# Patient Record
Sex: MALE | Race: BLACK OR AFRICAN AMERICAN | Employment: OTHER | URBAN - METROPOLITAN AREA
[De-identification: names, ages, dates, MRNs, and addresses within clinical notes are randomized per-mention and may not be internally consistent; named-entity substitution may affect disease eponyms.]

---

## 2017-01-02 ENCOUNTER — HOSPITAL ENCOUNTER (OUTPATIENT)
Dept: LAB | Age: 77
Discharge: HOME | End: 2017-01-02
Payer: MEDICARE

## 2017-01-02 DIAGNOSIS — C90.00 MULTIPLE MYELOMA NOT HAVING ACHIEVED REMISSION (HCC): ICD-10-CM

## 2017-01-02 DIAGNOSIS — Z92.3 HX OF RADIATION THERAPY: ICD-10-CM

## 2017-01-02 LAB
ALBUMIN SERPL BCP-MCNC: 3 G/DL (ref 3.2–4.6)
ALBUMIN/GLOB SERPL: 0.3 {RATIO} (ref 1.2–3.5)
ALP SERPL-CCNC: 69 U/L (ref 50–136)
ALT SERPL-CCNC: 30 U/L (ref 12–65)
ANION GAP BLD CALC-SCNC: 5 MMOL/L (ref 7–16)
AST SERPL W P-5'-P-CCNC: 70 U/L (ref 15–37)
BASOPHILS # BLD AUTO: 0 K/UL (ref 0–0.2)
BASOPHILS NFR BLD MANUAL: 1 % (ref 0–2)
BILIRUB SERPL-MCNC: 0.4 MG/DL (ref 0.2–1.1)
BLASTS NFR BLD: 2 %
BUN SERPL-MCNC: 24 MG/DL (ref 8–23)
CALCIUM SERPL-MCNC: 10.4 MG/DL (ref 8.3–10.4)
CHLORIDE SERPL-SCNC: 103 MMOL/L (ref 98–107)
CO2 SERPL-SCNC: 27 MMOL/L (ref 23–32)
CREAT SERPL-MCNC: 1.74 MG/DL (ref 0.8–1.5)
DIFFERENTIAL METHOD BLD: ABNORMAL
ERYTHROCYTE [DISTWIDTH] IN BLOOD BY AUTOMATED COUNT: 17.9 % (ref 11.9–14.6)
GLOBULIN SER CALC-MCNC: 9.4 G/DL (ref 2.3–3.5)
GLUCOSE SERPL-MCNC: 151 MG/DL (ref 65–100)
HCT VFR BLD AUTO: 28.8 % (ref 41.1–50.3)
HGB BLD-MCNC: 9.4 G/DL (ref 13.6–17.2)
LDH SERPL L TO P-CCNC: 366 U/L (ref 110–210)
LYMPHOCYTES # BLD: 1.7 K/UL (ref 0.5–4.6)
LYMPHOCYTES NFR BLD MANUAL: 37 % (ref 16–44)
MCH RBC QN AUTO: 22.5 PG (ref 26.1–32.9)
MCHC RBC AUTO-ENTMCNC: 32.6 G/DL (ref 31.4–35)
MCV RBC AUTO: 69.1 FL (ref 79.6–97.8)
MONOCYTES # BLD: 0.6 K/UL (ref 0.1–1.3)
MONOCYTES NFR BLD MANUAL: 12 % (ref 3–9)
NEUTS BAND NFR BLD MANUAL: 8 % (ref 0–6)
NEUTS SEG # BLD: 2.2 K/UL (ref 1.7–8.2)
NEUTS SEG NFR BLD MANUAL: 40 % (ref 47–75)
NRBC # BLD: 0.87 K/UL (ref 0–0.2)
PLATELET # BLD AUTO: 61 K/UL (ref 150–450)
PLATELET COMMENTS,PCOM: ABNORMAL
POTASSIUM SERPL-SCNC: 3.7 MMOL/L (ref 3.5–5.1)
PROT SERPL-MCNC: 12.4 G/DL (ref 6.3–8.2)
RBC # BLD AUTO: 4.17 M/UL (ref 4.23–5.67)
RBC MORPH BLD: ABNORMAL
SODIUM SERPL-SCNC: 135 MMOL/L (ref 136–145)
WBC # BLD AUTO: 4.6 K/UL (ref 4.3–11.1)
WBC MORPH BLD: ABNORMAL

## 2017-01-02 PROCEDURE — 36415 COLL VENOUS BLD VENIPUNCTURE: CPT | Performed by: INTERNAL MEDICINE

## 2017-01-02 PROCEDURE — 85025 COMPLETE CBC W/AUTO DIFF WBC: CPT | Performed by: INTERNAL MEDICINE

## 2017-01-02 PROCEDURE — 83615 LACTATE (LD) (LDH) ENZYME: CPT | Performed by: INTERNAL MEDICINE

## 2017-01-02 PROCEDURE — 80053 COMPREHEN METABOLIC PANEL: CPT | Performed by: INTERNAL MEDICINE

## 2017-01-03 ENCOUNTER — DOCUMENTATION ONLY (OUTPATIENT)
Dept: REGISTRATION | Age: 77
End: 2017-01-03

## 2017-01-03 NOTE — PROGRESS NOTES
I spoke with Mr. Maryan Fox regarding his insurance coverage. He has Care [employer] Plus and has no deductible and $6700 OOP MAX. Mr. Mayran Fox expressed concerns of how he would be financially               able to afford treatment. Mr. Maryan Fox inquired about Medicaid but I informed him that he was over the income limits for Medicaid. I explained that their is a dayanara available through [employer] for $10,000 that he would qualify for. Mr. Anna Underwood wife also expressed that she is not in good health and fears that she cannot physically help her  and asked about home care. I also encouraged Mr. Maryan Fox to speak  with the [employer] to apply for financial assistance. Next, I spoke with patient regarding potential oral medication authorizations. I told him that if he ever had any problems getting his oral medications filled to give the dedicated [address] [place] Yeimy Ortega financial   coordinator Neri Mitchell a call. Most of the time, it is simply an authorization that needs to be done with the insurance company. Next, I spoke with Mr. Maryan Fox regarding enrolling with ACS and [employer]. I went over some of the services that ACS and ACCA offers and the enrollment process. Lastly, I gave patient a form with various resource organizations that could assist with specific needs (example:  transportation, lodging, preparing meals, home cleaning)              Faxed Patient Referral form to the [facility] at [phone]. Phone [phone]. Form scanned into chart. Faxed Doctors Form to the [employer] at [phone]. Phone [phone]. Form scanned into chart.

## 2017-01-04 ENCOUNTER — HOSPITAL ENCOUNTER (OUTPATIENT)
Dept: INFUSION THERAPY | Age: 77
Discharge: HOME | End: 2017-01-04
Payer: MEDICARE

## 2017-01-04 VITALS
OXYGEN SATURATION: 99 % | SYSTOLIC BLOOD PRESSURE: 141 MMHG | HEART RATE: 78 BPM | TEMPERATURE: 97.7 F | BODY MASS INDEX: 27.89 KG/M2 | DIASTOLIC BLOOD PRESSURE: 76 MMHG | WEIGHT: 217.2 LBS | RESPIRATION RATE: 18 BRPM

## 2017-01-04 DIAGNOSIS — C90.00 MULTIPLE MYELOMA NOT HAVING ACHIEVED REMISSION (HCC): ICD-10-CM

## 2017-01-04 PROCEDURE — 96401 CHEMO ANTI-NEOPL SQ/IM: CPT

## 2017-01-04 PROCEDURE — 74011000250 HC RX REV CODE- 250: Performed by: INTERNAL MEDICINE

## 2017-01-04 PROCEDURE — 74011250637 HC RX REV CODE- 250/637: Performed by: INTERNAL MEDICINE

## 2017-01-04 PROCEDURE — 74011250636 HC RX REV CODE- 250/636

## 2017-01-04 PROCEDURE — 74011250636 HC RX REV CODE- 250/636: Performed by: INTERNAL MEDICINE

## 2017-01-04 RX ORDER — DEXAMETHASONE 4 MG/1
40 TABLET ORAL ONCE
Status: DISPENSED | OUTPATIENT
Start: 2017-01-04 | End: 2017-01-05

## 2017-01-04 RX ORDER — ONDANSETRON 8 MG/1
8 TABLET, ORALLY DISINTEGRATING ORAL ONCE
Status: COMPLETED | OUTPATIENT
Start: 2017-01-04 | End: 2017-01-04

## 2017-01-04 RX ADMIN — SODIUM CHLORIDE 2.95 MG: 9 INJECTION INTRAMUSCULAR; INTRAVENOUS; SUBCUTANEOUS at 16:11

## 2017-01-04 RX ADMIN — ONDANSETRON 8 MG: 8 TABLET, ORALLY DISINTEGRATING ORAL at 15:57

## 2017-01-04 NOTE — PROGRESS NOTES
Arrived ambulatory to [facility] to receive D1, C1 Velcade. Observed for 30 minutes post injection. No rxn noted. Tolerated well. Issues or concerns during appointment:  Patient reported that he forgot to take Decadron prior to arrival.  Shruthi Anne RN notified. Orders given for patient to hold the Decadron today and start tomorrow AM.  Patient verbalized understanding. Also, patient instructed to  prescription for acyclovir at pharmacy to begin taking in the AM.    Aware of next appointment on 1/11 at 2:35 AM.  Discharged ambulatory with cane.

## 2017-01-08 ENCOUNTER — HOSPITAL ENCOUNTER (EMERGENCY)
Age: 77
Discharge: HOME | DRG: 439 | End: 2017-01-08
Attending: EMERGENCY MEDICINE
Payer: MEDICARE

## 2017-01-08 ENCOUNTER — APPOINTMENT (OUTPATIENT)
Dept: CT IMAGING | Age: 77
DRG: 439 | End: 2017-01-08
Attending: EMERGENCY MEDICINE
Payer: MEDICARE

## 2017-01-08 VITALS
SYSTOLIC BLOOD PRESSURE: 156 MMHG | BODY MASS INDEX: 27.85 KG/M2 | RESPIRATION RATE: 18 BRPM | HEART RATE: 107 BPM | OXYGEN SATURATION: 95 % | HEIGHT: 74 IN | DIASTOLIC BLOOD PRESSURE: 76 MMHG | TEMPERATURE: 97.6 F | WEIGHT: 217 LBS

## 2017-01-08 DIAGNOSIS — C90.00 MULTIPLE MYELOMA, REMISSION STATUS UNSPECIFIED (HCC): ICD-10-CM

## 2017-01-08 DIAGNOSIS — R10.31 RIGHT LOWER QUADRANT ABDOMINAL PAIN: Primary | ICD-10-CM

## 2017-01-08 LAB
ALBUMIN SERPL BCP-MCNC: 2.9 G/DL (ref 3.2–4.6)
ALBUMIN/GLOB SERPL: 0.3 {RATIO} (ref 1.2–3.5)
ALP SERPL-CCNC: 70 U/L (ref 50–136)
ALT SERPL-CCNC: 26 U/L (ref 12–65)
ANION GAP BLD CALC-SCNC: 7 MMOL/L (ref 7–16)
AST SERPL W P-5'-P-CCNC: 81 U/L (ref 15–37)
BILIRUB SERPL-MCNC: 0.4 MG/DL (ref 0.2–1.1)
BUN SERPL-MCNC: 32 MG/DL (ref 8–23)
CALCIUM SERPL-MCNC: 9.9 MG/DL (ref 8.3–10.4)
CHLORIDE SERPL-SCNC: 102 MMOL/L (ref 98–107)
CO2 SERPL-SCNC: 26 MMOL/L (ref 21–32)
CREAT SERPL-MCNC: 1.6 MG/DL (ref 0.8–1.5)
DIFFERENTIAL METHOD BLD: ABNORMAL
ERYTHROCYTE [DISTWIDTH] IN BLOOD BY AUTOMATED COUNT: 18 % (ref 11.9–14.6)
GLOBULIN SER CALC-MCNC: 9 G/DL (ref 2.3–3.5)
GLUCOSE SERPL-MCNC: 99 MG/DL (ref 65–100)
HCT VFR BLD AUTO: 30 % (ref 41.1–50.3)
HGB BLD-MCNC: 9.9 G/DL (ref 13.6–17.2)
LYMPHOCYTES # BLD: 3 K/UL (ref 0.5–4.6)
LYMPHOCYTES NFR BLD MANUAL: 46 % (ref 16–44)
MCH RBC QN AUTO: 22.9 PG (ref 26.1–32.9)
MCHC RBC AUTO-ENTMCNC: 33 G/DL (ref 31.4–35)
MCV RBC AUTO: 69.4 FL (ref 79.6–97.8)
METAMYELOCYTES NFR BLD MANUAL: 3 %
MONOCYTES # BLD: 0.8 K/UL (ref 0.1–1.3)
MONOCYTES NFR BLD MANUAL: 13 % (ref 3–9)
MYELOCYTES NFR BLD MANUAL: 2 %
NEUTS SEG # BLD: 2.6 K/UL (ref 1.7–8.2)
NEUTS SEG NFR BLD MANUAL: 34 % (ref 47–75)
PLATELET # BLD AUTO: 46 K/UL (ref 150–450)
PLATELET COMMENTS,PCOM: ABNORMAL
PMV BLD AUTO: ABNORMAL FL (ref 10.8–14.1)
POTASSIUM SERPL-SCNC: 3.7 MMOL/L (ref 3.5–5.1)
PROMYELOCYTES NFR BLD MANUAL: 2 %
PROT SERPL-MCNC: 11.9 G/DL (ref 6.3–8.2)
RBC # BLD AUTO: 4.32 M/UL (ref 4.23–5.67)
RBC MORPH BLD: ABNORMAL
SODIUM SERPL-SCNC: 135 MMOL/L (ref 136–145)
WBC # BLD AUTO: 6.4 K/UL (ref 4.3–11.1)
WBC MORPH BLD: ABNORMAL

## 2017-01-08 RX ORDER — HYDROMORPHONE HYDROCHLORIDE 1 MG/ML
0.5 INJECTION, SOLUTION INTRAMUSCULAR; INTRAVENOUS; SUBCUTANEOUS ONCE
Status: COMPLETED | OUTPATIENT
Start: 2017-01-08 | End: 2017-01-08

## 2017-01-08 RX ORDER — SODIUM CHLORIDE 0.9 % (FLUSH) 0.9 %
10 SYRINGE (ML) INJECTION
Status: COMPLETED | OUTPATIENT
Start: 2017-01-08 | End: 2017-01-08

## 2017-01-08 RX ORDER — HYDROMORPHONE HYDROCHLORIDE 1 MG/ML
0.5 INJECTION, SOLUTION INTRAMUSCULAR; INTRAVENOUS; SUBCUTANEOUS
Status: COMPLETED | OUTPATIENT
Start: 2017-01-08 | End: 2017-01-08

## 2017-01-08 RX ADMIN — SODIUM CHLORIDE 1000 ML: 900 INJECTION, SOLUTION INTRAVENOUS at 16:17

## 2017-01-08 RX ADMIN — HYDROMORPHONE HYDROCHLORIDE 0.5 MG: 1 INJECTION, SOLUTION INTRAMUSCULAR; INTRAVENOUS; SUBCUTANEOUS at 19:27

## 2017-01-08 RX ADMIN — HYDROMORPHONE HYDROCHLORIDE 0.5 MG: 1 INJECTION, SOLUTION INTRAMUSCULAR; INTRAVENOUS; SUBCUTANEOUS at 21:17

## 2017-01-08 RX ADMIN — DIATRIZOATE MEGLUMINE AND DIATRIZOATE SODIUM 30 ML: 600; 100 SOLUTION ORAL; RECTAL at 16:53

## 2017-01-08 RX ADMIN — IOPAMIDOL 100 ML: 755 INJECTION, SOLUTION INTRAVENOUS at 18:43

## 2017-01-08 RX ADMIN — Medication 10 ML: at 18:43

## 2017-01-08 RX ADMIN — SODIUM CHLORIDE 100 ML: 900 INJECTION, SOLUTION INTRAVENOUS at 18:43

## 2017-01-08 NOTE — ED TRIAGE NOTES
Reports generalized abdominal pain radiating into the back. States has multiple myeloma and started treatment with chemo last week.

## 2017-01-08 NOTE — ED PROVIDER NOTES
HPI Comments: Patient comes to our department with abdominal pain that is more in the lower region and more to the right than left. In the last year he has had resection of an area of colon cancer. More recently he has been diagnosed with multiple myeloma and had last treatments on Tuesday followed by oral medication on Saturday. He has had no fever. He denies any abdominal distention. He has had no nausea vomiting. He has had neither constipation nor diarrhea. He has an old surgical scar to the right mid abdomen he is uncertain what surgery was done there but this was also used during his colon resection. Cancer : prostate, colon, multiple myeloma    Oncology called prior to patient's arrival to provide us with additional information    Patient is a 67 y.o. male presenting with abdominal pain. The history is provided by the patient and a friend. Abdominal Pain    This is a recurrent problem. The pain is located in the RLQ. The pain is moderate. Associated symptoms include constipation and back pain. Pertinent negatives include no fever, no diarrhea, no hematochezia, no melena, no nausea, no vomiting, no dysuria, no frequency and no hematuria. Nothing worsens the pain.         Past Medical History:   Diagnosis Date    Arrhythmia      LBBB    Arthritis     BMI 30.0-30.9,adult      BMI 30.5    Cardiomyopathy ([facility] [address]      followed by [facility]    Cholelithiases     Chronic systolic heart failure ([facility] [address] 9/13/2011     [place] Ass[place] class II-III heart failure symptoms    Gout     H/O: pituitary tumor      X2 benign, removed    High cholesterol     History of thyroid cancer     History of vertigo      no recent complications or episodes    Hx of radiation therapy to prostate 2004    Hyperlipidemia 11/18/2015    Hypertension     Hypothyroid      hypo- had portion of thyroid removed due to cancer    ICD (implantable cardiac defibrillator) in place 9/2011     Biotronik BIV/ICD, Gen change 3. 2.16    LBBB (left bundle branch block) 11/18/2015    Malaise and fatigue 11/18/2015    Malignant neoplasm of prostate ([address]     Mass of colon      right colon mass    Sinus node dysfunction (HCC)        Past Surgical History:   Procedure Laterality Date    Hx heent  2008     thyroidetomy partial tumor from pituitary x2    Hx cholecystectomy  2013    Hx gi       benign polyps removed    Hx heart catheterization      Hx pacemaker  2011/2016     biotronik biv-icd    Hx colonoscopy       dx with Right colon mass     Hx thyroidectomy  2008    Hx tumor removal  1990/2010     pituitary tumor removed X2         Family History:   Problem Relation Age of Onset    Heart Disease Sister     Heart Attack Sister     Stroke Mother        Social History     Social History    Marital status:      Spouse name: N/A    Number of children: N/A    Years of education: N/A     Occupational History    Not on file. Social History Main Topics    Smoking status: Never Smoker    Smokeless tobacco: Never Used    Alcohol use Yes      Comment: very rare    Drug use: No    Sexual activity: Yes     Partners: Female     Other Topics Concern    Not on file     Social History Narrative         ALLERGIES: Lisinopril and Pcn [penicillins]    Review of Systems   Constitutional: Negative for fever. Respiratory: Negative. Gastrointestinal: Positive for abdominal pain and constipation. Negative for diarrhea, hematochezia, melena, nausea and vomiting. Genitourinary: Negative for dysuria, flank pain, frequency and hematuria. Musculoskeletal: Positive for back pain. Negative for neck pain. All other systems reviewed and are negative.       Vitals:    01/08/17 1700 01/08/17 1720 01/08/17 1740 01/08/17 1800   BP: 165/72 163/70 149/67 156/70   Pulse: 73 74 73 72   Resp:       Temp:       SpO2: 98% 96% 97% 95%   Weight:       Height:                Physical Exam   Constitutional: He appears well-developed and well-nourished. No distress. Chronically ill but not toxic or septic   HENT:   Head: Atraumatic. Mouth/Throat: Oropharynx is clear and moist.   Eyes: No scleral icterus. Neck: Neck supple. Cardiovascular: Normal rate, regular rhythm and intact distal pulses. Pulmonary/Chest: Effort normal. No respiratory distress. He has no wheezes. Abdominal: Bowel sounds are normal. He exhibits no distension. There is tenderness. There is no rigidity, no rebound, no guarding and no CVA tenderness. Musculoskeletal: He exhibits no tenderness. Neurological: He is alert. Skin: Skin is warm and dry. Psychiatric: His behavior is normal. Thought content normal.   Nursing note and vitals reviewed. MDM  Number of Diagnoses or Management Options  Multiple myeloma, remission status unspecified ([facility] [address]):   Right lower quadrant abdominal pain:   Diagnosis management comments: With baseline significant illnesses will be somewhat more aggressive in evaluation than otherwise no acute surgical changes on exam.  Patient has back pain most likely related to his multiple myeloma. It is worse with certain motion. Not have urinary symptoms. Pain is mainly right mid abdomen with no redness rash or abdominal wall defect associated with this. We'll be doing basic lab work CT scan. In light of patient's ongoing concerns with constipation and gave the option of treating constipation as an outpatient versus in the emergency room trial of a single enema. Patient prefers a trial of an enema.        Amount and/or Complexity of Data Reviewed  Clinical lab tests: reviewed and ordered  Tests in the radiology section of CPT®: reviewed and ordered    Risk of Complications, Morbidity, and/or Mortality  Presenting problems: high  Diagnostic procedures: low  Management options: moderate    Patient Progress  Patient progress: stable    ED Course       Procedures    Recent Results (from the past 12 hour(s))   CBC WITH AUTOMATED DIFF Collection Time: 01/08/17  3:29 PM   Result Value Ref Range    WBC 6.4 4.3 - 11.1 K/uL    RBC 4.32 4.23 - 5.67 M/uL    HGB 9.9 (L) 13.6 - 17.2 g/dL    HCT 30.0 (L) 41.1 - 50.3 %    MCV 69.4 (L) 79.6 - 97.8 FL    MCH 22.9 (L) 26.1 - 32.9 PG    MCHC 33.0 31.4 - 35.0 g/dL    RDW 18.0 (H) 11.9 - 14.6 %    PLATELET 46 (L) 172 - 450 K/uL    MPV CANNOT BE CALCULATED 10.8 - 14.1 FL    NEUTROPHILS 34 (L) 47 - 75 %    LYMPHOCYTES 46 (H) 16 - 44 %    MONOCYTES 13 (H) 3 - 9 %    METAMYELOCYTES 3 %    MYELOCYTES 2 %    PROMYELOCYTES 2 %    ABS. NEUTROPHILS 2.6 1.7 - 8.2 K/UL    ABS. LYMPHOCYTES 3.0 0.5 - 4.6 K/UL    ABS. MONOCYTES 0.8 0.1 - 1.3 K/UL    RBC COMMENTS SLIGHT  ANISOCYTOSIS + POIKILOCYTOSIS        RBC COMMENTS MICROCYTOSIS      RBC COMMENTS TEARDROP CELLS      WBC COMMENTS ATYPICAL LYMPHOCYTES PRESENT      PLATELET COMMENTS DECREASED      DF AUTOMATED     METABOLIC PANEL, COMPREHENSIVE    Collection Time: 01/08/17  3:29 PM   Result Value Ref Range    Sodium 135 (L) 136 - 145 mmol/L    Potassium 3.7 3.5 - 5.1 mmol/L    Chloride 102 98 - 107 mmol/L    CO2 26 21 - 32 mmol/L    Anion gap 7 7 - 16 mmol/L    Glucose 99 65 - 100 mg/dL    BUN 32 (H) 8 - 23 MG/DL    Creatinine 1.60 (H) 0.8 - 1.5 MG/DL    GFR est AA 54 (L) >60 ml/min/1.73m2    GFR est non-AA 45 (L) >60 ml/min/1.73m2    Calcium 9.9 8.3 - 10.4 MG/DL    Bilirubin, total 0.4 0.2 - 1.1 MG/DL    ALT 26 12 - 65 U/L    AST 81 (H) 15 - 37 U/L    Alk. phosphatase 70 50 - 136 U/L    Protein, total 11.9 (H) 6.3 - 8.2 g/dL    Albumin 2.9 (L) 3.2 - 4.6 g/dL    Globulin 9.0 (H) 2.3 - 3.5 g/dL    A-G Ratio 0.3 (L) 1.2 - 3.5          CT of the Abdomen and Pelvis with contrast     CLINICAL INDICATION: Acute severe right abdominal pain with history of colon  cancer, prostate cancer, multiple myeloma     COMPARISON: 11/29/2016, 9/23/2016     TECHNIQUE: Automated exposure Control was used.  Multiple axial images were  obtained through the abdomen and pelvis after intravenous injection of 125cc of  isovue 370. Oral contrast given for evaluation of GI tract. Coronal reformatted  images obtained for further evaluation of organs.     FINDINGS: The partially seen lung bases demonstrate mild bilateral lower lobe  peribronchial thickening. The heart is enlarged. There is partial visualization  of pacemaker/defibrillator hardware.     Abdomen: There are no suspicious lesions in the liver or spleen. The adrenal  glands and pancreas appear unremarkable. Cholecystectomy clips are again noted. Probable bilateral renal cysts are similar to prior, but mostly too small to  characterize. There is normal enhancement of the kidneys, no hydronephrosis.      No lymphadenopathy, free air, focal inflammatory changes or fluid collections in  the abdomen. Aorta normal caliber.     There is no evidence of bowel obstruction. Again demonstrated is a right lower  quadrant abdominal wall hernia containing several loops of bowel without  definite inflammation or dilatation.     Pelvis: No acute inflammatory changes or fluid collections in the pelvis. No  lymphadenopathy. Urinary bladder is moderately fluid distended.     Bones demonstrate no acute fracture. Diffuse osseous lesions are again seen  compatible with metastases and/or myeloma. The T10 vertebral lesion measures up  to 2.5 cm (axial image 5, previously 2.3 cm at this level). The others overall  appear slightly increased in prominence and number.        IMPRESSION  IMPRESSION:   1. Diffuse osseous lesions slightly increased since prior, compatible with  myeloma or metastases. 2. Right abdominal wall hernia again noted. No bowel obstruction.   3. Mild bibasilar lung infiltrates.

## 2017-01-09 NOTE — ED NOTES
Pt given enema per order. Able to hold contents for 10  Minutes and is currently sitting on the commode.

## 2017-01-09 NOTE — ED NOTES
Report from Ravi Pham. Pt resting on stretcher flat per pt's request. Just returned from CT and states he is having pain. Will notify MD for treatment of pain.

## 2017-01-09 NOTE — DISCHARGE INSTRUCTIONS

## 2017-01-11 ENCOUNTER — APPOINTMENT (OUTPATIENT)
Dept: ULTRASOUND IMAGING | Age: 77
DRG: 439 | End: 2017-01-11
Attending: NURSE PRACTITIONER
Payer: MEDICARE

## 2017-01-11 ENCOUNTER — HOSPITAL ENCOUNTER (INPATIENT)
Age: 77
LOS: 6 days | Discharge: NURSING, REHAB OR LONG-TERM CARE | DRG: 439 | End: 2017-01-17
Attending: INTERNAL MEDICINE | Admitting: INTERNAL MEDICINE
Payer: MEDICARE

## 2017-01-11 ENCOUNTER — APPOINTMENT (OUTPATIENT)
Dept: GENERAL RADIOLOGY | Age: 77
DRG: 439 | End: 2017-01-11
Attending: NURSE PRACTITIONER
Payer: MEDICARE

## 2017-01-11 ENCOUNTER — HOSPITAL ENCOUNTER (OUTPATIENT)
Dept: INFUSION THERAPY | Age: 77
End: 2017-01-11
Payer: MEDICARE

## 2017-01-11 PROBLEM — N17.9 ACUTE KIDNEY INJURY (HCC): Status: ACTIVE | Noted: 2017-01-11

## 2017-01-11 PROBLEM — R52 INTRACTABLE PAIN: Status: ACTIVE | Noted: 2017-01-11

## 2017-01-11 PROBLEM — D61.818 PANCYTOPENIA (HCC): Status: ACTIVE | Noted: 2017-01-11

## 2017-01-11 PROBLEM — K59.00 CONSTIPATION: Status: ACTIVE | Noted: 2017-01-11

## 2017-01-11 LAB
ALBUMIN SERPL BCP-MCNC: 2.4 G/DL (ref 3.2–4.6)
ALBUMIN/GLOB SERPL: 0.3 {RATIO} (ref 1.2–3.5)
ALP SERPL-CCNC: 95 U/L (ref 50–136)
ALT SERPL-CCNC: 75 U/L (ref 12–65)
AMORPH CRY URNS QL MICRO: ABNORMAL
AMYLASE SERPL-CCNC: 174 U/L (ref 25–115)
ANION GAP BLD CALC-SCNC: 14 MMOL/L (ref 7–16)
APPEARANCE UR: ABNORMAL
AST SERPL W P-5'-P-CCNC: 148 U/L (ref 15–37)
BACTERIA URNS QL MICRO: 0 /HPF
BILIRUB SERPL-MCNC: 0.6 MG/DL (ref 0.2–1.1)
BILIRUB UR QL: NEGATIVE
BLASTS NFR BLD: 4 %
BUN SERPL-MCNC: 71 MG/DL (ref 8–23)
CALCIUM SERPL-MCNC: 9.1 MG/DL (ref 8.3–10.4)
CHLORIDE SERPL-SCNC: 102 MMOL/L (ref 98–107)
CK MB CFR SERPL CALC: 0.3 %
CK MB SERPL-MCNC: 1.8 NG/ML (ref 0.5–3.6)
CK SERPL-CCNC: 706 U/L (ref 21–215)
CO2 SERPL-SCNC: 21 MMOL/L (ref 21–32)
COLOR UR: YELLOW
CREAT SERPL-MCNC: 4.23 MG/DL (ref 0.8–1.5)
CREAT UR-MCNC: 90.4 MG/DL
DIFFERENTIAL METHOD BLD: ABNORMAL
EOSINOPHIL # BLD: 0 K/UL (ref 0–0.8)
EOSINOPHIL NFR BLD MANUAL: 1 % (ref 1–8)
EPI CELLS #/AREA URNS HPF: ABNORMAL /HPF
ERYTHROCYTE [DISTWIDTH] IN BLOOD BY AUTOMATED COUNT: 18.2 % (ref 11.9–14.6)
GLOBULIN SER CALC-MCNC: 7.1 G/DL (ref 2.3–3.5)
GLUCOSE SERPL-MCNC: 95 MG/DL (ref 65–100)
GLUCOSE UR STRIP.AUTO-MCNC: NEGATIVE MG/DL
HCT VFR BLD AUTO: 22 % (ref 41.1–50.3)
HGB BLD-MCNC: 7.4 G/DL (ref 13.6–17.2)
HGB UR QL STRIP: ABNORMAL
KETONES UR QL STRIP.AUTO: NEGATIVE MG/DL
LEUKOCYTE ESTERASE UR QL STRIP.AUTO: NEGATIVE
LIPASE SERPL-CCNC: 384 U/L (ref 73–393)
LYMPHOCYTES # BLD: 2.1 K/UL (ref 0.5–4.6)
LYMPHOCYTES NFR BLD MANUAL: 63 % (ref 16–44)
MAGNESIUM SERPL-MCNC: 2.3 MG/DL (ref 1.8–2.4)
MCH RBC QN AUTO: 22.8 PG (ref 26.1–32.9)
MCHC RBC AUTO-ENTMCNC: 33.6 G/DL (ref 31.4–35)
MCV RBC AUTO: 67.7 FL (ref 79.6–97.8)
METAMYELOCYTES NFR BLD MANUAL: 2 %
MONOCYTES # BLD: 0 K/UL (ref 0.1–1.3)
MONOCYTES NFR BLD MANUAL: 1 % (ref 3–9)
MYELOCYTES NFR BLD MANUAL: 3 %
MYOGLOBIN SERPL-MCNC: 1406 NG/ML (ref 16–96)
NEUTS BAND NFR BLD MANUAL: 4 % (ref 0–10)
NEUTS SEG # BLD: 1.1 K/UL (ref 1.7–8.2)
NEUTS SEG NFR BLD MANUAL: 21 % (ref 47–75)
NITRITE UR QL STRIP.AUTO: NEGATIVE
NRBC BLD-RTO: 3 PER 100 WBC
OTHER OBSERVATIONS,UCOM: ABNORMAL
PH UR STRIP: 5 [PH] (ref 5–9)
PLATELET # BLD AUTO: 31 K/UL (ref 150–450)
PLATELET COMMENTS,PCOM: ABNORMAL
PMV BLD AUTO: ABNORMAL FL (ref 10.8–14.1)
POTASSIUM SERPL-SCNC: 3.8 MMOL/L (ref 3.5–5.1)
PROMYELOCYTES NFR BLD MANUAL: 1 %
PROT SERPL-MCNC: 9.5 G/DL (ref 6.3–8.2)
PROT UR STRIP-MCNC: ABNORMAL MG/DL
RBC # BLD AUTO: 3.25 M/UL (ref 4.23–5.67)
RBC #/AREA URNS HPF: ABNORMAL /HPF
RBC MORPH BLD: ABNORMAL
SODIUM SERPL-SCNC: 137 MMOL/L (ref 136–145)
SODIUM UR-SCNC: 50 MMOL/L
SP GR UR REFRACTOMETRY: 1.01 (ref 1–1.02)
UROBILINOGEN UR QL STRIP.AUTO: 1 EU/DL (ref 0.2–1)
WBC # BLD AUTO: 3.4 K/UL (ref 4.3–11.1)
WBC URNS QL MICRO: ABNORMAL /HPF

## 2017-01-11 PROCEDURE — 80053 COMPREHEN METABOLIC PANEL: CPT | Performed by: NURSE PRACTITIONER

## 2017-01-11 PROCEDURE — 74020 XR ABD (AP AND ERECT OR DECUB): CPT

## 2017-01-11 PROCEDURE — 86644 CMV ANTIBODY: CPT | Performed by: NURSE PRACTITIONER

## 2017-01-11 PROCEDURE — 85025 COMPLETE CBC W/AUTO DIFF WBC: CPT | Performed by: NURSE PRACTITIONER

## 2017-01-11 PROCEDURE — 81001 URINALYSIS AUTO W/SCOPE: CPT | Performed by: NURSE PRACTITIONER

## 2017-01-11 PROCEDURE — 74011250637 HC RX REV CODE- 250/637: Performed by: NURSE PRACTITIONER

## 2017-01-11 PROCEDURE — 86923 COMPATIBILITY TEST ELECTRIC: CPT | Performed by: NURSE PRACTITIONER

## 2017-01-11 PROCEDURE — 65270000029 HC RM PRIVATE

## 2017-01-11 PROCEDURE — 86900 BLOOD TYPING SEROLOGIC ABO: CPT | Performed by: NURSE PRACTITIONER

## 2017-01-11 PROCEDURE — 76770 US EXAM ABDO BACK WALL COMP: CPT

## 2017-01-11 PROCEDURE — 82570 ASSAY OF URINE CREATININE: CPT | Performed by: NURSE PRACTITIONER

## 2017-01-11 PROCEDURE — 77030013131 HC IV BLD ST ICUM -A

## 2017-01-11 PROCEDURE — 83690 ASSAY OF LIPASE: CPT | Performed by: NURSE PRACTITIONER

## 2017-01-11 PROCEDURE — 83735 ASSAY OF MAGNESIUM: CPT | Performed by: NURSE PRACTITIONER

## 2017-01-11 PROCEDURE — 87086 URINE CULTURE/COLONY COUNT: CPT | Performed by: NURSE PRACTITIONER

## 2017-01-11 PROCEDURE — 82550 ASSAY OF CK (CPK): CPT | Performed by: NURSE PRACTITIONER

## 2017-01-11 PROCEDURE — 84300 ASSAY OF URINE SODIUM: CPT | Performed by: NURSE PRACTITIONER

## 2017-01-11 PROCEDURE — 74011250636 HC RX REV CODE- 250/636: Performed by: NURSE PRACTITIONER

## 2017-01-11 PROCEDURE — 36415 COLL VENOUS BLD VENIPUNCTURE: CPT | Performed by: NURSE PRACTITIONER

## 2017-01-11 PROCEDURE — 83874 ASSAY OF MYOGLOBIN: CPT | Performed by: NURSE PRACTITIONER

## 2017-01-11 PROCEDURE — 82150 ASSAY OF AMYLASE: CPT | Performed by: NURSE PRACTITIONER

## 2017-01-11 RX ORDER — ACETAMINOPHEN 325 MG/1
650 TABLET ORAL
Status: DISCONTINUED | OUTPATIENT
Start: 2017-01-11 | End: 2017-01-17 | Stop reason: HOSPADM

## 2017-01-11 RX ORDER — MELATONIN
2000 DAILY
Status: DISCONTINUED | OUTPATIENT
Start: 2017-01-12 | End: 2017-01-17 | Stop reason: HOSPADM

## 2017-01-11 RX ORDER — CARVEDILOL 25 MG/1
25 TABLET ORAL 2 TIMES DAILY WITH MEALS
Status: DISCONTINUED | OUTPATIENT
Start: 2017-01-12 | End: 2017-01-11

## 2017-01-11 RX ORDER — SODIUM CHLORIDE 9 MG/ML
125 INJECTION, SOLUTION INTRAVENOUS CONTINUOUS
Status: DISCONTINUED | OUTPATIENT
Start: 2017-01-11 | End: 2017-01-17 | Stop reason: HOSPADM

## 2017-01-11 RX ORDER — DEXAMETHASONE 4 MG/1
40 TABLET ORAL
Status: DISCONTINUED | OUTPATIENT
Start: 2017-01-12 | End: 2017-01-11

## 2017-01-11 RX ORDER — CHLORHEXIDINE GLUCONATE 1.2 MG/ML
15 RINSE ORAL 4 TIMES DAILY
Status: DISCONTINUED | OUTPATIENT
Start: 2017-01-11 | End: 2017-01-17 | Stop reason: HOSPADM

## 2017-01-11 RX ORDER — LANOLIN ALCOHOL/MO/W.PET/CERES
1000 CREAM (GRAM) TOPICAL DAILY
Status: DISCONTINUED | OUTPATIENT
Start: 2017-01-12 | End: 2017-01-17 | Stop reason: HOSPADM

## 2017-01-11 RX ORDER — ROSUVASTATIN CALCIUM 20 MG/1
10 TABLET, COATED ORAL
Status: DISCONTINUED | OUTPATIENT
Start: 2017-01-11 | End: 2017-01-17 | Stop reason: HOSPADM

## 2017-01-11 RX ORDER — ENOXAPARIN SODIUM 100 MG/ML
40 INJECTION SUBCUTANEOUS EVERY 24 HOURS
Status: DISCONTINUED | OUTPATIENT
Start: 2017-01-11 | End: 2017-01-11

## 2017-01-11 RX ORDER — POLYETHYLENE GLYCOL 3350 17 G/17G
17 POWDER, FOR SOLUTION ORAL DAILY
Status: DISCONTINUED | OUTPATIENT
Start: 2017-01-11 | End: 2017-01-17 | Stop reason: HOSPADM

## 2017-01-11 RX ORDER — LUBIPROSTONE 8 UG/1
8 CAPSULE, GELATIN COATED ORAL DAILY
Status: DISCONTINUED | OUTPATIENT
Start: 2017-01-12 | End: 2017-01-17 | Stop reason: HOSPADM

## 2017-01-11 RX ORDER — ASPIRIN 81 MG/1
81 TABLET ORAL
Status: DISCONTINUED | OUTPATIENT
Start: 2017-01-12 | End: 2017-01-11

## 2017-01-11 RX ORDER — VALSARTAN 320 MG/1
320 TABLET ORAL
Status: DISCONTINUED | OUTPATIENT
Start: 2017-01-11 | End: 2017-01-17 | Stop reason: HOSPADM

## 2017-01-11 RX ORDER — CARVEDILOL 25 MG/1
25 TABLET ORAL 2 TIMES DAILY WITH MEALS
Status: DISCONTINUED | OUTPATIENT
Start: 2017-01-11 | End: 2017-01-17 | Stop reason: HOSPADM

## 2017-01-11 RX ORDER — AMLODIPINE BESYLATE 5 MG/1
5 TABLET ORAL DAILY
Status: DISCONTINUED | OUTPATIENT
Start: 2017-01-12 | End: 2017-01-17 | Stop reason: HOSPADM

## 2017-01-11 RX ORDER — ACYCLOVIR 800 MG/1
400 TABLET ORAL
Status: DISPENSED | OUTPATIENT
Start: 2017-01-11 | End: 2017-01-15

## 2017-01-11 RX ORDER — LEVOTHYROXINE SODIUM 125 UG/1
125 TABLET ORAL
Status: DISCONTINUED | OUTPATIENT
Start: 2017-01-12 | End: 2017-01-17 | Stop reason: HOSPADM

## 2017-01-11 RX ORDER — SODIUM CHLORIDE 9 MG/ML
250 INJECTION, SOLUTION INTRAVENOUS AS NEEDED
Status: DISCONTINUED | OUTPATIENT
Start: 2017-01-11 | End: 2017-01-17 | Stop reason: HOSPADM

## 2017-01-11 RX ORDER — PANTOPRAZOLE SODIUM 40 MG/1
40 TABLET, DELAYED RELEASE ORAL
Status: DISCONTINUED | OUTPATIENT
Start: 2017-01-11 | End: 2017-01-17 | Stop reason: HOSPADM

## 2017-01-11 RX ORDER — DIPHENHYDRAMINE HCL 25 MG
25 CAPSULE ORAL
Status: DISCONTINUED | OUTPATIENT
Start: 2017-01-11 | End: 2017-01-17 | Stop reason: HOSPADM

## 2017-01-11 RX ORDER — ALLOPURINOL 100 MG/1
300 TABLET ORAL
Status: DISCONTINUED | OUTPATIENT
Start: 2017-01-11 | End: 2017-01-11

## 2017-01-11 RX ORDER — FUROSEMIDE 20 MG/1
20 TABLET ORAL
Status: DISCONTINUED | OUTPATIENT
Start: 2017-01-11 | End: 2017-01-11

## 2017-01-11 RX ORDER — OXYCODONE AND ACETAMINOPHEN 5; 325 MG/1; MG/1
1-2 TABLET ORAL
Status: DISCONTINUED | OUTPATIENT
Start: 2017-01-11 | End: 2017-01-17 | Stop reason: HOSPADM

## 2017-01-11 RX ORDER — OXYCODONE HCL 10 MG/1
10 TABLET, FILM COATED, EXTENDED RELEASE ORAL EVERY 12 HOURS
Status: DISCONTINUED | OUTPATIENT
Start: 2017-01-11 | End: 2017-01-17 | Stop reason: HOSPADM

## 2017-01-11 RX ADMIN — ACYCLOVIR 400 MG: 800 TABLET ORAL at 13:31

## 2017-01-11 RX ADMIN — ROSUVASTATIN CALCIUM 10 MG: 20 TABLET, FILM COATED ORAL at 22:56

## 2017-01-11 RX ADMIN — SODIUM CHLORIDE 100 ML/HR: 900 INJECTION, SOLUTION INTRAVENOUS at 22:55

## 2017-01-11 RX ADMIN — LACTULOSE 20 G: 10 SOLUTION ORAL at 18:49

## 2017-01-11 RX ADMIN — OXYCODONE HYDROCHLORIDE 10 MG: 10 TABLET, FILM COATED, EXTENDED RELEASE ORAL at 20:44

## 2017-01-11 RX ADMIN — ACYCLOVIR 400 MG: 800 TABLET ORAL at 22:55

## 2017-01-11 RX ADMIN — CARVEDILOL 25 MG: 25 TABLET, FILM COATED ORAL at 18:50

## 2017-01-11 RX ADMIN — CHLORHEXIDINE GLUCONATE 15 ML: 1.2 RINSE ORAL at 22:55

## 2017-01-11 RX ADMIN — SODIUM CHLORIDE 100 ML/HR: 900 INJECTION, SOLUTION INTRAVENOUS at 11:26

## 2017-01-11 RX ADMIN — ACYCLOVIR 400 MG: 800 TABLET ORAL at 18:49

## 2017-01-11 RX ADMIN — DIPHENHYDRAMINE HYDROCHLORIDE 25 MG: 25 CAPSULE ORAL at 22:56

## 2017-01-11 RX ADMIN — ACETAMINOPHEN 650 MG: 325 TABLET, FILM COATED ORAL at 22:56

## 2017-01-11 RX ADMIN — FUROSEMIDE 20 MG: 20 TABLET ORAL at 13:31

## 2017-01-11 RX ADMIN — VALSARTAN 320 MG: 320 TABLET, FILM COATED ORAL at 22:56

## 2017-01-11 RX ADMIN — PANTOPRAZOLE SODIUM 40 MG: 40 TABLET, DELAYED RELEASE ORAL at 13:30

## 2017-01-11 RX ADMIN — POLYETHYLENE GLYCOL 3350 17 G: 17 POWDER, FOR SOLUTION ORAL at 13:30

## 2017-01-11 NOTE — H&P
Inpatient Hematology / Oncology History and Physical    Reason for Asmission:  Multiple Myeloma/Weakness  Intractable pain    History of Present Illness:  Mr. Regina Summers is a 67 y.o. male admitted on 1/11/2017 with a primary diagnosis of intractable pain and dehydration. He is a known patient of Dr Yuliana Shi for resected colon cancer in Feburary 2016 and a new (12/16) diagnosis of multiple myeloma. He has been experiencing both back and abdominal pain along with general malaise that has markedly increased in the last 24 hours. His wife called his nurse navigator this morning and states he is much weaker and unable to hold his own weight. His wife reports that he fell around 3am today and she and a neighbor were unable to get him up therefore 911 was called for assistance. He was recently seen in the ED over the weekend for RLQ pain and constipation. He was given an enema is minimal relief while in the ED. He states today waking with dried blood to his lips and mouth along with noticing blood in his underwear from his penis. Review of Systems:  Constitutional + fatigue. Denies fever, chills, weight loss, appetite changes, night sweats. HEENT Denies trauma, blurry vision, hearing loss, ear pain, nosebleeds, sore throat, neck pain and ear discharge. Skin Denies lesions or rashes. Lungs Denies dyspnea, cough, sputum production or hemoptysis. Cardiovascular Denies chest pain, palpitations, or lower extremity edema. Gastrointestinal + abdominal pain and constipation. Denies nausea, vomiting, changes in bowel habits, bloody or black stools.  +blood from penis. Denies dysuria, frequency or hesitancy of urination. Neuro Denies headaches, visual changes or ataxia. Denies dizziness, tingling, tremors, sensory change, speech change, focal weakness or headaches. Hematology + bleeding from lip-unsure if from trauma. Denies easy bruising, gingival bleeding or epistaxis.    Endo Denies heat/cold intolerance, denies diabetes or thyroid abnormalities. MSK + back pain and recent falls. Denies arthralgias or myalgias. Psychiatric/Behavioral Denies depression and substance abuse. The patient is not nervous/anxious.          Allergies   Allergen Reactions    Lisinopril Cough    Pcn [Penicillins] Swelling     Past Medical History   Diagnosis Date    Arrhythmia      LBBB    Arthritis     BMI 30.0-30.9,adult      BMI 30.5    Cardiomyopathy ([facility] [address]      followed by [address] [facility]    Cholelithiases     Chronic systolic heart failure ([facility] [address] 9/13/2011     [place] Ass[place] class II-III heart failure symptoms    Gout     H/O: pituitary tumor      X2 benign, removed    High cholesterol     History of thyroid cancer     History of vertigo      no recent complications or episodes    Hx of radiation therapy to prostate 2004    Hyperlipidemia 11/18/2015    Hypertension     Hypothyroid      hypo- had portion of thyroid removed due to cancer    ICD (implantable cardiac defibrillator) in place 9/2011     B[employer]ronik BIV/ICD, Gen change 3.2.16    LBBB (left bundle branch block) 11/18/2015    Malaise and fatigue 11/18/2015    Malignant neoplasm of prostate ([facility] [address]     Mass of colon      right colon mass    Sinus node dysfunction (HCC)      Past Surgical History   Procedure Laterality Date    Hx heent  2008     thyroidetomy partial tumor from pituitary x2    Hx cholecystectomy  2013    Hx gi       benign polyps removed    Hx heart catheterization      Hx pacemaker  2011/2016     biotronik biv-icd    Hx colonoscopy       dx with Right colon mass     Hx thyroidectomy  2008    Hx tumor removal  1990/2010     pituitary tumor removed X2     Family History   Problem Relation Age of Onset    Heart Disease Sister     Heart Attack Sister     Stroke Mother      Social History     Social History    Marital status:      Spouse name: N/A    Number of children: N/A    Years of education: N/A Occupational History    Not on file. Social History Main Topics    Smoking status: Never Smoker    Smokeless tobacco: Never Used    Alcohol use Yes      Comment: very rare    Drug use: No    Sexual activity: Yes     Partners: Female     Other Topics Concern    Not on file     Social History Narrative     Current Facility-Administered Medications   Medication Dose Route Frequency Provider Last Rate Last Dose    acyclovir (ZOVIRAX) tablet 400 mg  400 mg Oral Q4HWA Consuelo Mao NP   400 mg at 01/11/17 1331    allopurinol (ZYLOPRIM) tablet 300 mg  300 mg Oral QHS Consuelo Mao NP       [facility] ON 1/12/2017] amLODIPine (NORVASC) tablet 5 mg  5 mg Oral DAILY Consuelo Mao NP        [START ON 1/12/2017] aspirin delayed-release tablet 81 mg  81 mg Oral 7am Consuelo Mao NP        [START ON 1/12/2017] carvedilol (COREG) tablet 25 mg  25 mg Oral BID WITH MEALS Consuelo Mao NP        [START ON 1/12/2017] . PHARMACY TO SUBSTITUTE PER PROTOCOL    DAILY Consuelo Mao NP        [START ON 1/12/2017] cyanocobalamin tablet 1,000 mcg  1,000 mcg Oral DAILY Consuelo Mao NP        [START ON 1/12/2017] dexamethasone (DECADRON) tablet 40 mg  40 mg Oral Q7D Consuelo Mao NP        furosemide (LASIX) tablet 20 mg  20 mg Oral PCL Consuelo Mao NP   20 mg at 01/11/17 1331    valsartan (DIOVAN) tablet 320 mg  320 mg Oral QHS Consuelo Mao NP        [START ON 1/12/2017] levothyroxine (SYNTHROID) tablet 125 mcg  125 mcg Oral ACB Consuelo Mao NP        . PHARMACY TO SUBSTITUTE PER PROTOCOL    Per Protocol Consuelo Mao NP        oxyCODONE-acetaminophen (PERCOCET) 5-325 mg per tablet 1-2 Tab  1-2 Tab Oral Q4H PRN Consuelo Mao NP        pantoprazole (PROTONIX) tablet 40 mg  40 mg Oral ACB Consuelo Mao NP   40 mg at 01/11/17 1330    polyethylene glycol (MIRALAX) packet 17 g  17 g Oral DAILY Consuelo Mao NP   17 g at 01/11/17 1330    rosuvastatin (CRESTOR) tablet 10 mg 10 mg Oral QHS Edda , NP        enoxaparin (LOVENOX) injection 40 mg  40 mg SubCUTAneous Q24H Edda , NP        0.9% sodium chloride infusion  100 mL/hr IntraVENous CONTINUOUS Edda [place],  mL/hr at 17 1126 100 mL/hr at 17 1126       OBJECTIVE:  Patient Vitals for the past 8 hrs:   BP Temp Pulse Resp SpO2   17 1324 117/61 97.2 °F (36.2 °C) 72 20 93 %   17 1109 - - - - 97 %   17 1105 125/62 100 °F (37.8 °C) 74 20 93 %     Temp (24hrs), Av.6 °F (37 °C), Min:97.2 °F (36.2 °C), Max:100 °F (37.8 °C)     0701 -  1900  In: 240 [P.O.:240]  Out: -     Physical Exam:  Constitutional: Well developed, well nourished male in no acute distress, sitting comfortably in the hospital bed. HEENT: Dried blood to lips and front teeth. Normocephalic and atraumatic. Oropharynx is clear, mucous membranes are moist.  Pupils are equal and round. Extraocular muscles are intact. Sclerae anicteric. Neck supple without JVD. No thyromegaly present. Skin Warm and dry. No bruising and no rash noted. No erythema. No pallor. Respiratory Lungs are clear to auscultation bilaterally without wheezes, rales or rhonchi, normal air exchange without accessory muscle use. CVS Normal rate, regular rhythm and normal S1 and S2. No murmurs, gallops, or rubs. Abdomen + tenderness to lower abdomen. Soft, nondistended, normoactive bowel sounds. No palpable mass. No hepatosplenomegaly. Neuro Grossly nonfocal with no obvious sensory or motor deficits. MSK Generalized weakness. Normal range of motion in general.  No edema and no tenderness. Psych Appropriate mood and affect.         Labs:    Recent Results (from the past 24 hour(s))   METABOLIC PANEL, COMPREHENSIVE    Collection Time: 17 11:50 AM   Result Value Ref Range    Sodium 137 136 - 145 mmol/L    Potassium 3.8 3.5 - 5.1 mmol/L    Chloride 102 98 - 107 mmol/L    CO2 21 21 - 32 mmol/L    Anion gap 14 7 - 16 mmol/L    Glucose 95 65 - 100 mg/dL    BUN 71 (H) 8 - 23 MG/DL    Creatinine 4.23 (H) 0.8 - 1.5 MG/DL    GFR est AA 18 (L) >60 ml/min/1.73m2    GFR est non-AA 15 (L) >60 ml/min/1.73m2    Calcium 9.1 8.3 - 10.4 MG/DL    Bilirubin, total 0.6 0.2 - 1.1 MG/DL    ALT 75 (H) 12 - 65 U/L     (H) 15 - 37 U/L    Alk. phosphatase 95 50 - 136 U/L    Protein, total 9.5 (H) 6.3 - 8.2 g/dL    Albumin 2.4 (L) 3.2 - 4.6 g/dL    Globulin 7.1 (H) 2.3 - 3.5 g/dL    A-G Ratio 0.3 (L) 1.2 - 3.5     MAGNESIUM    Collection Time: 01/11/17 11:50 AM   Result Value Ref Range    Magnesium 2.3 1.8 - 2.4 mg/dL   CBC WITH AUTOMATED DIFF    Collection Time: 01/11/17 11:50 AM   Result Value Ref Range    WBC 3.4 (L) 4.3 - 11.1 K/uL    RBC 3.25 (L) 4.23 - 5.67 M/uL    HGB 7.4 (L) 13.6 - 17.2 g/dL    HCT 22.0 (L) 41.1 - 50.3 %    MCV 67.7 (L) 79.6 - 97.8 FL    MCH 22.8 (L) 26.1 - 32.9 PG    MCHC 33.6 31.4 - 35.0 g/dL    RDW 18.2 (H) 11.9 - 14.6 %    PLATELET 31 (L) 774 - 450 K/uL    MPV CANNOT BE CALCULATED 10.8 - 14.1 FL    DF PENDING        Imaging:  [unfilled]    ASSESSMENT:  Problem List  Date Reviewed: 1/2/2017          Codes Class Noted    Intractable pain ICD-10-CM: R52  ICD-9-CM: 780.96  1/11/2017        Multiple myeloma (HCC) ICD-10-CM: C90.00  ICD-9-CM: 203.00  1/2/2017        Postural imbalance ICD-10-CM: R29.3  ICD-9-CM: 729.90  12/14/2016        Polyneuropathy ICD-10-CM: G62.9  ICD-9-CM: 356.9  12/14/2016        Fall ICD-10-CM: R39. Jossie Lay  ICD-9-CM: E888.9  12/14/2016        Encounter for medication management ICD-10-CM: Z79.899  ICD-9-CM: V58.69  12/14/2016        Urinary retention ICD-10-CM: R33.9  ICD-9-CM: 788.20  6/6/2016        Colonic mass ICD-10-CM: K63.9  ICD-9-CM: 569.89  3/23/2016        Hyperlipidemia ICD-10-CM: E78.5  ICD-9-CM: 272.4  11/18/2015        Vertigo ICD-10-CM: Z59  ICD-9-CM: 780.4  11/18/2015        Malaise and fatigue ICD-10-CM: R53.81, R53.83  ICD-9-CM: 780.79  11/18/2015        Cardiomyopathy ([facility] [address] ICD-10-CM: I42.9  ICD-9-CM: 425.4  11/18/2015        LBBB (left bundle branch block) ICD-10-CM: I44.7  ICD-9-CM: 426.3  11/18/2015        Arthritis ICD-10-CM: M19.90  ICD-9-CM: 716.90  Unknown        Arrhythmia ICD-10-CM: I49.9  ICD-9-CM: 427.9  Unknown    Overview Signed 6/18/2013  2:07 PM by Leonides Cantor MD     LBBB             Cholelithiases ICD-10-CM: R29.58  ICD-9-CM: 574.20  Unknown        Hx of radiation therapy to prostate ICD-10-CM: Z92.3  ICD-9-CM: V15.3  Unknown        Chronic systolic heart failure ([facility] [address] ICD-10-CM: I50.22  ICD-9-CM: 428.22  9/13/2011        Automatic implantable cardioverter-defibrillator in situ ICD-10-CM: Z95.810  ICD-9-CM: V45.02  9/1/2011                PLAN:  -Multiple Myeloma  Currently holding Revlimid, Dexamethasone and Velcade    -Abdominal/Back Pain/Constipation  Order KUB, lactulose, Oxycontin 10mg BID, Percocet 5 1-2 tabs q4h prn    -MARY  Gentle hydration due to EF of 30-35%, renal US, check FeNa, consider nephrology consult tomorrow if no improvement, hold lasix     -Pancytopenia  1 unit RBCs, monitor platelet and WBCs. Hold anticoagulation per platelets <25M. -Mucositis  Good oral hygiene, Peridex mouthwash QID    -Supportive Care  Follow Steven Davis NP   [facility]  [id number] [place] [address]  Office : [phone]  Fax : [phone]   Patient seen, examed and discussed with NP, agree with above history/assessment/plan. 67 y. o.male h/o colon cancer had new dx of MM and started RVD with Dr. Dee Edward, adimitted for abd pain, constipation, dehydration, weakness and unsteady, CT is reviewed personally and appeared stool impaction, s/p enema, give lactulose, check amylase/lipase for pancreatitis, check CPK/myoglobulin increased c/w myopathy, appeared very dry , dehydration and MARY and need IVF, hold chemo. Georgina Herrera M.D.   [address] [address]  Office : [phone]  Fax : [phone]

## 2017-01-11 NOTE — PROGRESS NOTES
Head to  Toe skin assessment completed with this nurse and  Griselda Wood RN charge nurse reveal no skin breaks. Dry skin noted old blood from oral mucosa.

## 2017-01-11 NOTE — PROGRESS NOTES
TRANSFER - IN REPORT:    Verbal report received from Chart review  on Daneil Nim  being received from home- direct admission  To hospital  for routine progression of care      Report consisted of patients Situation, Background, Assessment and   Recommendations(SBAR). Information from the following report(s) SBAR and Kardex was reviewed with the receiving nurse. Opportunity for questions and clarification was provided. Assessment completed upon patients arrival to unit and care assumed.

## 2017-01-12 ENCOUNTER — APPOINTMENT (OUTPATIENT)
Dept: GENERAL RADIOLOGY | Age: 77
DRG: 439 | End: 2017-01-12
Attending: NURSE PRACTITIONER
Payer: MEDICARE

## 2017-01-12 LAB
ALBUMIN SERPL BCP-MCNC: 2.2 G/DL (ref 3.2–4.6)
ALBUMIN/GLOB SERPL: 0.3 {RATIO} (ref 1.2–3.5)
ALP SERPL-CCNC: 85 U/L (ref 50–136)
ALT SERPL-CCNC: 74 U/L (ref 12–65)
ANION GAP BLD CALC-SCNC: 12 MMOL/L (ref 7–16)
AST SERPL W P-5'-P-CCNC: 169 U/L (ref 15–37)
BILIRUB SERPL-MCNC: 0.4 MG/DL (ref 0.2–1.1)
BLASTS NFR BLD: 1 %
BUN SERPL-MCNC: 72 MG/DL (ref 8–23)
CALCIUM SERPL-MCNC: 9.2 MG/DL (ref 8.3–10.4)
CHLORIDE SERPL-SCNC: 107 MMOL/L (ref 98–107)
CO2 SERPL-SCNC: 22 MMOL/L (ref 21–32)
CREAT SERPL-MCNC: 3.91 MG/DL (ref 0.8–1.5)
DIFFERENTIAL METHOD BLD: ABNORMAL
EOSINOPHIL # BLD: 0.1 K/UL (ref 0–0.8)
EOSINOPHIL NFR BLD MANUAL: 3 % (ref 1–8)
ERYTHROCYTE [DISTWIDTH] IN BLOOD BY AUTOMATED COUNT: 18.1 % (ref 11.9–14.6)
GLOBULIN SER CALC-MCNC: 6.6 G/DL (ref 2.3–3.5)
GLUCOSE SERPL-MCNC: 87 MG/DL (ref 65–100)
HCT VFR BLD AUTO: 21.2 % (ref 41.1–50.3)
HGB BLD-MCNC: 7.2 G/DL (ref 13.6–17.2)
LYMPHOCYTES # BLD: 1.6 K/UL (ref 0.5–4.6)
LYMPHOCYTES NFR BLD MANUAL: 62 % (ref 16–44)
MAGNESIUM SERPL-MCNC: 2.2 MG/DL (ref 1.8–2.4)
MCH RBC QN AUTO: 23.1 PG (ref 26.1–32.9)
MCHC RBC AUTO-ENTMCNC: 34 G/DL (ref 31.4–35)
MCV RBC AUTO: 67.9 FL (ref 79.6–97.8)
METAMYELOCYTES NFR BLD MANUAL: 2 %
MYELOCYTES NFR BLD MANUAL: 2 %
NEUTS BAND NFR BLD MANUAL: 6 % (ref 0–10)
NEUTS SEG # BLD: 0.9 K/UL (ref 1.7–8.2)
NEUTS SEG NFR BLD MANUAL: 24 % (ref 47–75)
NRBC BLD-RTO: 7 PER 100 WBC
PLATELET # BLD AUTO: 31 K/UL (ref 150–450)
PLATELET COMMENTS,PCOM: ABNORMAL
PMV BLD AUTO: ABNORMAL FL (ref 10.8–14.1)
POTASSIUM SERPL-SCNC: 4 MMOL/L (ref 3.5–5.1)
PROT SERPL-MCNC: 8.8 G/DL (ref 6.3–8.2)
RBC # BLD AUTO: 3.12 M/UL (ref 4.23–5.67)
RBC MORPH BLD: ABNORMAL
SODIUM SERPL-SCNC: 141 MMOL/L (ref 136–145)
WBC # BLD AUTO: 2.5 K/UL (ref 4.3–11.1)

## 2017-01-12 PROCEDURE — 83735 ASSAY OF MAGNESIUM: CPT | Performed by: NURSE PRACTITIONER

## 2017-01-12 PROCEDURE — 74011250636 HC RX REV CODE- 250/636: Performed by: NURSE PRACTITIONER

## 2017-01-12 PROCEDURE — 97166 OT EVAL MOD COMPLEX 45 MIN: CPT

## 2017-01-12 PROCEDURE — 36430 TRANSFUSION BLD/BLD COMPNT: CPT

## 2017-01-12 PROCEDURE — 80053 COMPREHEN METABOLIC PANEL: CPT | Performed by: NURSE PRACTITIONER

## 2017-01-12 PROCEDURE — 71020 XR CHEST PA LAT: CPT

## 2017-01-12 PROCEDURE — 74011250637 HC RX REV CODE- 250/637: Performed by: NURSE PRACTITIONER

## 2017-01-12 PROCEDURE — 86644 CMV ANTIBODY: CPT | Performed by: NURSE PRACTITIONER

## 2017-01-12 PROCEDURE — 65270000029 HC RM PRIVATE

## 2017-01-12 PROCEDURE — P9040 RBC LEUKOREDUCED IRRADIATED: HCPCS | Performed by: NURSE PRACTITIONER

## 2017-01-12 PROCEDURE — 97161 PT EVAL LOW COMPLEX 20 MIN: CPT

## 2017-01-12 PROCEDURE — 36415 COLL VENOUS BLD VENIPUNCTURE: CPT | Performed by: NURSE PRACTITIONER

## 2017-01-12 PROCEDURE — 87040 BLOOD CULTURE FOR BACTERIA: CPT | Performed by: NURSE PRACTITIONER

## 2017-01-12 PROCEDURE — 30233N1 TRANSFUSION OF NONAUTOLOGOUS RED BLOOD CELLS INTO PERIPHERAL VEIN, PERCUTANEOUS APPROACH: ICD-10-PCS | Performed by: INTERNAL MEDICINE

## 2017-01-12 PROCEDURE — 85025 COMPLETE CBC W/AUTO DIFF WBC: CPT | Performed by: NURSE PRACTITIONER

## 2017-01-12 PROCEDURE — 77010033678 HC OXYGEN DAILY

## 2017-01-12 RX ORDER — SODIUM CHLORIDE 9 MG/ML
250 INJECTION, SOLUTION INTRAVENOUS AS NEEDED
Status: DISCONTINUED | OUTPATIENT
Start: 2017-01-12 | End: 2017-01-17 | Stop reason: HOSPADM

## 2017-01-12 RX ORDER — DOCUSATE SODIUM 100 MG/1
100 CAPSULE, LIQUID FILLED ORAL 2 TIMES DAILY
Status: DISCONTINUED | OUTPATIENT
Start: 2017-01-12 | End: 2017-01-17 | Stop reason: HOSPADM

## 2017-01-12 RX ADMIN — SODIUM CHLORIDE 100 ML/HR: 900 INJECTION, SOLUTION INTRAVENOUS at 16:32

## 2017-01-12 RX ADMIN — LEVOTHYROXINE SODIUM 125 MCG: 125 TABLET ORAL at 08:29

## 2017-01-12 RX ADMIN — OXYCODONE HYDROCHLORIDE AND ACETAMINOPHEN 1 TABLET: 5; 325 TABLET ORAL at 00:59

## 2017-01-12 RX ADMIN — ACYCLOVIR 400 MG: 800 TABLET ORAL at 17:34

## 2017-01-12 RX ADMIN — DOCUSATE SODIUM 100 MG: 100 CAPSULE ORAL at 17:33

## 2017-01-12 RX ADMIN — CARVEDILOL 25 MG: 25 TABLET, FILM COATED ORAL at 08:30

## 2017-01-12 RX ADMIN — CHLORHEXIDINE GLUCONATE 15 ML: 1.2 RINSE ORAL at 08:39

## 2017-01-12 RX ADMIN — ACYCLOVIR 400 MG: 800 TABLET ORAL at 05:39

## 2017-01-12 RX ADMIN — CYANOCOBALAMIN TAB 1000 MCG 1000 MCG: 1000 TAB at 08:37

## 2017-01-12 RX ADMIN — ACYCLOVIR 400 MG: 800 TABLET ORAL at 09:45

## 2017-01-12 RX ADMIN — ACYCLOVIR 400 MG: 800 TABLET ORAL at 22:05

## 2017-01-12 RX ADMIN — OXYCODONE HYDROCHLORIDE 10 MG: 10 TABLET, FILM COATED, EXTENDED RELEASE ORAL at 08:37

## 2017-01-12 RX ADMIN — VALSARTAN 320 MG: 320 TABLET, FILM COATED ORAL at 22:05

## 2017-01-12 RX ADMIN — OXYCODONE HYDROCHLORIDE 10 MG: 10 TABLET, FILM COATED, EXTENDED RELEASE ORAL at 22:05

## 2017-01-12 RX ADMIN — DOCUSATE SODIUM 100 MG: 100 CAPSULE ORAL at 08:38

## 2017-01-12 RX ADMIN — AMLODIPINE BESYLATE 5 MG: 5 TABLET ORAL at 08:39

## 2017-01-12 RX ADMIN — ACYCLOVIR 400 MG: 800 TABLET ORAL at 13:57

## 2017-01-12 RX ADMIN — CHLORHEXIDINE GLUCONATE 15 ML: 1.2 RINSE ORAL at 23:34

## 2017-01-12 RX ADMIN — ROSUVASTATIN CALCIUM 10 MG: 20 TABLET, FILM COATED ORAL at 22:05

## 2017-01-12 RX ADMIN — CHLORHEXIDINE GLUCONATE 15 ML: 1.2 RINSE ORAL at 18:37

## 2017-01-12 RX ADMIN — PANTOPRAZOLE SODIUM 40 MG: 40 TABLET, DELAYED RELEASE ORAL at 08:29

## 2017-01-12 RX ADMIN — CHLORHEXIDINE GLUCONATE 15 ML: 1.2 RINSE ORAL at 14:05

## 2017-01-12 RX ADMIN — CARVEDILOL 25 MG: 25 TABLET, FILM COATED ORAL at 17:34

## 2017-01-12 RX ADMIN — VITAMIN D, TAB 1000IU (100/BT) 2000 UNITS: 25 TAB at 08:37

## 2017-01-12 NOTE — PROGRESS NOTES
Problem: Self Care Deficits Care Plan (Adult)  Goal: *Acute Goals and Plan of Care (Insert Text)  1. Patient will perform grooming and upper body dressing with supervision within 7 days with equipment as needed. 2. Patient will perform bathing with minimal assistance within 7 days with equipment as needed. 3. Patient will perform lower body dressing and toileting with moderate assistance within 7 days with equipment as needed. 4. Patient will perform toilet transfer with supervision within 7 days with equipment as needed. Pt will participate in B UE therapeutic exercises for 8 minutes with 3 rest breaks within 7 days. OCCUPATIONAL THERAPY: INITIAL ASSESSMENT, AM 1/12/2017  INPATIENT: Hospital Day: 2  Payor: CARE IMPROVEMENT PLUS / [place] / Product Type: [employer] Medicare /      NAME/AGE/GENDER: Jessica Adan is a 67 y.o. male  PRIMARY DIAGNOSIS: Multiple Myeloma/Weakness  Intractable pain Intractable pain Intractable pain        ICD-10: Treatment Diagnosis: Generalized Muscle Weakness (M62.81)  History of falling (Z91.81)  Precautions/Allergies:   Fall,  Lisinopril and Pcn [penicillins]    ASSESSMENT:      Mr. Tim Paredes presents supine in bed, alert. Patient lives at home with his wife; patient stated that he was able to perform most ADLs prior to admit. Patient sat on edge of bed with moderate assistance x 2. Patient's blood pressure: supine- 109/55, sitting 104/62, standing 110/44. Patient stood with moderate assistance using RW and transferred to [facility] with min/mod assistance. Patient was total assistance for toileting. Patient transferred to chair with moderate assistance. Patient functioning below baseline. Patient to benefit from Occupational Therapy to maximize ADL performance. Recommend Post Acute Rehab services. Cont OT per plan. This section established at most recent assessment   PROBLEM LIST (Impairments causing functional limitations):  1.  Decreased Strength  2. Decreased ADL/Functional Activities  3. Decreased Transfer Abilities  4. Decreased Ambulation Ability/Technique  5. Decreased Balance  6. Decreased Activity Tolerance  7. Decreased Work Simplification/Energy Conservation Techniques  8. Increased Fatigue  9. Decreased Flexibility/Joint Mobility  10. Decreased [place] with Home Exercise Program    INTERVENTIONS PLANNED: (Benefits and precautions of occupational therapy have been discussed with the patient.)  1. Activities of daily living training  2. Adaptive equipment training  3. Balance training  4. Clothing management  5. Cognitive training  6. Donning&doffing training  7. Group therapy  8. Hygiene training  9. Neuromuscular re-eduation  10. Re-evaluation  11. Sensory reintergration training  12. Theraputic activity  13. Theraputic exercise      TREATMENT PLAN: Frequency/Duration: Follow patient 3 x's /weel to address above goals. Rehabilitation Potential For Stated Goals: GOOD      RECOMMENDED REHABILITATION/EQUIPMENT: (at time of discharge pending progress): Continue Skilled Therapy. OCCUPATIONAL PROFILE AND HISTORY:   History of Present Injury/Illness (Reason for Referral):  Mr. Erika Lopez is a 67 y.o. male admitted on 1/11/2017 with a primary diagnosis of intractable pain and dehydration. He is a known patient of Dr Donna Washington for resected colon cancer in Feburary 2016 and a new (12/16) diagnosis of multiple myeloma. He has been experiencing both back and abdominal pain along with general malaise that has markedly increased in the last 24 hours. His wife called his nurse navigator this morning and states he is much weaker and unable to hold his own weight. His wife reports that he fell around 3am today and she and a neighbor were unable to get him up therefore 911 was called for assistance. He was recently seen in the ED over the weekend for RLQ pain and constipation. He was given an enema is minimal relief while in the ED.  He states today waking with dried blood to his lips and mouth along with noticing blood in his underwear from his penis. Past Medical History/Comorbidities:   Mr. Dianne Hirsch  has a past medical history of Arrhythmia; Arthritis; BMI 30.0-30.9,adult; Cardiomyopathy ([facility] [address] Cholelithiases; Chronic systolic heart failure ([facility] [address] (9/13/2011); Gout; H/O: pituitary tumor; High cholesterol; History of thyroid cancer; History of vertigo; Hx of radiation therapy to prostate (2004); Hyperlipidemia (11/18/2015); Hypertension; Hypothyroid; ICD (implantable cardiac defibrillator) in place (9/2011); LBBB (left bundle branch block) (11/18/2015); Malaise and fatigue (11/18/2015); Malignant neoplasm of prostate ([facility] [address] Mass of colon; and Sinus node dysfunction ([facility] [address] He also has no past medical history of Adverse effect of anesthesia; Difficult intubation; Malignant hyperthermia due to anesthesia; Nausea & vomiting; Pseudocholinesterase deficiency; or Unspecified adverse effect of anesthesia. Mr. Dianne Hirsch  has a past surgical history that includes heent (2008); cholecystectomy (2013); gi; heart catheterization; pacemaker (2011/2016); colonoscopy; thyroidectomy (2008); and tumor removal (1990/2010).   Social History/Living Environment:   Living Alone: No  Support Systems: Spouse/Significant Other/Partner  Patient Expects to be Discharged to[de-identified] Unknown  Tub or Shower Type: Shower  Prior Level of Function/Work/Activity:  Patient able to perform basic ADLs per patient report      Number of Personal Factors/Comorbidities that affect the Plan of Care  · Multiple Myeloma,   · Arthritis  · Malignant neoplasm of prostate  · Mass of colon  · Vertigo History  · Malaise & Fatigue  : Expanded review of therapy/medical records (1-2):  MODERATE COMPLEXITY   ASSESSMENT OF OCCUPATIONAL PERFORMANCE[de-identified]   Activities of Daily Living:           Basic ADLs (From Assessment) Complex ADLs (From Assessment)   Basic ADL  Feeding: Setup  Oral Facial Hygiene/Grooming: Minimum assistance  Bathing: Moderate assistance  Upper Body Dressing: Contact guard assistance  Lower Body Dressing: Total assistance  Toileting: Total assistance Instrumental ADL  Meal Preparation: Total assistance  Homemaking: Total assistance  Medication Management: Moderate assistance   Grooming/Bathing/Dressing Activities of Daily Living     Cognitive Retraining  Safety/Judgement: Awareness of environment; Fall prevention                       Bed/Mat Mobility  Supine to Sit: Moderate assistance;Assist x2  Sit to Stand: Minimum assistance;Assist x2; Additional time  Bed to Chair: Moderate assistance  Scooting: Contact guard assistance          Most Recent Physical Functioning:   Gross Assessment:                  Posture:  Posture (WDL): Exceptions to WDL  Posture Assessment: Forward head, Rounded shoulders  Balance:  Sitting: Impaired  Sitting - Static: Fair (occasional)  Sitting - Dynamic: Fair (occasional)  Standing: Impaired  Standing - Static: Fair  Standing - Dynamic : Fair Bed Mobility:  Supine to Sit: Moderate assistance;Assist x2  Scooting: Contact guard assistance  Wheelchair Mobility:     Transfers:  Sit to Stand: Minimum assistance;Assist x2; Additional time  Stand to Sit: Maximum assistance  Bed to Chair: Moderate assistance                    Patient Vitals for the past 6 hrs:       BP BP Patient Position O2 Flow Rate (L/min) Pulse   01/12/17 1155 117/56 At rest;Supine; Head of bed elevated (Comment degrees) 3 l/min 78        Mental Status  Neurologic State: Alert  Orientation Level: Oriented X4  Cognition: Follows commands  Perseveration: No perseveration noted  Safety/Judgement: Awareness of environment, Fall prevention                               Physical Skills Involved:  1. Balance  2. Mobility  3. Strength  4. Endurance Cognitive Skills Affected (resulting in the inability to perform in a timely and safe manner):  1. Problem Solving  2. Mental Sequencing  3.  Remembering Psychosocial Skills Affected:  1. Habits  2. Routines and Behaviors   Number of elements that affect the Plan of Care: 3-5:  MODERATE COMPLEXITY   CLINICAL DECISION MAKIN[address] [place] [address] AM-PAC 6 Clicks   Basic Mobility Inpatient Short Form  How much help from another person does the patient currently need. .. Total A Lot A Little None   1. Putting on and taking off regular lower body clothing? [X] 1   [ ] 2   [ ] 3   [ ] 4   2. Bathing (including washing, rinsing, drying)? [ ] 1   [X] 2   [ ] 3   [ ] 4   3. Toileting, which includes using toilet, bedpan or urinal?   [X] 1   [ ] 2   [ ] 3   [ ] 4   4. Putting on and taking off regular upper body clothing?   [ ] 1   [ ] 2   [X] 3   [ ] 4   5. Taking care of personal grooming such as brushing teeth? [ ] 1   [ ] 2   [X] 3   [ ] 4   6. Eating meals? [ ] 1   [ ] 2   [X] 3   [ ] 4   © , Trustees of [address] [place] [address], under license to [employer]. All rights reserved    Score:  Initial: CL Most Recent: X (Date: -- )     Interpretation of Tool:  Represents activities that are increasingly more difficult (i.e. Bed mobility, Transfers, Gait). Score 24 23 22-20 19-15 14-10 9-7 6       Modifier CH CI CJ CK CL CM CN         · Self Care:               - CURRENT STATUS:    CL - 60%-79% impaired, limited or restricted               - GOAL STATUS:           CL - 60%-79% impaired, limited or restricted               - D/C STATUS:                       ---------------To be determined---------------  Payor: CARE IMPROVEMENT PLUS [place] / Product Type: Managed Care Medicare /       Medical Necessity:     · Patient demonstrates good rehab potential due to higher previous functional level. Reason for Services/Other Comments:  · Patient continues to require skilled intervention due to patient's inability to take care of self.    Use of outcome tool(s) and clinical judgement create a POC that gives a: MODERATE COMPLEXITY TREATMENT:   (In addition to Assessment/Re-Assessment sessions the following treatments were rendered)   Pre-treatment Symptoms/Complaints:    Pain: Initial:   Pain Intensity 1: 4  Pain Location 1: Abdomen  Pain Orientation 1: Lower  Pain Intervention(s) 1: Ambulation/Increased Activity  Post Session:  3      Assessment/Reassessment only, no treatment provided today     Treatment/Session Assessment:    · Response to Treatment:  Patient tolerated tx well. · Interdisciplinary Collaboration:  · Physical Therapist, Registered Nurse and   · After treatment position/precautions:  · Up in chair, Bed/Chair-wheels locked, Bed in low position, Call light within reach and RN notified  · Compliance with Program/Exercises: compliant all of the time. · Recommendations/Intent for next treatment session: \"Next visit will focus on advancements to more challenging activities and reduction in assistance provided\".   Total Treatment Duration:  OT Patient Time In/Time Out  Time In: 5072  Time Out: 42 [facility],

## 2017-01-12 NOTE — PROGRESS NOTES
END OF SHIFT NOTE:    Intake/Output      Voiding:  has urinal in place   Catheter: no  Drain:  None             Stool:  None since admission . Emesis:  None           VITAL SIGNS  Patient Vitals for the past 12 hrs:   Temp Pulse Resp BP SpO2   01/11/17 1840 97.9 °F (36.6 °C) 73 18 118/54 98 %   01/11/17 1512 - - - - 93 %   01/11/17 1507 99.8 °F (37.7 °C) 76 20 100/46 91 %   01/11/17 1324 97.2 °F (36.2 °C) 72 20 117/61 93 %   01/11/17 1109 - - - - 97 %   01/11/17 1105 100 °F (37.8 °C) 74 20 125/62 93 %       Pain Assessment       Ambulating  Yes from bed to chair     Additional Information:  Patient can't tolerate head of bed  25- 35   Degree-  Complain of lower back pain. Sign place on outside of door per wife's request to screen visitors for cold- flu symptoms- no admission. Shift report given to oncoming nurse Rizwan Flynn RN at the bedside.     Leta Simmons RN

## 2017-01-12 NOTE — PROGRESS NOTES
Inpatient Hematology / Oncology Progress Note      Admission Date: 2017 10:59 AM  Reason for Admission/Hospital Course: Multiple Myeloma/Weakness  Intractable pain      24 Hour Events:  Pain improved  Abdominal pain continues  Appetite fair  Mucositis improved      ROS:  Constitutional: Positive for low grade fever, -chills, +weakness, +malaise, +fatigue. CV: Negative for chest pain, palpitations, edema. Respiratory: Negative for dyspnea, cough, wheezing. GI: Negative for nausea, +abdominal pain, -diarrhea. 10 point review of systems is otherwise negative with the exception of the elements mentioned above in the HPI. Allergies   Allergen Reactions    Lisinopril Cough    Pcn [Penicillins] Swelling       OBJECTIVE:  Patient Vitals for the past 8 hrs:   BP Temp Pulse Resp SpO2   17 1155 117/56 99.3 °F (37.4 °C) 78 20 -   17 0820 111/52 98.3 °F (36.8 °C) 74 18 96 %   17 0619 102/50 97.9 °F (36.6 °C) 97 18 98 %   17 0530 101/48 98.1 °F (36.7 °C) 75 18 96 %     Temp (24hrs), Av.7 °F (37.1 °C), Min:97.2 °F (36.2 °C), Max:100.6 °F (38.1 °C)     0701 -  1900  In: 240 [P.O.:240]  Out: 350 [Urine:350]    Physical Exam:  Constitutional: Well developed, well nourished male in no acute distress, lying comfortably in the hospital bed. HEENT: Normocephalic and atraumatic. Oropharynx is clear, mucous membranes are moist. Extraocular muscles are intact. Sclerae anicteric. Lymph node   Deferred   Skin Warm and dry. No bruising and no rash noted. No erythema. No pallor. Respiratory Lungs are clear to auscultation bilaterally without wheezes, rales or rhonchi, normal air exchange without accessory muscle use. CVS Normal rate, regular rhythm and normal S1 and S2. No murmurs, gallops, or rubs. Abdomen Soft, nontender and nondistended, normoactive bowel sounds. No palpable mass. No hepatosplenomegaly.    Neuro Grossly nonfocal with no obvious sensory or motor deficits. MSK Normal range of motion in general.  No edema and no tenderness. Psych Appropriate mood and affect. Labs:    Recent Labs      01/12/17   0040  01/11/17   1150   WBC  2.5*  3.4*   RBC  3.12*  3.25*   HGB  7.2*  7.4*   HCT  21.2*  22.0*   MCV  67.9*  67.7*   MCH  23.1*  22.8*   MCHC  34.0  33.6   RDW  18.1*  18.2*   PLT  31*  31*   GRANS  24*  21*   LYMPH  62*  63*   MONOS   --   1*   EOS  3  1   DF  MANUAL  AUTOMATED   ANEU  0.9*  1.1*   ABL  1.6  2.1   ABM   --   0.0*   REA  0.1  0.0      Recent Labs      01/12/17   0040  01/11/17   1150   NA  141  137   K  4.0  3.8   CL  107  102   CO2  22  21   AGAP  12  14   GLU  87  95   BUN  72*  71*   CREA  3.91*  4.23*   GFRAA  19*  18*   GFRNA  16*  15*   CA  9.2  9.1   SGOT  169*  148*   AP  85  95   TP  8.8*  9.5*   ALB  2.2*  2.4*   GLOB  6.6*  7.1*   AGRAT  0.3*  0.3*   MG  2.2  2.3         Imaging:      ASSESSMENT:    Problem List  Date Reviewed: 1/2/2017          Codes Class Noted    * (Principal)Intractable pain ICD-10-CM: R52  ICD-9-CM: 780.96  1/11/2017        Acute kidney injury [facility]) ICD-10-CM: N17.9  ICD-9-CM: 584.9  1/11/2017        Pancytopenia ([facility] [address] ICD-10-CM: L52.764  ICD-9-CM: 284.19  1/11/2017        Constipation ICD-10-CM: K59.00  ICD-9-CM: 564.00  1/11/2017        Multiple myeloma (HCC) ICD-10-CM: C90.00  ICD-9-CM: 203.00  1/2/2017        Postural imbalance ICD-10-CM: R29.3  ICD-9-CM: 729.90  12/14/2016        Polyneuropathy ICD-10-CM: G62.9  ICD-9-CM: 356.9  12/14/2016        Fall ICD-10-CM: J06. Natalie Moreno  ICD-9-CM: E888.9  12/14/2016        Encounter for medication management ICD-10-CM: Z79.899  ICD-9-CM: V58.69  12/14/2016        Urinary retention ICD-10-CM: R33.9  ICD-9-CM: 788.20  6/6/2016        Colonic mass ICD-10-CM: K63.9  ICD-9-CM: 569.89  3/23/2016        Hyperlipidemia ICD-10-CM: E78.5  ICD-9-CM: 272.4  11/18/2015        Vertigo ICD-10-CM: R42  ICD-9-CM: 780.4  11/18/2015        Malaise and fatigue ICD-10-CM: R53.81, R53.83  ICD-9-CM: 780.79  11/18/2015        Cardiomyopathy ([facility] [address] ICD-10-CM: I42.9  ICD-9-CM: 425.4  11/18/2015        LBBB (left bundle branch block) ICD-10-CM: I44.7  ICD-9-CM: 426.3  11/18/2015        Arthritis ICD-10-CM: M19.90  ICD-9-CM: 716.90  Unknown        Arrhythmia ICD-10-CM: I49.9  ICD-9-CM: 427.9  Unknown    Overview Signed 6/18/2013  2:07 PM by Jose Tena MD     LBBB             Cholelithiases ICD-10-CM: N80.98  ICD-9-CM: 574.20  Unknown        Hx of radiation therapy to prostate ICD-10-CM: Z92.3  ICD-9-CM: V15.3  Unknown        Chronic systolic heart failure ([facility] [address] ICD-10-CM: I50.22  ICD-9-CM: 428.22  9/13/2011        Automatic implantable cardioverter-defibrillator in situ ICD-10-CM: Z95.810  ICD-9-CM: V45.02  9/1/2011                PLAN:    -Multiple Myeloma  Currently holding Revlimid, Dexamethasone and Velcade     -Abdominal/Back Pain/Constipation  Lactulose, Oxycontin 10mg BID, Percocet 5 1-2 tabs q4h prn  01-12 pain improved, continue lactulose     -MARY  Gentle hydration due to EF of 30-35%, renal US, check FeNa, consider nephrology consult tomorrow if no improvement, hold lasix   01-12 creatinine improving    -Rhabdomyolysis  01-12 Continue with gently hydration       -Pancytopenia  1 unit RBCs, monitor platelet and WBCs. Hold anticoagulation per platelets <34L.  77-93 stable, continue to monitor     -Mucositis  Good oral hygiene, Peridex mouthwash QID  01-12 much improved     -Supportive Care  Follow Steven Gaviria NP   [facility]  [id number] [place] [address]  Office : [phone]  Fax : [phone]   Patient seen, examed and discussed with NP, agree with above history/assessment/plan. 67 y. o.male h/o colon cancer had new dx of MM and started RVD with Dr. Dennys Rand, adimitted for abd pain, constipation, dehydration, weakness and unsteady, CT is reviewed personally and appeared stool impaction, s/p enema, give lactulose, check amylase/lipase for pancreatitis, check CPK/myoglobulin increased c/w myopathy, appeared very dry , dehydration and MARY and need IVF, hold chemo. Responded to above supportive rx, continue gentle hydration given the CHF.       Georgina Herrera M.D.   [address]  Office : [phone]  Fax : [phone]

## 2017-01-12 NOTE — PROGRESS NOTES
Care Management Interventions  PCP Verified by CM: Yes  Palliative Care Consult (Criteria: CHF and RRAT>21): Yes  Mode of Transport at Discharge: Other (see comment)  Transition of Care Consult (CM Consult): Other  MyChart Signup: No  Discharge Durable Medical Equipment: No  Health Maintenance Reviewed: Yes  Physical Therapy Consult: Yes  Occupational Therapy Consult: Yes  Speech Therapy Consult: No  Current Support Network: Lives with Spouse, Own Home  Confirm Follow Up Transport: Family  Plan discussed with Pt/Family/Caregiver: Yes  Freedom of Choice Offered: Yes  Discharge Location  Discharge Placement: Skilled nursing facility     Patient needs a PPD asap.

## 2017-01-12 NOTE — PROGRESS NOTES
Problem: Mobility Impaired (Adult and Pediatric)  Goal: *Acute Goals and Plan of Care (Insert Text)  STG:  (1.)Mr. Srinivasan Cesar will move from supine to sit and sit to supine , scoot up and down and roll side to side with MINIMAL ASSIST within 3 day(s). (2.)Mr. Srinivasan Cesar will transfer from bed to chair and chair to bed with CONTACT GUARD ASSIST using the least restrictive device within 3 day(s). (3.)Mr. Srinivasan Cesar will ambulate with CONTACT GUARD ASSIST for 75+ feet with the least restrictive device within 3 day(s). LTG:  (1.)Mr. Srinivasan Cesar will move from supine to sit and sit to supine , scoot up and down and roll side to side in bed with SUPERVISION within 7 day(s). (2.)Mr. Srinivasan Cesar will transfer from bed to chair and chair to bed with STAND BY ASSIST using the least restrictive device within 7 day(s). (3.)Mr. Srinivasan Cesar will ambulate with STAND BY ASSIST for 150+ feet with the least restrictive device within 7 day(s). ________________________________________________________________________________________________       PHYSICAL THERAPY: INITIAL ASSESSMENT, AM 1/12/2017  INPATIENT: Hospital Day: 2  Payor: CARE IMPROVEMENT PLUS [place] / Product Type: [employer] Medicare /      NAME/AGE/GENDER: Madison Garcia is a 67 y.o. male  PRIMARY DIAGNOSIS: Multiple Myeloma/Weakness  Intractable pain Intractable pain Intractable pain        ICD-10: Treatment Diagnosis: Generalized Muscle Weakness (M62.81)  Difficulty in walking, Not elsewhere classified (R26.2)  Precautions/Allergies:   Lisinopril and Pcn [penicillins]       ASSESSMENT:      Mr. Srinivasan Cesar presents supine in bed upon arrival and agreeable to evaluation. Pt sat to EOB with mod assist x 2 then asked to use BSC. BP in supine was 106/58 then 110/44 in standing. No symptoms of dizziness reported. Transferred to [facility] with min assist x 2. Required total assist for steven-care and brief change. Transferred to chair with min assist x 2 and RW.   Left with CNA for bathing. Pt is currently functioning below his baseline and would benefit from Acute PT services during stay. This section established at most recent assessment   PROBLEM LIST (Impairments causing functional limitations):  1. Decreased Strength  2. Decreased ADL/Functional Activities  3. Decreased Transfer Abilities  4. Decreased Ambulation Ability/Technique  5. Decreased Balance  6. Decreased Activity Tolerance    INTERVENTIONS PLANNED: (Benefits and precautions of physical therapy have been discussed with the patient.)  1. Balance Exercise  2. Bed Mobility  3. Family Education  4. Gait Training  5. Therapeutic Activites  6. Therapeutic Exercise/Strengthening  7. Transfer Training      TREATMENT PLAN: Frequency/Duration: 3 times a week for duration of hospital stay  Rehabilitation Potential For Stated Goals: GOOD      RECOMMENDED REHABILITATION/EQUIPMENT: (at time of discharge pending progress): Continue Skilled Therapy. HISTORY:   History of Present Injury/Illness (Reason for Referral):  Mr. Daniel Alcaraz is a 67 y.o. male admitted on 1/11/2017 with a primary diagnosis of intractable pain and dehydration. He is a known patient of Dr Alas Friday for resected colon cancer in Feburary 2016 and a new (12/16) diagnosis of multiple myeloma. He has been experiencing both back and abdominal pain along with general malaise that has markedly increased in the last 24 hours. His wife called his nurse navigator this morning and states he is much weaker and unable to hold his own weight. His wife reports that he fell around 3am today and she and a neighbor were unable to get him up therefore 911 was called for assistance. He was recently seen in the ED over the weekend for RLQ pain and constipation. He was given an enema is minimal relief while in the ED.  He states today waking with dried blood to his lips and mouth along with noticing blood in his underwear from his penis  Past Medical History/Comorbidities: Mr. Erika Lopez  has a past medical history of Arrhythmia; Arthritis; BMI 30.0-30.9,adult; Cardiomyopathy ([facility] [address] Cholelithiases; Chronic systolic heart failure ([facility] [address] (9/13/2011); Gout; H/O: pituitary tumor; High cholesterol; History of thyroid cancer; History of vertigo; Hx of radiation therapy to prostate (2004); Hyperlipidemia (11/18/2015); Hypertension; Hypothyroid; ICD (implantable cardiac defibrillator) in place (9/2011); LBBB (left bundle branch block) (11/18/2015); Malaise and fatigue (11/18/2015); Malignant neoplasm of prostate ([facility] [address] Mass of colon; and Sinus node dysfunction ([facility] He also has no past medical history of Adverse effect of anesthesia; Difficult intubation; Malignant hyperthermia due to anesthesia; Nausea & vomiting; Pseudocholinesterase deficiency; or Unspecified adverse effect of anesthesia. Mr. Erika Lopez  has a past surgical history that includes heent (2008); cholecystectomy (2013); gi; heart catheterization; pacemaker (2011/2016); colonoscopy; thyroidectomy (2008); and tumor removal (1990/2010). Social History/Living Environment:   Tub or Shower Type: Shower  Prior Level of Function/Work/Activity:  Pt reports modified independence with all ADLs and ambulates with cane or RW on occasion.        Current Medications:           Current Facility-Administered Medications:     docusate sodium (COLACE) capsule 100 mg, 100 mg, Oral, BID, Halley Croft, NP, 100 mg at 01/12/17 0838    0.9% sodium chloride infusion 250 mL, 250 mL, IntraVENous, PRN, Halley Croft, NP    acyclovir (ZOVIRAX) tablet 400 mg, 400 mg, Oral, Q4HWA, Don Latus, NP, 400 mg at 01/12/17 0945    amLODIPine (NORVASC) tablet 5 mg, 5 mg, Oral, DAILY, Don Latus, NP, 5 mg at 01/12/17 1609    cholecalciferol (VITAMIN D3) tablet 2,000 Units, 2,000 Units, Oral, DAILY, Don Latus, NP, 2,000 Units at 01/12/17 9938    cyanocobalamin tablet 1,000 mcg, 1,000 mcg, Oral, DAILY, Abbe Hayden NP, 1,000 mcg at 01/12/17 4128   valsartan (DIOVAN) tablet 320 mg, 320 mg, Oral, QHS, Prakash Davis, NP, 320 mg at 01/11/17 2256    levothyroxine (SYNTHROID) tablet 125 mcg, 125 mcg, Oral, ACB, Prakash Davis, NP, 125 mcg at 01/12/17 5972    lubiPROStone (AMITIZA) capsule 8 mcg, 8 mcg, Oral, DAILY, Prakash Davis, NP, 8 mcg at 01/12/17 1155    oxyCODONE-acetaminophen (PERCOCET) 5-325 mg per tablet 1-2 Tab, 1-2 Tab, Oral, Q4H PRN, Prakash Davis, NP, 1 Tab at 01/12/17 0059    pantoprazole (PROTONIX) tablet 40 mg, 40 mg, Oral, ACB, Prakash Davis, NP, 40 mg at 01/12/17 0829    polyethylene glycol (MIRALAX) packet 17 g, 17 g, Oral, DAILY, Prakash Davis, NP, 17 g at 01/11/17 1330    rosuvastatin (CRESTOR) tablet 10 mg, 10 mg, Oral, QHS, Prakash Davis, NP, 10 mg at 01/11/17 2256    0.9% sodium chloride infusion, 100 mL/hr, IntraVENous, CONTINUOUS, Prakash Davis NP, Last Rate: 100 mL/hr at 01/11/17 2255, 100 mL/hr at 01/11/17 2255    lactulose (CHRONULAC) solution 20 g, 20 g, Oral, TID, Olivia Amezcua NP, Stopped at 01/11/17 2200    carvedilol (COREG) tablet 25 mg, 25 mg, Oral, BID WITH MEALS, Olivia Seven, NP, 25 mg at 01/12/17 0830    chlorhexidine (PERIDEX) 0.12 % mouthwash 15 mL, 15 mL, Oral, QID, Olivia Seven, NP, 15 mL at 01/12/17 0839    0.9% sodium chloride infusion 250 mL, 250 mL, IntraVENous, PRN, Olivia Seven, NP    acetaminophen (TYLENOL) tablet 650 mg, 650 mg, Oral, Q6H PRN, Olivia Seven, NP, 650 mg at 01/11/17 2256    diphenhydrAMINE (BENADRYL) capsule 25 mg, 25 mg, Oral, Q6H PRN, Olivia Amezcua, NP, 25 mg at 01/11/17 2256    oxyCODONE ER (OxyCONTIN) tablet 10 mg, 10 mg, Oral, Q12H, Olivia Amezcua, NP, 10 mg at 01/12/17 0837    promethazine (PHENERGAN) with saline injection 25 mg, 25 mg, IntraVENous, Q6H PRN, Olivia Amezcua NP   Date Last Reviewed:     Number of Personal Factors/Comorbidities that affect the Plan of Care: 1-2: MODERATE COMPLEXITY   EXAMINATION:   Most Recent Physical Functioning:   Gross Assessment:  AROM: Generally decreased, functional  Strength: Generally decreased, functional  Coordination: Generally decreased, functional               Posture:  Posture (WDL): Exceptions to WDL  Posture Assessment: Forward head, Rounded shoulders  Balance:  Sitting: Impaired  Sitting - Static: Fair (occasional)  Sitting - Dynamic: Fair (occasional)  Standing: Impaired  Standing - Static: Fair  Standing - Dynamic : Fair Bed Mobility:  Supine to Sit: Moderate assistance;Assist x2  Scooting: Contact guard assistance  Wheelchair Mobility:     Transfers:  Sit to Stand: Minimum assistance;Assist x2; Additional time  Stand to Sit: Maximum assistance  Bed to Chair: Minimum assistance;Assist x2  Gait:     Base of Support: Widened;Center of gravity altered  Speed/Fatoumata: Slow  Step Length: Right shortened;Left shortened  Gait Abnormalities: Decreased step clearance  Distance (ft): 5 Feet (ft) (total)  Assistive Device: Walker, rolling  Ambulation - Level of Assistance: Minimal assistance;Assist x2  Interventions: Verbal cues       Body Structures Involved:  1. Muscles Body Functions Affected:  1. Neuromusculoskeletal Activities and Participation Affected:  1. Mobility   Number of elements that affect the Plan of Care: 3: MODERATE COMPLEXITY   CLINICAL PRESENTATION:   Presentation: Stable and uncomplicated: LOW COMPLEXITY   CLINICAL DECISION MAKIN \A Chronology of [place] [facility]\"" [address] AM-PAC 6 Clicks   Basic Mobility Inpatient Short Form  How much difficulty does the patient currently have. .. Unable A Lot A Little None   1. Turning over in bed (including adjusting bedclothes, sheets and blankets)? [ ] 1   [ ] 2   [X] 3   [ ] 4   2. Sitting down on and standing up from a chair with arms ( e.g., wheelchair, bedside commode, etc.)   [ ] 1   [X] 2   [ ] 3   [ ] 4   3. Moving from lying on back to sitting on the side of the bed?    [ ] 1   [X] 2   [ ] 3   [ ] 4   How much help from another person does the patient currently need. .. Total A Lot A Little None   4. Moving to and from a bed to a chair (including a wheelchair)? [ ] 1   [ ] 2   [X] 3   [ ] 4   5. Need to walk in hospital room? [ ] 1   [ ] 2   [X] 3   [ ] 4   6. Climbing 3-5 steps with a railing? [ ] 1   [X] 2   [ ] 3   [ ] 4   © 2007, Trustees of [address] [place] [address] [address] [address], under license to [email or web address]. All rights reserved    Score:  Initial: 15 Most Recent: X (Date: -- )     Interpretation of Tool:  Represents activities that are increasingly more difficult (i.e. Bed mobility, Transfers, Gait). Score 24 23 22-20 19-15 14-10 9-7 6       Modifier CH CI CJ CK CL CM CN         · Mobility - Walking and Moving Around:               - CURRENT STATUS:    CK - 40%-59% impaired, limited or restricted               - GOAL STATUS:           CK - 40%-59% impaired, limited or restricted               - D/C STATUS:                       ---------------To be determined---------------  Payor: CARE IMPROVEMENT PLUS [place] / Product Type: Managed Care Medicare /       Medical Necessity:     · Patient demonstrates good rehab potential due to higher previous functional level. Reason for Services/Other Comments:  · Patient continues to require skilled intervention due to decreased functional status compared to baseline.    Use of outcome tool(s) and clinical judgement create a POC that gives a: Clear prediction of patient's progress: LOW COMPLEXITY                 TREATMENT:   (In addition to Assessment/Re-Assessment sessions the following treatments were rendered)   Pre-treatment Symptoms/Complaints:    Pain: Initial:   Pain Intensity 1: 0 0 Post Session:  0      Assessment/Reassessment only, no treatment provided today     Treatment/Session Assessment:    · Response to Treatment:  good  · Interdisciplinary Collaboration:  · Physical Therapist, Occupational Therapist and Registered Nurse  · After treatment position/precautions:  · Up in chair, Bed/Chair-wheels locked, Call light within reach and CNA at bedside  · Compliance with Program/Exercises: Will assess as treatment progresses. · Recommendations/Intent for next treatment session: \"Next visit will focus on advancements to more challenging activities and reduction in assistance provided\".   Total Treatment Duration:  PT Patient Time In/Time Out  Time In: 1015  Time Out: [address] , PT

## 2017-01-13 LAB
ABO + RH BLD: NORMAL
ALBUMIN SERPL BCP-MCNC: 1.9 G/DL (ref 3.2–4.6)
ALBUMIN/GLOB SERPL: 0.3 {RATIO} (ref 1.2–3.5)
ALP SERPL-CCNC: 72 U/L (ref 50–136)
ALT SERPL-CCNC: 74 U/L (ref 12–65)
AMYLASE SERPL-CCNC: 172 U/L (ref 25–115)
ANION GAP BLD CALC-SCNC: 11 MMOL/L (ref 7–16)
AST SERPL W P-5'-P-CCNC: 245 U/L (ref 15–37)
BILIRUB SERPL-MCNC: 0.4 MG/DL (ref 0.2–1.1)
BLASTS NFR BLD: 2 %
BLD PROD TYP BPU: NORMAL
BLD PROD TYP BPU: NORMAL
BLOOD GROUP ANTIBODIES SERPL: NORMAL
BPU ID: NORMAL
BPU ID: NORMAL
BUN SERPL-MCNC: 66 MG/DL (ref 8–23)
CALCIUM SERPL-MCNC: 8.2 MG/DL (ref 8.3–10.4)
CHLORIDE SERPL-SCNC: 108 MMOL/L (ref 98–107)
CK SERPL-CCNC: 977 U/L (ref 21–215)
CO2 SERPL-SCNC: 21 MMOL/L (ref 21–32)
CREAT SERPL-MCNC: 3.61 MG/DL (ref 0.8–1.5)
CROSSMATCH RESULT,%XM: NORMAL
CROSSMATCH RESULT,%XM: NORMAL
DIFFERENTIAL METHOD BLD: ABNORMAL
ERYTHROCYTE [DISTWIDTH] IN BLOOD BY AUTOMATED COUNT: 20.8 % (ref 11.9–14.6)
GLOBULIN SER CALC-MCNC: 5.8 G/DL (ref 2.3–3.5)
GLUCOSE SERPL-MCNC: 88 MG/DL (ref 65–100)
HCT VFR BLD AUTO: 25.1 % (ref 41.1–50.3)
HGB BLD-MCNC: 8.5 G/DL (ref 13.6–17.2)
LIPASE SERPL-CCNC: 896 U/L (ref 73–393)
LYMPHOCYTES # BLD: 0.5 K/UL (ref 0.5–4.6)
LYMPHOCYTES NFR BLD MANUAL: 28 % (ref 16–44)
MAGNESIUM SERPL-MCNC: 2.1 MG/DL (ref 1.8–2.4)
MCH RBC QN AUTO: 23.8 PG (ref 26.1–32.9)
MCHC RBC AUTO-ENTMCNC: 33.9 G/DL (ref 31.4–35)
MCV RBC AUTO: 70.3 FL (ref 79.6–97.8)
METAMYELOCYTES NFR BLD MANUAL: 14 %
MONOCYTES # BLD: 0.1 K/UL (ref 0.1–1.3)
MONOCYTES NFR BLD MANUAL: 6 % (ref 3–9)
MYELOCYTES NFR BLD MANUAL: 12 %
MYOGLOBIN SERPL-MCNC: 1086 NG/ML (ref 16–96)
NEUTS BAND NFR BLD MANUAL: 16 % (ref 0–10)
NEUTS SEG # BLD: 1.1 K/UL (ref 1.7–8.2)
NEUTS SEG NFR BLD MANUAL: 22 % (ref 47–75)
NRBC BLD-RTO: 2 PER 100 WBC
PLATELET # BLD AUTO: 28 K/UL (ref 150–450)
PLATELET COMMENTS,PCOM: ABNORMAL
PMV BLD AUTO: ABNORMAL FL (ref 10.8–14.1)
POTASSIUM SERPL-SCNC: 4.1 MMOL/L (ref 3.5–5.1)
PROT SERPL-MCNC: 7.7 G/DL (ref 6.3–8.2)
RBC # BLD AUTO: 3.57 M/UL (ref 4.23–5.67)
RBC MORPH BLD: ABNORMAL
SODIUM SERPL-SCNC: 140 MMOL/L (ref 136–145)
SPECIMEN EXP DATE BLD: NORMAL
STATUS OF UNIT,%ST: NORMAL
STATUS OF UNIT,%ST: NORMAL
TOTAL CELLS COUNTED SPEC: 50
UNIT DIVISION, %UDIV: 0
UNIT DIVISION, %UDIV: 0
WBC # BLD AUTO: 1.7 K/UL (ref 4.3–11.1)
WBC MORPH BLD: ABNORMAL

## 2017-01-13 PROCEDURE — 82150 ASSAY OF AMYLASE: CPT | Performed by: NURSE PRACTITIONER

## 2017-01-13 PROCEDURE — 74011250636 HC RX REV CODE- 250/636: Performed by: NURSE PRACTITIONER

## 2017-01-13 PROCEDURE — 82550 ASSAY OF CK (CPK): CPT | Performed by: NURSE PRACTITIONER

## 2017-01-13 PROCEDURE — 85025 COMPLETE CBC W/AUTO DIFF WBC: CPT | Performed by: NURSE PRACTITIONER

## 2017-01-13 PROCEDURE — 36415 COLL VENOUS BLD VENIPUNCTURE: CPT | Performed by: NURSE PRACTITIONER

## 2017-01-13 PROCEDURE — 65270000029 HC RM PRIVATE

## 2017-01-13 PROCEDURE — 74011000302 HC RX REV CODE- 302: Performed by: INTERNAL MEDICINE

## 2017-01-13 PROCEDURE — 74011250637 HC RX REV CODE- 250/637: Performed by: NURSE PRACTITIONER

## 2017-01-13 PROCEDURE — 86580 TB INTRADERMAL TEST: CPT | Performed by: INTERNAL MEDICINE

## 2017-01-13 PROCEDURE — 83690 ASSAY OF LIPASE: CPT | Performed by: NURSE PRACTITIONER

## 2017-01-13 PROCEDURE — 83735 ASSAY OF MAGNESIUM: CPT | Performed by: NURSE PRACTITIONER

## 2017-01-13 PROCEDURE — 80053 COMPREHEN METABOLIC PANEL: CPT | Performed by: NURSE PRACTITIONER

## 2017-01-13 PROCEDURE — 83874 ASSAY OF MYOGLOBIN: CPT | Performed by: NURSE PRACTITIONER

## 2017-01-13 RX ADMIN — CHLORHEXIDINE GLUCONATE 15 ML: 1.2 RINSE ORAL at 22:00

## 2017-01-13 RX ADMIN — ACYCLOVIR 400 MG: 800 TABLET ORAL at 05:35

## 2017-01-13 RX ADMIN — AMLODIPINE BESYLATE 5 MG: 5 TABLET ORAL at 09:14

## 2017-01-13 RX ADMIN — ACYCLOVIR 400 MG: 800 TABLET ORAL at 09:14

## 2017-01-13 RX ADMIN — CHLORHEXIDINE GLUCONATE 15 ML: 1.2 RINSE ORAL at 17:25

## 2017-01-13 RX ADMIN — SODIUM CHLORIDE 100 ML/HR: 900 INJECTION, SOLUTION INTRAVENOUS at 03:18

## 2017-01-13 RX ADMIN — SODIUM CHLORIDE 100 ML/HR: 900 INJECTION, SOLUTION INTRAVENOUS at 13:23

## 2017-01-13 RX ADMIN — ACYCLOVIR 400 MG: 800 TABLET ORAL at 21:28

## 2017-01-13 RX ADMIN — CHLORHEXIDINE GLUCONATE 15 ML: 1.2 RINSE ORAL at 13:23

## 2017-01-13 RX ADMIN — PANTOPRAZOLE SODIUM 40 MG: 40 TABLET, DELAYED RELEASE ORAL at 09:13

## 2017-01-13 RX ADMIN — OXYCODONE HYDROCHLORIDE AND ACETAMINOPHEN 2 TABLET: 5; 325 TABLET ORAL at 20:11

## 2017-01-13 RX ADMIN — CYANOCOBALAMIN TAB 1000 MCG 1000 MCG: 1000 TAB at 09:14

## 2017-01-13 RX ADMIN — ACYCLOVIR 400 MG: 800 TABLET ORAL at 13:24

## 2017-01-13 RX ADMIN — DOCUSATE SODIUM 100 MG: 100 CAPSULE ORAL at 17:25

## 2017-01-13 RX ADMIN — DOCUSATE SODIUM 100 MG: 100 CAPSULE ORAL at 09:14

## 2017-01-13 RX ADMIN — VITAMIN D, TAB 1000IU (100/BT) 2000 UNITS: 25 TAB at 09:13

## 2017-01-13 RX ADMIN — OXYCODONE HYDROCHLORIDE 10 MG: 10 TABLET, FILM COATED, EXTENDED RELEASE ORAL at 21:27

## 2017-01-13 RX ADMIN — ACYCLOVIR 400 MG: 800 TABLET ORAL at 17:25

## 2017-01-13 RX ADMIN — LEVOTHYROXINE SODIUM 125 MCG: 125 TABLET ORAL at 09:13

## 2017-01-13 RX ADMIN — POLYETHYLENE GLYCOL 3350 17 G: 17 POWDER, FOR SOLUTION ORAL at 09:14

## 2017-01-13 RX ADMIN — ROSUVASTATIN CALCIUM 10 MG: 20 TABLET, FILM COATED ORAL at 21:27

## 2017-01-13 RX ADMIN — CARVEDILOL 25 MG: 25 TABLET, FILM COATED ORAL at 17:25

## 2017-01-13 RX ADMIN — CARVEDILOL 25 MG: 25 TABLET, FILM COATED ORAL at 09:13

## 2017-01-13 RX ADMIN — TUBERCULIN PURIFIED PROTEIN DERIVATIVE 5 UNITS: 5 INJECTION, SOLUTION INTRADERMAL at 13:23

## 2017-01-13 RX ADMIN — OXYCODONE HYDROCHLORIDE 10 MG: 10 TABLET, FILM COATED, EXTENDED RELEASE ORAL at 09:14

## 2017-01-13 RX ADMIN — VALSARTAN 320 MG: 320 TABLET, FILM COATED ORAL at 21:27

## 2017-01-13 RX ADMIN — CHLORHEXIDINE GLUCONATE 15 ML: 1.2 RINSE ORAL at 09:13

## 2017-01-13 NOTE — PROGRESS NOTES
END OF SHIFT NOTE:    Intake/Output  01/12 1901 - 01/13 0700  In: 9820 [I.V.:1347]  Out: 250 [Urine:250]   Voiding: YES  Catheter: NO  Drain:              Stool:  2 occurrences. Stool Assessment  Stool Color: Trudi Toro (01/13/17 0210)  Stool Appearance: Other (comment) (smear) (01/13/17 0210)  Stool Amount: Smear (01/13/17 0210)  Stool Source/Status: Rectum (01/13/17 0210)    Emesis:  0 occurrences. VITAL SIGNS  Patient Vitals for the past 12 hrs:   Temp Pulse Resp BP SpO2   01/13/17 0428 98.5 °F (36.9 °C) 74 16 91/41 96 %   01/13/17 0021 98.1 °F (36.7 °C) 91 18 102/51 98 %   01/12/17 2028 98.3 °F (36.8 °C) 76 20 115/55 96 %       Pain Assessment  Pain 1  Pain Scale 1: Numeric (0 - 10) (01/12/17 1945)  Pain Intensity 1: 0 (01/12/17 1945)  Patient Stated Pain Goal: 0 (01/12/17 1603)  Pain Reassessment 1: Yes (01/12/17 1603)  Pain Onset 1: pta (01/12/17 1300)  Pain Location 1: Abdomen (01/12/17 1300)  Pain Orientation 1: Lower (01/12/17 1300)  Pain Description 1: Aching (01/12/17 1300)  Pain Intervention(s) 1: Ambulation/Increased Activity (01/12/17 1300)    Ambulating  NO    Additional Information:   Shift report will be given to oncoming nurse at the bedside.     Desmond Traore RN

## 2017-01-13 NOTE — PROGRESS NOTES
Inpatient Hematology / Oncology Progress Note      Admission Date: 2017 10:59 AM  Reason for Admission/Hospital Course: Multiple Myeloma/Weakness  Intractable pain      24 Hour Events:  Abdominal pain continues  Appetite good  Mucositis improved  Working with PT      ROS:  Constitutional: Positive for low grade fever, -chills, +weakness, +malaise, +fatigue. CV: Negative for chest pain, palpitations, edema. Respiratory: Negative for dyspnea, cough, wheezing. GI: Negative for nausea, +abdominal pain, -diarrhea. 10 point review of systems is otherwise negative with the exception of the elements mentioned above in the HPI. Allergies   Allergen Reactions    Lisinopril Cough    Pcn [Penicillins] Swelling       OBJECTIVE:  Patient Vitals for the past 8 hrs:   BP Temp Pulse Resp SpO2   17 0743 92/44 98.2 °F (36.8 °C) 73 18 93 %     Temp (24hrs), Av.3 °F (36.8 °C), Min:98.1 °F (36.7 °C), Max:98.5 °F (36.9 °C)     0701 -  1900  In: 360 [P.O.:360]  Out: 200 [Urine:200]    Physical Exam:  Constitutional: Well developed, well nourished male in no acute distress, lying comfortably in the hospital bed. HEENT: Normocephalic and atraumatic. Oropharynx is clear, mucous membranes are moist. Extraocular muscles are intact. Sclerae anicteric. Lymph node   Deferred   Skin Warm and dry. No bruising and no rash noted. No erythema. No pallor. Respiratory Lungs are clear to auscultation bilaterally without wheezes, rales or rhonchi, normal air exchange without accessory muscle use. CVS Normal rate, regular rhythm and normal S1 and S2. No murmurs, gallops, or rubs. Abdomen Soft, nontender and nondistended, normoactive bowel sounds. No palpable mass. No hepatosplenomegaly. Neuro Grossly nonfocal with no obvious sensory or motor deficits. MSK Normal range of motion in general.  No edema and no tenderness. Psych Appropriate mood and affect.         Labs:      Recent Labs 01/13/17 0425 01/12/17   0040  01/11/17   1150   WBC  1.7*  2.5*  3.4*   RBC  3.57*  3.12*  3.25*   HGB  8.5*  7.2*  7.4*   HCT  25.1*  21.2*  22.0*   MCV  70.3*  67.9*  67.7*   MCH  23.8*  23.1*  22.8*   MCHC  33.9  34.0  33.6   RDW  20.8*  18.1*  18.2*   PLT  28*  31*  31*   GRANS  22*  24*  21*   LYMPH  28  62*  63*   MONOS  6   --   1*   EOS   --   3  1   DF  MANUAL  MANUAL  AUTOMATED   ANEU  1.1*  0.9*  1.1*   ABL  0.5  1.6  2.1   ABM  0.1   --   0.0*   REA   --   0.1  0.0        Recent Labs      01/13/17 0425 01/12/17   0040  01/11/17   1150   NA  140  141  137   K  4.1  4.0  3.8   CL  108*  107  102   CO2  21  22  21   AGAP  11  12  14   GLU  88  87  95   BUN  66*  72*  71*   CREA  3.61*  3.91*  4.23*   GFRAA  21*  19*  18*   GFRNA  18*  16*  15*   CA  8.2*  9.2  9.1   SGOT  245*  169*  148*   AP  72  85  95   TP  7.7  8.8*  9.5*   ALB  1.9*  2.2*  2.4*   GLOB  5.8*  6.6*  7.1*   AGRAT  0.3*  0.3*  0.3*   MG  2.1  2.2  2.3         Imaging:      ASSESSMENT:    Problem List  Date Reviewed: 1/2/2017          Codes Class Noted    * (Principal)Intractable pain ICD-10-CM: R52  ICD-9-CM: 780.96  1/11/2017        Acute kidney injury [facility]) ICD-10-CM: N17.9  ICD-9-CM: 584.9  1/11/2017        Pancytopenia ([facility] [address] ICD-10-CM: W19.852  ICD-9-CM: 284.19  1/11/2017        Constipation ICD-10-CM: K59.00  ICD-9-CM: 564.00  1/11/2017        Multiple myeloma (HCC) ICD-10-CM: C90.00  ICD-9-CM: 203.00  1/2/2017        Postural imbalance ICD-10-CM: R29.3  ICD-9-CM: 729.90  12/14/2016        Polyneuropathy ICD-10-CM: G62.9  ICD-9-CM: 356.9  12/14/2016        Fall ICD-10-CM: I92. Alejandra Leticia  ICD-9-CM: E888.9  12/14/2016        Encounter for medication management ICD-10-CM: Z79.899  ICD-9-CM: V58.69  12/14/2016        Urinary retention ICD-10-CM: R33.9  ICD-9-CM: 788.20  6/6/2016        Colonic mass ICD-10-CM: K63.9  ICD-9-CM: 569.89  3/23/2016        Hyperlipidemia ICD-10-CM: E78.5  ICD-9-CM: 272.4  11/18/2015        Vertigo ICD-10-CM: R42  ICD-9-CM: 780.4  11/18/2015        Malaise and fatigue ICD-10-CM: R53.81, R53.83  ICD-9-CM: 780.79  11/18/2015        Cardiomyopathy ([facility] ICD-10-CM: I42.9  ICD-9-CM: 425.4  11/18/2015        LBBB (left bundle branch block) ICD-10-CM: I44.7  ICD-9-CM: 426.3  11/18/2015        Arthritis ICD-10-CM: M19.90  ICD-9-CM: 716.90  Unknown        Arrhythmia ICD-10-CM: I49.9  ICD-9-CM: 427.9  Unknown    Overview Signed 6/18/2013  2:07 PM by Evelina Gonzalez MD     LBBB             Cholelithiases ICD-10-CM: X79.24  ICD-9-CM: 574.20  Unknown        Hx of radiation therapy to prostate ICD-10-CM: Z92.3  ICD-9-CM: V15.3  Unknown        Chronic systolic heart failure ([facility] ICD-10-CM: I50.22  ICD-9-CM: 428.22  9/13/2011        Automatic implantable cardioverter-defibrillator in situ ICD-10-CM: Z95.810  ICD-9-CM: V45.02  9/1/2011                PLAN:    -Multiple Myeloma  Currently holding Revlimid, Dexamethasone and Velcade     -Abdominal/Back Pain/Constipation  Lactulose, Oxycontin 10mg BID, Percocet 5 1-2 tabs q4h prn  01-12 pain improved, continue lactulose  01-13 patient was unaware that he could get percocet as needed     -MARY  Gentle hydration due to EF of 30-35%, renal US, check FeNa, consider nephrology consult tomorrow if no improvement, hold lasix   01-12 creatinine improving    -Rhabdomyolysis  01-12 Continue with gently hydration  01-13 Continue to monitor-, Myoglobin 1086-Increase IV fluids to 125/hr       -Pancytopenia  1 unit RBCs, monitor platelet and WBCs. Hold anticoagulation per platelets <17U.  54-84 stable, continue to monitor     -Mucositis  Good oral hygiene, Peridex mouthwash QID  01-13 much improved     -Supportive Care  Follow Steven So NP   [facility]  [address] [address]  Office : [phone]  Fax : [phone]      Patient seen, examed and discussed with NP, agree with above history/assessment/plan. 67 y. o.male h/o colon cancer had new dx of MM and started RVD with Dr. Linda Sapp, adimitted for abd pain, constipation, dehydration, weakness and unsteady, CT is reviewed personally and appeared stool impaction, s/p enema, give lactulose, check amylase/lipase for pancreatitis, check CPK/myoglobulin increased c/w myopathy, appeared very dry , dehydration and MARY and need IVF, hold chemo. Responded to above supportive rx, He has low EF but never CHF exacerbation, increase IVF to 125ml/hr and monitor closely, PT.  Vania Ndiaye M.D.   [address]  Office : [phone]  Fax : [phone]

## 2017-01-13 NOTE — PROGRESS NOTES
END OF SHIFT NOTE:    Intake/Output      Voiding: yes   Catheter:  No  Drain:  None             Stool:  One stool this am    Stool Assessment  Stool Color: Shilo Perez (01/11/17 2107)  Stool Appearance: Formed; Soft (01/11/17 2107)  Stool Amount: Large (01/11/17 2107)  Stool Source/Status: Rectum (01/11/17 2107)    Emesis:  None           VITAL SIGNS  Patient Vitals for the past 12 hrs:   Temp Pulse Resp BP SpO2   01/12/17 1635 98.2 °F (36.8 °C) 76 18 112/47 97 %   01/12/17 1155 99.3 °F (37.4 °C) 78 20 117/56 -   01/12/17 0820 98.3 °F (36.8 °C) 74 18 111/52 96 %       Pain Assessment  Pain 1  Pain Scale 1: Visual (01/12/17 1603)  Pain Intensity 1: 0 (01/12/17 1603)  Patient Stated Pain Goal: 0 (01/12/17 1603)  Pain Reassessment 1: Yes (01/12/17 1603)  Pain Onset 1: pta (01/12/17 1300)  Pain Location 1: Abdomen (01/12/17 1300)  Pain Orientation 1: Lower (01/12/17 1300)  Pain Description 1: Aching (01/12/17 1300)  Pain Intervention(s) 1: Ambulation/Increased Activity (01/12/17 1300)    Ambulating  Short distance from bed to chair     Additional Information:  Low grade temperature 99.3 replied \" I don't know why I'm sleeping  so much. \"  No complain  of break-trough pain or nausea. One event  of right arm muscle twitching this am- short time event. Patient had a large stool this am; use  bedside commode. Overall appetite 20-30 %    Shift report given to oncoming nurse  Umer Bowman RN at the bedside.     Esteban Ibarra RN

## 2017-01-13 NOTE — PROGRESS NOTES
Attempted a conversation with patient's spouse ref SNF rehab and a possible opportunity for patient to rehab in [facility] at the [facility]. She does not want to discuss. She defers to a son in [place] that will be visiting tomorrow. SW will request that the weekend person meet with him if possible. Spouse does agree that patient needs rehab. Patient has had some confusion and he defers to the MD to decide if he needs rehab (?).

## 2017-01-14 LAB
ALBUMIN SERPL BCP-MCNC: 2 G/DL (ref 3.2–4.6)
ALBUMIN/GLOB SERPL: 0.4 {RATIO} (ref 1.2–3.5)
ALP SERPL-CCNC: 75 U/L (ref 50–136)
ALT SERPL-CCNC: 80 U/L (ref 12–65)
AMYLASE SERPL-CCNC: 161 U/L (ref 25–115)
ANION GAP BLD CALC-SCNC: 11 MMOL/L (ref 7–16)
AST SERPL W P-5'-P-CCNC: 245 U/L (ref 15–37)
BACTERIA SPEC CULT: NORMAL
BILIRUB SERPL-MCNC: 0.3 MG/DL (ref 0.2–1.1)
BUN SERPL-MCNC: 56 MG/DL (ref 8–23)
CALCIUM SERPL-MCNC: 8 MG/DL (ref 8.3–10.4)
CHLORIDE SERPL-SCNC: 112 MMOL/L (ref 98–107)
CK SERPL-CCNC: 1522 U/L (ref 21–215)
CO2 SERPL-SCNC: 20 MMOL/L (ref 21–32)
CREAT SERPL-MCNC: 2.82 MG/DL (ref 0.8–1.5)
DIFFERENTIAL METHOD BLD: ABNORMAL
EOSINOPHIL # BLD: 0 K/UL (ref 0–0.8)
EOSINOPHIL NFR BLD MANUAL: 1 % (ref 1–8)
ERYTHROCYTE [DISTWIDTH] IN BLOOD BY AUTOMATED COUNT: 21.4 % (ref 11.9–14.6)
GLOBULIN SER CALC-MCNC: 5.7 G/DL (ref 2.3–3.5)
GLUCOSE SERPL-MCNC: 107 MG/DL (ref 65–100)
HCT VFR BLD AUTO: 26.5 % (ref 41.1–50.3)
HGB BLD-MCNC: 8.9 G/DL (ref 13.6–17.2)
LIPASE SERPL-CCNC: 1025 U/L (ref 73–393)
LYMPHOCYTES # BLD: 0.5 K/UL (ref 0.5–4.6)
LYMPHOCYTES NFR BLD MANUAL: 25 % (ref 16–44)
MAGNESIUM SERPL-MCNC: 1.8 MG/DL (ref 1.8–2.4)
MCH RBC QN AUTO: 23.8 PG (ref 26.1–32.9)
MCHC RBC AUTO-ENTMCNC: 33.6 G/DL (ref 31.4–35)
MCV RBC AUTO: 70.9 FL (ref 79.6–97.8)
METAMYELOCYTES NFR BLD MANUAL: 7 %
MONOCYTES # BLD: 0.2 K/UL (ref 0.1–1.3)
MONOCYTES NFR BLD MANUAL: 11 % (ref 3–9)
MYELOCYTES NFR BLD MANUAL: 2 %
MYOGLOBIN SERPL-MCNC: 522 NG/ML (ref 16–96)
NEUTS BAND NFR BLD MANUAL: 8 % (ref 0–10)
NEUTS SEG # BLD: 1.3 K/UL (ref 1.7–8.2)
NEUTS SEG NFR BLD MANUAL: 45 % (ref 47–75)
PLATELET # BLD AUTO: 34 K/UL (ref 150–450)
PLATELET COMMENTS,PCOM: ABNORMAL
PMV BLD AUTO: ABNORMAL FL (ref 10.8–14.1)
POTASSIUM SERPL-SCNC: 4.3 MMOL/L (ref 3.5–5.1)
PROMYELOCYTES NFR BLD MANUAL: 1 %
PROT SERPL-MCNC: 7.7 G/DL (ref 6.3–8.2)
RBC # BLD AUTO: 3.74 M/UL (ref 4.23–5.67)
RBC MORPH BLD: ABNORMAL
SERVICE CMNT-IMP: NORMAL
SODIUM SERPL-SCNC: 143 MMOL/L (ref 136–145)
WBC # BLD AUTO: 2 K/UL (ref 4.3–11.1)

## 2017-01-14 PROCEDURE — 74011250637 HC RX REV CODE- 250/637: Performed by: NURSE PRACTITIONER

## 2017-01-14 PROCEDURE — 83874 ASSAY OF MYOGLOBIN: CPT | Performed by: NURSE PRACTITIONER

## 2017-01-14 PROCEDURE — 82150 ASSAY OF AMYLASE: CPT | Performed by: NURSE PRACTITIONER

## 2017-01-14 PROCEDURE — 77010033678 HC OXYGEN DAILY

## 2017-01-14 PROCEDURE — 82550 ASSAY OF CK (CPK): CPT | Performed by: NURSE PRACTITIONER

## 2017-01-14 PROCEDURE — 74011250636 HC RX REV CODE- 250/636: Performed by: NURSE PRACTITIONER

## 2017-01-14 PROCEDURE — 83735 ASSAY OF MAGNESIUM: CPT | Performed by: NURSE PRACTITIONER

## 2017-01-14 PROCEDURE — 83690 ASSAY OF LIPASE: CPT | Performed by: NURSE PRACTITIONER

## 2017-01-14 PROCEDURE — 85025 COMPLETE CBC W/AUTO DIFF WBC: CPT | Performed by: NURSE PRACTITIONER

## 2017-01-14 PROCEDURE — 36415 COLL VENOUS BLD VENIPUNCTURE: CPT | Performed by: NURSE PRACTITIONER

## 2017-01-14 PROCEDURE — 94760 N-INVAS EAR/PLS OXIMETRY 1: CPT

## 2017-01-14 PROCEDURE — 65270000029 HC RM PRIVATE

## 2017-01-14 PROCEDURE — 80053 COMPREHEN METABOLIC PANEL: CPT | Performed by: NURSE PRACTITIONER

## 2017-01-14 RX ADMIN — CHLORHEXIDINE GLUCONATE 15 ML: 1.2 RINSE ORAL at 16:59

## 2017-01-14 RX ADMIN — SODIUM CHLORIDE 125 ML/HR: 900 INJECTION, SOLUTION INTRAVENOUS at 01:27

## 2017-01-14 RX ADMIN — ACYCLOVIR 400 MG: 800 TABLET ORAL at 10:21

## 2017-01-14 RX ADMIN — Medication 5 ML: at 16:57

## 2017-01-14 RX ADMIN — DOCUSATE SODIUM 100 MG: 100 CAPSULE ORAL at 17:00

## 2017-01-14 RX ADMIN — LEVOTHYROXINE SODIUM 125 MCG: 125 TABLET ORAL at 10:21

## 2017-01-14 RX ADMIN — CARVEDILOL 25 MG: 25 TABLET, FILM COATED ORAL at 10:20

## 2017-01-14 RX ADMIN — ACYCLOVIR 400 MG: 800 TABLET ORAL at 03:48

## 2017-01-14 RX ADMIN — OXYCODONE HYDROCHLORIDE 10 MG: 10 TABLET, FILM COATED, EXTENDED RELEASE ORAL at 22:56

## 2017-01-14 RX ADMIN — ROSUVASTATIN CALCIUM 10 MG: 20 TABLET, FILM COATED ORAL at 22:56

## 2017-01-14 RX ADMIN — VALSARTAN 320 MG: 320 TABLET, FILM COATED ORAL at 22:56

## 2017-01-14 RX ADMIN — PANTOPRAZOLE SODIUM 40 MG: 40 TABLET, DELAYED RELEASE ORAL at 10:22

## 2017-01-14 RX ADMIN — AMLODIPINE BESYLATE 5 MG: 5 TABLET ORAL at 10:20

## 2017-01-14 RX ADMIN — DOCUSATE SODIUM 100 MG: 100 CAPSULE ORAL at 10:20

## 2017-01-14 RX ADMIN — ACYCLOVIR 400 MG: 800 TABLET ORAL at 22:55

## 2017-01-14 RX ADMIN — ACYCLOVIR 400 MG: 800 TABLET ORAL at 16:58

## 2017-01-14 RX ADMIN — Medication 5 ML: at 22:57

## 2017-01-14 RX ADMIN — SODIUM CHLORIDE 125 ML/HR: 900 INJECTION, SOLUTION INTRAVENOUS at 03:47

## 2017-01-14 RX ADMIN — SODIUM CHLORIDE 125 ML/HR: 900 INJECTION, SOLUTION INTRAVENOUS at 10:26

## 2017-01-14 RX ADMIN — CARVEDILOL 25 MG: 25 TABLET, FILM COATED ORAL at 16:59

## 2017-01-14 RX ADMIN — CHLORHEXIDINE GLUCONATE 15 ML: 1.2 RINSE ORAL at 10:27

## 2017-01-14 RX ADMIN — OXYCODONE HYDROCHLORIDE 10 MG: 10 TABLET, FILM COATED, EXTENDED RELEASE ORAL at 10:22

## 2017-01-14 RX ADMIN — VITAMIN D, TAB 1000IU (100/BT) 2000 UNITS: 25 TAB at 10:21

## 2017-01-14 NOTE — PROGRESS NOTES
Inpatient Hematology / Oncology Progress Note      Admission Date: 2017 10:59 AM  Reason for Admission/Hospital Course: Multiple Myeloma/Weakness  Intractable pain      24 Hour Events:  Abdominal pain improving  Appetite good  Mucositis improved  Working with PT      ROS:  Constitutional: Positive for low grade fever, -chills, +weakness, +malaise, +fatigue. CV: Negative for chest pain, palpitations, edema. Respiratory: Negative for dyspnea, cough, wheezing. GI: Negative for nausea, +abdominal pain, -diarrhea. 10 point review of systems is otherwise negative with the exception of the elements mentioned above in the HPI. Allergies   Allergen Reactions    Lisinopril Cough    Pcn [Penicillins] Swelling       OBJECTIVE:  Patient Vitals for the past 8 hrs:   BP Temp Pulse Resp SpO2   17 0748 93/59 97.3 °F (36.3 °C) 72 18 96 %   17 0346 118/66 97.8 °F (36.6 °C) 79 18 96 %     Temp (24hrs), Av.8 °F (36.6 °C), Min:97.3 °F (36.3 °C), Max:98.3 °F (36.8 °C)     0701 -  1900  In: -   Out: 300 [Urine:300]    Physical Exam:  Constitutional: Well developed, well nourished male in no acute distress, lying comfortably in the hospital bed. HEENT: Normocephalic and atraumatic. Oropharynx is clear, mucous membranes are moist. Extraocular muscles are intact. Sclerae anicteric. Lymph node   Deferred   Skin Warm and dry. No bruising and no rash noted. No erythema. No pallor. Respiratory Lungs are clear to auscultation bilaterally without wheezes, rales or rhonchi, normal air exchange without accessory muscle use. CVS Normal rate, regular rhythm and normal S1 and S2. No murmurs, gallops, or rubs. Abdomen Soft, nontender and nondistended, normoactive bowel sounds. No palpable mass. No hepatosplenomegaly. Neuro Grossly nonfocal with no obvious sensory or motor deficits. MSK Normal range of motion in general.  No edema and no tenderness.    Psych Appropriate mood and affect. Labs:      Recent Labs      01/14/17   0435  01/13/17   0425  01/12/17   0040  01/11/17   1150   WBC  2.0*  1.7*  2.5*  3.4*   RBC  3.74*  3.57*  3.12*  3.25*   HGB  8.9*  8.5*  7.2*  7.4*   HCT  26.5*  25.1*  21.2*  22.0*   MCV  70.9*  70.3*  67.9*  67.7*   MCH  23.8*  23.8*  23.1*  22.8*   MCHC  33.6  33.9  34.0  33.6   RDW  21.4*  20.8*  18.1*  18.2*   PLT  34*  28*  31*  31*   GRANS  45*  22*  24*  21*   LYMPH  25  28  62*  63*   MONOS  11*  6   --   1*   EOS  1   --   3  1   DF  AUTOMATED  MANUAL  MANUAL  AUTOMATED   ANEU  1.3*  1.1*  0.9*  1.1*   ABL  0.5  0.5  1.6  2.1   ABM  0.2  0.1   --   0.0*   REA  0.0   --   0.1  0.0        Recent Labs      01/14/17   0435  01/13/17   0425  01/12/17   0040   NA  143  140  141   K  4.3  4.1  4.0   CL  112*  108*  107   CO2  20*  21  22   AGAP  11  11  12   GLU  107*  88  87   BUN  56*  66*  72*   CREA  2.82*  3.61*  3.91*   GFRAA  28*  21*  19*   GFRNA  23*  18*  16*   CA  8.0*  8.2*  9.2   SGOT  245*  245*  169*   AP  75  72  85   TP  7.7  7.7  8.8*   ALB  2.0*  1.9*  2.2*   GLOB  5.7*  5.8*  6.6*   AGRAT  0.4*  0.3*  0.3*   MG  1.8  2.1  2.2         Imaging:      ASSESSMENT:    Problem List  Date Reviewed: 1/2/2017          Codes Class Noted    * (Principal)Intractable pain ICD-10-CM: R52  ICD-9-CM: 780.96  1/11/2017        Acute kidney injury [facility]) ICD-10-CM: N17.9  ICD-9-CM: 584.9  1/11/2017        Pancytopenia ([address] ICD-10-CM: L70.681  ICD-9-CM: 284.19  1/11/2017        Constipation ICD-10-CM: K59.00  ICD-9-CM: 564.00  1/11/2017        Multiple myeloma (HCC) ICD-10-CM: C90.00  ICD-9-CM: 203.00  1/2/2017        Postural imbalance ICD-10-CM: R29.3  ICD-9-CM: 729.90  12/14/2016        Polyneuropathy ICD-10-CM: G62.9  ICD-9-CM: 356.9  12/14/2016        Fall ICD-10-CM: T90. Balaji Grief  ICD-9-CM: E888.9  12/14/2016        Encounter for medication management ICD-10-CM: Z79.899  ICD-9-CM: V58.69  12/14/2016        Urinary retention ICD-10-CM: R33.9  ICD-9-CM: 788.20 6/6/2016        Colonic mass ICD-10-CM: K63.9  ICD-9-CM: 569.89  3/23/2016        Hyperlipidemia ICD-10-CM: E78.5  ICD-9-CM: 272.4  11/18/2015        Vertigo ICD-10-CM: D41  ICD-9-CM: 780.4  11/18/2015        Malaise and fatigue ICD-10-CM: R53.81, R53.83  ICD-9-CM: 780.79  11/18/2015        Cardiomyopathy ([facility] [address] ICD-10-CM: I42.9  ICD-9-CM: 425.4  11/18/2015        LBBB (left bundle branch block) ICD-10-CM: I44.7  ICD-9-CM: 426.3  11/18/2015        Arthritis ICD-10-CM: M19.90  ICD-9-CM: 716.90  Unknown        Arrhythmia ICD-10-CM: I49.9  ICD-9-CM: 427.9  Unknown    Overview Signed 6/18/2013  2:07 PM by Sarah Altamirano MD     LBBB             Cholelithiases ICD-10-CM: J17.06  ICD-9-CM: 574.20  Unknown        Hx of radiation therapy to prostate ICD-10-CM: Z92.3  ICD-9-CM: V15.3  Unknown        Chronic systolic heart failure ([facility] [address] ICD-10-CM: I50.22  ICD-9-CM: 428.22  9/13/2011        Automatic implantable cardioverter-defibrillator in situ ICD-10-CM: Z95.810  ICD-9-CM: V45.02  9/1/2011                PLAN:    -Multiple Myeloma  Currently holding Revlimid, Dexamethasone and Velcade     -Abdominal/Back Pain/Constipation  Lactulose, Oxycontin 10mg BID, Percocet 5 1-2 tabs q4h prn  01-12 pain improved, continue lactulose  01-13 patient was unaware that he could get percocet as needed  01-14 patient states pain controlled     -MARY  Gentle hydration due to EF of 30-35%, renal US, check FeNa, consider nephrology consult tomorrow if no improvement, hold lasix   01-12 creatinine improving    -Rhabdomyolysis  01-12 Continue with gently hydration  01-13 Continue to monitor-, Myoglobin 1086-Increase IV fluids to 125/hr  01-14 CK 1522, Myoglobin 522, Amylase 161, Lipase 1025       -Pancytopenia  1 unit RBCs, monitor platelet and WBCs.  Hold anticoagulation per platelets <70D.  94-69 stable, continue to monitor     -Mucositis  Good oral hygiene, Peridex mouthwash QID  01-13 much improved     -Supportive Care  Follow Steven Natty Bryan NP   [facility]  [address] [address]  Office : [phone]  Fax : [phone]    Patient seen, examed and discussed with NP, agree with above history/assessment/plan. 67 y. o.male h/o colon cancer had new dx of MM and started RVD with Dr. Mary Anne Roberts, adimitted for abd pain, constipation, dehydration, weakness and unsteady, CT is reviewed personally and appeared stool impaction, s/p enema, give lactulose, check amylase/lipase for pancreatitis, check CPK/myoglobulin increased c/w myopathy, appeared very dry , dehydration and MARY and need IVF, hold chemo. Responded to above supportive rx, He has low EF but never CHF exacerbation, increase IVF to 125ml/hr and monitor closely, PT, felling stronger, myoglobin trending down, but lipase tending up although eating fine, change to low fat diet for caution.  Jair Grajeda M.D.   [address]  Office : [phone]  Fax : [phone]

## 2017-01-14 NOTE — PROGRESS NOTES
END OF SHIFT NOTE:    Intake/Output  01/13 1901 - 01/14 0700  In: 400 [P.O.:400]  Out: -    Voiding: YES  Catheter: NO  Drain:      Stool:  3 occurrences. Stool Assessment  Stool Color: Dwight Crockerman (01/13/17 0210)  Stool Appearance: Other (comment) (smear) (01/13/17 0210)  Stool Amount: Smear (01/13/17 0210)  Stool Source/Status: Rectum (01/13/17 0210)    Emesis:  0 occurrences. VITAL SIGNS  Patient Vitals for the past 12 hrs:   Temp Pulse Resp BP SpO2   01/13/17 1620 97.9 °F (36.6 °C) 72 18 93/47 95 %       Pain Assessment  Pain 1  Pain Scale 1: Numeric (0 - 10) (01/13/17 1458)  Pain Intensity 1: 0 (01/13/17 1458)  Patient Stated Pain Goal: 0 (01/13/17 1458)  Pain Reassessment 1: Yes (01/12/17 1603)  Pain Onset 1: pta (01/12/17 1300)  Pain Location 1: Abdomen (01/12/17 1300)  Pain Orientation 1: Lower (01/12/17 1300)  Pain Description 1: Aching (01/12/17 1300)  Pain Intervention(s) 1: Ambulation/Increased Activity (01/12/17 1300)    Ambulating  NO    Additional Information:   Patient intermittently confused throughout the shift. Incontinent of bowel and bladder, states this is new since his admission. Pain is associated with movement, but resolves while he is lying still. He did not request any PRN pain medication today. Shift report given to oncoming nurse at the bedside.     Philly Hurst RN

## 2017-01-15 LAB
ALBUMIN SERPL BCP-MCNC: 2 G/DL (ref 3.2–4.6)
ALBUMIN/GLOB SERPL: 0.4 {RATIO} (ref 1.2–3.5)
ALP SERPL-CCNC: 78 U/L (ref 50–136)
ALT SERPL-CCNC: 84 U/L (ref 12–65)
AMYLASE SERPL-CCNC: 142 U/L (ref 25–115)
ANION GAP BLD CALC-SCNC: 8 MMOL/L (ref 7–16)
AST SERPL W P-5'-P-CCNC: 213 U/L (ref 15–37)
BILIRUB SERPL-MCNC: 0.2 MG/DL (ref 0.2–1.1)
BUN SERPL-MCNC: 41 MG/DL (ref 8–23)
CALCIUM SERPL-MCNC: 8.2 MG/DL (ref 8.3–10.4)
CHLORIDE SERPL-SCNC: 115 MMOL/L (ref 98–107)
CK SERPL-CCNC: 1040 U/L (ref 21–215)
CO2 SERPL-SCNC: 24 MMOL/L (ref 21–32)
CREAT SERPL-MCNC: 2.07 MG/DL (ref 0.8–1.5)
DIFFERENTIAL METHOD BLD: ABNORMAL
ERYTHROCYTE [DISTWIDTH] IN BLOOD BY AUTOMATED COUNT: 22 % (ref 11.9–14.6)
GLOBULIN SER CALC-MCNC: 5.1 G/DL (ref 2.3–3.5)
GLUCOSE SERPL-MCNC: 88 MG/DL (ref 65–100)
HCT VFR BLD AUTO: 27.8 % (ref 41.1–50.3)
HGB BLD-MCNC: 9.2 G/DL (ref 13.6–17.2)
LIPASE SERPL-CCNC: 859 U/L (ref 73–393)
LYMPHOCYTES # BLD: 0.8 K/UL (ref 0.5–4.6)
LYMPHOCYTES NFR BLD MANUAL: 34 % (ref 16–44)
MAGNESIUM SERPL-MCNC: 1.7 MG/DL (ref 1.8–2.4)
MCH RBC QN AUTO: 23.9 PG (ref 26.1–32.9)
MCHC RBC AUTO-ENTMCNC: 33.1 G/DL (ref 31.4–35)
MCV RBC AUTO: 72.2 FL (ref 79.6–97.8)
METAMYELOCYTES NFR BLD MANUAL: 7 %
MM INDURATION POC: NORMAL MM (ref 0–5)
MONOCYTES # BLD: 0.3 K/UL (ref 0.1–1.3)
MONOCYTES NFR BLD MANUAL: 12 % (ref 3–9)
MYELOCYTES NFR BLD MANUAL: 3 %
MYOGLOBIN SERPL-MCNC: 253 NG/ML (ref 16–96)
NEUTS BAND NFR BLD MANUAL: 3 % (ref 0–10)
NEUTS SEG # BLD: 1.2 K/UL (ref 1.7–8.2)
NEUTS SEG NFR BLD MANUAL: 41 % (ref 47–75)
PLATELET # BLD AUTO: 46 K/UL (ref 150–450)
PLATELET COMMENTS,PCOM: ABNORMAL
PMV BLD AUTO: ABNORMAL FL (ref 10.8–14.1)
POTASSIUM SERPL-SCNC: 4.5 MMOL/L (ref 3.5–5.1)
PPD POC: NORMAL NEGATIVE
PROT SERPL-MCNC: 7.1 G/DL (ref 6.3–8.2)
RBC # BLD AUTO: 3.85 M/UL (ref 4.23–5.67)
RBC MORPH BLD: ABNORMAL
RBC MORPH BLD: ABNORMAL
SODIUM SERPL-SCNC: 147 MMOL/L (ref 136–145)
WBC # BLD AUTO: 2.3 K/UL (ref 4.3–11.1)

## 2017-01-15 PROCEDURE — 36415 COLL VENOUS BLD VENIPUNCTURE: CPT | Performed by: NURSE PRACTITIONER

## 2017-01-15 PROCEDURE — 74011250636 HC RX REV CODE- 250/636: Performed by: NURSE PRACTITIONER

## 2017-01-15 PROCEDURE — 92610 EVALUATE SWALLOWING FUNCTION: CPT

## 2017-01-15 PROCEDURE — 83735 ASSAY OF MAGNESIUM: CPT | Performed by: NURSE PRACTITIONER

## 2017-01-15 PROCEDURE — 82150 ASSAY OF AMYLASE: CPT | Performed by: NURSE PRACTITIONER

## 2017-01-15 PROCEDURE — 74011250637 HC RX REV CODE- 250/637: Performed by: NURSE PRACTITIONER

## 2017-01-15 PROCEDURE — 83874 ASSAY OF MYOGLOBIN: CPT | Performed by: NURSE PRACTITIONER

## 2017-01-15 PROCEDURE — 80053 COMPREHEN METABOLIC PANEL: CPT | Performed by: NURSE PRACTITIONER

## 2017-01-15 PROCEDURE — 83690 ASSAY OF LIPASE: CPT | Performed by: NURSE PRACTITIONER

## 2017-01-15 PROCEDURE — 82550 ASSAY OF CK (CPK): CPT | Performed by: NURSE PRACTITIONER

## 2017-01-15 PROCEDURE — 65270000029 HC RM PRIVATE

## 2017-01-15 PROCEDURE — 85025 COMPLETE CBC W/AUTO DIFF WBC: CPT | Performed by: NURSE PRACTITIONER

## 2017-01-15 RX ADMIN — Medication 5 ML: at 09:54

## 2017-01-15 RX ADMIN — CHLORHEXIDINE GLUCONATE 15 ML: 1.2 RINSE ORAL at 18:12

## 2017-01-15 RX ADMIN — Medication 5 ML: at 20:29

## 2017-01-15 RX ADMIN — OXYCODONE HYDROCHLORIDE AND ACETAMINOPHEN 1 TABLET: 5; 325 TABLET ORAL at 20:31

## 2017-01-15 RX ADMIN — CHLORHEXIDINE GLUCONATE 15 ML: 1.2 RINSE ORAL at 13:20

## 2017-01-15 RX ADMIN — Medication 5 ML: at 05:11

## 2017-01-15 RX ADMIN — AMLODIPINE BESYLATE 5 MG: 5 TABLET ORAL at 09:52

## 2017-01-15 RX ADMIN — CHLORHEXIDINE GLUCONATE 15 ML: 1.2 RINSE ORAL at 09:00

## 2017-01-15 RX ADMIN — LEVOTHYROXINE SODIUM 125 MCG: 125 TABLET ORAL at 09:52

## 2017-01-15 RX ADMIN — VALSARTAN 320 MG: 320 TABLET, FILM COATED ORAL at 20:29

## 2017-01-15 RX ADMIN — OXYCODONE HYDROCHLORIDE 10 MG: 10 TABLET, FILM COATED, EXTENDED RELEASE ORAL at 20:31

## 2017-01-15 RX ADMIN — VITAMIN D, TAB 1000IU (100/BT) 2000 UNITS: 25 TAB at 09:51

## 2017-01-15 RX ADMIN — Medication 5 ML: at 17:02

## 2017-01-15 RX ADMIN — PANTOPRAZOLE SODIUM 40 MG: 40 TABLET, DELAYED RELEASE ORAL at 09:52

## 2017-01-15 RX ADMIN — CARVEDILOL 25 MG: 25 TABLET, FILM COATED ORAL at 17:02

## 2017-01-15 RX ADMIN — ACYCLOVIR 400 MG: 800 TABLET ORAL at 09:52

## 2017-01-15 RX ADMIN — Medication 5 ML: at 13:28

## 2017-01-15 RX ADMIN — OXYCODONE HYDROCHLORIDE AND ACETAMINOPHEN 1 TABLET: 5; 325 TABLET ORAL at 05:11

## 2017-01-15 RX ADMIN — SODIUM CHLORIDE 125 ML/HR: 900 INJECTION, SOLUTION INTRAVENOUS at 20:32

## 2017-01-15 RX ADMIN — DOCUSATE SODIUM 100 MG: 100 CAPSULE ORAL at 17:02

## 2017-01-15 RX ADMIN — ROSUVASTATIN CALCIUM 10 MG: 20 TABLET, FILM COATED ORAL at 20:30

## 2017-01-15 RX ADMIN — DOCUSATE SODIUM 100 MG: 100 CAPSULE ORAL at 09:52

## 2017-01-15 RX ADMIN — CYANOCOBALAMIN TAB 1000 MCG 1000 MCG: 1000 TAB at 09:52

## 2017-01-15 RX ADMIN — CARVEDILOL 25 MG: 25 TABLET, FILM COATED ORAL at 09:52

## 2017-01-15 RX ADMIN — SODIUM CHLORIDE 125 ML/HR: 900 INJECTION, SOLUTION INTRAVENOUS at 04:11

## 2017-01-15 RX ADMIN — OXYCODONE HYDROCHLORIDE 10 MG: 10 TABLET, FILM COATED, EXTENDED RELEASE ORAL at 09:52

## 2017-01-15 NOTE — PROGRESS NOTES
Speech Pathology Note:    Speech Therapy available for bedside swallow evaluation, however patient on BSC. Will re-attempt as schedule permits. Thank you for this consult,  Roger Lee MS, CCC-SLP

## 2017-01-15 NOTE — PROGRESS NOTES
Inpatient Hematology / Oncology Progress Note      Admission Date: 2017 10:59 AM  Reason for Admission/Hospital Course: Multiple Myeloma/Weakness; Intractable pain      24 Hour Events:  Abdominal pain improving  Appetite good  Mucositis improved  Working with PT      ROS:  Constitutional: Positive for low grade fever, -chills, +weakness, +malaise, +fatigue. CV: Negative for chest pain, palpitations, edema. Respiratory: Negative for dyspnea, cough, wheezing. GI: Negative for nausea, +abdominal pain, -diarrhea. 10 point review of systems is otherwise negative with the exception of the elements mentioned above in the HPI. Allergies   Allergen Reactions    Lisinopril Cough    Pcn [Penicillins] Swelling       OBJECTIVE:  Patient Vitals for the past 8 hrs:   BP Temp Pulse Resp SpO2   01/15/17 0716 122/67 97.4 °F (36.3 °C) 73 18 97 %   01/15/17 0421 129/71 97.4 °F (36.3 °C) 80 18 94 %     Temp (24hrs), Av.5 °F (36.4 °C), Min:97.4 °F (36.3 °C), Max:97.7 °F (36.5 °C)    01/15 0701 - 01/15 1900  In: -   Out: 900 [Urine:900]    Physical Exam:  Constitutional: Well developed, well nourished male in no acute distress, lying comfortably in the hospital bed. HEENT: Normocephalic and atraumatic. Oropharynx is clear, mucous membranes are moist. Extraocular muscles are intact. Sclerae anicteric. Lymph node   Deferred   Skin Warm and dry. No bruising and no rash noted. No erythema. No pallor. Respiratory Lungs are clear to auscultation bilaterally without wheezes, rales or rhonchi, normal air exchange without accessory muscle use. CVS Normal rate, regular rhythm and normal S1 and S2. No murmurs, gallops, or rubs. Abdomen Soft, nontender and nondistended, normoactive bowel sounds. No palpable mass. No hepatosplenomegaly. Neuro Grossly nonfocal with no obvious sensory or motor deficits. MSK Normal range of motion in general.  No edema and no tenderness.    Psych Appropriate mood and affect. Labs:      Recent Labs      01/15/17   0500  01/14/17   0435  01/13/17   0425   WBC  2.3*  2.0*  1.7*   RBC  3.85*  3.74*  3.57*   HGB  9.2*  8.9*  8.5*   HCT  27.8*  26.5*  25.1*   MCV  72.2*  70.9*  70.3*   MCH  23.9*  23.8*  23.8*   MCHC  33.1  33.6  33.9   RDW  22.0*  21.4*  20.8*   PLT  46*  34*  28*   GRANS  41*  45*  22*   LYMPH  34  25  28   MONOS  12*  11*  6   EOS   --   1   --    DF  AUTOMATED  AUTOMATED  MANUAL   ANEU  1.2*  1.3*  1.1*   ABL  0.8  0.5  0.5   ABM  0.3  0.2  0.1   REA   --   0.0   --         Recent Labs      01/15/17   0500  01/14/17 0435  01/13/17   0425   NA  147*  143  140   K  4.5  4.3  4.1   CL  115*  112*  108*   CO2  24  20*  21   AGAP  8  11  11   GLU  88  107*  88   BUN  41*  56*  66*   CREA  2.07*  2.82*  3.61*   GFRAA  40*  28*  21*   GFRNA  33*  23*  18*   CA  8.2*  8.0*  8.2*   SGOT  213*  245*  245*   AP  78  75  72   TP  7.1  7.7  7.7   ALB  2.0*  2.0*  1.9*   GLOB  5.1*  5.7*  5.8*   AGRAT  0.4*  0.4*  0.3*   MG  1.7*  1.8  2.1         Imaging:      ASSESSMENT:    Problem List  Date Reviewed: 1/2/2017          Codes Class Noted    * (Principal)Intractable pain ICD-10-CM: R52  ICD-9-CM: 780.96  1/11/2017        Acute kidney injury [facility]) ICD-10-CM: N17.9  ICD-9-CM: 584.9  1/11/2017        Pancytopenia ([facility] [address] [address] ICD-10-CM: E45.981  ICD-9-CM: 284.19  1/11/2017        Constipation ICD-10-CM: K59.00  ICD-9-CM: 564.00  1/11/2017        Multiple myeloma (HCC) ICD-10-CM: C90.00  ICD-9-CM: 203.00  1/2/2017        Postural imbalance ICD-10-CM: R29.3  ICD-9-CM: 729.90  12/14/2016        Polyneuropathy ICD-10-CM: G62.9  ICD-9-CM: 356.9  12/14/2016        Fall ICD-10-CM: D22. Kenard Plan  ICD-9-CM: E888.9  12/14/2016        Encounter for medication management ICD-10-CM: Z79.899  ICD-9-CM: V58.69  12/14/2016        Urinary retention ICD-10-CM: R33.9  ICD-9-CM: 788.20  6/6/2016        Colonic mass ICD-10-CM: K63.9  ICD-9-CM: 569.89  3/23/2016        Hyperlipidemia ICD-10-CM: E78.5  ICD-9-CM: 272.4  11/18/2015        Vertigo ICD-10-CM: R42  ICD-9-CM: 780.4  11/18/2015        Malaise and fatigue ICD-10-CM: R53.81, R53.83  ICD-9-CM: 780.79  11/18/2015        Cardiomyopathy ([facility] ICD-10-CM: I42.9  ICD-9-CM: 425.4  11/18/2015        LBBB (left bundle branch block) ICD-10-CM: I44.7  ICD-9-CM: 426.3  11/18/2015        Arthritis ICD-10-CM: M19.90  ICD-9-CM: 716.90  Unknown        Arrhythmia ICD-10-CM: I49.9  ICD-9-CM: 427.9  Unknown    Overview Signed 6/18/2013  2:07 PM by Ariella Jones MD     LBBB             Cholelithiases ICD-10-CM: B89.07  ICD-9-CM: 574.20  Unknown        Hx of radiation therapy to prostate ICD-10-CM: Z92.3  ICD-9-CM: V15.3  Unknown        Chronic systolic heart failure ([facility] ICD-10-CM: I50.22  ICD-9-CM: 428.22  9/13/2011        Automatic implantable cardioverter-defibrillator in situ ICD-10-CM: Z95.810  ICD-9-CM: V45.02  9/1/2011                PLAN:    -Multiple Myeloma  Currently holding Revlimid, Dexamethasone and Velcade     -Abdominal/Back Pain/Constipation  Lactulose, Oxycontin 10mg BID, Percocet 5 1-2 tabs q4h prn  01-12 pain improved, continue lactulose  01-13 patient was unaware that he could get percocet as needed  01-14 patient states pain controlled     -MARY  Gentle hydration due to EF of 30-35%, renal US, check FeNa, consider nephrology consult tomorrow if no improvement, hold lasix   01-12 creatinine improving    -Rhabdomyolysis  01-12 Continue with gently hydration  01-13 Continue to monitor-, Myoglobin 1086-Increase IV fluids to 125/hr  01-14 CK 1522, Myoglobin 522, Amylase 161, Lipase 1025       -Pancytopenia  1 unit RBCs, monitor platelet and WBCs. Hold anticoagulation per platelets <32X.  96-58 stable, continue to monitor     -Mucositis  Good oral hygiene, Peridex mouthwash QID  01-13 much improved     -Supportive Care  Follow Steven SOPs       Patient seen, examed and discussed with NP, agree with above history/assessment/plan. 67 y. o.male h/o colon cancer had new dx of MM and started RVD with Dr. Linda Sapp, adimitted for abd pain, constipation, dehydration, weakness and unsteady, CT is reviewed personally and appeared stool impaction, s/p enema, give lactulose, check amylase/lipase for pancreatitis, check CPK/myoglobulin increased c/w myopathy, appeared very dry , dehydration and MARY and need IVF, hold chemo. Responded to above supportive rx, He has low EF but never CHF exacerbation, increase IVF to 125ml/hr and monitor closely, PT, felling stronger, myoglobin trending down, but lipase tending up although eating fine, changed to low fat diet and lipase trend down, Cr trend down with IVF, CBC stable, wife complained his swallow and swallow eval was fine and discussed need to eat well, add remeron, PT eval for 9th floor.  Vania Ndiaye M.D.   [address], [address] [place] [address]  Office : [phone]  Fax : [phone]

## 2017-01-15 NOTE — PROGRESS NOTES
Problem: Dysphagia (Adult)  Goal: *Acute Goals and Plan of Care (Insert Text)  ST. Pt. Will tolerate mechanical soft/thin liquids with no overt s/sx of aspiration/penetration. LTG: Pt. Will tolerate least restrictive diet without respiratory decline. SPEECH LANGUAGE PATHOLOGY: BEDSIDE SWALLOW NOTE: INITIAL ASSESSMENT     NAME/AGE/GENDER: Matt Long is a 67 y.o. male  DATE: 1/15/2017  PRIMARY DIAGNOSIS: Multiple Myeloma/Weakness  Intractable pain       ICD-10: Treatment Diagnosis: R13.12 Dysphagia, Oral Phase     INTERDISCIPLINARY COLLABORATION: Registered Nurse  PRECAUTIONS/ALLERGIES: Lisinopril and Pcn [penicillins]   ASSESSMENT:   Based on the objective data described below, Mr. Jaskaran Vivar presents with oral dysphagia/decreased bolus formulation secondary to dry mouth. Patient reports he avoids much of the food he is served secondary to inability to adequately form bolus for swallow. Demonstrates adequate mastication quality and able to tolerate applesauce and soft fruit with adequate bolus formation. Did not attempt cracker as patient states he cannot swallow it given its dry quality. Patient tolerated thin liquids via cup sip and straw sip without difficulty. Recommend mechanical soft textures/thin liquids with additional sauces/syrup to allow for adequate moisture for bolus formation. Follow up for diet tolerance. Patient will benefit from skilled intervention to address the below impairments. ?????? ? ? This section established at most recent assessment??????????  PROBLEM LIST (Impairments causing functional limitations):  1. Mild oral dysphagia  REHABILITATION POTENTIAL FOR STATED GOALS: GOOD      PLAN OF CARE:   Patient will benefit from skilled intervention to address the following impairments.   RECOMMENDATIONS AND PLANNED INTERVENTIONS (Benefits and precautions of therapy have been discussed with the patient.):  · PO:  Mechanical soft  · Liquids:  regular thin  · extra sauces, gravies, syrup  MEDICATIONS:  · With liquid  COMPENSATORY STRATEGIES/MODIFICATIONS INCLUDING:  · Alternate liquids/solids  · Small sips and bites  OTHER RECOMMENDATIONS (including follow up treatment recommendations):   · Patient education  RECOMMENDED DIET MODIFICATIONS DISCUSSED WITH:  · Patient  FREQUENCY/DURATION: Continue to follow patient 3 times a week as able or until goals met. RECOMMENDED REHABILITATION/EQUIPMENT: (at time of discharge pending progress):   Continue Skilled Therapy. SUBJECTIVE:   \"I just can't catch a break\"  History of Present Injury/Illness: Mr. Prasanth Leung  has a past medical history of Arrhythmia; Arthritis; BMI 30.0-30.9,adult; Cardiomyopathy ([facility] [address] Cholelithiases; Chronic systolic heart failure ([facility] [address] (9/13/2011); Gout; H/O: pituitary tumor; High cholesterol; History of thyroid cancer; History of vertigo; Hx of radiation therapy to prostate (2004); Hyperlipidemia (11/18/2015); Hypertension; Hypothyroid; ICD (implantable cardiac defibrillator) in place (9/2011); LBBB (left bundle branch block) (11/18/2015); Malaise and fatigue (11/18/2015); Malignant neoplasm of prostate ([facility] [address] Mass of colon; and Sinus node dysfunction ([facility] [address] He also has no past medical history of Adverse effect of anesthesia; Difficult intubation; Malignant hyperthermia due to anesthesia; Nausea & vomiting; Pseudocholinesterase deficiency; or Unspecified adverse effect of anesthesia. Ethelene Gauss He also  has a past surgical history that includes heent (2008); cholecystectomy (2013); gi; heart catheterization; pacemaker (2011/2016); colonoscopy; thyroidectomy (2008); and tumor removal (1990/2010). Present Symptoms:    Pain Intensity 1: 0  Pain Location 1: Abdomen  Pain Orientation 1: Anterior, Mid  Pain Intervention(s) 1: Medication (see MAR)  Current Medications:   No current facility-administered medications on file prior to encounter.         Current Outpatient Prescriptions on File Prior to Encounter   Medication Sig Dispense Refill  [] acyclovir (ZOVIRAX) 400 mg tablet Take 1 Tab by mouth every four (4) hours (while awake) for 10 days. 50 Tab 0    pantoprazole (PROTONIX) 20 mg tablet Take 1 Tab by mouth daily. 30 Tab 2    polyethylene glycol (MIRALAX) 17 gram packet Take 17 g by mouth daily.  lenalidomide (REVLIMID) 10 mg cap Take 10 mg by mouth daily. For 3 weeks and then off for 1 week. 21 Cap 5    dexamethasone (DECADRON) 4 mg tablet Take 10 Tabs by mouth every seven (7) days. On the day of Velcade 40 Tab 5    cyanocobalamin (VITAMIN B-12) 1,000 mcg sublingual tablet Take 1,000 mcg by mouth daily.  cholecalciferol, vitamin D3, (VITAMIN D3) 2,000 unit tab Take  by mouth.  oxyCODONE-acetaminophen (PERCOCET) 5-325 mg per tablet Take 1-2 Tabs by mouth every four (4) hours as needed for Pain. Max Daily Amount: 12 Tabs. 90 Tab 0    lubiPROStone (AMITIZA) 8 mcg capsule Take  by mouth.  amLODIPine (NORVASC) 10 mg tablet 5 mg.  furosemide (LASIX) 20 mg tablet Take 20 mg by mouth daily (after lunch).  irbesartan (AVAPRO) 300 mg tablet Take 300 mg by mouth nightly.  rosuvastatin (CRESTOR) 10 mg tablet Take 10 mg by mouth nightly.  allopurinol (ZYLOPRIM) 300 mg tablet Take 300 mg by mouth nightly.  carvedilol (COREG) 25 mg tablet Take 25 mg by mouth two (2) times daily (with meals).  levothyroxine (SYNTHROID) 125 mcg tablet Take 125 mcg by mouth Daily (before breakfast).  aspirin 81 mg tablet Take 81 mg by mouth every morning.  May hold for 2 days prior to surg per Cardiology  Indications: MYOCARDIAL INFARCTION         Current Dietary Status:  Regular/thin           History of reflux:  YES   · Reflux medication:protonix  Social History/Home Situation:    Home Environment: Private residence  # Steps to Enter: 0  One/Two Story Residence: One story  Living Alone: No  Support Systems: Spouse/Significant Other/Partner  Patient Expects to be Discharged to[employer] residence  Current DME Used/Available at Home: [employer] Mends, straight, Walker, rollator  Tub or Shower Type: Shower      OBJECTIVE:   Respiratory Status:  Nasal cannula  2 l/min  CXR Results:IMPRESSION:     Mild cardiomegaly without evidence of vascular congestion. Mild left lung base atelectasis/infiltrate. MRI/CT Results:none this admission  Oral Motor Structure/Speech:  Oral-Motor Structure/Motor Speech  Labial: No impairment  Dentition: Intact  Oral Hygiene: dry mouth  Lingual: Left deviation     Cognitive and Communication Status:  Neurologic State: Alert  Orientation Level: Oriented to person;Oriented to place  Cognition: Memory loss; Follows commands  Perception: Appears intact  Perseveration: No perseveration noted  Safety/Judgement: Fall prevention; Awareness of environment     BEDSIDE SWALLOW EVALUATION  Oral Assessment:  Oral Assessment  Labial: No impairment  Dentition: Intact  Oral Hygiene: dry mouth  Lingual: Left deviation  P.O. Trials:  Patient Position: upright in chair     The patient was given bite/sip amounts of the following:   Consistency Presented: Thin liquid;Puree;Mixed consistency; Ice chips  How Presented: Self-fed/presented;Cup/sip;Spoon;Straw;Successive swallows     ORAL PHASE:  Bolus Acceptance: No impairment  Bolus Formation/Control: Impaired  Propulsion: Delayed (# of seconds)  Type of Impairment: Mastication;Piecemeal  Oral Residue: Less than 10% of bolus     PHARYNGEAL PHASE:  Initiation of Swallow: No impairment  Laryngeal Elevation: Functional  Aspiration Signs/Symptoms: None  Vocal Quality: No impairment     Effective Modifications: Alternate liquids/solids;Small sips and bites     Pharyngeal Phase Characteristics: No impairment, issues, or problems      OTHER OBSERVATIONS:  Rate/bite size: WNL         Endurance:   WNL               Tool Used: Dysphagia Outcome and Severity Scale (WESTLEY)     Score Comments   Normal Diet  [ ] 7 With no strategies or extra time needed   Functional Swallow [ ] 6 May have mild oral or pharyngeal delay         Mild Dysphagia     [X] 5 Which may require one diet consistency restricted (those who demonstrate penetration which is entirely cleared on MBS would be included)   Mild-Moderate Dysphagia  [ ] 4 With 1-2 diet consistencies restricted         Moderate Dysphagia  [ ] 3 With 2 or more diet consistencies restricted         Moderately Severe Dysphagia  [ ] 2 With partial PO strategies (trials with ST only)         Severe Dysphagia  [ ] 1 With inability to tolerate any PO safely            Score:  Initial: 5 Most Recent: X (Date: -- )   Interpretation of Tool: The Dysphagia Outcome and Severity Scale (WESTLEY) is a simple, easy-to-use, 7-point scale developed to systematically rate the functional severity of dysphagia based on objective assessment and make recommendations for diet level, independence level, and type of nutrition.        Score 7 6 5 4 3 2 1   Modifier CH CI CJ CK CL CM CN   · Swallowing:               - CURRENT STATUS:           CJ - 20%-39% impaired, limited or restricted               - GOAL STATUS:                   CI - 1%-19% impaired, limited or restricted               - D/C STATUS:                       ---------------To be determined---------------  Payor: CARE IMPROVEMENT PLUS / [place]ARE [place] / Product Type: Managed Care Medicare /       TREATMENT:         (In addition to Assessment/Re-Assessment sessions the following treatments were rendered)  Assessment/Reassessment only, no treatment provided today  MODALITIES:                                                                     ORAL MOTOR  EXERCISES:                                                                                                                                                                       LARYNGEAL / PHARYNGEAL EXERCISES: __________________________________________________________________________________________________  Safety:   After treatment position/precautions:  · Up in chair, Call light within reach and RN notified  Treatment Assessment:  Patient actively participated in evaluation. Progression/Medical Necessity:   · Skilled intervention continues to be required due to patient still consuming a modified diet. Compliance with Program/Exercises: Will assess as treatment progresses. Reason for Continuation of Services/Other Comments:  · Patient continues to require skilled intervention due to dysphagia. Recommendations/Intent for next treatment session: \"Treatment next visit will focus on diet tolerance\". Total Treatment Duration:  Time In: 4650  Time Out: [facility].  MS Jesus, CCC-SLP

## 2017-01-15 NOTE — PROGRESS NOTES
Spiritual care visit:  Reviewed notes prior to visit  Patient appears calm  Provided supportive presence   Assured patient of our continued support and prayers  Thankful for our care  Signed by Sabine Mason, staff , [address] [place] [address] Road

## 2017-01-16 LAB
ALBUMIN SERPL BCP-MCNC: 1.9 G/DL (ref 3.2–4.6)
ALBUMIN/GLOB SERPL: 0.4 {RATIO} (ref 1.2–3.5)
ALP SERPL-CCNC: 81 U/L (ref 50–136)
ALT SERPL-CCNC: 99 U/L (ref 12–65)
AMYLASE SERPL-CCNC: 147 U/L (ref 25–115)
ANION GAP BLD CALC-SCNC: 7 MMOL/L (ref 7–16)
AST SERPL W P-5'-P-CCNC: 197 U/L (ref 15–37)
BILIRUB SERPL-MCNC: 0.3 MG/DL (ref 0.2–1.1)
BUN SERPL-MCNC: 30 MG/DL (ref 8–23)
CALCIUM SERPL-MCNC: 8.4 MG/DL (ref 8.3–10.4)
CHLORIDE SERPL-SCNC: 114 MMOL/L (ref 98–107)
CK SERPL-CCNC: 652 U/L (ref 21–215)
CO2 SERPL-SCNC: 24 MMOL/L (ref 21–32)
CREAT SERPL-MCNC: 1.62 MG/DL (ref 0.8–1.5)
DIFFERENTIAL METHOD BLD: ABNORMAL
ERYTHROCYTE [DISTWIDTH] IN BLOOD BY AUTOMATED COUNT: 21.9 % (ref 11.9–14.6)
GLOBULIN SER CALC-MCNC: 5.3 G/DL (ref 2.3–3.5)
GLUCOSE SERPL-MCNC: 95 MG/DL (ref 65–100)
HCT VFR BLD AUTO: 27.3 % (ref 41.1–50.3)
HGB BLD-MCNC: 8.9 G/DL (ref 13.6–17.2)
LIPASE SERPL-CCNC: 959 U/L (ref 73–393)
LYMPHOCYTES # BLD: 0.8 K/UL (ref 0.5–4.6)
LYMPHOCYTES NFR BLD MANUAL: 30 % (ref 16–44)
MAGNESIUM SERPL-MCNC: 1.5 MG/DL (ref 1.8–2.4)
MCH RBC QN AUTO: 23.5 PG (ref 26.1–32.9)
MCHC RBC AUTO-ENTMCNC: 32.6 G/DL (ref 31.4–35)
MCV RBC AUTO: 72 FL (ref 79.6–97.8)
METAMYELOCYTES NFR BLD MANUAL: 2 %
MM INDURATION POC: NORMAL MM (ref 0–5)
MONOCYTES # BLD: 0.2 K/UL (ref 0.1–1.3)
MONOCYTES NFR BLD MANUAL: 8 % (ref 3–9)
MYOGLOBIN SERPL-MCNC: 148 NG/ML (ref 16–96)
NEUTS SEG # BLD: 1.5 K/UL (ref 1.7–8.2)
NEUTS SEG NFR BLD MANUAL: 60 % (ref 47–75)
NRBC BLD-RTO: 1 PER 100 WBC
PLATELET # BLD AUTO: 50 K/UL (ref 150–450)
PLATELET COMMENTS,PCOM: ABNORMAL
PMV BLD AUTO: ABNORMAL FL (ref 10.8–14.1)
POTASSIUM SERPL-SCNC: 4.4 MMOL/L (ref 3.5–5.1)
PPD POC: NEGATIVE NEGATIVE
PROT SERPL-MCNC: 7.2 G/DL (ref 6.3–8.2)
RBC # BLD AUTO: 3.79 M/UL (ref 4.23–5.67)
RBC MORPH BLD: ABNORMAL
SODIUM SERPL-SCNC: 145 MMOL/L (ref 136–145)
WBC # BLD AUTO: 2.5 K/UL (ref 4.3–11.1)

## 2017-01-16 PROCEDURE — 74011250636 HC RX REV CODE- 250/636: Performed by: NURSE PRACTITIONER

## 2017-01-16 PROCEDURE — 74011250637 HC RX REV CODE- 250/637: Performed by: NURSE PRACTITIONER

## 2017-01-16 PROCEDURE — 92526 ORAL FUNCTION THERAPY: CPT

## 2017-01-16 PROCEDURE — 83690 ASSAY OF LIPASE: CPT | Performed by: NURSE PRACTITIONER

## 2017-01-16 PROCEDURE — 82550 ASSAY OF CK (CPK): CPT | Performed by: NURSE PRACTITIONER

## 2017-01-16 PROCEDURE — 83735 ASSAY OF MAGNESIUM: CPT | Performed by: NURSE PRACTITIONER

## 2017-01-16 PROCEDURE — 65270000029 HC RM PRIVATE

## 2017-01-16 PROCEDURE — 74011250636 HC RX REV CODE- 250/636: Performed by: INTERNAL MEDICINE

## 2017-01-16 PROCEDURE — 36415 COLL VENOUS BLD VENIPUNCTURE: CPT | Performed by: NURSE PRACTITIONER

## 2017-01-16 PROCEDURE — 85025 COMPLETE CBC W/AUTO DIFF WBC: CPT | Performed by: NURSE PRACTITIONER

## 2017-01-16 PROCEDURE — 83874 ASSAY OF MYOGLOBIN: CPT | Performed by: NURSE PRACTITIONER

## 2017-01-16 PROCEDURE — 74011250637 HC RX REV CODE- 250/637: Performed by: INTERNAL MEDICINE

## 2017-01-16 PROCEDURE — 82150 ASSAY OF AMYLASE: CPT | Performed by: NURSE PRACTITIONER

## 2017-01-16 PROCEDURE — 97150 GROUP THERAPEUTIC PROCEDURES: CPT

## 2017-01-16 PROCEDURE — 80053 COMPREHEN METABOLIC PANEL: CPT | Performed by: NURSE PRACTITIONER

## 2017-01-16 PROCEDURE — 97535 SELF CARE MNGMENT TRAINING: CPT

## 2017-01-16 RX ORDER — HYDROMORPHONE HYDROCHLORIDE 1 MG/ML
1 INJECTION, SOLUTION INTRAMUSCULAR; INTRAVENOUS; SUBCUTANEOUS
Status: COMPLETED | OUTPATIENT
Start: 2017-01-16 | End: 2017-01-16

## 2017-01-16 RX ORDER — MAGNESIUM SULFATE HEPTAHYDRATE 40 MG/ML
2 INJECTION, SOLUTION INTRAVENOUS ONCE
Status: COMPLETED | OUTPATIENT
Start: 2017-01-16 | End: 2017-01-16

## 2017-01-16 RX ORDER — FENTANYL 50 UG/1
1 PATCH TRANSDERMAL
Status: DISCONTINUED | OUTPATIENT
Start: 2017-01-16 | End: 2017-01-17 | Stop reason: HOSPADM

## 2017-01-16 RX ADMIN — PANTOPRAZOLE SODIUM 40 MG: 40 TABLET, DELAYED RELEASE ORAL at 08:52

## 2017-01-16 RX ADMIN — VITAMIN D, TAB 1000IU (100/BT) 2000 UNITS: 25 TAB at 08:52

## 2017-01-16 RX ADMIN — CHLORHEXIDINE GLUCONATE 15 ML: 1.2 RINSE ORAL at 09:06

## 2017-01-16 RX ADMIN — DOCUSATE SODIUM 100 MG: 100 CAPSULE ORAL at 08:52

## 2017-01-16 RX ADMIN — VALSARTAN 320 MG: 320 TABLET, FILM COATED ORAL at 21:59

## 2017-01-16 RX ADMIN — OXYCODONE HYDROCHLORIDE 10 MG: 10 TABLET, FILM COATED, EXTENDED RELEASE ORAL at 21:59

## 2017-01-16 RX ADMIN — SODIUM CHLORIDE 125 ML/HR: 900 INJECTION, SOLUTION INTRAVENOUS at 18:31

## 2017-01-16 RX ADMIN — OXYCODONE HYDROCHLORIDE AND ACETAMINOPHEN 1 TABLET: 5; 325 TABLET ORAL at 22:01

## 2017-01-16 RX ADMIN — SODIUM CHLORIDE 125 ML/HR: 900 INJECTION, SOLUTION INTRAVENOUS at 03:59

## 2017-01-16 RX ADMIN — OXYCODONE HYDROCHLORIDE AND ACETAMINOPHEN 2 TABLET: 5; 325 TABLET ORAL at 17:44

## 2017-01-16 RX ADMIN — HYDROMORPHONE HYDROCHLORIDE 1 MG: 1 INJECTION, SOLUTION INTRAMUSCULAR; INTRAVENOUS; SUBCUTANEOUS at 18:34

## 2017-01-16 RX ADMIN — DOCUSATE SODIUM 100 MG: 100 CAPSULE ORAL at 17:42

## 2017-01-16 RX ADMIN — CHLORHEXIDINE GLUCONATE 15 ML: 1.2 RINSE ORAL at 22:02

## 2017-01-16 RX ADMIN — Medication 5 ML: at 04:00

## 2017-01-16 RX ADMIN — OXYCODONE HYDROCHLORIDE 10 MG: 10 TABLET, FILM COATED, EXTENDED RELEASE ORAL at 08:52

## 2017-01-16 RX ADMIN — CARVEDILOL 25 MG: 25 TABLET, FILM COATED ORAL at 17:42

## 2017-01-16 RX ADMIN — MAGNESIUM SULFATE HEPTAHYDRATE 2 G: 40 INJECTION, SOLUTION INTRAVENOUS at 08:55

## 2017-01-16 RX ADMIN — LEVOTHYROXINE SODIUM 125 MCG: 125 TABLET ORAL at 08:52

## 2017-01-16 RX ADMIN — AMLODIPINE BESYLATE 5 MG: 5 TABLET ORAL at 08:52

## 2017-01-16 RX ADMIN — ROSUVASTATIN CALCIUM 10 MG: 20 TABLET, FILM COATED ORAL at 21:59

## 2017-01-16 RX ADMIN — CARVEDILOL 25 MG: 25 TABLET, FILM COATED ORAL at 08:52

## 2017-01-16 RX ADMIN — CYANOCOBALAMIN TAB 1000 MCG 1000 MCG: 1000 TAB at 14:05

## 2017-01-16 RX ADMIN — Medication: at 18:30

## 2017-01-16 RX ADMIN — Medication 5 ML: at 22:00

## 2017-01-16 NOTE — PROGRESS NOTES
Met with pts spouse Janet Reece and her brother in [facility] about bed offer from [facility], explained that we have not heard from [facility], wife described a conversation with a doctor about possible being transferred to 9th floor but no referral notes from Dr. Siddharth Jolly or William Fields in chart. Messages x2 left for Sandra to return call from [facility]. Just received call back from Sandra with [facility] states she has sent pts information over to the billing office and waiting to hear back from them about bed. States will call back when she knows something.

## 2017-01-16 NOTE — PROGRESS NOTES
0700-Bedside report received from Rebekah Mcrae RN. Resting in bed. No needs voiced. No s/s of acute distress. 0708-Able to turn self in bed to the right so that this RN could assess posterior lung sounds. 1747-Reports abdominal pain 9/10 and wants the doctor called. MD paged. 1757-Spoke with Rita Epps NP. Orders received.

## 2017-01-16 NOTE — PROGRESS NOTES
Problem: Self Care Deficits Care Plan (Adult)  Goal: *Acute Goals and Plan of Care (Insert Text)  1. Patient will perform grooming and upper body dressing with supervision within 7 days with equipment as needed. 2. Patient will perform bathing with minimal assistance within 7 days with equipment as needed. 3. Patient will perform lower body dressing and toileting with moderate assistance within 7 days with equipment as needed. 4. Patient will perform toilet transfer with supervision within 7 days with equipment as needed. Pt will participate in B UE therapeutic exercises for 8 minutes with 3 rest breaks within 7 days. OCCUPATIONAL THERAPY: Daily Note, Treatment Day: 1st and AM 1/16/2017  INPATIENT: Hospital Day: 6  Payor: CARE IMPROVEMENT PLUS / [place] CARE [place] / Product Type: Managed Care Medicare /      NAME/AGE/GENDER: Luz Marina Hardy is a 67 y.o. male  PRIMARY DIAGNOSIS: Multiple Myeloma/Weakness  Intractable pain Intractable pain Intractable pain        ICD-10: Treatment Diagnosis: Generalized Muscle Weakness (M62.81)  History of falling (Z91.81)  Precautions/Allergies:   Fall,  Lisinopril and Pcn [penicillins]    ASSESSMENT:      Mr. Tio Molina was admitted for the above diagnosis. Pt presents sitting up in chair upon arrival. Required min assist for sit to stand. Pt able to enter bathroom and complete toileting (standing) with CGA. Pt stood at sink and washed his hands with CGA as well. Pt was SOB a little coming out of bathroom. Pt was brought to therapy gym to participate in group exercises (in grid below) to increase UE strength and activity tolerance to perform mobility and ADLs. Pt required visual, verbal, and tactile cueing to complete exercises. Pt tolerated session well. Returned to room by recliner. Pt left sitting up in chair with wife present in room. Pt will continue to benefit from skilled OT during stay.     This section established at most recent assessment   PROBLEM LIST (Impairments causing functional limitations):  1. Decreased Strength  2. Decreased ADL/Functional Activities  3. Decreased Transfer Abilities  4. Decreased Ambulation Ability/Technique  5. Decreased Balance  6. Decreased Activity Tolerance  7. Decreased Work Simplification/Energy Conservation Techniques  8. Increased Fatigue  9. Decreased Flexibility/Joint Mobility  10. Decreased [place] with Home Exercise Program    INTERVENTIONS PLANNED: (Benefits and precautions of occupational therapy have been discussed with the patient.)  1. Activities of daily living training  2. Adaptive equipment training  3. Balance training  4. Clothing management  5. Cognitive training  6. Donning&doffing training  7. Group therapy  8. Hygiene training  9. Neuromuscular re-eduation  10. Re-evaluation  11. Sensory reintergration training  12. Theraputic activity  13. Theraputic exercise      TREATMENT PLAN: Frequency/Duration: Follow patient 3 x's /sol to address above goals. Rehabilitation Potential For Stated Goals: GOOD      RECOMMENDED REHABILITATION/EQUIPMENT: (at time of discharge pending progress): Continue Skilled Therapy. OCCUPATIONAL PROFILE AND HISTORY:   History of Present Injury/Illness (Reason for Referral):  Mr. Maryan Fox is a 67 y.o. male admitted on 1/11/2017 with a primary diagnosis of intractable pain and dehydration. He is a known patient of Dr Roshni Nguyen for resected colon cancer in Feburary 2016 and a new (12/16) diagnosis of multiple myeloma. He has been experiencing both back and abdominal pain along with general malaise that has markedly increased in the last 24 hours. His wife called his nurse navigator this morning and states he is much weaker and unable to hold his own weight. His wife reports that he fell around 3am today and she and a neighbor were unable to get him up therefore 911 was called for assistance. He was recently seen in the ED over the weekend for RLQ pain and constipation.  He was given an enema is minimal relief while in the ED. He states today waking with dried blood to his lips and mouth along with noticing blood in his underwear from his penis. Past Medical History/Comorbidities:   Mr. Jody Del Toro  has a past medical history of Arrhythmia; Arthritis; BMI 30.0-30.9,adult; Cardiomyopathy ([address] Cholelithiases; Chronic systolic heart failure ([address] (9/13/2011); Gout; H/O: pituitary tumor; High cholesterol; History of thyroid cancer; History of vertigo; Hx of radiation therapy to prostate (2004); Hyperlipidemia (11/18/2015); Hypertension; Hypothyroid; ICD (implantable cardiac defibrillator) in place (9/2011); LBBB (left bundle branch block) (11/18/2015); Malaise and fatigue (11/18/2015); Malignant neoplasm of prostate ([facility] [address] Mass of colon; and Sinus node dysfunction ([facility] [address] He also has no past medical history of Adverse effect of anesthesia; Difficult intubation; Malignant hyperthermia due to anesthesia; Nausea & vomiting; Pseudocholinesterase deficiency; or Unspecified adverse effect of anesthesia. Mr. Jody Del Toro  has a past surgical history that includes heent (2008); cholecystectomy (2013); gi; heart catheterization; pacemaker (2011/2016); colonoscopy; thyroidectomy (2008); and tumor removal (1990/2010).   Social History/Living Environment:   Home Environment: Private residence  # Steps to Enter: 0  One/Two Story Residence: One story  Living Alone: No  Support Systems: Spouse/Significant Other/Partner  Patient Expects to be Discharged to[de-identified] Private residence  Current DME Used/Available at Home: Maria Speller, straight, Braden Canavan, rollator  Tub or Shower Type: Shower  Prior Level of Function/Work/Activity:  Patient able to perform basic ADLs per patient report      Number of Personal Factors/Comorbidities that affect the Plan of Care  · Multiple Myeloma,   · Arthritis  · Malignant neoplasm of prostate  · Mass of colon  · Vertigo History  · Malaise & Fatigue  : Expanded review of therapy/medical records (1-2): MODERATE COMPLEXITY   ASSESSMENT OF OCCUPATIONAL PERFORMANCE[de-identified]   Activities of Daily Living:           Basic ADLs (From Assessment) Complex ADLs (From Assessment)   Basic ADL  Feeding: Setup  Oral Facial Hygiene/Grooming: Minimum assistance  Bathing: Moderate assistance  Upper Body Dressing: Contact guard assistance  Lower Body Dressing: Total assistance  Toileting: Total assistance Instrumental ADL  Meal Preparation: Total assistance  Homemaking: Total assistance  Medication Management: Moderate assistance   Grooming/Bathing/Dressing Activities of Daily Living     Cognitive Retraining  Safety/Judgement: Awareness of environment           Toileting  Toileting Assistance: Contact guard assistance     Functional Transfers  Bathroom Mobility: Contact guard assistance     Bed/Mat Mobility  Sit to Stand: Minimum assistance          Most Recent Physical Functioning:   Gross Assessment:                  Posture:  Posture (WDL): Exceptions to WDL  Posture Assessment: Forward head, Rounded shoulders  Balance:  Sitting: Impaired  Sitting - Static: Good (unsupported)  Sitting - Dynamic: Fair (occasional)  Standing: Impaired  Standing - Static: Fair  Standing - Dynamic : Fair Bed Mobility:     Wheelchair Mobility:     Transfers:  Sit to Stand: Minimum assistance                    Patient Vitals for the past 6 hrs:   BP BP Patient Position SpO2 Pulse   01/16/17 0808 152/82 At rest 98 % 71   01/16/17 1200 130/66 At rest 98 % 73        Mental Status  Neurologic State: Alert, Appropriate for age  Orientation Level: Oriented X4  Cognition: Appropriate decision making, Follows commands  Perception: Appears intact  Perseveration: No perseveration noted  Safety/Judgement: Awareness of environment                               Physical Skills Involved:  1. Balance  2. Mobility  3. Strength  4. Endurance Cognitive Skills Affected (resulting in the inability to perform in a timely and safe manner):  1. Problem Solving  2.  Mental Sequencing  3. Remembering Psychosocial Skills Affected:  1. Habits  2. Routines and Behaviors   Number of elements that affect the Plan of Care: 3-5:  MODERATE COMPLEXITY   CLINICAL DECISION MAKIN[address] [place] [address] AM-PAC 6 Clicks   Basic Mobility Inpatient Short Form  How much help from another person does the patient currently need. .. Total A Lot A Little None   1. Putting on and taking off regular lower body clothing? [X] 1   [ ] 2   [ ] 3   [ ] 4   2. Bathing (including washing, rinsing, drying)? [ ] 1   [X] 2   [ ] 3   [ ] 4   3. Toileting, which includes using toilet, bedpan or urinal?   [X] 1   [ ] 2   [ ] 3   [ ] 4   4. Putting on and taking off regular upper body clothing?   [ ] 1   [ ] 2   [X] 3   [ ] 4   5. Taking care of personal grooming such as brushing teeth? [ ] 1   [ ] 2   [X] 3   [ ] 4   6. Eating meals? [ ] 1   [ ] 2   [X] 3   [ ] 4   © , Trustees of [address] [place] [address], under license to [employer]. All rights reserved    Score:  Initial: CL Most Recent: X (Date: -- )     Interpretation of Tool:  Represents activities that are increasingly more difficult (i.e. Bed mobility, Transfers, Gait). Score 24 23 22-20 19-15 14-10 9-7 6       Modifier CH CI CJ CK CL CM CN         · Self Care:               - CURRENT STATUS:    CL - 60%-79% impaired, limited or restricted               - GOAL STATUS:           CL - 60%-79% impaired, limited or restricted               - D/C STATUS:                       ---------------To be determined---------------  Payor: CARE IMPROVEMENT PLUS [place]ARE [place]PR[place] / Product Type: Managed Care Medicare /       Medical Necessity:     · Patient demonstrates good rehab potential due to higher previous functional level. Reason for Services/Other Comments:  · Patient continues to require skilled intervention due to patient's inability to take care of self.    Use of outcome tool(s) and clinical judgement create a POC that gives a: MODERATE COMPLEXITY                 TREATMENT:   (In addition to Assessment/Re-Assessment sessions the following treatments were rendered)   Pre-treatment Symptoms/Complaints:    Pain: Initial:   Pain Intensity 1: 0  Post Session:  3      Self Care: (10 minutes): Procedure(s) (per grid) utilized to improve and/or restore self-care/home management as related to toileting. Required minimal tactile cueing to facilitate activities of daily living skills. Group Therapeutic Exercise: (10 minutes):  Exercises per grid below to improve mobility, strength and activity tolerance. Required minimal visual, verbal and tactile cues to promote proper body alignment and promote proper body mechanics. Progressed resistance and repetitions as indicated. UE Exercises (with theraband) Date:  1/16/2017 Date:   Date:     Activity/Exercise Parameters Parameters Parameters   Shoulder Abd/Adduction 20 reps     Shoulder Flexion 15 reps     Elbow Flexion 20 reps     Punches 20 reps     Ball Toss 5 reps                        Treatment/Session Assessment:    · Response to Treatment:  Patient tolerated tx well. · Interdisciplinary Collaboration:  · Certified Occupational Therapy Assistant and Registered Nurse  · After treatment position/precautions:  · Up in chair, Bed/Chair-wheels locked, Bed in low position, Call light within reach and Family at bedside  · Compliance with Program/Exercises: compliant all of the time. · Recommendations/Intent for next treatment session: \"Next visit will focus on advancements to more challenging activities and reduction in assistance provided\".   Total Treatment Duration:  OT Patient Time In/Time Out  Time In: 1100 (1115)  Time Out: 1110 [phone])     Brandi Miranda

## 2017-01-16 NOTE — PROGRESS NOTES
Received call back from [employer] with Beverley Chairez states has just received word that pts insurance has approved him to come to their facility. Will be going to room # 302, report number [phone], asked by Sandra to please fax DC summary, RX, PPD, orders to be faxed prior to transfer fax# [phone]. I have called spouse Apple Jeffries and made her aware of acceptance to [facility], also made Harpreet Borges NP with Oncology aware of bed ready at [facility].

## 2017-01-16 NOTE — PROGRESS NOTES
Inpatient Hematology / Oncology Progress Note      Admission Date: 2017 10:59 AM  Reason for Admission/Hospital Course: Multiple Myeloma/Weakness  Intractable pain      24 Hour Events:  Abdominal pain continue  Orders for fentanyl patch  Mucositis improved  Working with PT  Waiting on rehab placement      ROS:  Constitutional: Positive for low grade fever, -chills, +weakness, +malaise, +fatigue. CV: Negative for chest pain, palpitations, edema. Respiratory: Negative for dyspnea, cough, wheezing. GI: Negative for nausea, +abdominal pain, -diarrhea. 10 point review of systems is otherwise negative with the exception of the elements mentioned above in the HPI. Allergies   Allergen Reactions    Lisinopril Cough    Pcn [Penicillins] Swelling       OBJECTIVE:  Patient Vitals for the past 8 hrs:   BP Temp Pulse Resp SpO2   17 0808 152/82 97.6 °F (36.4 °C) 71 18 98 %   17 0345 142/73 97.5 °F (36.4 °C) 75 18 98 %     Temp (24hrs), Av.6 °F (36.4 °C), Min:97.5 °F (36.4 °C), Max:98 °F (36.7 °C)         Physical Exam:  Constitutional: Well developed, well nourished male in no acute distress, lying comfortably in the hospital bed. HEENT: Normocephalic and atraumatic. Oropharynx is clear, mucous membranes are moist. Extraocular muscles are intact. Sclerae anicteric. Lymph node   Deferred   Skin Warm and dry. No bruising and no rash noted. No erythema. No pallor. Respiratory Lungs are clear to auscultation bilaterally without wheezes, rales or rhonchi, normal air exchange without accessory muscle use. CVS Normal rate, regular rhythm and normal S1 and S2. No murmurs, gallops, or rubs. Abdomen Soft, mildly tender and nondistended, normoactive bowel sounds. No palpable mass. No hepatosplenomegaly. Neuro Grossly nonfocal with no obvious sensory or motor deficits. MSK Normal range of motion in general.  No edema and no tenderness. Psych Appropriate mood and affect. Labs:      Recent Labs      01/16/17   0640  01/15/17   0500  01/14/17   0435   WBC  2.5*  2.3*  2.0*   RBC  3.79*  3.85*  3.74*   HGB  8.9*  9.2*  8.9*   HCT  27.3*  27.8*  26.5*   MCV  72.0*  72.2*  70.9*   MCH  23.5*  23.9*  23.8*   MCHC  32.6  33.1  33.6   RDW  21.9*  22.0*  21.4*   PLT  50*  46*  34*   GRANS  60  41*  45*   LYMPH  30  34  25   MONOS  8  12*  11*   EOS   --    --   1   DF  MANUAL  AUTOMATED  AUTOMATED   ANEU  1.5*  1.2*  1.3*   ABL  0.8  0.8  0.5   ABM  0.2  0.3  0.2   REA   --    --   0.0        Recent Labs      01/16/17   0640  01/15/17   0500  01/14/17   0435   NA  145  147*  143   K  4.4  4.5  4.3   CL  114*  115*  112*   CO2  24  24  20*   AGAP  7  8  11   GLU  95  88  107*   BUN  30*  41*  56*   CREA  1.62*  2.07*  2.82*   GFRAA  53*  40*  28*   GFRNA  44*  33*  23*   CA  8.4  8.2*  8.0*   SGOT  197*  213*  245*   AP  81  78  75   TP  7.2  7.1  7.7   ALB  1.9*  2.0*  2.0*   GLOB  5.3*  5.1*  5.7*   AGRAT  0.4*  0.4*  0.4*   MG  1.5*  1.7*  1.8         Imaging:      ASSESSMENT:    Problem List  Date Reviewed: 1/2/2017          Codes Class Noted    * (Principal)Intractable pain ICD-10-CM: R52  ICD-9-CM: 780.96  1/11/2017        Acute kidney injury [facility]) ICD-10-CM: N17.9  ICD-9-CM: 584.9  1/11/2017        Pancytopenia ([facility] [address] ICD-10-CM: V85.258  ICD-9-CM: 284.19  1/11/2017        Constipation ICD-10-CM: K59.00  ICD-9-CM: 564.00  1/11/2017        Multiple myeloma (H[facility]) ICD-10-CM: C90.00  ICD-9-CM: 203.00  1/2/2017        Postural imbalance ICD-10-CM: R29.3  ICD-9-CM: 729.90  12/14/2016        Polyneuropathy ICD-10-CM: G62.9  ICD-9-CM: 356.9  12/14/2016        Fall ICD-10-CM: N64. Milind Zelayas  ICD-9-CM: E888.9  12/14/2016        Encounter for medication management ICD-10-CM: Z79.899  ICD-9-CM: V58.69  12/14/2016        Urinary retention ICD-10-CM: R33.9  ICD-9-CM: 788.20  6/6/2016        Colonic mass ICD-10-CM: K63.9  ICD-9-CM: 569.89  3/23/2016        Hyperlipidemia ICD-10-CM: E78.5  ICD-9-CM: 272.4  11/18/2015        Vertigo ICD-10-CM: R42  ICD-9-CM: 780.4  11/18/2015        Malaise and fatigue ICD-10-CM: R53.81, R53.83  ICD-9-CM: 780.79  11/18/2015        Cardiomyopathy ([facility] ICD-10-CM: I42.9  ICD-9-CM: 425.4  11/18/2015        LBBB (left bundle branch block) ICD-10-CM: I44.7  ICD-9-CM: 426.3  11/18/2015        Arthritis ICD-10-CM: M19.90  ICD-9-CM: 716.90  Unknown        Arrhythmia ICD-10-CM: I49.9  ICD-9-CM: 427.9  Unknown    Overview Signed 6/18/2013  2:07 PM by So Fernandez MD     LBBB             Cholelithiases ICD-10-CM: L24.28  ICD-9-CM: 574.20  Unknown        Hx of radiation therapy to prostate ICD-10-CM: Z92.3  ICD-9-CM: V15.3  Unknown        Chronic systolic heart failure ([facility] ICD-10-CM: I50.22  ICD-9-CM: 428.22  9/13/2011        Automatic implantable cardioverter-defibrillator in situ ICD-10-CM: Z95.810  ICD-9-CM: V45.02  9/1/2011                PLAN:    -Multiple Myeloma  Currently holding Revlimid, Dexamethasone and Velcade  01-16 Need to re-evaluate treatment plan once discharged as symptoms may be SE from Velcade     -Abdominal/Back Pain/Constipation  Lactulose, Oxycontin 10mg BID, Percocet 5 1-2 tabs q4h prn  01-12 pain improved, continue lactulose  01-13 patient was unaware that he could get percocet as needed  01-14 patient states pain controlled  01-16 Constipation resolved. Fentanyl patch ordered for continued abdominal pain     -MARY  Gentle hydration due to EF of 30-35%, renal US, check FeNa, consider nephrology consult tomorrow if no improvement, hold lasix   01-16 creatinine improving    -Rhabdomyolysis/Pancreatitis  01-12 Continue with gently hydration  01-13 Continue to monitor-, Myoglobin 1086-Increase IV fluids to 125/hr  01-14 CK 1522, Myoglobin 522, Amylase 161, Lipase 1025  01-16 Counts improving, on low fat diet       -Pancytopenia  1 unit RBCs, monitor platelet and WBCs.  Hold anticoagulation per platelets <65N.  90-50 stable, continue to monitor     -Mucositis  Good oral hygiene, Peridex mouthwash QID  01-13 much improved     -Supportive Care  Follow [facility] [address], [address] [place] [address]  Office : [phone]  Fax : [phone]        Attending Addendum:  I personally evaluated the patient with Yobany Lee, N.P.,  and agree with the assessment, findings and plan as documented. His pancreatitis is due to Velcade, he said he felt a little better.               Griselda Goldstein MD  [address] [place] [address]  Office : [phone]  Fax : [phone]

## 2017-01-16 NOTE — PROGRESS NOTES
Problem: Dysphagia (Adult)  Goal: *Acute Goals and Plan of Care (Insert Text)  ST. Pt. Will tolerate mechanical soft/thin liquids with no overt s/sx of aspiration/penetration. LTG: Pt. Will tolerate least restrictive diet without respiratory decline. SPEECH LANGUAGE PATHOLOGY: BEDSIDE SWALLOW NOTE: Daily Note and Discharge     NAME/AGE/GENDER: Lilliam Gregg is a 67 y.o. male  DATE: 2017  PRIMARY DIAGNOSIS: Multiple Myeloma/Weakness  Intractable pain       ICD-10: Treatment Diagnosis: R13.12 Dysphagia, Oral Phase     INTERDISCIPLINARY COLLABORATION: Registered Nurse  PRECAUTIONS/ALLERGIES: Lisinopril and Pcn [penicillins]   ASSESSMENT:   Patient seen for diet tolerance. Pt reported he didn't eat his lunch which was apparent as his tray was at bedside. He reported he didn't have any problems swallowing prior to admission. Pt reported pain in abdomen when elevated. He agreed to be elevated to 60 degree angle but no higher. Pt given trials thin liquids and taryn slaw. Pt with increased mastication with slaw. Min residue along front of upper teeth as he was masticating but he spontaneously used a lingual sweep to clear which was effective. Also recommended for him to alternate liquids/solids when his mouth is dry. He reported he hasn't tried that yet. Also reminded him it was suggested by SLP yesterday to receive extra gravy/sauces for his food. He reported he would try more foods if that was the case. Recommend continuing with [facility] soft diet. No further ST indicated. ?????? ? ? This section established at most recent assessment??????????  PROBLEM LIST (Impairments causing functional limitations):  1. Mild oral dysphagia  REHABILITATION POTENTIAL FOR STATED GOALS: GOOD      PLAN OF CARE:   Patient will benefit from skilled intervention to address the following impairments.   RECOMMENDATIONS AND PLANNED INTERVENTIONS (Benefits and precautions of therapy have been discussed with the patient.):  · PO:  Mechanical soft  · Liquids:  regular thin  · extra sauces, gravies, syrup  MEDICATIONS:  · With liquid  COMPENSATORY STRATEGIES/MODIFICATIONS INCLUDING:  · Alternate liquids/solids  · Small sips and bites  RECOMMENDED DIET MODIFICATIONS DISCUSSED WITH:  · Patient  · RN     RECOMMENDED REHABILITATION/EQUIPMENT: (at time of discharge pending progress):   None. SUBJECTIVE:   Pt cooperative. History of Present Injury/Illness: Mr. Rosalind Rodriguez  has a past medical history of Arrhythmia; Arthritis; BMI 30.0-30.9,adult; Cardiomyopathy ([address] Cholelithiases; Chronic systolic heart failure ([address] (9/13/2011); Gout; H/O: pituitary tumor; High cholesterol; History of thyroid cancer; History of vertigo; Hx of radiation therapy to prostate (2004); Hyperlipidemia (11/18/2015); Hypertension; Hypothyroid; ICD (implantable cardiac defibrillator) in place (9/2011); LBBB (left bundle branch block) (11/18/2015); Malaise and fatigue (11/18/2015); Malignant neoplasm of prostate ([address] Mass of colon; and Sinus node dysfunction ([address] He also has no past medical history of Adverse effect of anesthesia; Difficult intubation; Malignant hyperthermia due to anesthesia; Nausea & vomiting; Pseudocholinesterase deficiency; or Unspecified adverse effect of anesthesia. Renata Lopez He also  has a past surgical history that includes heent (2008); cholecystectomy (2013); gi; heart catheterization; pacemaker (2011/2016); colonoscopy; thyroidectomy (2008); and tumor removal (1990/2010). Present Symptoms:    Pain Intensity 1: 3  Pain Location 1: Abdomen  Pain Orientation 1: Anterior, Mid  Pain Intervention(s) 1: Repositioned  Current Medications:   No current facility-administered medications on file prior to encounter. Current Outpatient Prescriptions on File Prior to Encounter   Medication Sig Dispense Refill    pantoprazole (PROTONIX) 20 mg tablet Take 1 Tab by mouth daily.  30 Tab 2    polyethylene glycol (MIRALAX) 17 gram packet Take 17 g by mouth daily.  lenalidomide (REVLIMID) 10 mg cap Take 10 mg by mouth daily. For 3 weeks and then off for 1 week. 21 Cap 5    dexamethasone (DECADRON) 4 mg tablet Take 10 Tabs by mouth every seven (7) days. On the day of Velcade 40 Tab 5    cyanocobalamin (VITAMIN B-12) 1,000 mcg sublingual tablet Take 1,000 mcg by mouth daily.  cholecalciferol, vitamin D3, (VITAMIN D3) 2,000 unit tab Take  by mouth.  oxyCODONE-acetaminophen (PERCOCET) 5-325 mg per tablet Take 1-2 Tabs by mouth every four (4) hours as needed for Pain. Max Daily Amount: 12 Tabs. 90 Tab 0    lubiPROStone (AMITIZA) 8 mcg capsule Take  by mouth.  amLODIPine (NORVASC) 10 mg tablet 5 mg.  furosemide (LASIX) 20 mg tablet Take 20 mg by mouth daily (after lunch).  irbesartan (AVAPRO) 300 mg tablet Take 300 mg by mouth nightly.  rosuvastatin (CRESTOR) 10 mg tablet Take 10 mg by mouth nightly.  allopurinol (ZYLOPRIM) 300 mg tablet Take 300 mg by mouth nightly.  carvedilol (COREG) 25 mg tablet Take 25 mg by mouth two (2) times daily (with meals).  levothyroxine (SYNTHROID) 125 mcg tablet Take 125 mcg by mouth Daily (before breakfast).  aspirin 81 mg tablet Take 81 mg by mouth every morning. May hold for 2 days prior to surg per Cardiology  Indications: MYOCARDIAL INFARCTION       Current Dietary Status:  [facility] soft/thin           History of reflux:  YES   · Reflux medication:protonix  Social History/Home Situation:    Home Environment: Private residence  # Steps to Enter: 0  One/Two Story Residence: One story  Living Alone: No  Support Systems: Spouse/Significant Other/Partner  Patient Expects to be Discharged to[de-identified] Private residence  Current DME Used/Available at Home: corinne Keenan Johanne Hylan, rollator  Tub or Shower Type: Shower      OBJECTIVE:   Respiratory Status:        CXR Results:IMPRESSION:     Mild cardiomegaly without evidence of vascular congestion.      Mild left lung base atelectasis/infiltrate. MRI/CT Results:none this admission     Cognitive and Communication Status:  Neurologic State: Alert         BEDSIDE SWALLOW EVALUATION  Oral Assessment:     P.O. Trials:  Patient Position: upright in bed     The patient was given bite/sip amounts of the following:   Consistency Presented: Mixed consistency; Thin liquid  How Presented: Self-fed/presented;Spoon;Straw;Successive swallows     ORAL PHASE:  Bolus Acceptance: No impairment  Bolus Formation/Control: Impaired  Propulsion: No impairment  Type of Impairment: Mastication  Oral Residue: None     PHARYNGEAL PHASE:  Initiation of Swallow: No impairment     Aspiration Signs/Symptoms: None  Vocal Quality: No impairment                 OTHER OBSERVATIONS:  Rate/bite size: WNL         Endurance: WNL               Tool Used: Dysphagia Outcome and Severity Scale (WESTLEY)     Score Comments   Normal Diet  [ ] 7 With no strategies or extra time needed   Functional Swallow  [ ] 6 May have mild oral or pharyngeal delay         Mild Dysphagia     [X] 5 Which may require one diet consistency restricted (those who demonstrate penetration which is entirely cleared on MBS would be included)   Mild-Moderate Dysphagia  [ ] 4 With 1-2 diet consistencies restricted         Moderate Dysphagia  [ ] 3 With 2 or more diet consistencies restricted         Moderately Severe Dysphagia  [ ] 2 With partial PO strategies (trials with ST only)         Severe Dysphagia  [ ] 1 With inability to tolerate any PO safely            Score:  Initial: 5 Most Recent: 6 (Date: 1/16/17 )   Interpretation of Tool: The Dysphagia Outcome and Severity Scale (WESTLEY) is a simple, easy-to-use, 7-point scale developed to systematically rate the functional severity of dysphagia based on objective assessment and make recommendations for diet level, independence level, and type of nutrition.        Score 7 6 5 4 3 2 1   Modifier CH CI CJ CK CL CM CN   · Swallowing:               - CURRENT STATUS:           CJ - 20%-39% impaired, limited or restricted               - GOAL STATUS:                   CI - 1%-19% impaired, limited or restricted               - D/C STATUS:                       CI - 1%-19% impaired, limited or restricted  Payor: Kath Clemente / Plan: [employer] / Product Type: Managed Care Medicare /       TREATMENT:         (In addition to Assessment/Re-Assessment sessions the following treatments were rendered)  Dysphagia Activities: Activities/Procedures listed utilized to improve progress in diet tolerance. Required no cueing to improve swallow safety.   MODALITIES:   :           __________________________________________________________________________________________________  Safety:   After treatment position/precautions:  · Call light within reach, RN notified and upright in bed    Total Treatment Duration:  Time In: 3387  Time Out: [address] [facility] [facility]

## 2017-01-17 VITALS
RESPIRATION RATE: 18 BRPM | HEART RATE: 71 BPM | OXYGEN SATURATION: 96 % | DIASTOLIC BLOOD PRESSURE: 72 MMHG | SYSTOLIC BLOOD PRESSURE: 114 MMHG | TEMPERATURE: 97.9 F

## 2017-01-17 LAB
ALBUMIN SERPL BCP-MCNC: 2.1 G/DL (ref 3.2–4.6)
ALBUMIN/GLOB SERPL: 0.4 {RATIO} (ref 1.2–3.5)
ALP SERPL-CCNC: 88 U/L (ref 50–136)
ALT SERPL-CCNC: 136 U/L (ref 12–65)
AMYLASE SERPL-CCNC: 148 U/L (ref 25–115)
ANION GAP BLD CALC-SCNC: 10 MMOL/L (ref 7–16)
AST SERPL W P-5'-P-CCNC: 224 U/L (ref 15–37)
BACTERIA SPEC CULT: NORMAL
BACTERIA SPEC CULT: NORMAL
BASOPHILS # BLD AUTO: 0 K/UL (ref 0–0.2)
BASOPHILS # BLD: 1 % (ref 0–2)
BILIRUB SERPL-MCNC: 0.3 MG/DL (ref 0.2–1.1)
BUN SERPL-MCNC: 26 MG/DL (ref 8–23)
CALCIUM SERPL-MCNC: 8.5 MG/DL (ref 8.3–10.4)
CHLORIDE SERPL-SCNC: 113 MMOL/L (ref 98–107)
CK SERPL-CCNC: 448 U/L (ref 21–215)
CO2 SERPL-SCNC: 22 MMOL/L (ref 21–32)
CREAT SERPL-MCNC: 1.34 MG/DL (ref 0.8–1.5)
DIFFERENTIAL METHOD BLD: ABNORMAL
EOSINOPHIL # BLD: 0 K/UL (ref 0–0.8)
EOSINOPHIL NFR BLD: 0 % (ref 0.5–7.8)
ERYTHROCYTE [DISTWIDTH] IN BLOOD BY AUTOMATED COUNT: 22.1 % (ref 11.9–14.6)
GLOBULIN SER CALC-MCNC: 5.3 G/DL (ref 2.3–3.5)
GLUCOSE SERPL-MCNC: 98 MG/DL (ref 65–100)
HCT VFR BLD AUTO: 27.7 % (ref 41.1–50.3)
HGB BLD-MCNC: 9.2 G/DL (ref 13.6–17.2)
IMM GRANULOCYTES # BLD: 0.2 K/UL (ref 0–0.5)
LIPASE SERPL-CCNC: 832 U/L (ref 73–393)
LYMPHOCYTES # BLD AUTO: 37 % (ref 13–44)
LYMPHOCYTES # BLD: 1.1 K/UL (ref 0.5–4.6)
MAGNESIUM SERPL-MCNC: 1.9 MG/DL (ref 1.8–2.4)
MCH RBC QN AUTO: 24 PG (ref 26.1–32.9)
MCHC RBC AUTO-ENTMCNC: 33.2 G/DL (ref 31.4–35)
MCV RBC AUTO: 72.1 FL (ref 79.6–97.8)
MONOCYTES # BLD: 0.4 K/UL (ref 0.1–1.3)
MONOCYTES NFR BLD AUTO: 12 % (ref 4–12)
MYOGLOBIN SERPL-MCNC: 147 NG/ML (ref 16–96)
NEUTS SEG # BLD: 1.5 K/UL (ref 1.7–8.2)
NEUTS SEG NFR BLD AUTO: 50 % (ref 43–78)
PLATELET # BLD AUTO: 71 K/UL (ref 150–450)
PLATELET COMMENTS,PCOM: ABNORMAL
PMV BLD AUTO: ABNORMAL FL (ref 10.8–14.1)
POTASSIUM SERPL-SCNC: 4 MMOL/L (ref 3.5–5.1)
PROT SERPL-MCNC: 7.4 G/DL (ref 6.3–8.2)
RBC # BLD AUTO: 3.84 M/UL (ref 4.23–5.67)
RBC MORPH BLD: ABNORMAL
SERVICE CMNT-IMP: NORMAL
SERVICE CMNT-IMP: NORMAL
SODIUM SERPL-SCNC: 145 MMOL/L (ref 136–145)
WBC # BLD AUTO: 3 K/UL (ref 4.3–11.1)

## 2017-01-17 PROCEDURE — 85025 COMPLETE CBC W/AUTO DIFF WBC: CPT | Performed by: NURSE PRACTITIONER

## 2017-01-17 PROCEDURE — 74011250637 HC RX REV CODE- 250/637: Performed by: NURSE PRACTITIONER

## 2017-01-17 PROCEDURE — 83735 ASSAY OF MAGNESIUM: CPT | Performed by: NURSE PRACTITIONER

## 2017-01-17 PROCEDURE — 74011250636 HC RX REV CODE- 250/636: Performed by: NURSE PRACTITIONER

## 2017-01-17 PROCEDURE — 82150 ASSAY OF AMYLASE: CPT | Performed by: NURSE PRACTITIONER

## 2017-01-17 PROCEDURE — 82550 ASSAY OF CK (CPK): CPT | Performed by: NURSE PRACTITIONER

## 2017-01-17 PROCEDURE — 83874 ASSAY OF MYOGLOBIN: CPT | Performed by: NURSE PRACTITIONER

## 2017-01-17 PROCEDURE — 36415 COLL VENOUS BLD VENIPUNCTURE: CPT | Performed by: NURSE PRACTITIONER

## 2017-01-17 PROCEDURE — 80053 COMPREHEN METABOLIC PANEL: CPT | Performed by: NURSE PRACTITIONER

## 2017-01-17 PROCEDURE — 83690 ASSAY OF LIPASE: CPT | Performed by: NURSE PRACTITIONER

## 2017-01-17 RX ORDER — CHLORHEXIDINE GLUCONATE 1.2 MG/ML
15 RINSE ORAL 4 TIMES DAILY
Qty: 840 ML | Refills: 0 | Status: SHIPPED | OUTPATIENT
Start: 2017-01-17 | End: 2017-01-31

## 2017-01-17 RX ORDER — FENTANYL 50 UG/1
1 PATCH TRANSDERMAL
Qty: 5 PATCH | Refills: 0 | Status: SHIPPED | OUTPATIENT
Start: 2017-01-17 | End: 2017-02-10 | Stop reason: SDUPTHER

## 2017-01-17 RX ORDER — OXYCODONE AND ACETAMINOPHEN 5; 325 MG/1; MG/1
1-2 TABLET ORAL
Qty: 60 TAB | Refills: 0 | Status: SHIPPED | OUTPATIENT
Start: 2017-01-17 | End: 2017-03-16

## 2017-01-17 RX ORDER — OXYCODONE HCL 10 MG/1
10 TABLET, FILM COATED, EXTENDED RELEASE ORAL EVERY 12 HOURS
Qty: 60 TAB | Refills: 0 | Status: SHIPPED | OUTPATIENT
Start: 2017-01-17 | End: 2017-03-16

## 2017-01-17 RX ADMIN — Medication 5 ML: at 01:00

## 2017-01-17 RX ADMIN — LEVOTHYROXINE SODIUM 125 MCG: 125 TABLET ORAL at 08:53

## 2017-01-17 RX ADMIN — CYANOCOBALAMIN TAB 1000 MCG 1000 MCG: 1000 TAB at 08:53

## 2017-01-17 RX ADMIN — AMLODIPINE BESYLATE 5 MG: 5 TABLET ORAL at 08:53

## 2017-01-17 RX ADMIN — CHLORHEXIDINE GLUCONATE 15 ML: 1.2 RINSE ORAL at 12:10

## 2017-01-17 RX ADMIN — VITAMIN D, TAB 1000IU (100/BT) 2000 UNITS: 25 TAB at 08:53

## 2017-01-17 RX ADMIN — PANTOPRAZOLE SODIUM 40 MG: 40 TABLET, DELAYED RELEASE ORAL at 08:53

## 2017-01-17 RX ADMIN — SODIUM CHLORIDE 125 ML/HR: 900 INJECTION, SOLUTION INTRAVENOUS at 02:50

## 2017-01-17 RX ADMIN — Medication 5 ML: at 12:10

## 2017-01-17 RX ADMIN — POLYETHYLENE GLYCOL 3350 17 G: 17 POWDER, FOR SOLUTION ORAL at 08:53

## 2017-01-17 RX ADMIN — OXYCODONE HYDROCHLORIDE 10 MG: 10 TABLET, FILM COATED, EXTENDED RELEASE ORAL at 08:53

## 2017-01-17 RX ADMIN — Medication 5 ML: at 08:53

## 2017-01-17 RX ADMIN — CHLORHEXIDINE GLUCONATE 15 ML: 1.2 RINSE ORAL at 08:53

## 2017-01-17 RX ADMIN — Medication 5 ML: at 04:00

## 2017-01-17 RX ADMIN — DOCUSATE SODIUM 100 MG: 100 CAPSULE ORAL at 08:53

## 2017-01-17 RX ADMIN — OXYCODONE HYDROCHLORIDE AND ACETAMINOPHEN 1 TABLET: 5; 325 TABLET ORAL at 05:05

## 2017-01-17 RX ADMIN — CARVEDILOL 25 MG: 25 TABLET, FILM COATED ORAL at 08:53

## 2017-01-17 NOTE — DISCHARGE SUMMARY
[facility]: In Patient Hematology / Oncology Discharge Summary Note    Patient ID:  Boyd Doss  [id number]  67 y.o.  1940    Admit Date: 1/11/2017    Discharge Date: 1/17/2017    Admission Diagnoses: Multiple Myeloma/Weakness  Intractable pain    Discharge Diagnoses:  Principal Diagnosis: Intractable pain  Principal Problem:    Intractable pain (1/11/2017)    Active Problems:    Acute kidney injury ([facility] [address] (1/11/2017)      Pancytopenia ([facility] [address] (1/11/2017)      Constipation (1/11/2017)        Hospital Course:  Mr. Sanjana Jackson is a 67 y.o. male admitted on 1/11/2017 with a primary diagnosis of intractable pain and dehydration. He is a known patient of Dr Vera Hodge for resected colon cancer in Feburary 2016 and a new (12/16) diagnosis of multiple myeloma. He had been experiencing both back and abdominal pain along with general malaise that has markedly increased. He was admitted to hospital for intractable abdominal pain. He was gently hydrated as his EF is 30-35% and medications for his pain. He was found to have elevated CK, Myoglobin, Amylase, and Lipase which have gradually improved as well as his pain. It appears that Mr. Cony Simon admission was perhaps due to a SE of Velcade therefore, it was removed from his treatment plan. He is going to short term rehab at [facility]. He will followup with Dr. Vera Hodge in clinic early next week. Consults:  None    Significant Diagnostic Studies:   Final result (Exam End: 1/11/2017  4:54 PM)         Study Result      History: Abdominal pain and constipation     EXAM: Flat and upright views of the abdomen     FINDINGS: Clips are present from prior cholecystectomy. The lung bases are  clear. The bowel gas pattern is nonspecific without evidence of free air or  obstruction.     IMPRESSION  IMPRESSION: No findings to suggest free air or obstruction.      Final result (Exam End: 1/11/2017  6:15 PM)         Study Result      Bilateral renal ultrasound     History: acute renal failure, 67 years Male     Comparison: None available     Findings: No evidence of hydronephrosis, nephrolithiasis, or renal mass. The  right kidney measures 11.5 CM in length. There is a simple appearing right  renal parapelvic cyst measuring 2.7 x 1.7 x 2.1 cm, without flow. Left kidney  measures 12.5 CM. Small simple appearing left renal upper pole medial cortical  cyst measuring 0.9 x 1.0 x 1.0 cm, without flow. Partially full urinary bladder  appears unremarkable.      IMPRESSION  Impression: No acute hydronephrosis. Simple appearing bilateral renal cysts. Final result (Exam End: 2017  1:26 AM) Open        Study Result         Exam: 2 views of the chest     Indication: Fever and cough, evaluate for infection      Comparison: Chest radiograph from March 15, 2016     Findings: The AP image is likely inverted on the vertical axis and mislabeled. The cardiac  silhouette is mildly enlarged. Left cardiac pacer/ICD is in unchanged position. Patchy left lung base atelectasis/infiltrate. . No pleural effusion or evidence  of pneumothorax. No acute osseous abnormality. IMPRESSION:     Mild cardiomegaly without evidence of vascular congestion.     Mild left lung base atelectasis/infiltrate. Allergies   Allergen Reactions    Lisinopril Cough    Pcn [Penicillins] Swelling       OBJECTIVE:  Patient Vitals for the past 8 hrs:   BP Temp Pulse Resp SpO2   17 0350 142/85 97.8 °F (36.6 °C) 70 18 97 %     Temp (24hrs), Av.9 °F (36.6 °C), Min:97.6 °F (36.4 °C), Max:98.2 °F (36.8 °C)         Physical Exam:  Constitutional: Oriented to person, place, and time. Well-developed and well-nourished. HEENT: Normocephalic and atraumatic. Oropharynx is clear and moist.   Conjunctivae and EOM are normal.. No scleral icterus. Neck supple. Skin Warm and dry. No bruising and no rash noted. No erythema. No pallor. Respiratory Effort normal and breath sounds normal.  No respiratory distress.   No wheezes. No rales. No tenderness. CVS Normal rate, regular rhythm and normal heart sounds. Exam reveals no gallop, no friction and no rub. No murmur heard. Abdomen Soft. Bowel sounds are normal. Exhibits no distension. There is no tenderness. There is no rebound and no guarding. Neuro Alert and oriented   MSK Normal range of motion. No edema and no tenderness. Psych Normal mood, affect, behavior, judgment and thought content      Labs:  Recent Labs      01/17/17 0449 01/16/17   0640  01/15/17   0500   WBC  3.0*  2.5*  2.3*   RBC  3.84*  3.79*  3.85*   HGB  9.2*  8.9*  9.2*   HCT  27.7*  27.3*  27.8*   MCV  72.1*  72.0*  72.2*   MCH  24.0*  23.5*  23.9*   MCHC  33.2  32.6  33.1   RDW  22.1*  21.9*  22.0*   PLT  71*  50*  46*   GRANS  50  60  41*   LYMPH  37  30  34   MONOS  12  8  12*   EOS  0*   --    --    BASOS  1   --    --    DF  MANUAL  MANUAL  AUTOMATED   ANEU  1.5*  1.5*  1.2*   ABL  1.1  0.8  0.8   ABM  0.4  0.2  0.3   REA  0.0   --    --    ABB  0.0   --    --    AIG  0.2   --    --     Recent Labs      01/17/17 0449 01/16/17   0640  01/15/17   0500   NA  145  145  147*   K  4.0  4.4  4.5   CL  113*  114*  115*   CO2  22  24  24   AGAP  10  7  8   GLU  98  95  88   BUN  26*  30*  41*   CREA  1.34  1.62*  2.07*   GFRAA  >60  53*  40*   GFRNA  55*  44*  33*   CA  8.5  8.4  8.2*   SGOT  224*  197*  213*   AP  88  81  78   TP  7.4  7.2  7.1   ALB  2.1*  1.9*  2.0*   GLOB  5.3*  5.3*  5.1*   AGRAT  0.4*  0.4*  0.4*   MG  1.9  1.5*  1.7*       ASSESSMENT:    Principal Problem:    Intractable pain (1/11/2017)    Active Problems:    Acute kidney injury ([facility] [address] (1/11/2017)      Pancytopenia ([facility] [address] (1/11/2017)      Constipation (1/11/2017)      Current Discharge Medication List      START taking these medications    Details   chlorhexidine (PERIDEX) 0.12 % solution Take 15 mL by mouth four (4) times daily for 14 days.   Qty: 840 mL, Refills: 0      fentaNYL (DURAGESIC) 50 mcg/hr PATCH 1 Patch by TransDERmal route every seventy-two (72) hours. Max Daily Amount: 1 Patch. Qty: 5 Patch, Refills: 0      oxyCODONE ER (OXYCONTIN) 10 mg ER tablet Take 1 Tab by mouth every twelve (12) hours. Max Daily Amount: 20 mg.  Qty: 60 Tab, Refills: 0         CONTINUE these medications which have CHANGED    Details   !! oxyCODONE-acetaminophen (PERCOCET) 5-325 mg per tablet Take 1-2 Tabs by mouth every four (4) hours as needed. Max Daily Amount: 12 Tabs. Qty: 60 Tab, Refills: 0       !! - Potential duplicate medications found. Please discuss with provider. CONTINUE these medications which have NOT CHANGED    Details   pantoprazole (PROTONIX) 20 mg tablet Take 1 Tab by mouth daily. Qty: 30 Tab, Refills: 2      polyethylene glycol (MIRALAX) 17 gram packet Take 17 g by mouth daily. Associated Diagnoses: Multiple myeloma, remission status unspecified (HCC)      cyanocobalamin (VITAMIN B-12) 1,000 mcg sublingual tablet Take 1,000 mcg by mouth daily. cholecalciferol, vitamin D3, (VITAMIN D3) 2,000 unit tab Take  by mouth. !! oxyCODONE-acetaminophen (PERCOCET) 5-325 mg per tablet Take 1-2 Tabs by mouth every four (4) hours as needed for Pain. Max Daily Amount: 12 Tabs. Qty: 90 Tab, Refills: 0      lubiPROStone (AMITIZA) 8 mcg capsule Take  by mouth. amLODIPine (NORVASC) 10 mg tablet 5 mg.      furosemide (LASIX) 20 mg tablet Take 20 mg by mouth daily (after lunch). irbesartan (AVAPRO) 300 mg tablet Take 300 mg by mouth nightly. rosuvastatin (CRESTOR) 10 mg tablet Take 10 mg by mouth nightly. carvedilol (COREG) 25 mg tablet Take 25 mg by mouth two (2) times daily (with meals). levothyroxine (SYNTHROID) 125 mcg tablet Take 125 mcg by mouth Daily (before breakfast). aspirin 81 mg tablet Take 81 mg by mouth every morning. May hold for 2 days prior to surg per Cardiology  Indications: MYOCARDIAL INFARCTION       ! ! - Potential duplicate medications found. Please discuss with provider. STOP taking these medications       acyclovir (ZOVIRAX) 400 mg tablet Comments:   Reason for Stopping:         lenalidomide (REVLIMID) 10 mg cap Comments:   Reason for Stopping:         dexamethasone (DECADRON) 4 mg tablet Comments:   Reason for Stopping:         allopurinol (ZYLOPRIM) 300 mg tablet Comments:   Reason for Stopping:               PLAN:    Follow-up Appointments   Procedures    FOLLOW UP VISIT Appointment in: 3 - 5 Days Please make followup appointment with Dr. Veronica Avila early next week. Please make followup appointment with Dr. Veronica Avila early next week. Standing Status:   Standing     Number of Occurrences:   1     Order Specific Question:   Appointment in     Answer:   3 - 5 Days       Time spent discharging the patient was 32 minutes. Saravanan Moreno NP  [address], [address]  Office : [phone]  Fax : [phone]        Attending Addendum:  I personally evaluated the patient with Saravanan Moreno, N.P.,  and agree with the assessment, findings and plan as documented. He still complained of abdominal discomfort, he is scheduled to return to our clinic next week, no more Velcade.               Edith Rodriguez MD  [facility]  [address] [address]  Office : [phone]  Fax : [phone]

## 2017-01-17 NOTE — PROGRESS NOTES
Care Management Interventions  PCP Verified by CM: Yes  Palliative Care Consult (Criteria: CHF and RRAT>21): Yes  Mode of Transport at Discharge: Other (see comment)  Transition of Care Consult (CM Consult):  Other  MyChart Signup: No  Discharge Durable Medical Equipment: No  Health Maintenance Reviewed: Yes  Physical Therapy Consult: Yes  Occupational Therapy Consult: Yes  Speech Therapy Consult: No  Current Support Network: Lives with Spouse, Own Home  Confirm Follow Up Transport: Family  Plan discussed with Pt/Family/Caregiver: Yes  Freedom of Choice Offered: Yes  Discharge Location  Discharge Placement: Skilled nursing facility     Patient to transfer to [facility] via [facility] today

## 2017-01-17 NOTE — PROGRESS NOTES
Report given to nurse at [facility] [place], operated by [facility]. Prescriptions were faxed over and will be in packet for ambulance to take. All questions answered.

## 2017-01-17 NOTE — PROGRESS NOTES
END OF SHIFT NOTE:    Intake/Output  01/16 1901 - 01/17 0700  In: 5448 [I.V.:1573]  Out: 210 [Urine:210]   Voiding: YES  Catheter: NO  Drain:              Stool:  0 occurrences. Stool Assessment  Stool Color: Santos Gey (01/15/17 1445)  Stool Appearance: Formed (01/15/17 1445)  Stool Amount: Small (01/15/17 1445)  Stool Source/Status: Rectum (01/15/17 1445)    Emesis:  0 occurrences. VITAL SIGNS  Patient Vitals for the past 12 hrs:   Temp Pulse Resp BP SpO2   01/17/17 0350 97.8 °F (36.6 °C) 70 18 142/85 97 %   01/16/17 2353 97.6 °F (36.4 °C) 70 20 136/76 98 %   01/16/17 2002 97.9 °F (36.6 °C) 74 20 120/61 96 %       Pain Assessment  Pain 1  Pain Scale 1: Numeric (0 - 10) (01/17/17 0505)  Pain Intensity 1: 8 (01/17/17 0505)  Patient Stated Pain Goal: 0 (01/16/17 2201)  Pain Reassessment 1: Patient sleeping (01/16/17 1844)  Pain Onset 1: \"now\" (01/17/17 0505)  Pain Location 1: Abdomen (01/17/17 0505)  Pain Orientation 1: Anterior (01/17/17 0505)  Pain Description 1: Aching (01/17/17 0505)  Pain Intervention(s) 1: Medication (see MAR) (01/17/17 0505)    Ambulating  YES    Additional Information:     Shift report given to oncoming nurse at the bedside.     Judd Carreon RN

## 2017-01-17 NOTE — PROGRESS NOTES
TRANSFER - OUT REPORT:    Verbal report given to nurse on Madeline Brown  being transferred to [facility]  for routine progression of care       Report consisted of patients Situation, Background, Assessment and   Recommendations(SBAR). Information from the following report(s) SBAR and Recent Results was reviewed with the receiving nurse. Lines:   Peripheral IV 11/29/16 Right Antecubital (Active)       Peripheral IV 01/13/17 Right; Inner Forearm (Active)   Site Assessment Clean, dry, & intact 1/16/2017  8:00 PM   Phlebitis Assessment 0 1/16/2017  8:00 PM   Infiltration Assessment 0 1/16/2017  8:00 PM   Dressing Status Clean, dry, & intact 1/16/2017  8:00 PM   Dressing Type Disk with Chlorhexadine Gluconate (CHG); Transparent 1/16/2017  2:05 PM   Hub Color/Line Status Blue; Infusing 1/16/2017  2:05 PM        Opportunity for questions and clarification was provided.       Patient transported with:   O2 @ 3 liters

## 2017-01-18 ENCOUNTER — HOSPITAL ENCOUNTER (OUTPATIENT)
Dept: INFUSION THERAPY | Age: 77
End: 2017-01-18
Payer: MEDICARE

## 2017-01-19 ENCOUNTER — PATIENT OUTREACH (OUTPATIENT)
Dept: CASE MANAGEMENT | Age: 77
End: 2017-01-19

## 2017-01-19 NOTE — ACP (ADVANCE CARE PLANNING)
1/19/17:  Patient's wife called this navigator questioning follow-up plan for the patient in regards to revlimid schedule and Dr. Marcello Pierce appointments. Patient not currently taking revlimid or dexamethasone at [address] [place] [address] per RN in charge of his care. Patient to follow-up with Dr. Marcello Pierce on 1/27 @ 12 labs and 1230 ov. Dr. Marcello Pierce made aware that patient not on Rev/Dex at this time. Wife notified of appt times as well.

## 2017-01-25 ENCOUNTER — HOSPITAL ENCOUNTER (OUTPATIENT)
Dept: INFUSION THERAPY | Age: 77
End: 2017-01-25
Payer: MEDICARE

## 2017-01-27 ENCOUNTER — HOSPITAL ENCOUNTER (OUTPATIENT)
Dept: LAB | Age: 77
Discharge: HOME | End: 2017-01-27

## 2017-01-27 DIAGNOSIS — C90.00 MULTIPLE MYELOMA NOT HAVING ACHIEVED REMISSION (HCC): ICD-10-CM

## 2017-02-01 ENCOUNTER — HOSPITAL ENCOUNTER (OUTPATIENT)
Dept: INFUSION THERAPY | Age: 77
End: 2017-02-01

## 2017-02-13 ENCOUNTER — HOSPITAL ENCOUNTER (OUTPATIENT)
Dept: LAB | Age: 77
Discharge: HOME | End: 2017-02-13
Payer: MEDICARE

## 2017-02-13 ENCOUNTER — PATIENT OUTREACH (OUTPATIENT)
Dept: CASE MANAGEMENT | Age: 77
End: 2017-02-13

## 2017-02-13 DIAGNOSIS — D61.818 PANCYTOPENIA (HCC): ICD-10-CM

## 2017-02-13 LAB
ALBUMIN SERPL BCP-MCNC: 3.5 G/DL (ref 3.2–4.6)
ALBUMIN/GLOB SERPL: 1 {RATIO} (ref 1.2–3.5)
ALP SERPL-CCNC: 274 U/L (ref 50–136)
ALT SERPL-CCNC: 52 U/L (ref 12–65)
ANION GAP BLD CALC-SCNC: 9 MMOL/L (ref 7–16)
AST SERPL W P-5'-P-CCNC: 37 U/L (ref 15–37)
BASOPHILS # BLD AUTO: 0 K/UL (ref 0–0.2)
BASOPHILS # BLD: 0 % (ref 0–2)
BILIRUB SERPL-MCNC: 1 MG/DL (ref 0.2–1.1)
BUN SERPL-MCNC: 21 MG/DL (ref 8–23)
CALCIUM SERPL-MCNC: 9 MG/DL (ref 8.3–10.4)
CHLORIDE SERPL-SCNC: 101 MMOL/L (ref 98–107)
CO2 SERPL-SCNC: 25 MMOL/L (ref 23–32)
CREAT SERPL-MCNC: 1.13 MG/DL (ref 0.8–1.5)
DIFFERENTIAL METHOD BLD: ABNORMAL
EOSINOPHIL # BLD: 0.2 K/UL (ref 0–0.8)
EOSINOPHIL NFR BLD: 4 % (ref 0.5–7.8)
ERYTHROCYTE [DISTWIDTH] IN BLOOD BY AUTOMATED COUNT: 20.9 % (ref 11.9–14.6)
GLOBULIN SER CALC-MCNC: 3.5 G/DL (ref 2.3–3.5)
GLUCOSE SERPL-MCNC: 139 MG/DL (ref 65–100)
HCT VFR BLD AUTO: 27.1 % (ref 41.1–50.3)
HGB BLD-MCNC: 9 G/DL (ref 13.6–17.2)
LYMPHOCYTES # BLD AUTO: 22 % (ref 13–44)
LYMPHOCYTES # BLD: 1.1 K/UL (ref 0.5–4.6)
MAGNESIUM SERPL-MCNC: 2.1 MG/DL (ref 1.8–2.4)
MCH RBC QN AUTO: 24.1 PG (ref 26.1–32.9)
MCHC RBC AUTO-ENTMCNC: 33.2 G/DL (ref 31.4–35)
MCV RBC AUTO: 72.5 FL (ref 79.6–97.8)
MONOCYTES # BLD: 0.5 K/UL (ref 0.1–1.3)
MONOCYTES NFR BLD AUTO: 11 % (ref 4–12)
NEUTS SEG # BLD: 2.9 K/UL (ref 1.7–8.2)
NEUTS SEG NFR BLD AUTO: 62 % (ref 43–78)
NRBC # BLD: 0 K/UL (ref 0–0.2)
PLATELET # BLD AUTO: 221 K/UL (ref 150–450)
PMV BLD AUTO: 9.9 FL (ref 10.8–14.1)
POTASSIUM SERPL-SCNC: 4.2 MMOL/L (ref 3.5–5.1)
PROT SERPL-MCNC: 7 G/DL (ref 6.3–8.2)
RBC # BLD AUTO: 3.74 M/UL (ref 4.23–5.67)
SODIUM SERPL-SCNC: 135 MMOL/L (ref 136–145)
WBC # BLD AUTO: 4.7 K/UL (ref 4.3–11.1)

## 2017-02-13 PROCEDURE — 83735 ASSAY OF MAGNESIUM: CPT | Performed by: INTERNAL MEDICINE

## 2017-02-13 PROCEDURE — 85025 COMPLETE CBC W/AUTO DIFF WBC: CPT | Performed by: INTERNAL MEDICINE

## 2017-02-13 PROCEDURE — 80053 COMPREHEN METABOLIC PANEL: CPT | Performed by: INTERNAL MEDICINE

## 2017-02-13 PROCEDURE — 36415 COLL VENOUS BLD VENIPUNCTURE: CPT | Performed by: INTERNAL MEDICINE

## 2017-02-28 ENCOUNTER — HOSPITAL ENCOUNTER (OUTPATIENT)
Dept: LAB | Age: 77
Discharge: HOME | End: 2017-02-28
Payer: MEDICARE

## 2017-02-28 ENCOUNTER — PATIENT OUTREACH (OUTPATIENT)
Dept: CASE MANAGEMENT | Age: 77
End: 2017-02-28

## 2017-02-28 DIAGNOSIS — C90.00 MULTIPLE MYELOMA, REMISSION STATUS UNSPECIFIED (HCC): ICD-10-CM

## 2017-02-28 LAB
ALBUMIN SERPL BCP-MCNC: 3.6 G/DL (ref 3.2–4.6)
ALBUMIN/GLOB SERPL: 1.2 {RATIO} (ref 1.2–3.5)
ALP SERPL-CCNC: 225 U/L (ref 50–136)
ALT SERPL-CCNC: 34 U/L (ref 12–65)
ANION GAP BLD CALC-SCNC: 8 MMOL/L (ref 7–16)
AST SERPL W P-5'-P-CCNC: 25 U/L (ref 15–37)
BASOPHILS # BLD AUTO: 0 K/UL (ref 0–0.2)
BASOPHILS # BLD: 1 % (ref 0–2)
BILIRUB SERPL-MCNC: 0.6 MG/DL (ref 0.2–1.1)
BUN SERPL-MCNC: 13 MG/DL (ref 8–23)
CALCIUM SERPL-MCNC: 9.1 MG/DL (ref 8.3–10.4)
CHLORIDE SERPL-SCNC: 101 MMOL/L (ref 98–107)
CO2 SERPL-SCNC: 27 MMOL/L (ref 23–32)
CREAT SERPL-MCNC: 0.83 MG/DL (ref 0.8–1.5)
DIFFERENTIAL METHOD BLD: ABNORMAL
EOSINOPHIL # BLD: 0 K/UL (ref 0–0.8)
EOSINOPHIL NFR BLD: 1 % (ref 0.5–7.8)
ERYTHROCYTE [DISTWIDTH] IN BLOOD BY AUTOMATED COUNT: 19.2 % (ref 11.9–14.6)
GLOBULIN SER CALC-MCNC: 3.1 G/DL (ref 2.3–3.5)
GLUCOSE SERPL-MCNC: 81 MG/DL (ref 65–100)
HCT VFR BLD AUTO: 29.4 % (ref 41.1–50.3)
HGB BLD-MCNC: 9.5 G/DL (ref 13.6–17.2)
LYMPHOCYTES # BLD AUTO: 34 % (ref 13–44)
LYMPHOCYTES # BLD: 1.6 K/UL (ref 0.5–4.6)
MAGNESIUM SERPL-MCNC: 2.2 MG/DL (ref 1.8–2.4)
MCH RBC QN AUTO: 23.6 PG (ref 26.1–32.9)
MCHC RBC AUTO-ENTMCNC: 32.3 G/DL (ref 31.4–35)
MCV RBC AUTO: 73 FL (ref 79.6–97.8)
MONOCYTES # BLD: 0.9 K/UL (ref 0.1–1.3)
MONOCYTES NFR BLD AUTO: 18 % (ref 4–12)
NEUTS SEG # BLD: 2.2 K/UL (ref 1.7–8.2)
NEUTS SEG NFR BLD AUTO: 47 % (ref 43–78)
NRBC # BLD: 0 K/UL (ref 0–0.2)
PHOSPHATE SERPL-MCNC: 2.4 MG/DL (ref 2.3–3.7)
PLATELET # BLD AUTO: 257 K/UL (ref 150–450)
PMV BLD AUTO: 9.6 FL (ref 10.8–14.1)
POTASSIUM SERPL-SCNC: 4.1 MMOL/L (ref 3.5–5.1)
PROT SERPL-MCNC: 6.7 G/DL (ref 6.3–8.2)
RBC # BLD AUTO: 4.03 M/UL (ref 4.23–5.67)
SODIUM SERPL-SCNC: 136 MMOL/L (ref 136–145)
WBC # BLD AUTO: 4.7 K/UL (ref 4.3–11.1)

## 2017-02-28 PROCEDURE — 84100 ASSAY OF PHOSPHORUS: CPT | Performed by: INTERNAL MEDICINE

## 2017-02-28 PROCEDURE — 83883 ASSAY NEPHELOMETRY NOT SPEC: CPT | Performed by: INTERNAL MEDICINE

## 2017-02-28 PROCEDURE — 80053 COMPREHEN METABOLIC PANEL: CPT | Performed by: INTERNAL MEDICINE

## 2017-02-28 PROCEDURE — 36415 COLL VENOUS BLD VENIPUNCTURE: CPT | Performed by: INTERNAL MEDICINE

## 2017-02-28 PROCEDURE — 85025 COMPLETE CBC W/AUTO DIFF WBC: CPT | Performed by: INTERNAL MEDICINE

## 2017-02-28 PROCEDURE — 83735 ASSAY OF MAGNESIUM: CPT | Performed by: INTERNAL MEDICINE

## 2017-02-28 PROCEDURE — 84165 PROTEIN E-PHORESIS SERUM: CPT | Performed by: INTERNAL MEDICINE

## 2017-02-28 PROCEDURE — 86334 IMMUNOFIX E-PHORESIS SERUM: CPT | Performed by: INTERNAL MEDICINE

## 2017-02-28 NOTE — ACP (ADVANCE CARE PLANNING)
2/28/17 Dr Jeanine Cadet reviewed labs and orders for myeloma labs today. Patient to continue Rev/Dex and return in 2 weeks for labs and 4 weeks for labs and OV. Patient aware of appts.  Decreased Duragesic by 50 %

## 2017-03-01 LAB
ALBUMIN SERPL ELPH-MCNC: 2.81 G/DL (ref 3.2–5.6)
ALBUMIN/GLOB SERPL: 0.8 {RATIO}
ALPHA1 GLOB SERPL ELPH-MCNC: 0.19 G/DL (ref 0.1–0.4)
ALPHA2 GLOB SERPL ELPH-MCNC: 0.83 G/DL (ref 0.4–1.2)
B-GLOBULIN SERPL QL ELPH: 1.07 G/DL (ref 0.6–1.3)
GAMMA GLOB MFR SERPL ELPH: 1.4 G/DL (ref 0.5–1.6)
IGA SERPL-MCNC: 56 MG/DL (ref 85–499)
IGG SERPL-MCNC: 858 MG/DL (ref 610–1616)
IGM SERPL-MCNC: 125 MG/DL (ref 35–242)
KAPPA LC FREE SER-MCNC: 13.19 MG/L (ref 3.3–19.4)
KAPPA LC FREE/LAMBDA FREE SER: 0.47 {RATIO} (ref 0.26–1.65)
LAMBDA LC FREE SERPL-MCNC: 28.19 MG/L (ref 5.71–26.3)
M PROTEIN SERPL ELPH-MCNC: 0.53 G/DL
PROT PATTERN SERPL ELPH-IMP: ABNORMAL
PROT PATTERN SPEC IFE-IMP: ABNORMAL
PROT SERPL-MCNC: 6.3 G/DL (ref 6.3–8.2)

## 2017-03-14 ENCOUNTER — HOSPITAL ENCOUNTER (OUTPATIENT)
Dept: LAB | Age: 77
Discharge: HOME | End: 2017-03-14
Payer: MEDICARE

## 2017-03-14 DIAGNOSIS — C90.00 MULTIPLE MYELOMA, REMISSION STATUS UNSPECIFIED (HCC): ICD-10-CM

## 2017-03-14 LAB
ALBUMIN SERPL BCP-MCNC: 3.6 G/DL (ref 3.2–4.6)
ALBUMIN/GLOB SERPL: 1.1 {RATIO} (ref 1.2–3.5)
ALP SERPL-CCNC: 167 U/L (ref 50–136)
ALT SERPL-CCNC: 32 U/L (ref 12–65)
ANION GAP BLD CALC-SCNC: 7 MMOL/L (ref 7–16)
AST SERPL W P-5'-P-CCNC: 20 U/L (ref 15–37)
BASOPHILS # BLD AUTO: 0 K/UL (ref 0–0.2)
BASOPHILS # BLD: 1 % (ref 0–2)
BILIRUB SERPL-MCNC: 0.5 MG/DL (ref 0.2–1.1)
BUN SERPL-MCNC: 14 MG/DL (ref 8–23)
CALCIUM SERPL-MCNC: 9.3 MG/DL (ref 8.3–10.4)
CHLORIDE SERPL-SCNC: 103 MMOL/L (ref 98–107)
CO2 SERPL-SCNC: 29 MMOL/L (ref 23–32)
CREAT SERPL-MCNC: 0.97 MG/DL (ref 0.8–1.5)
DIFFERENTIAL METHOD BLD: ABNORMAL
EOSINOPHIL # BLD: 0.3 K/UL (ref 0–0.8)
EOSINOPHIL NFR BLD: 5 % (ref 0.5–7.8)
ERYTHROCYTE [DISTWIDTH] IN BLOOD BY AUTOMATED COUNT: 17.8 % (ref 11.9–14.6)
GLOBULIN SER CALC-MCNC: 3.4 G/DL (ref 2.3–3.5)
GLUCOSE SERPL-MCNC: 99 MG/DL (ref 65–100)
HCT VFR BLD AUTO: 32.3 % (ref 41.1–50.3)
HGB BLD-MCNC: 10.5 G/DL (ref 13.6–17.2)
LYMPHOCYTES # BLD AUTO: 28 % (ref 13–44)
LYMPHOCYTES # BLD: 1.5 K/UL (ref 0.5–4.6)
MCH RBC QN AUTO: 23.6 PG (ref 26.1–32.9)
MCHC RBC AUTO-ENTMCNC: 32.5 G/DL (ref 31.4–35)
MCV RBC AUTO: 72.7 FL (ref 79.6–97.8)
MONOCYTES # BLD: 0.9 K/UL (ref 0.1–1.3)
MONOCYTES NFR BLD AUTO: 18 % (ref 4–12)
NEUTS SEG # BLD: 2.6 K/UL (ref 1.7–8.2)
NEUTS SEG NFR BLD AUTO: 49 % (ref 43–78)
NRBC # BLD: 0 K/UL (ref 0–0.2)
PLATELET # BLD AUTO: 288 K/UL (ref 150–450)
PMV BLD AUTO: 9.3 FL (ref 10.8–14.1)
POTASSIUM SERPL-SCNC: 4.1 MMOL/L (ref 3.5–5.1)
PROT SERPL-MCNC: 7 G/DL (ref 6.3–8.2)
RBC # BLD AUTO: 4.44 M/UL (ref 4.23–5.67)
SODIUM SERPL-SCNC: 139 MMOL/L (ref 136–145)
WBC # BLD AUTO: 5.3 K/UL (ref 4.3–11.1)

## 2017-03-14 PROCEDURE — 86334 IMMUNOFIX E-PHORESIS SERUM: CPT | Performed by: INTERNAL MEDICINE

## 2017-03-14 PROCEDURE — 36415 COLL VENOUS BLD VENIPUNCTURE: CPT | Performed by: INTERNAL MEDICINE

## 2017-03-14 PROCEDURE — 80053 COMPREHEN METABOLIC PANEL: CPT | Performed by: INTERNAL MEDICINE

## 2017-03-14 PROCEDURE — 83883 ASSAY NEPHELOMETRY NOT SPEC: CPT | Performed by: INTERNAL MEDICINE

## 2017-03-14 PROCEDURE — 84165 PROTEIN E-PHORESIS SERUM: CPT | Performed by: INTERNAL MEDICINE

## 2017-03-14 PROCEDURE — 85025 COMPLETE CBC W/AUTO DIFF WBC: CPT | Performed by: INTERNAL MEDICINE

## 2017-03-15 LAB
ALBUMIN SERPL ELPH-MCNC: 3.65 G/DL (ref 3.2–5.6)
ALBUMIN/GLOB SERPL: 1.3 {RATIO}
ALPHA1 GLOB SERPL ELPH-MCNC: 0.22 G/DL (ref 0.1–0.4)
ALPHA2 GLOB SERPL ELPH-MCNC: 0.73 G/DL (ref 0.4–1.2)
B-GLOBULIN SERPL QL ELPH: 0.93 G/DL (ref 0.6–1.3)
GAMMA GLOB MFR SERPL ELPH: 0.87 G/DL (ref 0.5–1.6)
IGA SERPL-MCNC: 54 MG/DL (ref 85–499)
IGG SERPL-MCNC: 806 MG/DL (ref 610–1616)
IGM SERPL-MCNC: 110 MG/DL (ref 35–242)
KAPPA LC FREE SER-MCNC: 13.26 MG/L (ref 3.3–19.4)
KAPPA LC FREE/LAMBDA FREE SER: 0.4 {RATIO} (ref 0.26–1.65)
LAMBDA LC FREE SERPL-MCNC: 33.39 MG/L (ref 5.71–26.3)
M PROTEIN SERPL ELPH-MCNC: 0.31 G/DL
PROT PATTERN SERPL ELPH-IMP: ABNORMAL
PROT PATTERN SPEC IFE-IMP: ABNORMAL
PROT SERPL-MCNC: 6.4 G/DL (ref 6.3–8.2)

## 2017-03-28 ENCOUNTER — HOSPITAL ENCOUNTER (OUTPATIENT)
Dept: LAB | Age: 77
Discharge: HOME | End: 2017-03-28
Payer: MEDICARE

## 2017-03-28 DIAGNOSIS — C90.00 MULTIPLE MYELOMA, REMISSION STATUS UNSPECIFIED (HCC): ICD-10-CM

## 2017-03-28 LAB
ALBUMIN SERPL BCP-MCNC: 3.7 G/DL (ref 3.2–4.6)
ALBUMIN/GLOB SERPL: 1.2 {RATIO} (ref 1.2–3.5)
ALP SERPL-CCNC: 145 U/L (ref 50–136)
ALT SERPL-CCNC: 32 U/L (ref 12–65)
ANION GAP BLD CALC-SCNC: 4 MMOL/L (ref 7–16)
AST SERPL W P-5'-P-CCNC: 20 U/L (ref 15–37)
BASOPHILS # BLD AUTO: 0 K/UL (ref 0–0.2)
BASOPHILS # BLD: 1 % (ref 0–2)
BILIRUB SERPL-MCNC: 0.5 MG/DL (ref 0.2–1.1)
BUN SERPL-MCNC: 17 MG/DL (ref 8–23)
CALCIUM SERPL-MCNC: 9.2 MG/DL (ref 8.3–10.4)
CHLORIDE SERPL-SCNC: 107 MMOL/L (ref 98–107)
CO2 SERPL-SCNC: 29 MMOL/L (ref 23–32)
CREAT SERPL-MCNC: 0.92 MG/DL (ref 0.8–1.5)
DIFFERENTIAL METHOD BLD: ABNORMAL
EOSINOPHIL # BLD: 0.1 K/UL (ref 0–0.8)
EOSINOPHIL NFR BLD: 1 % (ref 0.5–7.8)
ERYTHROCYTE [DISTWIDTH] IN BLOOD BY AUTOMATED COUNT: 17 % (ref 11.9–14.6)
GLOBULIN SER CALC-MCNC: 3.2 G/DL (ref 2.3–3.5)
GLUCOSE SERPL-MCNC: 106 MG/DL (ref 65–100)
HCT VFR BLD AUTO: 33.4 % (ref 41.1–50.3)
HGB BLD-MCNC: 10.9 G/DL (ref 13.6–17.2)
LYMPHOCYTES # BLD AUTO: 34 % (ref 13–44)
LYMPHOCYTES # BLD: 1.5 K/UL (ref 0.5–4.6)
MCH RBC QN AUTO: 23.6 PG (ref 26.1–32.9)
MCHC RBC AUTO-ENTMCNC: 32.6 G/DL (ref 31.4–35)
MCV RBC AUTO: 72.5 FL (ref 79.6–97.8)
MONOCYTES # BLD: 0.8 K/UL (ref 0.1–1.3)
MONOCYTES NFR BLD AUTO: 17 % (ref 4–12)
NEUTS SEG # BLD: 2.1 K/UL (ref 1.7–8.2)
NEUTS SEG NFR BLD AUTO: 47 % (ref 43–78)
NRBC # BLD: 0 K/UL (ref 0–0.2)
PLATELET # BLD AUTO: 246 K/UL (ref 150–450)
PMV BLD AUTO: 9.5 FL (ref 10.8–14.1)
POTASSIUM SERPL-SCNC: 4.1 MMOL/L (ref 3.5–5.1)
PROT SERPL-MCNC: 6.9 G/DL (ref 6.3–8.2)
RBC # BLD AUTO: 4.61 M/UL (ref 4.23–5.67)
SODIUM SERPL-SCNC: 140 MMOL/L (ref 136–145)
WBC # BLD AUTO: 4.5 K/UL (ref 4.3–11.1)

## 2017-03-28 PROCEDURE — 85025 COMPLETE CBC W/AUTO DIFF WBC: CPT | Performed by: INTERNAL MEDICINE

## 2017-03-28 PROCEDURE — 36415 COLL VENOUS BLD VENIPUNCTURE: CPT | Performed by: INTERNAL MEDICINE

## 2017-03-28 PROCEDURE — 83883 ASSAY NEPHELOMETRY NOT SPEC: CPT | Performed by: INTERNAL MEDICINE

## 2017-03-28 PROCEDURE — 86334 IMMUNOFIX E-PHORESIS SERUM: CPT | Performed by: INTERNAL MEDICINE

## 2017-03-28 PROCEDURE — 84165 PROTEIN E-PHORESIS SERUM: CPT | Performed by: INTERNAL MEDICINE

## 2017-03-28 PROCEDURE — 80053 COMPREHEN METABOLIC PANEL: CPT | Performed by: INTERNAL MEDICINE

## 2017-03-29 LAB
ALBUMIN SERPL ELPH-MCNC: 3.95 G/DL (ref 3.2–5.6)
ALBUMIN/GLOB SERPL: 1.6 {RATIO}
ALPHA1 GLOB SERPL ELPH-MCNC: 0.17 G/DL (ref 0.1–0.4)
ALPHA2 GLOB SERPL ELPH-MCNC: 0.7 G/DL (ref 0.4–1.2)
B-GLOBULIN SERPL QL ELPH: 0.81 G/DL (ref 0.6–1.3)
GAMMA GLOB MFR SERPL ELPH: 0.87 G/DL (ref 0.5–1.6)
IGA SERPL-MCNC: 55 MG/DL (ref 85–499)
IGG SERPL-MCNC: 934 MG/DL (ref 610–1616)
IGM SERPL-MCNC: 114 MG/DL (ref 35–242)
KAPPA LC FREE SER-MCNC: 14.7 MG/L (ref 3.3–19.4)
KAPPA LC FREE/LAMBDA FREE SER: 0.53 {RATIO} (ref 0.26–1.65)
LAMBDA LC FREE SERPL-MCNC: 27.56 MG/L (ref 5.71–26.3)
M PROTEIN SERPL ELPH-MCNC: 0.42 G/DL
PROT PATTERN SERPL ELPH-IMP: ABNORMAL
PROT PATTERN SPEC IFE-IMP: ABNORMAL
PROT SERPL-MCNC: 6.5 G/DL (ref 6.3–8.2)

## 2017-04-26 ENCOUNTER — PATIENT OUTREACH (OUTPATIENT)
Dept: CASE MANAGEMENT | Age: 77
End: 2017-04-26

## 2017-04-26 ENCOUNTER — HOSPITAL ENCOUNTER (OUTPATIENT)
Dept: LAB | Age: 77
Discharge: HOME | End: 2017-04-26
Payer: MEDICARE

## 2017-04-26 DIAGNOSIS — C90.00 MULTIPLE MYELOMA, REMISSION STATUS UNSPECIFIED (HCC): ICD-10-CM

## 2017-04-26 LAB
ALBUMIN SERPL BCP-MCNC: 3.6 G/DL (ref 3.2–4.6)
ALBUMIN/GLOB SERPL: 1.2 {RATIO} (ref 1.2–3.5)
ALP SERPL-CCNC: 107 U/L (ref 50–136)
ALT SERPL-CCNC: 33 U/L (ref 12–65)
ANION GAP BLD CALC-SCNC: 9 MMOL/L (ref 7–16)
AST SERPL W P-5'-P-CCNC: 24 U/L (ref 15–37)
BASOPHILS # BLD AUTO: 0.1 K/UL (ref 0–0.2)
BASOPHILS # BLD: 1 % (ref 0–2)
BILIRUB SERPL-MCNC: 0.3 MG/DL (ref 0.2–1.1)
BUN SERPL-MCNC: 20 MG/DL (ref 8–23)
CALCIUM SERPL-MCNC: 9.2 MG/DL (ref 8.3–10.4)
CHLORIDE SERPL-SCNC: 105 MMOL/L (ref 98–107)
CO2 SERPL-SCNC: 26 MMOL/L (ref 21–32)
CREAT SERPL-MCNC: 1.04 MG/DL (ref 0.8–1.5)
DIFFERENTIAL METHOD BLD: ABNORMAL
EOSINOPHIL # BLD: 0.1 K/UL (ref 0–0.8)
EOSINOPHIL NFR BLD: 2 % (ref 0.5–7.8)
ERYTHROCYTE [DISTWIDTH] IN BLOOD BY AUTOMATED COUNT: 16 % (ref 11.9–14.6)
GLOBULIN SER CALC-MCNC: 2.9 G/DL (ref 2.3–3.5)
GLUCOSE SERPL-MCNC: 152 MG/DL (ref 65–100)
HCT VFR BLD AUTO: 36.1 % (ref 41.1–50.3)
HGB BLD-MCNC: 12 G/DL (ref 13.6–17.2)
LYMPHOCYTES # BLD AUTO: 35 % (ref 13–44)
LYMPHOCYTES # BLD: 1.3 K/UL (ref 0.5–4.6)
MAGNESIUM SERPL-MCNC: 2 MG/DL (ref 1.8–2.4)
MCH RBC QN AUTO: 23.5 PG (ref 26.1–32.9)
MCHC RBC AUTO-ENTMCNC: 33.2 G/DL (ref 31.4–35)
MCV RBC AUTO: 70.8 FL (ref 79.6–97.8)
MONOCYTES # BLD: 0.7 K/UL (ref 0.1–1.3)
MONOCYTES NFR BLD AUTO: 17 % (ref 4–12)
NEUTS SEG # BLD: 1.7 K/UL (ref 1.7–8.2)
NEUTS SEG NFR BLD AUTO: 44 % (ref 43–78)
NRBC # BLD: 0 K/UL (ref 0–0.2)
PLATELET # BLD AUTO: 209 K/UL (ref 150–450)
PMV BLD AUTO: 9.5 FL (ref 10.8–14.1)
POTASSIUM SERPL-SCNC: 4.3 MMOL/L (ref 3.5–5.1)
PROT SERPL-MCNC: 6.5 G/DL (ref 6.3–8.2)
RBC # BLD AUTO: 5.1 M/UL (ref 4.23–5.67)
SODIUM SERPL-SCNC: 140 MMOL/L (ref 136–145)
WBC # BLD AUTO: 3.8 K/UL (ref 4.3–11.1)

## 2017-04-26 PROCEDURE — 80053 COMPREHEN METABOLIC PANEL: CPT | Performed by: INTERNAL MEDICINE

## 2017-04-26 PROCEDURE — 83735 ASSAY OF MAGNESIUM: CPT | Performed by: INTERNAL MEDICINE

## 2017-04-26 PROCEDURE — 36415 COLL VENOUS BLD VENIPUNCTURE: CPT | Performed by: INTERNAL MEDICINE

## 2017-04-26 PROCEDURE — 86334 IMMUNOFIX E-PHORESIS SERUM: CPT | Performed by: INTERNAL MEDICINE

## 2017-04-26 PROCEDURE — 84165 PROTEIN E-PHORESIS SERUM: CPT | Performed by: INTERNAL MEDICINE

## 2017-04-26 PROCEDURE — 85025 COMPLETE CBC W/AUTO DIFF WBC: CPT | Performed by: INTERNAL MEDICINE

## 2017-04-26 PROCEDURE — 83883 ASSAY NEPHELOMETRY NOT SPEC: CPT | Performed by: INTERNAL MEDICINE

## 2017-04-26 NOTE — ACP (ADVANCE CARE PLANNING)
4/26/17:  Patient in for monthly follow-up and labs with Dr. Scotty Mayo. Patient reports he has pain occasionally (3x/week)throughout the week in which he manages with his pain medication. Patient made Dr. Scotty Mayo aware of incisional hernia in abdomen. Patient states surgical repair on hold while he is taking revlimid. Patient states hernia doesn't bother him and he has a binder to help as well. Dr. Scotty Mayo pleased with progress at this point and mentioned possibility of increasing revlimid dose in the future if necessary. Patient to return in one month with labs. Revlimid and decadron calendar provided to the patient. Dr. Scotty Mayo would like repeat myeloma labs and cea with next visit. Imaging every 6 months for colon cancer monitoring purposes.

## 2017-04-28 LAB
ALBUMIN SERPL ELPH-MCNC: 3.67 G/DL (ref 3.2–5.6)
ALBUMIN/GLOB SERPL: 1.3 {RATIO}
ALPHA1 GLOB SERPL ELPH-MCNC: 0.22 G/DL (ref 0.1–0.4)
ALPHA2 GLOB SERPL ELPH-MCNC: 0.81 G/DL (ref 0.4–1.2)
B-GLOBULIN SERPL QL ELPH: 0.9 G/DL (ref 0.6–1.3)
GAMMA GLOB MFR SERPL ELPH: 0.81 G/DL (ref 0.5–1.6)
IGA SERPL-MCNC: 46 MG/DL (ref 85–499)
IGG SERPL-MCNC: 782 MG/DL (ref 610–1616)
IGM SERPL-MCNC: 77 MG/DL (ref 35–242)
KAPPA LC FREE SER-MCNC: 11.49 MG/L (ref 3.3–19.4)
KAPPA LC FREE/LAMBDA FREE SER: 0.57 {RATIO} (ref 0.26–1.65)
LAMBDA LC FREE SERPL-MCNC: 20.33 MG/L (ref 5.71–26.3)
M PROTEIN SERPL ELPH-MCNC: 0.38 G/DL
PROT PATTERN SERPL ELPH-IMP: ABNORMAL
PROT PATTERN SPEC IFE-IMP: ABNORMAL
PROT SERPL-MCNC: 6.4 G/DL (ref 6.3–8.2)

## 2017-05-24 ENCOUNTER — HOSPITAL ENCOUNTER (OUTPATIENT)
Dept: LAB | Age: 77
Discharge: HOME | End: 2017-05-24
Payer: MEDICARE

## 2017-05-24 ENCOUNTER — HOSPITAL ENCOUNTER (OUTPATIENT)
Dept: GENERAL RADIOLOGY | Age: 77
Discharge: HOME | End: 2017-05-24
Payer: MEDICARE

## 2017-05-24 ENCOUNTER — PATIENT OUTREACH (OUTPATIENT)
Dept: CASE MANAGEMENT | Age: 77
End: 2017-05-24

## 2017-05-24 DIAGNOSIS — C90.00 MULTIPLE MYELOMA, REMISSION STATUS UNSPECIFIED (HCC): ICD-10-CM

## 2017-05-24 DIAGNOSIS — K63.89 COLONIC MASS: ICD-10-CM

## 2017-05-24 LAB
ALBUMIN SERPL BCP-MCNC: 3.4 G/DL (ref 3.2–4.6)
ALBUMIN/GLOB SERPL: 1 {RATIO} (ref 1.2–3.5)
ALP SERPL-CCNC: 99 U/L (ref 50–136)
ALT SERPL-CCNC: 38 U/L (ref 12–65)
ANION GAP BLD CALC-SCNC: 8 MMOL/L (ref 7–16)
AST SERPL W P-5'-P-CCNC: 29 U/L (ref 15–37)
BASOPHILS # BLD AUTO: 0 K/UL (ref 0–0.2)
BASOPHILS # BLD: 1 % (ref 0–2)
BILIRUB SERPL-MCNC: 0.5 MG/DL (ref 0.2–1.1)
BUN SERPL-MCNC: 18 MG/DL (ref 8–23)
CALCIUM SERPL-MCNC: 9.1 MG/DL (ref 8.3–10.4)
CEA SERPL-MCNC: 2.8 NG/ML (ref 0–3)
CHLORIDE SERPL-SCNC: 101 MMOL/L (ref 98–107)
CO2 SERPL-SCNC: 27 MMOL/L (ref 21–32)
CREAT SERPL-MCNC: 1.03 MG/DL (ref 0.8–1.5)
DIFFERENTIAL METHOD BLD: ABNORMAL
EOSINOPHIL # BLD: 0.1 K/UL (ref 0–0.8)
EOSINOPHIL NFR BLD: 1 % (ref 0.5–7.8)
ERYTHROCYTE [DISTWIDTH] IN BLOOD BY AUTOMATED COUNT: 16.3 % (ref 11.9–14.6)
GLOBULIN SER CALC-MCNC: 3.5 G/DL (ref 2.3–3.5)
GLUCOSE SERPL-MCNC: 99 MG/DL (ref 65–100)
HCT VFR BLD AUTO: 38 % (ref 41.1–50.3)
HGB BLD-MCNC: 12.7 G/DL (ref 13.6–17.2)
KAPPA LC FREE SER-MCNC: 12.69 MG/L (ref 3.3–19.4)
KAPPA LC FREE/LAMBDA FREE SER: 0.44 {RATIO} (ref 0.26–1.65)
LAMBDA LC FREE SERPL-MCNC: 28.96 MG/L (ref 5.71–26.3)
LYMPHOCYTES # BLD AUTO: 26 % (ref 13–44)
LYMPHOCYTES # BLD: 1.6 K/UL (ref 0.5–4.6)
MAGNESIUM SERPL-MCNC: 2.1 MG/DL (ref 1.8–2.4)
MCH RBC QN AUTO: 23.1 PG (ref 26.1–32.9)
MCHC RBC AUTO-ENTMCNC: 33.4 G/DL (ref 31.4–35)
MCV RBC AUTO: 69.1 FL (ref 79.6–97.8)
MONOCYTES # BLD: 1.2 K/UL (ref 0.1–1.3)
MONOCYTES NFR BLD AUTO: 20 % (ref 4–12)
NEUTS SEG # BLD: 3.2 K/UL (ref 1.7–8.2)
NEUTS SEG NFR BLD AUTO: 52 % (ref 43–78)
NRBC # BLD: 0 K/UL (ref 0–0.2)
PLATELET # BLD AUTO: 178 K/UL (ref 150–450)
PMV BLD AUTO: 8.8 FL (ref 10.8–14.1)
POTASSIUM SERPL-SCNC: 4 MMOL/L (ref 3.5–5.1)
PROT SERPL-MCNC: 6.9 G/DL (ref 6.3–8.2)
RBC # BLD AUTO: 5.5 M/UL (ref 4.23–5.67)
SODIUM SERPL-SCNC: 136 MMOL/L (ref 136–145)
WBC # BLD AUTO: 6.2 K/UL (ref 4.3–11.1)

## 2017-05-24 PROCEDURE — 36415 COLL VENOUS BLD VENIPUNCTURE: CPT | Performed by: INTERNAL MEDICINE

## 2017-05-24 PROCEDURE — 80053 COMPREHEN METABOLIC PANEL: CPT | Performed by: INTERNAL MEDICINE

## 2017-05-24 PROCEDURE — 71100 X-RAY EXAM RIBS UNI 2 VIEWS: CPT

## 2017-05-24 PROCEDURE — 84165 PROTEIN E-PHORESIS SERUM: CPT | Performed by: INTERNAL MEDICINE

## 2017-05-24 PROCEDURE — 83735 ASSAY OF MAGNESIUM: CPT | Performed by: INTERNAL MEDICINE

## 2017-05-24 PROCEDURE — 82378 CARCINOEMBRYONIC ANTIGEN: CPT | Performed by: INTERNAL MEDICINE

## 2017-05-24 PROCEDURE — 85025 COMPLETE CBC W/AUTO DIFF WBC: CPT | Performed by: INTERNAL MEDICINE

## 2017-05-24 PROCEDURE — 86334 IMMUNOFIX E-PHORESIS SERUM: CPT | Performed by: INTERNAL MEDICINE

## 2017-05-24 PROCEDURE — 83883 ASSAY NEPHELOMETRY NOT SPEC: CPT | Performed by: INTERNAL MEDICINE

## 2017-05-26 LAB
ALBUMIN SERPL ELPH-MCNC: 3.57 G/DL (ref 3.2–5.6)
ALBUMIN/GLOB SERPL: 1.2 {RATIO}
ALPHA1 GLOB SERPL ELPH-MCNC: 0.28 G/DL (ref 0.1–0.4)
ALPHA2 GLOB SERPL ELPH-MCNC: 0.92 G/DL (ref 0.4–1.2)
B-GLOBULIN SERPL QL ELPH: 1 G/DL (ref 0.6–1.3)
GAMMA GLOB MFR SERPL ELPH: 0.83 G/DL (ref 0.5–1.6)
IGA SERPL-MCNC: 40 MG/DL (ref 85–499)
IGG SERPL-MCNC: 787 MG/DL (ref 610–1616)
IGM SERPL-MCNC: 69 MG/DL (ref 35–242)
M PROTEIN SERPL ELPH-MCNC: 0.29 G/DL
PROT PATTERN SERPL ELPH-IMP: ABNORMAL
PROT PATTERN SPEC IFE-IMP: ABNORMAL
PROT SERPL-MCNC: 6.6 G/DL (ref 6.3–8.2)

## 2017-06-05 ENCOUNTER — PATIENT OUTREACH (OUTPATIENT)
Dept: CASE MANAGEMENT | Age: 77
End: 2017-06-05

## 2017-06-26 ENCOUNTER — PATIENT OUTREACH (OUTPATIENT)
Dept: CASE MANAGEMENT | Age: 77
End: 2017-06-26

## 2017-06-26 ENCOUNTER — HOSPITAL ENCOUNTER (OUTPATIENT)
Dept: LAB | Age: 77
Discharge: HOME | End: 2017-06-26
Payer: MEDICARE

## 2017-06-26 DIAGNOSIS — C90.00 MULTIPLE MYELOMA, REMISSION STATUS UNSPECIFIED (HCC): ICD-10-CM

## 2017-06-26 DIAGNOSIS — K63.89 COLONIC MASS: ICD-10-CM

## 2017-06-26 LAB
ALBUMIN SERPL BCP-MCNC: 3.6 G/DL (ref 3.2–4.6)
ALBUMIN/GLOB SERPL: 1.2 {RATIO} (ref 1.2–3.5)
ALP SERPL-CCNC: 89 U/L (ref 50–136)
ALT SERPL-CCNC: 39 U/L (ref 12–65)
ANION GAP BLD CALC-SCNC: 7 MMOL/L (ref 7–16)
AST SERPL W P-5'-P-CCNC: 28 U/L (ref 15–37)
BASOPHILS # BLD AUTO: 0 K/UL (ref 0–0.2)
BASOPHILS NFR BLD MANUAL: 1 % (ref 0–2)
BILIRUB SERPL-MCNC: 0.4 MG/DL (ref 0.2–1.1)
BUN SERPL-MCNC: 19 MG/DL (ref 8–23)
CALCIUM SERPL-MCNC: 9.5 MG/DL (ref 8.3–10.4)
CHLORIDE SERPL-SCNC: 103 MMOL/L (ref 98–107)
CO2 SERPL-SCNC: 29 MMOL/L (ref 21–32)
CREAT SERPL-MCNC: 1.1 MG/DL (ref 0.8–1.5)
DIFFERENTIAL METHOD BLD: ABNORMAL
EOSINOPHIL # BLD: 0.2 K/UL (ref 0–0.8)
EOSINOPHIL NFR BLD MANUAL: 4 % (ref 1–8)
ERYTHROCYTE [DISTWIDTH] IN BLOOD BY AUTOMATED COUNT: 17.1 % (ref 11.9–14.6)
GLOBULIN SER CALC-MCNC: 3 G/DL (ref 2.3–3.5)
GLUCOSE SERPL-MCNC: 87 MG/DL (ref 65–100)
HCT VFR BLD AUTO: 37.4 % (ref 41.1–50.3)
HGB BLD-MCNC: 12.7 G/DL (ref 13.6–17.2)
LYMPHOCYTES # BLD: 1.4 K/UL (ref 0.5–4.6)
LYMPHOCYTES NFR BLD MANUAL: 31 % (ref 16–44)
MAGNESIUM SERPL-MCNC: 2 MG/DL (ref 1.8–2.4)
MCH RBC QN AUTO: 23.1 PG (ref 26.1–32.9)
MCHC RBC AUTO-ENTMCNC: 34 G/DL (ref 31.4–35)
MCV RBC AUTO: 68.1 FL (ref 79.6–97.8)
METAMYELOCYTES NFR BLD MANUAL: 1 %
MONOCYTES # BLD: 0.5 K/UL (ref 0.1–1.3)
MONOCYTES NFR BLD MANUAL: 12 % (ref 3–9)
NEUTS BAND NFR BLD MANUAL: 3 % (ref 0–6)
NEUTS SEG # BLD: 2.3 K/UL (ref 1.7–8.2)
NEUTS SEG NFR BLD MANUAL: 46 % (ref 47–75)
NRBC # BLD: 0 K/UL (ref 0–0.2)
PLATELET # BLD AUTO: 158 K/UL (ref 150–450)
PLATELET COMMENTS,PCOM: SLIGHT
PMV BLD AUTO: 9.2 FL (ref 10.8–14.1)
POTASSIUM SERPL-SCNC: 4.1 MMOL/L (ref 3.5–5.1)
PROMYELOCYTES NFR BLD MANUAL: 2 %
PROT SERPL-MCNC: 6.6 G/DL (ref 6.3–8.2)
RBC # BLD AUTO: 5.49 M/UL (ref 4.23–5.67)
RBC MORPH BLD: ABNORMAL
SODIUM SERPL-SCNC: 139 MMOL/L (ref 136–145)
WBC # BLD AUTO: 4.4 K/UL (ref 4.3–11.1)
WBC MORPH BLD: ABNORMAL

## 2017-06-26 PROCEDURE — 83883 ASSAY NEPHELOMETRY NOT SPEC: CPT | Performed by: INTERNAL MEDICINE

## 2017-06-26 PROCEDURE — 85025 COMPLETE CBC W/AUTO DIFF WBC: CPT | Performed by: INTERNAL MEDICINE

## 2017-06-26 PROCEDURE — 86334 IMMUNOFIX E-PHORESIS SERUM: CPT | Performed by: INTERNAL MEDICINE

## 2017-06-26 PROCEDURE — 36415 COLL VENOUS BLD VENIPUNCTURE: CPT | Performed by: INTERNAL MEDICINE

## 2017-06-26 PROCEDURE — 84165 PROTEIN E-PHORESIS SERUM: CPT | Performed by: INTERNAL MEDICINE

## 2017-06-26 PROCEDURE — 83735 ASSAY OF MAGNESIUM: CPT | Performed by: INTERNAL MEDICINE

## 2017-06-26 PROCEDURE — 80053 COMPREHEN METABOLIC PANEL: CPT | Performed by: INTERNAL MEDICINE

## 2017-06-26 NOTE — PROGRESS NOTES
6/26/17 Dr Kaitlin Roper reviewed labs. Patient to continue Revlimid. Navigation will sign off at this time since Revlimid therapy only.  Please feel free to contact us should further additional treatment is needed

## 2017-06-28 LAB
ALBUMIN SERPL ELPH-MCNC: 3.7 G/DL (ref 3.2–5.6)
ALBUMIN/GLOB SERPL: 1.4 {RATIO}
ALPHA1 GLOB SERPL ELPH-MCNC: 0.2 G/DL (ref 0.1–0.4)
ALPHA2 GLOB SERPL ELPH-MCNC: 0.75 G/DL (ref 0.4–1.2)
B-GLOBULIN SERPL QL ELPH: 0.92 G/DL (ref 0.6–1.3)
GAMMA GLOB MFR SERPL ELPH: 0.72 G/DL (ref 0.5–1.6)
IGA SERPL-MCNC: 45 MG/DL (ref 85–499)
IGG SERPL-MCNC: 692 MG/DL (ref 610–1616)
IGM SERPL-MCNC: 97 MG/DL (ref 35–242)
KAPPA LC FREE SER-MCNC: 8.42 MG/L (ref 3.3–19.4)
KAPPA LC FREE/LAMBDA FREE SER: 0.52 {RATIO} (ref 0.26–1.65)
LAMBDA LC FREE SERPL-MCNC: 16.22 MG/L (ref 5.71–26.3)
M PROTEIN SERPL ELPH-MCNC: 0.36 G/DL
PROT PATTERN SERPL ELPH-IMP: ABNORMAL
PROT PATTERN SPEC IFE-IMP: ABNORMAL
PROT SERPL-MCNC: 6.3 G/DL (ref 6.3–8.2)

## 2017-07-19 ENCOUNTER — HOSPITAL ENCOUNTER (OUTPATIENT)
Dept: LAB | Age: 77
Discharge: HOME | End: 2017-07-19
Payer: MEDICARE

## 2017-07-19 DIAGNOSIS — C90.00 MULTIPLE MYELOMA, REMISSION STATUS UNSPECIFIED (HCC): ICD-10-CM

## 2017-07-19 LAB
ALBUMIN SERPL BCP-MCNC: 3.2 G/DL (ref 3.2–4.6)
ALBUMIN/GLOB SERPL: 1 {RATIO} (ref 1.2–3.5)
ALP SERPL-CCNC: 88 U/L (ref 50–136)
ALT SERPL-CCNC: 35 U/L (ref 12–65)
ANION GAP BLD CALC-SCNC: 9 MMOL/L (ref 7–16)
AST SERPL W P-5'-P-CCNC: 24 U/L (ref 15–37)
BASOPHILS # BLD AUTO: 0.1 K/UL (ref 0–0.2)
BASOPHILS # BLD: 1 % (ref 0–2)
BILIRUB SERPL-MCNC: 0.6 MG/DL (ref 0.2–1.1)
BUN SERPL-MCNC: 15 MG/DL (ref 8–23)
CALCIUM SERPL-MCNC: 8.7 MG/DL (ref 8.3–10.4)
CHLORIDE SERPL-SCNC: 107 MMOL/L (ref 98–107)
CO2 SERPL-SCNC: 26 MMOL/L (ref 21–32)
CREAT SERPL-MCNC: 0.97 MG/DL (ref 0.8–1.5)
DIFFERENTIAL METHOD BLD: ABNORMAL
EOSINOPHIL # BLD: 0.1 K/UL (ref 0–0.8)
EOSINOPHIL NFR BLD: 2 % (ref 0.5–7.8)
ERYTHROCYTE [DISTWIDTH] IN BLOOD BY AUTOMATED COUNT: 16.6 % (ref 11.9–14.6)
GLOBULIN SER CALC-MCNC: 3.3 G/DL (ref 2.3–3.5)
GLUCOSE SERPL-MCNC: 131 MG/DL (ref 65–100)
HCT VFR BLD AUTO: 36 % (ref 41.1–50.3)
HGB BLD-MCNC: 12.3 G/DL (ref 13.6–17.2)
KAPPA LC FREE SER-MCNC: 11.81 MG/L (ref 3.3–19.4)
KAPPA LC FREE/LAMBDA FREE SER: 0.43 {RATIO} (ref 0.26–1.65)
LAMBDA LC FREE SERPL-MCNC: 27.6 MG/L (ref 5.71–26.3)
LYMPHOCYTES # BLD AUTO: 34 % (ref 13–44)
LYMPHOCYTES # BLD: 1.8 K/UL (ref 0.5–4.6)
MCH RBC QN AUTO: 23.6 PG (ref 26.1–32.9)
MCHC RBC AUTO-ENTMCNC: 34.2 G/DL (ref 31.4–35)
MCV RBC AUTO: 69 FL (ref 79.6–97.8)
MONOCYTES # BLD: 0.8 K/UL (ref 0.1–1.3)
MONOCYTES NFR BLD AUTO: 15 % (ref 4–12)
NEUTS SEG # BLD: 2.5 K/UL (ref 1.7–8.2)
NEUTS SEG NFR BLD AUTO: 49 % (ref 43–78)
NRBC # BLD: 0 K/UL (ref 0–0.2)
PLATELET # BLD AUTO: 189 K/UL (ref 150–450)
PMV BLD AUTO: 9.5 FL (ref 10.8–14.1)
POTASSIUM SERPL-SCNC: 3.9 MMOL/L (ref 3.5–5.1)
PROT SERPL-MCNC: 6.5 G/DL (ref 6.3–8.2)
RBC # BLD AUTO: 5.22 M/UL (ref 4.23–5.67)
SODIUM SERPL-SCNC: 142 MMOL/L (ref 136–145)
WBC # BLD AUTO: 5.2 K/UL (ref 4.3–11.1)

## 2017-07-19 PROCEDURE — 80053 COMPREHEN METABOLIC PANEL: CPT | Performed by: INTERNAL MEDICINE

## 2017-07-19 PROCEDURE — 85025 COMPLETE CBC W/AUTO DIFF WBC: CPT | Performed by: INTERNAL MEDICINE

## 2017-07-19 PROCEDURE — 36415 COLL VENOUS BLD VENIPUNCTURE: CPT | Performed by: INTERNAL MEDICINE

## 2017-07-19 PROCEDURE — 83883 ASSAY NEPHELOMETRY NOT SPEC: CPT | Performed by: INTERNAL MEDICINE

## 2017-07-19 PROCEDURE — 84165 PROTEIN E-PHORESIS SERUM: CPT | Performed by: INTERNAL MEDICINE

## 2017-07-19 PROCEDURE — 86334 IMMUNOFIX E-PHORESIS SERUM: CPT | Performed by: INTERNAL MEDICINE

## 2017-07-21 LAB
ALBUMIN SERPL ELPH-MCNC: 3.53 G/DL (ref 3.2–5.6)
ALBUMIN/GLOB SERPL: 1.3 {RATIO}
ALPHA1 GLOB SERPL ELPH-MCNC: 0.25 G/DL (ref 0.1–0.4)
ALPHA2 GLOB SERPL ELPH-MCNC: 0.78 G/DL (ref 0.4–1.2)
B-GLOBULIN SERPL QL ELPH: 0.92 G/DL (ref 0.6–1.3)
GAMMA GLOB MFR SERPL ELPH: 0.72 G/DL (ref 0.5–1.6)
IGA SERPL-MCNC: 56 MG/DL (ref 85–499)
IGG SERPL-MCNC: 717 MG/DL (ref 610–1616)
IGM SERPL-MCNC: 101 MG/DL (ref 35–242)
M PROTEIN SERPL ELPH-MCNC: 0.4 G/DL
PROT PATTERN SERPL ELPH-IMP: ABNORMAL
PROT PATTERN SPEC IFE-IMP: ABNORMAL
PROT SERPL-MCNC: 6.2 G/DL (ref 6.3–8.2)

## 2017-08-16 ENCOUNTER — HOSPITAL ENCOUNTER (OUTPATIENT)
Dept: LAB | Age: 77
Discharge: HOME | End: 2017-08-16
Payer: MEDICARE

## 2017-08-16 ENCOUNTER — PATIENT OUTREACH (OUTPATIENT)
Dept: CASE MANAGEMENT | Age: 77
End: 2017-08-16

## 2017-08-16 DIAGNOSIS — C90.00 MULTIPLE MYELOMA, REMISSION STATUS UNSPECIFIED (HCC): ICD-10-CM

## 2017-08-16 LAB
ALBUMIN SERPL-MCNC: 3.4 G/DL (ref 3.2–4.6)
ALBUMIN/GLOB SERPL: 1.2 {RATIO} (ref 1.2–3.5)
ALP SERPL-CCNC: 78 U/L (ref 50–136)
ALT SERPL-CCNC: 35 U/L (ref 12–65)
ANION GAP SERPL CALC-SCNC: 4 MMOL/L (ref 7–16)
AST SERPL-CCNC: 23 U/L (ref 15–37)
BASOPHILS # BLD: 0.1 K/UL (ref 0–0.2)
BASOPHILS NFR BLD: 1 % (ref 0–2)
BILIRUB SERPL-MCNC: 0.5 MG/DL (ref 0.2–1.1)
BUN SERPL-MCNC: 18 MG/DL (ref 8–23)
CALCIUM SERPL-MCNC: 8.8 MG/DL (ref 8.3–10.4)
CHLORIDE SERPL-SCNC: 104 MMOL/L (ref 98–107)
CO2 SERPL-SCNC: 30 MMOL/L (ref 21–32)
CREAT SERPL-MCNC: 1.19 MG/DL (ref 0.8–1.5)
DIFFERENTIAL METHOD BLD: ABNORMAL
EOSINOPHIL # BLD: 0.1 K/UL (ref 0–0.8)
EOSINOPHIL NFR BLD: 2 % (ref 0.5–7.8)
ERYTHROCYTE [DISTWIDTH] IN BLOOD BY AUTOMATED COUNT: 17 % (ref 11.9–14.6)
GLOBULIN SER CALC-MCNC: 2.9 G/DL (ref 2.3–3.5)
GLUCOSE SERPL-MCNC: 107 MG/DL (ref 65–100)
HCT VFR BLD AUTO: 36.8 % (ref 41.1–50.3)
HGB BLD-MCNC: 12.5 G/DL (ref 13.6–17.2)
KAPPA LC FREE SER-MCNC: 10.73 MG/L (ref 3.3–19.4)
KAPPA LC FREE/LAMBDA FREE SER: 0.47 {RATIO} (ref 0.26–1.65)
LAMBDA LC FREE SERPL-MCNC: 22.77 MG/L (ref 5.71–26.3)
LYMPHOCYTES # BLD: 1.7 K/UL (ref 0.5–4.6)
LYMPHOCYTES NFR BLD: 38 % (ref 13–44)
MAGNESIUM SERPL-MCNC: 2 MG/DL (ref 1.8–2.4)
MCH RBC QN AUTO: 23.9 PG (ref 26.1–32.9)
MCHC RBC AUTO-ENTMCNC: 34 G/DL (ref 31.4–35)
MCV RBC AUTO: 70.2 FL (ref 79.6–97.8)
MONOCYTES # BLD: 0.8 K/UL (ref 0.1–1.3)
MONOCYTES NFR BLD: 18 % (ref 4–12)
NEUTS SEG # BLD: 1.8 K/UL (ref 1.7–8.2)
NEUTS SEG NFR BLD: 41 % (ref 43–78)
NRBC # BLD: 0.01 K/UL (ref 0–0.2)
PHOSPHATE SERPL-MCNC: 2.5 MG/DL (ref 2.3–3.7)
PLATELET # BLD AUTO: 152 K/UL (ref 150–450)
PMV BLD AUTO: 8.7 FL (ref 10.8–14.1)
POTASSIUM SERPL-SCNC: 4.2 MMOL/L (ref 3.5–5.1)
PROT SERPL-MCNC: 6.3 G/DL (ref 6.3–8.2)
RBC # BLD AUTO: 5.24 M/UL (ref 4.23–5.67)
SODIUM SERPL-SCNC: 138 MMOL/L (ref 136–145)
WBC # BLD AUTO: 4.4 K/UL (ref 4.3–11.1)

## 2017-08-16 PROCEDURE — 84100 ASSAY OF PHOSPHORUS: CPT | Performed by: INTERNAL MEDICINE

## 2017-08-16 PROCEDURE — 36415 COLL VENOUS BLD VENIPUNCTURE: CPT | Performed by: INTERNAL MEDICINE

## 2017-08-16 PROCEDURE — 85025 COMPLETE CBC W/AUTO DIFF WBC: CPT | Performed by: INTERNAL MEDICINE

## 2017-08-16 PROCEDURE — 80053 COMPREHEN METABOLIC PANEL: CPT | Performed by: INTERNAL MEDICINE

## 2017-08-16 PROCEDURE — 83883 ASSAY NEPHELOMETRY NOT SPEC: CPT | Performed by: INTERNAL MEDICINE

## 2017-08-16 PROCEDURE — 83735 ASSAY OF MAGNESIUM: CPT | Performed by: INTERNAL MEDICINE

## 2017-08-16 PROCEDURE — 86334 IMMUNOFIX E-PHORESIS SERUM: CPT | Performed by: INTERNAL MEDICINE

## 2017-08-16 PROCEDURE — 84165 PROTEIN E-PHORESIS SERUM: CPT | Performed by: INTERNAL MEDICINE

## 2017-08-16 NOTE — PROGRESS NOTES
8/16/17:  Patient in for myeloma follow-up. Patient reports he is doing ok and has good and bad days energy torres. Dr. Myesha Chavarria reviewed labs and assessed the patient. Plan is to continue with revlimid 10mg 21 out 28 days. Patient given medication calendar to help with Revlimid and Dex 40mg weekly schedule. Patient to return in one months with labs.

## 2017-08-18 LAB
ALBUMIN SERPL ELPH-MCNC: 3.47 G/DL (ref 3.2–5.6)
ALBUMIN/GLOB SERPL: 1.3 {RATIO}
ALPHA1 GLOB SERPL ELPH-MCNC: 0.18 G/DL (ref 0.1–0.4)
ALPHA2 GLOB SERPL ELPH-MCNC: 0.7 G/DL (ref 0.4–1.2)
B-GLOBULIN SERPL QL ELPH: 0.92 G/DL (ref 0.6–1.3)
GAMMA GLOB MFR SERPL ELPH: 0.83 G/DL (ref 0.5–1.6)
IGA SERPL-MCNC: 49 MG/DL (ref 85–499)
IGG SERPL-MCNC: 672 MG/DL (ref 610–1616)
IGM SERPL-MCNC: 77 MG/DL (ref 35–242)
M PROTEIN SERPL ELPH-MCNC: 0.45 G/DL
PROT PATTERN SERPL ELPH-IMP: ABNORMAL
PROT PATTERN SPEC IFE-IMP: ABNORMAL
PROT SERPL-MCNC: 6.1 G/DL (ref 6.3–8.2)

## 2017-08-25 ENCOUNTER — HOSPITAL ENCOUNTER (OUTPATIENT)
Dept: CT IMAGING | Age: 77
Discharge: HOME | End: 2017-08-25
Attending: NEUROLOGICAL SURGERY
Payer: MEDICARE

## 2017-08-25 ENCOUNTER — HOSPITAL ENCOUNTER (OUTPATIENT)
Dept: LAB | Age: 77
Discharge: HOME | End: 2017-08-25
Attending: NEUROLOGICAL SURGERY
Payer: MEDICARE

## 2017-08-25 VITALS — BODY MASS INDEX: 27.46 KG/M2 | HEIGHT: 74 IN | WEIGHT: 214 LBS

## 2017-08-25 DIAGNOSIS — D35.2 PITUITARY ADENOMA (HCC): ICD-10-CM

## 2017-08-25 LAB
CORTIS AM PEAK SERPL-MCNC: 7.1 UG/DL (ref 7–25)
PROLACTIN SERPL-MCNC: 12.9 NG/ML
TSH SERPL DL<=0.005 MIU/L-ACNC: 0.28 UIU/ML (ref 0.36–3.74)

## 2017-08-25 PROCEDURE — 84443 ASSAY THYROID STIM HORMONE: CPT | Performed by: NEUROLOGICAL SURGERY

## 2017-08-25 PROCEDURE — 36415 COLL VENOUS BLD VENIPUNCTURE: CPT | Performed by: NEUROLOGICAL SURGERY

## 2017-08-25 PROCEDURE — 74011636320 HC RX REV CODE- 636/320: Performed by: NEUROLOGICAL SURGERY

## 2017-08-25 PROCEDURE — 82533 TOTAL CORTISOL: CPT | Performed by: NEUROLOGICAL SURGERY

## 2017-08-25 PROCEDURE — 84146 ASSAY OF PROLACTIN: CPT | Performed by: NEUROLOGICAL SURGERY

## 2017-08-25 PROCEDURE — 74011000258 HC RX REV CODE- 258: Performed by: NEUROLOGICAL SURGERY

## 2017-08-25 PROCEDURE — 70470 CT HEAD/BRAIN W/O & W/DYE: CPT

## 2017-08-25 PROCEDURE — 84305 ASSAY OF SOMATOMEDIN: CPT | Performed by: NEUROLOGICAL SURGERY

## 2017-08-25 RX ORDER — SODIUM CHLORIDE 0.9 % (FLUSH) 0.9 %
10 SYRINGE (ML) INJECTION
Status: COMPLETED | OUTPATIENT
Start: 2017-08-25 | End: 2017-08-25

## 2017-08-25 RX ADMIN — Medication 10 ML: at 15:17

## 2017-08-25 RX ADMIN — IOPAMIDOL 100 ML: 755 INJECTION, SOLUTION INTRAVENOUS at 15:17

## 2017-08-25 RX ADMIN — SODIUM CHLORIDE 100 ML: 900 INJECTION, SOLUTION INTRAVENOUS at 15:17

## 2017-08-26 LAB — IGF-I SERPL-MCNC: 92 NG/ML (ref 37–172)

## 2017-09-13 ENCOUNTER — HOSPITAL ENCOUNTER (OUTPATIENT)
Dept: LAB | Age: 77
Discharge: HOME | End: 2017-09-13
Payer: MEDICARE

## 2017-09-13 ENCOUNTER — PATIENT OUTREACH (OUTPATIENT)
Dept: CASE MANAGEMENT | Age: 77
End: 2017-09-13

## 2017-09-13 DIAGNOSIS — C90.00 MULTIPLE MYELOMA, REMISSION STATUS UNSPECIFIED (HCC): ICD-10-CM

## 2017-09-13 LAB
ALBUMIN SERPL-MCNC: 3.8 G/DL (ref 3.2–4.6)
ALBUMIN/GLOB SERPL: 1.3 {RATIO} (ref 1.2–3.5)
ALP SERPL-CCNC: 78 U/L (ref 50–136)
ALT SERPL-CCNC: 38 U/L (ref 12–65)
ANION GAP SERPL CALC-SCNC: 4 MMOL/L (ref 7–16)
AST SERPL-CCNC: 30 U/L (ref 15–37)
BASOPHILS # BLD: 0.1 K/UL (ref 0–0.2)
BASOPHILS NFR BLD: 1 % (ref 0–2)
BILIRUB SERPL-MCNC: 0.5 MG/DL (ref 0.2–1.1)
BUN SERPL-MCNC: 17 MG/DL (ref 8–23)
CALCIUM SERPL-MCNC: 9.5 MG/DL (ref 8.3–10.4)
CHLORIDE SERPL-SCNC: 105 MMOL/L (ref 98–107)
CO2 SERPL-SCNC: 29 MMOL/L (ref 21–32)
CREAT SERPL-MCNC: 1.03 MG/DL (ref 0.8–1.5)
DIFFERENTIAL METHOD BLD: ABNORMAL
EOSINOPHIL # BLD: 0.1 K/UL (ref 0–0.8)
EOSINOPHIL NFR BLD: 1 % (ref 0.5–7.8)
ERYTHROCYTE [DISTWIDTH] IN BLOOD BY AUTOMATED COUNT: 16.5 % (ref 11.9–14.6)
GLOBULIN SER CALC-MCNC: 3 G/DL (ref 2.3–3.5)
GLUCOSE SERPL-MCNC: 92 MG/DL (ref 65–100)
HCT VFR BLD AUTO: 38.9 % (ref 41.1–50.3)
HGB BLD-MCNC: 13.3 G/DL (ref 13.6–17.2)
LYMPHOCYTES # BLD: 2.1 K/UL (ref 0.5–4.6)
LYMPHOCYTES NFR BLD: 37 % (ref 13–44)
MAGNESIUM SERPL-MCNC: 2 MG/DL (ref 1.8–2.4)
MCH RBC QN AUTO: 24.2 PG (ref 26.1–32.9)
MCHC RBC AUTO-ENTMCNC: 34.2 G/DL (ref 31.4–35)
MCV RBC AUTO: 70.7 FL (ref 79.6–97.8)
MONOCYTES # BLD: 0.9 K/UL (ref 0.1–1.3)
MONOCYTES NFR BLD: 17 % (ref 4–12)
NEUTS SEG # BLD: 2.5 K/UL (ref 1.7–8.2)
NEUTS SEG NFR BLD: 44 % (ref 43–78)
NRBC # BLD: 0 K/UL (ref 0–0.2)
PLATELET # BLD AUTO: 172 K/UL (ref 150–450)
PMV BLD AUTO: 8.9 FL (ref 10.8–14.1)
POTASSIUM SERPL-SCNC: 4.2 MMOL/L (ref 3.5–5.1)
PROT SERPL-MCNC: 6.8 G/DL (ref 6.3–8.2)
RBC # BLD AUTO: 5.5 M/UL (ref 4.23–5.67)
SODIUM SERPL-SCNC: 138 MMOL/L (ref 136–145)
WBC # BLD AUTO: 5.7 K/UL (ref 4.3–11.1)

## 2017-09-13 PROCEDURE — 83735 ASSAY OF MAGNESIUM: CPT | Performed by: INTERNAL MEDICINE

## 2017-09-13 PROCEDURE — 85025 COMPLETE CBC W/AUTO DIFF WBC: CPT | Performed by: INTERNAL MEDICINE

## 2017-09-13 PROCEDURE — 86334 IMMUNOFIX E-PHORESIS SERUM: CPT | Performed by: INTERNAL MEDICINE

## 2017-09-13 PROCEDURE — 84165 PROTEIN E-PHORESIS SERUM: CPT | Performed by: INTERNAL MEDICINE

## 2017-09-13 PROCEDURE — 36415 COLL VENOUS BLD VENIPUNCTURE: CPT | Performed by: INTERNAL MEDICINE

## 2017-09-13 PROCEDURE — 80053 COMPREHEN METABOLIC PANEL: CPT | Performed by: INTERNAL MEDICINE

## 2017-09-13 PROCEDURE — 83883 ASSAY NEPHELOMETRY NOT SPEC: CPT | Performed by: INTERNAL MEDICINE

## 2017-09-13 NOTE — PROGRESS NOTES
9/13/17 Dr Laith Jules reviewed labs and patient to proceed with Revlimid and Decadron tomorrow as planned. Orders for CT scan of chest/abd/pelvis with contrast prior to return in 4 weeks (due to hx of colon cancer). Patient aware of appointments and calendar given to patient.

## 2017-09-14 LAB
ALBUMIN SERPL ELPH-MCNC: 3.77 G/DL (ref 3.2–5.6)
ALBUMIN/GLOB SERPL: 1.4 {RATIO}
ALPHA1 GLOB SERPL ELPH-MCNC: 0.23 G/DL (ref 0.1–0.4)
ALPHA2 GLOB SERPL ELPH-MCNC: 0.64 G/DL (ref 0.4–1.2)
B-GLOBULIN SERPL QL ELPH: 0.97 G/DL (ref 0.6–1.3)
GAMMA GLOB MFR SERPL ELPH: 0.89 G/DL (ref 0.5–1.6)
IGA SERPL-MCNC: 32 MG/DL (ref 85–499)
IGG SERPL-MCNC: 747 MG/DL (ref 610–1616)
IGM SERPL-MCNC: 55 MG/DL (ref 35–242)
KAPPA LC FREE SER-MCNC: 10.2 MG/L (ref 3.3–19.4)
KAPPA LC FREE/LAMBDA FREE SER: 0.43 {RATIO} (ref 0.26–1.65)
LAMBDA LC FREE SERPL-MCNC: 23.99 MG/L (ref 5.71–26.3)
M PROTEIN SERPL ELPH-MCNC: 0.35 G/DL
PROT PATTERN SERPL ELPH-IMP: ABNORMAL
PROT PATTERN SPEC IFE-IMP: ABNORMAL
PROT SERPL-MCNC: 6.5 G/DL (ref 6.3–8.2)

## 2017-10-09 ENCOUNTER — HOSPITAL ENCOUNTER (OUTPATIENT)
Dept: CT IMAGING | Age: 77
Discharge: HOME | End: 2017-10-09
Attending: INTERNAL MEDICINE
Payer: MEDICARE

## 2017-10-09 VITALS — HEIGHT: 74 IN | WEIGHT: 212 LBS | BODY MASS INDEX: 27.21 KG/M2

## 2017-10-09 DIAGNOSIS — K63.89 COLONIC MASS: ICD-10-CM

## 2017-10-09 DIAGNOSIS — C90.00 MULTIPLE MYELOMA, REMISSION STATUS UNSPECIFIED (HCC): ICD-10-CM

## 2017-10-09 PROCEDURE — 74011000258 HC RX REV CODE- 258: Performed by: INTERNAL MEDICINE

## 2017-10-09 PROCEDURE — 74011636320 HC RX REV CODE- 636/320: Performed by: INTERNAL MEDICINE

## 2017-10-09 PROCEDURE — 74177 CT ABD & PELVIS W/CONTRAST: CPT

## 2017-10-09 RX ORDER — SODIUM CHLORIDE 0.9 % (FLUSH) 0.9 %
10 SYRINGE (ML) INJECTION
Status: COMPLETED | OUTPATIENT
Start: 2017-10-09 | End: 2017-10-09

## 2017-10-09 RX ADMIN — SODIUM CHLORIDE 100 ML: 900 INJECTION, SOLUTION INTRAVENOUS at 15:42

## 2017-10-09 RX ADMIN — DIATRIZOATE MEGLUMINE AND DIATRIZOATE SODIUM 15 ML: 660; 100 LIQUID ORAL; RECTAL at 14:15

## 2017-10-09 RX ADMIN — Medication 10 ML: at 15:42

## 2017-10-09 RX ADMIN — IOPAMIDOL 100 ML: 755 INJECTION, SOLUTION INTRAVENOUS at 15:41

## 2017-10-11 ENCOUNTER — HOSPITAL ENCOUNTER (OUTPATIENT)
Dept: LAB | Age: 77
Discharge: HOME | End: 2017-10-11
Payer: MEDICARE

## 2017-10-11 ENCOUNTER — PATIENT OUTREACH (OUTPATIENT)
Dept: CASE MANAGEMENT | Age: 77
End: 2017-10-11

## 2017-10-11 DIAGNOSIS — C90.00 MULTIPLE MYELOMA, REMISSION STATUS UNSPECIFIED (HCC): ICD-10-CM

## 2017-10-11 DIAGNOSIS — K63.89 COLONIC MASS: ICD-10-CM

## 2017-10-11 LAB
ALBUMIN SERPL-MCNC: 3.2 G/DL (ref 3.2–4.6)
ALBUMIN/GLOB SERPL: 1 {RATIO} (ref 1.2–3.5)
ALP SERPL-CCNC: 78 U/L (ref 50–136)
ALT SERPL-CCNC: 37 U/L (ref 12–65)
ANION GAP SERPL CALC-SCNC: 7 MMOL/L (ref 7–16)
AST SERPL-CCNC: 27 U/L (ref 15–37)
BASOPHILS # BLD: 0.1 K/UL (ref 0–0.2)
BASOPHILS NFR BLD: 2 % (ref 0–2)
BILIRUB SERPL-MCNC: 0.3 MG/DL (ref 0.2–1.1)
BUN SERPL-MCNC: 16 MG/DL (ref 8–23)
CALCIUM SERPL-MCNC: 8.8 MG/DL (ref 8.3–10.4)
CEA SERPL-MCNC: 3.6 NG/ML (ref 0–3)
CHLORIDE SERPL-SCNC: 107 MMOL/L (ref 98–107)
CO2 SERPL-SCNC: 26 MMOL/L (ref 21–32)
CREAT SERPL-MCNC: 1.09 MG/DL (ref 0.8–1.5)
DIFFERENTIAL METHOD BLD: ABNORMAL
EOSINOPHIL # BLD: 0.1 K/UL (ref 0–0.8)
EOSINOPHIL NFR BLD: 2 % (ref 0.5–7.8)
ERYTHROCYTE [DISTWIDTH] IN BLOOD BY AUTOMATED COUNT: 15.9 % (ref 11.9–14.6)
GLOBULIN SER CALC-MCNC: 3.2 G/DL (ref 2.3–3.5)
GLUCOSE SERPL-MCNC: 169 MG/DL (ref 65–100)
HCT VFR BLD AUTO: 37.2 % (ref 41.1–50.3)
HGB BLD-MCNC: 12.6 G/DL (ref 13.6–17.2)
KAPPA LC FREE SER-MCNC: 11.11 MG/L (ref 3.3–19.4)
KAPPA LC FREE/LAMBDA FREE SER: 0.27 {RATIO} (ref 0.26–1.65)
LAMBDA LC FREE SERPL-MCNC: 41.51 MG/L (ref 5.71–26.3)
LYMPHOCYTES # BLD: 1.5 K/UL (ref 0.5–4.6)
LYMPHOCYTES NFR BLD: 38 % (ref 13–44)
MCH RBC QN AUTO: 24 PG (ref 26.1–32.9)
MCHC RBC AUTO-ENTMCNC: 33.9 G/DL (ref 31.4–35)
MCV RBC AUTO: 70.9 FL (ref 79.6–97.8)
MONOCYTES # BLD: 0.7 K/UL (ref 0.1–1.3)
MONOCYTES NFR BLD: 18 % (ref 4–12)
NEUTS SEG # BLD: 1.6 K/UL (ref 1.7–8.2)
NEUTS SEG NFR BLD: 40 % (ref 43–78)
NRBC # BLD: 0 K/UL (ref 0–0.2)
PLATELET # BLD AUTO: 160 K/UL (ref 150–450)
PMV BLD AUTO: 9.3 FL (ref 10.8–14.1)
POTASSIUM SERPL-SCNC: 4 MMOL/L (ref 3.5–5.1)
PROT SERPL-MCNC: 6.4 G/DL (ref 6.3–8.2)
RBC # BLD AUTO: 5.25 M/UL (ref 4.23–5.67)
SODIUM SERPL-SCNC: 140 MMOL/L (ref 136–145)
WBC # BLD AUTO: 4 K/UL (ref 4.3–11.1)

## 2017-10-11 PROCEDURE — 80053 COMPREHEN METABOLIC PANEL: CPT | Performed by: INTERNAL MEDICINE

## 2017-10-11 PROCEDURE — 83883 ASSAY NEPHELOMETRY NOT SPEC: CPT | Performed by: INTERNAL MEDICINE

## 2017-10-11 PROCEDURE — 85025 COMPLETE CBC W/AUTO DIFF WBC: CPT | Performed by: INTERNAL MEDICINE

## 2017-10-11 PROCEDURE — 36415 COLL VENOUS BLD VENIPUNCTURE: CPT | Performed by: INTERNAL MEDICINE

## 2017-10-11 PROCEDURE — 86334 IMMUNOFIX E-PHORESIS SERUM: CPT | Performed by: INTERNAL MEDICINE

## 2017-10-11 PROCEDURE — 84165 PROTEIN E-PHORESIS SERUM: CPT | Performed by: INTERNAL MEDICINE

## 2017-10-11 PROCEDURE — 82378 CARCINOEMBRYONIC ANTIGEN: CPT | Performed by: INTERNAL MEDICINE

## 2017-10-13 LAB
ALBUMIN SERPL ELPH-MCNC: 3.32 G/DL (ref 3.2–5.6)
ALBUMIN/GLOB SERPL: 1.2 {RATIO}
ALPHA1 GLOB SERPL ELPH-MCNC: 0.22 G/DL (ref 0.1–0.4)
ALPHA2 GLOB SERPL ELPH-MCNC: 0.75 G/DL (ref 0.4–1.2)
B-GLOBULIN SERPL QL ELPH: 0.9 G/DL (ref 0.6–1.3)
GAMMA GLOB MFR SERPL ELPH: 0.81 G/DL (ref 0.5–1.6)
IGA SERPL-MCNC: 27 MG/DL (ref 85–499)
IGG SERPL-MCNC: 770 MG/DL (ref 610–1616)
IGM SERPL-MCNC: 70 MG/DL (ref 35–242)
M PROTEIN SERPL ELPH-MCNC: 0.31 G/DL
PROT PATTERN SERPL ELPH-IMP: ABNORMAL
PROT PATTERN SPEC IFE-IMP: ABNORMAL
PROT SERPL-MCNC: 6 G/DL (ref 6.3–8.2)

## 2017-10-16 ENCOUNTER — HOSPITAL ENCOUNTER (OUTPATIENT)
Dept: LAB | Age: 77
Discharge: HOME | End: 2017-10-16
Payer: MEDICARE

## 2017-10-16 DIAGNOSIS — N17.9 ACUTE KIDNEY INJURY (HCC): ICD-10-CM

## 2017-10-16 DIAGNOSIS — R53.81 MALAISE AND FATIGUE: ICD-10-CM

## 2017-10-16 DIAGNOSIS — I42.9 CARDIOMYOPATHY, UNSPECIFIED TYPE (HCC): ICD-10-CM

## 2017-10-16 DIAGNOSIS — R53.83 MALAISE AND FATIGUE: ICD-10-CM

## 2017-10-16 DIAGNOSIS — I50.22 CHRONIC SYSTOLIC HEART FAILURE (HCC): ICD-10-CM

## 2017-10-16 LAB
ANION GAP SERPL CALC-SCNC: 4 MMOL/L (ref 7–16)
BNP SERPL-MCNC: 55 PG/ML
BUN SERPL-MCNC: 20 MG/DL (ref 8–23)
CALCIUM SERPL-MCNC: 8.8 MG/DL (ref 8.3–10.4)
CHLORIDE SERPL-SCNC: 107 MMOL/L (ref 98–107)
CO2 SERPL-SCNC: 32 MMOL/L (ref 21–32)
CREAT SERPL-MCNC: 1.28 MG/DL (ref 0.8–1.5)
GLUCOSE SERPL-MCNC: 92 MG/DL (ref 65–100)
POTASSIUM SERPL-SCNC: 3.8 MMOL/L (ref 3.5–5.1)
SODIUM SERPL-SCNC: 143 MMOL/L (ref 136–145)

## 2017-10-16 PROCEDURE — 83880 ASSAY OF NATRIURETIC PEPTIDE: CPT | Performed by: INTERNAL MEDICINE

## 2017-10-16 PROCEDURE — 80048 BASIC METABOLIC PNL TOTAL CA: CPT | Performed by: INTERNAL MEDICINE

## 2017-10-16 PROCEDURE — 36415 COLL VENOUS BLD VENIPUNCTURE: CPT | Performed by: INTERNAL MEDICINE

## 2017-10-20 ENCOUNTER — HOSPITAL ENCOUNTER (OUTPATIENT)
Dept: LAB | Age: 77
Discharge: HOME | End: 2017-10-20
Payer: MEDICARE

## 2017-10-20 DIAGNOSIS — I49.9 CARDIAC ARRHYTHMIA, UNSPECIFIED CARDIAC ARRHYTHMIA TYPE: ICD-10-CM

## 2017-10-20 DIAGNOSIS — I42.9 CARDIOMYOPATHY, UNSPECIFIED TYPE (HCC): ICD-10-CM

## 2017-10-20 DIAGNOSIS — I50.22 CHRONIC SYSTOLIC HEART FAILURE (HCC): ICD-10-CM

## 2017-10-20 DIAGNOSIS — N17.9 ACUTE KIDNEY INJURY (HCC): ICD-10-CM

## 2017-10-20 LAB
ANION GAP SERPL CALC-SCNC: 10 MMOL/L (ref 7–16)
BUN SERPL-MCNC: 13 MG/DL (ref 8–23)
CALCIUM SERPL-MCNC: 9 MG/DL (ref 8.3–10.4)
CHLORIDE SERPL-SCNC: 106 MMOL/L (ref 98–107)
CO2 SERPL-SCNC: 23 MMOL/L (ref 21–32)
CREAT SERPL-MCNC: 1.1 MG/DL (ref 0.8–1.5)
GLUCOSE SERPL-MCNC: 130 MG/DL (ref 65–100)
MAGNESIUM SERPL-MCNC: 1.8 MG/DL (ref 1.8–2.4)
POTASSIUM SERPL-SCNC: 3.7 MMOL/L (ref 3.5–5.1)
SODIUM SERPL-SCNC: 139 MMOL/L (ref 136–145)

## 2017-10-20 PROCEDURE — 36415 COLL VENOUS BLD VENIPUNCTURE: CPT | Performed by: INTERNAL MEDICINE

## 2017-10-20 PROCEDURE — 83735 ASSAY OF MAGNESIUM: CPT | Performed by: INTERNAL MEDICINE

## 2017-10-20 PROCEDURE — 80048 BASIC METABOLIC PNL TOTAL CA: CPT | Performed by: INTERNAL MEDICINE

## 2017-11-02 ENCOUNTER — HOSPITAL ENCOUNTER (OUTPATIENT)
Dept: LAB | Age: 77
Discharge: HOME | End: 2017-11-02
Payer: MEDICARE

## 2017-11-02 DIAGNOSIS — I50.22 CHRONIC SYSTOLIC HEART FAILURE (HCC): ICD-10-CM

## 2017-11-02 DIAGNOSIS — R60.0 LOCALIZED EDEMA: ICD-10-CM

## 2017-11-02 LAB
ALBUMIN SERPL-MCNC: 3.5 G/DL (ref 3.2–4.6)
ALBUMIN/GLOB SERPL: 1.1 {RATIO} (ref 1.2–3.5)
ALP SERPL-CCNC: 74 U/L (ref 50–136)
ALT SERPL-CCNC: 44 U/L (ref 12–65)
ANION GAP SERPL CALC-SCNC: 8 MMOL/L (ref 7–16)
AST SERPL-CCNC: 34 U/L (ref 15–37)
BILIRUB SERPL-MCNC: 0.9 MG/DL (ref 0.2–1.1)
BUN SERPL-MCNC: 19 MG/DL (ref 8–23)
CALCIUM SERPL-MCNC: 8.8 MG/DL (ref 8.3–10.4)
CHLORIDE SERPL-SCNC: 107 MMOL/L (ref 98–107)
CO2 SERPL-SCNC: 25 MMOL/L (ref 21–32)
CREAT SERPL-MCNC: 1.18 MG/DL (ref 0.8–1.5)
GLOBULIN SER CALC-MCNC: 3.2 G/DL (ref 2.3–3.5)
GLUCOSE SERPL-MCNC: 112 MG/DL (ref 65–100)
POTASSIUM SERPL-SCNC: 4.5 MMOL/L (ref 3.5–5.1)
PROT SERPL-MCNC: 6.7 G/DL (ref 6.3–8.2)
SODIUM SERPL-SCNC: 140 MMOL/L (ref 136–145)

## 2017-11-02 PROCEDURE — 36415 COLL VENOUS BLD VENIPUNCTURE: CPT | Performed by: INTERNAL MEDICINE

## 2017-11-02 PROCEDURE — 80053 COMPREHEN METABOLIC PANEL: CPT | Performed by: INTERNAL MEDICINE

## 2017-11-13 ENCOUNTER — HOSPITAL ENCOUNTER (OUTPATIENT)
Dept: LAB | Age: 77
Discharge: HOME | End: 2017-11-13
Payer: MEDICARE

## 2017-11-13 ENCOUNTER — HOSPITAL ENCOUNTER (OUTPATIENT)
Dept: INFUSION THERAPY | Age: 77
Discharge: HOME | End: 2017-11-13

## 2017-11-13 ENCOUNTER — PATIENT OUTREACH (OUTPATIENT)
Dept: CASE MANAGEMENT | Age: 77
End: 2017-11-13

## 2017-11-13 DIAGNOSIS — C90.00 MULTIPLE MYELOMA, REMISSION STATUS UNSPECIFIED (HCC): ICD-10-CM

## 2017-11-13 LAB
ALBUMIN SERPL-MCNC: 4 G/DL (ref 3.2–4.6)
ALBUMIN/GLOB SERPL: 1.1 {RATIO} (ref 1.2–3.5)
ALP SERPL-CCNC: 87 U/L (ref 50–136)
ALT SERPL-CCNC: 47 U/L (ref 12–65)
ANION GAP SERPL CALC-SCNC: 7 MMOL/L (ref 7–16)
AST SERPL-CCNC: 149 U/L (ref 15–37)
BASOPHILS # BLD: 0 K/UL (ref 0–0.2)
BASOPHILS NFR BLD: 0 % (ref 0–2)
BILIRUB SERPL-MCNC: 1.4 MG/DL (ref 0.2–1.1)
BUN SERPL-MCNC: 26 MG/DL (ref 8–23)
CALCIUM SERPL-MCNC: 9.7 MG/DL (ref 8.3–10.4)
CHLORIDE SERPL-SCNC: 97 MMOL/L (ref 98–107)
CO2 SERPL-SCNC: 33 MMOL/L (ref 21–32)
CREAT SERPL-MCNC: 1.52 MG/DL (ref 0.8–1.5)
DIFFERENTIAL METHOD BLD: ABNORMAL
EOSINOPHIL # BLD: 0.1 K/UL (ref 0–0.8)
EOSINOPHIL NFR BLD: 1 % (ref 0.5–7.8)
ERYTHROCYTE [DISTWIDTH] IN BLOOD BY AUTOMATED COUNT: 15.1 % (ref 11.9–14.6)
GLOBULIN SER CALC-MCNC: 3.5 G/DL (ref 2.3–3.5)
GLUCOSE SERPL-MCNC: 130 MG/DL (ref 65–100)
HCT VFR BLD AUTO: 42.4 % (ref 41.1–50.3)
HGB BLD-MCNC: 14.5 G/DL (ref 13.6–17.2)
LDH SERPL L TO P-CCNC: 639 U/L (ref 110–210)
LYMPHOCYTES # BLD: 2.1 K/UL (ref 0.5–4.6)
LYMPHOCYTES NFR BLD: 19 % (ref 13–44)
MAGNESIUM SERPL-MCNC: 1.6 MG/DL (ref 1.8–2.4)
MCH RBC QN AUTO: 24.1 PG (ref 26.1–32.9)
MCHC RBC AUTO-ENTMCNC: 34.2 G/DL (ref 31.4–35)
MCV RBC AUTO: 70.5 FL (ref 79.6–97.8)
MONOCYTES # BLD: 2.6 K/UL (ref 0.1–1.3)
MONOCYTES NFR BLD: 23 % (ref 4–12)
NEUTS SEG # BLD: 6.3 K/UL (ref 1.7–8.2)
NEUTS SEG NFR BLD: 57 % (ref 43–78)
NRBC # BLD: 0.01 K/UL (ref 0–0.2)
PLATELET # BLD AUTO: 150 K/UL (ref 150–450)
PMV BLD AUTO: 10.3 FL (ref 10.8–14.1)
POTASSIUM SERPL-SCNC: 3.9 MMOL/L (ref 3.5–5.1)
PROT SERPL-MCNC: 7.5 G/DL (ref 6.3–8.2)
RBC # BLD AUTO: 6.01 M/UL (ref 4.23–5.67)
SODIUM SERPL-SCNC: 137 MMOL/L (ref 136–145)
WBC # BLD AUTO: 11.1 K/UL (ref 4.3–11.1)

## 2017-11-13 PROCEDURE — 84165 PROTEIN E-PHORESIS SERUM: CPT | Performed by: INTERNAL MEDICINE

## 2017-11-13 PROCEDURE — 36415 COLL VENOUS BLD VENIPUNCTURE: CPT | Performed by: INTERNAL MEDICINE

## 2017-11-13 PROCEDURE — 80053 COMPREHEN METABOLIC PANEL: CPT | Performed by: INTERNAL MEDICINE

## 2017-11-13 PROCEDURE — 83615 LACTATE (LD) (LDH) ENZYME: CPT | Performed by: INTERNAL MEDICINE

## 2017-11-13 PROCEDURE — 85025 COMPLETE CBC W/AUTO DIFF WBC: CPT | Performed by: INTERNAL MEDICINE

## 2017-11-13 PROCEDURE — 86334 IMMUNOFIX E-PHORESIS SERUM: CPT | Performed by: INTERNAL MEDICINE

## 2017-11-13 PROCEDURE — 83883 ASSAY NEPHELOMETRY NOT SPEC: CPT | Performed by: INTERNAL MEDICINE

## 2017-11-13 PROCEDURE — 83735 ASSAY OF MAGNESIUM: CPT | Performed by: INTERNAL MEDICINE

## 2017-11-13 NOTE — PROGRESS NOTES
11/13/17 Dr Jeanine Cadet reviewed labs and CT scan. Orders to hold Zometa for now, Dr Army Morrow consult for excisional biopsy of node seen on scan. Order for Omnicef 300 mg BID for 10 days patient instructed not to start until after appointment with Dr Thelma Shukla tomorrow and to start only if cultures there are negative. Patient stated understanding. Surgical navigator, Charly Silverio contacted about referral. Patient to return in 4 weeks with labs.

## 2017-11-14 LAB
ALBUMIN SERPL ELPH-MCNC: 3.88 G/DL (ref 3.2–5.6)
ALBUMIN/GLOB SERPL: 1.2 {RATIO}
ALPHA1 GLOB SERPL ELPH-MCNC: 0.31 G/DL (ref 0.1–0.4)
ALPHA2 GLOB SERPL ELPH-MCNC: 0.98 G/DL (ref 0.4–1.2)
B-GLOBULIN SERPL QL ELPH: 1.1 G/DL (ref 0.6–1.3)
GAMMA GLOB MFR SERPL ELPH: 0.84 G/DL (ref 0.5–1.6)
IGA SERPL-MCNC: 35 MG/DL (ref 85–499)
IGG SERPL-MCNC: 882 MG/DL (ref 610–1616)
IGM SERPL-MCNC: 75 MG/DL (ref 35–242)
KAPPA LC FREE SER-MCNC: 10.84 MG/L (ref 3.3–19.4)
KAPPA LC FREE/LAMBDA FREE SER: 0.19 {RATIO} (ref 0.26–1.65)
LAMBDA LC FREE SERPL-MCNC: 55.79 MG/L (ref 5.71–26.3)
M PROTEIN SERPL ELPH-MCNC: 0.31 G/DL
PROT PATTERN SERPL ELPH-IMP: ABNORMAL
PROT PATTERN SPEC IFE-IMP: ABNORMAL
PROT SERPL-MCNC: 7.1 G/DL (ref 6.3–8.2)

## 2017-11-15 ENCOUNTER — HOSPITAL ENCOUNTER (OUTPATIENT)
Dept: LAB | Age: 77
Discharge: HOME | End: 2017-11-15
Payer: MEDICARE

## 2017-11-15 ENCOUNTER — HOSPITAL ENCOUNTER (OUTPATIENT)
Dept: GENERAL RADIOLOGY | Age: 77
Discharge: HOME | End: 2017-11-15
Payer: MEDICARE

## 2017-11-15 DIAGNOSIS — R05.9 COUGH: ICD-10-CM

## 2017-11-15 DIAGNOSIS — R60.0 LOCALIZED EDEMA: ICD-10-CM

## 2017-11-15 LAB
ANION GAP SERPL CALC-SCNC: 13 MMOL/L (ref 7–16)
BUN SERPL-MCNC: 57 MG/DL (ref 8–23)
CALCIUM SERPL-MCNC: 9.3 MG/DL (ref 8.3–10.4)
CHLORIDE SERPL-SCNC: 95 MMOL/L (ref 98–107)
CO2 SERPL-SCNC: 25 MMOL/L (ref 21–32)
CREAT SERPL-MCNC: 3.32 MG/DL (ref 0.8–1.5)
GLUCOSE SERPL-MCNC: 104 MG/DL (ref 65–100)
POTASSIUM SERPL-SCNC: 3.9 MMOL/L (ref 3.5–5.1)
SODIUM SERPL-SCNC: 133 MMOL/L (ref 136–145)

## 2017-11-15 PROCEDURE — 71020 XR CHEST PA LAT: CPT

## 2017-11-17 ENCOUNTER — HOSPITAL ENCOUNTER (OUTPATIENT)
Dept: LAB | Age: 77
Discharge: HOME | DRG: 580 | End: 2017-11-17
Payer: MEDICARE

## 2017-11-17 DIAGNOSIS — C90.00 MULTIPLE MYELOMA, REMISSION STATUS UNSPECIFIED (HCC): ICD-10-CM

## 2017-11-17 LAB
ALBUMIN SERPL-MCNC: 2.8 G/DL (ref 3.2–4.6)
ALBUMIN/GLOB SERPL: 0.7 {RATIO} (ref 1.2–3.5)
ALP SERPL-CCNC: 74 U/L (ref 50–136)
ALT SERPL-CCNC: 39 U/L (ref 12–65)
ANION GAP SERPL CALC-SCNC: 15 MMOL/L (ref 7–16)
AST SERPL-CCNC: 46 U/L (ref 15–37)
BASOPHILS # BLD: 0 K/UL (ref 0–0.2)
BASOPHILS NFR BLD: 0 % (ref 0–2)
BILIRUB SERPL-MCNC: 2.2 MG/DL (ref 0.2–1.1)
BUN SERPL-MCNC: 67 MG/DL (ref 8–23)
CALCIUM SERPL-MCNC: 9.3 MG/DL (ref 8.3–10.4)
CHLORIDE SERPL-SCNC: 95 MMOL/L (ref 98–107)
CO2 SERPL-SCNC: 21 MMOL/L (ref 21–32)
CREAT SERPL-MCNC: 3.68 MG/DL (ref 0.8–1.5)
DIFFERENTIAL METHOD BLD: ABNORMAL
EOSINOPHIL # BLD: 0.1 K/UL (ref 0–0.8)
EOSINOPHIL NFR BLD: 1 % (ref 0.5–7.8)
ERYTHROCYTE [DISTWIDTH] IN BLOOD BY AUTOMATED COUNT: 14.8 % (ref 11.9–14.6)
GLOBULIN SER CALC-MCNC: 4.1 G/DL (ref 2.3–3.5)
GLUCOSE SERPL-MCNC: 133 MG/DL (ref 65–100)
HCT VFR BLD AUTO: 33.1 % (ref 41.1–50.3)
HGB BLD-MCNC: 11.7 G/DL (ref 13.6–17.2)
IMM GRANULOCYTES # BLD: 0.3 K/UL (ref 0–0.5)
IMM GRANULOCYTES NFR BLD AUTO: 4 % (ref 0–5)
KAPPA LC FREE SER-MCNC: 23.21 MG/L (ref 3.3–19.4)
KAPPA LC FREE/LAMBDA FREE SER: 0.15 {RATIO} (ref 0.26–1.65)
LAMBDA LC FREE SERPL-MCNC: 153.9 MG/L (ref 5.71–26.3)
LYMPHOCYTES # BLD: 2.1 K/UL (ref 0.5–4.6)
LYMPHOCYTES NFR BLD: 27 % (ref 13–44)
MAGNESIUM SERPL-MCNC: 1.7 MG/DL (ref 1.8–2.4)
MCH RBC QN AUTO: 24 PG (ref 26.1–32.9)
MCHC RBC AUTO-ENTMCNC: 35.3 G/DL (ref 31.4–35)
MCV RBC AUTO: 67.8 FL (ref 79.6–97.8)
MONOCYTES # BLD: 1.1 K/UL (ref 0.1–1.3)
MONOCYTES NFR BLD: 15 % (ref 4–12)
NEUTS SEG # BLD: 4.2 K/UL (ref 1.7–8.2)
NEUTS SEG NFR BLD: 53 % (ref 43–78)
PLATELET # BLD AUTO: 178 K/UL (ref 150–450)
PMV BLD AUTO: ABNORMAL FL (ref 10.8–14.1)
POTASSIUM SERPL-SCNC: 3.5 MMOL/L (ref 3.5–5.1)
PROT SERPL-MCNC: 6.9 G/DL (ref 6.3–8.2)
RBC # BLD AUTO: 4.88 M/UL (ref 4.23–5.67)
SODIUM SERPL-SCNC: 131 MMOL/L (ref 136–145)
WBC # BLD AUTO: 7.8 K/UL (ref 4.3–11.1)

## 2017-11-18 PROBLEM — N17.9 ACUTE RENAL FAILURE (ARF) (HCC): Status: ACTIVE | Noted: 2017-11-18

## 2017-11-20 ENCOUNTER — HOSPITAL ENCOUNTER (OUTPATIENT)
Dept: LAB | Age: 77
Discharge: HOME | End: 2017-11-20
Payer: MEDICARE

## 2017-11-20 ENCOUNTER — HOSPITAL ENCOUNTER (INPATIENT)
Age: 77
LOS: 6 days | Discharge: HOME HEALTH | DRG: 580 | End: 2017-11-26
Attending: INTERNAL MEDICINE | Admitting: INTERNAL MEDICINE
Payer: MEDICARE

## 2017-11-20 ENCOUNTER — ANESTHESIA EVENT (OUTPATIENT)
Dept: SURGERY | Age: 77
DRG: 580 | End: 2017-11-20
Payer: MEDICARE

## 2017-11-20 ENCOUNTER — PATIENT OUTREACH (OUTPATIENT)
Dept: CASE MANAGEMENT | Age: 77
End: 2017-11-20

## 2017-11-20 DIAGNOSIS — N17.9 ACUTE KIDNEY INJURY (HCC): ICD-10-CM

## 2017-11-20 DIAGNOSIS — R22.1 NECK MASS: ICD-10-CM

## 2017-11-20 DIAGNOSIS — R52 PAIN: ICD-10-CM

## 2017-11-20 DIAGNOSIS — I48.0 PAROXYSMAL ATRIAL FIBRILLATION (HCC): ICD-10-CM

## 2017-11-20 DIAGNOSIS — M54.2 NECK PAIN: ICD-10-CM

## 2017-11-20 DIAGNOSIS — C90.00 MULTIPLE MYELOMA, REMISSION STATUS UNSPECIFIED (HCC): ICD-10-CM

## 2017-11-20 DIAGNOSIS — R53.83 MALAISE AND FATIGUE: ICD-10-CM

## 2017-11-20 DIAGNOSIS — N17.9 ACUTE RENAL FAILURE, UNSPECIFIED ACUTE RENAL FAILURE TYPE (HCC): ICD-10-CM

## 2017-11-20 DIAGNOSIS — R53.81 MALAISE AND FATIGUE: ICD-10-CM

## 2017-11-20 DIAGNOSIS — N17.9 AKI (ACUTE KIDNEY INJURY) (HCC): ICD-10-CM

## 2017-11-20 LAB
ABO + RH BLD: NORMAL
ALBUMIN SERPL ELPH-MCNC: 2.23 G/DL (ref 3.2–5.6)
ALBUMIN SERPL-MCNC: 3 G/DL (ref 3.2–4.6)
ALBUMIN/GLOB SERPL: 0.5 {RATIO}
ALBUMIN/GLOB SERPL: 0.7 {RATIO} (ref 1.2–3.5)
ALP SERPL-CCNC: 73 U/L (ref 50–136)
ALPHA1 GLOB SERPL ELPH-MCNC: 0.59 G/DL (ref 0.1–0.4)
ALPHA2 GLOB SERPL ELPH-MCNC: 1.14 G/DL (ref 0.4–1.2)
ALT SERPL-CCNC: 44 U/L (ref 12–65)
ANION GAP SERPL CALC-SCNC: 12 MMOL/L (ref 7–16)
AST SERPL-CCNC: 35 U/L (ref 15–37)
B-GLOBULIN SERPL QL ELPH: 1.16 G/DL (ref 0.6–1.3)
BILIRUB SERPL-MCNC: 0.7 MG/DL (ref 0.2–1.1)
BLOOD GROUP ANTIBODIES SERPL: NORMAL
BNP SERPL-MCNC: 64 PG/ML
BUN SERPL-MCNC: 58 MG/DL (ref 8–23)
CALCIUM SERPL-MCNC: 9.8 MG/DL (ref 8.3–10.4)
CHLORIDE SERPL-SCNC: 104 MMOL/L (ref 98–107)
CO2 SERPL-SCNC: 23 MMOL/L (ref 21–32)
CREAT SERPL-MCNC: 2.16 MG/DL (ref 0.8–1.5)
DIFFERENTIAL METHOD BLD: ABNORMAL
ERYTHROCYTE [DISTWIDTH] IN BLOOD BY AUTOMATED COUNT: 14.5 % (ref 11.9–14.6)
GAMMA GLOB MFR SERPL ELPH: 1.28 G/DL (ref 0.5–1.6)
GLOBULIN SER CALC-MCNC: 4.3 G/DL (ref 2.3–3.5)
GLUCOSE SERPL-MCNC: 190 MG/DL (ref 65–100)
HCT VFR BLD AUTO: 35.7 % (ref 41.1–50.3)
HGB BLD-MCNC: 12.3 G/DL (ref 13.6–17.2)
IGA SERPL-MCNC: 32 MG/DL (ref 85–499)
IGG SERPL-MCNC: 745 MG/DL (ref 610–1616)
IGM SERPL-MCNC: 67 MG/DL (ref 35–242)
LYMPHOCYTES # BLD: 1.4 K/UL (ref 0.5–4.6)
LYMPHOCYTES NFR BLD MANUAL: 20 % (ref 16–44)
M PROTEIN SERPL ELPH-MCNC: 0.51 G/DL
MAGNESIUM SERPL-MCNC: 1.9 MG/DL (ref 1.8–2.4)
MCH RBC QN AUTO: 24.3 PG (ref 26.1–32.9)
MCHC RBC AUTO-ENTMCNC: 34.5 G/DL (ref 31.4–35)
MCV RBC AUTO: 70.6 FL (ref 79.6–97.8)
METAMYELOCYTES NFR BLD MANUAL: 1 %
MONOCYTES # BLD: 1 K/UL (ref 0.1–1.3)
MONOCYTES NFR BLD MANUAL: 14 % (ref 3–9)
MYELOCYTES NFR BLD MANUAL: 1 %
NEUTS BAND NFR BLD MANUAL: 5 % (ref 0–6)
NEUTS SEG # BLD: 4.7 K/UL (ref 1.7–8.2)
NEUTS SEG NFR BLD MANUAL: 59 % (ref 47–75)
NRBC # BLD: 0.01 K/UL (ref 0–0.2)
PLATELET # BLD AUTO: 183 K/UL (ref 150–450)
PLATELET COMMENTS,PCOM: ADEQUATE
PMV BLD AUTO: 11.3 FL (ref 10.8–14.1)
POTASSIUM SERPL-SCNC: 4.4 MMOL/L (ref 3.5–5.1)
PROT PATTERN SERPL ELPH-IMP: ABNORMAL
PROT PATTERN SPEC IFE-IMP: ABNORMAL
PROT SERPL-MCNC: 6.4 G/DL (ref 6.3–8.2)
PROT SERPL-MCNC: 7.3 G/DL (ref 6.3–8.2)
RBC # BLD AUTO: 5.06 M/UL (ref 4.23–5.67)
RBC MORPH BLD: ABNORMAL
RBC MORPH BLD: ABNORMAL
SODIUM SERPL-SCNC: 139 MMOL/L (ref 136–145)
SPECIMEN EXP DATE BLD: NORMAL
WBC # BLD AUTO: 7.1 K/UL (ref 4.3–11.1)
WBC MORPH BLD: ABNORMAL

## 2017-11-20 PROCEDURE — 99222 1ST HOSP IP/OBS MODERATE 55: CPT | Performed by: INTERNAL MEDICINE

## 2017-11-20 PROCEDURE — 74011250636 HC RX REV CODE- 250/636: Performed by: NURSE PRACTITIONER

## 2017-11-20 PROCEDURE — 80053 COMPREHEN METABOLIC PANEL: CPT | Performed by: NURSE PRACTITIONER

## 2017-11-20 PROCEDURE — 85025 COMPLETE CBC W/AUTO DIFF WBC: CPT | Performed by: NURSE PRACTITIONER

## 2017-11-20 PROCEDURE — 83735 ASSAY OF MAGNESIUM: CPT | Performed by: NURSE PRACTITIONER

## 2017-11-20 PROCEDURE — 36415 COLL VENOUS BLD VENIPUNCTURE: CPT | Performed by: NURSE PRACTITIONER

## 2017-11-20 PROCEDURE — 74011250637 HC RX REV CODE- 250/637: Performed by: NURSE PRACTITIONER

## 2017-11-20 PROCEDURE — 65270000029 HC RM PRIVATE

## 2017-11-20 PROCEDURE — 86900 BLOOD TYPING SEROLOGIC ABO: CPT | Performed by: NURSE PRACTITIONER

## 2017-11-20 PROCEDURE — 83880 ASSAY OF NATRIURETIC PEPTIDE: CPT | Performed by: INTERNAL MEDICINE

## 2017-11-20 RX ORDER — SODIUM CHLORIDE 0.9 % (FLUSH) 0.9 %
5-10 SYRINGE (ML) INJECTION EVERY 8 HOURS
Status: DISCONTINUED | OUTPATIENT
Start: 2017-11-20 | End: 2017-11-21 | Stop reason: HOSPADM

## 2017-11-20 RX ORDER — LEVOTHYROXINE SODIUM 100 UG/1
100 TABLET ORAL
Status: DISCONTINUED | OUTPATIENT
Start: 2017-11-21 | End: 2017-11-26 | Stop reason: HOSPADM

## 2017-11-20 RX ORDER — PANTOPRAZOLE SODIUM 40 MG/1
40 TABLET, DELAYED RELEASE ORAL
Status: DISCONTINUED | OUTPATIENT
Start: 2017-11-21 | End: 2017-11-26 | Stop reason: HOSPADM

## 2017-11-20 RX ORDER — MORPHINE SULFATE 10 MG/ML
2 INJECTION, SOLUTION INTRAMUSCULAR; INTRAVENOUS
Status: DISCONTINUED | OUTPATIENT
Start: 2017-11-20 | End: 2017-11-26 | Stop reason: HOSPADM

## 2017-11-20 RX ORDER — SODIUM CHLORIDE 9 MG/ML
75 INJECTION, SOLUTION INTRAVENOUS CONTINUOUS
Status: DISCONTINUED | OUTPATIENT
Start: 2017-11-20 | End: 2017-11-24

## 2017-11-20 RX ORDER — CARVEDILOL 25 MG/1
25 TABLET ORAL 2 TIMES DAILY WITH MEALS
Status: DISCONTINUED | OUTPATIENT
Start: 2017-11-20 | End: 2017-11-21

## 2017-11-20 RX ORDER — LENALIDOMIDE 10 MG/1
10 CAPSULE ORAL DAILY
Status: COMPLETED | OUTPATIENT
Start: 2017-11-21 | End: 2017-11-22

## 2017-11-20 RX ORDER — AMOXICILLIN 250 MG
1 CAPSULE ORAL DAILY
Status: DISCONTINUED | OUTPATIENT
Start: 2017-11-21 | End: 2017-11-26 | Stop reason: HOSPADM

## 2017-11-20 RX ORDER — OXYCODONE HYDROCHLORIDE 5 MG/1
10 TABLET ORAL
Status: DISCONTINUED | OUTPATIENT
Start: 2017-11-20 | End: 2017-11-26 | Stop reason: HOSPADM

## 2017-11-20 RX ORDER — SODIUM CHLORIDE 0.9 % (FLUSH) 0.9 %
5-10 SYRINGE (ML) INJECTION AS NEEDED
Status: DISCONTINUED | OUTPATIENT
Start: 2017-11-20 | End: 2017-11-21 | Stop reason: HOSPADM

## 2017-11-20 RX ADMIN — OXYCODONE HYDROCHLORIDE 10 MG: 5 TABLET ORAL at 19:39

## 2017-11-20 RX ADMIN — SODIUM CHLORIDE 75 ML/HR: 900 INJECTION, SOLUTION INTRAVENOUS at 15:17

## 2017-11-20 RX ADMIN — MORPHINE SULFATE 2 MG: 10 INJECTION, SOLUTION INTRAMUSCULAR; INTRAVENOUS at 15:15

## 2017-11-20 RX ADMIN — CARVEDILOL 25 MG: 25 TABLET, FILM COATED ORAL at 17:12

## 2017-11-20 NOTE — PROGRESS NOTES
END OF SHIFT NOTE:    Intake/Output      Voiding: Yes  Catheter: No  Drain:  No            Stool:  0         Emesis:  0       VITAL SIGNS  Patient Vitals for the past 12 hrs:   Temp Pulse Resp BP SpO2   11/20/17 1516 98.2 °F (36.8 °C) 86 20 119/69 95 %       Pain Assessment  Pain 1  Pain Scale 1: Numeric (0 - 10) (11/20/17 1550)  Pain Intensity 1: 4 (11/20/17 1550)  Patient Stated Pain Goal: 0 (11/20/17 1820)  Pain Reassessment 1: Yes (11/20/17 1550)  Pain Location 1: Neck (11/20/17 1518)  Pain Description 1: Constant; Sharp (11/20/17 1518)  Pain Intervention(s) 1: Medication (see MAR) (11/20/17 1518)    Ambulating  Yes    Additional Information:     Shift report will be  given to oncoming nurse at the bedside.     Serina Abbott RN

## 2017-11-20 NOTE — LETTER
11/30/2017 9:48 AM 
 
[facility]. Decision to operate Left neck exploration lymph node biopsy and abscess drainage. Drain placement. Thank you

## 2017-11-20 NOTE — CONSULTS
Consult Note:   Arina Mcmahan  MRN: [id number]  :1940  Age:77 y.o.    HPI: Arina Mcmahan is a 65 y.o. male who has a PMHx of heart failure, multiple myeloma and colon CA. Mr Erika Lopez was admitted to [facility] on 17 for MARY and left neck pain. He reports nothing makes the pain better but palpation of the neck increases pain. He also reports a hx of thyroid cancer with partial thyroidectomy around [id number]. Over the last month, pt reports worsening fatigue, weight loss of about 6 lbs, and progressive swelling in the left neck. He had a CT scan of chest, abdomen and pelvis at [facility] approx 2 weeks ago. Results are highlighted below. He had a previously scheduled appointment with Dr. Mahesh Mcqueen for lymph node biopsy for early December but because of increasing pain and enlarging node was admitted to hospital and general surgery is consulted for node biopsy.         Past Medical History:   Diagnosis Date    Arrhythmia     LBBB    Arthritis     BMI 30.0-30.9,adult     BMI 30.5    Cardiomyopathy ([address]     followed by [facility]    Cholelithiases     Chronic systolic heart failure ([address] 2011    [place] [employer] class II-III heart failure symptoms    Gout     H/O: pituitary tumor     X2 benign, removed    High cholesterol     History of thyroid cancer     History of vertigo     no recent complications or episodes    Hx of radiation therapy to prostate     Hyperlipidemia 2015    Hypertension     Hypothyroid     hypo- had portion of thyroid removed due to cancer    ICD (implantable cardiac defibrillator) in place 2011    [employer] BIV/ICD, Gen change 3.2.16    LBBB (left bundle branch block) 2015    Malaise and fatigue 2015    Malignant neoplasm of prostate ([address]     Mass of colon     right colon mass    Sinus node dysfunction (HCC)      Past Surgical History:   Procedure Laterality Date    HX CHOLECYSTECTOMY      HX COLONOSCOPY      dx with Right colon mass  HX GI      benign polyps removed    HX HEART CATHETERIZATION      HX HEENT  2008    thyroidetomy partial tumor from pituitary x2    HX PACEMAKER  2011/2016    biotronik biv-icd    HX THYROIDECTOMY  2008    HX TUMOR REMOVAL  1990/2010    pituitary tumor removed X2     Current Facility-Administered Medications   Medication Dose Route Frequency    apixaban (ELIQUIS) tablet 5 mg  5 mg Oral BID    carvedilol (COREG) tablet 25 mg  25 mg Oral BID WITH MEALS    [START ON 11/21/2017] levothyroxine (SYNTHROID) tablet 100 mcg  100 mcg Oral ACB    [START ON 11/21/2017] pantoprazole (PROTONIX) tablet 40 mg  40 mg Oral ACB    [START ON 11/21/2017] lenalidomide cap 10 mg (patient supplied)  10 mg Oral DAILY    oxyCODONE IR (ROXICODONE) tablet 10 mg  10 mg Oral Q4H PRN    morphine injection 2 mg  2 mg IntraVENous Q3H PRN    0.9% sodium chloride infusion  75 mL/hr IntraVENous CONTINUOUS    [START ON 11/21/2017] senna-docusate (Velez Resides) 8.6-50 mg per tablet 1 Tab  1 Tab Oral DAILY     Lisinopril and Pcn [penicillins]  Social History     Social History    Marital status:      Spouse name: N/A    Number of children: N/A    Years of education: N/A     Social History Main Topics    Smoking status: Never Smoker    Smokeless tobacco: Never Used    Alcohol use Yes      Comment: very rare    Drug use: No    Sexual activity: Yes     Partners: Female     Other Topics Concern    Not on file     Social History Narrative     History   Smoking Status    Never Smoker   Smokeless Tobacco    Never Used     Family History   Problem Relation Age of Onset    Heart Disease Sister     Heart Attack Sister     Stroke Mother      ROS: The patient has no difficulty with chest pain. Reports shortness of breath and feeling fatigued. No fever or chills. Denies n/v/d or dysphagia. Comprehensive review of systems was otherwise unremarkable except as noted above.     Physical Exam:   Visit Vitals    /69 (BP 1 Location: Right arm, BP Patient Position: Sitting)    Pulse 86    Temp 98.2 °F (36.8 °C)    Resp 20    Wt 92.4 kg (203 lb 11.3 oz)  Comment: 203 lbs 6 oz    SpO2 95%    BMI 26.15 kg/m2     Constitutional: Alert, oriented, cooperative patient in no acute distress; appears stated age    Eyes:Sclera are clear. EOMs intact  ENMT: no external lesions gross hearing normal; approx 6 cm left neck mass (lymph node) that is tender to touch, no ear or lip lesions, nares normal  CV: RRR. Normal perfusion  Resp: No JVD. Breathing is  non-labored; no audible wheezing. GI: soft and non-distended     Musculoskeletal: unremarkable with normal function. No embolic signs or cyanosis. Neuro:  Oriented; moves all 4; no focal deficits  Psychiatric: normal affect and mood, no memory impairment    Recent vitals (if inpt):  Patient Vitals for the past 24 hrs:   BP Temp Pulse Resp SpO2 Weight   11/20/17 1518 - - - - - 92.4 kg (203 lb 11.3 oz)   11/20/17 1516 119/69 98.2 °F (36.8 °C) 86 20 95 % -       Labs:  Recent Labs      11/20/17   1154   WBC  7.1   HGB  12.3*   PLT  183   NA  139   K  4.4   CL  104   CO2  23   BUN  58*   CREA  2.16*   GLU  190*   TBILI  0.7   SGOT  35   ALT  44   AP  73       Lab Results   Component Value Date/Time    WBC 7.1 11/20/2017 11:54 AM    HGB 12.3 11/20/2017 11:54 AM    PLATELET 887 98/17/2867 11:54 AM    Sodium 139 11/20/2017 11:54 AM    Potassium 4.4 11/20/2017 11:54 AM    Chloride 104 11/20/2017 11:54 AM    CO2 23 11/20/2017 11:54 AM    BUN 58 11/20/2017 11:54 AM    Creatinine 2.16 11/20/2017 11:54 AM    Glucose 190 11/20/2017 11:54 AM    INR 1.0 05/06/2016 09:07 AM    aPTT 24.2 05/06/2016 09:07 AM    Bilirubin, total 0.7 11/20/2017 11:54 AM    Bilirubin, direct 0.2 07/22/2014 11:28 AM    AST (SGOT) 35 11/20/2017 11:54 AM    ALT (SGPT) 44 11/20/2017 11:54 AM    Alk.  phosphatase 73 11/20/2017 11:54 AM    Amylase 148 01/17/2017 04:49 AM    Lipase 832 01/17/2017 04:49 AM    Troponin-I, Qt. <0.02 08/17/2014 08:15 PM     CT Scan 10/9/17:   CT CHEST, ABDOMEN AND PELVIS WITH INTRAVENOUS CONTRAST DATED 10/9/2017.      History: Multiple myeloma and colon cancer.       Comparison: CT chest 11/29/2016, and CT abdomen and pelvis 1/8/2017, and  9/23/2016       Technique:   Multiple contiguous helical CT images reconstructed at 5 mm were  obtained from the base of the neck to the ischial tuberosities following oral  and 100 cc Isovue-370 without acute complication.  All CT scans performed at  this facility use one or all of the following: Automated exposure control,  adjustment of the mA and/or kVp according to patient's size, iterative  reconstruction.      Findings:  CT Chest:   Enlarging solid appearing masses are seen in the left neck. The larger lesion in  the mid left neck is only partially visualized on image 1 measuring 2.3 cm and  does appear increased in size to this lesion which was partially visualized on  the September 2016 study at which time this measured 1.9 cm. An additional left  neck mass which may be related to the left thyroid lobe seen on image 11 also  appears increased in size now measuring 3 cm x 2.9 cm and previously measuring  1.9 cm x 1.7 cm. No evolving axillary, mediastinal, or hilar lymphadenopathy is  seen.  The thoracic aorta is normal in caliber.       Evaluation with lung windows demonstrates no new worrisome pulmonary nodules or  masses.  No pleural effusion is seen.  Lungs are expanded without evidence for  pneumothorax.  The prior numerous lytic appearing lesions are not felt to be  significantly changed with the largest lesion once again seen in T10. This  lesion does demonstrate minimal peripheral sclerosis which can suggest early  healing.  Some new vertebral body height loss is seen at T10 and T12 which is new  from the prior CT scan of the chest dated 11/29/2016 although no definite acute  features are seen and therefore these likely represent interval although  subacute compression fractures.      CT ABDOMEN:    The Liver is homogeneous in attenuation.  The spleen is homogeneous in  attenuation.  No contour deforming or enhancing mass lesions are seen of the  pancreas or adrenal glands.  The gallbladder has been removed.  The kidneys  enhance symmetrically and no evidence of hydronephrosis is seen.        The visualized loops of small bowel and colon are normal in caliber. Stable post  surgical changes are seen in the right colon with apparent resection of the  cecum. The ileocolonic anastomosis is seen on image 92 without evolving  worrisome adverse features. A stable large hernia is seen in the right anterior  abdominal wall containing multiple small bowel loops in the hepatic flexure of  the colon. No free fluid and no free air is seen in the abdomen.  No adenopathy  is seen of the abdomen.  The abdominal aorta is unremarkable in appearance. Innumerable lytic lesions are seen of the lumbar spine which are not felt to be  significantly changed.      CT PELVIS:  No abnormal pelvic fluid collections are present.  No pelvic adenopathy is seen.   The urinary bladder is unremarkable.  Innumerable lytic lesions are seen in the  bony pelvis which are not felt to be significantly changed.      IMPRESSION  IMPRESSION:    1.  Innumerable lytic osseous lesions which are not felt to be significant  changed. The prior largest lesion in T10 demonstrates minimal peripheral  sclerosis which could indicate early healing. New vertebral body height  abnormalities are seen at T10 and T12 indicating likely pathologic compression  fractures. These are new since November 2016 although no definite acute features  are seen and therefore these are favored to be more subacute in nature.      2.  Stable post surgical changes suggesting a prior resection of the cecum. No  abnormal soft tissue is seen adjacent to the suture line to suggest locally  recurrent tumor.      3.  Enlarging enhancing masses in the left neck which may be related to the left  thyroid lobe although this is difficult to say with certainty. This is felt to  unlikely represent metastatic disease from colon cancer although could represent  soft tissue manifestation of multiple myeloma (i.e. plasmacytoma). A dedicated  contrasted CT scan of the neck would be recommended for further assessment. I reviewed recent labs and recent radiologic studies. I independently reviewed radiology images for studies I described above or studies I have ordered. Admission date (for inpatients): 11/20/2017   * No surgery found *  * No surgery found *    ASSESSMENT/PLAN:  Problem List  Date Reviewed: 11/13/2017          Codes Class Noted    Pain ICD-10-CM: R52  ICD-9-CM: 780.96  11/20/2017        MARY (acute kidney injury) ([facility] ICD-10-CM: N17.9  ICD-9-CM: 584.9  11/20/2017        Acute renal failure (ARF) ([facility] ICD-10-CM: N17.9  ICD-9-CM: 584.9  11/18/2017        Intractable pain ICD-10-CM: R52  ICD-9-CM: 780.96  1/11/2017        Acute kidney injury ([facility] ICD-10-CM: N17.9  ICD-9-CM: 584.9  1/11/2017        Pancytopenia ([facility] ICD-10-CM: X17.189  ICD-9-CM: 284.19  1/11/2017        Constipation ICD-10-CM: K59.00  ICD-9-CM: 564.00  1/11/2017        Multiple myeloma ([facility] ICD-10-CM: C90.00  ICD-9-CM: 203.00  1/2/2017        Postural imbalance ICD-10-CM: R29.3  ICD-9-CM: 729.90  12/14/2016        Polyneuropathy ICD-10-CM: G62.9  ICD-9-CM: 356.9  12/14/2016        Fall ICD-10-CM: F09. Jse Penning  ICD-9-CM: E888.9  12/14/2016        Encounter for medication management ICD-10-CM: Z79.899  ICD-9-CM: V58.69  12/14/2016        Urinary retention ICD-10-CM: R33.9  ICD-9-CM: 788.20  6/6/2016        Colonic mass ICD-10-CM: K63.9  ICD-9-CM: 569.89  3/23/2016        Hyperlipidemia ICD-10-CM: E78.5  ICD-9-CM: 272.4  11/18/2015        Vertigo ICD-10-CM: W05  ICD-9-CM: 780.4  11/18/2015        Malaise and fatigue ICD-10-CM: R53.81, R53.83  ICD-9-CM: 780.79  11/18/2015        Cardiomyopathy ([facility] ICD-10-CM: I42.9  ICD-9-CM: 425.4  11/18/2015        LBBB (left bundle branch block) ICD-10-CM: I44.7  ICD-9-CM: 426.3  11/18/2015        Arthritis ICD-10-CM: M19.90  ICD-9-CM: 716.90  Unknown        Arrhythmia ICD-10-CM: I49.9  ICD-9-CM: 427.9  Unknown    Overview Signed 6/18/2013  2:07 PM by Pranay Flores MD     LBBB             Cholelithiases ICD-10-CM: F21.88  ICD-9-CM: 574.20  Unknown        Hx of radiation therapy to prostate ICD-10-CM: Z92.3  ICD-9-CM: V15.3  Unknown        Chronic systolic heart failure ([facility] ICD-10-CM: I50.22  ICD-9-CM: 428.22  9/13/2011        Automatic implantable cardioverter-defibrillator in situ ICD-10-CM: Z95.810  ICD-9-CM: V45.02  9/1/2011            Active Problems:    Pain (11/20/2017)      MARY (acute kidney injury) ([facility] (11/20/2017)       Plan:  NPO after midnight  Hold Eliquis  Left cervical lymph node biopsy 11/21/17    Further orders per Dr. Zane Reyna    Signed:  Kiko Bañuelos NP   I have seen and independently examined this patient in addition to the Nurse Practitioner and I formulated the assessment and plan and communicated the plan to her for the completion of the above note. The plan will include tissue diagnosis with lymph node excision or partial excision depending on location. Given his blood thinner I would be reluctant to remove the whole node if adherent. I have discussed this with him and he expresses understanding and wishes to proceed to surgery tomorrow. I have discussed the plan with the patient and they are in agreement. If they have any additional questions in the interim I have asked them to notify the nurse to call me. Bal Varner MD, FACS  General and Bariatric Surgery  [facility].

## 2017-11-20 NOTE — H&P
Inpatient Hematology / Oncology History and Physical    Reason for Asmission:  myeloma  renal failure    History of Present Illness:  Mr. Kj Lopez is a 65 y.o. male admitted on 11/20/2017 with a primary diagnosis of MARY and Left neck pain. Mr. Kj Lopez, a patient of Dr Gallito Mott, has a history of resected colon cancer in March 2016. In December of 2016 he was diagnosed with Multiple Myeloma, bone marrow biopsy confirmed extensive involvement with clonal plasma cells with complex cytogenetics by FISH. He was offered RVD off protocol as this appears to be an aggressive plasma cell dyscrasia. He was admitted 1/11/17 for worsening pain, dehydration. He was found to have evidence of pancreatitis with elevation of amylase and lipase, improved with fluids. Velcade was thought to be the cause therefore a change to Rev/Dex was made. He also had a history of thyroid cancer, unsure of type and how long ago. He was seen in follow up with Dr Gallito Mott last week and states worsening fatigue and progressive swelling in the left neck for the past few weeks. He had a CT at [facility] two weeks prior. His labs on 11/13 showed a creatinine of 1.52, it continued to rise to 3.68 on 11/17 and today is 2.16. He states he has been drinking a lot of water, but has not been eating well and has lost 6 pounds in the past week. Of note, on 11/17 his Kappa chain was 23.21 and Lambda 153, up from 4 days prior with Kappa 10.84 and Lambda of 55.79. M-spike of 0.51, up from 0.31 on 11/13 as well. He was brought in today for recheck of labs (cardiology held his diuretics and Entresto for the 3 days). Today he states his neck swelling and pain has increased, he is unable to alleviate his pain with Ibuprofen or different positions. Last week he was referred back to Dr Familia Heredia for a lymph node biopsy but earliest appointment is in early December.   Because of his progressive pain, concern for the progressively enlarged node interfering with his airway and the need for the biopsy, he has agreed to be admitted to accomplish these goals. Review of Systems:  Constitutional +fatigue, weight loss Denies fever, chills, night sweats. HEENT +left neck pain. Denies trauma, blurry vision, hearing loss, ear pain, nosebleeds, sore throat and ear discharge. Skin Denies lesions or rashes. Lungs +dyspnea Denies cough, sputum production or hemoptysis. Cardiovascular Denies chest pain, palpitations, or lower extremity edema. Gastrointestinal Denies nausea, vomiting, changes in bowel habits, bloody or black stools, abdominal pain.  Denies dysuria, frequency or hesitancy of urination. Neuro Denies headaches, visual changes or ataxia. Denies dizziness, tingling, tremors, sensory change, speech change, focal weakness or headaches. Hematology Denies easy bruising or bleeding, denies gingival bleeding or epistaxis. Endo Denies heat/cold intolerance, denies diabetes or thyroid abnormalities. MSK Denies back pain, arthralgias, myalgias or frequent falls. Psychiatric/Behavioral The patient is not nervous/anxious.          Allergies   Allergen Reactions    Lisinopril Cough    Pcn [Penicillins] Swelling     Past Medical History:   Diagnosis Date    Arrhythmia     LBBB    Arthritis     BMI 30.0-30.9,adult     BMI 30.5    Cardiomyopathy ([facility] [address]     followed by [facility]    Cholelithiases     Chronic systolic heart failure ([facility] [address] 9/13/2011    [place] Ass[place] class II-III heart failure symptoms    Gout     H/O: pituitary tumor     X2 benign, removed    High cholesterol     History of thyroid cancer     History of vertigo     no recent complications or episodes    Hx of radiation therapy to prostate 2004    Hyperlipidemia 11/18/2015    Hypertension     Hypothyroid     hypo- had portion of thyroid removed due to cancer    ICD (implantable cardiac defibrillator) in place 9/2011    Biotronik BIV/ICD, Gen change 3.2.16    LBBB (left bundle branch block) 11/18/2015    Malaise and fatigue 11/18/2015    Malignant neoplasm of prostate ([facility] [address]     Mass of colon     right colon mass    Sinus node dysfunction (HCC)      Past Surgical History:   Procedure Laterality Date    HX CHOLECYSTECTOMY  2013    HX COLONOSCOPY      dx with Right colon mass     HX GI      benign polyps removed    HX HEART CATHETERIZATION      HX HEENT  2008    thyroidetomy partial tumor from pituitary x2    HX PACEMAKER  2011/2016    biotronik biv-icd    HX THYROIDECTOMY  2008    HX TUMOR REMOVAL  1990/2010    pituitary tumor removed X2     Family History   Problem Relation Age of Onset    Heart Disease Sister     Heart Attack Sister     Stroke Mother      Social History     Social History    Marital status:      Spouse name: N/A    Number of children: N/A    Years of education: N/A     Occupational History    Not on file. Social History Main Topics    Smoking status: Never Smoker    Smokeless tobacco: Never Used    Alcohol use Yes      Comment: very rare    Drug use: No    Sexual activity: Yes     Partners: Female     Other Topics Concern    Not on file     Social History Narrative     Current Outpatient Prescriptions   Medication Sig Dispense Refill    sacubitril-valsartan (ENTRESTO) 49 mg/51 mg tablet Take 1 Tab by mouth two (2) times a day. 180 Tab 3    apixaban (ELIQUIS) 5 mg tablet 1 BID  Indications: PREVENT THROMBOEMBOLISM IN CHRONIC ATRIAL FIBRILLATION 180 Tab 3    spironolactone (ALDACTONE) 25 mg tablet Take 1 Tab by mouth two (2) times a day. 180 Tab 3    torsemide (DEMADEX) 20 mg tablet Take 1 Tab by mouth daily. Indications: Edema, Pulmonary Edema due to Chronic Heart Failure 30 Tab 11    lenalidomide (REVLIMID) 10 mg cap Take 1 Cap by mouth daily. For 3 weeks and then off for 1 week. 21 Cap 0    ibuprofen (ADVIL) 200 mg tablet Take  by mouth.       dexamethasone (DECADRON) 4 mg tablet Take 10 tabs by mouth every 7 days when taking velcade 40 Tab 4    whey 9 gram pack Take  by mouth.  chlorhexidine (PERIDEX) 0.12 % solution 15 mL by Swish and Spit route two (2) times a day.  omeprazole (PRILOSEC) 20 mg capsule Take 20 mg by mouth daily.  cyanocobalamin (VITAMIN B-12) 1,000 mcg sublingual tablet Take 1,000 mcg by mouth daily.  cholecalciferol, vitamin D3, (VITAMIN D3) 2,000 unit tab Take  by mouth.  rosuvastatin (CRESTOR) 10 mg tablet Take 10 mg by mouth nightly.  carvedilol (COREG) 25 mg tablet Take 25 mg by mouth two (2) times daily (with meals).  levothyroxine (SYNTHROID) 125 mcg tablet Take 100 mcg by mouth Daily (before breakfast). OBJECTIVE:  No data found. Temp (24hrs), Av.2 °F (36.8 °C), Min:98.2 °F (36.8 °C), Max:98.2 °F (36.8 °C)         Physical Exam:  Constitutional: Well developed, well nourished male in no acute distress, sitting comfortably in the exam chair. HEENT: Normocephalic and atraumatic. Oropharynx is clear, mucous membranes are moist.   Sclerae anicteric. Neck supple without JVD. No thyromegaly present. Trachea midline. Lymph node   Large left (golf ball size) cervical node-tender to touch. Skin Warm and dry. No bruising and no rash noted. No erythema. No pallor. Respiratory Lungs are clear to auscultation bilaterally without wheezes, rales or rhonchi, normal air exchange without accessory muscle use. CVS Normal rate, regular rhythm and normal S1 and S2. No murmurs, gallops, or rubs. Abdomen Soft, nontender and nondistended, normoactive bowel sounds. No palpable mass. No hepatosplenomegaly. Neuro Grossly nonfocal with no obvious sensory or motor deficits. MSK Normal range of motion in general.  No edema and no tenderness. Psych Appropriate mood and affect.         Labs:    Recent Results (from the past 24 hour(s))   CBC WITH AUTOMATED DIFF    Collection Time: 17 11:54 AM   Result Value Ref Range    WBC 7.1 4.3 - 11.1 K/uL RBC 5.06 4.23 - 5.67 M/uL    HGB 12.3 (L) 13.6 - 17.2 g/dL    HCT 35.7 (L) 41.1 - 50.3 %    MCV 70.6 (L) 79.6 - 97.8 FL    MCH 24.3 (L) 26.1 - 32.9 PG    MCHC 34.5 31.4 - 35.0 g/dL    RDW 14.5 11.9 - 14.6 %    PLATELET 856 623 - 255 K/uL    MPV 11.3 10.8 - 14.1 FL    ABSOLUTE NRBC 0.01 0.0 - 0.2 K/uL    NEUTROPHILS 59 47 - 75 %    BAND NEUTROPHILS 5 0 - 6 %    LYMPHOCYTES 20 16 - 44 %    MONOCYTES 14 (H) 3 - 9 %    METAMYELOCYTES 1 %    MYELOCYTES 1 %    ABS. NEUTROPHILS 4.7 1.7 - 8.2 K/UL    ABS. LYMPHOCYTES 1.4 0.5 - 4.6 K/UL    ABS. MONOCYTES 1.0 0.1 - 1.3 K/UL    RBC COMMENTS NRBC'S PRESENT  OCCASIONAL  OVALOCYTES       RBC COMMENTS RARE  ACANTHOCYTES  OCCASIONAL  TARGET CELLS        WBC COMMENTS Result Confirmed By Smear      PLATELET COMMENTS ADEQUATE      DF MANUAL     METABOLIC PANEL, COMPREHENSIVE    Collection Time: 11/20/17 11:54 AM   Result Value Ref Range    Sodium 139 136 - 145 mmol/L    Potassium 4.4 3.5 - 5.1 mmol/L    Chloride 104 98 - 107 mmol/L    CO2 23 21 - 32 mmol/L    Anion gap 12 7 - 16 mmol/L    Glucose 190 (H) 65 - 100 mg/dL    BUN 58 (H) 8 - 23 MG/DL    Creatinine 2.16 (H) 0.8 - 1.5 MG/DL    GFR est AA 38 (L) >60 ml/min/1.73m2    GFR est non-AA 32 (L) >60 ml/min/1.73m2    Calcium 9.8 8.3 - 10.4 MG/DL    Bilirubin, total 0.7 0.2 - 1.1 MG/DL    ALT (SGPT) 44 12 - 65 U/L    AST (SGOT) 35 15 - 37 U/L    Alk.  phosphatase 73 50 - 136 U/L    Protein, total 7.3 6.3 - 8.2 g/dL    Albumin 3.0 (L) 3.2 - 4.6 g/dL    Globulin 4.3 (H) 2.3 - 3.5 g/dL    A-G Ratio 0.7 (L) 1.2 - 3.5     MAGNESIUM    Collection Time: 11/20/17 11:54 AM   Result Value Ref Range    Magnesium 1.9 1.8 - 2.4 mg/dL       Imaging:  CT CHEST, ABDOMEN AND PELVIS WITH INTRAVENOUS CONTRAST DATED 10/9/2017.     History: Multiple myeloma and colon cancer.      Comparison: CT chest 11/29/2016, and CT abdomen and pelvis 1/8/2017, and  9/23/2016      Technique:   Multiple contiguous helical CT images reconstructed at 5 mm were  obtained from the base of the neck to the ischial tuberosities following oral  and 100 cc Isovue-370 without acute complication. All CT scans performed at  this facility use one or all of the following: Automated exposure control,  adjustment of the mA and/or kVp according to patient's size, iterative  reconstruction.     Findings:  CT Chest:   Enlarging solid appearing masses are seen in the left neck. The larger lesion in  the mid left neck is only partially visualized on image 1 measuring 2.3 cm and  does appear increased in size to this lesion which was partially visualized on  the September 2016 study at which time this measured 1.9 cm. An additional left  neck mass which may be related to the left thyroid lobe seen on image 11 also  appears increased in size now measuring 3 cm x 2.9 cm and previously measuring  1.9 cm x 1.7 cm. No evolving axillary, mediastinal, or hilar lymphadenopathy is  seen. The thoracic aorta is normal in caliber.      Evaluation with lung windows demonstrates no new worrisome pulmonary nodules or  masses. No pleural effusion is seen. Lungs are expanded without evidence for  pneumothorax. The prior numerous lytic appearing lesions are not felt to be  significantly changed with the largest lesion once again seen in T10. This  lesion does demonstrate minimal peripheral sclerosis which can suggest early  healing. Some new vertebral body height loss is seen at T10 and T12 which is new  from the prior CT scan of the chest dated 11/29/2016 although no definite acute  features are seen and therefore these likely represent interval although  subacute compression fractures.     CT ABDOMEN:    The Liver is homogeneous in attenuation. The spleen is homogeneous in  attenuation. No contour deforming or enhancing mass lesions are seen of the  pancreas or adrenal glands. The gallbladder has been removed.   The kidneys  enhance symmetrically and no evidence of hydronephrosis is seen.       The visualized loops of small bowel and colon are normal in caliber. Stable post  surgical changes are seen in the right colon with apparent resection of the  cecum. The ileocolonic anastomosis is seen on image 92 without evolving  worrisome adverse features. A stable large hernia is seen in the right anterior  abdominal wall containing multiple small bowel loops in the hepatic flexure of  the colon. No free fluid and no free air is seen in the abdomen. No adenopathy  is seen of the abdomen. The abdominal aorta is unremarkable in appearance. Innumerable lytic lesions are seen of the lumbar spine which are not felt to be  significantly changed.     CT PELVIS:  No abnormal pelvic fluid collections are present. No pelvic adenopathy is seen. The urinary bladder is unremarkable. Innumerable lytic lesions are seen in the  bony pelvis which are not felt to be significantly changed.     IMPRESSION  IMPRESSION:    1. Innumerable lytic osseous lesions which are not felt to be significant  changed. The prior largest lesion in T10 demonstrates minimal peripheral  sclerosis which could indicate early healing. New vertebral body height  abnormalities are seen at T10 and T12 indicating likely pathologic compression  fractures. These are new since November 2016 although no definite acute features  are seen and therefore these are favored to be more subacute in nature.     2.  Stable post surgical changes suggesting a prior resection of the cecum. No  abnormal soft tissue is seen adjacent to the suture line to suggest locally  recurrent tumor.     3. Enlarging enhancing masses in the left neck which may be related to the left  thyroid lobe although this is difficult to say with certainty. This is felt to  unlikely represent metastatic disease from colon cancer although could represent  soft tissue manifestation of multiple myeloma (i.e. plasmacytoma).  A dedicated  contrasted CT scan of the neck would be recommended for further assessment. ASSESSMENT:  Problem List  Date Reviewed: 11/13/2017          Codes Class Noted    Acute renal failure (ARF) ([facility] ICD-10-CM: N17.9  ICD-9-CM: 584.9  11/18/2017        Intractable pain ICD-10-CM: R52  ICD-9-CM: 780.96  1/11/2017        Acute kidney injury [facility]) ICD-10-CM: N17.9  ICD-9-CM: 584.9  1/11/2017        Pancytopenia ([facility] ICD-10-CM: M13.113  ICD-9-CM: 284.19  1/11/2017        Constipation ICD-10-CM: K59.00  ICD-9-CM: 564.00  1/11/2017        Multiple myeloma ([facility] ICD-10-CM: C90.00  ICD-9-CM: 203.00  1/2/2017        Postural imbalance ICD-10-CM: R29.3  ICD-9-CM: 729.90  12/14/2016        Polyneuropathy ICD-10-CM: G62.9  ICD-9-CM: 356.9  12/14/2016        Fall ICD-10-CM: Q15. [address] Vasquez  ICD-9-CM: E888.9  12/14/2016        Encounter for medication management ICD-10-CM: Z79.899  ICD-9-CM: V58.69  12/14/2016        Urinary retention ICD-10-CM: R33.9  ICD-9-CM: 788.20  6/6/2016        Colonic mass ICD-10-CM: K63.9  ICD-9-CM: 569.89  3/23/2016        Hyperlipidemia ICD-10-CM: E78.5  ICD-9-CM: 272.4  11/18/2015        Vertigo ICD-10-CM: C05  ICD-9-CM: 780.4  11/18/2015        Malaise and fatigue ICD-10-CM: R53.81, R53.83  ICD-9-CM: 780.79  11/18/2015        Cardiomyopathy ([facility] ICD-10-CM: I42.9  ICD-9-CM: 425.4  11/18/2015        LBBB (left bundle branch block) ICD-10-CM: I44.7  ICD-9-CM: 426.3  11/18/2015        Arthritis ICD-10-CM: M19.90  ICD-9-CM: 716.90  Unknown        Arrhythmia ICD-10-CM: I49.9  ICD-9-CM: 427.9  Unknown    Overview Signed 6/18/2013  2:07 PM by Danita Espinoza MD     LBBB             Cholelithiases ICD-10-CM: C40.49  ICD-9-CM: 574.20  Unknown        Hx of radiation therapy to prostate ICD-10-CM: Z92.3  ICD-9-CM: V15.3  Unknown        Chronic systolic heart failure ([facility] [address] ICD-10-CM: I50.22  ICD-9-CM: 428.22  9/13/2011        Automatic implantable cardioverter-defibrillator in situ ICD-10-CM: Z95.810  ICD-9-CM: V45.02  9/1/2011                PLAN:  Multiple Myeloma  -currently on Revlimid/Dex    Left Neck Pain-related to Enlarged Lymph Node  -?  Plasmacytoma, consult general surgery for biopsy, will start pain meds (OxyIR 10mg q4h, Morphine IV 2mg q3h prn)    MARY  -closely monitor creatinine, gentle hydration with [email or web address]    Supportive Care  Continue Home Meds    DVT prophylaxis-currently on Eliquis 5 mg BID for A Fib, will need to be held 24 hours for biopsy            Luis Castañeda, [facility]  [address] [place] [address]  Office : [phone]  Fax : [phone]

## 2017-11-20 NOTE — PROGRESS NOTES
11/20/17:  Received call from Dr. Marcello Pierce to have patient seen by NP for further evaluation based on labs from Friday. [id number], NP saw patient and evaluated labs. Patient's main problem is pain on left side of neck around site of enlarged lymph node. Patient to be admitted for pain control and lymph node biopsy. Dr. Kianna Gotti to evaluate patient at cancer center and then to be admitted to #[phone]. Report called to Juan Jose Navarro RN on 5th floor.

## 2017-11-20 NOTE — IP AVS SNAPSHOT
Alenatinygabbi Keller 
 
 
 [address] [facility] 
[phone] Patient: Howie Nuno MRN: [id number] :1940 About your hospitalization You were admitted on:  2017 You last received care in the:  [address] You were discharged on:  2017 Why you were hospitalized Your primary diagnosis was:  Pain Your diagnoses also included:  Arnel (Acute Kidney Injury) (Hcc), Paf (Paroxysmal Atrial Fibrillation) (Hcc), Chronic Systolic Chf (Congestive Heart Failure) (Hcc), Multiple Myeloma (Hcc) Things You Need To Do (next 8 weeks) Follow up with Axel Carias MD  
  
Phone:  [phone] Where:  [address] [facility] [address] Friday Dec 01, 2017 Follow Up with Janice Tautm MD at  9:30 AM  
Where:  [place] [facility] ([facility]) Monday Dec 11, 2017 LAB with Roman Solis at  3:15 PM  
Where:  Roman Solis ([address]) Follow Up with Aron Ratliff MD at  3:45 PM  
Where: [facility]  LAB with Roman Solis at 10:30 AM  
Where:  Roman Solis ([address]) Follow Up with Aron Ratliff MD at 11:00 AM  
Where: [facility])  OFFICE DEVICE CHECKS with MARISELA/ADRIAN DEVICE 55 at 10:40 AM  
This upcoming device check will take place IN OUR OFFICE. Please arrive 15 minutes early to review any necessary paperwork requirements. If you have any further questions or need to change this appointment, we are happy to help and can be reached at [phone]. Where:  [facility] CARDIOLOGY MARISELA OFFICE ([address] [place] [address]) Discharge Orders None A check sandip indicates which time of day the medication should be taken. My Medications STOP taking these medications   
 apixaban 5 mg tablet Commonly known as:  ELIQUIS  
   
  
  
TAKE these medications as instructed Instructions Each Dose to Equal  
 Morning Noon Evening Bedtime ADVIL 200 mg tablet Generic drug:  ibuprofen Your last dose was: Your next dose is: Take  by mouth. * carvedilol 25 mg tablet Commonly known as:  Jax Pintos Your last dose was: Your next dose is: Take 25 mg by mouth two (2) times daily (with meals). 25 mg  
    
   
   
   
  
 * carvedilol 6.25 mg tablet Commonly known as:  Jax Pintos Your last dose was: Your next dose is: Take 1 Tab by mouth two (2) times daily (with meals). 6.25 mg  
    
   
   
   
  
 chlorhexidine 0.12 % solution Commonly known as:  PERIDEX Your last dose was: Your next dose is:    
   
   
 15 mL by Swish and Spit route two (2) times a day. 15 mL CRESTOR 10 mg tablet Generic drug:  rosuvastatin Your last dose was: Your next dose is: Take 10 mg by mouth nightly. 10 mg  
    
   
   
   
  
 dexamethasone 4 mg tablet Commonly known as:  DECADRON Your last dose was: Your next dose is: Take 10 tabs by mouth every 7 days when taking velcade  
     
   
   
   
  
 lenalidomide 10 mg Cap Commonly known as:  REVLIMID Your last dose was: Your next dose is: Take 1 Cap by mouth daily. For 3 weeks and then off for 1 week. 10 mg  
    
   
   
   
  
 levothyroxine 125 mcg tablet Commonly known as:  SYNTHROID Your last dose was: Your next dose is: Take 100 mcg by mouth Daily (before breakfast). 100 mcg  
    
   
   
   
  
 omeprazole 20 mg capsule Commonly known as:  PRILOSEC Your last dose was: Your next dose is: Take 20 mg by mouth daily. 20 mg  
    
   
   
   
  
 oxyCODONE IR 10 mg Tab immediate release tablet Commonly known as:  Mabeline Cristian Your last dose was: Your next dose is: Take 1 Tab by mouth every four (4) hours as needed. Max Daily Amount: 60 mg.  
 10 mg  
    
   
   
   
  
 sacubitril-valsartan 49-51 mg Tab tablet Commonly known as:  ENTRESTO Your last dose was: Your next dose is: Take 1 Tab by mouth two (2) times a day. 1 Tab * spironolactone 25 mg tablet Commonly known as:  ALDACTONE Your last dose was: Your next dose is: Take 1 Tab by mouth two (2) times a day. 25 mg  
    
   
   
   
  
 * spironolactone 25 mg tablet Commonly known as:  ALDACTONE Your last dose was: Your next dose is: Take 1 Tab by mouth two (2) times a day. 25 mg  
    
   
   
   
  
 torsemide 20 mg tablet Commonly known as:  DEMADEX Your last dose was: Your next dose is: Take 1 Tab by mouth daily. Indications: Edema, Pulmonary Edema due to Chronic Heart Failure 20 mg  
    
   
   
   
  
 VITAMIN B-12 1,000 mcg sublingual tablet Generic drug:  cyanocobalamin Your last dose was: Your next dose is: Take 1,000 mcg by mouth daily. 1000 mcg VITAMIN D3 2,000 unit Tab Generic drug:  cholecalciferol (vitamin D3) Your last dose was: Your next dose is: Take  by mouth.  
     
   
   
   
  
 whey 9 gram Pack Your last dose was: Your next dose is: Take  by mouth. * Notice: This list has 4 medication(s) that are the same as other medications prescribed for you. Read the directions carefully, and ask your doctor or other care provider to review them with you. Where to Get Your Medications These medications were sent to [facility], [address]  [address], [address] [address] Phone:  [phone]  
  carvedilol 6.25 mg tablet  
 spironolactone 25 mg tablet Information on where to get these meds will be given to you by the nurse or doctor. ! Ask your nurse or doctor about these medications  
  oxyCODONE IR 10 mg Tab immediate release tablet Discharge Instructions DISCHARGE SUMMARY from Nurse PATIENT INSTRUCTIONS: 
 
After general anesthesia or intravenous sedation, for 24 hours or while taking prescription Narcotics: · Limit your activities · Do not drive and operate hazardous machinery · Do not make important personal or business decisions · Do  not drink alcoholic beverages · If you have not urinated within 8 hours after discharge, please contact your surgeon on call. Report the following to your surgeon: 
· Excessive pain, swelling, redness or odor of or around the surgical area · Temperature over 100.5 · Nausea and vomiting lasting longer than 4 hours or if unable to take medications · Any signs of decreased circulation or nerve impairment to extremity: change in color, persistent  numbness, tingling, coldness or increase pain · Any questions What to do at Home: 
Recommended activity: Activity as tolerated. If you experience any of the following symptoms 1. Temperature greater than 99.5 (check temp every day) 2. Heart rate greater than 90 beats per minute 3. Increased respirations 4. Chills 5. Cough with any sputum (color: yellow, white, green, or bloody?) 6. Shortness of breath or change in breathing pattern 7. Bleeding:  Any prolonged bleeding presented from skin tears, cuts, bleeding from brushing teeth, nose bleed, bleeding noted in stool 8. Tenderness, swelling, or drainage from IV site or central line catheter Diet: 
 
Neutropenic Precautions Thrombocytopenic Precautions Follow-Up visits: 
An Dias MD office #:[phone] 
 
 
 
 
 
 
, please follow up with your doctor. *  Please give a list of your current medications to your Primary Care Provider. *  Please update this list whenever your medications are discontinued, doses are 
    changed, or new medications (including over-the-counter products) are added. *  Please carry medication information at all times in case of emergency situations. These are general instructions for a healthy lifestyle: No smoking/ No tobacco products/ Avoid exposure to second hand smoke Surgeon General's Warning:  Quitting smoking now greatly reduces serious risk to your health. Obesity, smoking, and sedentary lifestyle greatly increases your risk for illness A healthy diet, regular physical exercise & weight monitoring are important for maintaining a healthy lifestyle You may be retaining fluid if you have a history of heart failure or if you experience any of the following symptoms:  Weight gain of 3 pounds or more overnight or 5 pounds in a week, increased swelling in our hands or feet or shortness of breath while lying flat in bed. Please call your doctor as soon as you notice any of these symptoms; do not wait until your next office visit. Recognize signs and symptoms of STROKE: 
 
F-face looks uneven A-arms unable to move or move unevenly S-speech slurred or non-existent T-time-call 911 as soon as signs and symptoms begin-DO NOT go Back to bed or wait to see if you get better-TIME IS BRAIN. Warning Signs of HEART ATTACK Call 911 if you have these symptoms: 
? Chest discomfort. Most heart attacks involve discomfort in the center of the chest that lasts more than a few minutes, or that goes away and comes back. It can feel like uncomfortable pressure, squeezing, fullness, or pain. ? Discomfort in other areas of the upper body.  Symptoms can include pain or discomfort in one or both arms, the back, neck, jaw, or stomach. ? Shortness of breath with or without chest discomfort. ? Other signs may include breaking out in a cold sweat, nausea, or lightheadedness. Don't wait more than five minutes to call [address]! Fast action can save your life. Calling 911 is almost always the fastest way to get lifesaving treatment. Emergency Medical Services staff can begin treatment when they arrive  up to an hour sooner than if someone gets to the hospital by car. The discharge information has been reviewed with the patient. The patient verbalized understanding. Discharge medications reviewed with the patient and appropriate educational materials and side effects teaching were provided. ___________________________________________________________________________________________________________________________________ Neck Dissection: What to Expect at Home Your Recovery A neck dissection is surgery to remove all or some of the lymph nodes and surrounding tissue from the neck. Lymph nodes are small, round or bean-shaped glands that act like filters and remove germs from your body, help fight infection, and trap cancer cells. This surgery is most often done to treat cancer of the head and neck. You may leave the hospital with stitches in your cut (incision). Your doctor will tell you if you need to return to have these removed. You may still have a tube called a drain in your neck. Your doctor will probably take this out a few days after your surgery. The area may also be swollen, and you may have a stiff neck. For most people, the swelling starts to go away 4 to 5 days after surgery. You may have numbness in your neck and ear. Your lower lip or shoulder may feel weak. For most people, these problems go away in 6 to 12 months. But sometimes these problems can be permanent. You may always feel a little numb, stiff, or weak in some areas. If a neck muscle was removed, your neck may look flatter or thinner. If you have cancer, you may still need other treatment after surgery, such as radiation or chemotherapy. Having cancer is scary. You may feel many emotions and may need some help coping. Seek out family, friends, and counselors for support. You also can do things at home to make yourself feel better while you go through treatment. Call the [employer] Debbie Ipsumanjel (6-445.916.4840) or visit its website at [address] for more information. This care sheet gives you a general idea about how long it will take for you to recover. But each person recovers at a different pace. Follow the steps below to get better as quickly as possible. How can you care for yourself at home? ? ? · Try to walk each day. Start by walking a little more than you did the day before. Bit by bit, increase the amount you walk. Walking boosts blood flow and helps prevent pneumonia and constipation. ? · Avoid strenuous physical activity, lifting heavy objects, and airplane travel for 3 weeks after surgery or until your doctor says it is okay. ? · Ask your doctor when you can drive again. ? · You may be able to take showers (unless you have a drain near your incision). If you do have a drain near your incision, follow your doctor's instructions to empty and care for it. Diet ? · If it is painful to swallow, start out with cold drinks, Popsicles, and ice cream. Next, try soft foods like pudding, yogurt, canned or cooked fruit, scrambled eggs, and mashed potatoes. Avoid eating hard or scratchy foods like chips or raw vegetables. Avoid orange or tomato juice and other acidic foods that can sting the throat. ? · If you cough right after drinking, try drinking thicker liquids, such as \"smoothies. \"  
? · You may notice that your bowel movements are not regular right after your surgery. This is common.  Try to avoid constipation and straining with bowel movements. You may want to take a fiber supplement every day. If you have not had a bowel movement after a couple of days, ask your doctor about taking a mild laxative. Medicines ? · Your doctor will tell you if and when you can restart your medicines. He or she will also give you instructions about taking any new medicines. ? · If you take blood thinners, such as warfarin (Coumadin), clopidogrel (Plavix), or aspirin, be sure to talk to your doctor. He or she will tell you if and when to start taking those medicines again. Make sure that you understand exactly what your doctor wants you to do. ? · Take pain medicines exactly as directed. ¨ If the doctor gave you a prescription medicine for pain, take it as prescribed. ¨ If you are not taking a prescription pain medicine, ask your doctor if you can take an over-the-counter medicine. ? · If you think your pain medicine is making you sick to your stomach: 
¨ Take your medicine after meals (unless your doctor has told you not to). ¨ Ask your doctor for a different pain medicine. ? · Your doctor may have prescribed antibiotics. Take them as directed. Do not stop taking them just because you feel better. You need to take the full course of antibiotics. Incision care ? · If your doctor told you how to care for your incision, follow your doctor's instructions. If you did not get instructions, follow this general advice: ¨ After the first 24 to 48 hours, wash around the incision with clean water 2 times a day. Don't use hydrogen peroxide or alcohol, which can slow healing. ? · You may have a drain near your incision. Your doctor will tell you how to take care of it. Exercise ? · If you have trouble with shoulder and arm strength and movement, you may need physical therapy. Your doctor or physical therapist will help you with this. Follow-up care is a key part of your treatment and safety.  Be sure to make and go to all appointments, and call your doctor if you are having problems. It's also a good idea to know your test results and keep a list of the medicines you take. When should you call for help? Call 911 anytime you think you may need emergency care. For example, call if: 
? · You passed out (lost consciousness). ? · You have sudden chest pain and shortness of breath, or you cough up blood. ? · You have trouble breathing. ?Call your doctor now or seek immediate medical care if: 
? · You have loose stitches, or your incision comes open. ? · You have signs of infection, such as: 
¨ Increased pain, swelling, warmth, or redness. ¨ Red streaks leading from the incision. ¨ Pus draining from the incision. ¨ A fever. ? · Bleeding from your incision soaks through your bandages. ? Watch closely for changes in your health, and be sure to contact your doctor if: 
? · You do not get better as expected. Where can you learn more? Go to http://nakul-sharon.info/. Enter L729 in the search box to learn more about \"Neck Dissection: What to Expect at Home. \" Current as of: May 12, 2017 Content Version: 11.4 © 4507-9561 Healthwise, Incorporated. Care instructions adapted under license by [employer] (which disclaims liability or warranty for this information). If you have questions about a medical condition or this instruction, always ask your healthcare professional. [place] [address] any warranty or liability for your use of this information. Introducing [facility]! Dear Gino Hylton: Thank you for requesting a [employer] account. Our records indicate that you already have an active [employer] account. You can access your account anytime at [email or web address] Did you know that you can access your hospital and ER discharge instructions at any time in [employer]?   You can also review all of your test results from your hospital stay or ER visit. Additional Information If you have questions, please visit the Frequently Asked Questions section of the [employer] website at [email or web address] Remember, MyChart is NOT to be used for urgent needs. For medical emergencies, dial 911. Now available from your iPhone and Android! Unresulted Labs-Please follow up with your PCP about these lab tests Order Current Status FUNGUS CULTURE AND SMEAR In process QUANTIFERON TB GOLD In process CULTURE, ANAEROBIC Preliminary result EKG, 12 LEAD, INITIAL Preliminary result Providers Seen During Your Hospitalization Provider Specialty Primary office phone Emily Mejia MD Hematology and Oncology [phone] Your Primary Care Physician (PCP) Primary Care Physician Office Phone Office Fax [address] [phone] You are allergic to the following Allergen Reactions Lisinopril Cough Pcn (Penicillins) Swelling Recent Documentation Weight BMI Smoking Status 97.4 kg 27.58 kg/m2 Never Smoker Emergency Contacts Name Discharge Info Relation Home Work Mobile [address] [place] [address] CAREGIVER [3] Spouse [3] [phone] Patient Belongings The following personal items are in your possession at time of discharge: 
  Dental Appliances: None  Visual Aid: None      Home Medications: None   Jewelry: None  Clothing: None    Other Valuables: None Please provide this summary of care documentation to your next provider. Signatures-by signing, you are acknowledging that this After Visit Summary has been reviewed with you and you have received a copy. Patient Signature:  ____________________________________________________________  Date:  ____________________________________________________________  
  
Gino Newell    
    
 Provider Signature:  ____________________________________________________________ Date:  ____________________________________________________________

## 2017-11-20 NOTE — IP AVS SNAPSHOT
[address] [facility] 
 
 
 [address] [place] [address] 
[phone] Patient: Andrews Thomason MRN: [id number] :1940 My Medications STOP taking these medications   
 apixaban 5 mg tablet Commonly known as:  ELIQUIS  
   
  
  
TAKE these medications as instructed Instructions Each Dose to Equal  
 Morning Noon Evening Bedtime ADVIL 200 mg tablet Generic drug:  ibuprofen Your last dose was: Your next dose is: Take  by mouth. * carvedilol 25 mg tablet Commonly known as:  Jenna Olguin Your last dose was: Your next dose is: Take 25 mg by mouth two (2) times daily (with meals). 25 mg  
    
   
   
   
  
 * carvedilol 6.25 mg tablet Commonly known as:  Jenna Olguin Your last dose was: Your next dose is: Take 1 Tab by mouth two (2) times daily (with meals). 6.25 mg  
    
   
   
   
  
 chlorhexidine 0.12 % solution Commonly known as:  PERIDEX Your last dose was: Your next dose is:    
   
   
 15 mL by Swish and Spit route two (2) times a day. 15 mL CRESTOR 10 mg tablet Generic drug:  rosuvastatin Your last dose was: Your next dose is: Take 10 mg by mouth nightly. 10 mg  
    
   
   
   
  
 dexamethasone 4 mg tablet Commonly known as:  DECADRON Your last dose was: Your next dose is: Take 10 tabs by mouth every 7 days when taking velcade  
     
   
   
   
  
 lenalidomide 10 mg Cap Commonly known as:  REVLIMID Your last dose was: Your next dose is: Take 1 Cap by mouth daily. For 3 weeks and then off for 1 week. 10 mg  
    
   
   
   
  
 levothyroxine 125 mcg tablet Commonly known as:  SYNTHROID Your last dose was: Your next dose is: Take 100 mcg by mouth Daily (before breakfast). 100 mcg  
    
   
   
   
  
 omeprazole 20 mg capsule Commonly known as:  PRILOSEC Your last dose was: Your next dose is: Take 20 mg by mouth daily. 20 mg  
    
   
   
   
  
 oxyCODONE IR 10 mg Tab immediate release tablet Commonly known as:  Huong Kellogg Your last dose was: Your next dose is: Take 1 Tab by mouth every four (4) hours as needed. Max Daily Amount: 60 mg.  
 10 mg  
    
   
   
   
  
 sacubitril-valsartan 49-51 mg Tab tablet Commonly known as:  ENTRESTO Your last dose was: Your next dose is: Take 1 Tab by mouth two (2) times a day. 1 Tab * spironolactone 25 mg tablet Commonly known as:  ALDACTONE Your last dose was: Your next dose is: Take 1 Tab by mouth two (2) times a day. 25 mg  
    
   
   
   
  
 * spironolactone 25 mg tablet Commonly known as:  ALDACTONE Your last dose was: Your next dose is: Take 1 Tab by mouth two (2) times a day. 25 mg  
    
   
   
   
  
 torsemide 20 mg tablet Commonly known as:  DEMADEX Your last dose was: Your next dose is: Take 1 Tab by mouth daily. Indications: Edema, Pulmonary Edema due to Chronic Heart Failure 20 mg  
    
   
   
   
  
 VITAMIN B-12 1,000 mcg sublingual tablet Generic drug:  cyanocobalamin Your last dose was: Your next dose is: Take 1,000 mcg by mouth daily. 1000 mcg VITAMIN D3 2,000 unit Tab Generic drug:  cholecalciferol (vitamin D3) Your last dose was: Your next dose is: Take  by mouth.  
     
   
   
   
  
 whey 9 gram Pack Your last dose was: Your next dose is: Take  by mouth. * Notice:   This list has 4 medication(s) that are the same as other medications prescribed for you. Read the directions carefully, and ask your doctor or other care provider to review them with you. Where to Get Your Medications These medications were sent to [facility] [address]  [address], [address] [address] Phone:  [phone]  
  carvedilol 6.25 mg tablet  
 spironolactone 25 mg tablet Information on where to get these meds will be given to you by the nurse or doctor. ! Ask your nurse or doctor about these medications  
  oxyCODONE IR 10 mg Tab immediate release tablet

## 2017-11-21 ENCOUNTER — ANESTHESIA (OUTPATIENT)
Dept: SURGERY | Age: 77
DRG: 580 | End: 2017-11-21
Payer: MEDICARE

## 2017-11-21 LAB
ALBUMIN SERPL-MCNC: 2.6 G/DL (ref 3.2–4.6)
ALBUMIN/GLOB SERPL: 0.7 {RATIO} (ref 1.2–3.5)
ALP SERPL-CCNC: 57 U/L (ref 50–136)
ALT SERPL-CCNC: 34 U/L (ref 12–65)
ANION GAP SERPL CALC-SCNC: 10 MMOL/L (ref 7–16)
AST SERPL-CCNC: 26 U/L (ref 15–37)
BILIRUB SERPL-MCNC: 0.7 MG/DL (ref 0.2–1.1)
BUN SERPL-MCNC: 47 MG/DL (ref 8–23)
CALCIUM SERPL-MCNC: 8.9 MG/DL (ref 8.3–10.4)
CHLORIDE SERPL-SCNC: 108 MMOL/L (ref 98–107)
CO2 SERPL-SCNC: 23 MMOL/L (ref 21–32)
CREAT SERPL-MCNC: 1.51 MG/DL (ref 0.8–1.5)
DIFFERENTIAL METHOD BLD: ABNORMAL
EOSINOPHIL # BLD: 0.2 K/UL (ref 0–0.8)
EOSINOPHIL NFR BLD MANUAL: 4 % (ref 1–8)
ERYTHROCYTE [DISTWIDTH] IN BLOOD BY AUTOMATED COUNT: 14.8 % (ref 11.9–14.6)
GLOBULIN SER CALC-MCNC: 3.5 G/DL (ref 2.3–3.5)
GLUCOSE SERPL-MCNC: 117 MG/DL (ref 65–100)
HCT VFR BLD AUTO: 31.4 % (ref 41.1–50.3)
HGB BLD-MCNC: 11 G/DL (ref 13.6–17.2)
LYMPHOCYTES # BLD: 1.3 K/UL (ref 0.5–4.6)
LYMPHOCYTES NFR BLD MANUAL: 23 % (ref 16–44)
MAGNESIUM SERPL-MCNC: 1.6 MG/DL (ref 1.8–2.4)
MCH RBC QN AUTO: 23.9 PG (ref 26.1–32.9)
MCHC RBC AUTO-ENTMCNC: 35 G/DL (ref 31.4–35)
MCV RBC AUTO: 68.3 FL (ref 79.6–97.8)
MONOCYTES # BLD: 0.9 K/UL (ref 0.1–1.3)
MONOCYTES NFR BLD MANUAL: 15 % (ref 3–9)
MYELOCYTES NFR BLD MANUAL: 2 %
NEUTS SEG # BLD: 3.3 K/UL (ref 1.7–8.2)
NEUTS SEG NFR BLD MANUAL: 56 % (ref 47–75)
PLATELET # BLD AUTO: 150 K/UL (ref 150–450)
PLATELET COMMENTS,PCOM: ADEQUATE
PMV BLD AUTO: 10.1 FL (ref 10.8–14.1)
POTASSIUM SERPL-SCNC: 4.1 MMOL/L (ref 3.5–5.1)
PROT SERPL-MCNC: 6.1 G/DL (ref 6.3–8.2)
RBC # BLD AUTO: 4.6 M/UL (ref 4.23–5.67)
RBC MORPH BLD: ABNORMAL
SODIUM SERPL-SCNC: 141 MMOL/L (ref 136–145)
WBC # BLD AUTO: 5.7 K/UL (ref 4.3–11.1)
WBC MORPH BLD: ABNORMAL

## 2017-11-21 PROCEDURE — 83735 ASSAY OF MAGNESIUM: CPT | Performed by: NURSE PRACTITIONER

## 2017-11-21 PROCEDURE — 0J9500Z DRAINAGE OF LEFT NECK SUBCUTANEOUS TISSUE AND FASCIA WITH DRAINAGE DEVICE, OPEN APPROACH: ICD-10-PCS | Performed by: SURGERY

## 2017-11-21 PROCEDURE — 76010000138 HC OR TIME 0.5 TO 1 HR: Performed by: SURGERY

## 2017-11-21 PROCEDURE — 88305 TISSUE EXAM BY PATHOLOGIST: CPT | Performed by: SURGERY

## 2017-11-21 PROCEDURE — 74011000250 HC RX REV CODE- 250: Performed by: SURGERY

## 2017-11-21 PROCEDURE — 87205 SMEAR GRAM STAIN: CPT | Performed by: SURGERY

## 2017-11-21 PROCEDURE — 76210000006 HC OR PH I REC 0.5 TO 1 HR: Performed by: SURGERY

## 2017-11-21 PROCEDURE — 87102 FUNGUS ISOLATION CULTURE: CPT | Performed by: INTERNAL MEDICINE

## 2017-11-21 PROCEDURE — 77030011640 HC PAD GRND REM COVD -A: Performed by: SURGERY

## 2017-11-21 PROCEDURE — 99233 SBSQ HOSP IP/OBS HIGH 50: CPT | Performed by: INTERNAL MEDICINE

## 2017-11-21 PROCEDURE — 74011250636 HC RX REV CODE- 250/636

## 2017-11-21 PROCEDURE — 77030012411 HC DRN WND CARD -A: Performed by: SURGERY

## 2017-11-21 PROCEDURE — 74011250636 HC RX REV CODE- 250/636: Performed by: NURSE PRACTITIONER

## 2017-11-21 PROCEDURE — 74011000250 HC RX REV CODE- 250: Performed by: NURSE PRACTITIONER

## 2017-11-21 PROCEDURE — 77030020782 HC GWN BAIR PAWS FLX 3M -B: Performed by: ANESTHESIOLOGY

## 2017-11-21 PROCEDURE — 77030027138 HC INCENT SPIROMETER -A

## 2017-11-21 PROCEDURE — 80053 COMPREHEN METABOLIC PANEL: CPT | Performed by: NURSE PRACTITIONER

## 2017-11-21 PROCEDURE — 74011250637 HC RX REV CODE- 250/637: Performed by: NURSE PRACTITIONER

## 2017-11-21 PROCEDURE — 87075 CULTR BACTERIA EXCEPT BLOOD: CPT | Performed by: SURGERY

## 2017-11-21 PROCEDURE — 77030002966 HC SUT PDS J&J -A: Performed by: SURGERY

## 2017-11-21 PROCEDURE — 87076 CULTURE ANAEROBE IDENT EACH: CPT | Performed by: INTERNAL MEDICINE

## 2017-11-21 PROCEDURE — 36415 COLL VENOUS BLD VENIPUNCTURE: CPT | Performed by: NURSE PRACTITIONER

## 2017-11-21 PROCEDURE — 07B20ZX EXCISION OF LEFT NECK LYMPHATIC, OPEN APPROACH, DIAGNOSTIC: ICD-10-PCS | Performed by: SURGERY

## 2017-11-21 PROCEDURE — 65270000029 HC RM PRIVATE

## 2017-11-21 PROCEDURE — 74011000258 HC RX REV CODE- 258: Performed by: NURSE PRACTITIONER

## 2017-11-21 PROCEDURE — 76060000033 HC ANESTHESIA 1 TO 1.5 HR: Performed by: SURGERY

## 2017-11-21 PROCEDURE — 85025 COMPLETE CBC W/AUTO DIFF WBC: CPT | Performed by: NURSE PRACTITIONER

## 2017-11-21 RX ORDER — LANOLIN ALCOHOL/MO/W.PET/CERES
400 CREAM (GRAM) TOPICAL 3 TIMES DAILY
Status: DISCONTINUED | OUTPATIENT
Start: 2017-11-21 | End: 2017-11-23

## 2017-11-21 RX ORDER — LENALIDOMIDE 10 MG/1
10 CAPSULE ORAL DAILY
Status: DISCONTINUED | OUTPATIENT
Start: 2017-11-30 | End: 2017-11-26 | Stop reason: HOSPADM

## 2017-11-21 RX ORDER — HYDROMORPHONE HYDROCHLORIDE 1 MG/ML
1 INJECTION, SOLUTION INTRAMUSCULAR; INTRAVENOUS; SUBCUTANEOUS
Status: DISCONTINUED | OUTPATIENT
Start: 2017-11-21 | End: 2017-11-26 | Stop reason: HOSPADM

## 2017-11-21 RX ORDER — HYDROCODONE BITARTRATE AND ACETAMINOPHEN 7.5; 325 MG/15ML; MG/15ML
7.5 SOLUTION ORAL
Status: DISCONTINUED | OUTPATIENT
Start: 2017-11-21 | End: 2017-11-26 | Stop reason: HOSPADM

## 2017-11-21 RX ORDER — BUPIVACAINE HYDROCHLORIDE 2.5 MG/ML
INJECTION, SOLUTION EPIDURAL; INFILTRATION; INTRACAUDAL AS NEEDED
Status: DISCONTINUED | OUTPATIENT
Start: 2017-11-21 | End: 2017-11-21 | Stop reason: HOSPADM

## 2017-11-21 RX ORDER — PROPOFOL 10 MG/ML
INJECTION, EMULSION INTRAVENOUS AS NEEDED
Status: DISCONTINUED | OUTPATIENT
Start: 2017-11-21 | End: 2017-11-21 | Stop reason: HOSPADM

## 2017-11-21 RX ORDER — FENTANYL CITRATE 50 UG/ML
INJECTION, SOLUTION INTRAMUSCULAR; INTRAVENOUS AS NEEDED
Status: DISCONTINUED | OUTPATIENT
Start: 2017-11-21 | End: 2017-11-21 | Stop reason: HOSPADM

## 2017-11-21 RX ORDER — SODIUM CHLORIDE, SODIUM LACTATE, POTASSIUM CHLORIDE, CALCIUM CHLORIDE 600; 310; 30; 20 MG/100ML; MG/100ML; MG/100ML; MG/100ML
INJECTION, SOLUTION INTRAVENOUS
Status: DISCONTINUED | OUTPATIENT
Start: 2017-11-21 | End: 2017-11-21 | Stop reason: HOSPADM

## 2017-11-21 RX ORDER — PROPOFOL 10 MG/ML
INJECTION, EMULSION INTRAVENOUS
Status: DISCONTINUED | OUTPATIENT
Start: 2017-11-21 | End: 2017-11-21 | Stop reason: HOSPADM

## 2017-11-21 RX ADMIN — CLINDAMYCIN PHOSPHATE 900 MG: 150 INJECTION, SOLUTION, CONCENTRATE INTRAVENOUS at 13:52

## 2017-11-21 RX ADMIN — Medication 400 MG: at 22:08

## 2017-11-21 RX ADMIN — OXYCODONE HYDROCHLORIDE 10 MG: 5 TABLET ORAL at 06:37

## 2017-11-21 RX ADMIN — LEVOTHYROXINE SODIUM 100 MCG: 100 TABLET ORAL at 06:37

## 2017-11-21 RX ADMIN — FENTANYL CITRATE 25 MCG: 50 INJECTION, SOLUTION INTRAMUSCULAR; INTRAVENOUS at 14:02

## 2017-11-21 RX ADMIN — CARVEDILOL 25 MG: 25 TABLET, FILM COATED ORAL at 08:56

## 2017-11-21 RX ADMIN — Medication 400 MG: at 16:42

## 2017-11-21 RX ADMIN — SODIUM CHLORIDE 75 ML/HR: 900 INJECTION, SOLUTION INTRAVENOUS at 06:41

## 2017-11-21 RX ADMIN — OXYCODONE HYDROCHLORIDE 10 MG: 5 TABLET ORAL at 22:08

## 2017-11-21 RX ADMIN — PANTOPRAZOLE SODIUM 40 MG: 40 TABLET, DELAYED RELEASE ORAL at 06:37

## 2017-11-21 RX ADMIN — LENALIDOMIDE 10 MG: 10 CAPSULE ORAL at 10:36

## 2017-11-21 RX ADMIN — Medication 10 ML: at 01:39

## 2017-11-21 RX ADMIN — PROPOFOL 75 MCG/KG/MIN: 10 INJECTION, EMULSION INTRAVENOUS at 14:02

## 2017-11-21 RX ADMIN — PROPOFOL 30 MG: 10 INJECTION, EMULSION INTRAVENOUS at 14:02

## 2017-11-21 RX ADMIN — Medication 400 MG: at 08:56

## 2017-11-21 RX ADMIN — STANDARDIZED SENNA CONCENTRATE AND DOCUSATE SODIUM 1 TABLET: 8.6; 5 TABLET, FILM COATED ORAL at 09:01

## 2017-11-21 RX ADMIN — SODIUM CHLORIDE, SODIUM LACTATE, POTASSIUM CHLORIDE, CALCIUM CHLORIDE: 600; 310; 30; 20 INJECTION, SOLUTION INTRAVENOUS at 13:52

## 2017-11-21 NOTE — PROGRESS NOTES
END OF SHIFT NOTE:    Intake/Output  11/20 1901 - 11/21 0700  In: 1127 [P.O.:857; I.V.:270]  Out: 500 [Urine:500]   Voiding: YES  Catheter: NO  Drain:              Stool:  0 occurrences. Stool Assessment  Stool Color: Renetta Forth (per pt.) (11/21/17 0335)  Stool Appearance: Formed (per pt.) (11/21/17 0335)  Stool Amount: Small (per pt.) (11/21/17 0335)  Stool Source/Status: Rectum (11/21/17 0335)    Emesis:  0 occurrences. VITAL SIGNS  Patient Vitals for the past 12 hrs:   Temp Pulse Resp BP SpO2   11/21/17 0335 97.8 °F (36.6 °C) 69 18 137/75 98 %   11/20/17 2310 97.5 °F (36.4 °C) 83 20 95/62 96 %   11/20/17 2025 97.2 °F (36.2 °C) 86 20 96/61 96 %       Pain Assessment  Pain 1  Pain Scale 1: Visual (11/20/17 2025)  Pain Intensity 1: 0 (11/20/17 2025)  Patient Stated Pain Goal: 0 (11/1940)  Pain Reassessment 1: Patient sleeping (11/20/17 2025)  Pain Onset 1: pta (11/1940)  Pain Location 1: Neck (11/1940)  Pain Orientation 1:  (all the way around) (11/1940)  Pain Description 1: Constant; Sharp (11/1940)  Pain Intervention(s) 1: Medication (see MAR) (11/1940)    Ambulating  No    Additional Information:     Shift report given to oncoming nurse at the bedside.     Tiera Bustos RN

## 2017-11-21 NOTE — PROGRESS NOTES
[facility] Hematology & Oncology        Inpatient Hematology / Oncology Progress Note      Admission Date: 2017  2:39 PM  Reason for Admission/Hospital Course: Lymphadenopathy [R59.1]      24 Hour Events:  Feeling better - pain improved with oxycodone  Mildly hypotensive this AM  No dyspnea     ROS:  Constitutional: Negative for fever, chills, weakness, malaise, fatigue. CV: Negative for chest pain, palpitations, edema. Respiratory: Negative for dyspnea, cough, wheezing. GI: Negative for nausea, abdominal pain, diarrhea. 10 point review of systems is otherwise negative with the exception of the elements mentioned above in the HPI. Allergies   Allergen Reactions    Lisinopril Cough    Pcn [Penicillins] Swelling       OBJECTIVE:  Patient Vitals for the past 8 hrs:   BP Temp Pulse Resp SpO2   17 1100 99/58 97.3 °F (36.3 °C) 77 18 92 %   17 0648 105/72 97.3 °F (36.3 °C) 73 18 96 %     Temp (24hrs), Av.6 °F (36.4 °C), Min:97.2 °F (36.2 °C), Max:98.2 °F (36.8 °C)         Physical Exam:  Constitutional: Well developed, well nourished male in no acute distress, sitting comfortably in the hospital bed. HEENT: Normocephalic and atraumatic. Oropharynx is clear, mucous membranes are moist. Area of swelling to left side of neck - firm to palpation   Lymph node   Deferred   Skin Warm and dry. No bruising and no rash noted. No erythema. No pallor. Respiratory Lungs few bibasilar crackles, normal air exchange without accessory muscle use. CVS Normal rate, regular rhythm and normal S1 and S2. No murmurs, gallops, or rubs. Abdomen Soft, nontender and nondistended, normoactive bowel sounds. No palpable mass. No hepatosplenomegaly. Neuro Grossly nonfocal with no obvious sensory or motor deficits. MSK Normal range of motion in general.  No edema and no tenderness. Psych Appropriate mood and affect.         Labs:    Recent Labs      17   0356  17   1154   WBC  5.7 7. 1   RBC  4.60  5.06   HGB  11.0*  12.3*   HCT  31.4*  35.7*   MCV  68.3*  70.6*   MCH  23.9*  24.3*   MCHC  35.0  34.5   RDW  14.8*  14.5   PLT  150  183   GRANS  56  59   LYMPH  23  20   MONOS  15*  14*   EOS  4   --    DF  MANUAL  MANUAL   ANEU  3.3  4.7   ABL  1.3  1.4   ABM  0.9  1.0   REA  0.2   --       Recent Labs      11/21/17   0356  11/20/17   1154   NA  141  139   K  4.1  4.4   CL  108*  104   CO2  23  23   AGAP  10  12   GLU  117*  190*   BUN  47*  58*   CREA  1.51*  2.16*   GFRAA  58*  38*   GFRNA  48*  32*   CA  8.9  9.8   SGOT  26  35   AP  57  73   TP  6.1*  7.3   ALB  2.6*  3.0*   GLOB  3.5  4.3*   AGRAT  0.7*  0.7*   MG  1.6*  1.9     Imaging:  NA    ASSESSMENT:    Problem List  Date Reviewed: 11/13/2017          Codes Class Noted    Pain ICD-10-CM: R52  ICD-9-CM: 780.96  11/20/2017        MARY (acute kidney injury) ([facility] ICD-10-CM: N17.9  ICD-9-CM: 584.9  11/20/2017        Acute renal failure (ARF) ([facility] ICD-10-CM: N17.9  ICD-9-CM: 584.9  11/18/2017        Intractable pain ICD-10-CM: R52  ICD-9-CM: 780.96  1/11/2017        Acute kidney injury ([facility] ICD-10-CM: N17.9  ICD-9-CM: 584.9  1/11/2017        Pancytopenia ([facility] ICD-10-CM: M38.410  ICD-9-CM: 284.19  1/11/2017        Constipation ICD-10-CM: K59.00  ICD-9-CM: 564.00  1/11/2017        Multiple myeloma (HCC) ICD-10-CM: C90.00  ICD-9-CM: 203.00  1/2/2017        Postural imbalance ICD-10-CM: R29.3  ICD-9-CM: 729.90  12/14/2016        Polyneuropathy ICD-10-CM: G62.9  ICD-9-CM: 356.9  12/14/2016        Fall ICD-10-CM: Z24. Enrique Luke  ICD-9-CM: E888.9  12/14/2016        Encounter for medication management ICD-10-CM: Z79.899  ICD-9-CM: V58.69  12/14/2016        Urinary retention ICD-10-CM: R33.9  ICD-9-CM: 788.20  6/6/2016        Colonic mass ICD-10-CM: K63.9  ICD-9-CM: 569.89  3/23/2016        Hyperlipidemia ICD-10-CM: E78.5  ICD-9-CM: 272.4  11/18/2015        Vertigo ICD-10-CM: R42  ICD-9-CM: 780.4  11/18/2015        Malaise and fatigue ICD-10-CM: R53.81, R53.83  ICD-9-CM: 780.79  11/18/2015        Cardiomyopathy ([facility] ICD-10-CM: I42.9  ICD-9-CM: 425.4  11/18/2015        LBBB (left bundle branch block) ICD-10-CM: I44.7  ICD-9-CM: 426.3  11/18/2015        Arthritis ICD-10-CM: M19.90  ICD-9-CM: 716.90  Unknown        Arrhythmia ICD-10-CM: I49.9  ICD-9-CM: 427.9  Unknown    Overview Signed 6/18/2013  2:07 PM by Rina Winn MD     LBBB             Cholelithiases ICD-10-CM: Q72.68  ICD-9-CM: 574.20  Unknown        Hx of radiation therapy to prostate ICD-10-CM: Z92.3  ICD-9-CM: V15.3  Unknown        Chronic systolic heart failure ([facility] ICD-10-CM: I50.22  ICD-9-CM: 428.22  9/13/2011        Automatic implantable cardioverter-defibrillator in situ ICD-10-CM: Z95.810  ICD-9-CM: V45.02  9/1/2011                PLAN:  Multiple Myeloma  -currently on Revlimid/Dex     Left Neck Pain-related to Enlarged Lymph Node  -? Plasmacytoma, consult general surgery for biopsy, will start pain meds (OxyIR 10mg q4h, Morphine IV 2mg q3h prn)  11/21 Gen surgery planning biopsy later today. Appreciate the help! Continue pain meds PRN.    MARY  -closely monitor creatinine, gentle hydration with [email or web address] on Request  11/21 Improved back to baseline at 1.51     Supportive Care  Continue Home Meds  Encourage incentive spirometer     DVT prophylaxis-currently on Eliquis 5 mg BID for A Fib, resume when [id number] with surgery post biopsy              [facility]  [address] [address]  Office : [phone]  Fax : [phone]     Attending Addendum:  I personally evaluated the patient with Sofia Meade NP , and agree with the assessment, findings and plan as documented. Appears well, heart regular without murmur, lungs clear, abdomen benign. Decreased BP this am, will hold Coreg in pm.  Pain much better controlled. Going for neck mass biopsy today. Cr back to baseline.           MD [facility]  [address], [address] [facility]  Office : [phone]  Fax : [phone]

## 2017-11-21 NOTE — H&P
Admit Date: 2017    POD Day of Surgery    Procedure:  Procedure(s):  LEFT CERVICAL/NECK  NODE BIOPSY   REQUEST TO FOLLOW  ROOM 516    Subjective:     Patient has no new complaints. His neck soreness remains    Objective:     Blood pressure (P) 117/56, pulse (P) 73, temperature (P) 97.5 °F (36.4 °C), resp. rate (P) 18, weight 201 lb 14.4 oz (91.6 kg), SpO2 (P) 96 %. Temp (24hrs), Av.5 °F (36.4 °C), Min:97.2 °F (36.2 °C), Max:98.2 °F (36.8 °C)      Physical Exam:  LUNG: clear , HEART: regular rate and rhythm, ABDOMEN: soft, non-tender. Bowel sounds normal. No masses,  no organomegaly, EXTREMITIES:  extremities normal, atraumatic, no cyanosis or edema  HEENT:  Palpable left anterior lymph node/mass. Labs:   Recent Results (from the past 24 hour(s))   TYPE & SCREEN    Collection Time: 17  7:04 PM   Result Value Ref Range    Crossmatch Expiration 2017     ABO/Rh(D) B POSITIVE     Antibody screen NEG    METABOLIC PANEL, COMPREHENSIVE    Collection Time: 17  3:56 AM   Result Value Ref Range    Sodium 141 136 - 145 mmol/L    Potassium 4.1 3.5 - 5.1 mmol/L    Chloride 108 (H) 98 - 107 mmol/L    CO2 23 21 - 32 mmol/L    Anion gap 10 7 - 16 mmol/L    Glucose 117 (H) 65 - 100 mg/dL    BUN 47 (H) 8 - 23 MG/DL    Creatinine 1.51 (H) 0.8 - 1.5 MG/DL    GFR est AA 58 (L) >60 ml/min/1.73m2    GFR est non-AA 48 (L) >60 ml/min/1.73m2    Calcium 8.9 8.3 - 10.4 MG/DL    Bilirubin, total 0.7 0.2 - 1.1 MG/DL    ALT (SGPT) 34 12 - 65 U/L    AST (SGOT) 26 15 - 37 U/L    Alk.  phosphatase 57 50 - 136 U/L    Protein, total 6.1 (L) 6.3 - 8.2 g/dL    Albumin 2.6 (L) 3.2 - 4.6 g/dL    Globulin 3.5 2.3 - 3.5 g/dL    A-G Ratio 0.7 (L) 1.2 - 3.5     MAGNESIUM    Collection Time: 17  3:56 AM   Result Value Ref Range    Magnesium 1.6 (L) 1.8 - 2.4 mg/dL   CBC WITH AUTOMATED DIFF    Collection Time: 17  3:56 AM   Result Value Ref Range    WBC 5.7 4.3 - 11.1 K/uL    RBC 4.60 4.23 - 5.67 M/uL    HGB 11.0 (L) 13.6 - 17.2 g/dL    HCT 31.4 (L) 41.1 - 50.3 %    MCV 68.3 (L) 79.6 - 97.8 FL    MCH 23.9 (L) 26.1 - 32.9 PG    MCHC 35.0 31.4 - 35.0 g/dL    RDW 14.8 (H) 11.9 - 14.6 %    PLATELET 539 282 - 447 K/uL    MPV 10.1 (L) 10.8 - 14.1 FL    NEUTROPHILS 56 47 - 75 %    LYMPHOCYTES 23 16 - 44 %    MONOCYTES 15 (H) 3 - 9 %    EOSINOPHILS 4 1 - 8 %    MYELOCYTES 2 %    ABS. NEUTROPHILS 3.3 1.7 - 8.2 K/UL    ABS. LYMPHOCYTES 1.3 0.5 - 4.6 K/UL    ABS. MONOCYTES 0.9 0.1 - 1.3 K/UL    ABS. EOSINOPHILS 0.2 0.0 - 0.8 K/UL    RBC COMMENTS SLIGHT  MICROCYTOSIS        WBC COMMENTS Result Confirmed By Smear      PLATELET COMMENTS ADEQUATE      DF MANUAL         Data Review images and reports reviewed    Assessment:     Active Problems:    Pain (11/20/2017)      MARY (acute kidney injury) ([facility] (11/20/2017)        Plan/Recommendations/Medical Decision Making:     Continue present treatment   He agrees to proceed with surgery to day to get a tissue diagnosis. I have explained the risks and benefits. Left anterior neck mass biopsy.     Dennis Lanza MD

## 2017-11-21 NOTE — PROGRESS NOTES
Problem: Nutrition Deficit  Goal: *Optimize nutritional status  Nutrition  Reason for assessment: Referral received from nursing admission Malnutrition Screening Tool for recently lost 14-23# without trying and eating poorly due to decreased appetite. Assessment:   Diet order(s): NPO 11/20, CLQ 11/21  Food/Nutrition Patient History:  I spoke with the patient's wife while the patient was off the floor for procedure. Per patient's wife, he has not had an appetite for the past couple of months. She states that this is d/t a recent illness (reported PNA), and a new medication that has inhibited his appetite and meal intake (?). Per patient's wife, she states that he has complained of some discomfort with swallowing but he has continued to eat regular foods. She is unsure what a UBW is and states that I would have to ask the patient. Per wife, he does use a protein powder + milk at home. Patient has a h/o colon cancer (resection in 3/2016) and thyroid cancer. He is newly diagnosed with MM and findings of a L neck lesion and is s/p excisional anterior chain lymph node biopsy and drainage of deep space abscess with drain placement today (biopsy originally scheduled for December). He also presents with MARY. Labs appear to have improved from yesterday (BUN 47, Cr 1.51, K WDL of 4.1). Edema: 2+ to BLEs  Anthropometrics:    Vitals 11/20/2017   Height 6' 2\"   ,  Weight: 91.6 kg (201 lb 14.4 oz), Weight Source: Standing scale (comment), Body mass index is 25.92 kg/(m^2). BMI class of normal weight for age. WT / BMI 11/21/2017 11/20/2017 11/13/2017   WEIGHT 201 lb 14.4 oz 204 lb 2 oz 210 lb     WT / BMI 11/8/2017 10/30/2017 10/16/2017   WEIGHT 215 lb 219 lb 219 lb     WT / BMI 11/29/2016   WEIGHT 232 lb   Per weights recorded in EMR, patient has had a potential 31 pound, 13.3% clinically insignificant weight loss over ~1 year.   He has had a more recent 18 pound, 8.2% clinically significant weight loss over ~1 month.  Macronutrient needs:  EER:  3596-1074 kcal /day (20-25 kcal/kg listed BW)  EPR:   grams protein/day (1-1.3 grams/kg IBW)-as renal function allows;(GFR 58)-MARY  Intake/Comparative Standards: NPO/CLQ diet is inadequate in kcal/protein. Nutrition Diagnosis: Unintended weight loss r/t decreased appetite as evidenced by patient with significant recent weight loss noted above and insignificant weight loss over the past one year and wife report of patient with no appetite. Intervention:  Meals and snacks: CLQ  Nutrition Supplement Therapy: ensure clear TID with CLQ diet; CIB + milk and ensure enlive TID with diet advancement to full liquids or solids. (as diet and/or renal function allows). Discharge nutrition plan: Too soon to determine.     Rose Howell [address], [address] [place] [address], [phone]

## 2017-11-21 NOTE — PROGRESS NOTES
visited x2. Pt had been taken to surgery earlier so  met with his wife. Wife asked if  would return following pt's operation. Wife and her friend were leaving.  visited again and pt was sitting in his chair alert and trying to work his phone. Pt stated he was doing well and he appeared to be doing well.  provided spiritual care through presence and prayer.

## 2017-11-21 NOTE — ANESTHESIA POSTPROCEDURE EVALUATION
Post-Anesthesia Evaluation and Assessment    Patient: Jessica Adan MRN: [id number]  SSN: [id number]    YOB: 1940  Age: 65 y.o. Sex: male       Cardiovascular Function/Vital Signs  Visit Vitals    /55    Pulse 71    Temp 36.8 °C (98.2 °F)    Resp 18    Wt 91.6 kg (201 lb 14.4 oz)    SpO2 98%    BMI 25.92 kg/m2       Patient is status post total IV anesthesia, general - backup anesthesia for Procedure(s):  LEFT CERVICAL/NECK  NODE BIOPSY  . Nausea/Vomiting: None    Postoperative hydration reviewed and adequate. Pain:  Pain Scale 1: Numeric (0 - 10) (11/21/17 1533)  Pain Intensity 1: 0 (11/21/17 1533)   Managed    Neurological Status:   Neuro (WDL): Within Defined Limits (11/21/17 1533)   At baseline    Mental Status and Level of Consciousness: Arousable    Pulmonary Status:   O2 Device: Nasal cannula (11/21/17 1533)   Adequate oxygenation and airway patent    Complications related to anesthesia: None    Post-anesthesia assessment completed.  No concerns    Signed By: Beto Garrett MD     November 21, 2017

## 2017-11-21 NOTE — OP NOTES
Calvin A. Norberto Cheadle., MD  [facility]ariat[facility]  [facility], [facility] [address], [place]   [phone]      Operative Report    Patient: Yohan Hodge MRN: [id number]      YOB: 1940  Age: 65 y.o. Sex: male       Date of Surgery: 11/21/2017     Preoperative Diagnosis: Lymphadenopathy [R59.1] Torticollis     Postoperative Diagnosis: Lymphadenopathy [R59.1] Torticollis. Deep Anterior Neck Abscess, Between the pretracheal fascia and the carotid sheath. Necrotic Lymph node excisional biopsy. Drainage of deep space abscess with drain placement. Cultures taken for aerobic, anaerobic and fungus. Procedure:  Excisional anterior chain lymph node biopsy. Drainage of deep space abscess with drain placement. Anesthesia: General   Complications: none  Estimated Blood Loss: per anesthesia  Drain: 1/4 inch penrose left in the deep space and sutured to the skin. Indications:  As outlined in the History and Physical.      INDICATIONS: The patient who was referred to me for mostly left neck pain. He expressed symptoms of pain all the way around his neck and essentially was hold his neck in one position due to pain with movement. It has been present for over a few weeks. The patient desires remove and the risks and benefits of the procedure were described in the preoperative area in detail. They are evaluated in the Preoperative holding area and opportunity for questions was given and answers given to their satisfaction. The wish to proceed with surgery today. There was evaluated and findings are consistent with an enlarged lymph node and pain. Due to his extensive history of cancers metastatic disease was discussed and tissue for diagnosis is requested. I discussed the reasoning behind the surgery and that I would likely only take part of the lymph node due to its proximity to the major vessels and nerves if the mass was adherent to any of them.   If it came out easily I will try to remove it for symptoms relief. He understood and agreed to the risks and the plan. PROCEDURE IN DETAIL: The patient was evaluated in the preoperative holding area. We discussed the planned intervention. I discussed the risks and benefits of the procedure, including bleeding, significant scarring, and disease recurrence. The patient was brought to the operating room and underwent MAC anesthesia. He was then placed in the supine position. All pressure points were padded. His lesion was well exposed and he was secured to the table. I then planned my excision by making a linear incision for a length of 6 cm in a transverse fashion over the mass which was palpable and marked prior to surgery. After pacheco the tissue mass was evaluated for removal by blunt dissection. I used cautery and the 15 blade scalpel to dissect through the skin and split the platysma with electrocautery and used a self retaining retractor to hold the tissues back. I identified the SCM I dissected over the top of the mass and I dissected posterior to it with care to preserve the nerves and vessels. I encountered the carotid shealth and stayed anterior to it. I was able to bluntly dissect around mass which was a lymph node as suspected measuring greater than 3 cm. As I was dissecting around the node I noted small amount of purulent material.  I took a larger blunt hemostat and teased the pretracheal and carotid sheath space open and a large amount of purulent material flowed out. I did cultures for aerobic, anaerobic and fungus. I then copiously irrigated the space and placed a 1/4 inch penrose into it and sutured it to the skin with 3-0 nylon in two areas. I then wedged out the anterior surface of the lymph node and there was central necrosis and more purulence was identified. I irrigated that out as well and sent a 1 cm by 1 cm piece of the lymph node to pathology.    I examined the wound and bleeding was controlled with electrocautery. After removal of this tissue, all that was left was the remainder of the necrotic lymph node, healthy fat, skin, and subcutaneous tissues. The wound was irrigated with sterile saline once again. Hemostasis was assured. This was  complex wound and was closed with multiple layers. The deep tissues were closed with 3-0 PDS and subdermally with 3-0 PDS and 3-0 nylon in the skin in a simple interrupted fashion. It was covered with sterile guaze and fixed with tape. 0.25% Marcaine was instilled into the skin and subcutaneous tissues. After all sutures were placed, the wound was again evaluated. The patient was placed back supine on the stretcher, extubated, and returned to recovery in stable condition. Sponge, instrument, and needle counts were correct.      Arnoldo Klein MD

## 2017-11-21 NOTE — PERIOP NOTES
TRANSFER - IN REPORT:    Verbal report received from Darrell Delatorre RN(name) on [employer]  being received from 5th floor(unit) for ordered procedure      Report consisted of patients Situation, Background, Assessment and   Recommendations(SBAR). Information from the following report(s) SBAR was reviewed with the receiving nurse. Opportunity for questions and clarification was provided. Assessment completed upon patients arrival to unit and care assumed.

## 2017-11-21 NOTE — PERIOP NOTES
TRANSFER - OUT REPORT:    Verbal report given to Laura OLSON on [employer]  being transferred to [address] [place] [address] for routine post - op       Report consisted of patients Situation, Background, Assessment and   Recommendations(SBAR). Information from the following report(s) SBAR, OR Summary, Procedure Summary, Intake/Output and MAR was reviewed with the receiving nurse. Lines:   Peripheral IV 11/20/17 Right Forearm (Active)   Site Assessment Clean, dry, & intact 11/21/2017  3:33 PM   Phlebitis Assessment 0 11/21/2017  3:33 PM   Infiltration Assessment 0 11/21/2017  3:33 PM   Dressing Status Clean, dry, & intact; Occlusive 11/21/2017  3:33 PM   Dressing Type Transparent;Tape 11/21/2017  3:33 PM   Hub Color/Line Status Blue; Infusing 11/21/2017  3:33 PM   Alcohol Cap Used No 11/21/2017  3:33 PM        Opportunity for questions and clarification was provided. Patient transported with:   O2 @ 2 liters    VTE prophylaxis orders have not been written for [employer]. Patient and family given floor number and nurses name. Family updated re: pt status after security code verified.

## 2017-11-21 NOTE — PROGRESS NOTES
END OF SHIFT NOTE:    Intake/Output  11/21 0701 - 11/21 1900  In: 65 [P.O.:120; I.V.:500]  Out: 205 [Urine:200]   Voiding: YES  Catheter: NO  Drain:   Penrose Drain 11/21/17 Left;Mid Neck (Active)   Site Assessment Clean, dry, & intact 11/21/2017  3:33 PM   Dressing Status Clean, dry, & intact 11/21/2017  3:33 PM               Stool:  0 occurrences. Stool Assessment  Stool Color: Yulissa Link (per pt.) (11/21/17 0335)  Stool Appearance: Formed (per pt.) (11/21/17 0335)  Stool Amount: Small (per pt.) (11/21/17 0335)  Stool Source/Status: Rectum (11/21/17 0335)    Emesis:  0 occurrences. VITAL SIGNS  Patient Vitals for the past 12 hrs:   Temp Pulse Resp BP SpO2   11/21/17 1645 97.3 °F (36.3 °C) 73 18 136/71 97 %   11/21/17 1548 - 71 18 122/55 98 %   11/21/17 1543 - 69 18 115/57 96 %   11/21/17 1538 - 70 18 119/64 98 %   11/21/17 1533 98.2 °F (36.8 °C) 70 16 116/67 96 %   11/21/17 1528 - 70 18 118/66 97 %   11/21/17 1523 - 69 16 119/60 98 %   11/21/17 1518 - 69 18 114/58 97 %   11/21/17 1513 - 73 16 105/56 98 %   11/21/17 1508 - 71 16 114/58 99 %   11/21/17 1503 - 72 16 113/57 100 %   11/21/17 1458 - 70 14 112/55 99 %   11/21/17 1455 98.3 °F (36.8 °C) 70 10 111/55 98 %   11/21/17 1306 97.5 °F (36.4 °C) 73 18 117/56 96 %   11/21/17 1100 97.3 °F (36.3 °C) 77 18 99/58 92 %   11/21/17 0648 97.3 °F (36.3 °C) 73 18 105/72 96 %       Pain Assessment  Pain 1  Pain Scale 1: Numeric (0 - 10) (11/21/17 1659)  Pain Intensity 1: 0 (11/21/17 1659)  Patient Stated Pain Goal: 0 (11/21/17 2346)  Pain Reassessment 1: Yes (11/21/17 1659)  Pain Onset 1: pta (11/21/17 1089)  Pain Location 1: Neck (11/21/17 1230)  Pain Orientation 1:  (all the way around) (11/21/17 5934)  Pain Description 1: Constant; Sharp (11/21/17 2847)  Pain Intervention(s) 1: Emotional support (11/21/17 5792)    Ambulating  Yes    Additional Information: drsg to left neck from bx today    Shift report will be given to oncoming nurse at the bedside.     Shanice Hernandez Katherleen Meigs

## 2017-11-21 NOTE — ANESTHESIA PREPROCEDURE EVALUATION
Anesthetic History   No history of anesthetic complications            Review of Systems / Medical History  Patient summary reviewed, nursing notes reviewed and pertinent labs reviewed    Pulmonary          Shortness of breath         Neuro/Psych             Comments: Pituitary tumor resected x 2 Cardiovascular    Hypertension: well controlled      CHF: NYHA Classification III  Dysrhythmias (LBBB)   Pacemaker (Since 2011. Fully paced. Has never discharged.) and hyperlipidemia    Exercise tolerance[de-identified] Could achieve >4 Mets until about past 2 weeks where he has been getting progressively more fatigued. Comments: H/o ischemic cardiomyopathy. Echo 2016 showed EF 30-35% with RVSP of 43mmHg. GI/Hepatic/Renal         Renal disease: ARF      Comments: Colon mass Endo/Other      Hypothyroidism: well controlled  Arthritis     Other Findings   Comments: Pt has had h/o thyroid cancer, colon cancer, prostate cancer and now multiple myeloma. Physical Exam    Airway  Mallampati: I  TM Distance: 4 - 6 cm  Neck ROM: decreased range of motion   Mouth opening: Normal     Cardiovascular    Rhythm: regular  Rate: normal         Dental    Dentition: Implants and Poor dentition     Pulmonary  Breath sounds clear to auscultation               Abdominal  GI exam deferred       Other Findings            Anesthetic Plan    ASA: 3  Anesthesia type: total IV anesthesia and general - backup          Induction: Intravenous  Anesthetic plan and risks discussed with: Patient      Pt with some hoarseness. ?recurrent larygeal nerve involvement in cervical neck mass. Denies dysphagia. Limited extension due to neck pain and mass.

## 2017-11-22 LAB
ALBUMIN SERPL-MCNC: 2.3 G/DL (ref 3.2–4.6)
ALBUMIN/GLOB SERPL: 0.7 {RATIO} (ref 1.2–3.5)
ALP SERPL-CCNC: 53 U/L (ref 50–136)
ALT SERPL-CCNC: 29 U/L (ref 12–65)
ANION GAP SERPL CALC-SCNC: 8 MMOL/L (ref 7–16)
AST SERPL-CCNC: 24 U/L (ref 15–37)
BASOPHILS # BLD: 0 K/UL (ref 0–0.2)
BASOPHILS NFR BLD: 0 % (ref 0–2)
BILIRUB SERPL-MCNC: 0.8 MG/DL (ref 0.2–1.1)
BUN SERPL-MCNC: 26 MG/DL (ref 8–23)
CALCIUM SERPL-MCNC: 8.5 MG/DL (ref 8.3–10.4)
CHLORIDE SERPL-SCNC: 109 MMOL/L (ref 98–107)
CO2 SERPL-SCNC: 24 MMOL/L (ref 21–32)
CREAT SERPL-MCNC: 1.26 MG/DL (ref 0.8–1.5)
DIFFERENTIAL METHOD BLD: ABNORMAL
EOSINOPHIL # BLD: 0.2 K/UL (ref 0–0.8)
EOSINOPHIL NFR BLD: 3 % (ref 0.5–7.8)
ERYTHROCYTE [DISTWIDTH] IN BLOOD BY AUTOMATED COUNT: 14.8 % (ref 11.9–14.6)
GLOBULIN SER CALC-MCNC: 3.3 G/DL (ref 2.3–3.5)
GLUCOSE SERPL-MCNC: 96 MG/DL (ref 65–100)
HCT VFR BLD AUTO: 29.2 % (ref 41.1–50.3)
HGB BLD-MCNC: 10.1 G/DL (ref 13.6–17.2)
IMM GRANULOCYTES # BLD: 0.2 K/UL (ref 0–0.5)
IMM GRANULOCYTES NFR BLD AUTO: 5 % (ref 0–5)
LYMPHOCYTES # BLD: 1.1 K/UL (ref 0.5–4.6)
LYMPHOCYTES NFR BLD: 21 % (ref 13–44)
MAGNESIUM SERPL-MCNC: 1.6 MG/DL (ref 1.8–2.4)
MCH RBC QN AUTO: 23.9 PG (ref 26.1–32.9)
MCHC RBC AUTO-ENTMCNC: 34.6 G/DL (ref 31.4–35)
MCV RBC AUTO: 69.2 FL (ref 79.6–97.8)
MONOCYTES # BLD: 0.7 K/UL (ref 0.1–1.3)
MONOCYTES NFR BLD: 13 % (ref 4–12)
NEUTS SEG # BLD: 2.9 K/UL (ref 1.7–8.2)
NEUTS SEG NFR BLD: 58 % (ref 43–78)
PLATELET # BLD AUTO: 146 K/UL (ref 150–450)
PMV BLD AUTO: 10 FL (ref 10.8–14.1)
POTASSIUM SERPL-SCNC: 4 MMOL/L (ref 3.5–5.1)
PROT SERPL-MCNC: 5.6 G/DL (ref 6.3–8.2)
RBC # BLD AUTO: 4.22 M/UL (ref 4.23–5.67)
SODIUM SERPL-SCNC: 141 MMOL/L (ref 136–145)
WBC # BLD AUTO: 5.1 K/UL (ref 4.3–11.1)

## 2017-11-22 PROCEDURE — 74011250637 HC RX REV CODE- 250/637: Performed by: NURSE PRACTITIONER

## 2017-11-22 PROCEDURE — 74011000258 HC RX REV CODE- 258: Performed by: NURSE PRACTITIONER

## 2017-11-22 PROCEDURE — 74011000250 HC RX REV CODE- 250: Performed by: NURSE PRACTITIONER

## 2017-11-22 PROCEDURE — 65270000029 HC RM PRIVATE

## 2017-11-22 PROCEDURE — 86777 TOXOPLASMA ANTIBODY: CPT | Performed by: NURSE PRACTITIONER

## 2017-11-22 PROCEDURE — 36415 COLL VENOUS BLD VENIPUNCTURE: CPT | Performed by: NURSE PRACTITIONER

## 2017-11-22 PROCEDURE — 87389 HIV-1 AG W/HIV-1&-2 AB AG IA: CPT | Performed by: NURSE PRACTITIONER

## 2017-11-22 PROCEDURE — 85025 COMPLETE CBC W/AUTO DIFF WBC: CPT | Performed by: NURSE PRACTITIONER

## 2017-11-22 PROCEDURE — 83735 ASSAY OF MAGNESIUM: CPT | Performed by: NURSE PRACTITIONER

## 2017-11-22 PROCEDURE — 86480 TB TEST CELL IMMUN MEASURE: CPT | Performed by: NURSE PRACTITIONER

## 2017-11-22 PROCEDURE — 80053 COMPREHEN METABOLIC PANEL: CPT | Performed by: NURSE PRACTITIONER

## 2017-11-22 PROCEDURE — 94760 N-INVAS EAR/PLS OXIMETRY 1: CPT

## 2017-11-22 PROCEDURE — 99233 SBSQ HOSP IP/OBS HIGH 50: CPT | Performed by: INTERNAL MEDICINE

## 2017-11-22 PROCEDURE — 74011250636 HC RX REV CODE- 250/636: Performed by: NURSE PRACTITIONER

## 2017-11-22 RX ADMIN — SODIUM CHLORIDE 900 MG: 900 INJECTION, SOLUTION INTRAVENOUS at 10:43

## 2017-11-22 RX ADMIN — Medication 400 MG: at 23:23

## 2017-11-22 RX ADMIN — SODIUM CHLORIDE 900 MG: 900 INJECTION, SOLUTION INTRAVENOUS at 17:46

## 2017-11-22 RX ADMIN — OXYCODONE HYDROCHLORIDE 10 MG: 5 TABLET ORAL at 23:20

## 2017-11-22 RX ADMIN — PANTOPRAZOLE SODIUM 40 MG: 40 TABLET, DELAYED RELEASE ORAL at 07:02

## 2017-11-22 RX ADMIN — STANDARDIZED SENNA CONCENTRATE AND DOCUSATE SODIUM 1 TABLET: 8.6; 5 TABLET, FILM COATED ORAL at 07:02

## 2017-11-22 RX ADMIN — MORPHINE SULFATE 2 MG: 10 INJECTION, SOLUTION INTRAMUSCULAR; INTRAVENOUS at 01:05

## 2017-11-22 RX ADMIN — Medication 400 MG: at 07:02

## 2017-11-22 RX ADMIN — LENALIDOMIDE 10 MG: 10 CAPSULE ORAL at 07:05

## 2017-11-22 RX ADMIN — APIXABAN 5 MG: 5 TABLET, FILM COATED ORAL at 17:45

## 2017-11-22 RX ADMIN — LEVOTHYROXINE SODIUM 100 MCG: 100 TABLET ORAL at 07:02

## 2017-11-22 RX ADMIN — SODIUM CHLORIDE 75 ML/HR: 900 INJECTION, SOLUTION INTRAVENOUS at 01:05

## 2017-11-22 RX ADMIN — Medication 400 MG: at 15:52

## 2017-11-22 NOTE — PROGRESS NOTES
[facility] Hematology & Oncology        Inpatient Hematology / Oncology Progress Note      Admission Date: 2017  2:39 PM  Reason for Admission/Hospital Course: Lymphadenopathy [R59.1]      24 Hour Events:  S/p excisional lymph node biopsy and drainage of abscess to left side of neck  BP improved off coreg  Pain generally controlled with PO oxycodone     ROS:  Constitutional: Negative for fever, chills, weakness, malaise, fatigue. CV: Negative for chest pain, palpitations, edema. Respiratory: Negative for dyspnea, cough, wheezing. GI: Negative for nausea, abdominal pain, diarrhea. 10 point review of systems is otherwise negative with the exception of the elements mentioned above in the HPI. Allergies   Allergen Reactions    Lisinopril Cough    Pcn [Penicillins] Swelling       OBJECTIVE:  Patient Vitals for the past 8 hrs:   BP Temp Pulse Resp SpO2   17 0730 137/74 97.8 °F (36.6 °C) 65 18 96 %   17 0729 - - - - 96 %   17 0300 112/50 98.1 °F (36.7 °C) 71 18 96 %     Temp (24hrs), Av.8 °F (36.6 °C), Min:97.3 °F (36.3 °C), Max:98.3 °F (36.8 °C)     0701 -  1900  In: 267 [P.O.:240; I.V.:407]  Out: 225 [Urine:225]    Physical Exam:  Constitutional: Well developed, well nourished male in no acute distress, sitting comfortably in the hospital bed. HEENT: Normocephalic and atraumatic. Oropharynx is clear, mucous membranes are moist. Area of swelling to left side of neck - firm to palpation   Lymph node   Deferred   Skin Warm and dry. No bruising and no rash noted. No erythema. No pallor. Respiratory Lungs clear, normal air exchange without accessory muscle use. CVS Normal rate, regular rhythm and normal S1 and S2. No murmurs, gallops, or rubs. Abdomen Soft, nontender and nondistended, normoactive bowel sounds. No palpable mass. No hepatosplenomegaly. Neuro Grossly nonfocal with no obvious sensory or motor deficits.    MSK Normal range of motion in general.  No edema and no tenderness. Psych Appropriate mood and affect.         Labs:      Recent Labs      11/22/17   0633  11/21/17   0356  11/20/17   1154   WBC  5.1  5.7  7.1   RBC  4.22*  4.60  5.06   HGB  10.1*  11.0*  12.3*   HCT  29.2*  31.4*  35.7*   MCV  69.2*  68.3*  70.6*   MCH  23.9*  23.9*  24.3*   MCHC  34.6  35.0  34.5   RDW  14.8*  14.8*  14.5   PLT  146*  150  183   GRANS  58  56  59   LYMPH  21  23  20   MONOS  13*  15*  14*   EOS  3  4   --    BASOS  0   --    --    IG  5   --    --    DF  AUTOMATED  MANUAL  MANUAL   ANEU  2.9  3.3  4.7   ABL  1.1  1.3  1.4   ABM  0.7  0.9  1.0   REA  0.2  0.2   --    ABB  0.0   --    --    AIG  0.2   --    --         Recent Labs      11/22/17   0633  11/21/17   0356  11/20/17   1154   NA  141  141  139   K  4.0  4.1  4.4   CL  109*  108*  104   CO2  24  23  23   AGAP  8  10  12   GLU  96  117*  190*   BUN  26*  47*  58*   CREA  1.26  1.51*  2.16*   GFRAA  >60  58*  38*   GFRNA  59*  48*  32*   CA  8.5  8.9  9.8   SGOT  24  26  35   AP  53  57  73   TP  5.6*  6.1*  7.3   ALB  2.3*  2.6*  3.0*   GLOB  3.3  3.5  4.3*   AGRAT  0.7*  0.7*  0.7*   MG  1.6*  1.6*  1.9     Imaging:  NA    ASSESSMENT:    Problem List  Date Reviewed: 11/13/2017          Codes Class Noted    Pain ICD-10-CM: R52  ICD-9-CM: 780.96  11/20/2017        MARY (acute kidney injury) ([facility] [address] [facility] ICD-10-CM: N17.9  ICD-9-CM: 584.9  11/20/2017        Acute renal failure (ARF) (HCC) ICD-10-CM: N17.9  ICD-9-CM: 584.9  11/18/2017        Intractable pain ICD-10-CM: R52  ICD-9-CM: 780.96  1/11/2017        Acute kidney injury ([facility] ICD-10-CM: N17.9  ICD-9-CM: 584.9  1/11/2017        Pancytopenia ([facility] ICD-10-CM: Q73.059  ICD-9-CM: 284.19  1/11/2017        Constipation ICD-10-CM: K59.00  ICD-9-CM: 564.00  1/11/2017        Multiple myeloma (HCC) ICD-10-CM: C90.00  ICD-9-CM: 203.00  1/2/2017        Postural imbalance ICD-10-CM: R29.3  ICD-9-CM: 729.90  12/14/2016        Polyneuropathy ICD-10-CM: G62.9  ICD-9-CM: 356.9  12/14/2016        Fall ICD-10-CM: [address]  ICD-9-CM: E888.9  12/14/2016        Encounter for medication management ICD-10-CM: Z79.899  ICD-9-CM: V58.69  12/14/2016        Urinary retention ICD-10-CM: R33.9  ICD-9-CM: 788.20  6/6/2016        Colonic mass ICD-10-CM: K63.9  ICD-9-CM: 569.89  3/23/2016        Hyperlipidemia ICD-10-CM: E78.5  ICD-9-CM: 272.4  11/18/2015        Vertigo ICD-10-CM: X14  ICD-9-CM: 780.4  11/18/2015        Malaise and fatigue ICD-10-CM: R53.81, R53.83  ICD-9-CM: 780.79  11/18/2015        Cardiomyopathy ([facility] ICD-10-CM: I42.9  ICD-9-CM: 425.4  11/18/2015        LBBB (left bundle branch block) ICD-10-CM: I44.7  ICD-9-CM: 426.3  11/18/2015        Arthritis ICD-10-CM: M19.90  ICD-9-CM: 716.90  Unknown        Arrhythmia ICD-10-CM: I49.9  ICD-9-CM: 427.9  Unknown    Overview Signed 6/18/2013  2:07 PM by Danita Espinoza MD     LBBB             Cholelithiases ICD-10-CM: E20.09  ICD-9-CM: 574.20  Unknown        Hx of radiation therapy to prostate ICD-10-CM: Z92.3  ICD-9-CM: V15.3  Unknown        Chronic systolic heart failure ([facility] ICD-10-CM: I50.22  ICD-9-CM: 428.22  9/13/2011        Automatic implantable cardioverter-defibrillator in situ ICD-10-CM: Z95.810  ICD-9-CM: V45.02  9/1/2011                PLAN:  Multiple Myeloma  - Revlimid/Dex       Left Neck Pain-related to Enlarged Lymph Node  -? Plasmacytoma, consult general surgery for biopsy, will start pain meds (OxyIR 10mg q4h, Morphine IV 2mg q3h prn)  11/21 Gen surgery planning biopsy later today. Appreciate the help! Continue pain meds PRN. 11/22 S/p excisional anterior chain lymph node biopsy and drainage of deep space abscess with drain placement by Dr. Lynda Ojeda 11/21.  ID consult pending for antibiotic recommendations     MARY  -closely monitor creatinine, gentle hydration with [email or web address]  11/21 Improved back to baseline at 1.51     Supportive Care  Continue Home Meds  Encourage incentive spirometer     DVT prophylaxis-currently on Eliquis 5 mg BID for A Fib, resume this PM per surgery              [facility]  [address] [address]  Office : [phone]  Fax : [phone]     Attending Addendum:  I personally evaluated the patient with Kadie Vasquez NP , and agree with the assessment, findings and plan as documented. Appears well, heart regular without murmur, lungs clear, abdomen benign. Looking well after biopsy of neck mass and abscess drainage. Off coreg due to drop in BP yesterday, now improved. He will be undergoing CT neck and ID consult is pending.           MD Mar[facility]  [address] [place] [address]  Office : [phone]  Fax : [phone]

## 2017-11-22 NOTE — PROGRESS NOTES
S/p left cervical neck biopsy and deep space abscess I&D-11/21/17  PLAN:  Pain control  Penrose drain  Abscess fluid culture pending: on Cleocin  ID consult 11/21/17   Restart Eliquis tonight  CT of neck tomorrow, 11/23/17, to r/o residual abscess      ASSESSMENT:  Admit Date: 11/20/2017   1 Day Post-Op  Procedure(s):  LEFT CERVICAL/NECK  NODE BIOPSY      Active Problems:    Pain (11/20/2017)      MARY (acute kidney injury) ([facility] [address] [facility] (11/20/2017)         SUBJECTIVE:Pt alert and sitting on edge of bed. Reports improvement in neck pain. Denies N/V/D. Denies dysphagia or SOB. UOP 550mL last 24h. VSS. AF. H/H 10.1/29.2, WBC 5.1     OBJECTIVE:  Constitutional: Alert oriented cooperative patient in no acute distress; appears stated age   Visit Vitals    /74 (BP 1 Location: Right arm, BP Patient Position: At rest;Sitting)    Pulse 65    Temp 97.8 °F (36.6 °C)    Resp 18    Wt 92.8 kg (204 lb 8 oz)    SpO2 96%    BMI 26.26 kg/m2     Eyes:Sclera are clear. ENMT: no external lesions gross hearing normal; edematous left anterior neck with dressing c/d/i. Small amount of dried bloody drainage noted from penrose drain. CV: RRR. Normal perfusion  Resp: No JVD. Breathing is  non-labored; no audible wheezing. GI: soft and non-distended     Musculoskeletal: Decreased cervical ROM; worse with axial rotation towards the left. No embolic signs or cyanosis.    Neuro:  Oriented; moves all 4; no focal deficits  Psychiatric: normal affect and mood, no memory impairment      Patient Vitals for the past 24 hrs:   BP Temp Pulse Resp SpO2 Weight   11/22/17 0730 137/74 97.8 °F (36.6 °C) 65 18 96 % -   11/22/17 0729 - - - - 96 % -   11/22/17 0300 112/50 98.1 °F (36.7 °C) 71 18 96 % -   11/22/17 0113 - - - - - 92.8 kg (204 lb 8 oz)   11/21/17 2215 118/52 97.9 °F (36.6 °C) 75 18 93 % -   11/21/17 2032 132/69 97.4 °F (36.3 °C) (!) 56 18 92 % -   11/21/17 1645 136/71 97.3 °F (36.3 °C) 73 18 97 % -   11/21/17 1548 122/55 - 71 18 98 % - 11/21/17 1543 115/57 - 69 18 96 % -   11/21/17 1538 119/64 - 70 18 98 % -   11/21/17 1533 116/67 98.2 °F (36.8 °C) 70 16 96 % -   11/21/17 1528 118/66 - 70 18 97 % -   11/21/17 1523 119/60 - 69 16 98 % -   11/21/17 1518 114/58 - 69 18 97 % -   11/21/17 1513 105/56 - 73 16 98 % -   11/21/17 1508 114/58 - 71 16 99 % -   11/21/17 1503 113/57 - 72 16 100 % -   11/21/17 1458 112/55 - 70 14 99 % -   11/21/17 1455 111/55 98.3 °F (36.8 °C) 70 10 98 % -   11/21/17 1306 117/56 97.5 °F (36.4 °C) 73 18 96 % -   11/21/17 1100 99/58 97.3 °F (36.3 °C) 77 18 92 % -     Labs:  Recent Labs      11/22/17   0633   WBC  5.1   HGB  10.1*   PLT  146*   NA  141   K  4.0   CL  109*   CO2  24   BUN  26*   CREA  1.26   GLU  96   TBILI  0.8   SGOT  24   ALT  29   AP  53       KRISTINA Santos  I have seen and independently examined this patient in addition to the Nurse Practitioner and I formulated the assessment and plan and communicated the plan to her for the completion of the above note. The plan will include follow up CT scan for residual abscess. Continue with current management awaiting results. I have discussed the plan with the patient and they are in agreement. If they have any additional questions in the interim I have asked them to notify the nurse to call me. Garry Mansfield MD, FACS  General and Bariatric Surgery  [facility].

## 2017-11-22 NOTE — PHYSICIAN ADVISORY
Letter of Determination: Inpatient Status Appropriate    This patient was originally hospitalized as Inpatient on 11/20/2017 for acute kidney injury. This patient is appropriate for Inpatient Admission in accordance with CMS regulation Section 43 .3. Specifically, patient's stay is expected to be more than Two Midnights and was medically necessary. 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 The patient underwent a lymph node biopsy which exposed a necrotic lymph node with a deep space abscess with placement of a post operative drain. Consistent with CMS guidelines, patient meets for inpatient status. It is our recommendation that this patient's hospitalization status should be INPATIENT status.      The final decision regarding the patient's hospitalization status depends on the attending physician's judgement.     Vanda Ritchie MD, MORA,   Physician [facility].

## 2017-11-22 NOTE — PROGRESS NOTES
END OF SHIFT NOTE:    Intake/Output  11/22 0701 - 11/22 1900  In: 473 [P.O.:240; I.V.:407]  Out: 225 [Urine:225]   Voiding: Yes  Catheter:Yes  Drain:   Penrose Drain 11/21/17 Left;Mid Neck (Active)   Site Assessment Clean, dry, & intact 11/21/2017  3:33 PM   Dressing Status Clean, dry, & intact 11/21/2017  3:33 PM               Stool:  0  Emesis:  0        VITAL SIGNS  Patient Vitals for the past 12 hrs:   Temp Pulse Resp BP SpO2   11/22/17 1515 98.2 °F (36.8 °C) 78 18 125/76 96 %   11/22/17 1115 97.9 °F (36.6 °C) 76 18 123/74 98 %   11/22/17 0730 97.8 °F (36.6 °C) 65 18 137/74 96 %   11/22/17 0729 - - - - 96 %       Pain Assessment  Pain 1  Pain Scale 1: Visual (11/22/17 0151)  Pain Intensity 1: 0 (11/22/17 0151)  Patient Stated Pain Goal: 0 (11/22/17 0109)  Pain Reassessment 1: Patient sleeping (11/22/17 0151)  Pain Onset 1: pta (11/22/17 0109)  Pain Location 1: Neck (11/22/17 0109)  Pain Orientation 1: Posterior (11/22/17 0109)  Pain Description 1: Sharp (11/22/17 0109)  Pain Intervention(s) 1: Medication (see MAR) (11/22/17 0109)    Ambulating  Yes    Additional Information:     Shift report will be  given to oncoming nurse at the bedside.     Gianfranco Herrera RN

## 2017-11-22 NOTE — PROGRESS NOTES
END OF SHIFT NOTE:    Intake/Output  11/21 1901 - 11/22 0700  In: 551 [I.V.:551]  Out: 350 [Urine:350]   Voiding: YES  Catheter: NO  Drain:   Penrose Drain 11/21/17 Left;Mid Neck (Active)   Site Assessment Clean, dry, & intact 11/21/2017  3:33 PM   Dressing Status Clean, dry, & intact 11/21/2017  3:33 PM               Stool:  0 occurrences. Stool Assessment  Stool Color: Haleigh Rawls (per pt.) (11/21/17 0335)  Stool Appearance: Formed (per pt.) (11/21/17 0335)  Stool Amount: Small (per pt.) (11/21/17 0335)  Stool Source/Status: Rectum (11/21/17 0335)    Emesis:  0 occurrences. VITAL SIGNS  Patient Vitals for the past 12 hrs:   Temp Pulse Resp BP SpO2   11/22/17 0300 98.1 °F (36.7 °C) 71 18 112/50 96 %   11/21/17 2215 97.9 °F (36.6 °C) 75 18 118/52 93 %   11/21/17 2032 97.4 °F (36.3 °C) (!) 56 18 132/69 92 %       Pain Assessment  Pain 1  Pain Scale 1: Visual (11/22/17 0151)  Pain Intensity 1: 0 (11/22/17 0151)  Patient Stated Pain Goal: 0 (11/22/17 0109)  Pain Reassessment 1: Patient sleeping (11/22/17 0151)  Pain Onset 1: pta (11/22/17 0109)  Pain Location 1: Neck (11/22/17 0109)  Pain Orientation 1: Posterior (11/22/17 0109)  Pain Description 1: Sharp (11/22/17 0109)  Pain Intervention(s) 1: Medication (see MAR) (11/22/17 0109)    Ambulating  Yes    Additional Information: Pt ambulated to bathroom w/no assistance. Only requested assistance w/getting legs onto bed and adjusting pillows. IV morphine and PO oxy given for posterior neck pain. Pt resting quietly. Shift report given to oncoming nurse at the bedside.     Karli Mcmahon RN

## 2017-11-22 NOTE — CONSULTS
Infectious Disease Consult    Today's Date: 11/22/2017   Admit Date: 11/20/2017    Impression:   · Deep anterior neck abscess s/p drainage (11/21), cx pending  · Lymphadenopathy s/p anterior chain node bx  · MM  · Remote hx of thyroid cancer (2009) with partial thyroidectomy   · Hx of colon cancer s/p resection  · CHF with ICD  · A fib on Eliquis    Plan:   ·  At this time, purulent fluid could represent abscess vs necrotic liquefaction of lymph node. Cultures will provide further insight. · Continue Clindamycin for now. If cultures negative, plan on stopping clindamycin after 72 hours. · Atypical organisms could be present. AFB cultures and node pathology pending. Would not start empiric treatment without definitive evidence. · Check HIV and quantiferon gold. Anti-infectives:   Clindamycin x 1 dose    Subjective:   Date of Consultation:  November 22, 2017  Referring Physician: Josue Monklandon    Patient is a 65 y.o. male admitted on 11/20 for enlarging L neck lesion (concerns for obstruction of airway and plasmacytoma) and worsening renal function. PMH for resected colon cancer in 3/2016, thyroid cancer with partial thyroidectomy (2009), recently diagnosed MM (on rev/dex), A fib on Eliquis, CHF with ICD (generator exchange 2016), and gout. Patient is followed by Dr. Mick Carmichael. General surgery consulted for lymph node biopsy, which was performed yesterday. Per OP note, purulent material encountered behind enlarge node in pretracheal and carotid sheath space. Penrose drain placed. Cx pending.   -He remains afebrile without leukocytosis. MARY greatly improved with IVF. Reported PCN allergy as an adult, IM PCN given in [facility], hours later \"face swelled up so much I had to go to ER\". Does not remember ever taking cephalosporin. Clindamycin given x1 dose pre-op.  Denies nausea, vomiting, diarrhea, fever, chills, or sweats      Patient Active Problem List   Diagnosis Code    Chronic systolic heart failure (HCC) I50.22    Arthritis M19.90    Arrhythmia I49.9    Automatic implantable cardioverter-defibrillator in situ Z95.810    Cholelithiases K80.20    Hx of radiation therapy to prostate Z92.3    Hyperlipidemia E78.5    Vertigo R42    Malaise and fatigue R53.81, R53.83    Cardiomyopathy (HCC) I42.9    LBBB (left bundle branch block) I44.7    Colonic mass K63.9    Urinary retention R33.9    Postural imbalance R29.3    Polyneuropathy G62.9    Fall W19. Rene Zhou Encounter for medication management Z79.899    Multiple myeloma ([address] C90.00    Intractable pain R52    Acute kidney injury ([address] N17.9    Pancytopenia ([address] D61.818    Constipation K59.00    Acute renal failure (ARF) (HCC) N17.9    Pain R52    MARY (acute kidney injury) ([address] N17.9     Past Medical History:   Diagnosis Date    Arrhythmia     LBBB    Arthritis     BMI 30.0-30.9,adult     BMI 30.5    Cardiomyopathy ([address]     follow[facility] by [facility]    Cholelithiases     Chronic systolic heart failure ([address] 9/13/2011    [place] [employer] class II-III heart failure symptoms    Gout     H/O: pituitary tumor     X2 benign, removed    High cholesterol     History of thyroid cancer     History of vertigo     no recent complications or episodes    Hx of radiation therapy to prostate 2004    Hyperlipidemia 11/18/2015    Hypertension     Hypothyroid     hypo- had portion of thyroid removed due to cancer    ICD (implantable cardiac defibrillator) in place 9/2011    [employer] BIV/ICD, Gen change 3.2.16    LBBB (left bundle branch block) 11/18/2015    Malaise and fatigue 11/18/2015    Malignant neoplasm of prostate (HCC)     Mass of colon     right colon mass    Sinus node dysfunction (HCC)       Family History   Problem Relation Age of Onset    Heart Disease Sister     Heart Attack Sister     Stroke Mother       Social History   Substance Use Topics    Smoking status: Never Smoker    Smokeless tobacco: Never Used    Alcohol use Yes Comment: very rare     Past Surgical History:   Procedure Laterality Date    HX CHOLECYSTECTOMY  2013    HX COLONOSCOPY      dx with Right colon mass     HX GI      benign polyps removed    HX HEART CATHETERIZATION      HX HEENT  2008    thyroidetomy partial tumor from pituitary x2    HX PACEMAKER  2011/2016    biotronik biv-icd    HX THYROIDECTOMY  2008    HX TUMOR REMOVAL  1990/2010    pituitary tumor removed X2      Prior to Admission medications    Medication Sig Start Date End Date Taking? Authorizing Provider   sacubitril-valsartan (ENTRESTO) 49 mg/51 mg tablet Take 1 Tab by mouth two (2) times a day. 11/8/17   Sarita Covington MD   apixaban (ELIQUIS) 5 mg tablet 1 BID  Indications: PREVENT THROMBOEMBOLISM IN CHRONIC ATRIAL FIBRILLATION 11/8/17   Sarita Covington MD   spironolactone (ALDACTONE) 25 mg tablet Take 1 Tab by mouth two (2) times a day. 11/8/17   Sarita Covington MD   torsemide (DEMADEX) 20 mg tablet Take 1 Tab by mouth daily. Indications: Edema, Pulmonary Edema due to Chronic Heart Failure 11/8/17   Sarita Covington MD   lenalidomide (REVLIMID) 10 mg cap Take 1 Cap by mouth daily. For 3 weeks and then off for 1 week. 10/26/17   Luann Edwards   ibuprofen (ADVIL) 200 mg tablet Take  by mouth. Historical Provider   dexamethasone (DECADRON) 4 mg tablet Take 10 tabs by mouth every 7 days when taking velcade 10/3/17   Trinity Medina MD   whey 9 gram pack Take  by mouth. Historical Provider   chlorhexidine (PERIDEX) 0.12 % solution 15 mL by Swish and Spit route two (2) times a day. Historical Provider   omeprazole (PRILOSEC) 20 mg capsule Take 20 mg by mouth daily. Historical Provider   cyanocobalamin (VITAMIN B-12) 1,000 mcg sublingual tablet Take 1,000 mcg by mouth daily. Historical Provider   cholecalciferol, vitamin D3, (VITAMIN D3) 2,000 unit tab Take  by mouth. Historical Provider   rosuvastatin (CRESTOR) 10 mg tablet Take 10 mg by mouth nightly.     Historical Provider   carvedilol (COREG) 25 mg tablet Take 25 mg by mouth two (2) times daily (with meals). Historical Provider   levothyroxine (SYNTHROID) 125 mcg tablet Take 100 mcg by mouth Daily (before breakfast). Historical Provider       Allergies   Allergen Reactions    Lisinopril Cough    Pcn [Penicillins] Swelling        Review of Systems:  A comprehensive review of systems was negative except for that written in the History of Present Illness. Objective:     Visit Vitals    /74 (BP 1 Location: Right arm, BP Patient Position: At rest;Sitting)    Pulse 65    Temp 97.8 °F (36.6 °C)    Resp 18    Wt 92.8 kg (204 lb 8 oz)    SpO2 96%    BMI 26.26 kg/m2     Temp (24hrs), Av.8 °F (36.6 °C), Min:97.3 °F (36.3 °C), Max:98.3 °F (36.8 °C)       Lines:  Peripheral IV:            Physical Exam:    General:  Alert, cooperative, well noursished, well developed, appears stated age   Eyes:  Sclera anicteric. Pupils equally round and reactive to light. Mouth/Throat: Mucous membranes normal, oral pharynx clear. L anterior neck enlarged with penrose in place. Incision site closed with sutures. Neck: Supple   Lungs:   Clear to auscultation bilaterally, good effort   CV:  Regular rate and rhythm,no murmur, click, rub or gallop   Abdomen:   Soft, non-tender. bowel sounds normal. non-distended   Extremities: No cyanosis.  Trace edema   Skin: Skin color, texture, turgor normal. no acute rash or lesions   Lymph nodes: Cervical and supraclavicular normal   Musculoskeletal: No swelling or deformity   Lines/Devices:  Intact, no erythema, drainage or tenderness   Psych: Alert and oriented, normal mood affect given the setting         Data Review:     CBC:Recent Labs      17   0633  17   0356  17   1154   WBC  5.1  5.7  7.1   GRANS  58  56  59   MONOS  13*  15*  14*   EOS  3  4   --    ANEU  2.9  3.3  4.7   ABL  1.1  1.3  1.4   HGB  10.1*  11.0*  12.3*   HCT  29.2*  31.4*  35.7*   PLT  146*  150  183       BMP:  Recent Labs      11/22/17   0633  11/21/17   0356  11/20/17   1154   CREA  1.26  1.51*  2.16*   BUN  26*  47*  58*   NA  141  141  139   K  4.0  4.1  4.4   CL  109*  108*  104   CO2  24  23  23   AGAP  8  10  12   GLU  96  117*  190*       LFTS:  Recent Labs      11/22/17   0633  11/21/17   0356  11/20/17   1154   TBILI  0.8  0.7  0.7   ALT  29  34  44   SGOT  24  26  35   AP  53  57  73   TP  5.6*  6.1*  7.3   ALB  2.3*  2.6*  3.0*       Microbiology:     All Micro Results     Procedure Component Value Units Date/Time    CULTURE, Mack Baumgarten STAIN [[id number]] Collected:  11/21/17 1445    Order Status:  Completed Specimen:  Wound from Wound Drainage Updated:  11/22/17 0811     Special Requests: --        NECK  LEFT       GRAM STAIN PENDING     Culture result: NO GROWTH 1 DAY       CULTURE, ANAEROBIC [[id number]] Collected:  11/21/17 1430    Order Status:  Completed Specimen:  Wound Drainage Updated:  11/21/17 1626    FUNGUS CULTURE AND SMEAR [[id number]] Collected:  11/21/17 1425    Order Status:  Completed Updated:  11/21/17 1528    CULTURE, FUNGUS [[id number]]     Order Status:  Sent Specimen:  Wound from Wound Drainage           Imaging:   CT C/A/P (10/9/17)-IMPRESSION:    1.  Innumerable lytic osseous lesions which are not felt to be significant  changed. The prior largest lesion in T10 demonstrates minimal peripheral  sclerosis which could indicate early healing. New vertebral body height  abnormalities are seen at T10 and T12 indicating likely pathologic compression  fractures. These are new since November 2016 although no definite acute features  are seen and therefore these are favored to be more subacute in nature.      2.  Stable post surgical changes suggesting a prior resection of the cecum. No  abnormal soft tissue is seen adjacent to the suture line to suggest locally  recurrent tumor.      3.  Enlarging enhancing masses in the left neck which may be related to the left  thyroid lobe although this is difficult to say with certainty. This is felt to  unlikely represent metastatic disease from colon cancer although could represent  soft tissue manifestation of multiple myeloma (i.e. plasmacytoma). A dedicated  contrasted CT scan of the neck would be recommended for further assessment.     Signed By: Kaylen Cervnates NP     November 22, 2017

## 2017-11-23 ENCOUNTER — APPOINTMENT (OUTPATIENT)
Dept: CT IMAGING | Age: 77
DRG: 580 | End: 2017-11-23
Attending: NURSE PRACTITIONER
Payer: MEDICARE

## 2017-11-23 LAB
ALBUMIN SERPL-MCNC: 2.2 G/DL (ref 3.2–4.6)
ALBUMIN/GLOB SERPL: 0.7 {RATIO} (ref 1.2–3.5)
ALP SERPL-CCNC: 51 U/L (ref 50–136)
ALT SERPL-CCNC: 26 U/L (ref 12–65)
ANION GAP SERPL CALC-SCNC: 11 MMOL/L (ref 7–16)
AST SERPL-CCNC: 24 U/L (ref 15–37)
BACTERIA SPEC CULT: NORMAL
BASOPHILS # BLD: 0 K/UL (ref 0–0.2)
BASOPHILS NFR BLD: 1 % (ref 0–2)
BILIRUB SERPL-MCNC: 0.8 MG/DL (ref 0.2–1.1)
BUN SERPL-MCNC: 15 MG/DL (ref 8–23)
CALCIUM SERPL-MCNC: 8.5 MG/DL (ref 8.3–10.4)
CHLORIDE SERPL-SCNC: 110 MMOL/L (ref 98–107)
CO2 SERPL-SCNC: 21 MMOL/L (ref 21–32)
COMMENT, 006485: NORMAL
CREAT SERPL-MCNC: 1.06 MG/DL (ref 0.8–1.5)
DIFFERENTIAL METHOD BLD: ABNORMAL
EOSINOPHIL # BLD: 0.2 K/UL (ref 0–0.8)
EOSINOPHIL NFR BLD: 4 % (ref 0.5–7.8)
ERYTHROCYTE [DISTWIDTH] IN BLOOD BY AUTOMATED COUNT: 14.9 % (ref 11.9–14.6)
GLOBULIN SER CALC-MCNC: 3 G/DL (ref 2.3–3.5)
GLUCOSE SERPL-MCNC: 78 MG/DL (ref 65–100)
GRAM STN SPEC: NORMAL
GRAM STN SPEC: NORMAL
HCT VFR BLD AUTO: 27.1 % (ref 41.1–50.3)
HGB BLD-MCNC: 9.4 G/DL (ref 13.6–17.2)
HIV 1+2 AB+HIV1 P24 AG SERPL QL IA: NON REACTIVE
IMM GRANULOCYTES # BLD: 0.2 K/UL (ref 0–0.5)
IMM GRANULOCYTES NFR BLD AUTO: 5 % (ref 0–5)
LYMPHOCYTES # BLD: 0.9 K/UL (ref 0.5–4.6)
LYMPHOCYTES NFR BLD: 24 % (ref 13–44)
MAGNESIUM SERPL-MCNC: 1.4 MG/DL (ref 1.8–2.4)
MCH RBC QN AUTO: 24 PG (ref 26.1–32.9)
MCHC RBC AUTO-ENTMCNC: 34.7 G/DL (ref 31.4–35)
MCV RBC AUTO: 69.1 FL (ref 79.6–97.8)
MONOCYTES # BLD: 0.5 K/UL (ref 0.1–1.3)
MONOCYTES NFR BLD: 14 % (ref 4–12)
NEUTS SEG # BLD: 2 K/UL (ref 1.7–8.2)
NEUTS SEG NFR BLD: 52 % (ref 43–78)
PLATELET # BLD AUTO: 133 K/UL (ref 150–450)
PMV BLD AUTO: 9.8 FL (ref 10.8–14.1)
POTASSIUM SERPL-SCNC: 3.8 MMOL/L (ref 3.5–5.1)
PROT SERPL-MCNC: 5.2 G/DL (ref 6.3–8.2)
RBC # BLD AUTO: 3.92 M/UL (ref 4.23–5.67)
SERVICE CMNT-IMP: NORMAL
SODIUM SERPL-SCNC: 142 MMOL/L (ref 136–145)
T GONDII IGG SERPL IA-ACNC: <3 IU/ML (ref 0–7.1)
T GONDII IGM SER IA-ACNC: <3 AU/ML (ref 0–7.9)
WBC # BLD AUTO: 3.8 K/UL (ref 4.3–11.1)

## 2017-11-23 PROCEDURE — 74011250636 HC RX REV CODE- 250/636: Performed by: NURSE PRACTITIONER

## 2017-11-23 PROCEDURE — 70491 CT SOFT TISSUE NECK W/DYE: CPT

## 2017-11-23 PROCEDURE — 74011000250 HC RX REV CODE- 250: Performed by: NURSE PRACTITIONER

## 2017-11-23 PROCEDURE — 83735 ASSAY OF MAGNESIUM: CPT | Performed by: NURSE PRACTITIONER

## 2017-11-23 PROCEDURE — 74011250637 HC RX REV CODE- 250/637: Performed by: INTERNAL MEDICINE

## 2017-11-23 PROCEDURE — 74011250637 HC RX REV CODE- 250/637: Performed by: NURSE PRACTITIONER

## 2017-11-23 PROCEDURE — 74011636320 HC RX REV CODE- 636/320: Performed by: INTERNAL MEDICINE

## 2017-11-23 PROCEDURE — 65270000029 HC RM PRIVATE

## 2017-11-23 PROCEDURE — 99233 SBSQ HOSP IP/OBS HIGH 50: CPT | Performed by: INTERNAL MEDICINE

## 2017-11-23 PROCEDURE — 74011000258 HC RX REV CODE- 258: Performed by: NURSE PRACTITIONER

## 2017-11-23 PROCEDURE — 74011000258 HC RX REV CODE- 258: Performed by: INTERNAL MEDICINE

## 2017-11-23 PROCEDURE — 80053 COMPREHEN METABOLIC PANEL: CPT | Performed by: NURSE PRACTITIONER

## 2017-11-23 PROCEDURE — 85025 COMPLETE CBC W/AUTO DIFF WBC: CPT | Performed by: NURSE PRACTITIONER

## 2017-11-23 PROCEDURE — 36415 COLL VENOUS BLD VENIPUNCTURE: CPT | Performed by: NURSE PRACTITIONER

## 2017-11-23 RX ORDER — SODIUM CHLORIDE 0.9 % (FLUSH) 0.9 %
10 SYRINGE (ML) INJECTION
Status: COMPLETED | OUTPATIENT
Start: 2017-11-23 | End: 2017-11-23

## 2017-11-23 RX ORDER — LANOLIN ALCOHOL/MO/W.PET/CERES
400 CREAM (GRAM) TOPICAL 4 TIMES DAILY
Status: DISCONTINUED | OUTPATIENT
Start: 2017-11-23 | End: 2017-11-24

## 2017-11-23 RX ADMIN — SODIUM CHLORIDE 900 MG: 900 INJECTION, SOLUTION INTRAVENOUS at 10:36

## 2017-11-23 RX ADMIN — SODIUM CHLORIDE 900 MG: 900 INJECTION, SOLUTION INTRAVENOUS at 01:25

## 2017-11-23 RX ADMIN — OXYCODONE HYDROCHLORIDE 10 MG: 5 TABLET ORAL at 10:33

## 2017-11-23 RX ADMIN — PANTOPRAZOLE SODIUM 40 MG: 40 TABLET, DELAYED RELEASE ORAL at 08:45

## 2017-11-23 RX ADMIN — SODIUM CHLORIDE 100 ML: 900 INJECTION, SOLUTION INTRAVENOUS at 09:04

## 2017-11-23 RX ADMIN — IOPAMIDOL 80 ML: 755 INJECTION, SOLUTION INTRAVENOUS at 09:03

## 2017-11-23 RX ADMIN — SODIUM CHLORIDE 900 MG: 900 INJECTION, SOLUTION INTRAVENOUS at 17:55

## 2017-11-23 RX ADMIN — Medication 400 MG: at 12:10

## 2017-11-23 RX ADMIN — Medication 400 MG: at 17:52

## 2017-11-23 RX ADMIN — APIXABAN 5 MG: 5 TABLET, FILM COATED ORAL at 17:52

## 2017-11-23 RX ADMIN — SODIUM CHLORIDE 75 ML/HR: 900 INJECTION, SOLUTION INTRAVENOUS at 21:36

## 2017-11-23 RX ADMIN — OXYCODONE HYDROCHLORIDE 10 MG: 5 TABLET ORAL at 23:12

## 2017-11-23 RX ADMIN — LEVOTHYROXINE SODIUM 100 MCG: 100 TABLET ORAL at 08:45

## 2017-11-23 RX ADMIN — SODIUM CHLORIDE 75 ML/HR: 900 INJECTION, SOLUTION INTRAVENOUS at 06:26

## 2017-11-23 RX ADMIN — Medication 10 ML: at 09:04

## 2017-11-23 RX ADMIN — Medication 400 MG: at 08:45

## 2017-11-23 RX ADMIN — APIXABAN 5 MG: 5 TABLET, FILM COATED ORAL at 08:44

## 2017-11-23 NOTE — PROGRESS NOTES
END OF SHIFT NOTE:    Intake/Output      Voiding: YES  Catheter: NO  Drain:   Penrose Drain 11/21/17 Left;Mid Neck (Active)   Site Assessment Clean, dry, & intact 11/21/2017  3:33 PM   Dressing Status Clean, dry, & intact 11/21/2017  3:33 PM               Stool:  0 occurrences. Stool Assessment  Stool Color: Gabriella Greenwood (per pt.) (11/21/17 0335)  Stool Appearance: Formed (per pt.) (11/21/17 0335)  Stool Amount: Small (per pt.) (11/21/17 0335)  Stool Source/Status: Rectum (11/21/17 0335)    Emesis:  0 occurrences. VITAL SIGNS  Patient Vitals for the past 12 hrs:   Temp Pulse Resp BP SpO2   11/23/17 0413 97.5 °F (36.4 °C) 72 14 120/57 95 %   11/22/17 2353 98.3 °F (36.8 °C) 75 16 109/59 96 %   11/22/17 1956 97.9 °F (36.6 °C) 68 18 112/62 97 %       Pain Assessment  Pain 1  Pain Scale 1: Numeric (0 - 10) (11/23/17 0714)  Pain Intensity 1: 0 (11/23/17 0714)  Patient Stated Pain Goal: 0 (11/22/17 2326)  Pain Reassessment 1: Patient sleeping (11/22/17 2359)  Pain Onset 1: pta (11/22/17 2326)  Pain Location 1: Neck (11/22/17 2326)  Pain Orientation 1: Posterior (11/22/17 2326)  Pain Description 1: Sharp (11/22/17 2326)  Pain Intervention(s) 1: Medication (see MAR) (11/22/17 2326)    Ambulating  Yes    Additional Information:    Shift report given to oncoming nurse at the bedside.     Arturo Pickering RN

## 2017-11-23 NOTE — PROGRESS NOTES
Old left neck dressing loose. Using aseptic technique,  Area cleanse with normal saline new 5.x  9 abd  dressing applied. Sutures and Pen-carolyn drain in place and intact.

## 2017-11-23 NOTE — PROGRESS NOTES
[facility]        Inpatient Hematology / Oncology Progress Note      Admission Date: 2017  2:39 PM  Reason for Admission/Hospital Course: Lymphadenopathy [R59.1]      24 Hour Events:  S/p excisional lymph node biopsy and drainage of abscess to left side of neck  BP improved off coreg  Pain generally controlled with PO oxycodone   CT showed abscess (but in area where gauze is packed)  Mass noted behind this area. LN biopsy showed inflammed connective tissue with areas of extensive necrosis and inflammation - no malignancy  On Clindamycin (D2)    ROS:  Constitutional: Negative for fever, chills, weakness, malaise, fatigue. CV: Negative for chest pain, palpitations, edema. Respiratory: Negative for dyspnea, cough, wheezing. GI: Negative for nausea, abdominal pain, diarrhea. Neuro: +headache    10 point review of systems is otherwise negative with the exception of the elements mentioned above in the HPI. Allergies   Allergen Reactions    Lisinopril Cough    Pcn [Penicillins] Swelling       OBJECTIVE:  Patient Vitals for the past 8 hrs:   BP Temp Pulse Resp SpO2   17 0824 119/68 98.1 °F (36.7 °C) 74 18 96 %   17 0413 120/57 97.5 °F (36.4 °C) 72 14 95 %     Temp (24hrs), Av °F (36.7 °C), Min:97.5 °F (36.4 °C), Max:98.3 °F (36.8 °C)         Physical Exam:  Constitutional: Well developed, well nourished male in no acute distress, sitting comfortably in the hospital bed. HEENT: Normocephalic and atraumatic. Oropharynx is clear, mucous membranes are moist. Area of swelling to left side of neck - firm to palpation +Dressing soak with serosanguinous fluid   Lymph node   Deferred   Skin Warm and dry. No bruising and no rash noted. No erythema. No pallor. Respiratory Lungs clear, normal air exchange without accessory muscle use. CVS Normal rate, regular rhythm and normal S1 and S2. No murmurs, gallops, or rubs.    Abdomen Soft, nontender and nondistended, normoactive bowel sounds. No palpable mass. No hepatosplenomegaly. Neuro Grossly nonfocal with no obvious sensory or motor deficits. MSK Normal range of motion in general.  No edema and no tenderness. Psych Appropriate mood and affect.         Labs:      Recent Labs      11/23/17   0534  11/22/17   0633  11/21/17   0356   WBC  3.8*  5.1  5.7   RBC  3.92*  4.22*  4.60   HGB  9.4*  10.1*  11.0*   HCT  27.1*  29.2*  31.4*   MCV  69.1*  69.2*  68.3*   MCH  24.0*  23.9*  23.9*   MCHC  34.7  34.6  35.0   RDW  14.9*  14.8*  14.8*   PLT  133*  146*  150   GRANS  52  58  56   LYMPH  24  21  23   MONOS  14*  13*  15*   EOS  4  3  4   BASOS  1  0   --    IG  5  5   --    DF  AUTOMATED  AUTOMATED  MANUAL   ANEU  2.0  2.9  3.3   ABL  0.9  1.1  1.3   ABM  0.5  0.7  0.9   REA  0.2  0.2  0.2   ABB  0.0  0.0   --    AIG  0.2  0.2   --         Recent Labs      11/23/17   0534  11/22/17   0633  11/21/17   0356   NA  142  141  141   K  3.8  4.0  4.1   CL  110*  109*  108*   CO2  21  24  23   AGAP  11  8  10   GLU  78  96  117*   BUN  15  26*  47*   CREA  1.06  1.26  1.51*   GFRAA  >60  >60  58*   GFRNA  >60  59*  48*   CA  8.5  8.5  8.9   SGOT  24  24  26   AP  51  53  57   TP  5.2*  5.6*  6.1*   ALB  2.2*  2.3*  2.6*   GLOB  3.0  3.3  3.5   AGRAT  0.7*  0.7*  0.7*   MG  1.4*  1.6*  1.6*     Imaging: n/a      ASSESSMENT:    Problem List  Date Reviewed: 11/13/2017          Codes Class Noted    Pain ICD-10-CM: R52  ICD-9-CM: 780.96  11/20/2017        MARY (acute kidney injury) ([address] ICD-10-CM: N17.9  ICD-9-CM: 584.9  11/20/2017        Acute renal failure (ARF) (HCC) ICD-10-CM: N17.9  ICD-9-CM: 584.9  11/18/2017        Intractable pain ICD-10-CM: R52  ICD-9-CM: 780.96  1/11/2017        Acute kidney injury ([facility] ICD-10-CM: N17.9  ICD-9-CM: 584.9  1/11/2017        Pancytopenia ([facility] ICD-10-CM: N52.265  ICD-9-CM: 284.19  1/11/2017        Constipation ICD-10-CM: K59.00  ICD-9-CM: 564.00  1/11/2017        Multiple myeloma (HCC) ICD-10-CM: C90.00  ICD-9-CM: 203.00  1/2/2017        Postural imbalance ICD-10-CM: R29.3  ICD-9-CM: 729.90  12/14/2016        Polyneuropathy ICD-10-CM: G62.9  ICD-9-CM: 356.9  12/14/2016        Fall ICD-10-CM: C65. Leroy Moody  ICD-9-CM: E888.9  12/14/2016        Encounter for medication management ICD-10-CM: Z79.899  ICD-9-CM: V58.69  12/14/2016        Urinary retention ICD-10-CM: R33.9  ICD-9-CM: 788.20  6/6/2016        Colonic mass ICD-10-CM: K63.9  ICD-9-CM: 569.89  3/23/2016        Hyperlipidemia ICD-10-CM: E78.5  ICD-9-CM: 272.4  11/18/2015        Vertigo ICD-10-CM: O36  ICD-9-CM: 780.4  11/18/2015        Malaise and fatigue ICD-10-CM: R53.81, R53.83  ICD-9-CM: 780.79  11/18/2015        Cardiomyopathy ([facility] ICD-10-CM: I42.9  ICD-9-CM: 425.4  11/18/2015        LBBB (left bundle branch block) ICD-10-CM: I44.7  ICD-9-CM: 426.3  11/18/2015        Arthritis ICD-10-CM: M19.90  ICD-9-CM: 716.90  Unknown        Arrhythmia ICD-10-CM: I49.9  ICD-9-CM: 427.9  Unknown    Overview Signed 6/18/2013  2:07 PM by Jose Tena MD     LBBB             Cholelithiases ICD-10-CM: G51.31  ICD-9-CM: 574.20  Unknown        Hx of radiation therapy to prostate ICD-10-CM: Z92.3  ICD-9-CM: V15.3  Unknown        Chronic systolic heart failure ([facility] ICD-10-CM: I50.22  ICD-9-CM: 428.22  9/13/2011        Automatic implantable cardioverter-defibrillator in situ ICD-10-CM: Z95.810  ICD-9-CM: V45.02  9/1/2011                PLAN:  Multiple Myeloma  - Revlimid/Dex       Left Neck Pain-related to Enlarged Lymph Node  -? Plasmacytoma, consult general surgery for biopsy, will start pain meds (OxyIR 10mg q4h, Morphine IV 2mg q3h prn)  11/21 Gen surgery planning biopsy later today. Appreciate the help! Continue pain meds PRN. 11/22 S/p excisional anterior chain lymph node biopsy and drainage of deep space abscess with drain placement by Dr. Wanda Swift 11/21.  ID consult pending for antibiotic recommendations  11/23 LN bx showed inflammed connective tissue with areas of extensive necrosis and inflammation with no malignancy. Wound cx-NGTD. CT neck completed. MARY  -closely monitor creatinine, gentle hydration with [email or web address]  11/21 Improved back to baseline at 1.51  29 [facility]  Encourage incentive spirometer     DVT prophylaxis-currently on Eliquis 5 mg BID for A Fib              [facility]  [address] [address]  Office : [phone]  Fax : [phone]     Attending Addendum:  I personally evaluated the patient with , and agree with the assessment, findings and plan as documented. VSS/afebrile. Appears well, heart regular without murmur, lungs clear, abdomen benign. I reviewed the CT of neck and will await surgical recommendations. Will likely need additional intervention. Pathology discussed with the pt. ID on board. MARY resolved.            [facility]  [address] [place] [address]  Office : [phone]  Fax : [phone]

## 2017-11-23 NOTE — PROGRESS NOTES
END OF SHIFT NOTE:    Intake/Output  11/23 0701 - 11/23 1900  In: 3306 [P.O.:480; I.V.:801]  Out: 100 [Urine:100]   Voiding: yes   Catheter: no   Drain:   Penrose Drain 11/21/17 Left;Mid Neck (Active)   Site Assessment Clean, dry, & intact 11/21/2017  3:33 PM   Dressing Status Clean, dry, & intact 11/21/2017  3:33 PM               Stool:   Stool this am per patient not seen by nurse occurrences. Stool Assessment  Stool Color: Walker Lacy (per pt.) (11/21/17 0335)  Stool Appearance: Formed (per pt.) (11/21/17 0335)  Stool Amount: Small (per pt.) (11/21/17 0335)  Stool Source/Status: Rectum (11/21/17 0335)    Emesis:  No occurrences. VITAL SIGNS  Patient Vitals for the past 12 hrs:   Temp Pulse Resp BP SpO2   11/23/17 1528 98.1 °F (36.7 °C) 80 16 117/60 99 %   11/23/17 1300 97.8 °F (36.6 °C) 77 18 109/59 96 %   11/23/17 0824 98.1 °F (36.7 °C) 74 18 119/68 96 %       Pain Assessment  Pain 1  Pain Scale 1: Numeric (0 - 10) (11/23/17 1400)  Pain Intensity 1: 0 (11/23/17 1400)  Patient Stated Pain Goal: 0 (11/22/17 2326)  Pain Reassessment 1: Patient sleeping (11/22/17 2359)  Pain Onset 1: pta (11/22/17 2326)  Pain Location 1: Head;Neck (11/23/17 1033)  Pain Orientation 1: Left;Posterior (11/23/17 1033)  Pain Description 1: Cathleen Rhein (11/23/17 1033)  Pain Intervention(s) 1: Medication (see MAR) (11/23/17 1033)    Ambulating  Yes to recliner and bathroom     Additional Information: Regular diet tolerate well. No complain of nausea. Medicated once this shift for pain with Oxycodone. Afebrile. Shift report given to oncoming nurse Mark Ledezma RN  at the bedside.     Uriel Anderson RN

## 2017-11-23 NOTE — PROGRESS NOTES
[facility]  Admission Date: 11/20/2017   POD: 2 Days Post-Op            Daily Progress Note: 11/23/2017  Subjective:   Pt sleeping comfortably,  Denies any shortness of breath, swallowing difficulty, or significant pain in his neck   No stridor or wheezing noted while he was sleeping or when he was awake   Objective:   AVSS  Physical Exam:          GEN: well developed and in no acute distress  HEENT:  PERRL, EOMI, no alar flaring or epistaxis, oral mucosa moist without cyanosis,   NECK: penrose drain in place, no drainage noted on bandage. Incisions clean and intact. Palpable lump with slight tracheal deviation to right noted. LUNGS: clear  HEART:  RRR  ABDOMEN:  soft with no tenderness; positive bowel sounds present  EXTREMITIES:  warm with no cyanosis,  SKIN:  no jaundice or ecchymosis,   NEURO:  alert and oriented, grossly non-focal      LAB  Recent Labs      11/23/17   0534  11/22/17   0633  11/21/17   0356   WBC  3.8*  5.1  5.7   HGB  9.4*  10.1*  11.0*   HCT  27.1*  29.2*  31.4*   PLT  133*  146*  150   NA  142  141  141   K  3.8  4.0  4.1   CL  110*  109*  108*   CO2  21  24  23   BUN  15  26*  47*   CREA  1.06  1.26  1.51*   MG  1.4*  1.6*  1.6*   GLU  78  96  117*   TBILI  0.8  0.8  0.7   SGOT  24  24  26   ALT  26  29  34   AP  51  53  57     CT NECK WITH CONTRAST.      HISTORY:  Status post lymph node biopsy and neck abscess with drain placement.     TECHNIQUE:  3.75 mm axial scans with coronal reconstruction from the obits to  the aortic arch following 80 cc intravenous contrast.  Intravenous contrast was  given to increase the sensitivity to neoplasia.     COMPARISON:  None.     FINDINGS:  There is a peripherally enhancing irregular 3.5 x 3.2 x 4.9 cm  abscess in the left neck. This just lateral to the thyroid cartilage. There is a  drain along the lateral margin of the abscess but not really within it. Trachea  is deviated rightward slightly.  There is a solid appearing mass inferior to the  abscess which measures 3.2 x 3.2 cm. This may represent an abnormal lymph node. This is just medial to the great vessels.     Included intracranial contents unremarkable. Globes and orbits are unremarkable. Included portion of the paranasal sinuses are clear. Mastoid air cells are  clear. Lung apices unremarkable. Superior mediastinum unremarkable.     IMPRESSION  IMPRESSION:  A 3.5 x 3.2 x 4.9 cm abscess in the left neck, just lateral to the  thyroid cartilage. The drain is along the lateral margin of the abscess. Inferior to the abscess is a 3.2 cm mass, neoplasm or abnormal lymph node. The  airways deviated rightward although widely patent. Assessment:     Hospital Problems  Date Reviewed: 11/13/2017          Codes Class Noted POA    Pain ICD-10-CM: R52  ICD-9-CM: 780.96  11/20/2017 Unknown        MARY (acute kidney injury) [facility]) ICD-10-CM: N17.9  ICD-9-CM: 584.9  11/20/2017 Unknown            Plan:     Pt comfortable but with today's CT scan findings I do not think it wise to send him home today. Will discuss with Dr Yassine Rosas - may need repeat washout of neck abscess.      José Miguel Win MD

## 2017-11-24 LAB
ALBUMIN SERPL-MCNC: 2.1 G/DL (ref 3.2–4.6)
ALBUMIN/GLOB SERPL: 0.6 {RATIO} (ref 1.2–3.5)
ALP SERPL-CCNC: 53 U/L (ref 50–136)
ALT SERPL-CCNC: 28 U/L (ref 12–65)
ANION GAP SERPL CALC-SCNC: 9 MMOL/L (ref 7–16)
AST SERPL-CCNC: 29 U/L (ref 15–37)
BASOPHILS # BLD: 0 K/UL (ref 0–0.2)
BASOPHILS NFR BLD: 1 % (ref 0–2)
BILIRUB SERPL-MCNC: 0.4 MG/DL (ref 0.2–1.1)
BUN SERPL-MCNC: 12 MG/DL (ref 8–23)
CALCIUM SERPL-MCNC: 7.8 MG/DL (ref 8.3–10.4)
CHLORIDE SERPL-SCNC: 109 MMOL/L (ref 98–107)
CO2 SERPL-SCNC: 24 MMOL/L (ref 21–32)
CREAT SERPL-MCNC: 1.07 MG/DL (ref 0.8–1.5)
DIFFERENTIAL METHOD BLD: ABNORMAL
EOSINOPHIL # BLD: 0.1 K/UL (ref 0–0.8)
EOSINOPHIL NFR BLD: 3 % (ref 0.5–7.8)
ERYTHROCYTE [DISTWIDTH] IN BLOOD BY AUTOMATED COUNT: 14.8 % (ref 11.9–14.6)
GLOBULIN SER CALC-MCNC: 3.3 G/DL (ref 2.3–3.5)
GLUCOSE SERPL-MCNC: 82 MG/DL (ref 65–100)
HCT VFR BLD AUTO: 28.3 % (ref 41.1–50.3)
HGB BLD-MCNC: 9.8 G/DL (ref 13.6–17.2)
IMM GRANULOCYTES # BLD: 0.1 K/UL (ref 0–0.5)
IMM GRANULOCYTES NFR BLD AUTO: 3 % (ref 0–5)
LYMPHOCYTES # BLD: 0.9 K/UL (ref 0.5–4.6)
LYMPHOCYTES NFR BLD: 23 % (ref 13–44)
MAGNESIUM SERPL-MCNC: 1.6 MG/DL (ref 1.8–2.4)
MCH RBC QN AUTO: 23.9 PG (ref 26.1–32.9)
MCHC RBC AUTO-ENTMCNC: 34.6 G/DL (ref 31.4–35)
MCV RBC AUTO: 69 FL (ref 79.6–97.8)
MONOCYTES # BLD: 0.5 K/UL (ref 0.1–1.3)
MONOCYTES NFR BLD: 12 % (ref 4–12)
NEUTS SEG # BLD: 2.4 K/UL (ref 1.7–8.2)
NEUTS SEG NFR BLD: 58 % (ref 43–78)
PLATELET # BLD AUTO: 156 K/UL (ref 150–450)
PMV BLD AUTO: 9.6 FL (ref 10.8–14.1)
POTASSIUM SERPL-SCNC: 3.6 MMOL/L (ref 3.5–5.1)
PROT SERPL-MCNC: 5.4 G/DL (ref 6.3–8.2)
RBC # BLD AUTO: 4.1 M/UL (ref 4.23–5.67)
SODIUM SERPL-SCNC: 142 MMOL/L (ref 136–145)
WBC # BLD AUTO: 4 K/UL (ref 4.3–11.1)

## 2017-11-24 PROCEDURE — 74011250636 HC RX REV CODE- 250/636: Performed by: NURSE PRACTITIONER

## 2017-11-24 PROCEDURE — 74011250637 HC RX REV CODE- 250/637: Performed by: NURSE PRACTITIONER

## 2017-11-24 PROCEDURE — 74011250636 HC RX REV CODE- 250/636: Performed by: INTERNAL MEDICINE

## 2017-11-24 PROCEDURE — 36415 COLL VENOUS BLD VENIPUNCTURE: CPT | Performed by: NURSE PRACTITIONER

## 2017-11-24 PROCEDURE — 74011000250 HC RX REV CODE- 250: Performed by: NURSE PRACTITIONER

## 2017-11-24 PROCEDURE — 85025 COMPLETE CBC W/AUTO DIFF WBC: CPT | Performed by: NURSE PRACTITIONER

## 2017-11-24 PROCEDURE — 83735 ASSAY OF MAGNESIUM: CPT | Performed by: NURSE PRACTITIONER

## 2017-11-24 PROCEDURE — 80053 COMPREHEN METABOLIC PANEL: CPT | Performed by: NURSE PRACTITIONER

## 2017-11-24 PROCEDURE — 65270000029 HC RM PRIVATE

## 2017-11-24 PROCEDURE — 74011000258 HC RX REV CODE- 258: Performed by: NURSE PRACTITIONER

## 2017-11-24 PROCEDURE — 99233 SBSQ HOSP IP/OBS HIGH 50: CPT | Performed by: INTERNAL MEDICINE

## 2017-11-24 RX ORDER — FLUTICASONE PROPIONATE 50 MCG
2 SPRAY, SUSPENSION (ML) NASAL DAILY
Status: DISCONTINUED | OUTPATIENT
Start: 2017-11-24 | End: 2017-11-26 | Stop reason: HOSPADM

## 2017-11-24 RX ORDER — MAGNESIUM SULFATE HEPTAHYDRATE 40 MG/ML
2 INJECTION, SOLUTION INTRAVENOUS ONCE
Status: COMPLETED | OUTPATIENT
Start: 2017-11-24 | End: 2017-11-24

## 2017-11-24 RX ADMIN — SODIUM CHLORIDE 900 MG: 900 INJECTION, SOLUTION INTRAVENOUS at 10:08

## 2017-11-24 RX ADMIN — LEVOTHYROXINE SODIUM 100 MCG: 100 TABLET ORAL at 08:48

## 2017-11-24 RX ADMIN — APIXABAN 5 MG: 5 TABLET, FILM COATED ORAL at 17:51

## 2017-11-24 RX ADMIN — OXYCODONE HYDROCHLORIDE 10 MG: 5 TABLET ORAL at 15:54

## 2017-11-24 RX ADMIN — OXYCODONE HYDROCHLORIDE 10 MG: 5 TABLET ORAL at 20:18

## 2017-11-24 RX ADMIN — PANTOPRAZOLE SODIUM 40 MG: 40 TABLET, DELAYED RELEASE ORAL at 08:50

## 2017-11-24 RX ADMIN — APIXABAN 5 MG: 5 TABLET, FILM COATED ORAL at 08:48

## 2017-11-24 RX ADMIN — FLUTICASONE PROPIONATE 2 SPRAY: 50 SPRAY, METERED NASAL at 17:51

## 2017-11-24 RX ADMIN — SODIUM CHLORIDE 75 ML/HR: 900 INJECTION, SOLUTION INTRAVENOUS at 10:07

## 2017-11-24 RX ADMIN — MAGNESIUM SULFATE HEPTAHYDRATE 2 G: 40 INJECTION, SOLUTION INTRAVENOUS at 08:50

## 2017-11-24 RX ADMIN — SODIUM CHLORIDE 900 MG: 900 INJECTION, SOLUTION INTRAVENOUS at 17:51

## 2017-11-24 RX ADMIN — SODIUM CHLORIDE 900 MG: 900 INJECTION, SOLUTION INTRAVENOUS at 01:35

## 2017-11-24 NOTE — PROGRESS NOTES
Met with patient at bedside. Patient is alert and oriented x3. Patient lives in own home with wife. Has 4 steps to enter home. Prior to admission patient was independent with adls and continues to drive. Patient has cane at home. Patient did state he was getting his lenalidomide 10mg from the [facility] pharmacy and would continue to need help paying for that medication. Notified LISA Morales at [facility] to follow up with patient for this need. Patient states he has used [facility] in the past and if indicated would like to use them again. Patient states dishcarge plan at this time is home. CM will continue to follow for discharge needs. Care Management Interventions  PCP Verified by CM:  Yes (patient states a couple weeks ago)  Transition of Care Consult (CM Consult): Discharge Planning  Discharge Durable Medical Equipment: No  Current Support Network: Lives with Spouse, Own Home  Confirm Follow Up Transport: Family  Plan discussed with Pt/Family/Caregiver: Yes  Freedom of Choice Offered: Yes  Discharge Location  Discharge Placement: Home with home health

## 2017-11-24 NOTE — PROGRESS NOTES
[facility]        Inpatient Hematology / Oncology Progress Note      Admission Date: 2017  2:39 PM  Reason for Admission/Hospital Course: Lymphadenopathy [R59.1]      24 Hour Events:  S/p excisional lymph node biopsy and drainage of abscess to left side of neck   Pain generally controlled with PO oxycodone    CT showed abscess (but in area where gauze is packed)  Mass noted behind this area - surgery following. LN biopsy showed inflammed connective tissue with areas of extensive necrosis and inflammation - no malignancy  On Clindamycin (D3)    ROS:  Constitutional: Negative for fever, chills, weakness, malaise, fatigue. CV: Negative for chest pain, palpitations, edema. Respiratory: Negative for dyspnea, cough, wheezing. GI: Negative for nausea, abdominal pain, diarrhea. 10 point review of systems is otherwise negative with the exception of the elements mentioned above in the HPI. Allergies   Allergen Reactions    Lisinopril Cough    Pcn [Penicillins] Swelling       OBJECTIVE:  Patient Vitals for the past 8 hrs:   BP Temp Pulse Resp SpO2   17 1050 139/76 97.4 °F (36.3 °C) 70 19 98 %   17 0647 139/57 98.3 °F (36.8 °C) 62 19 96 %     Temp (24hrs), Av.1 °F (36.7 °C), Min:97.4 °F (36.3 °C), Max:98.4 °F (36.9 °C)     0701 -  1900  In: -   Out: 100 [Urine:100]    Physical Exam:  Constitutional: Well developed, well nourished male in no acute distress, sitting comfortably in the hospital bed. HEENT: Normocephalic and atraumatic. Oropharynx is clear, mucous membranes are moist. Area of swelling to left side of neck - firm to palpation +Dressing C/D/I   Lymph node   Deferred   Skin Warm and dry. No bruising and no rash noted. No erythema. No pallor. Respiratory Lungs clear, normal air exchange without accessory muscle use. CVS Normal rate, regular rhythm and normal S1 and S2. No murmurs, gallops, or rubs.    Abdomen Soft, nontender and nondistended, normoactive bowel sounds. No palpable mass. No hepatosplenomegaly. Neuro Grossly nonfocal with no obvious sensory or motor deficits. MSK Normal range of motion in general.  No edema and no tenderness. Psych Appropriate mood and affect.         Labs:      Recent Labs      11/24/17   0600  11/23/17   0534  11/22/17   0633   WBC  4.0*  3.8*  5.1   RBC  4.10*  3.92*  4.22*   HGB  9.8*  9.4*  10.1*   HCT  28.3*  27.1*  29.2*   MCV  69.0*  69.1*  69.2*   MCH  23.9*  24.0*  23.9*   MCHC  34.6  34.7  34.6   RDW  14.8*  14.9*  14.8*   PLT  156  133*  146*   GRANS  58  52  58   LYMPH  23  24  21   MONOS  12  14*  13*   EOS  3  4  3   BASOS  1  1  0   IG  3  5  5   DF  AUTOMATED  AUTOMATED  AUTOMATED   ANEU  2.4  2.0  2.9   ABL  0.9  0.9  1.1   ABM  0.5  0.5  0.7   REA  0.1  0.2  0.2   ABB  0.0  0.0  0.0   AIG  0.1  0.2  0.2        Recent Labs      11/24/17   0600  11/23/17   0534  11/22/17   0633   NA  142  142  141   K  3.6  3.8  4.0   CL  109*  110*  109*   CO2  24  21  24   AGAP  9  11  8   GLU  82  78  96   BUN  12  15  26*   CREA  1.07  1.06  1.26   GFRAA  >60  >60  >60   GFRNA  >60  >60  59*   CA  7.8*  8.5  8.5   SGOT  29  24  24   AP  53  51  53   TP  5.4*  5.2*  5.6*   ALB  2.1*  2.2*  2.3*   GLOB  3.3  3.0  3.3   AGRAT  0.6*  0.7*  0.7*   MG  1.6*  1.4*  1.6*     Imaging: n/a      ASSESSMENT:    Problem List  Date Reviewed: 11/13/2017          Codes Class Noted    Pain ICD-10-CM: R52  ICD-9-CM: 780.96  11/20/2017        MARY (acute kidney injury) ([facility] ICD-10-CM: N17.9  ICD-9-CM: 584.9  11/20/2017        Acute renal failure (ARF) (HCC) ICD-10-CM: N17.9  ICD-9-CM: 584.9  11/18/2017        Intractable pain ICD-10-CM: R52  ICD-9-CM: 780.96  1/11/2017        Acute kidney injury ([facility] ICD-10-CM: N17.9  ICD-9-CM: 584.9  1/11/2017        Pancytopenia ([facility] ICD-10-CM: A06.387  ICD-9-CM: 284.19  1/11/2017        Constipation ICD-10-CM: K59.00  ICD-9-CM: 564.00  1/11/2017        Multiple myeloma (HCC) ICD-10-CM: C90.00  ICD-9-CM: 203.00  1/2/2017        Postural imbalance ICD-10-CM: R29.3  ICD-9-CM: 729.90  12/14/2016        Polyneuropathy ICD-10-CM: G62.9  ICD-9-CM: 356.9  12/14/2016        Fall ICD-10-CM: J30. Cliffan Hamper  ICD-9-CM: E888.9  12/14/2016        Encounter for medication management ICD-10-CM: Z79.899  ICD-9-CM: V58.69  12/14/2016        Urinary retention ICD-10-CM: R33.9  ICD-9-CM: 788.20  6/6/2016        Colonic mass ICD-10-CM: K63.9  ICD-9-CM: 569.89  3/23/2016        Hyperlipidemia ICD-10-CM: E78.5  ICD-9-CM: 272.4  11/18/2015        Vertigo ICD-10-CM: C04  ICD-9-CM: 780.4  11/18/2015        Malaise and fatigue ICD-10-CM: R53.81, R53.83  ICD-9-CM: 780.79  11/18/2015        Cardiomyopathy ([facility] ICD-10-CM: I42.9  ICD-9-CM: 425.4  11/18/2015        LBBB (left bundle branch block) ICD-10-CM: I44.7  ICD-9-CM: 426.3  11/18/2015        Arthritis ICD-10-CM: M19.90  ICD-9-CM: 716.90  Unknown        Arrhythmia ICD-10-CM: I49.9  ICD-9-CM: 427.9  Unknown    Overview Signed 6/18/2013  2:07 PM by Miroslava Pizarro MD     LBBB             Cholelithiases ICD-10-CM: Y50.55  ICD-9-CM: 574.20  Unknown        Hx of radiation therapy to prostate ICD-10-CM: Z92.3  ICD-9-CM: V15.3  Unknown        Chronic systolic heart failure ([facility] ICD-10-CM: I50.22  ICD-9-CM: 428.22  9/13/2011        Automatic implantable cardioverter-defibrillator in situ ICD-10-CM: Z95.810  ICD-9-CM: V45.02  9/1/2011                PLAN:  Multiple Myeloma  - Revlimid/Dex    11/24 Dex due today. Will hold given infection     Left Neck Pain-related to Enlarged Lymph Node  -? Plasmacytoma, consult general surgery for biopsy, will start pain meds (OxyIR 10mg q4h, Morphine IV 2mg q3h prn)  11/21 Gen surgery planning biopsy later today. Appreciate the help! Continue pain meds PRN. 11/22 S/p excisional anterior chain lymph node biopsy and drainage of deep space abscess with drain placement by Dr. Orion Noguera 11/21.  ID consult pending for antibiotic recommendations  11/23 LN bx showed inflammed connective tissue with areas of extensive necrosis and inflammation with no malignancy. Wound cx-NGTD. CT neck completed. 11/24 CT neck with large abscess again noted with a mass inferior to abscess. Await surgery recommendations regarding management. Continues clinda per ID. MARY  -closely monitor creatinine, gentle hydration with [email or web address]  11/21 Improved back to baseline at 1.51  RESOLVED. Stop fluids 11/24     Supportive Care  Continue Home Meds  Encourage incentive spirometer     DVT prophylaxis-currently on Eliquis 5 mg BID for A Fib              [facility]  [address] [address]  Office : [phone]  Fax : [phone]     Attending Addendum:  I personally evaluated the patient with Sudha Pierce NP, and agree with the assessment, findings and plan as documented. VSS, sitting in chair, appears well, heart regular without murmur, no stridor, no wheezing, lungs clear, abdomen benign. No complaints of difficulty breathing. On Rev/Dex - holding dexamethasone today. Appreciate surgical management. On clinda IV per ID.                    [facility]  [address] [facility], [address] [place] [address]  Office : [phone]  Fax : [phone]

## 2017-11-24 NOTE — PROGRESS NOTES
[address]  [address], [address] [facility], [address]      PLAN:Continue to observe until swelling resolved. Await pathology  Continue ABX      ASSESSMENT:  Admit Date: 11/20/2017   3 Days Post-Op  Procedure(s):  LEFT CERVICAL/NECK  NODE BIOPSY      Active Problems:    Pain (11/20/2017)      MARY (acute kidney injury) ([address] (11/20/2017)         SUBJECTIVE:Complains of neck pain although significantly improved since before surgery. No fever. No stridor. CT shows residual abscess and large neoplasm and/or lymph node. Reviewed surgery details. BELEN drain anterior to abscess. I do not feel comfortable sending him home. He looks good today sitting up in chair. Will discuss with Dr. Cathalene Blizzard if swelling becomes surgical over next few days. OBJECTIVE:  Constitutional: Alert oriented cooperative patient in no acute distress; appears stated age   Visit Vitals    /57 (BP 1 Location: Left arm)    Pulse 62    Temp 98.3 °F (36.8 °C)    Resp 19    Wt 214 lb 9.6 oz (97.3 kg)    SpO2 96%    BMI 27.55 kg/m2     Eyes:Sclera are clear. ENMT: no external lesions gross hearing normal; no ear or lip lesions  Neck:Penrose drain in place. Minimal drainage. Mild TTP. No stridor. CV: RRR. Normal perfusion  Resp: No JVD. Breathing is  non-labored; no audible wheezing. GI: soft no TTP    Musculoskeletal: unremarkable with normal function. No embolic signs or cyanosis.    Neuro:  Oriented; moves all 4; no focal deficits  Psychiatric: normal affect and mood, no memory impairment      Patient Vitals for the past 24 hrs:   BP Temp Pulse Resp SpO2 Weight   11/24/17 0647 139/57 98.3 °F (36.8 °C) 62 19 96 % -   11/24/17 0324 123/61 98.4 °F (36.9 °C) 72 14 96 % 214 lb 9.6 oz (97.3 kg)   11/24/17 0040 128/62 98.4 °F (36.9 °C) 70 14 96 % -   11/23/17 2030 114/49 98.2 °F (36.8 °C) 77 17 97 % -   11/23/17 1528 117/60 98.1 °F (36.7 °C) 80 16 99 % -   11/23/17 1300 109/59 97.8 °F (36.6 °C) 77 18 96 % -     Labs:  Recent Labs      11/24/17   0600   WBC  4.0*   HGB  9.8*   PLT  156   NA  142   K  3.6   CL  109*   CO2  24   BUN  12   CREA  1.07   GLU  82   TBILI  0.4   SGOT  29   ALT  28   AP  53         Celestia Hong Cheyanne, DO

## 2017-11-24 NOTE — PROGRESS NOTES
Problem: Falls - Risk of  Goal: *Absence of Falls  Document Bee Fall Risk and appropriate interventions in the flowsheet.    Outcome: Progressing Towards Goal  Fall Risk Interventions:  Mobility Interventions: Communicate number of staff needed for ambulation/transfer, Patient to call before getting OOB         Medication Interventions: Patient to call before getting OOB, Teach patient to arise slowly    Elimination Interventions: Call light in reach, Urinal in reach, Toileting schedule/hourly rounds, Patient to call for help with toileting needs

## 2017-11-24 NOTE — PROGRESS NOTES
END OF SHIFT NOTE:    Intake/Output  11/23 1901 - 11/24 0700  In: 1905 [P.O.:360; I.V.:976]  Out: 250 [Urine:250]   Voiding: YES  Catheter: NO  Drain:   Penrose Drain 11/21/17 Left;Mid Neck (Active)   Site Assessment Clean;Dry 11/24/2017  2:10 AM   Dressing Status Clean, dry, & intact 11/24/2017  2:10 AM   Drainage Description Other (Comment) 11/24/2017  2:10 AM               Stool:  0 occurrences. Stool Assessment  Stool Color: Haleigh Rawls (per pt.) (11/21/17 0335)  Stool Appearance: Other (comment) (per pt was loose this am) (11/23/17 1930)  Stool Amount: Small (per pt.) (11/21/17 0335)  Stool Source/Status: Rectum (11/21/17 0335)    Emesis:  0 occurrences. VITAL SIGNS  Patient Vitals for the past 12 hrs:   Temp Pulse Resp BP SpO2   11/24/17 0324 98.4 °F (36.9 °C) 72 14 123/61 96 %   11/24/17 0040 98.4 °F (36.9 °C) 70 14 128/62 96 %   11/23/17 2030 98.2 °F (36.8 °C) 77 17 114/49 97 %       Pain Assessment  Pain 1  Pain Scale 1: Numeric (0 - 10) (11/24/17 0210)  Pain Intensity 1: 0 (11/24/17 0210)  Patient Stated Pain Goal: 0 (11/24/17 0210)  Pain Reassessment 1: Patient sleeping (11/24/17 0012)  Pain Onset 1: this admit (11/23/17 2312)  Pain Location 1: Neck (11/23/17 2312)  Pain Orientation 1: Left (11/23/17 2312)  Pain Description 1: Aching (11/23/17 2312)  Pain Intervention(s) 1: Medication (see MAR); Environmental changes (11/23/17 2312)    Ambulating  Yes;from bed to bathroom    Additional Information:   Medicated for pain x 1 overnight. Dressing on neck remains dry/intact  Shift report given to oncoming nurse at the bedside.     Mahendra Hawk RN

## 2017-11-25 LAB
ALBUMIN SERPL-MCNC: 2.3 G/DL (ref 3.2–4.6)
ALBUMIN/GLOB SERPL: 0.7 {RATIO} (ref 1.2–3.5)
ALP SERPL-CCNC: 49 U/L (ref 50–136)
ALT SERPL-CCNC: 28 U/L (ref 12–65)
ANION GAP SERPL CALC-SCNC: 8 MMOL/L (ref 7–16)
AST SERPL-CCNC: 29 U/L (ref 15–37)
BASOPHILS # BLD: 0 K/UL (ref 0–0.2)
BASOPHILS NFR BLD: 1 % (ref 0–2)
BILIRUB SERPL-MCNC: 0.4 MG/DL (ref 0.2–1.1)
BUN SERPL-MCNC: 10 MG/DL (ref 8–23)
CALCIUM SERPL-MCNC: 8.2 MG/DL (ref 8.3–10.4)
CHLORIDE SERPL-SCNC: 107 MMOL/L (ref 98–107)
CO2 SERPL-SCNC: 24 MMOL/L (ref 21–32)
CREAT SERPL-MCNC: 1.06 MG/DL (ref 0.8–1.5)
DIFFERENTIAL METHOD BLD: ABNORMAL
EOSINOPHIL # BLD: 0.2 K/UL (ref 0–0.8)
EOSINOPHIL NFR BLD: 3 % (ref 0.5–7.8)
ERYTHROCYTE [DISTWIDTH] IN BLOOD BY AUTOMATED COUNT: 14.9 % (ref 11.9–14.6)
GLOBULIN SER CALC-MCNC: 3.4 G/DL (ref 2.3–3.5)
GLUCOSE SERPL-MCNC: 82 MG/DL (ref 65–100)
HCT VFR BLD AUTO: 27.9 % (ref 41.1–50.3)
HGB BLD-MCNC: 9.7 G/DL (ref 13.6–17.2)
IMM GRANULOCYTES # BLD: 0.1 K/UL (ref 0–0.5)
IMM GRANULOCYTES NFR BLD AUTO: 2 % (ref 0–5)
LYMPHOCYTES # BLD: 1.3 K/UL (ref 0.5–4.6)
LYMPHOCYTES NFR BLD: 25 % (ref 13–44)
MAGNESIUM SERPL-MCNC: 1.6 MG/DL (ref 1.8–2.4)
MCH RBC QN AUTO: 23.9 PG (ref 26.1–32.9)
MCHC RBC AUTO-ENTMCNC: 34.8 G/DL (ref 31.4–35)
MCV RBC AUTO: 68.7 FL (ref 79.6–97.8)
MONOCYTES # BLD: 0.7 K/UL (ref 0.1–1.3)
MONOCYTES NFR BLD: 14 % (ref 4–12)
NEUTS SEG # BLD: 3 K/UL (ref 1.7–8.2)
NEUTS SEG NFR BLD: 55 % (ref 43–78)
PLATELET # BLD AUTO: 162 K/UL (ref 150–450)
PMV BLD AUTO: 9.3 FL (ref 10.8–14.1)
POTASSIUM SERPL-SCNC: 3.6 MMOL/L (ref 3.5–5.1)
PROT SERPL-MCNC: 5.7 G/DL (ref 6.3–8.2)
RBC # BLD AUTO: 4.06 M/UL (ref 4.23–5.67)
SODIUM SERPL-SCNC: 139 MMOL/L (ref 136–145)
WBC # BLD AUTO: 5.3 K/UL (ref 4.3–11.1)

## 2017-11-25 PROCEDURE — 80053 COMPREHEN METABOLIC PANEL: CPT | Performed by: NURSE PRACTITIONER

## 2017-11-25 PROCEDURE — 74011250636 HC RX REV CODE- 250/636: Performed by: NURSE PRACTITIONER

## 2017-11-25 PROCEDURE — 74011250637 HC RX REV CODE- 250/637: Performed by: NURSE PRACTITIONER

## 2017-11-25 PROCEDURE — 83735 ASSAY OF MAGNESIUM: CPT | Performed by: NURSE PRACTITIONER

## 2017-11-25 PROCEDURE — 74011000250 HC RX REV CODE- 250: Performed by: NURSE PRACTITIONER

## 2017-11-25 PROCEDURE — 36415 COLL VENOUS BLD VENIPUNCTURE: CPT | Performed by: NURSE PRACTITIONER

## 2017-11-25 PROCEDURE — 74011250637 HC RX REV CODE- 250/637: Performed by: INTERNAL MEDICINE

## 2017-11-25 PROCEDURE — 74011250636 HC RX REV CODE- 250/636: Performed by: INTERNAL MEDICINE

## 2017-11-25 PROCEDURE — 85025 COMPLETE CBC W/AUTO DIFF WBC: CPT | Performed by: NURSE PRACTITIONER

## 2017-11-25 PROCEDURE — 74011000258 HC RX REV CODE- 258: Performed by: NURSE PRACTITIONER

## 2017-11-25 PROCEDURE — 99233 SBSQ HOSP IP/OBS HIGH 50: CPT | Performed by: INTERNAL MEDICINE

## 2017-11-25 PROCEDURE — 65270000029 HC RM PRIVATE

## 2017-11-25 RX ORDER — MAGNESIUM SULFATE HEPTAHYDRATE 40 MG/ML
2 INJECTION, SOLUTION INTRAVENOUS ONCE
Status: COMPLETED | OUTPATIENT
Start: 2017-11-25 | End: 2017-11-25

## 2017-11-25 RX ORDER — DOCUSATE SODIUM 100 MG/1
100 CAPSULE, LIQUID FILLED ORAL
Status: DISCONTINUED | OUTPATIENT
Start: 2017-11-25 | End: 2017-11-26 | Stop reason: HOSPADM

## 2017-11-25 RX ADMIN — MAGNESIUM SULFATE HEPTAHYDRATE 2 G: 40 INJECTION, SOLUTION INTRAVENOUS at 06:11

## 2017-11-25 RX ADMIN — PANTOPRAZOLE SODIUM 40 MG: 40 TABLET, DELAYED RELEASE ORAL at 08:06

## 2017-11-25 RX ADMIN — SODIUM CHLORIDE 900 MG: 900 INJECTION, SOLUTION INTRAVENOUS at 10:40

## 2017-11-25 RX ADMIN — OXYCODONE HYDROCHLORIDE 10 MG: 5 TABLET ORAL at 06:16

## 2017-11-25 RX ADMIN — MORPHINE SULFATE 2 MG: 10 INJECTION, SOLUTION INTRAMUSCULAR; INTRAVENOUS at 22:41

## 2017-11-25 RX ADMIN — SODIUM CHLORIDE 900 MG: 900 INJECTION, SOLUTION INTRAVENOUS at 01:06

## 2017-11-25 RX ADMIN — FLUTICASONE PROPIONATE 2 SPRAY: 50 SPRAY, METERED NASAL at 10:37

## 2017-11-25 RX ADMIN — OXYCODONE HYDROCHLORIDE 10 MG: 5 TABLET ORAL at 21:19

## 2017-11-25 RX ADMIN — DOCUSATE SODIUM 100 MG: 100 CAPSULE, LIQUID FILLED ORAL at 21:19

## 2017-11-25 RX ADMIN — APIXABAN 5 MG: 5 TABLET, FILM COATED ORAL at 17:25

## 2017-11-25 RX ADMIN — APIXABAN 5 MG: 5 TABLET, FILM COATED ORAL at 08:07

## 2017-11-25 RX ADMIN — LEVOTHYROXINE SODIUM 100 MCG: 100 TABLET ORAL at 08:06

## 2017-11-25 NOTE — PROGRESS NOTES
[facility]        Inpatient Hematology / Oncology Progress Note      Admission Date: 2017  2:39 PM  Reason for Admission/Hospital Course: Lymphadenopathy [R59.1]      24 Hour Events:  S/p excisional lymph node biopsy and drainage of abscess to left side of neck   Pain generally controlled with PO oxycodone    CT showed abscess (but in area where gauze is packed)  Mass noted behind this area - surgery following and consulted ENT. LN biopsy showed inflammed connective tissue with areas of extensive necrosis and inflammation - no malignancy  On Clindamycin (D3)    ROS:  Constitutional: Negative for fever, chills, weakness, malaise, fatigue. CV: Negative for chest pain, palpitations, edema. Respiratory: Negative for dyspnea, cough, wheezing. GI: Negative for nausea, abdominal pain, diarrhea. 10 point review of systems is otherwise negative with the exception of the elements mentioned above in the HPI. Allergies   Allergen Reactions    Lisinopril Cough    Pcn [Penicillins] Swelling       OBJECTIVE:  Patient Vitals for the past 8 hrs:   BP Temp Pulse Resp SpO2 Weight   17 0737 136/74 98.9 °F (37.2 °C) 77 18 95 % -   17 0354 121/70 98.3 °F (36.8 °C) 72 18 94 % -   17 0244 - - - - - 217 lb 8 oz (98.7 kg)     Temp (24hrs), Av.1 °F (36.7 °C), Min:97.4 °F (36.3 °C), Max:98.9 °F (37.2 °C)         Physical Exam:  Constitutional: Well developed, well nourished male in no acute distress, sitting comfortably in bedside chair. HEENT: Normocephalic and atraumatic. Oropharynx is clear, mucous membranes are moist. Area of swelling to left side of neck - firm to palpation +Dressing C/D/I   Lymph node   Deferred   Skin Warm and dry. No bruising and no rash noted. No erythema. No pallor. Respiratory Lungs clear, normal air exchange without accessory muscle use. CVS Normal rate, regular rhythm and normal S1 and S2. No murmurs, gallops, or rubs. Abdomen Soft, nontender and nondistended, normoactive bowel sounds. No palpable mass. No hepatosplenomegaly. Neuro Grossly nonfocal with no obvious sensory or motor deficits. MSK Normal range of motion in general.  No edema and no tenderness. Psych Appropriate mood and affect.         Labs:      Recent Labs      11/25/17 0453 11/24/17   0600  11/23/17   0534   WBC  5.3  4.0*  3.8*   RBC  4.06*  4.10*  3.92*   HGB  9.7*  9.8*  9.4*   HCT  27.9*  28.3*  27.1*   MCV  68.7*  69.0*  69.1*   MCH  23.9*  23.9*  24.0*   MCHC  34.8  34.6  34.7   RDW  14.9*  14.8*  14.9*   PLT  162  156  133*   GRANS  55  58  52   LYMPH  25  23  24   MONOS  14*  12  14*   EOS  3  3  4   BASOS  1  1  1   IG  2  3  5   DF  AUTOMATED  AUTOMATED  AUTOMATED   ANEU  3.0  2.4  2.0   ABL  1.3  0.9  0.9   ABM  0.7  0.5  0.5   REA  0.2  0.1  0.2   ABB  0.0  0.0  0.0   AIG  0.1  0.1  0.2        Recent Labs      11/25/17 0453 11/24/17   0600  11/23/17   0534   NA  139  142  142   K  3.6  3.6  3.8   CL  107  109*  110*   CO2  24  24  21   AGAP  8  9  11   GLU  82  82  78   BUN  10  12  15   CREA  1.06  1.07  1.06   GFRAA  >60  >60  >60   GFRNA  >60  >60  >60   CA  8.2*  7.8*  8.5   SGOT  29  29  24   AP  49*  53  51   TP  5.7*  5.4*  5.2*   ALB  2.3*  2.1*  2.2*   GLOB  3.4  3.3  3.0   AGRAT  0.7*  0.6*  0.7*   MG  1.6*  1.6*  1.4*     Imaging: n/a      ASSESSMENT:    Problem List  Date Reviewed: 11/13/2017          Codes Class Noted    Pain ICD-10-CM: R52  ICD-9-CM: 780.96  11/20/2017        MARY (acute kidney injury) ([facility] ICD-10-CM: N17.9  ICD-9-CM: 584.9  11/20/2017        Acute renal failure (ARF) ([facility] ICD-10-CM: N17.9  ICD-9-CM: 584.9  11/18/2017        Intractable pain ICD-10-CM: R52  ICD-9-CM: 780.96  1/11/2017        Acute kidney injury ([facility] ICD-10-CM: N17.9  ICD-9-CM: 584.9  1/11/2017        Pancytopenia ([facility] ICD-10-CM: Y12.250  ICD-9-CM: 284.19  1/11/2017        Constipation ICD-10-CM: K59.00  ICD-9-CM: 564.00  1/11/2017 Multiple myeloma ([facility] ICD-10-CM: C90.00  ICD-9-CM: 203.00  1/2/2017        Postural imbalance ICD-10-CM: R29.3  ICD-9-CM: 729.90  12/14/2016        Polyneuropathy ICD-10-CM: G62.9  ICD-9-CM: 356.9  12/14/2016        Fall ICD-10-CM: Y89. Leroy Moody  ICD-9-CM: E888.9  12/14/2016        Encounter for medication management ICD-10-CM: Z79.899  ICD-9-CM: V58.69  12/14/2016        Urinary retention ICD-10-CM: R33.9  ICD-9-CM: 788.20  6/6/2016        Colonic mass ICD-10-CM: K63.9  ICD-9-CM: 569.89  3/23/2016        Hyperlipidemia ICD-10-CM: E78.5  ICD-9-CM: 272.4  11/18/2015        Vertigo ICD-10-CM: B23  ICD-9-CM: 780.4  11/18/2015        Malaise and fatigue ICD-10-CM: R53.81, R53.83  ICD-9-CM: 780.79  11/18/2015        Cardiomyopathy ([facility] ICD-10-CM: I42.9  ICD-9-CM: 425.4  11/18/2015        LBBB (left bundle branch block) ICD-10-CM: I44.7  ICD-9-CM: 426.3  11/18/2015        Arthritis ICD-10-CM: M19.90  ICD-9-CM: 716.90  Unknown        Arrhythmia ICD-10-CM: I49.9  ICD-9-CM: 427.9  Unknown    Overview Signed 6/18/2013  2:07 PM by Jose Tena MD     LBBB             Cholelithiases ICD-10-CM: W36.17  ICD-9-CM: 574.20  Unknown        Hx of radiation therapy to prostate ICD-10-CM: Z92.3  ICD-9-CM: V15.3  Unknown        Chronic systolic heart failure ([facility] ICD-10-CM: I50.22  ICD-9-CM: 428.22  9/13/2011        Automatic implantable cardioverter-defibrillator in situ ICD-10-CM: Z95.810  ICD-9-CM: V45.02  9/1/2011                PLAN:  Multiple Myeloma  - Revlimid/Dex    11/24 Dex due today. Will hold given infection     Left Neck Pain-related to Enlarged Lymph Node  -? Plasmacytoma, consult general surgery for biopsy, will start pain meds (OxyIR 10mg q4h, Morphine IV 2mg q3h prn)  11/21 Gen surgery planning biopsy later today. Appreciate the help! Continue pain meds PRN. 11/22 S/p excisional anterior chain lymph node biopsy and drainage of deep space abscess with drain placement by Dr. Wanda Swift 11/21.  ID consult pending for antibiotic recommendations  11/23 LN bx showed inflammed connective tissue with areas of extensive necrosis and inflammation with no malignancy. Wound cx-NGTD. CT neck completed. 11/24 CT neck with large abscess again noted with a mass inferior to abscess. Await surgery recommendations regarding management. Continues clinda per ID.  11/25 surgery following. Consulted ENT to evaluate    MARY  -closely monitor creatinine, gentle hydration with [email or web address]  11/21 Improved back to baseline at 1.51  RESOLVED. Stop fluids 11/24     Supportive Care  Continue Home Meds  Encourage incentive spirometer     DVT prophylaxis-currently on Eliquis 5 mg BID for A Fib              Pollo Al NP   [place] [facility]  [address] [place] [address]  Office : [phone]  Fax : [phone]              Attending Addendum:  I personally evaluated the patient with Pollo Al NP , and agree with the assessment, findings and plan as documented. Appears well, heart regular without murmur, dressing in place over left neck, wound clean, drain in place and no erythema noted, lungs clear, abdomen benign, bilat LE edema unchanged. ENT to see pt for planning of intervention. ID following and recs appreciated.           Chiara Tinoco MD  [place] [facility]  [address] [place] [address]  Office : [phone]  Fax : [phone]

## 2017-11-25 NOTE — PROGRESS NOTES
END OF SHIFT NOTE:    Intake/Output  11/24 1901 - 11/25 0700  In: 236 [P.O.:120; I.V.:116]  Out: -    Voiding: YES  Catheter: NO  Drain:   Penrose Drain 11/21/17 Left;Mid Neck (Active)   Site Assessment Other (Comment) 11/24/2017  7:30 PM   Dressing Status Clean, dry, & intact 11/24/2017  7:30 PM   Drainage Description Other (Comment) 11/24/2017  7:30 PM               Stool:  0 occurrences. Stool Assessment  Stool Color: Ion Antes (per pt.) (11/21/17 0335)  Stool Appearance: Other (comment) (per pt was loose this am) (11/23/17 1930)  Stool Amount: Small (per pt.) (11/21/17 0335)  Stool Source/Status: Rectum (11/21/17 0335)    Emesis:  0 occurrences. VITAL SIGNS  Patient Vitals for the past 12 hrs:   Temp Pulse Resp BP SpO2   11/25/17 0354 98.3 °F (36.8 °C) 72 18 121/70 94 %   11/25/17 0009 98.3 °F (36.8 °C) 67 18 136/64 94 %   [birth year]/1939 97.6 °F (36.4 °C) 100 18 111/52 93 %       Pain Assessment  Pain 1  Pain Scale 1: Numeric (0 - 10) (11/25/17 0210)  Pain Intensity 1: 0 (11/25/17 0210)  Patient Stated Pain Goal: 0 (11/25/17 0210)  Pain Reassessment 1: Yes (11/24/17 2118)  Pain Onset 1: this admit (11/24/17 2018)  Pain Location 1: Arm;Head;Neck (11/24/17 2018)  Pain Orientation 1: Anterior (11/24/17 2018)  Pain Description 1: Oseas Alba (11/24/17 2018)  Pain Intervention(s) 1: Medication (see MAR); Environmental changes; Rest (11/24/17 2018)    Ambulating  Yes;from bed to bathroom    Additional Information:   Medicated x 2 w/ pain med overnight for neck pain. No new complaints. Magnesium replaced per protocol;via IV as pt refused po replacements;causing him diarrhea. Shift report given to oncoming nurse at the bedside.     Erich Jolley RN

## 2017-11-25 NOTE — PROGRESS NOTES
Left neck wound care, using aseptic technique area cleanse with Normal Saline sutures and penrose drain intact no active drainage or odor. Kerlix wrapped arouund  his neck  Loose but secure to prevent tape burns.

## 2017-11-25 NOTE — PROGRESS NOTES
Wound care provide using aseptic technique to left neck. Site cleanse with normal saline sutures  and penrose drain remain  intact no redness . 4 x 4 sterile dressing applied.

## 2017-11-25 NOTE — PROGRESS NOTES
[address]  [address], [address] [facility], [address]      PLAN:Consult ENT  Continue ABX  Patient will need I and D of abscess and possible resection of 3cm LN. ASSESSMENT:  Admit Date: 11/20/2017   4 Days Post-Op  Procedure(s):  LEFT CERVICAL/NECK  NODE BIOPSY      Active Problems:    Pain (11/20/2017)      MARY (acute kidney injury) ([address] (11/20/2017)         801 Count includes the [facility] patient for Dr. Regine Adams. No complaints today. Minimal Penrose drainage. VSS afebrile. No stridor or respiratory distress. Decreased erythema. Mild TTP. OBJECTIVE:  Constitutional: Alert oriented cooperative patient in no acute distress; appears stated age   Visit Vitals    /74 (BP 1 Location: Left arm, BP Patient Position: At rest)    Pulse 77    Temp 98.9 °F (37.2 °C)    Resp 18    Wt 217 lb 8 oz (98.7 kg)    SpO2 95%    BMI 27.93 kg/m2     Eyes:Sclera are clear. ENMT: no external lesions gross hearing normal; penrose drain in place without drainage. Mild TTP and fullness to left neck above drain. CV: RRR. Normal perfusion  Resp: No JVD. Breathing is  non-labored; no audible wheezing. GI: soft No TTP. Musculoskeletal: unremarkable with normal function. No embolic signs or cyanosis.    Neuro:  Oriented; moves all 4; no focal deficits  Psychiatric: normal affect and mood, no memory impairment      Patient Vitals for the past 24 hrs:   BP Temp Pulse Resp SpO2 Weight   11/25/17 0737 136/74 98.9 °F (37.2 °C) 77 18 95 % -   11/25/17 0354 121/70 98.3 °F (36.8 °C) 72 18 94 % -   11/25/17 0244 - - - - - 217 lb 8 oz (98.7 kg)   11/25/17 0009 136/64 98.3 °F (36.8 °C) 67 18 94 % -   11/1940 111/52 97.6 °F (36.4 °C) 100 18 93 % -   11/24/17 1432 124/58 98.3 °F (36.8 °C) 83 19 97 % -   11/24/17 1050 139/76 97.4 °F (36.3 °C) 70 19 98 % -     Labs:  Recent Labs      11/25/17   0453   WBC  5.3   HGB  9.7*   PLT  162   NA  139   K  3.6   CL  107   CO2  24   BUN  10 CREA  1.06   GLU  82   TBILI  0.4   SGOT  29   ALT  28   AP  49*         Felecia Nash Cheyanne, DO

## 2017-11-25 NOTE — PROGRESS NOTES
Problem: Falls - Risk of  Goal: *Absence of Falls  Document Bee Fall Risk and appropriate interventions in the flowsheet.    Outcome: Progressing Towards Goal  Fall Risk Interventions:  Mobility Interventions: Patient to call before getting OOB         Medication Interventions: Teach patient to arise slowly, Patient to call before getting OOB    Elimination Interventions: Call light in reach, Patient to call for help with toileting needs, Toilet paper/wipes in reach, Toileting schedule/hourly rounds, Urinal in reach

## 2017-11-25 NOTE — PROGRESS NOTES
Chart reviewed. Patient not examined. I will discontinue clindamycin per previously outlined plan.     Jerry Pallas, MD

## 2017-11-26 VITALS
HEART RATE: 74 BPM | SYSTOLIC BLOOD PRESSURE: 122 MMHG | OXYGEN SATURATION: 95 % | TEMPERATURE: 98.5 F | DIASTOLIC BLOOD PRESSURE: 64 MMHG | RESPIRATION RATE: 18 BRPM | WEIGHT: 214.8 LBS | BODY MASS INDEX: 27.58 KG/M2

## 2017-11-26 PROBLEM — I50.22 CHRONIC SYSTOLIC CHF (CONGESTIVE HEART FAILURE) (HCC): Status: ACTIVE | Noted: 2017-11-26

## 2017-11-26 PROBLEM — I48.0 PAF (PAROXYSMAL ATRIAL FIBRILLATION) (HCC): Status: ACTIVE | Noted: 2017-11-26

## 2017-11-26 LAB
ALBUMIN SERPL-MCNC: 2.2 G/DL (ref 3.2–4.6)
ALBUMIN/GLOB SERPL: 0.6 {RATIO} (ref 1.2–3.5)
ALP SERPL-CCNC: 49 U/L (ref 50–136)
ALT SERPL-CCNC: 26 U/L (ref 12–65)
ANION GAP SERPL CALC-SCNC: 8 MMOL/L (ref 7–16)
AST SERPL-CCNC: 29 U/L (ref 15–37)
ATRIAL RATE: 78 BPM
BASOPHILS # BLD: 0 K/UL (ref 0–0.2)
BASOPHILS NFR BLD: 1 % (ref 0–2)
BILIRUB SERPL-MCNC: 0.3 MG/DL (ref 0.2–1.1)
BUN SERPL-MCNC: 10 MG/DL (ref 8–23)
CALCIUM SERPL-MCNC: 8.1 MG/DL (ref 8.3–10.4)
CALCULATED P AXIS, ECG09: 37 DEGREES
CALCULATED R AXIS, ECG10: -27 DEGREES
CALCULATED T AXIS, ECG11: 126 DEGREES
CHLORIDE SERPL-SCNC: 107 MMOL/L (ref 98–107)
CO2 SERPL-SCNC: 25 MMOL/L (ref 21–32)
CREAT SERPL-MCNC: 1 MG/DL (ref 0.8–1.5)
DIAGNOSIS, 93000: NORMAL
DIFFERENTIAL METHOD BLD: ABNORMAL
EOSINOPHIL # BLD: 0.2 K/UL (ref 0–0.8)
EOSINOPHIL NFR BLD: 4 % (ref 0.5–7.8)
ERYTHROCYTE [DISTWIDTH] IN BLOOD BY AUTOMATED COUNT: 15 % (ref 11.9–14.6)
GLOBULIN SER CALC-MCNC: 3.5 G/DL (ref 2.3–3.5)
GLUCOSE SERPL-MCNC: 82 MG/DL (ref 65–100)
HCT VFR BLD AUTO: 27.5 % (ref 41.1–50.3)
HGB BLD-MCNC: 9.7 G/DL (ref 13.6–17.2)
IMM GRANULOCYTES # BLD: 0.1 K/UL (ref 0–0.5)
IMM GRANULOCYTES NFR BLD AUTO: 1 % (ref 0–5)
LYMPHOCYTES # BLD: 1.1 K/UL (ref 0.5–4.6)
LYMPHOCYTES NFR BLD: 29 % (ref 13–44)
MAGNESIUM SERPL-MCNC: 1.7 MG/DL (ref 1.8–2.4)
MCH RBC QN AUTO: 24.3 PG (ref 26.1–32.9)
MCHC RBC AUTO-ENTMCNC: 35.3 G/DL (ref 31.4–35)
MCV RBC AUTO: 68.8 FL (ref 79.6–97.8)
MONOCYTES # BLD: 0.4 K/UL (ref 0.1–1.3)
MONOCYTES NFR BLD: 12 % (ref 4–12)
NEUTS SEG # BLD: 1.9 K/UL (ref 1.7–8.2)
NEUTS SEG NFR BLD: 53 % (ref 43–78)
P-R INTERVAL, ECG05: 142 MS
PLATELET # BLD AUTO: 173 K/UL (ref 150–450)
PMV BLD AUTO: 9.7 FL (ref 10.8–14.1)
POTASSIUM SERPL-SCNC: 3.7 MMOL/L (ref 3.5–5.1)
PROT SERPL-MCNC: 5.7 G/DL (ref 6.3–8.2)
Q-T INTERVAL, ECG07: 464 MS
QRS DURATION, ECG06: 182 MS
QTC CALCULATION (BEZET), ECG08: 528 MS
RBC # BLD AUTO: 4 M/UL (ref 4.23–5.67)
SODIUM SERPL-SCNC: 140 MMOL/L (ref 136–145)
VENTRICULAR RATE, ECG03: 78 BPM
WBC # BLD AUTO: 3.7 K/UL (ref 4.3–11.1)

## 2017-11-26 PROCEDURE — 74011250636 HC RX REV CODE- 250/636: Performed by: NURSE PRACTITIONER

## 2017-11-26 PROCEDURE — 80053 COMPREHEN METABOLIC PANEL: CPT | Performed by: NURSE PRACTITIONER

## 2017-11-26 PROCEDURE — 85025 COMPLETE CBC W/AUTO DIFF WBC: CPT | Performed by: NURSE PRACTITIONER

## 2017-11-26 PROCEDURE — 36415 COLL VENOUS BLD VENIPUNCTURE: CPT | Performed by: NURSE PRACTITIONER

## 2017-11-26 PROCEDURE — 74011250637 HC RX REV CODE- 250/637: Performed by: PHYSICIAN ASSISTANT

## 2017-11-26 PROCEDURE — 74011250637 HC RX REV CODE- 250/637: Performed by: NURSE PRACTITIONER

## 2017-11-26 PROCEDURE — 99239 HOSP IP/OBS DSCHRG MGMT >30: CPT | Performed by: INTERNAL MEDICINE

## 2017-11-26 PROCEDURE — 93005 ELECTROCARDIOGRAM TRACING: CPT | Performed by: PHYSICIAN ASSISTANT

## 2017-11-26 PROCEDURE — 83735 ASSAY OF MAGNESIUM: CPT | Performed by: NURSE PRACTITIONER

## 2017-11-26 RX ORDER — CARVEDILOL 6.25 MG/1
6.25 TABLET ORAL 2 TIMES DAILY WITH MEALS
Qty: 60 TAB | Refills: 0 | Status: SHIPPED | OUTPATIENT
Start: 2017-11-26 | End: 2018-08-10 | Stop reason: SDUPTHER

## 2017-11-26 RX ORDER — SPIRONOLACTONE 25 MG/1
25 TABLET ORAL 2 TIMES DAILY
Qty: 30 TAB | Refills: 0 | Status: SHIPPED | OUTPATIENT
Start: 2017-11-26 | End: 2018-01-05

## 2017-11-26 RX ORDER — ENOXAPARIN SODIUM 100 MG/ML
40 INJECTION SUBCUTANEOUS ONCE
Status: COMPLETED | OUTPATIENT
Start: 2017-11-26 | End: 2017-11-26

## 2017-11-26 RX ORDER — OXYCODONE HYDROCHLORIDE 10 MG/1
10 TABLET ORAL
Qty: 45 TAB | Refills: 0 | Status: SHIPPED | OUTPATIENT
Start: 2017-11-26 | End: 2018-01-05

## 2017-11-26 RX ORDER — SPIRONOLACTONE 25 MG/1
25 TABLET ORAL 2 TIMES DAILY
Status: DISCONTINUED | OUTPATIENT
Start: 2017-11-26 | End: 2017-11-26 | Stop reason: HOSPADM

## 2017-11-26 RX ORDER — CARVEDILOL 6.25 MG/1
6.25 TABLET ORAL 2 TIMES DAILY WITH MEALS
Status: DISCONTINUED | OUTPATIENT
Start: 2017-11-26 | End: 2017-11-26 | Stop reason: HOSPADM

## 2017-11-26 RX ADMIN — SPIRONOLACTONE 25 MG: 25 TABLET ORAL at 14:00

## 2017-11-26 RX ADMIN — ENOXAPARIN SODIUM 40 MG: 40 INJECTION SUBCUTANEOUS at 09:49

## 2017-11-26 RX ADMIN — FLUTICASONE PROPIONATE 2 SPRAY: 50 SPRAY, METERED NASAL at 09:52

## 2017-11-26 RX ADMIN — PANTOPRAZOLE SODIUM 40 MG: 40 TABLET, DELAYED RELEASE ORAL at 09:49

## 2017-11-26 RX ADMIN — LEVOTHYROXINE SODIUM 100 MCG: 100 TABLET ORAL at 09:49

## 2017-11-26 RX ADMIN — STANDARDIZED SENNA CONCENTRATE AND DOCUSATE SODIUM 1 TABLET: 8.6; 5 TABLET, FILM COATED ORAL at 09:49

## 2017-11-26 NOTE — PROGRESS NOTES
PLAN:  Dressing change to neck/ penrose drain - clean with NS and cover with Dry gauze daily & prn   ENT surgery next week   Stop Eliquis for upcoming surgery  Lovenox 40mg sq today prior to DC  Cardiology consult for surgery clearance - pt known to Dr. Andrew Myers:  Admit Date: 11/20/2017   5 Days Post-Op  Procedure(s):  LEFT CERVICAL/NECK  NODE BIOPSY      Principal Problem:    Pain (11/20/2017)    Active Problems:    Multiple myeloma ([facility] (1/2/2017)      MARY (acute kidney injury) ([facility] (11/20/2017)      PAF (paroxysmal atrial fibrillation) ([facility] (11/26/2017)      Chronic systolic CHF (congestive heart failure) ([facility] (11/26/2017)       SUBJECTIVE:  Pt sitting up on side of the bed. No new complaints. States is going home later today and will follow up with ENT for surgery next week. AF, VSS. OBJECTIVE:  Constitutional: Alert, cooperative, no acute distress; appears stated age   Visit Vitals    /80 (BP 1 Location: Right arm, BP Patient Position: At rest;Sitting)    Pulse 78    Temp 97.6 °F (36.4 °C)    Resp 18    Wt 214 lb 12.8 oz (97.4 kg)    SpO2 94%    BMI 27.58 kg/m2     Eyes:Sclera are clear. ENMT: no external lesions gross hearing normal; no obvious neck masses, no ear or lip lesions  CV: RRR. Normal perfusion  Resp: No JVD. Breathing is  non-labored; no audible wheezing. GI: soft, non-tender, non-distended     Skin: dressing to neck c/d/i. Penrose drain present  Musculoskeletal: unremarkable with normal function. No embolic signs or cyanosis.    Neuro:  Oriented; moves all 4; no focal deficits  Psychiatric: normal affect and mood, no memory impairment      Patient Vitals for the past 24 hrs:   BP Temp Pulse Resp SpO2 Weight   11/26/17 0752 128/80 97.6 °F (36.4 °C) 78 18 94 % -   11/26/17 0450 137/68 98.2 °F (36.8 °C) 73 15 95 % -   11/26/17 0206 - - - - - 214 lb 12.8 oz (97.4 kg)   11/26/17 0105 116/64 97.9 °F (36.6 °C) 72 16 94 % -   11/25/17 1949 129/67 98.9 °F (37.2 °C) 71 18 97 % -   11/25/17 1620 132/64 98.4 °F (36.9 °C) 70 20 95 % -   11/25/17 1149 151/78 98 °F (36.7 °C) 69 20 95 % -     Labs:  Recent Labs      11/26/17   0445   WBC  3.7*   HGB  9.7*   PLT  173   NA  140   K  3.7   CL  107   CO2  25   BUN  10   CREA  1.00   GLU  82   TBILI  0.3   SGOT  29   ALT  26   AP  49*       Hayley Dumont, FNP-BC    The patient was seen in conjunction with Dr. Mary Rodriguez who independently evaluated the patient, reviewed the chart and agreed with the assessment and plan.

## 2017-11-26 NOTE — PROGRESS NOTES
END OF SHIFT NOTE:    Intake/Output      Voiding: yes   Catheter: no   Drain:   Penrose Drain 11/21/17 Left;Mid Neck (Active)   Site Assessment Other (Comment) 11/24/2017  7:30 PM   Dressing Status Clean, dry, & intact 11/24/2017  7:30 PM   Drainage Description Other (Comment) 11/24/2017  7:30 PM               Stool:  No  occurrences. Stool Assessment  Stool Color: Rosaura Jorge (per pt.) (11/21/17 0335)  Stool Appearance: Other (comment) (per pt was loose this am) (11/23/17 1930)  Stool Amount: Small (per pt.) (11/21/17 0335)  Stool Source/Status: Rectum (11/21/17 0335)    Emesis: no   occurrences. VITAL SIGNS  Patient Vitals for the past 12 hrs:   Temp Pulse Resp BP SpO2   11/25/17 1620 98.4 °F (36.9 °C) 70 20 132/64 95 %   11/25/17 1149 98 °F (36.7 °C) 69 20 151/78 95 %   11/25/17 0737 98.9 °F (37.2 °C) 77 18 136/74 95 %       Pain Assessment  Pain 1  Pain Scale 1: Numeric (0 - 10) (11/25/17 1157)  Pain Intensity 1: 0 (11/25/17 1157)  Patient Stated Pain Goal: 0 (11/25/17 0716)  Pain Reassessment 1: Yes (11/25/17 0716)  Pain Onset 1: this admit (11/25/17 0616)  Pain Location 1: Neck (11/25/17 0616)  Pain Orientation 1: Anterior (11/25/17 6363)  Pain Description 1: [place] (11/25/17 0616)  Pain Intervention(s) 1: Medication (see MAR); Environmental changes (11/25/17 0616)    Ambulating  Yes in hallway tolerate  well. Additional Information:  Possible discharge in am, patient has question on when  Eliquis  need to be held prior to surgery for Monday afternoon. Patient's Cardiologist is Dr. Patience Patricia. No complain of pain or nausea. Appetite fair. Shift report given to oncoming nurse  Arron Koenig RN  at the bedside.     Selvin Daley RN

## 2017-11-26 NOTE — DISCHARGE INSTRUCTIONS
DISCHARGE SUMMARY from Nurse    PATIENT INSTRUCTIONS:    After general anesthesia or intravenous sedation, for 24 hours or while taking prescription Narcotics:  · Limit your activities  · Do not drive and operate hazardous machinery  · Do not make important personal or business decisions  · Do  not drink alcoholic beverages  · If you have not urinated within 8 hours after discharge, please contact your surgeon on call. Report the following to your surgeon:  · Excessive pain, swelling, redness or odor of or around the surgical area  · Temperature over 100.5  · Nausea and vomiting lasting longer than 4 hours or if unable to take medications  · Any signs of decreased circulation or nerve impairment to extremity: change in color, persistent  numbness, tingling, coldness or increase pain  · Any questions    What to do at Home:  Recommended activity: Activity as tolerated. If you experience any of the following symptoms       1. Temperature greater than 99.5 (check temp every day)  2. Heart rate greater than 90 beats per minute  3. Increased respirations   4. Chills  5. Cough with any sputum (color: yellow, white, green, or bloody?)  6. Shortness of breath or change in breathing pattern  7. Bleeding:  Any prolonged bleeding presented from skin tears, cuts, bleeding from brushing teeth, nose bleed, bleeding noted in stool  8. Tenderness, swelling, or drainage from IV site or central line catheter    Diet:    Neutropenic Precautions  Thrombocytopenic Precautions    Follow-Up visits:  Babs Lindsay MD office #[phone]              , please follow up with your doctor. *  Please give a list of your current medications to your Primary Care Provider. *  Please update this list whenever your medications are discontinued, doses are      changed, or new medications (including over-the-counter products) are added.     *  Please carry medication information at all times in case of emergency situations. These are general instructions for a healthy lifestyle:    No smoking/ No tobacco products/ Avoid exposure to second hand smoke  Surgeon General's Warning:  Quitting smoking now greatly reduces serious risk to your health. Obesity, smoking, and sedentary lifestyle greatly increases your risk for illness    A healthy diet, regular physical exercise & weight monitoring are important for maintaining a healthy lifestyle    You may be retaining fluid if you have a history of heart failure or if you experience any of the following symptoms:  Weight gain of 3 pounds or more overnight or 5 pounds in a week, increased swelling in our hands or feet or shortness of breath while lying flat in bed. Please call your doctor as soon as you notice any of these symptoms; do not wait until your next office visit. Recognize signs and symptoms of STROKE:    F-face looks uneven    A-arms unable to move or move unevenly    S-speech slurred or non-existent    T-time-call 911 as soon as signs and symptoms begin-DO NOT go       Back to bed or wait to see if you get better-TIME IS BRAIN. Warning Signs of HEART ATTACK     Call 911 if you have these symptoms:   Chest discomfort. Most heart attacks involve discomfort in the center of the chest that lasts more than a few minutes, or that goes away and comes back. It can feel like uncomfortable pressure, squeezing, fullness, or pain.  Discomfort in other areas of the upper body. Symptoms can include pain or discomfort in one or both arms, the back, neck, jaw, or stomach.  Shortness of breath with or without chest discomfort.  Other signs may include breaking out in a cold sweat, nausea, or lightheadedness. Don't wait more than five minutes to call 911 - MINUTES MATTER! Fast action can save your life. Calling 911 is almost always the fastest way to get lifesaving treatment.  Emergency Medical Services staff can begin treatment when they arrive -- up to an hour sooner than if someone gets to the hospital by car. The discharge information has been reviewed with the patient. The patient verbalized understanding. Discharge medications reviewed with the patient and appropriate educational materials and side effects teaching were provided. ___________________________________________________________________________________________________________________________________     Neck Dissection: What to Expect at Home  Your Recovery  A neck dissection is surgery to remove all or some of the lymph nodes and surrounding tissue from the neck. Lymph nodes are small, round or bean-shaped glands that act like filters and remove germs from your body, help fight infection, and trap cancer cells. This surgery is most often done to treat cancer of the head and neck. You may leave the hospital with stitches in your cut (incision). Your doctor will tell you if you need to return to have these removed. You may still have a tube called a drain in your neck. Your doctor will probably take this out a few days after your surgery. The area may also be swollen, and you may have a stiff neck. For most people, the swelling starts to go away 4 to 5 days after surgery. You may have numbness in your neck and ear. Your lower lip or shoulder may feel weak. For most people, these problems go away in 6 to 12 months. But sometimes these problems can be permanent. You may always feel a little numb, stiff, or weak in some areas. If a neck muscle was removed, your neck may look flatter or thinner. If you have cancer, you may still need other treatment after surgery, such as radiation or chemotherapy. Having cancer is scary. You may feel many emotions and may need some help coping. Seek out family, friends, and counselors for support. You also can do things at home to make yourself feel better while you go through treatment.  Call the [employer] (8-926.971.7589) or visit its website at www.cancer. org for more information. This care sheet gives you a general idea about how long it will take for you to recover. But each person recovers at a different pace. Follow the steps below to get better as quickly as possible. How can you care for yourself at home? ? ? · Try to walk each day. Start by walking a little more than you did the day before. Bit by bit, increase the amount you walk. Walking boosts blood flow and helps prevent pneumonia and constipation. ? · Avoid strenuous physical activity, lifting heavy objects, and airplane travel for 3 weeks after surgery or until your doctor says it is okay. ? · Ask your doctor when you can drive again. ? · You may be able to take showers (unless you have a drain near your incision). If you do have a drain near your incision, follow your doctor's instructions to empty and care for it. Diet  ? · If it is painful to swallow, start out with cold drinks, Popsicles, and ice cream. Next, try soft foods like pudding, yogurt, canned or cooked fruit, scrambled eggs, and mashed potatoes. Avoid eating hard or scratchy foods like chips or raw vegetables. Avoid orange or tomato juice and other acidic foods that can sting the throat. ? · If you cough right after drinking, try drinking thicker liquids, such as \"smoothies. \"   ? · You may notice that your bowel movements are not regular right after your surgery. This is common. Try to avoid constipation and straining with bowel movements. You may want to take a fiber supplement every day. If you have not had a bowel movement after a couple of days, ask your doctor about taking a mild laxative. Medicines  ? · Your doctor will tell you if and when you can restart your medicines. He or she will also give you instructions about taking any new medicines. ? · If you take blood thinners, such as warfarin (Coumadin), clopidogrel (Plavix), or aspirin, be sure to talk to your doctor.  He or she will tell you if and when to start taking those medicines again. Make sure that you understand exactly what your doctor wants you to do. ? · Take pain medicines exactly as directed. ¨ If the doctor gave you a prescription medicine for pain, take it as prescribed. ¨ If you are not taking a prescription pain medicine, ask your doctor if you can take an over-the-counter medicine. ? · If you think your pain medicine is making you sick to your stomach:  ¨ Take your medicine after meals (unless your doctor has told you not to). ¨ Ask your doctor for a different pain medicine. ? · Your doctor may have prescribed antibiotics. Take them as directed. Do not stop taking them just because you feel better. You need to take the full course of antibiotics. Incision care  ? · If your doctor told you how to care for your incision, follow your doctor's instructions. If you did not get instructions, follow this general advice:  ¨ After the first 24 to 48 hours, wash around the incision with clean water 2 times a day. Don't use hydrogen peroxide or alcohol, which can slow healing. ? · You may have a drain near your incision. Your doctor will tell you how to take care of it. Exercise  ? · If you have trouble with shoulder and arm strength and movement, you may need physical therapy. Your doctor or physical therapist will help you with this. Follow-up care is a key part of your treatment and safety. Be sure to make and go to all appointments, and call your doctor if you are having problems. It's also a good idea to know your test results and keep a list of the medicines you take. When should you call for help? Call 911 anytime you think you may need emergency care. For example, call if:  ? · You passed out (lost consciousness). ? · You have sudden chest pain and shortness of breath, or you cough up blood. ? · You have trouble breathing.    ?Call your doctor now or seek immediate medical care if:  ? · You have loose stitches, or your incision comes open. ? · You have signs of infection, such as:  ¨ Increased pain, swelling, warmth, or redness. ¨ Red streaks leading from the incision. ¨ Pus draining from the incision. ¨ A fever. ? · Bleeding from your incision soaks through your bandages. ? Watch closely for changes in your health, and be sure to contact your doctor if:  ? · You do not get better as expected. Where can you learn more? Go to http://nakul-sharon.info/. Enter Q631 in the search box to learn more about \"Neck Dissection: What to Expect at Home. \"  Current as of: May 12, 2017  Content Version: 11.4  © 6115-6449 Healthwise, [employer]. Care instructions adapted under license by [employer] (which disclaims liability or warranty for this information). If you have questions about a medical condition or this instruction, always ask your healthcare professional. [address] any warranty or liability for your use of this information.

## 2017-11-26 NOTE — CONSULTS
[facility] Cardiology Consult                Date of  Admission: 11/20/2017  2:39 PM     Primary Care Physician: Dr Adalid Campbell  Primary Cardiologist: Dr Sharri Vidal  Referring Physician: Dr Karyle Aden  Consulting Physician: Dr Brittany Bae    CC/Reason for consult: CHF      Aurora Mercado is a 65 y.o. male admitted for Lymphadenopathy [R59.1]. He has a h/o colon cancer, tyroid cancer, multiple myeloma, PAF on eliquis and NICM w echo  showing EF 30-35% s/p Biotronink ICD. Remote LHC w normal coronaries. He was admitted 11-20 with neck pain and swelling. Biopsy showed purulent fluid. He was treated with clindamycin. Culture negative and antibiotics held. General surgery and ENT consulted and the patient is pending I&D of L neck abscess and resection of cervical lymph node. He has been off diuretics, BB and ARB but denies CP, SOB, dizziness, palpitations, syncope, LE edema. He had one episode last week where he felt like electricity shot through his whole body but is unsure whether this was his ICD discharging. Last interrogation 9-2017 showed no ventricular arrhythmias. EKG shows v paced at 78. /80, cr 1 (after hydration), CXR 11-15-17 showed faint ZARI opacity. He can walk one flight of stairs without difficulty, hasn't tried two but does not think it would cause him to have CP or SOB. Patient Active Problem List   Diagnosis Code    Chronic systolic heart failure (HCC) I50.22    Arthritis M19.90    Arrhythmia I49.9    Automatic implantable cardioverter-defibrillator in situ Z95.810    Cholelithiases K80.20    Hx of radiation therapy to prostate Z92.3    Hyperlipidemia E78.5    Vertigo R42    Malaise and fatigue R53.81, R53.83    Cardiomyopathy ([facility] [address] I42.9    LBBB (left bundle branch block) I44.7    Colonic mass K63.9    Urinary retention R33.9    Postural imbalance R29.3    Polyneuropathy G62.9    Fall W19. [place] Encounter for medication management Z79.899    Multiple myeloma ([facility] [address] C90.00    Intractable pain R52    Acute kidney injury ([facility] [address] N17.9    Pancytopenia (HCC) D61.818    Constipation K59.00    Acute renal failure (ARF) (HCC) N17.9    Pain R52    MARY (acute kidney injury) (HCC) N17.9    PAF (paroxysmal atrial fibrillation) (HCC) I48.0    Chronic systolic CHF (congestive heart failure) ([facility] I50.22       Past Medical History:   Diagnosis Date    Arrhythmia     LBBB    Arthritis     BMI 30.0-30.9,adult     BMI 30.5    Cardiomyopathy ([facility] [address]     followed by [facility]    Cholelithiases     Chronic systolic heart failure ([facility] [address] 9/13/2011    [place] class II-III heart failure symptoms    Gout     H/O: pituitary tumor     X2 benign, removed    High cholesterol     History of thyroid cancer     History of vertigo     no recent complications or episodes    Hx of radiation therapy to prostate 2004    Hyperlipidemia 11/18/2015    Hypertension     Hypothyroid     hypo- had portion of thyroid removed due to cancer    ICD (implantable cardiac defibrillator) in place 9/2011    Biotronik BIV/ICD, Gen change 3.2.16    LBBB (left bundle branch block) 11/18/2015    Malaise and fatigue 11/18/2015    Malignant neoplasm of prostate ([facility] [address]     Mass of colon     right colon mass    Sinus node dysfunction (HCC)       Past Surgical History:   Procedure Laterality Date    HX CHOLECYSTECTOMY  2013    HX COLONOSCOPY      dx with Right colon mass     HX GI      benign polyps removed    HX HEART CATHETERIZATION      HX HEENT  2008    thyroidetomy partial tumor from pituitary x2    HX PACEMAKER  2011/2016    biotronik biv-icd    HX THYROIDECTOMY  2008    HX TUMOR REMOVAL  1990/2010    pituitary tumor removed X2     Allergies   Allergen Reactions    Lisinopril Cough    Pcn [Penicillins] Swelling      Family History   Problem Relation Age of Onset    Heart Disease Sister     Heart Attack Sister     Stroke Mother       Social History   Substance Use Topics    Smoking status: Never Smoker    Smokeless tobacco: Never Used    Alcohol use Yes      Comment: very rare        Current Facility-Administered Medications   Medication Dose Route Frequency    enoxaparin (LOVENOX) injection 40 mg  40 mg SubCUTAneous ONCE    docusate sodium (COLACE) capsule 100 mg  100 mg Oral BID PRN    fluticasone (FLONASE) 50 mcg/actuation nasal spray 2 Spray  2 Spray Both Nostrils DAILY    [START ON 11/30/2017] lenalidomide cap 10 mg  10 mg Oral DAILY    HYDROmorphone (PF) (DILAUDID) injection 1 mg  1 mg IntraVENous Q4H PRN    HYDROcodone-acetaminophen (HYCET) 0.5-21.7 mg/mL oral solution 7.5 mg  7.5 mg Oral Q4H PRN    levothyroxine (SYNTHROID) tablet 100 mcg  100 mcg Oral ACB    pantoprazole (PROTONIX) tablet 40 mg  40 mg Oral ACB    oxyCODONE IR (ROXICODONE) tablet 10 mg  10 mg Oral Q4H PRN    morphine injection 2 mg  2 mg IntraVENous Q3H PRN    senna-docusate (PERICOLACE) 8.6-50 mg per tablet 1 Tab  1 Tab Oral DAILY       Review of Symptoms:  General: no weight change,  no weakness, fever or chills  Skin: no rashes, lumps, or other skin changes  HEENT: no headache, dizziness, neck swelling and pain   Neck: no swollen glands, goiter, pain or stiffness  Respiratory: no cough, sputum, hemoptysis, no dyspnea, wheezing  Cardiovascular: + as per HPI  Gastrointestinal: + GERD, constipation, diarrhea, liver problems, or h/o GI bleed  Urinary: no frequency, urgency , hematuria, burning/pain with urination, recent flank pain, polyuria, nocturia, or difficulty urinating  Peripheral Vascular: no claudication, leg cramps, prior DVTs, swelling of calves, legs, or feet, color change, or swelling with redness or tenderness  Musculoskeletal: no muscle or joint pain/stiffness, joint swelling, erythema of joints, or back pain  Psychiatric: no depression or excessive stress  Neurological: no sensory or motor loss, seizures, syncope, tremors, numbness, no dementia  Hematologic: no anemia, easy bruising or bleeding  Endocrine: + thyroid problems on synthroid, heat or cold intolerance, excessive sweating, polyuria, polydipsia, no  diabetes. Physical Exam  Vitals:    11/26/17 0105 11/26/17 0206 11/26/17 0450 11/26/17 0752   BP: 116/64  137/68 128/80   Pulse: 72  73 78   Resp: 16  15 18   Temp: 97.9 °F (36.6 °C)  98.2 °F (36.8 °C) 97.6 °F (36.4 °C)   SpO2: 94%  95% 94%   Weight:  97.4 kg (214 lb 12.8 oz)         Physical Exam:  General: Well Developed, Well Nourished, No Acute Distress  HEENT: pupils equal and round, no abnormalities noted  Neck: supple, dressing around neck   Heart: S1S2 irregularly irregular w ICD in chest wall   Lungs: Clear throughout auscultation bilaterally without adventitious sounds  Abd: soft, nontender, nondistended, with good bowel sounds  Ext: warm, 2+ edema only in ankles and feet   Skin: warm and dry  Psychiatric: Normal mood and affect  Neurologic: Alert and oriented X 3          Labs:   Recent Labs      11/26/17   0445  11/25/17   0453   NA  140  139   K  3.7  3.6   MG  1.7*  1.6*   BUN  10  10   CREA  1.00  1.06   GLU  82  82   WBC  3.7*  5.3   HGB  9.7*  9.7*   HCT  27.5*  27.9*   PLT  173  162        Assessment/Plan:     Assessment:   Pain- neck abscess and lymphadenopathy pending I&D per ENT. Pt at mild  increased risk for surgery due to CHF and a fib but no further work up required prior to non elective surgery. Will restart aldactone, BB (at lower dose), hold demadex and entresto and monitor volume status closely. Multiple myeloma - Chemo per onc. MARY (acute kidney injury)- Resolved. PAF (paroxysmal atrial fibrillation) - OK to hold eliquis for surgery, will cont BB for rate control (restart at lower dose as BP borderline). Chronic systolic CHF (congestive heart failure) - EF 30-35% on echo , ? ICD shock last week, euvolemic off diuretics with improved cr after hydration. Restart Aldactone, BB at lower dose, cont to hold demadex and entresto. Thank you very much for this referral. We appreciate the opportunity to participate in this patient's care. We will follow along with above stated plan.     Lacey Wang PA-C  Consulting MD: Tej Bull

## 2017-11-26 NOTE — PROGRESS NOTES
0700-Bedside report received from [facility]. Resting in bed. No needs voiced. No s/s of acute distress. 0815-Discussed planned but not scheduled (per pt) ENT surgery on Monday and need for anticoagulation. Ordered to stop Eliquis and give a one time dose of Lovenox today. 1007-Reports dsg over primrose drain is irriating. Requests for it to be changed. Only note a one time dsg change order for the 22. Teresa Reveles NP with Gen Surg paged to request orders to change dsg.   1022-POLI Sultana paged again. 1025-Orders to cleanse site with NS and cover with dry gauze received. 1105-Dsg to left neck at primrose drain site, cleaned with NS and dry gauze applied. Intact sutures noted. Tolerated well. 1416-Two coregs and aldactos on discharge med rec sheet. Prescription for 6.25 mg noted. Instructed pt to sub 6.25mg for 25 mg and disregard the 25 mg instruction. Instructed to call Dr. Pierre Acharya for follow up tomorrow and Dr. Vince Macias for any surgery questions. Numbers provided. I have reviewed discharge instructions with the patient. The patient verbalized understanding. Awaiting ride. 1450-Discharge to home via wheelchair. Accompanied to lobby by Ivory Keith CNA.

## 2017-11-26 NOTE — PROGRESS NOTES
END OF SHIFT NOTE:    Intake/Output  11/25 1901 - 11/26 0700  In: 240 [P.O.:240]  Out: 25 [Urine:25]   Voiding: YES  Catheter: NO  Drain:   Penrose Drain 11/21/17 Left;Mid Neck (Active)   Site Assessment Other (Comment) 11/24/2017  7:30 PM   Dressing Status Clean, dry, & intact 11/24/2017  7:30 PM   Drainage Description Other (Comment) 11/24/2017  7:30 PM               Stool:  0 occurrences. Stool Assessment  Stool Color: Malissa Civil (per pt.) (11/21/17 0335)  Stool Appearance: Other (comment) (per pt was loose this am) (11/23/17 1930)  Stool Amount: Small (per pt.) (11/21/17 0335)  Stool Source/Status: Rectum (11/21/17 0335)    Emesis:  0 occurrences. VITAL SIGNS  Patient Vitals for the past 12 hrs:   Temp Pulse Resp BP SpO2   11/26/17 0450 98.2 °F (36.8 °C) 73 15 137/68 95 %   11/26/17 0105 97.9 °F (36.6 °C) 72 16 116/64 94 %   11/25/17 1949 98.9 °F (37.2 °C) 71 18 129/67 97 %       Pain Assessment  Pain 1  Pain Scale 1: Visual (11/25/17 2341)  Pain Intensity 1: 0 (11/25/17 2341)  Patient Stated Pain Goal: 0 (11/25/17 0716)  Pain Reassessment 1: Patient sleeping (11/25/17 2341)  Pain Onset 1: now (11/25/17 2242)  Pain Location 1: Head (11/25/17 2242)  Pain Orientation 1: Mid (11/25/17 2242)  Pain Description 1: Dahl Sundeep (11/25/17 2242)  Pain Intervention(s) 1: Medication (see MAR) (11/25/17 2242)    Ambulating  Yes    Additional Information: Pt resting quietly at this time. Pain medication given x2 prn. No other complaints at this time. Shift report given to oncoming nurse at the bedside.     Flako Fagan RN

## 2017-11-26 NOTE — CONSULTS
Ears/Nose/Throat Consult    Subjective:     Date of Consultation:  November 25, 2017    Referring Physician:  Dr. Milli Samuel    History of Present Illness:   Patient is a 65 y.o.  male who is being seen for a left neck mass and abscess. He was admitted to the [facility] for Lymphadenopathy [R59.1]. He was following up with oncology and was found to have swelling of the left neck. He has a history of colon cancer and there was a concern due to the new masses. He states that he first noticed them a few months ago and it started as a small lump which has slowly grown. Upon admission on Monday, Dr. Himanshu Crocker was consulted and he had surgery on Tuesday to drain the left neck abscess. A drain was placed and he has done well after surgery. Pathology was benign. A CT was done after surgery which showed a 5 cm abscess and a 3.2 cm lymph node inferior to the abscess. This is medial to the jugular. There is no hoarseness, no dysphagia, minimal discomfort.       Patient Active Problem List    Diagnosis Date Noted    Pain 11/20/2017    MARY (acute kidney injury) ([address] 11/20/2017    Acute renal failure (ARF) ([address] 11/18/2017    Intractable pain 01/11/2017    Acute kidney injury ([address] 01/11/2017    Pancytopenia ([address] 01/11/2017    Constipation 01/11/2017    Multiple myeloma ([address] 01/02/2017    Postural imbalance 12/14/2016    Polyneuropathy 12/14/2016    Fall 12/14/2016    Encounter for medication management 12/14/2016    Urinary retention 06/06/2016    Colonic mass 03/23/2016    Hyperlipidemia 11/18/2015    Vertigo 11/18/2015    Malaise and fatigue 11/18/2015    Cardiomyopathy ([address] 11/18/2015    LBBB (left bundle branch block) 11/18/2015    Arthritis     Arrhythmia     Cholelithiases     Hx of radiation therapy to prostate     Chronic systolic heart failure ([address] 09/13/2011    Automatic implantable cardioverter-defibrillator in situ 09/01/2011     Past Medical History:   Diagnosis Date  Arrhythmia     LBBB    Arthritis     BMI 30.0-30.9,adult     BMI 30.5    Cardiomyopathy ([facility] [address]     followed by [facility]    Cholelithiases     Chronic systolic heart failure ([facility] [address] 9/13/2011    [place] class II-III heart failure symptoms    Gout     H/O: pituitary tumor     X2 benign, removed    High cholesterol     History of thyroid cancer     History of vertigo     no recent complications or episodes    Hx of radiation therapy to prostate 2004    Hyperlipidemia 11/18/2015    Hypertension     Hypothyroid     hypo- had portion of thyroid removed due to cancer    ICD (implantable cardiac defibrillator) in place 9/2011    Biotronik BIV/ICD, Gen change 3.2.16    LBBB (left bundle branch block) 11/18/2015    Malaise and fatigue 11/18/2015    Malignant neoplasm of prostate ([facility] [address]     Mass of colon     right colon mass    Sinus node dysfunction (HCC)       Family History   Problem Relation Age of Onset    Heart Disease Sister     Heart Attack Sister     Stroke Mother       Social History   Substance Use Topics    Smoking status: Never Smoker    Smokeless tobacco: Never Used    Alcohol use Yes      Comment: very rare     Past Surgical History:   Procedure Laterality Date    HX CHOLECYSTECTOMY  2013    HX COLONOSCOPY      dx with Right colon mass     HX GI      benign polyps removed    HX HEART CATHETERIZATION      HX HEENT  2008    thyroidetomy partial tumor from pituitary x2    HX PACEMAKER  2011/2016    biotronik biv-icd    HX THYROIDECTOMY  2008    HX TUMOR REMOVAL  1990/2010    pituitary tumor removed X2      Current Facility-Administered Medications   Medication Dose Route Frequency    docusate sodium (COLACE) capsule 100 mg  100 mg Oral BID PRN    fluticasone (FLONASE) 50 mcg/actuation nasal spray 2 Spray  2 Spray Both Nostrils DAILY    apixaban (ELIQUIS) tablet 5 mg  5 mg Oral BID    [START ON 11/30/2017] lenalidomide cap 10 mg  10 mg Oral DAILY    HYDROmorphone (PF) (DILAUDID) injection 1 mg  1 mg IntraVENous Q4H PRN    HYDROcodone-acetaminophen (HYCET) 0.5-21.7 mg/mL oral solution 7.5 mg  7.5 mg Oral Q4H PRN    levothyroxine (SYNTHROID) tablet 100 mcg  100 mcg Oral ACB    pantoprazole (PROTONIX) tablet 40 mg  40 mg Oral ACB    oxyCODONE IR (ROXICODONE) tablet 10 mg  10 mg Oral Q4H PRN    morphine injection 2 mg  2 mg IntraVENous Q3H PRN    senna-docusate (PERICOLACE) 8.6-50 mg per tablet 1 Tab  1 Tab Oral DAILY      Allergies   Allergen Reactions    Lisinopril Cough    Pcn [Penicillins] Swelling        Review of Systems:  Pertinent items are noted in the History of Present Illness. Objective:     Patient Vitals for the past 8 hrs:   BP Temp Pulse Resp SpO2   17 1949 129/67 98.9 °F (37.2 °C) 71 18 97 %   17 1620 132/64 98.4 °F (36.9 °C) 70 20 95 %     Temp (24hrs), Av.5 °F (36.9 °C), Min:98 °F (36.7 °C), Max:98.9 °F (37.2 °C)     0701 -  1900  In: 8604 [P.O.:480; I.V.:936]  Out: 700 [Urine:700]    Physical Exam:     Head  Head and Face - The head and face are atraumatic, normocephpalic. The salivary glands are intact and the facial appearance is symmetric. Head shape - No scars, lesions, or masses    Ear  Ear - Tympanic membranes are clear, the external auditory canal is without discharge and the tympanic membranes are mobile. There is no tympanic membrane erythema and no middle ear opacity is visualized. Pinna: bilateral - No hematomas or lacerations    Eye  Eyeball - bilateral - extraocular motions intact, equal in size and movement    Nose and Sinuses  Nose - mucosa is pink and the septum is midline. There are no nasal lesions and there was no turbinate hypertrophy. Mouth and Throat  Lips - upper lip - normal: no dryness, cracking, pallor, cyanosis, or vesicular eruption. Lower lip: normal: no dryness, cracking, pallor, cyanosis, or vesicular eruption.      Teeth and Gums - No bleeding, no inflammation or ulceration. Lips - Pink and symmetrical  Oral Cavity - Oral mucosa pink, soft and hard palates contiguous and tongue moist without ulcers. The mucosa is without ulcerations. No oral cavity masses present. Parotid Gland - Bilateral - Non tender, not swollen. Oropharynx - No discharge or Erythema  Nasopharynx - Non obstructed, mucosa pink and moist.    Hypopharynx - No erythema  Submandiular Gland - Non tender, not swollen. Tonsils - Normal    Neck   Neck - Full range of motion and Supple. Mildly tender on the left to palpation.   + Masses left neck    Trachea - Midline. Thyroid - Gland - Symmetric. Non Tender. Nodules - No nodules. Neurologic - II - XII Grossly intact bilaterally    Cardiac  Inspection - Jugular Vein:  Bilateral - non distended, no prominent pulsations    Chest and Lung  Inspection - Movements:  Chest symmetrical with bilateral expansion, respirations even and non labored          Assessment:     1. Left Neck Abscess  2. Cervical Adenopthy    Plan:     Pt and wife counseled. My recommendation is that he undergo an Incision and drainage of the left neck abscess and excision of a left deep cervical lymph node. The procedure, risks and benefits were discussed with them in the room and they were agreeable to thte surgery. The patient will likely get discharged and will follow up to get this done at [facility] on Monday. All questions answered.         Signed By: Driss Carr DO     November 25, 2017

## 2017-11-26 NOTE — PROGRESS NOTES
Problem: Falls - Risk of  Goal: *Absence of Falls  Outcome: Resolved/Met Date Met: 11/26/17  Fall Risk Interventions:  Mobility Interventions: Assess mobility with egress test         Medication Interventions: Patient to call before getting OOB    Elimination Interventions: Call light in reach

## 2017-11-26 NOTE — DISCHARGE SUMMARY
[facility]: Inpatient Hematology / Oncology Discharge Summary Note    Patient ID:  Sehreen Geller  [id number]  65 y.o.  1940    Admit Date: 11/20/2017    Discharge Date: 11/26/2017    Admission Diagnoses: Lymphadenopathy [R59.1]    Discharge Diagnoses:  Principal Diagnosis: Pain  Principal Problem:    Pain (11/20/2017)    Active Problems:    Multiple myeloma ([facility] (1/2/2017)      MARY (acute kidney injury) ([facility] (11/20/2017)      PAF (paroxysmal atrial fibrillation) ([facility] (11/26/2017)      Chronic systolic CHF (congestive heart failure) ([facility] (11/26/2017)        Hospital Course:  Mr. Prasanth Leung is a 65 y.o. male admitted on 11/20/2017 with a primary diagnosis of MARY and Left neck pain.       Mr. Bashir, a patient of Dr Laura Suárez, has a history of resected colon cancer in March 2016. In December of 2016 he was diagnosed with Multiple Myeloma, bone marrow biopsy confirmed extensive involvement with clonal plasma cells with complex cytogenetics by FISH. He was offered RVD off protocol as this appears to be an aggressive plasma cell dyscrasia.  He was admitted 1/11/17 for worsening pain, dehydration.  He was found to have evidence of pancreatitis with elevation of amylase and lipase, improved with fluids. Velcade was thought to be the cause therefore a change to Rev/Dex was made. He also had a history of thyroid cancer, unsure of type and how long ago. He was seen in follow up with Dr Laura Suárez last week and states worsening fatigue and progressive swelling in the left neck for the past few weeks. He had a CT at [facility] two weeks prior. His labs on 11/13 showed a creatinine of 1.52, it continued to rise to 3.68 on 11/17 and today is 2.16. He states he has been drinking a lot of water, but has not been eating well and has lost 6 pounds in the past week. Of note, on 11/17 his Kappa chain was 23.21 and Lambda 153, up from 4 days prior with Kappa 10.84 and Lambda of 55.79.   M-spike of 0.51, up from 0.31 on 11/13 as well.       He was brought in to clinic 11/20  For recheck of labs (cardiology held his diuretics and Entresto for the 3 days) and he complained of  his neck swelling and pain. Last week he was referred back to Dr Ny Dutta for a lymph node biopsy but earliest appointment is in early December. Because of his progressive pain, concern for the progressively enlarged node interfering with his airway and the need for the biopsy, he has agreed to be admitted to accomplish these goals. Remainder of hospital stay is as follows:       Left Neck Pain-related to Enlarged Lymph Node  -? Plasmacytoma, consult general surgery for biopsy, will start pain meds (OxyIR 10mg q4h, Morphine IV 2mg q3h prn)  11/21 Gen surgery planning biopsy later today. Appreciate the help! Continue pain meds PRN. 11/22 S/p excisional anterior chain lymph node biopsy and drainage of deep space abscess with drain placement by Dr. Carola Rush 11/21. ID consult pending for antibiotic recommendations  11/23 LN bx showed inflammed connective tissue with areas of extensive necrosis and inflammation with no malignancy. Wound cx-NGTD. CT neck completed. 11/24 CT neck with large abscess again noted with a mass inferior to abscess. Await surgery recommendations regarding management. Continues clinda per ID.  11/25 surgery following. Consulted ENT to evaluate  11/26 ENT planning to do an Incision and drainage of the left neck abscess and excision of a left deep cervical lymph node for Monday at [address].      MARY  -closely monitor creatinine, gentle hydration with [email or web address]  11/21 Improved back to baseline at 1.51  RESOLVED. Stop fluids 11/24    Patient is feeling well and is ready for discharge. He will follow up with Dr. Bib Fitch next week. His Eliquis is being held for procedure.    Consults:  IP CONSULT TO GENERAL SURGERY  IP CONSULT TO INFECTIOUS DISEASES  IP CONSULT TO OTOLARYNGOLOGY  IP CONSULT TO CARDIOLOGY    Pertinent Diagnostic Studies:   Labs:    Recent Labs      17   0445  17   0453  17   0600   WBC  3.7*  5.3  4.0*   HGB  9.7*  9.7*  9.8*   PLT  173  162  156   ANEU  1.9  3.0  2.4    Recent Labs      17   0445  17   0453  17   0600   NA  140  139  142   K  3.7  3.6  3.6   CL  107  107  109*   CO2    24  24   GLU  82  82  82   BUN  10  10  12   CREA  1.00  1.06  1.07   CA  8.1*  8.2*  7.8*   SGOT  29  29  29   AP  49*  49*  53   TP  5.7*  5.7*  5.4*   ALB  2.2*  2.3*  2.1*   MG  1.7*  1.6*  1.6*     OBJECTIVE:  Patient Vitals for the past 8 hrs:   BP Temp Pulse Resp SpO2   17 1149 122/64 98.5 °F (36.9 °C) 74 18 95 %   17 0752 128/80 97.6 °F (36.4 °C) 78 18 94 %     Temp (24hrs), Av.3 °F (36.8 °C), Min:97.6 °F (36.4 °C), Max:98.9 °F (37.2 °C)         Physical Exam:  Constitutional: Well developed, well nourished male in no acute distress, sitting comfortably in bed   HEENT: Normocephalic and atraumatic. Oropharynx is clear, mucous membranes are moist. Extraocular muscles are intact. Sclerae anicteric. Skin Warm and dry. No bruising and no rash noted. No erythema. No pallor. Respiratory Lungs are clear to auscultation bilaterally without wheezes, rales or rhonchi, normal air exchange without accessory muscle use. CVS Normal rate, regular rhythm and normal S1 and S2. No murmurs, gallops, or rubs. Abdomen Soft, nontender and nondistended, normoactive bowel sounds. Neuro Grossly nonfocal with no obvious sensory or motor deficits. MSK Normal range of motion in general.  No edema and no tenderness. Psych Appropriate mood and affect.         ASSESSMENT:    Principal Problem:    Pain (2017)    Active Problems:    Multiple myeloma ([facility] (2017)      MARY (acute kidney injury) ([facility] (2017)      PAF (paroxysmal atrial fibrillation) ([facility] (2017)      Chronic systolic CHF (congestive heart failure) ([facility] (2017)      Discharge Medication List as of 11/26/2017  1:56 PM      START taking these medications    Details   oxyCODONE IR (ROXICODONE) 10 mg tab immediate release tablet Take 1 Tab by mouth every four (4) hours as needed. Max Daily Amount: 60 mg., Print, Disp-45 Tab, R-0      !! carvedilol (COREG) 6.25 mg tablet Take 1 Tab by mouth two (2) times daily (with meals). , Normal, Disp-60 Tab, R-0      !! spironolactone (ALDACTONE) 25 mg tablet Take 1 Tab by mouth two (2) times a day., Normal, Disp-30 Tab, R-0       !! - Potential duplicate medications found. Please discuss with provider. CONTINUE these medications which have NOT CHANGED    Details   sacubitril-valsartan (ENTRESTO) 49 mg/51 mg tablet Take 1 Tab by mouth two (2) times a day., Print, Disp-180 Tab, R-3      !! spironolactone (ALDACTONE) 25 mg tablet Take 1 Tab by mouth two (2) times a day., Print, Disp-180 Tab, R-3      torsemide (DEMADEX) 20 mg tablet Take 1 Tab by mouth daily. Indications: Edema, Pulmonary Edema due to Chronic Heart Failure, Normal, Disp-30 Tab, R-11      lenalidomide (REVLIMID) 10 mg cap Take 1 Cap by mouth daily. For 3 weeks and then off for 1 week., Print, Disp-21 Cap, R-0      ibuprofen (ADVIL) 200 mg tablet Take  by mouth., Historical Med      dexamethasone (DECADRON) 4 mg tablet Take 10 tabs by mouth every 7 days when taking velcade, Normal, Disp-40 Tab, R-4      whey 9 gram pack Take  by mouth., Historical Med      chlorhexidine (PERIDEX) 0.12 % solution 15 mL by Swish and Spit route two (2) times a day., Historical Med      omeprazole (PRILOSEC) 20 mg capsule Take 20 mg by mouth daily. , Historical Med      cyanocobalamin (VITAMIN B-12) 1,000 mcg sublingual tablet Take 1,000 mcg by mouth daily. , Historical Med      cholecalciferol, vitamin D3, (VITAMIN D3) 2,000 unit tab Take  by mouth., Historical Med      rosuvastatin (CRESTOR) 10 mg tablet Take 10 mg by mouth nightly., Historical Med      !! carvedilol (COREG) 25 mg tablet Take 25 mg by mouth two (2) times daily (with meals). , Historical Med      levothyroxine (SYNTHROID) 125 mcg tablet Take 100 mcg by mouth Daily (before breakfast). , Historical Med       !! - Potential duplicate medications found. Please discuss with provider. STOP taking these medications       apixaban (ELIQUIS) 5 mg tablet Comments:   Reason for Stopping:             DISPOSITION:  Follow-up Appointments   Procedures    FOLLOW UP VISIT Appointment in: Other (Specify) Patient has follow up appointment with Dr. Zena Phillips 12/1/17     Patient has follow up appointment with Dr. Zena Phillips 12/1/17     Standing Status:   Standing     Number of Occurrences:   1     Order Specific Question:   Appointment in     Answer: Other ([facility])               Arturo Hernandez NP  [facility]  [address] [place] [address]  Office : [phone]  Fax : [phone]     Attending Addendum:  I personally evaluated the patient with Arturo Hernandez NP, and agree with the assessment, findings and plan as documented. Appears well, heart regular, lungs clear, abdomen benign. Seen by cardiology prior to proceeding with ENT intervention tomorrow. He will be called by ENT tomorrow for time of his surgery. Eliquis held. Safe for d/c with follow up with Dr Zena Phillips this week.           Khushi Salazar MD  [facility]  [address]  Office : [phone]  Fax : [phone]

## 2017-11-27 ENCOUNTER — ANESTHESIA EVENT (OUTPATIENT)
Dept: SURGERY | Age: 77
End: 2017-11-27
Payer: MEDICARE

## 2017-11-27 ENCOUNTER — PATIENT OUTREACH (OUTPATIENT)
Dept: CASE MANAGEMENT | Age: 77
End: 2017-11-27

## 2017-11-27 ENCOUNTER — HOSPITAL ENCOUNTER (OUTPATIENT)
Age: 77
Setting detail: OUTPATIENT SURGERY
Discharge: HOME | End: 2017-11-27
Attending: OTOLARYNGOLOGY | Admitting: OTOLARYNGOLOGY
Payer: MEDICARE

## 2017-11-27 ENCOUNTER — ANESTHESIA (OUTPATIENT)
Dept: SURGERY | Age: 77
End: 2017-11-27
Payer: MEDICARE

## 2017-11-27 VITALS
SYSTOLIC BLOOD PRESSURE: 128 MMHG | HEART RATE: 70 BPM | TEMPERATURE: 98 F | BODY MASS INDEX: 27.26 KG/M2 | RESPIRATION RATE: 18 BRPM | OXYGEN SATURATION: 98 % | DIASTOLIC BLOOD PRESSURE: 58 MMHG | WEIGHT: 212.31 LBS

## 2017-11-27 DIAGNOSIS — C90.00 MULTIPLE MYELOMA, REMISSION STATUS UNSPECIFIED (HCC): ICD-10-CM

## 2017-11-27 DIAGNOSIS — N17.9 ACUTE KIDNEY INJURY (HCC): ICD-10-CM

## 2017-11-27 DIAGNOSIS — R22.1 NECK MASS: ICD-10-CM

## 2017-11-27 PROCEDURE — 77030011640 HC PAD GRND REM COVD -A: Performed by: OTOLARYNGOLOGY

## 2017-11-27 PROCEDURE — 76010000153 HC OR TIME 1.5 TO 2 HR: Performed by: OTOLARYNGOLOGY

## 2017-11-27 PROCEDURE — 74011250636 HC RX REV CODE- 250/636: Performed by: ANESTHESIOLOGY

## 2017-11-27 PROCEDURE — 77030008703 HC TU ET UNCUF COVD -A: Performed by: ANESTHESIOLOGY

## 2017-11-27 PROCEDURE — 76210000006 HC OR PH I REC 0.5 TO 1 HR: Performed by: OTOLARYNGOLOGY

## 2017-11-27 PROCEDURE — 77030018836 HC SOL IRR NACL ICUM -A: Performed by: OTOLARYNGOLOGY

## 2017-11-27 PROCEDURE — 74011000250 HC RX REV CODE- 250

## 2017-11-27 PROCEDURE — 76060000034 HC ANESTHESIA 1.5 TO 2 HR: Performed by: OTOLARYNGOLOGY

## 2017-11-27 PROCEDURE — 77030002888 HC SUT CHRMC J&J -A: Performed by: OTOLARYNGOLOGY

## 2017-11-27 PROCEDURE — 77030020782 HC GWN BAIR PAWS FLX 3M -B: Performed by: ANESTHESIOLOGY

## 2017-11-27 PROCEDURE — 74011250637 HC RX REV CODE- 250/637: Performed by: ANESTHESIOLOGY

## 2017-11-27 PROCEDURE — 74011000250 HC RX REV CODE- 250: Performed by: OTOLARYNGOLOGY

## 2017-11-27 PROCEDURE — 77030012411 HC DRN WND CARD -A: Performed by: OTOLARYNGOLOGY

## 2017-11-27 PROCEDURE — 77030014650 HC SEAL MTRX FLOSEL BAXT -C: Performed by: OTOLARYNGOLOGY

## 2017-11-27 PROCEDURE — 77030002916 HC SUT ETHLN J&J -A: Performed by: OTOLARYNGOLOGY

## 2017-11-27 PROCEDURE — 74011250636 HC RX REV CODE- 250/636

## 2017-11-27 PROCEDURE — 88305 TISSUE EXAM BY PATHOLOGIST: CPT | Performed by: OTOLARYNGOLOGY

## 2017-11-27 PROCEDURE — 77030021678 HC GLIDESCP STAT DISP VERT -B: Performed by: ANESTHESIOLOGY

## 2017-11-27 PROCEDURE — 77030011267 HC ELECTRD BLD COVD -A: Performed by: OTOLARYNGOLOGY

## 2017-11-27 RX ORDER — GLYCOPYRROLATE 0.2 MG/ML
INJECTION INTRAMUSCULAR; INTRAVENOUS AS NEEDED
Status: DISCONTINUED | OUTPATIENT
Start: 2017-11-27 | End: 2017-11-27 | Stop reason: HOSPADM

## 2017-11-27 RX ORDER — CARVEDILOL 25 MG/1
25 TABLET ORAL ONCE
Status: DISCONTINUED | OUTPATIENT
Start: 2017-11-27 | End: 2017-11-27

## 2017-11-27 RX ORDER — LIDOCAINE HYDROCHLORIDE AND EPINEPHRINE 10; 10 MG/ML; UG/ML
INJECTION, SOLUTION INFILTRATION; PERINEURAL AS NEEDED
Status: DISCONTINUED | OUTPATIENT
Start: 2017-11-27 | End: 2017-11-27 | Stop reason: HOSPADM

## 2017-11-27 RX ORDER — FENTANYL CITRATE 50 UG/ML
25 INJECTION, SOLUTION INTRAMUSCULAR; INTRAVENOUS ONCE
Status: DISCONTINUED | OUTPATIENT
Start: 2017-11-27 | End: 2017-11-27 | Stop reason: HOSPADM

## 2017-11-27 RX ORDER — CARVEDILOL 6.25 MG/1
12.5 TABLET ORAL
Status: COMPLETED | OUTPATIENT
Start: 2017-11-27 | End: 2017-11-27

## 2017-11-27 RX ORDER — SODIUM CHLORIDE 0.9 % (FLUSH) 0.9 %
5-10 SYRINGE (ML) INJECTION EVERY 8 HOURS
Status: DISCONTINUED | OUTPATIENT
Start: 2017-11-27 | End: 2017-11-27 | Stop reason: HOSPADM

## 2017-11-27 RX ORDER — SODIUM CHLORIDE 9 MG/ML
50 INJECTION, SOLUTION INTRAVENOUS CONTINUOUS
Status: DISCONTINUED | OUTPATIENT
Start: 2017-11-27 | End: 2017-11-27 | Stop reason: HOSPADM

## 2017-11-27 RX ORDER — OXYCODONE HYDROCHLORIDE 5 MG/1
5 TABLET ORAL
Status: DISCONTINUED | OUTPATIENT
Start: 2017-11-27 | End: 2017-11-27 | Stop reason: HOSPADM

## 2017-11-27 RX ORDER — ONDANSETRON 2 MG/ML
INJECTION INTRAMUSCULAR; INTRAVENOUS AS NEEDED
Status: DISCONTINUED | OUTPATIENT
Start: 2017-11-27 | End: 2017-11-27 | Stop reason: HOSPADM

## 2017-11-27 RX ORDER — SODIUM CHLORIDE, SODIUM LACTATE, POTASSIUM CHLORIDE, CALCIUM CHLORIDE 600; 310; 30; 20 MG/100ML; MG/100ML; MG/100ML; MG/100ML
100 INJECTION, SOLUTION INTRAVENOUS CONTINUOUS
Status: DISCONTINUED | OUTPATIENT
Start: 2017-11-27 | End: 2017-11-27 | Stop reason: HOSPADM

## 2017-11-27 RX ORDER — SODIUM CHLORIDE 0.9 % (FLUSH) 0.9 %
5-10 SYRINGE (ML) INJECTION AS NEEDED
Status: DISCONTINUED | OUTPATIENT
Start: 2017-11-27 | End: 2017-11-27 | Stop reason: HOSPADM

## 2017-11-27 RX ORDER — DEXAMETHASONE SODIUM PHOSPHATE 4 MG/ML
INJECTION, SOLUTION INTRA-ARTICULAR; INTRALESIONAL; INTRAMUSCULAR; INTRAVENOUS; SOFT TISSUE AS NEEDED
Status: DISCONTINUED | OUTPATIENT
Start: 2017-11-27 | End: 2017-11-27 | Stop reason: HOSPADM

## 2017-11-27 RX ORDER — LIDOCAINE HYDROCHLORIDE 20 MG/ML
INJECTION, SOLUTION EPIDURAL; INFILTRATION; INTRACAUDAL; PERINEURAL AS NEEDED
Status: DISCONTINUED | OUTPATIENT
Start: 2017-11-27 | End: 2017-11-27 | Stop reason: HOSPADM

## 2017-11-27 RX ORDER — MIDAZOLAM HYDROCHLORIDE 1 MG/ML
2 INJECTION, SOLUTION INTRAMUSCULAR; INTRAVENOUS
Status: DISCONTINUED | OUTPATIENT
Start: 2017-11-27 | End: 2017-11-27 | Stop reason: HOSPADM

## 2017-11-27 RX ORDER — HYDROMORPHONE HYDROCHLORIDE 2 MG/ML
0.5 INJECTION, SOLUTION INTRAMUSCULAR; INTRAVENOUS; SUBCUTANEOUS
Status: DISCONTINUED | OUTPATIENT
Start: 2017-11-27 | End: 2017-11-27 | Stop reason: HOSPADM

## 2017-11-27 RX ORDER — FENTANYL CITRATE 50 UG/ML
INJECTION, SOLUTION INTRAMUSCULAR; INTRAVENOUS AS NEEDED
Status: DISCONTINUED | OUTPATIENT
Start: 2017-11-27 | End: 2017-11-27 | Stop reason: HOSPADM

## 2017-11-27 RX ORDER — FAMOTIDINE 20 MG/1
20 TABLET, FILM COATED ORAL ONCE
Status: COMPLETED | OUTPATIENT
Start: 2017-11-27 | End: 2017-11-27

## 2017-11-27 RX ORDER — NEOSTIGMINE METHYLSULFATE 1 MG/ML
INJECTION INTRAVENOUS AS NEEDED
Status: DISCONTINUED | OUTPATIENT
Start: 2017-11-27 | End: 2017-11-27 | Stop reason: HOSPADM

## 2017-11-27 RX ORDER — LIDOCAINE HYDROCHLORIDE 10 MG/ML
0.1 INJECTION INFILTRATION; PERINEURAL AS NEEDED
Status: DISCONTINUED | OUTPATIENT
Start: 2017-11-27 | End: 2017-11-27 | Stop reason: HOSPADM

## 2017-11-27 RX ORDER — OXYCODONE AND ACETAMINOPHEN 5; 325 MG/1; MG/1
1 TABLET ORAL AS NEEDED
Status: DISCONTINUED | OUTPATIENT
Start: 2017-11-27 | End: 2017-11-27 | Stop reason: HOSPADM

## 2017-11-27 RX ORDER — MIDAZOLAM HYDROCHLORIDE 1 MG/ML
2 INJECTION, SOLUTION INTRAMUSCULAR; INTRAVENOUS ONCE
Status: DISCONTINUED | OUTPATIENT
Start: 2017-11-27 | End: 2017-11-27 | Stop reason: HOSPADM

## 2017-11-27 RX ORDER — ACETAMINOPHEN 10 MG/ML
1000 INJECTION, SOLUTION INTRAVENOUS
Status: COMPLETED | OUTPATIENT
Start: 2017-11-27 | End: 2017-11-27

## 2017-11-27 RX ORDER — PROPOFOL 10 MG/ML
INJECTION, EMULSION INTRAVENOUS AS NEEDED
Status: DISCONTINUED | OUTPATIENT
Start: 2017-11-27 | End: 2017-11-27 | Stop reason: HOSPADM

## 2017-11-27 RX ORDER — ROCURONIUM BROMIDE 10 MG/ML
INJECTION, SOLUTION INTRAVENOUS AS NEEDED
Status: DISCONTINUED | OUTPATIENT
Start: 2017-11-27 | End: 2017-11-27 | Stop reason: HOSPADM

## 2017-11-27 RX ORDER — FLUTICASONE PROPIONATE 50 MCG
2 SPRAY, SUSPENSION (ML) NASAL DAILY
COMMUNITY
End: 2018-02-08

## 2017-11-27 RX ORDER — DIPHENHYDRAMINE HYDROCHLORIDE 50 MG/ML
12.5 INJECTION, SOLUTION INTRAMUSCULAR; INTRAVENOUS ONCE
Status: DISCONTINUED | OUTPATIENT
Start: 2017-11-27 | End: 2017-11-27 | Stop reason: HOSPADM

## 2017-11-27 RX ORDER — CARVEDILOL 6.25 MG/1
6.25 TABLET ORAL ONCE
Status: DISCONTINUED | OUTPATIENT
Start: 2017-11-27 | End: 2017-11-27

## 2017-11-27 RX ADMIN — NEOSTIGMINE METHYLSULFATE 3 MG: 1 INJECTION INTRAVENOUS at 19:04

## 2017-11-27 RX ADMIN — FENTANYL CITRATE 100 MCG: 50 INJECTION, SOLUTION INTRAMUSCULAR; INTRAVENOUS at 17:48

## 2017-11-27 RX ADMIN — ROCURONIUM BROMIDE 35 MG: 10 INJECTION, SOLUTION INTRAVENOUS at 17:48

## 2017-11-27 RX ADMIN — PROPOFOL 200 MG: 10 INJECTION, EMULSION INTRAVENOUS at 17:48

## 2017-11-27 RX ADMIN — SODIUM CHLORIDE, SODIUM LACTATE, POTASSIUM CHLORIDE, AND CALCIUM CHLORIDE 100 ML/HR: 600; 310; 30; 20 INJECTION, SOLUTION INTRAVENOUS at 17:17

## 2017-11-27 RX ADMIN — ONDANSETRON 4 MG: 2 INJECTION INTRAMUSCULAR; INTRAVENOUS at 18:48

## 2017-11-27 RX ADMIN — LIDOCAINE HYDROCHLORIDE 40 MG: 20 INJECTION, SOLUTION EPIDURAL; INFILTRATION; INTRACAUDAL; PERINEURAL at 17:48

## 2017-11-27 RX ADMIN — ACETAMINOPHEN 1000 MG: 10 INJECTION, SOLUTION INTRAVENOUS at 19:29

## 2017-11-27 RX ADMIN — FAMOTIDINE 20 MG: 20 TABLET, FILM COATED ORAL at 17:17

## 2017-11-27 RX ADMIN — ROCURONIUM BROMIDE 10 MG: 10 INJECTION, SOLUTION INTRAVENOUS at 18:17

## 2017-11-27 RX ADMIN — FENTANYL CITRATE 50 MCG: 50 INJECTION, SOLUTION INTRAMUSCULAR; INTRAVENOUS at 18:51

## 2017-11-27 RX ADMIN — GLYCOPYRROLATE 0.4 MG: 0.2 INJECTION INTRAMUSCULAR; INTRAVENOUS at 19:04

## 2017-11-27 RX ADMIN — FENTANYL CITRATE 50 MCG: 50 INJECTION, SOLUTION INTRAMUSCULAR; INTRAVENOUS at 19:10

## 2017-11-27 RX ADMIN — CARVEDILOL 12.5 MG: 6.25 TABLET, FILM COATED ORAL at 17:32

## 2017-11-27 RX ADMIN — SODIUM CHLORIDE, SODIUM LACTATE, POTASSIUM CHLORIDE, AND CALCIUM CHLORIDE: 600; 310; 30; 20 INJECTION, SOLUTION INTRAVENOUS at 17:40

## 2017-11-27 RX ADMIN — DEXAMETHASONE SODIUM PHOSPHATE 4 MG: 4 INJECTION, SOLUTION INTRA-ARTICULAR; INTRALESIONAL; INTRAMUSCULAR; INTRAVENOUS; SOFT TISSUE at 18:48

## 2017-11-27 NOTE — Clinical Note
Please note d/c notes states if indicated patient will be referred to [facility] per his request.  Patient states he would prefer [facility] if indicated, but there are no orders at this time. Patient is also currently admitted for surgery. TY!

## 2017-11-27 NOTE — IP AVS SNAPSHOT
[address] [place] [address] 
[phone] Patient: Olga Hardin MRN: [id number] :1940 About your hospitalization You were admitted on:  2017 You last received care in the:  [facility] PACU You were discharged on:  2017 Why you were hospitalized Your primary diagnosis was:  Not on File Things You Need To Do (next 8 weeks) Schedule an appointment with Nhan Turner,  as soon as possible for a visit today CALL TO SCHEDULE FOLLOW UP APPOINTMENT IF NOT ALREADY SCHEDULED Phone:  [phone] Where:  [facility] [address] Monday Dec 11, 2017 LAB with Roman Solis at  3:15 PM  
Where:  Roman Solis ([address]) Follow Up with Mariza Duncan MD at  3:45 PM  
Where: [facility])  LAB with Roman Solis at 10:30 AM  
Where:  Roman Solis ([address]) Follow Up with Mariza Duncan MD at 11:00 AM  
Where: [facility])  OFFICE DEVICE CHECKS with MARISELA/ADRIAN DEVICE 55 at 10:40 AM  
This upcoming device check will take place IN OUR OFFICE. Please arrive 15 minutes early to review any necessary paperwork requirements. If you have any further questions or need to change this appointment, we are happy to help and can be reached at [phone]. Where:  [facility] CARDIOLOGY MARISELA OFFICE ([address] [place] [address]) Discharge Orders None A check sandip indicates which time of day the medication should be taken. My Medications ASK your physician about these medications Instructions Each Dose to Equal  
 Morning Noon Evening Bedtime ADVIL 200 mg tablet Generic drug:  ibuprofen Your last dose was: Your next dose is: Take  by mouth. * carvedilol 25 mg tablet Commonly known as:  Travis Sandoval Your last dose was: Your next dose is: Take 25 mg by mouth two (2) times daily (with meals). 25 mg  
    
   
   
   
  
 * carvedilol 6.25 mg tablet Commonly known as:  Travis Sandoval Your last dose was: Your next dose is: Take 1 Tab by mouth two (2) times daily (with meals). 6.25 mg  
    
   
   
   
  
 chlorhexidine 0.12 % solution Commonly known as:  PERIDEX Your last dose was: Your next dose is:    
   
   
 15 mL by Swish and Spit route two (2) times a day. 15 mL CRESTOR 10 mg tablet Generic drug:  rosuvastatin Your last dose was: Your next dose is: Take 10 mg by mouth nightly. 10 mg  
    
   
   
   
  
 dexamethasone 4 mg tablet Commonly known as:  DECADRON Your last dose was: Your next dose is: Take 10 tabs by mouth every 7 days when taking velcade FLONASE 50 mcg/actuation nasal spray Generic drug:  fluticasone Your last dose was: Your next dose is: 2 Sprays by Both Nostrils route daily. 2 Spray  
    
   
   
   
  
 lenalidomide 10 mg Cap Commonly known as:  REVLIMID Your last dose was: Your next dose is: Take 1 Cap by mouth daily. For 3 weeks and then off for 1 week. 10 mg  
    
   
   
   
  
 levothyroxine 125 mcg tablet Commonly known as:  SYNTHROID Your last dose was: Your next dose is: Take 100 mcg by mouth Daily (before breakfast). 100 mcg  
    
   
   
   
  
 omeprazole 20 mg capsule Commonly known as:  PRILOSEC Your last dose was: Your next dose is: Take 20 mg by mouth daily.   
 20 mg  
    
   
   
   
  
 oxyCODONE IR 10 mg Tab immediate release tablet Commonly known as:  Danne Copper Your last dose was: Your next dose is: Take 1 Tab by mouth every four (4) hours as needed. Max Daily Amount: 60 mg.  
 10 mg  
    
   
   
   
  
 sacubitril-valsartan 49-51 mg Tab tablet Commonly known as:  ENTRESTO Your last dose was: Your next dose is: Take 1 Tab by mouth two (2) times a day. 1 Tab * spironolactone 25 mg tablet Commonly known as:  ALDACTONE Your last dose was: Your next dose is: Take 1 Tab by mouth two (2) times a day. 25 mg  
    
   
   
   
  
 * spironolactone 25 mg tablet Commonly known as:  ALDACTONE Your last dose was: Your next dose is: Take 1 Tab by mouth two (2) times a day. 25 mg  
    
   
   
   
  
 torsemide 20 mg tablet Commonly known as:  DEMADEX Your last dose was: Your next dose is: Take 1 Tab by mouth daily. Indications: Edema, Pulmonary Edema due to Chronic Heart Failure 20 mg  
    
   
   
   
  
 VITAMIN B-12 1,000 mcg sublingual tablet Generic drug:  cyanocobalamin Your last dose was: Your next dose is: Take 1,000 mcg by mouth daily. 1000 mcg VITAMIN D3 2,000 unit Tab Generic drug:  cholecalciferol (vitamin D3) Your last dose was: Your next dose is: Take  by mouth.  
     
   
   
   
  
 whey 9 gram Pack Your last dose was: Your next dose is: Take  by mouth. * Notice: This list has 4 medication(s) that are the same as other medications prescribed for you. Read the directions carefully, and ask your doctor or other care provider to review them with you. Discharge Instructions Instructions Following Excision of Soft Tissue Mass Activity · As tolerated and as directed by your doctor · Bathe or shower as directed by your doctor Diet · Clear liquids until no nausea or vomiting; then light diet for the first day · Advance to regular diet on second day, unless your doctor orders otherwise · If nausea and vomiting continues, call your doctor Pain · Take pain medication as directed by your doctor ·  Call your doctor if pain is NOT relieved by medication · DO NOT take aspirin or blood thinners until directed by your doctor Follow-Up Phone Calls · Call will be made nursing staff · If you have any problems or concerns, call your doctor as needed Call your doctor if you experience: 
· Excessive bleeding that does not stop after holding mild pressure over the area · Temperature of 101° Fahrenheit or above · Redness, excessive swelling or bruising, and/or green or yellow, smelly discharge from incision After Anesthesia · For the first 24 hours: DO NOT drive, drink alcoholic beverages, or make important decisions · Be aware of dizziness following anesthesia and while taking pain medication Introducing [facility]! Dear Salena Claudio: Thank you for requesting a [employer] account. Our records indicate that you already have an active [employer] account. You can access your account anytime at [email or web address] Did you know that you can access your hospital and ER discharge instructions at any time in [employer]? You can also review all of your test results from your hospital stay or ER visit. Additional Information If you have questions, please visit the Frequently Asked Questions section of the [employer] website at [email or web address] Remember, [employer] is NOT to be used for urgent needs. For medical emergencies, dial 911. Now available from your iPhone and Android! Providers Seen During Your Hospitalization Provider Specialty Primary office phone Nhan Turner,  Otolaryngology [phone] Your Primary Care Physician (PCP) Primary Care Physician Office Phone Office Fax Junior Olivares [phone] You are allergic to the following Allergen Reactions Lisinopril Cough Pcn (Penicillins) Swelling Recent Documentation Weight BMI Smoking Status 96.3 kg 27.26 kg/m2 Never Smoker Emergency Contacts Name Discharge Info Relation Home Work Mobile [address] CAREGIVER [3] Spouse [3] [phone] Patient Belongings The following personal items are in your possession at time of discharge: 
  Dental Appliances: None Please provide this summary of care documentation to your next provider. Signatures-by signing, you are acknowledging that this After Visit Summary has been reviewed with you and you have received a copy. Patient Signature:  ____________________________________________________________ Date:  ____________________________________________________________  
  
Aloma Snellen Provider Signature:  ____________________________________________________________ Date:  ____________________________________________________________

## 2017-11-27 NOTE — PROGRESS NOTES
Transition of Care Discharge Follow-up Questionnaire   Date/Time of Call:   11/27/2017 10:00 AM    What was the patient hospitalized for? Patient was hospitalized for Pain   Does the patient understand his/her diagnosis and/or treatment and what happened during the hospitalization? Spoke to patient who verbalized understanding of treatment and diagnosis. Did the patient receive discharge instructions? Patient states d/c instructions received. Review any discharge instructions (see notes in [facility]). Ask patient if they understand these. Do they have any questions? Patient discussed discharge plan and instruction as Patient understood it to be with Care Coordinator. Which I have documented in the pertinent areas throughout this progress note. Were home services ordered (nursing, PT, OT, ST, etc.)? HH ordered at d/c as states in d/c notes if indicated patient would prefer [facility]. Patient states he actually would prefer [facility] instead. Also please note no order for [facility] order in chart at this time. If so, has the first visit occurred? If not, why? (Assist with coordination of services if necessary.) N/A   Was any DME ordered? No DME ordered at d/c. If so, has it been received? If not, why?  (Assist with coordination of arranging DME orders if necessary.) N/A   Complete a review of all medications (new, continued and discontinued meds per the D/C instructions and medication tab in [address] [place] [address]). All meds reviewed with Care Coordinator and Patient. Aldactone, Oxycodone, Coreg prescribed at d/c. Were all new prescriptions filled? If not, why?  (Assist with obtainment of medications if necessary.) Patient states will p/u today. Does the patient understand the purpose and dosing instructions for all medications?   (If patient has questions, provide explanation and education.) Indicated by Patient to Care Coordinator, the purpose and dosing instructions for all medications were understood. Does the patient have any problems in performing ADLs? (If  patient is unable to perform ADLs  what is the limiting factor(s)? Do they have a support system that can assist? If no support system is present, discuss possible assistance that they may be able to obtain.) Patient states he is independent with all ADLs. Does the patient have all follow-up appointments scheduled? 7 day f/up with PCP?    7-14 day f/up with specialist?    If f/up has not been made  what actions has the care coordinator made to accomplish this? Has transportation been arranged? [facility] Pulmonary follow-up should be within 7 days of discharge; all others should have PCP follow-up within 7 days of discharge; follow-ups with other specialists as appropriate or ordered.) PCP appt. advised and patient declined assistance in scheduling stating he will schedule f/u after todays scheduled surgery. Please see CC. [facility] f/u 12/1, Hem/Onc 12/11. Patient denies need for transportation. Any other questions or concerns expressed by the patient? Gratitude stated and no further questions asked or needs identified. TENNILLE Call Completed By: Quincy Barnes LPN  [facility] [address] Coordinator          This note will not be viewable in [address]

## 2017-11-27 NOTE — ANESTHESIA PREPROCEDURE EVALUATION
Anesthetic History   No history of anesthetic complications            Review of Systems / Medical History  Patient summary reviewed and pertinent labs reviewed    Pulmonary  Within defined limits                 Neuro/Psych   Within defined limits           Cardiovascular    Hypertension: well controlled      CHF  Dysrhythmias   Pacemaker (ICD/pacer) and hyperlipidemia    Exercise tolerance: >4 METS  Comments: Cardiomyopathy  LBBB  EF 30-35% 12/16    ICD never discharged; fully paced   GI/Hepatic/Renal  Within defined limits              Endo/Other      Hypothyroidism  Obesity, arthritis and cancer (prostate, thyroid and colon; MM)     Other Findings   Comments: Vertigo  Gout  Benign pituitary tumor         Physical Exam    Airway  Mallampati: II  TM Distance: 4 - 6 cm  Neck ROM: normal range of motion   Mouth opening: Normal    Comments: Draining left neck wound Cardiovascular    Rhythm: regular  Rate: normal         Dental      Comments: Sore left lower lip   Pulmonary  Breath sounds clear to auscultation               Abdominal  GI exam deferred       Other Findings            Anesthetic Plan    ASA: 3  Anesthesia type: general          Induction: Intravenous  Anesthetic plan and risks discussed with: Patient and Spouse      Magnet over pacer for surgery. Will need stat pads placed for during surgery. Pt given new beta blocker dose (1/2 of original) preop. Was just discharged from hospital yesterday, so this is his first reduced dose.

## 2017-11-28 NOTE — OP NOTES
[facility] 64   OPERATIVE REPORT       Name:  Ramses Chen   MR#:  [id number]   :  1940   Account #:  [de-identified]   Date of Adm:  2017       PREOPERATIVE DIAGNOSES   1. Left neck mass. 2. Left neck abscess. POSTOPERATIVE DIAGNOSES   1. Left neck mass. 2. Left neck abscess. PROCEDURE   1. Incision and drainage of left neck abscess. 2. Incisional biopsy of left neck mass. SURGEON: Edd Mednoza DO    ASSISTANT: None. ANESTHESIA: General.     COMPLICATIONS: None. BLOOD LOSS: Approximately 30 mL. HISTORY: This is a 28-year-old male who I was asked to see on   consult a couple days ago in the hospital. He has a history of   colon cancer, thyroid cancer, and prostate cancer, most recently   being treated for the prostate cancer and he was followed up at   the cancer center. When he did, it was found that he was having   trouble turning his head, almost had a torticollis on the left   side and due to that, was admitted to the hospital. It was felt   that there could have been an abscess so General Surgery was   consulted. An incision and drainage along with a biopsy of the left   neck was done. A drain was placed. A postoperative CT scan was   checked which showed the drain to be in place in the mid portion   of the left neck, but it showed there to be an abscess was still   present superior to that, and a 3 cm neck mass inferior to that   so this was found just prior to discharge and because of the   continued issues and the neck mass, I was consulted for further   care so I did see the patient in the hospital. He had recently   taken Eliquis and had eaten that day. This was the only thing he   was in the hospital for so he was discharged with the plan being   that today I would perform an incision and drainage of the left   neck abscess and excisional biopsy of the left neck mass.  The   procedure, risks, and benefits of all that were discussed with   him and his wife in the hospital room. All questions were   answered and they were agreeable to the surgery. SURGERY ITSELF: The patient was identified in the preoperative   waiting area. He was taken back to the operating room where he   underwent general anesthesia. The gauze was removed from the   left neck and then patient was prepped and draped in a sterile   fashion. Initially, I removed the old stitches and the Penrose   drain that were placed by Dr. Yan Sheets, at the surgery 6 days ago. I then removed his sutures, opening up the surgical incision. There was a defect in the left neck which appeared to be   granulating in well. Palpating more superiorly there was the   mass/abscess that was seen on the CT scan that was above the   drain. Palpating the lower neck externally I was able to locate the   mass that was seen inferiorly on the left side. I did the left   neck mass first out of concern for possible leakage of any   purulent debris from the incision and drainage getting into the   potentially clean surgical incision below, so I did draw an   approximately 2 to 2.5 cm line in a neck crease directly   overlying the left inferior neck mass, less than 1 mL of 1%   lidocaine with epinephrine was injected into that pre-drawn line   and a knife was used to make an incision through the pre-drawn   line and then blunt dissection was used to start dissecting   around the left inferior neck mass. I had retracted the   sternocleidomastoid laterally. I did move the strap muscles out   of the way, was able to locate an irregular feeling/shaped mass   in left inferior neck. This was severely scarred and all the   tissue around it and there appeared to be new blood supply going   to the area so using blunt dissection, I was able to dissect   that around the entire anterior/medial aspect and slightly   towards the lateral aspect.  According to the CT scan, the   jugular vein was attached to the lateral aspect of the carotid   artery and attached to the deep aspect. Given the amount of scar   tissue that was found during just the initial part, I elected   not to dissect out those areas for concern of the vessels. I was   able again, once I had about 50% of the mass dissected out, that   is when an incision was made directly through the mass, excising   the most superficial half. Immediately upon doing so, there was   some mild generalized oozing, but the rest of the debris from   inside this neck mass came out very easily once the capsule   itself was excised so what was left was the deep portion of the   capsule and at that point, both the jugular vein and carotid   artery could be visualized and again, due to scar tissue, I was   not able to safely remove that. There again was continued   generalized oozing, so I did take FloSeal, which was placed into   the cavity. Pressure was held on it. There was good hemostasis so   then 4-0 chromic was used to reapproximate the platysma, 5-0   nylon was used to reapproximate the skin and then the original   surgical incision was what was done next. Again, the sutures   were removed, opening up the incision and palpating the rest of   the neck, the area or Dr. Lexa Currie had operated previously   actually felt like a large necrotic debris. There was still some   of this left behind. There was no sign of any obvious purulence. Going more superior to where the other abscesses were seen,   there appeared to be a very firm tissue and induration.  I did   take a hemostat and I was able to break into the inferior   portion and I got a lot of thin liquid, a small amount of   purulent debris, the largest necrotic tissue which was just   coming out of the incision so through multiple small incisions,   I was able to open up the upper area in at least 5 different   areas and each one contained 1 mL or 2 of purulent debris,   nothing close to what was expected based on the CT scan and   again the finger palpation of the area revealed only hard,   necrotic tissue which was also very friable, so no further   biopsies were done of the upper half. A 1/4-inch Penrose was   then placed through the surgical incision up into the mass   superior to the original incision, 4-0 chromic was used to   reapproximate platysma, 5-0 nylon was used to reapproximate the   skin around the drain and then at that point antibiotic ointment   was placed on the incision and the patient was awakened and   taken to the postop recovery room in stable condition. It should   be noted that compared to what would be expected for an   incisional biopsy and incision and drainage of a neck abscess,   this was much more difficult and more labor intensive than   expected.          Vannessa Gonzalez DO      TSS / TB   D:  11/27/2017   22:06   T:  11/28/2017   14:54   Job #:  [id number]

## 2017-11-28 NOTE — DISCHARGE INSTRUCTIONS
Instructions Following Excision of Soft Tissue Mass    Activity  · As tolerated and as directed by your doctor  · Bathe or shower as directed by your doctor    Diet  · Clear liquids until no nausea or vomiting; then light diet for the first day  · Advance to regular diet on second day, unless your doctor orders otherwise  · If nausea and vomiting continues, call your doctor    Pain  · Take pain medication as directed by your doctor  ·  Call your doctor if pain is NOT relieved by medication  · DO NOT take aspirin or blood thinners until directed by your doctor      Follow-Up Phone Calls  · Call will be made nursing staff  · If you have any problems or concerns, call your doctor as needed    Call your doctor if you experience:  · Excessive bleeding that does not stop after holding mild pressure over the area  · Temperature of 101° Fahrenheit or above  · Redness, excessive swelling or bruising, and/or green or yellow, smelly discharge from incision    After Anesthesia  · For the first 24 hours: DO NOT drive, drink alcoholic beverages, or make important decisions  · Be aware of dizziness following anesthesia and while taking pain medication

## 2017-11-28 NOTE — ANESTHESIA POSTPROCEDURE EVALUATION
Post-Anesthesia Evaluation and Assessment    Patient: Ernie Souza MRN: [id number]  SSN: [id number]    YOB: 1940  Age: 65 y.o. Sex: male       Cardiovascular Function/Vital Signs  Visit Vitals    /58 (BP 1 Location: Right arm, BP Patient Position: At rest)    Pulse 70    Temp 36.7 °C (98 °F)    Resp 18    Wt 96.3 kg (212 lb 5 oz)    SpO2 98%    BMI 27.26 kg/m2       Patient is status post general anesthesia for Procedure(s):  LEFT NECK ABSCESS INCISION AND DRAINAGE/ INCISIONAL BIOPSY OF LEFT NECK MASS. Nausea/Vomiting: None    Postoperative hydration reviewed and adequate. Pain:  Pain Scale 1: Numeric (0 - 10) (11/27/17 2014)  Pain Intensity 1: 0 (11/27/17 2014)   Managed    Neurological Status:   Neuro (WDL): Exceptions to WDL (11/27/17 1959)  Neuro  Neurologic State: Drowsy (11/27/17 1959)  LUE Motor Response: Purposeful (11/27/17 1959)  LLE Motor Response: Purposeful (11/27/17 1959)  RUE Motor Response: Purposeful (11/27/17 1959)  RLE Motor Response: Purposeful (11/27/17 1959)   At baseline    Mental Status and Level of Consciousness: Arousable    Pulmonary Status:   O2 Device: Room air (11/27/17 2014)   Adequate oxygenation and airway patent    Complications related to anesthesia: None    Post-anesthesia assessment completed.  No concerns    Signed By: Jose Cano MD     November 28, 2017

## 2017-11-28 NOTE — ANESTHESIA POSTPROCEDURE EVALUATION
Post-Anesthesia Evaluation and Assessment    Patient: Aurora Mercado MRN: [id number]  SSN: [id number]    YOB: 1940  Age: 65 y.o. Sex: male       Cardiovascular Function/Vital Signs  Visit Vitals    /64 (BP 1 Location: Right arm, BP Patient Position: At rest)    Pulse 69    Temp 36.7 °C (98 °F)    Resp 18    Wt 96.3 kg (212 lb 5 oz)    SpO2 97%    BMI 27.26 kg/m2       Patient is status post general anesthesia for Procedure(s):  LEFT NECK ABSCESS INCISION AND DRAINAGE/ INCISIONAL BIOPSY OF LEFT NECK MASS. Nausea/Vomiting: None    Postoperative hydration reviewed and adequate. Pain:  Pain Scale 1: Numeric (0 - 10) (11/27/17 1959)  Pain Intensity 1: 0 (11/27/17 1959)   Managed    Neurological Status:   Neuro (WDL): Exceptions to WDL (11/27/17 1959)  Neuro  Neurologic State: Drowsy (11/27/17 1959)  LUE Motor Response: Purposeful (11/27/17 1959)  LLE Motor Response: Purposeful (11/27/17 1959)  RUE Motor Response: Purposeful (11/27/17 1959)  RLE Motor Response: Purposeful (11/27/17 1959)   At baseline    Mental Status and Level of Consciousness: Arousable    Pulmonary Status:   O2 Device: Room air (11/27/17 1959)   Adequate oxygenation and airway patent    Complications related to anesthesia: None    Post-anesthesia assessment completed.  No concerns    Signed By: Ilda Ross MD     November 27, 2017

## 2017-12-04 ENCOUNTER — HOSPITAL ENCOUNTER (OUTPATIENT)
Dept: LAB | Age: 77
Discharge: HOME | End: 2017-12-04
Payer: MEDICARE

## 2017-12-04 DIAGNOSIS — N17.9 ACUTE RENAL FAILURE, UNSPECIFIED ACUTE RENAL FAILURE TYPE (HCC): ICD-10-CM

## 2017-12-04 LAB
ANAEROBE ID RESULT 1, RAND1T: NORMAL
ANAEROBE ID W/SUSCEPT., ANIDLT: ABNORMAL
ANION GAP SERPL CALC-SCNC: 10 MMOL/L (ref 7–16)
BUN SERPL-MCNC: 22 MG/DL (ref 8–23)
CALCIUM SERPL-MCNC: 9.7 MG/DL (ref 8.3–10.4)
CHLORIDE SERPL-SCNC: 102 MMOL/L (ref 98–107)
CO2 SERPL-SCNC: 26 MMOL/L (ref 21–32)
CREAT SERPL-MCNC: 1.11 MG/DL (ref 0.8–1.5)
GLUCOSE SERPL-MCNC: 102 MG/DL (ref 65–100)
POTASSIUM SERPL-SCNC: 3.5 MMOL/L (ref 3.5–5.1)
SODIUM SERPL-SCNC: 138 MMOL/L (ref 136–145)
SPECIMEN SOURCE: ABNORMAL

## 2017-12-04 PROCEDURE — 80048 BASIC METABOLIC PNL TOTAL CA: CPT | Performed by: INTERNAL MEDICINE

## 2017-12-04 PROCEDURE — 36415 COLL VENOUS BLD VENIPUNCTURE: CPT | Performed by: INTERNAL MEDICINE

## 2017-12-06 LAB — BACTERIA ISLT ANAEROBE CULT: NORMAL

## 2017-12-11 ENCOUNTER — PATIENT OUTREACH (OUTPATIENT)
Dept: CASE MANAGEMENT | Age: 77
End: 2017-12-11

## 2017-12-11 ENCOUNTER — HOSPITAL ENCOUNTER (OUTPATIENT)
Dept: LAB | Age: 77
Discharge: HOME | End: 2017-12-11
Payer: MEDICARE

## 2017-12-11 ENCOUNTER — HOSPITAL ENCOUNTER (OUTPATIENT)
Dept: GENERAL RADIOLOGY | Age: 77
Discharge: HOME | End: 2017-12-11
Payer: MEDICARE

## 2017-12-11 DIAGNOSIS — J06.9 URI WITH COUGH AND CONGESTION: ICD-10-CM

## 2017-12-11 DIAGNOSIS — C90.00 MULTIPLE MYELOMA NOT HAVING ACHIEVED REMISSION (HCC): ICD-10-CM

## 2017-12-11 LAB
ALBUMIN SERPL-MCNC: 3.4 G/DL (ref 3.2–4.6)
ALBUMIN/GLOB SERPL: 0.8 {RATIO} (ref 1.2–3.5)
ALP SERPL-CCNC: 81 U/L (ref 50–136)
ALT SERPL-CCNC: 51 U/L (ref 12–65)
ANION GAP SERPL CALC-SCNC: 12 MMOL/L (ref 7–16)
AST SERPL-CCNC: 54 U/L (ref 15–37)
BASOPHILS # BLD: 0.1 K/UL (ref 0–0.2)
BASOPHILS NFR BLD: 1 % (ref 0–2)
BILIRUB SERPL-MCNC: 0.7 MG/DL (ref 0.2–1.1)
BUN SERPL-MCNC: 38 MG/DL (ref 8–23)
CALCIUM SERPL-MCNC: 9.5 MG/DL (ref 8.3–10.4)
CHLORIDE SERPL-SCNC: 100 MMOL/L (ref 98–107)
CO2 SERPL-SCNC: 26 MMOL/L (ref 21–32)
CREAT SERPL-MCNC: 1.85 MG/DL (ref 0.8–1.5)
DIFFERENTIAL METHOD BLD: ABNORMAL
EOSINOPHIL # BLD: 0.1 K/UL (ref 0–0.8)
EOSINOPHIL NFR BLD: 1 % (ref 0.5–7.8)
ERYTHROCYTE [DISTWIDTH] IN BLOOD BY AUTOMATED COUNT: 14.8 % (ref 11.9–14.6)
GLOBULIN SER CALC-MCNC: 4.1 G/DL (ref 2.3–3.5)
GLUCOSE SERPL-MCNC: 122 MG/DL (ref 65–100)
HCT VFR BLD AUTO: 34.7 % (ref 41.1–50.3)
HGB BLD-MCNC: 11.4 G/DL (ref 13.6–17.2)
LYMPHOCYTES # BLD: 3.7 K/UL (ref 0.5–4.6)
LYMPHOCYTES NFR BLD: 44 % (ref 13–44)
MAGNESIUM SERPL-MCNC: 1.7 MG/DL (ref 1.8–2.4)
MCH RBC QN AUTO: 23.4 PG (ref 26.1–32.9)
MCHC RBC AUTO-ENTMCNC: 32.9 G/DL (ref 31.4–35)
MCV RBC AUTO: 71.1 FL (ref 79.6–97.8)
MONOCYTES # BLD: 1.3 K/UL (ref 0.1–1.3)
MONOCYTES NFR BLD: 16 % (ref 4–12)
NEUTS SEG # BLD: 3.2 K/UL (ref 1.7–8.2)
NEUTS SEG NFR BLD: 38 % (ref 43–78)
NRBC # BLD: 0 K/UL (ref 0–0.2)
NRBC BLD-RTO: 0 PER 100 WBC (ref 0–2)
PLATELET # BLD AUTO: 193 K/UL (ref 150–450)
PLATELET COMMENTS,PCOM: ADEQUATE
PMV BLD AUTO: 11.4 FL (ref 10.8–14.1)
POTASSIUM SERPL-SCNC: 3.8 MMOL/L (ref 3.5–5.1)
PROT SERPL-MCNC: 7.5 G/DL (ref 6.3–8.2)
RBC # BLD AUTO: 4.88 M/UL (ref 4.23–5.67)
RBC MORPH BLD: ABNORMAL
SODIUM SERPL-SCNC: 138 MMOL/L (ref 136–145)
WBC # BLD AUTO: 8.4 K/UL (ref 4.3–11.1)
WBC MORPH BLD: ABNORMAL

## 2017-12-11 PROCEDURE — 71020 XR CHEST PA LAT: CPT

## 2017-12-11 NOTE — PROGRESS NOTES
12/11/17:  Patient in for follow-up with Dr. Marcello Pierce after recent biopsy. Dr. Marcello Pierce explained need for referral to [address] for additional surgery to right neck. Patient and wife hesitant on additional surgery but willing to go for consultation. Patient with concerns about productive cough and chest congestion. He will go for chest xray this afternoon. Dr. Marcello Pierce to speak with DR. Daniels in regards to blood pressure medications as blood pressure is low today. Patient and wife with multiple questions and concerns. Dr. Marcello Pierce answered questions and offered encouragement. Patient to follow-up on 1/8. [facility] consult in the meantime. This navigator to call patient back with chest xray results.

## 2017-12-13 LAB
ALBUMIN SERPL ELPH-MCNC: 3.59 G/DL (ref 3.2–5.6)
ALBUMIN/GLOB SERPL: 1.1 {RATIO}
ALPHA1 GLOB SERPL ELPH-MCNC: 0.32 G/DL (ref 0.1–0.4)
ALPHA2 GLOB SERPL ELPH-MCNC: 0.96 G/DL (ref 0.4–1.2)
B-GLOBULIN SERPL QL ELPH: 1.02 G/DL (ref 0.6–1.3)
GAMMA GLOB MFR SERPL ELPH: 1.11 G/DL (ref 0.5–1.6)
IGA SERPL-MCNC: 34 MG/DL (ref 85–499)
IGG SERPL-MCNC: 1167 MG/DL (ref 610–1616)
IGM SERPL-MCNC: 75 MG/DL (ref 35–242)
KAPPA LC FREE SER-MCNC: 22.59 MG/L (ref 3.3–19.4)
KAPPA LC FREE/LAMBDA FREE SER: 0.14 {RATIO} (ref 0.26–1.65)
LAMBDA LC FREE SERPL-MCNC: 164.52 MG/L (ref 5.71–26.3)
M PROTEIN SERPL ELPH-MCNC: 0.65 G/DL
PROT PATTERN SERPL ELPH-IMP: ABNORMAL
PROT PATTERN SPEC IFE-IMP: ABNORMAL
PROT SERPL-MCNC: 7 G/DL (ref 6.3–8.2)

## 2017-12-21 LAB
FUNGUS CULTURE, RFCO2T: NORMAL
FUNGUS SMEAR, RFCO1T: NORMAL
FUNGUS SPEC CULT: NORMAL
FUNGUS STAIN, 188244: NORMAL
REFLEX TO ID, RFCO3T: NORMAL
SPECIMEN SOURCE: NORMAL
SPECIMEN SOURCE: NORMAL

## 2017-12-28 ENCOUNTER — HOSPITAL ENCOUNTER (OUTPATIENT)
Dept: LAB | Age: 77
Discharge: HOME | End: 2017-12-28
Payer: MEDICARE

## 2017-12-28 DIAGNOSIS — C90.00 MULTIPLE MYELOMA NOT HAVING ACHIEVED REMISSION (HCC): ICD-10-CM

## 2017-12-28 LAB
ALBUMIN SERPL-MCNC: 3.6 G/DL (ref 3.2–4.6)
ALBUMIN/GLOB SERPL: 1.1 {RATIO} (ref 1.2–3.5)
ALP SERPL-CCNC: 66 U/L (ref 50–136)
ALT SERPL-CCNC: 47 U/L (ref 12–65)
ANION GAP SERPL CALC-SCNC: 12 MMOL/L (ref 7–16)
AST SERPL-CCNC: 48 U/L (ref 15–37)
BASOPHILS # BLD: 0 K/UL (ref 0–0.2)
BASOPHILS NFR BLD: 1 % (ref 0–2)
BILIRUB SERPL-MCNC: 0.5 MG/DL (ref 0.2–1.1)
BUN SERPL-MCNC: 91 MG/DL (ref 8–23)
CALCIUM SERPL-MCNC: 9.2 MG/DL (ref 8.3–10.4)
CHLORIDE SERPL-SCNC: 99 MMOL/L (ref 98–107)
CO2 SERPL-SCNC: 22 MMOL/L (ref 21–32)
CREAT SERPL-MCNC: 2.4 MG/DL (ref 0.8–1.5)
DIFFERENTIAL METHOD BLD: ABNORMAL
EOSINOPHIL # BLD: 0 K/UL (ref 0–0.8)
EOSINOPHIL NFR BLD: 1 % (ref 0.5–7.8)
ERYTHROCYTE [DISTWIDTH] IN BLOOD BY AUTOMATED COUNT: 15 % (ref 11.9–14.6)
GLOBULIN SER CALC-MCNC: 3.4 G/DL (ref 2.3–3.5)
GLUCOSE SERPL-MCNC: 162 MG/DL (ref 65–100)
HCT VFR BLD AUTO: 30.6 % (ref 41.1–50.3)
HGB BLD-MCNC: 10.6 G/DL (ref 13.6–17.2)
IMM GRANULOCYTES # BLD: 0 K/UL (ref 0–0.5)
IMM GRANULOCYTES NFR BLD AUTO: 0 % (ref 0–5)
LYMPHOCYTES # BLD: 1.6 K/UL (ref 0.5–4.6)
LYMPHOCYTES NFR BLD: 37 % (ref 13–44)
MAGNESIUM SERPL-MCNC: 1.7 MG/DL (ref 1.8–2.4)
MCH RBC QN AUTO: 23.9 PG (ref 26.1–32.9)
MCHC RBC AUTO-ENTMCNC: 34.6 G/DL (ref 31.4–35)
MCV RBC AUTO: 68.9 FL (ref 79.6–97.8)
MONOCYTES # BLD: 0.4 K/UL (ref 0.1–1.3)
MONOCYTES NFR BLD: 9 % (ref 4–12)
NEUTS SEG # BLD: 2.3 K/UL (ref 1.7–8.2)
NEUTS SEG NFR BLD: 52 % (ref 43–78)
PLATELET # BLD AUTO: 153 K/UL (ref 150–450)
PMV BLD AUTO: 10.9 FL (ref 10.8–14.1)
POTASSIUM SERPL-SCNC: 4.5 MMOL/L (ref 3.5–5.1)
PROT SERPL-MCNC: 7 G/DL (ref 6.3–8.2)
RBC # BLD AUTO: 4.44 M/UL (ref 4.23–5.67)
SODIUM SERPL-SCNC: 133 MMOL/L (ref 136–145)
WBC # BLD AUTO: 4.4 K/UL (ref 4.3–11.1)

## 2017-12-28 PROCEDURE — 83735 ASSAY OF MAGNESIUM: CPT | Performed by: INTERNAL MEDICINE

## 2017-12-28 PROCEDURE — 86334 IMMUNOFIX E-PHORESIS SERUM: CPT | Performed by: INTERNAL MEDICINE

## 2017-12-28 PROCEDURE — 36415 COLL VENOUS BLD VENIPUNCTURE: CPT | Performed by: INTERNAL MEDICINE

## 2017-12-28 PROCEDURE — 82784 ASSAY IGA/IGD/IGG/IGM EACH: CPT | Performed by: INTERNAL MEDICINE

## 2017-12-28 PROCEDURE — 83883 ASSAY NEPHELOMETRY NOT SPEC: CPT | Performed by: INTERNAL MEDICINE

## 2017-12-28 PROCEDURE — 80053 COMPREHEN METABOLIC PANEL: CPT | Performed by: INTERNAL MEDICINE

## 2017-12-28 PROCEDURE — 85025 COMPLETE CBC W/AUTO DIFF WBC: CPT | Performed by: INTERNAL MEDICINE

## 2017-12-29 LAB
ALBUMIN SERPL ELPH-MCNC: 2.7 G/DL (ref 3.2–5.6)
ALBUMIN/GLOB SERPL: 0.8 {RATIO}
ALPHA1 GLOB SERPL ELPH-MCNC: 0.16 G/DL (ref 0.1–0.4)
ALPHA2 GLOB SERPL ELPH-MCNC: 0.99 G/DL (ref 0.4–1.2)
B-GLOBULIN SERPL QL ELPH: 1.07 G/DL (ref 0.6–1.3)
GAMMA GLOB MFR SERPL ELPH: 1.38 G/DL (ref 0.5–1.6)
IGA SERPL-MCNC: 27 MG/DL (ref 85–499)
IGG SERPL-MCNC: 946 MG/DL (ref 610–1616)
IGM SERPL-MCNC: 49 MG/DL (ref 35–242)
KAPPA LC FREE SER-MCNC: 14.97 MG/L (ref 3.3–19.4)
KAPPA LC FREE/LAMBDA FREE SER: 0.12 {RATIO} (ref 0.26–1.65)
LAMBDA LC FREE SERPL-MCNC: 127.68 MG/L (ref 5.71–26.3)
M PROTEIN SERPL ELPH-MCNC: 0.65 G/DL
PROT PATTERN SERPL ELPH-IMP: ABNORMAL
PROT PATTERN SPEC IFE-IMP: ABNORMAL
PROT SERPL-MCNC: 6.3 G/DL (ref 6.3–8.2)

## 2018-01-03 LAB
BACTERIA SPEC CULT: ABNORMAL
SERVICE CMNT-IMP: ABNORMAL

## 2018-01-05 ENCOUNTER — HOSPITAL ENCOUNTER (OUTPATIENT)
Dept: LAB | Age: 78
Discharge: HOME | End: 2018-01-05
Payer: MEDICARE

## 2018-01-05 DIAGNOSIS — I50.22 CHRONIC SYSTOLIC CHF (CONGESTIVE HEART FAILURE) (HCC): ICD-10-CM

## 2018-01-05 LAB
ANION GAP SERPL CALC-SCNC: 9 MMOL/L
BNP SERPL-MCNC: 33 PG/ML
BUN SERPL-MCNC: 62 MG/DL (ref 8–23)
CALCIUM SERPL-MCNC: 9.5 MG/DL (ref 8.3–10.4)
CHLORIDE SERPL-SCNC: 98 MMOL/L (ref 98–107)
CO2 SERPL-SCNC: 23 MMOL/L (ref 21–32)
CREAT SERPL-MCNC: 2.7 MG/DL (ref 0.8–1.5)
GLUCOSE SERPL-MCNC: 116 MG/DL (ref 65–100)
HCT VFR BLD AUTO: 30.6 % (ref 41.1–50.3)
HGB BLD-MCNC: 10.9 G/DL (ref 13.6–17.2)
POTASSIUM SERPL-SCNC: 4.8 MMOL/L (ref 3.5–5.1)
SODIUM SERPL-SCNC: 130 MMOL/L (ref 136–145)

## 2018-01-05 PROCEDURE — 83880 ASSAY OF NATRIURETIC PEPTIDE: CPT | Performed by: INTERNAL MEDICINE

## 2018-01-05 PROCEDURE — 36415 COLL VENOUS BLD VENIPUNCTURE: CPT | Performed by: INTERNAL MEDICINE

## 2018-01-05 PROCEDURE — 85018 HEMOGLOBIN: CPT | Performed by: INTERNAL MEDICINE

## 2018-01-05 PROCEDURE — 80048 BASIC METABOLIC PNL TOTAL CA: CPT | Performed by: INTERNAL MEDICINE

## 2018-01-11 ENCOUNTER — HOSPITAL ENCOUNTER (OUTPATIENT)
Dept: LAB | Age: 78
Discharge: HOME | End: 2018-01-11
Payer: MEDICARE

## 2018-01-11 DIAGNOSIS — I50.22 SYSTOLIC CHF, CHRONIC (HCC): ICD-10-CM

## 2018-01-11 LAB
ANION GAP SERPL CALC-SCNC: 13 MMOL/L (ref 7–16)
BUN SERPL-MCNC: 48 MG/DL (ref 8–23)
CALCIUM SERPL-MCNC: 9.4 MG/DL (ref 8.3–10.4)
CHLORIDE SERPL-SCNC: 97 MMOL/L (ref 98–107)
CO2 SERPL-SCNC: 23 MMOL/L (ref 21–32)
CREAT SERPL-MCNC: 2 MG/DL (ref 0.8–1.5)
GLUCOSE SERPL-MCNC: 160 MG/DL (ref 65–100)
POTASSIUM SERPL-SCNC: 4.3 MMOL/L (ref 3.5–5.1)
SODIUM SERPL-SCNC: 133 MMOL/L (ref 136–145)

## 2018-01-11 PROCEDURE — 80048 BASIC METABOLIC PNL TOTAL CA: CPT | Performed by: INTERNAL MEDICINE

## 2018-01-11 PROCEDURE — 36415 COLL VENOUS BLD VENIPUNCTURE: CPT | Performed by: INTERNAL MEDICINE

## 2018-01-12 PROBLEM — R00.1 SINUS BRADYCARDIA: Status: ACTIVE | Noted: 2018-01-12

## 2018-01-12 NOTE — PROGRESS NOTES
Labs reviewed and are improved .    he needs to continue on current medical regimen   recheck in 2 weeks  monitor weight and swelling and report 3-5 # weight gain or excessive edema

## 2018-01-15 ENCOUNTER — PATIENT OUTREACH (OUTPATIENT)
Dept: CASE MANAGEMENT | Age: 78
End: 2018-01-15

## 2018-01-15 ENCOUNTER — HOSPITAL ENCOUNTER (OUTPATIENT)
Dept: LAB | Age: 78
Discharge: HOME | End: 2018-01-15
Payer: MEDICARE

## 2018-01-15 DIAGNOSIS — C90.00 MULTIPLE MYELOMA NOT HAVING ACHIEVED REMISSION (HCC): ICD-10-CM

## 2018-01-15 LAB
ALBUMIN SERPL-MCNC: 3.6 G/DL (ref 3.2–4.6)
ALBUMIN/GLOB SERPL: 1 {RATIO} (ref 1.2–3.5)
ALP SERPL-CCNC: 67 U/L (ref 50–136)
ALT SERPL-CCNC: 48 U/L (ref 12–65)
ANION GAP SERPL CALC-SCNC: 10 MMOL/L (ref 7–16)
AST SERPL-CCNC: 39 U/L (ref 15–37)
BASOPHILS # BLD: 0 K/UL (ref 0–0.2)
BASOPHILS NFR BLD: 0 % (ref 0–2)
BILIRUB SERPL-MCNC: 0.7 MG/DL (ref 0.2–1.1)
BUN SERPL-MCNC: 40 MG/DL (ref 8–23)
CALCIUM SERPL-MCNC: 9.4 MG/DL (ref 8.3–10.4)
CHLORIDE SERPL-SCNC: 100 MMOL/L (ref 98–107)
CO2 SERPL-SCNC: 26 MMOL/L (ref 21–32)
CREAT SERPL-MCNC: 2.03 MG/DL (ref 0.8–1.5)
DIFFERENTIAL METHOD BLD: ABNORMAL
EOSINOPHIL # BLD: 0.1 K/UL (ref 0–0.8)
EOSINOPHIL NFR BLD: 1 % (ref 0.5–7.8)
ERYTHROCYTE [DISTWIDTH] IN BLOOD BY AUTOMATED COUNT: 15.5 % (ref 11.9–14.6)
GLOBULIN SER CALC-MCNC: 3.7 G/DL (ref 2.3–3.5)
GLUCOSE SERPL-MCNC: 89 MG/DL (ref 65–100)
HCT VFR BLD AUTO: 29.3 % (ref 41.1–50.3)
HGB BLD-MCNC: 9.9 G/DL (ref 13.6–17.2)
KAPPA LC FREE SER-MCNC: 42.85 MG/L (ref 3.3–19.4)
KAPPA LC FREE/LAMBDA FREE SER: 0.42 {RATIO} (ref 0.26–1.65)
LAMBDA LC FREE SERPL-MCNC: 103.13 MG/L (ref 5.71–26.3)
LYMPHOCYTES # BLD: 2.8 K/UL (ref 0.5–4.6)
LYMPHOCYTES NFR BLD: 53 % (ref 13–44)
MAGNESIUM SERPL-MCNC: 1.6 MG/DL (ref 1.8–2.4)
MCH RBC QN AUTO: 24 PG (ref 26.1–32.9)
MCHC RBC AUTO-ENTMCNC: 33.8 G/DL (ref 31.4–35)
MCV RBC AUTO: 71.1 FL (ref 79.6–97.8)
MONOCYTES # BLD: 0.4 K/UL (ref 0.1–1.3)
MONOCYTES NFR BLD: 8 % (ref 4–12)
NEUTS SEG # BLD: 2.1 K/UL (ref 1.7–8.2)
NEUTS SEG NFR BLD: 38 % (ref 43–78)
NRBC # BLD: 0.01 K/UL (ref 0–0.2)
PLATELET # BLD AUTO: 100 K/UL (ref 150–450)
PLATELET COMMENTS,PCOM: SLIGHT
PMV BLD AUTO: 11.7 FL (ref 10.8–14.1)
POTASSIUM SERPL-SCNC: 3.6 MMOL/L (ref 3.5–5.1)
PROT SERPL-MCNC: 7.3 G/DL (ref 6.3–8.2)
RBC # BLD AUTO: 4.12 M/UL (ref 4.23–5.67)
RBC MORPH BLD: ABNORMAL
SODIUM SERPL-SCNC: 136 MMOL/L (ref 136–145)
WBC # BLD AUTO: 5.4 K/UL (ref 4.3–11.1)
WBC MORPH BLD: ABNORMAL

## 2018-01-15 PROCEDURE — 80053 COMPREHEN METABOLIC PANEL: CPT | Performed by: INTERNAL MEDICINE

## 2018-01-15 PROCEDURE — 83735 ASSAY OF MAGNESIUM: CPT | Performed by: INTERNAL MEDICINE

## 2018-01-15 PROCEDURE — 83883 ASSAY NEPHELOMETRY NOT SPEC: CPT | Performed by: INTERNAL MEDICINE

## 2018-01-15 PROCEDURE — 84165 PROTEIN E-PHORESIS SERUM: CPT | Performed by: INTERNAL MEDICINE

## 2018-01-15 PROCEDURE — 86334 IMMUNOFIX E-PHORESIS SERUM: CPT | Performed by: INTERNAL MEDICINE

## 2018-01-15 PROCEDURE — 36415 COLL VENOUS BLD VENIPUNCTURE: CPT | Performed by: INTERNAL MEDICINE

## 2018-01-15 PROCEDURE — 85025 COMPLETE CBC W/AUTO DIFF WBC: CPT | Performed by: INTERNAL MEDICINE

## 2018-01-15 NOTE — PROGRESS NOTES
1/15/18:  Patient in for follow-up with Dr. Kailee Andrew. He is scheduled for surgery at [address] with Dr. Isidro Oneil on 1/26 for removal of thyroid mass. Dr. Kailee Andrew discussed plan to stop revlimid and dexamethasone after 1/18 dose. Patient to go to [address] on 1/25 and will return for follow-up with Dr. Kailee Andrew around 2/19. Patient to call this navigator if plans change.

## 2018-01-16 LAB
ALBUMIN SERPL ELPH-MCNC: 3.55 G/DL (ref 3.2–5.6)
ALBUMIN/GLOB SERPL: 1.1 {RATIO}
ALPHA1 GLOB SERPL ELPH-MCNC: 0.32 G/DL (ref 0.1–0.4)
ALPHA2 GLOB SERPL ELPH-MCNC: 0.9 G/DL (ref 0.4–1.2)
B-GLOBULIN SERPL QL ELPH: 0.98 G/DL (ref 0.6–1.3)
GAMMA GLOB MFR SERPL ELPH: 0.95 G/DL (ref 0.5–1.6)
IGA SERPL-MCNC: 24 MG/DL (ref 85–499)
IGG SERPL-MCNC: 820 MG/DL (ref 610–1616)
IGM SERPL-MCNC: 39 MG/DL (ref 35–242)
M PROTEIN SERPL ELPH-MCNC: 0.63 G/DL
PROT PATTERN SERPL ELPH-IMP: ABNORMAL
PROT PATTERN SPEC IFE-IMP: ABNORMAL
PROT SERPL-MCNC: 6.7 G/DL (ref 6.3–8.2)

## 2018-01-29 ENCOUNTER — PATIENT OUTREACH (OUTPATIENT)
Dept: CASE MANAGEMENT | Age: 78
End: 2018-01-29

## 2018-01-30 ENCOUNTER — HOSPITAL ENCOUNTER (OUTPATIENT)
Dept: LAB | Age: 78
Discharge: HOME | End: 2018-01-30
Payer: MEDICARE

## 2018-01-30 ENCOUNTER — PATIENT OUTREACH (OUTPATIENT)
Dept: CASE MANAGEMENT | Age: 78
End: 2018-01-30

## 2018-01-30 DIAGNOSIS — C90.00 MULTIPLE MYELOMA NOT HAVING ACHIEVED REMISSION (HCC): ICD-10-CM

## 2018-01-30 LAB
ALBUMIN SERPL-MCNC: 3.1 G/DL (ref 3.2–4.6)
ALBUMIN/GLOB SERPL: 0.8 {RATIO} (ref 1.2–3.5)
ALP SERPL-CCNC: 67 U/L (ref 50–136)
ALT SERPL-CCNC: 36 U/L (ref 12–65)
ANION GAP SERPL CALC-SCNC: 7 MMOL/L (ref 7–16)
AST SERPL-CCNC: 45 U/L (ref 15–37)
BASOPHILS # BLD: 0 K/UL (ref 0–0.2)
BASOPHILS NFR BLD: 1 % (ref 0–2)
BILIRUB SERPL-MCNC: 0.4 MG/DL (ref 0.2–1.1)
BUN SERPL-MCNC: 26 MG/DL (ref 8–23)
CALCIUM SERPL-MCNC: 8.9 MG/DL (ref 8.3–10.4)
CHLORIDE SERPL-SCNC: 106 MMOL/L (ref 98–107)
CO2 SERPL-SCNC: 27 MMOL/L (ref 21–32)
CREAT SERPL-MCNC: 1.55 MG/DL (ref 0.8–1.5)
DIFFERENTIAL METHOD BLD: ABNORMAL
EOSINOPHIL # BLD: 0 K/UL (ref 0–0.8)
EOSINOPHIL NFR BLD: 1 % (ref 0.5–7.8)
ERYTHROCYTE [DISTWIDTH] IN BLOOD BY AUTOMATED COUNT: 17 % (ref 11.9–14.6)
GLOBULIN SER CALC-MCNC: 3.9 G/DL (ref 2.3–3.5)
GLUCOSE SERPL-MCNC: 82 MG/DL (ref 65–100)
HCT VFR BLD AUTO: 25.8 % (ref 41.1–50.3)
HGB BLD-MCNC: 8.7 G/DL (ref 13.6–17.2)
KAPPA LC FREE SER-MCNC: 13.62 MG/L (ref 3.3–19.4)
KAPPA LC FREE/LAMBDA FREE SER: 0.07 {RATIO} (ref 0.26–1.65)
LAMBDA LC FREE SERPL-MCNC: 206.17 MG/L (ref 5.71–26.3)
LYMPHOCYTES # BLD: 1.3 K/UL (ref 0.5–4.6)
LYMPHOCYTES NFR BLD: 47 % (ref 13–44)
MAGNESIUM SERPL-MCNC: 1.7 MG/DL (ref 1.8–2.4)
MCH RBC QN AUTO: 24.4 PG (ref 26.1–32.9)
MCHC RBC AUTO-ENTMCNC: 33.7 G/DL (ref 31.4–35)
MCV RBC AUTO: 72.5 FL (ref 79.6–97.8)
MONOCYTES # BLD: 0.5 K/UL (ref 0.1–1.3)
MONOCYTES NFR BLD: 17 % (ref 4–12)
NEUTS SEG # BLD: 1 K/UL (ref 1.7–8.2)
NEUTS SEG NFR BLD: 35 % (ref 43–78)
NRBC # BLD: 0 K/UL (ref 0–0.2)
PLATELET # BLD AUTO: 208 K/UL (ref 150–450)
PMV BLD AUTO: 9.2 FL (ref 10.8–14.1)
POTASSIUM SERPL-SCNC: 3.4 MMOL/L (ref 3.5–5.1)
PROT SERPL-MCNC: 7 G/DL (ref 6.3–8.2)
RBC # BLD AUTO: 3.56 M/UL (ref 4.23–5.67)
SODIUM SERPL-SCNC: 140 MMOL/L (ref 136–145)
WBC # BLD AUTO: 2.8 K/UL (ref 4.3–11.1)

## 2018-01-30 PROCEDURE — 84165 PROTEIN E-PHORESIS SERUM: CPT | Performed by: INTERNAL MEDICINE

## 2018-01-30 PROCEDURE — 85025 COMPLETE CBC W/AUTO DIFF WBC: CPT | Performed by: INTERNAL MEDICINE

## 2018-01-30 PROCEDURE — 83883 ASSAY NEPHELOMETRY NOT SPEC: CPT | Performed by: INTERNAL MEDICINE

## 2018-01-30 PROCEDURE — 80053 COMPREHEN METABOLIC PANEL: CPT | Performed by: INTERNAL MEDICINE

## 2018-01-30 PROCEDURE — 83735 ASSAY OF MAGNESIUM: CPT | Performed by: INTERNAL MEDICINE

## 2018-01-30 PROCEDURE — 36415 COLL VENOUS BLD VENIPUNCTURE: CPT | Performed by: INTERNAL MEDICINE

## 2018-01-30 PROCEDURE — 86334 IMMUNOFIX E-PHORESIS SERUM: CPT | Performed by: INTERNAL MEDICINE

## 2018-01-30 NOTE — PROGRESS NOTES
1/30/18:  Patient in for follow-up with Dr. Antonia Mckeon post surgery at [address] on 1/26. Patient discharged on 1/28 and doing well. His incision is dry and intact. Patient reports he has instructions for cleaning provided by [address]. He has follow-up arranged with Dr. Arturo De León NP currently but patient trying to get an appt with physician. Dr. Antonia Mckeon would like the patient to hold rev/dex until February 9th. He will re-start at that time and see Dr. Antonia Mckeon one month following. Calendar given to the patient for guidance.

## 2018-02-01 LAB
ALBUMIN SERPL ELPH-MCNC: 3.22 G/DL (ref 3.2–5.6)
ALBUMIN/GLOB SERPL: 1.1 {RATIO}
ALPHA1 GLOB SERPL ELPH-MCNC: 0.34 G/DL (ref 0.1–0.4)
ALPHA2 GLOB SERPL ELPH-MCNC: 0.83 G/DL (ref 0.4–1.2)
B-GLOBULIN SERPL QL ELPH: 0.88 G/DL (ref 0.6–1.3)
GAMMA GLOB MFR SERPL ELPH: 1.02 G/DL (ref 0.5–1.6)
IGA SERPL-MCNC: 18 MG/DL (ref 85–499)
IGG SERPL-MCNC: 1104 MG/DL (ref 610–1616)
IGM SERPL-MCNC: 22 MG/DL (ref 35–242)
M PROTEIN SERPL ELPH-MCNC: 0.44 G/DL
PROT PATTERN SERPL ELPH-IMP: ABNORMAL
PROT PATTERN SPEC IFE-IMP: ABNORMAL
PROT SERPL-MCNC: 6.3 G/DL (ref 6.3–8.2)

## 2018-02-08 PROBLEM — E07.9 THYROID MASS: Status: ACTIVE | Noted: 2018-02-08

## 2018-03-05 ENCOUNTER — HOSPITAL ENCOUNTER (OUTPATIENT)
Dept: LAB | Age: 78
Discharge: HOME | End: 2018-03-05
Payer: MEDICARE

## 2018-03-05 DIAGNOSIS — C90.00 MULTIPLE MYELOMA NOT HAVING ACHIEVED REMISSION (HCC): ICD-10-CM

## 2018-03-05 LAB
ALBUMIN SERPL-MCNC: 3.7 G/DL (ref 3.2–4.6)
ALBUMIN/GLOB SERPL: 1.1 {RATIO} (ref 1.2–3.5)
ALP SERPL-CCNC: 79 U/L (ref 50–136)
ALT SERPL-CCNC: 27 U/L (ref 12–65)
ANION GAP SERPL CALC-SCNC: 11 MMOL/L (ref 7–16)
AST SERPL-CCNC: 26 U/L (ref 15–37)
BASOPHILS # BLD: 0 K/UL (ref 0–0.2)
BASOPHILS NFR BLD: 1 % (ref 0–2)
BILIRUB SERPL-MCNC: 0.5 MG/DL (ref 0.2–1.1)
BUN SERPL-MCNC: 20 MG/DL (ref 8–23)
CALCIUM SERPL-MCNC: 8.9 MG/DL (ref 8.3–10.4)
CHLORIDE SERPL-SCNC: 106 MMOL/L (ref 98–107)
CO2 SERPL-SCNC: 26 MMOL/L (ref 21–32)
CREAT SERPL-MCNC: 1.18 MG/DL (ref 0.8–1.5)
DIFFERENTIAL METHOD BLD: ABNORMAL
EOSINOPHIL # BLD: 0 K/UL (ref 0–0.8)
EOSINOPHIL NFR BLD: 1 % (ref 0.5–7.8)
ERYTHROCYTE [DISTWIDTH] IN BLOOD BY AUTOMATED COUNT: 16.4 % (ref 11.9–14.6)
GLOBULIN SER CALC-MCNC: 3.3 G/DL (ref 2.3–3.5)
GLUCOSE SERPL-MCNC: 110 MG/DL (ref 65–100)
HCT VFR BLD AUTO: 33.7 % (ref 41.1–50.3)
HGB BLD-MCNC: 11.4 G/DL (ref 13.6–17.2)
KAPPA LC FREE SER-MCNC: 10.21 MG/L (ref 3.3–19.4)
KAPPA LC FREE/LAMBDA FREE SER: 0.12 {RATIO} (ref 0.26–1.65)
LAMBDA LC FREE SERPL-MCNC: 82.59 MG/L (ref 5.71–26.3)
LYMPHOCYTES # BLD: 1.3 K/UL (ref 0.5–4.6)
LYMPHOCYTES NFR BLD: 32 % (ref 13–44)
MAGNESIUM SERPL-MCNC: 1.9 MG/DL (ref 1.8–2.4)
MCH RBC QN AUTO: 24.8 PG (ref 26.1–32.9)
MCHC RBC AUTO-ENTMCNC: 33.8 G/DL (ref 31.4–35)
MCV RBC AUTO: 73.3 FL (ref 79.6–97.8)
MONOCYTES # BLD: 0.9 K/UL (ref 0.1–1.3)
MONOCYTES NFR BLD: 21 % (ref 4–12)
NEUTS SEG # BLD: 1.9 K/UL (ref 1.7–8.2)
NEUTS SEG NFR BLD: 46 % (ref 43–78)
NRBC # BLD: 0 K/UL (ref 0–0.2)
PLATELET # BLD AUTO: 173 K/UL (ref 150–450)
PMV BLD AUTO: 10.4 FL (ref 10.8–14.1)
POTASSIUM SERPL-SCNC: 3.3 MMOL/L (ref 3.5–5.1)
PROT SERPL-MCNC: 7 G/DL (ref 6.3–8.2)
RBC # BLD AUTO: 4.6 M/UL (ref 4.23–5.67)
SODIUM SERPL-SCNC: 143 MMOL/L (ref 136–145)
WBC # BLD AUTO: 4.1 K/UL (ref 4.3–11.1)

## 2018-03-05 PROCEDURE — 83883 ASSAY NEPHELOMETRY NOT SPEC: CPT | Performed by: INTERNAL MEDICINE

## 2018-03-05 PROCEDURE — 85025 COMPLETE CBC W/AUTO DIFF WBC: CPT | Performed by: INTERNAL MEDICINE

## 2018-03-05 PROCEDURE — 80053 COMPREHEN METABOLIC PANEL: CPT | Performed by: INTERNAL MEDICINE

## 2018-03-05 PROCEDURE — 36415 COLL VENOUS BLD VENIPUNCTURE: CPT | Performed by: INTERNAL MEDICINE

## 2018-03-05 PROCEDURE — 86334 IMMUNOFIX E-PHORESIS SERUM: CPT | Performed by: INTERNAL MEDICINE

## 2018-03-05 PROCEDURE — 83735 ASSAY OF MAGNESIUM: CPT | Performed by: INTERNAL MEDICINE

## 2018-03-05 PROCEDURE — 84165 PROTEIN E-PHORESIS SERUM: CPT | Performed by: INTERNAL MEDICINE

## 2018-03-06 LAB
ALBUMIN SERPL ELPH-MCNC: 3.89 G/DL (ref 3.2–5.6)
ALBUMIN/GLOB SERPL: 1.4 {RATIO}
ALPHA1 GLOB SERPL ELPH-MCNC: 0.21 G/DL (ref 0.1–0.4)
ALPHA2 GLOB SERPL ELPH-MCNC: 0.72 G/DL (ref 0.4–1.2)
B-GLOBULIN SERPL QL ELPH: 0.85 G/DL (ref 0.6–1.3)
GAMMA GLOB MFR SERPL ELPH: 0.93 G/DL (ref 0.5–1.6)
IGA SERPL-MCNC: 21 MG/DL (ref 85–499)
IGG SERPL-MCNC: 1058 MG/DL (ref 610–1616)
IGM SERPL-MCNC: 27 MG/DL (ref 35–242)
M PROTEIN SERPL ELPH-MCNC: 0.46 G/DL
PROT PATTERN SERPL ELPH-IMP: ABNORMAL
PROT PATTERN SPEC IFE-IMP: ABNORMAL
PROT SERPL-MCNC: 6.6 G/DL (ref 6.3–8.2)

## 2018-03-15 PROBLEM — E89.0 POSTSURGICAL HYPOTHYROIDISM: Status: ACTIVE | Noted: 2018-03-15

## 2018-03-15 PROBLEM — C73 HURTHLE CELL CARCINOMA (HCC): Status: ACTIVE | Noted: 2018-03-15

## 2018-03-15 PROBLEM — D35.2 PITUITARY MACROADENOMA (HCC): Status: ACTIVE | Noted: 2018-03-15

## 2018-03-22 ENCOUNTER — HOSPITAL ENCOUNTER (OUTPATIENT)
Dept: LAB | Age: 78
Discharge: HOME | End: 2018-03-22
Payer: MEDICARE

## 2018-03-22 DIAGNOSIS — I48.0 PAF (PAROXYSMAL ATRIAL FIBRILLATION) (HCC): ICD-10-CM

## 2018-03-22 DIAGNOSIS — N17.9 ACUTE RENAL FAILURE, UNSPECIFIED ACUTE RENAL FAILURE TYPE (HCC): ICD-10-CM

## 2018-03-22 DIAGNOSIS — I50.22 CHRONIC SYSTOLIC HEART FAILURE (HCC): ICD-10-CM

## 2018-03-22 DIAGNOSIS — I49.9 CARDIAC ARRHYTHMIA, UNSPECIFIED CARDIAC ARRHYTHMIA TYPE: ICD-10-CM

## 2018-03-22 DIAGNOSIS — N17.9 ACUTE KIDNEY INJURY (HCC): ICD-10-CM

## 2018-03-22 DIAGNOSIS — I50.22 CHRONIC SYSTOLIC CHF (CONGESTIVE HEART FAILURE) (HCC): ICD-10-CM

## 2018-03-22 DIAGNOSIS — N17.9 AKI (ACUTE KIDNEY INJURY) (HCC): ICD-10-CM

## 2018-03-22 DIAGNOSIS — I42.9 CARDIOMYOPATHY, UNSPECIFIED TYPE (HCC): ICD-10-CM

## 2018-03-22 LAB
ANION GAP SERPL CALC-SCNC: 8 MMOL/L (ref 7–16)
BNP SERPL-MCNC: 24 PG/ML
BUN SERPL-MCNC: 16 MG/DL (ref 8–23)
CALCIUM SERPL-MCNC: 8.9 MG/DL (ref 8.3–10.4)
CHLORIDE SERPL-SCNC: 104 MMOL/L (ref 98–107)
CO2 SERPL-SCNC: 28 MMOL/L (ref 21–32)
CREAT SERPL-MCNC: 1.2 MG/DL (ref 0.8–1.5)
GLUCOSE SERPL-MCNC: 95 MG/DL (ref 65–100)
POTASSIUM SERPL-SCNC: 3.6 MMOL/L (ref 3.5–5.1)
SODIUM SERPL-SCNC: 140 MMOL/L (ref 136–145)

## 2018-03-22 PROCEDURE — 36415 COLL VENOUS BLD VENIPUNCTURE: CPT | Performed by: INTERNAL MEDICINE

## 2018-03-22 PROCEDURE — 83880 ASSAY OF NATRIURETIC PEPTIDE: CPT | Performed by: INTERNAL MEDICINE

## 2018-03-22 PROCEDURE — 80048 BASIC METABOLIC PNL TOTAL CA: CPT | Performed by: INTERNAL MEDICINE

## 2018-03-27 ENCOUNTER — HOSPITAL ENCOUNTER (OUTPATIENT)
Dept: ULTRASOUND IMAGING | Age: 78
Discharge: HOME | End: 2018-03-27
Attending: INTERNAL MEDICINE
Payer: MEDICARE

## 2018-03-27 ENCOUNTER — HOSPITAL ENCOUNTER (OUTPATIENT)
Dept: CT IMAGING | Age: 78
Discharge: HOME | End: 2018-03-27
Attending: INTERNAL MEDICINE
Payer: MEDICARE

## 2018-03-27 DIAGNOSIS — C73 HURTHLE CELL CARCINOMA (HCC): ICD-10-CM

## 2018-03-27 PROCEDURE — 74011636320 HC RX REV CODE- 636/320: Performed by: INTERNAL MEDICINE

## 2018-03-27 PROCEDURE — 70491 CT SOFT TISSUE NECK W/DYE: CPT

## 2018-03-27 PROCEDURE — 74011000258 HC RX REV CODE- 258: Performed by: INTERNAL MEDICINE

## 2018-03-27 PROCEDURE — 76536 US EXAM OF HEAD AND NECK: CPT

## 2018-03-27 RX ORDER — SODIUM CHLORIDE 0.9 % (FLUSH) 0.9 %
10 SYRINGE (ML) INJECTION
Status: COMPLETED | OUTPATIENT
Start: 2018-03-27 | End: 2018-03-27

## 2018-03-27 RX ADMIN — SODIUM CHLORIDE 100 ML: 900 INJECTION, SOLUTION INTRAVENOUS at 11:33

## 2018-03-27 RX ADMIN — Medication 10 ML: at 11:33

## 2018-03-27 RX ADMIN — IOPAMIDOL 100 ML: 755 INJECTION, SOLUTION INTRAVENOUS at 11:33

## 2018-03-28 ENCOUNTER — PATIENT OUTREACH (OUTPATIENT)
Dept: CASE MANAGEMENT | Age: 78
End: 2018-03-28

## 2018-03-28 ENCOUNTER — HOSPITAL ENCOUNTER (OUTPATIENT)
Dept: LAB | Age: 78
Discharge: HOME | End: 2018-03-28
Payer: MEDICARE

## 2018-03-28 DIAGNOSIS — C90.00 MULTIPLE MYELOMA NOT HAVING ACHIEVED REMISSION (HCC): ICD-10-CM

## 2018-03-28 LAB
ALBUMIN SERPL-MCNC: 3.5 G/DL (ref 3.2–4.6)
ALBUMIN/GLOB SERPL: 1 {RATIO} (ref 1.2–3.5)
ALP SERPL-CCNC: 81 U/L (ref 50–136)
ALT SERPL-CCNC: 35 U/L (ref 12–65)
ANION GAP SERPL CALC-SCNC: 4 MMOL/L (ref 7–16)
AST SERPL-CCNC: 27 U/L (ref 15–37)
BASOPHILS # BLD: 0.1 K/UL (ref 0–0.2)
BASOPHILS NFR BLD: 1 % (ref 0–2)
BILIRUB SERPL-MCNC: 0.6 MG/DL (ref 0.2–1.1)
BUN SERPL-MCNC: 22 MG/DL (ref 8–23)
CALCIUM SERPL-MCNC: 9.1 MG/DL (ref 8.3–10.4)
CHLORIDE SERPL-SCNC: 102 MMOL/L (ref 98–107)
CO2 SERPL-SCNC: 33 MMOL/L (ref 21–32)
CREAT SERPL-MCNC: 1.34 MG/DL (ref 0.8–1.5)
DIFFERENTIAL METHOD BLD: ABNORMAL
EOSINOPHIL # BLD: 0.2 K/UL (ref 0–0.8)
EOSINOPHIL NFR BLD: 4 % (ref 0.5–7.8)
ERYTHROCYTE [DISTWIDTH] IN BLOOD BY AUTOMATED COUNT: 16 % (ref 11.9–14.6)
GLOBULIN SER CALC-MCNC: 3.4 G/DL (ref 2.3–3.5)
GLUCOSE SERPL-MCNC: 131 MG/DL (ref 65–100)
HCT VFR BLD AUTO: 36.6 % (ref 41.1–50.3)
HGB BLD-MCNC: 12.3 G/DL (ref 13.6–17.2)
LYMPHOCYTES # BLD: 1.9 K/UL (ref 0.5–4.6)
LYMPHOCYTES NFR BLD: 38 % (ref 13–44)
MAGNESIUM SERPL-MCNC: 2.1 MG/DL (ref 1.8–2.4)
MCH RBC QN AUTO: 24.6 PG (ref 26.1–32.9)
MCHC RBC AUTO-ENTMCNC: 33.6 G/DL (ref 31.4–35)
MCV RBC AUTO: 73.1 FL (ref 79.6–97.8)
MONOCYTES # BLD: 0.7 K/UL (ref 0.1–1.3)
MONOCYTES NFR BLD: 14 % (ref 4–12)
NEUTS SEG # BLD: 2.2 K/UL (ref 1.7–8.2)
NEUTS SEG NFR BLD: 43 % (ref 43–78)
NRBC # BLD: 0 K/UL (ref 0–0.2)
PLATELET # BLD AUTO: 141 K/UL (ref 150–450)
PLATELET COMMENTS,PCOM: ADEQUATE
PMV BLD AUTO: 10.2 FL (ref 10.8–14.1)
POTASSIUM SERPL-SCNC: 3.8 MMOL/L (ref 3.5–5.1)
PROT SERPL-MCNC: 6.9 G/DL (ref 6.3–8.2)
RBC # BLD AUTO: 5.01 M/UL (ref 4.23–5.67)
RBC MORPH BLD: ABNORMAL
SODIUM SERPL-SCNC: 139 MMOL/L (ref 136–145)
WBC # BLD AUTO: 5.1 K/UL (ref 4.3–11.1)
WBC MORPH BLD: ABNORMAL

## 2018-03-28 PROCEDURE — 80053 COMPREHEN METABOLIC PANEL: CPT | Performed by: INTERNAL MEDICINE

## 2018-03-28 PROCEDURE — 83735 ASSAY OF MAGNESIUM: CPT | Performed by: INTERNAL MEDICINE

## 2018-03-28 PROCEDURE — 85025 COMPLETE CBC W/AUTO DIFF WBC: CPT | Performed by: INTERNAL MEDICINE

## 2018-03-28 PROCEDURE — 36415 COLL VENOUS BLD VENIPUNCTURE: CPT | Performed by: INTERNAL MEDICINE

## 2018-03-28 NOTE — PROGRESS NOTES
3/28/18:  Patient in for follow-up with Dr. Zhang Moulton. Patient reports he is scheduled to meet with Dr. Nena Ruelas at [address] [place] [address]. Patient voiced concerns about hoping to receive radiation at [facility] if possible. Patient to discuss options with Dr. Moira Barron tomorrow. Dr. Zhang Moulton would like patient to continue revlimid and dex as before and return in one month. He will have repeat CT scan prior to follow-up on history of colon cancer. Revlimid calendar provided for patient's use.

## 2018-04-12 ENCOUNTER — PATIENT OUTREACH (OUTPATIENT)
Dept: CASE MANAGEMENT | Age: 78
End: 2018-04-12

## 2018-04-12 NOTE — PROGRESS NOTES
4/12/18:  Patient called reporting he has experienced sharp lower back pain for the last week. He was evaluated by his PCP. Per patient, PCP told him to follow-up with Dr. Cecelia Choi. Dr. Cecelia Choi made aware and would like to keep current CT scheduled and follow-up as planned.

## 2018-04-20 ENCOUNTER — HOSPITAL ENCOUNTER (OUTPATIENT)
Dept: CT IMAGING | Age: 78
Discharge: HOME | End: 2018-04-20
Attending: INTERNAL MEDICINE
Payer: MEDICARE

## 2018-04-20 DIAGNOSIS — K63.89 COLONIC MASS: ICD-10-CM

## 2018-04-20 PROCEDURE — 74177 CT ABD & PELVIS W/CONTRAST: CPT

## 2018-04-20 PROCEDURE — 74011000258 HC RX REV CODE- 258: Performed by: INTERNAL MEDICINE

## 2018-04-20 PROCEDURE — 74011636320 HC RX REV CODE- 636/320: Performed by: INTERNAL MEDICINE

## 2018-04-20 RX ORDER — SODIUM CHLORIDE 0.9 % (FLUSH) 0.9 %
10 SYRINGE (ML) INJECTION
Status: COMPLETED | OUTPATIENT
Start: 2018-04-20 | End: 2018-04-20

## 2018-04-20 RX ADMIN — IOPAMIDOL 100 ML: 755 INJECTION, SOLUTION INTRAVENOUS at 14:38

## 2018-04-20 RX ADMIN — Medication 10 ML: at 14:38

## 2018-04-20 RX ADMIN — DIATRIZOATE MEGLUMINE AND DIATRIZOATE SODIUM 15 ML: 660; 100 LIQUID ORAL; RECTAL at 14:38

## 2018-04-20 RX ADMIN — SODIUM CHLORIDE 100 ML: 900 INJECTION, SOLUTION INTRAVENOUS at 14:38

## 2018-04-24 ENCOUNTER — HOSPITAL ENCOUNTER (OUTPATIENT)
Dept: LAB | Age: 78
Discharge: HOME | End: 2018-04-24
Payer: MEDICARE

## 2018-04-24 ENCOUNTER — PATIENT OUTREACH (OUTPATIENT)
Dept: CASE MANAGEMENT | Age: 78
End: 2018-04-24

## 2018-04-24 DIAGNOSIS — C90.00 MULTIPLE MYELOMA NOT HAVING ACHIEVED REMISSION (HCC): ICD-10-CM

## 2018-04-24 DIAGNOSIS — K63.89 COLONIC MASS: ICD-10-CM

## 2018-04-24 LAB
ALBUMIN SERPL-MCNC: 3.7 G/DL (ref 3.2–4.6)
ALBUMIN/GLOB SERPL: 1 {RATIO} (ref 1.2–3.5)
ALP SERPL-CCNC: 92 U/L (ref 50–136)
ALT SERPL-CCNC: 56 U/L (ref 12–65)
ANION GAP SERPL CALC-SCNC: 7 MMOL/L (ref 7–16)
AST SERPL-CCNC: 41 U/L (ref 15–37)
BASOPHILS # BLD: 0.1 K/UL (ref 0–0.2)
BASOPHILS NFR BLD: 1 % (ref 0–2)
BILIRUB SERPL-MCNC: 0.5 MG/DL (ref 0.2–1.1)
BUN SERPL-MCNC: 31 MG/DL (ref 8–23)
CALCIUM SERPL-MCNC: 9.3 MG/DL (ref 8.3–10.4)
CEA SERPL-MCNC: 4.5 NG/ML (ref 0–3)
CHLORIDE SERPL-SCNC: 100 MMOL/L (ref 98–107)
CO2 SERPL-SCNC: 31 MMOL/L (ref 21–32)
CREAT SERPL-MCNC: 1.51 MG/DL (ref 0.8–1.5)
DIFFERENTIAL METHOD BLD: ABNORMAL
EOSINOPHIL # BLD: 0.2 K/UL (ref 0–0.8)
EOSINOPHIL NFR BLD: 4 % (ref 0.5–7.8)
ERYTHROCYTE [DISTWIDTH] IN BLOOD BY AUTOMATED COUNT: 16 % (ref 11.9–14.6)
GLOBULIN SER CALC-MCNC: 3.8 G/DL (ref 2.3–3.5)
GLUCOSE SERPL-MCNC: 161 MG/DL (ref 65–100)
HCT VFR BLD AUTO: 39.3 % (ref 41.1–50.3)
HGB BLD-MCNC: 13.1 G/DL (ref 13.6–17.2)
KAPPA LC FREE SER-MCNC: 9.43 MG/L (ref 3.3–19.4)
KAPPA LC FREE/LAMBDA FREE SER: 0.05 {RATIO} (ref 0.26–1.65)
LAMBDA LC FREE SERPL-MCNC: 186.52 MG/L (ref 5.71–26.3)
LYMPHOCYTES # BLD: 1.5 K/UL (ref 0.5–4.6)
LYMPHOCYTES NFR BLD: 27 % (ref 13–44)
MAGNESIUM SERPL-MCNC: 1.8 MG/DL (ref 1.8–2.4)
MCH RBC QN AUTO: 24.1 PG (ref 26.1–32.9)
MCHC RBC AUTO-ENTMCNC: 33.3 G/DL (ref 31.4–35)
MCV RBC AUTO: 72.4 FL (ref 79.6–97.8)
MONOCYTES # BLD: 0.7 K/UL (ref 0.1–1.3)
MONOCYTES NFR BLD: 13 % (ref 4–12)
NEUTS SEG # BLD: 2.9 K/UL (ref 1.7–8.2)
NEUTS SEG NFR BLD: 55 % (ref 43–78)
NRBC # BLD: 0 K/UL (ref 0–0.2)
PLATELET # BLD AUTO: 164 K/UL (ref 150–450)
PLATELET COMMENTS,PCOM: ADEQUATE
PMV BLD AUTO: 10.1 FL (ref 10.8–14.1)
POTASSIUM SERPL-SCNC: 3.6 MMOL/L (ref 3.5–5.1)
PROT SERPL-MCNC: 7.5 G/DL (ref 6.3–8.2)
RBC # BLD AUTO: 5.43 M/UL (ref 4.23–5.67)
RBC MORPH BLD: ABNORMAL
SODIUM SERPL-SCNC: 138 MMOL/L (ref 136–145)
WBC # BLD AUTO: 5.4 K/UL (ref 4.3–11.1)
WBC MORPH BLD: ABNORMAL

## 2018-04-24 PROCEDURE — 85025 COMPLETE CBC W/AUTO DIFF WBC: CPT | Performed by: INTERNAL MEDICINE

## 2018-04-24 PROCEDURE — 84165 PROTEIN E-PHORESIS SERUM: CPT | Performed by: INTERNAL MEDICINE

## 2018-04-24 PROCEDURE — 83883 ASSAY NEPHELOMETRY NOT SPEC: CPT | Performed by: INTERNAL MEDICINE

## 2018-04-24 PROCEDURE — 80053 COMPREHEN METABOLIC PANEL: CPT | Performed by: INTERNAL MEDICINE

## 2018-04-24 PROCEDURE — 82378 CARCINOEMBRYONIC ANTIGEN: CPT | Performed by: INTERNAL MEDICINE

## 2018-04-24 PROCEDURE — 36415 COLL VENOUS BLD VENIPUNCTURE: CPT | Performed by: INTERNAL MEDICINE

## 2018-04-24 PROCEDURE — 86334 IMMUNOFIX E-PHORESIS SERUM: CPT | Performed by: INTERNAL MEDICINE

## 2018-04-24 PROCEDURE — 83735 ASSAY OF MAGNESIUM: CPT | Performed by: INTERNAL MEDICINE

## 2018-04-24 NOTE — PROGRESS NOTES
Pt was seen and labs reviewed by Dr. Laith Jules. Pt is scheduled for thyroid surgery soon, so he ws instructed to stop Revlimid now. His last dose of this cycle would have been this coming Friday, but pt to hold until he would have resumed next cycle. He was also instructed to hold Dex on Friday as well and resume with start of next cycle. Pt encouraged to take/alternate  Oxycodone and Ibuprofen for back pain. Pt encouraged to call us if pack pain worsens. Will look into starting Zometa once he has recovered from surgery. Pt verbalized understanding of all instructions. He will return to clinic in 4 weeks.

## 2018-04-25 LAB
ALBUMIN SERPL ELPH-MCNC: 3.89 G/DL (ref 3.2–5.6)
ALBUMIN/GLOB SERPL: 1.2 {RATIO}
ALPHA1 GLOB SERPL ELPH-MCNC: 0.27 G/DL (ref 0.1–0.4)
ALPHA2 GLOB SERPL ELPH-MCNC: 0.9 G/DL (ref 0.4–1.2)
B-GLOBULIN SERPL QL ELPH: 1.03 G/DL (ref 0.6–1.3)
GAMMA GLOB MFR SERPL ELPH: 1.1 G/DL (ref 0.5–1.6)
IGA SERPL-MCNC: 19 MG/DL (ref 85–499)
IGG SERPL-MCNC: 1189 MG/DL (ref 610–1616)
IGM SERPL-MCNC: 29 MG/DL (ref 35–242)
M PROTEIN SERPL ELPH-MCNC: 0.87 G/DL
PROT PATTERN SERPL ELPH-IMP: ABNORMAL
PROT PATTERN SPEC IFE-IMP: ABNORMAL
PROT SERPL-MCNC: 7.2 G/DL (ref 6.3–8.2)

## 2018-05-07 ENCOUNTER — PATIENT OUTREACH (OUTPATIENT)
Dept: CASE MANAGEMENT | Age: 78
End: 2018-05-07

## 2018-05-07 ENCOUNTER — HOSPITAL ENCOUNTER (OUTPATIENT)
Dept: LAB | Age: 78
Discharge: HOME | End: 2018-05-07
Payer: MEDICARE

## 2018-05-07 DIAGNOSIS — C90.00 MULTIPLE MYELOMA NOT HAVING ACHIEVED REMISSION (HCC): ICD-10-CM

## 2018-05-07 DIAGNOSIS — M54.50 ACUTE RIGHT-SIDED LOW BACK PAIN WITHOUT SCIATICA: ICD-10-CM

## 2018-05-07 LAB
APPEARANCE UR: CLEAR
BILIRUB UR QL: NEGATIVE
COLOR UR: YELLOW
GLUCOSE UR STRIP.AUTO-MCNC: NEGATIVE MG/DL
HGB UR QL STRIP: NEGATIVE
KETONES UR QL STRIP.AUTO: NEGATIVE MG/DL
LEUKOCYTE ESTERASE UR QL STRIP.AUTO: NEGATIVE
NITRITE UR QL STRIP.AUTO: NEGATIVE
PH UR STRIP: 5 [PH] (ref 5–9)
PROT UR STRIP-MCNC: NEGATIVE MG/DL
SP GR UR REFRACTOMETRY: 1.01 (ref 1–1.02)
UROBILINOGEN UR QL STRIP.AUTO: 0.2 EU/DL (ref 0.2–1)

## 2018-05-07 PROCEDURE — 81003 URINALYSIS AUTO W/O SCOPE: CPT | Performed by: NURSE PRACTITIONER

## 2018-05-07 NOTE — PROGRESS NOTES
5/7/18:  Patient called this morning to report extreme pain (noted 9/10) on right lower back. He states his normal abdominal pain and lower back pain is bad as well. He notes pain is some better when lying down at night but intensifies with position changes. He has tried oxycodone 10 with no relief and norco with no relief. Patient worked in to see the NP. CT scan from 4/20 reviewed by NP and Dr. Barrington Pinto again. Urinalysis to be collected today. Patient to try an increase in oxycodone to 15mg and he will have script for flexeril to have on hand. Patient to return on 5/22 with Dr. Barrington Pinto but will call if medication changes are not effective.

## 2018-05-14 ENCOUNTER — APPOINTMENT (OUTPATIENT)
Dept: CT IMAGING | Age: 78
DRG: 478 | End: 2018-05-14
Attending: NURSE PRACTITIONER
Payer: MEDICARE

## 2018-05-14 ENCOUNTER — HOSPITAL ENCOUNTER (INPATIENT)
Age: 78
LOS: 8 days | Discharge: NURSING, REHAB OR LONG-TERM CARE | DRG: 478 | End: 2018-05-23
Attending: EMERGENCY MEDICINE | Admitting: INTERNAL MEDICINE
Payer: MEDICARE

## 2018-05-14 ENCOUNTER — APPOINTMENT (OUTPATIENT)
Dept: GENERAL RADIOLOGY | Age: 78
DRG: 478 | End: 2018-05-14
Attending: EMERGENCY MEDICINE
Payer: MEDICARE

## 2018-05-14 DIAGNOSIS — N17.9 AKI (ACUTE KIDNEY INJURY) (HCC): ICD-10-CM

## 2018-05-14 DIAGNOSIS — C73 HURTHLE CELL CARCINOMA OF THYROID (HCC): ICD-10-CM

## 2018-05-14 DIAGNOSIS — Z92.3 HX OF RADIATION THERAPY: ICD-10-CM

## 2018-05-14 DIAGNOSIS — I50.22 CHRONIC SYSTOLIC CHF (CONGESTIVE HEART FAILURE) (HCC): ICD-10-CM

## 2018-05-14 DIAGNOSIS — D35.2 PITUITARY MACROADENOMA (HCC): ICD-10-CM

## 2018-05-14 DIAGNOSIS — R53.83 MALAISE AND FATIGUE: ICD-10-CM

## 2018-05-14 DIAGNOSIS — R52 PAIN: ICD-10-CM

## 2018-05-14 DIAGNOSIS — C90.00 MULTIPLE MYELOMA, REMISSION STATUS UNSPECIFIED (HCC): ICD-10-CM

## 2018-05-14 DIAGNOSIS — I44.7 LBBB (LEFT BUNDLE BRANCH BLOCK): ICD-10-CM

## 2018-05-14 DIAGNOSIS — N17.9 ACUTE RENAL FAILURE, UNSPECIFIED ACUTE RENAL FAILURE TYPE (HCC): ICD-10-CM

## 2018-05-14 DIAGNOSIS — S32.020D CLOSED COMPRESSION FRACTURE OF L2 LUMBAR VERTEBRA WITH ROUTINE HEALING, SUBSEQUENT ENCOUNTER: ICD-10-CM

## 2018-05-14 DIAGNOSIS — I42.9 CARDIOMYOPATHY, UNSPECIFIED TYPE (HCC): ICD-10-CM

## 2018-05-14 DIAGNOSIS — R52 INTRACTABLE PAIN: ICD-10-CM

## 2018-05-14 DIAGNOSIS — I48.0 PAF (PAROXYSMAL ATRIAL FIBRILLATION) (HCC): ICD-10-CM

## 2018-05-14 DIAGNOSIS — E89.0 POSTSURGICAL HYPOTHYROIDISM: ICD-10-CM

## 2018-05-14 DIAGNOSIS — Z95.810 AUTOMATIC IMPLANTABLE CARDIOVERTER-DEFIBRILLATOR IN SITU: ICD-10-CM

## 2018-05-14 DIAGNOSIS — G62.9 POLYNEUROPATHY: ICD-10-CM

## 2018-05-14 DIAGNOSIS — R53.81 MALAISE AND FATIGUE: ICD-10-CM

## 2018-05-14 DIAGNOSIS — M54.50 ACUTE MIDLINE LOW BACK PAIN WITHOUT SCIATICA: Primary | ICD-10-CM

## 2018-05-14 DIAGNOSIS — C61 PROSTATE CANCER (HCC): ICD-10-CM

## 2018-05-14 LAB
ALBUMIN SERPL-MCNC: 3.6 G/DL (ref 3.2–4.6)
ALBUMIN/GLOB SERPL: 0.9 {RATIO} (ref 1.2–3.5)
ALP SERPL-CCNC: 110 U/L (ref 50–136)
ALT SERPL-CCNC: 38 U/L (ref 12–65)
ANION GAP SERPL CALC-SCNC: 10 MMOL/L (ref 7–16)
AST SERPL-CCNC: 44 U/L (ref 15–37)
BASOPHILS # BLD: 0 K/UL (ref 0–0.2)
BASOPHILS NFR BLD: 0 % (ref 0–2)
BILIRUB SERPL-MCNC: 0.9 MG/DL (ref 0.2–1.1)
BUN SERPL-MCNC: 34 MG/DL (ref 8–23)
CALCIUM SERPL-MCNC: 9.8 MG/DL (ref 8.3–10.4)
CHLORIDE SERPL-SCNC: 98 MMOL/L (ref 98–107)
CO2 SERPL-SCNC: 31 MMOL/L (ref 21–32)
CREAT SERPL-MCNC: 1.53 MG/DL (ref 0.8–1.5)
DIFFERENTIAL METHOD BLD: ABNORMAL
EOSINOPHIL # BLD: 0.1 K/UL (ref 0–0.8)
EOSINOPHIL NFR BLD: 1 % (ref 0.5–7.8)
ERYTHROCYTE [DISTWIDTH] IN BLOOD BY AUTOMATED COUNT: 15.3 % (ref 11.9–14.6)
GLOBULIN SER CALC-MCNC: 3.8 G/DL (ref 2.3–3.5)
GLUCOSE SERPL-MCNC: 110 MG/DL (ref 65–100)
HCT VFR BLD AUTO: 36.2 % (ref 41.1–50.3)
HGB BLD-MCNC: 12.2 G/DL (ref 13.6–17.2)
IMM GRANULOCYTES # BLD: 0.1 K/UL (ref 0–0.5)
IMM GRANULOCYTES NFR BLD AUTO: 1 % (ref 0–5)
LYMPHOCYTES # BLD: 1.7 K/UL (ref 0.5–4.6)
LYMPHOCYTES NFR BLD: 29 % (ref 13–44)
MCH RBC QN AUTO: 23.7 PG (ref 26.1–32.9)
MCHC RBC AUTO-ENTMCNC: 33.7 G/DL (ref 31.4–35)
MCV RBC AUTO: 70.3 FL (ref 79.6–97.8)
MONOCYTES # BLD: 0.5 K/UL (ref 0.1–1.3)
MONOCYTES NFR BLD: 9 % (ref 4–12)
NEUTS SEG # BLD: 3.5 K/UL (ref 1.7–8.2)
NEUTS SEG NFR BLD: 60 % (ref 43–78)
PLATELET # BLD AUTO: 147 K/UL (ref 150–450)
PMV BLD AUTO: ABNORMAL FL (ref 10.8–14.1)
POTASSIUM SERPL-SCNC: 3.4 MMOL/L (ref 3.5–5.1)
PROT SERPL-MCNC: 7.4 G/DL (ref 6.3–8.2)
RBC # BLD AUTO: 5.15 M/UL (ref 4.23–5.67)
SODIUM SERPL-SCNC: 139 MMOL/L (ref 136–145)
TSH SERPL DL<=0.005 MIU/L-ACNC: 0.93 UIU/ML (ref 0.36–3.74)
WBC # BLD AUTO: 5.9 K/UL (ref 4.3–11.1)

## 2018-05-14 PROCEDURE — 85025 COMPLETE CBC W/AUTO DIFF WBC: CPT | Performed by: EMERGENCY MEDICINE

## 2018-05-14 PROCEDURE — 99223 1ST HOSP IP/OBS HIGH 75: CPT | Performed by: INTERNAL MEDICINE

## 2018-05-14 PROCEDURE — 99285 EMERGENCY DEPT VISIT HI MDM: CPT | Performed by: EMERGENCY MEDICINE

## 2018-05-14 PROCEDURE — 81003 URINALYSIS AUTO W/O SCOPE: CPT | Performed by: EMERGENCY MEDICINE

## 2018-05-14 PROCEDURE — 74011250636 HC RX REV CODE- 250/636: Performed by: EMERGENCY MEDICINE

## 2018-05-14 PROCEDURE — 99218 HC RM OBSERVATION: CPT

## 2018-05-14 PROCEDURE — 74011636637 HC RX REV CODE- 636/637: Performed by: NURSE PRACTITIONER

## 2018-05-14 PROCEDURE — 72132 CT LUMBAR SPINE W/DYE: CPT

## 2018-05-14 PROCEDURE — 84443 ASSAY THYROID STIM HORMONE: CPT | Performed by: EMERGENCY MEDICINE

## 2018-05-14 PROCEDURE — 74011250637 HC RX REV CODE- 250/637: Performed by: NURSE PRACTITIONER

## 2018-05-14 PROCEDURE — 96376 TX/PRO/DX INJ SAME DRUG ADON: CPT | Performed by: EMERGENCY MEDICINE

## 2018-05-14 PROCEDURE — 72170 X-RAY EXAM OF PELVIS: CPT

## 2018-05-14 PROCEDURE — 72100 X-RAY EXAM L-S SPINE 2/3 VWS: CPT

## 2018-05-14 PROCEDURE — 74011250636 HC RX REV CODE- 250/636: Performed by: NURSE PRACTITIONER

## 2018-05-14 PROCEDURE — 96374 THER/PROPH/DIAG INJ IV PUSH: CPT | Performed by: EMERGENCY MEDICINE

## 2018-05-14 PROCEDURE — 80053 COMPREHEN METABOLIC PANEL: CPT | Performed by: EMERGENCY MEDICINE

## 2018-05-14 PROCEDURE — 74011636320 HC RX REV CODE- 636/320: Performed by: INTERNAL MEDICINE

## 2018-05-14 PROCEDURE — 74011000258 HC RX REV CODE- 258: Performed by: INTERNAL MEDICINE

## 2018-05-14 RX ORDER — POTASSIUM CHLORIDE 20 MEQ/1
20 TABLET, EXTENDED RELEASE ORAL 2 TIMES DAILY
Status: DISCONTINUED | OUTPATIENT
Start: 2018-05-14 | End: 2018-05-23 | Stop reason: HOSPADM

## 2018-05-14 RX ORDER — MORPHINE SULFATE 10 MG/ML
4 INJECTION, SOLUTION INTRAMUSCULAR; INTRAVENOUS
Status: COMPLETED | OUTPATIENT
Start: 2018-05-14 | End: 2018-05-14

## 2018-05-14 RX ORDER — HYDROMORPHONE HYDROCHLORIDE 1 MG/ML
1 INJECTION, SOLUTION INTRAMUSCULAR; INTRAVENOUS; SUBCUTANEOUS
Status: DISCONTINUED | OUTPATIENT
Start: 2018-05-14 | End: 2018-05-23 | Stop reason: HOSPADM

## 2018-05-14 RX ORDER — CARVEDILOL 6.25 MG/1
6.25 TABLET ORAL 2 TIMES DAILY WITH MEALS
Status: DISCONTINUED | OUTPATIENT
Start: 2018-05-14 | End: 2018-05-23 | Stop reason: HOSPADM

## 2018-05-14 RX ORDER — LANOLIN ALCOHOL/MO/W.PET/CERES
1000 CREAM (GRAM) TOPICAL DAILY
Status: DISCONTINUED | OUTPATIENT
Start: 2018-05-15 | End: 2018-05-23 | Stop reason: HOSPADM

## 2018-05-14 RX ORDER — PANTOPRAZOLE SODIUM 40 MG/1
40 TABLET, DELAYED RELEASE ORAL
Status: DISCONTINUED | OUTPATIENT
Start: 2018-05-15 | End: 2018-05-23 | Stop reason: HOSPADM

## 2018-05-14 RX ORDER — OXYCODONE HYDROCHLORIDE 15 MG/1
15 TABLET ORAL
Status: DISCONTINUED | OUTPATIENT
Start: 2018-05-14 | End: 2018-05-17

## 2018-05-14 RX ORDER — CYCLOBENZAPRINE HCL 10 MG
10 TABLET ORAL
Status: DISCONTINUED | OUTPATIENT
Start: 2018-05-14 | End: 2018-05-23 | Stop reason: HOSPADM

## 2018-05-14 RX ORDER — SODIUM CHLORIDE 0.9 % (FLUSH) 0.9 %
10 SYRINGE (ML) INJECTION
Status: COMPLETED | OUTPATIENT
Start: 2018-05-14 | End: 2018-05-14

## 2018-05-14 RX ORDER — CHLORHEXIDINE GLUCONATE 1.2 MG/ML
15 RINSE ORAL 2 TIMES DAILY
Status: DISCONTINUED | OUTPATIENT
Start: 2018-05-14 | End: 2018-05-23 | Stop reason: HOSPADM

## 2018-05-14 RX ORDER — POLYETHYLENE GLYCOL 3350 17 G/17G
17 POWDER, FOR SOLUTION ORAL DAILY
Status: DISCONTINUED | OUTPATIENT
Start: 2018-05-15 | End: 2018-05-15

## 2018-05-14 RX ORDER — ROSUVASTATIN CALCIUM 5 MG/1
10 TABLET, COATED ORAL
Status: DISCONTINUED | OUTPATIENT
Start: 2018-05-14 | End: 2018-05-23 | Stop reason: HOSPADM

## 2018-05-14 RX ORDER — TORSEMIDE 20 MG/1
20 TABLET ORAL DAILY
Status: DISCONTINUED | OUTPATIENT
Start: 2018-05-15 | End: 2018-05-23 | Stop reason: HOSPADM

## 2018-05-14 RX ADMIN — MORPHINE SULFATE 4 MG: 10 INJECTION INTRAMUSCULAR; INTRAVENOUS; SUBCUTANEOUS at 09:39

## 2018-05-14 RX ADMIN — APIXABAN 5 MG: 5 TABLET, FILM COATED ORAL at 16:42

## 2018-05-14 RX ADMIN — OXYCODONE HYDROCHLORIDE 15 MG: 15 TABLET ORAL at 14:27

## 2018-05-14 RX ADMIN — CYCLOBENZAPRINE HYDROCHLORIDE 10 MG: 10 TABLET, FILM COATED ORAL at 21:24

## 2018-05-14 RX ADMIN — SODIUM CHLORIDE 100 ML: 900 INJECTION, SOLUTION INTRAVENOUS at 16:04

## 2018-05-14 RX ADMIN — HYDROMORPHONE HYDROCHLORIDE 1 MG: 1 INJECTION, SOLUTION INTRAMUSCULAR; INTRAVENOUS; SUBCUTANEOUS at 21:24

## 2018-05-14 RX ADMIN — HYDROMORPHONE HYDROCHLORIDE 1 MG: 1 INJECTION, SOLUTION INTRAMUSCULAR; INTRAVENOUS; SUBCUTANEOUS at 13:35

## 2018-05-14 RX ADMIN — MORPHINE SULFATE 4 MG: 10 INJECTION INTRAMUSCULAR; INTRAVENOUS; SUBCUTANEOUS at 08:07

## 2018-05-14 RX ADMIN — POTASSIUM CHLORIDE 20 MEQ: 1500 TABLET, EXTENDED RELEASE ORAL at 16:43

## 2018-05-14 RX ADMIN — Medication 10 ML: at 16:04

## 2018-05-14 RX ADMIN — HYDROMORPHONE HYDROCHLORIDE 1 MG: 1 INJECTION, SOLUTION INTRAMUSCULAR; INTRAVENOUS; SUBCUTANEOUS at 16:38

## 2018-05-14 RX ADMIN — PREDNISONE 60 MG: 50 TABLET ORAL at 14:27

## 2018-05-14 RX ADMIN — CARVEDILOL 6.25 MG: 6.25 TABLET, FILM COATED ORAL at 16:43

## 2018-05-14 RX ADMIN — ROSUVASTATIN CALCIUM 10 MG: 5 TABLET, FILM COATED ORAL at 21:24

## 2018-05-14 RX ADMIN — IOPAMIDOL 100 ML: 755 INJECTION, SOLUTION INTRAVENOUS at 16:04

## 2018-05-14 NOTE — ED NOTES
Ramond Pallas MD requested ambulation of pt. Pt states he \"hurts to bad to get up and there is no way he can walk\". This nurse attempted to assist pt with standing and pt refused.

## 2018-05-14 NOTE — ED TRIAGE NOTES
Pt sent by oncologist Juliane Lubin. Pt has hx of multiple myloma. Pt c\o all over body pain, predominately back pain.  States  He has had this pain \"for a while\" C\o worse upon waking this AM.

## 2018-05-14 NOTE — PROGRESS NOTES
05/14/18 1336   Dual Skin Pressure Injury Assessment   Dual Skin Pressure Injury Assessment WDL   Second Care Provider (Based on [address] [place] [address]) Sukhjinder Benedict RN

## 2018-05-14 NOTE — ED PROVIDER NOTES
HPI Comments: 78-year-old gentleman with complex past history including congestive heart failure, atrial fibrillation, defibrillator implant and also a history of prostate, colon, thyroid cancer with multiple myeloma. Patient has had some mild fairly long-term back pain attributed to his myeloma. It seemed to worsen last night. When he awoke today he had too much pain to get out of bed and his low back. No change in bowel or bladder. No fever or dysuria. No incontinence. No radiation of the pain down his legs or any numbness. Mild suprapubic pain. No nausea vomiting fever. No chest pain. Patient is a 65 y.o. male presenting with general illness and back pain. The history is provided by the patient. Generalized Body Aches   Associated symptoms include abdominal pain. Pertinent negatives include no chest pain, no headaches and no shortness of breath. Back Pain    This is a new problem. The current episode started more than 1 week ago. The problem has been rapidly worsening. The problem occurs constantly. The pain is associated with no known injury. The pain is present in the lower back. The quality of the pain is described as aching and dull. The pain radiates to the left groin. The pain is moderate. Associated symptoms include abdominal pain. Pertinent negatives include no chest pain, no fever, no numbness, no headaches, no abdominal swelling, no bowel incontinence, no perianal numbness, no bladder incontinence, no dysuria, no pelvic pain, no paresthesias and no weakness. He has tried nothing for the symptoms.         Past Medical History:   Diagnosis Date    Arrhythmia     LBBB    Arthritis     BMI 30.0-30.9,adult     BMI 30.5    Cardiomyopathy ([facility] [address]     followed by [facility]    Cholelithiases     Chronic systolic heart failure ([facility] [address] 9/13/2011    [place] [employer] class II-III heart failure symptoms    Gout     H/O: pituitary tumor     X2 benign, removed    High cholesterol     History of thyroid cancer     History of vertigo     no recent complications or episodes    Hurthle cell carcinoma of thyroid ([facility] [address]     Hx of radiation therapy to prostate 2004    Hyperlipidemia 11/18/2015    Hypertension     Hypothyroid     hypo- had portion of thyroid removed due to cancer    ICD (implantable cardiac defibrillator) in place 9/2011    Biotron[employer] BIV/ICD, Gen change 3.2.16    LBBB (left bundle branch block) 11/18/2015    Malaise and fatigue 11/18/2015    Malignant neoplasm of prostate ([facility] [address]     Mass of colon     right colon mass    Multiple myeloma ([facility] [address]     Sinus node dysfunction (HCC)        Past Surgical History:   Procedure Laterality Date    HX CHOLECYSTECTOMY  2013    HX COLONOSCOPY      dx with Right colon mass     HX GI      benign polyps removed    HX HEART CATHETERIZATION      HX HEENT  2008    thyroidetomy partial tumor from pituitary x2    HX PACEMAKER  2011/2016    biotronik biv-icd    HX THYROIDECTOMY  2008    HX THYROIDECTOMY  01/26/2018    completion thyroidectomy    HX TUMOR REMOVAL  1990/2010    pituitary tumor removed X2         Family History:   Problem Relation Age of Onset    Heart Disease Sister     Heart Attack Sister     Stroke Mother     Thyroid Disease Neg Hx     Diabetes Neg Hx        Social History     Social History    Marital status:      Spouse name: N/A    Number of children: N/A    Years of education: N/A     Occupational History    Not on file. Social History Main Topics    Smoking status: Never Smoker    Smokeless tobacco: Never Used    Alcohol use Yes      Comment: very rare    Drug use: No    Sexual activity: Yes     Partners: Female     Other Topics Concern    Not on file     Social History Narrative         ALLERGIES: Lisinopril and Pcn [penicillins]    Review of Systems   Constitutional: Negative for chills and fever. Respiratory: Negative for cough and shortness of breath.     Cardiovascular: Negative for chest pain and palpitations. Gastrointestinal: Positive for abdominal pain. Negative for bowel incontinence, diarrhea, nausea and vomiting. Genitourinary: Negative for bladder incontinence, dysuria, flank pain and pelvic pain. Musculoskeletal: Positive for back pain. Negative for neck pain. Skin: Negative for color change and rash. Neurological: Negative for syncope, weakness, numbness, headaches and paresthesias. All other systems reviewed and are negative. Vitals:    05/14/18 0745 05/14/18 0750   BP: (!) 158/92    Pulse: 84    Resp: 18    Temp: 97.9 °F (36.6 °C)    SpO2: 96% 96%   Weight: 86.2 kg (190 lb)    Height: 6' 2\" (1.88 m)             Physical Exam   Constitutional: He is oriented to person, place, and time. He appears well-developed and well-nourished. No distress. HENT:   Head: Normocephalic and atraumatic. Mouth/Throat: Oropharynx is clear and moist. No oropharyngeal exudate. Eyes: Conjunctivae and EOM are normal. Pupils are equal, round, and reactive to light. Neck: Normal range of motion. Neck supple. Cardiovascular: Normal rate, regular rhythm and intact distal pulses. No murmur heard. Pulmonary/Chest: Breath sounds normal. No respiratory distress. Abdominal: Soft. Bowel sounds are normal. He exhibits no mass. There is no tenderness. There is no rebound and no guarding. No hernia. Musculoskeletal:        Lumbar back: He exhibits decreased range of motion, tenderness and bony tenderness. Neurological: He is alert and oriented to person, place, and time. Gait normal.   Nl speech   Skin: Skin is warm and dry. Psychiatric: He has a normal mood and affect. His speech is normal.   Nursing note and vitals reviewed. MDM  Number of Diagnoses or Management Options  Diagnosis management comments: No signs of spinal cord compression. Plain films. Urinalysis screening blood work.   Pain control       Amount and/or Complexity of Data Reviewed  Clinical lab tests: ordered and reviewed  Tests in the radiology section of CPT®: ordered and reviewed  Independent visualization of images, tracings, or specimens: yes    Risk of Complications, Morbidity, and/or Mortality  Presenting problems: moderate  Diagnostic procedures: low  Management options: moderate    Patient Progress  Patient progress: stable        ED Course       Procedures      Results Include:    Recent Results (from the past 24 hour(s))   CBC WITH AUTOMATED DIFF    Collection Time: 05/14/18  8:01 AM   Result Value Ref Range    WBC 5.9 4.3 - 11.1 K/uL    RBC 5.15 4.23 - 5.67 M/uL    HGB 12.2 (L) 13.6 - 17.2 g/dL    HCT 36.2 (L) 41.1 - 50.3 %    MCV 70.3 (L) 79.6 - 97.8 FL    MCH 23.7 (L) 26.1 - 32.9 PG    MCHC 33.7 31.4 - 35.0 g/dL    RDW 15.3 (H) 11.9 - 14.6 %    PLATELET 573 (L) 713 - 450 K/uL    MPV Cannot be calculated 10.8 - 14.1 FL    DF AUTOMATED      NEUTROPHILS 60 43 - 78 %    LYMPHOCYTES 29 13 - 44 %    MONOCYTES 9 4.0 - 12.0 %    EOSINOPHILS 1 0.5 - 7.8 %    BASOPHILS 0 0.0 - 2.0 %    IMMATURE GRANULOCYTES 1 0.0 - 5.0 %    ABS. NEUTROPHILS 3.5 1.7 - 8.2 K/UL    ABS. LYMPHOCYTES 1.7 0.5 - 4.6 K/UL    ABS. MONOCYTES 0.5 0.1 - 1.3 K/UL    ABS. EOSINOPHILS 0.1 0.0 - 0.8 K/UL    ABS. BASOPHILS 0.0 0.0 - 0.2 K/UL    ABS. IMM. GRANS. 0.1 0.0 - 0.5 K/UL   METABOLIC PANEL, COMPREHENSIVE    Collection Time: 05/14/18  8:01 AM   Result Value Ref Range    Sodium 139 136 - 145 mmol/L    Potassium 3.4 (L) 3.5 - 5.1 mmol/L    Chloride 98 98 - 107 mmol/L    CO2 31 21 - 32 mmol/L    Anion gap 10 7 - 16 mmol/L    Glucose 110 (H) 65 - 100 mg/dL    BUN 34 (H) 8 - 23 MG/DL    Creatinine 1.53 (H) 0.8 - 1.5 MG/DL    GFR est AA 57 (L) >60 ml/min/1.73m2    GFR est non-AA 47 (L) >60 ml/min/1.73m2    Calcium 9.8 8.3 - 10.4 MG/DL    Bilirubin, total 0.9 0.2 - 1.1 MG/DL    ALT (SGPT) 38 12 - 65 U/L    AST (SGOT) 44 (H) 15 - 37 U/L    Alk.  phosphatase 110 50 - 136 U/L    Protein, total 7.4 6.3 - 8.2 g/dL    Albumin 3.6 3.2 - 4.6 g/dL    Globulin 3.8 (H) 2.3 - 3.5 g/dL    A-G Ratio 0.9 (L) 1.2 - 3.5     TSH 3RD GENERATION    Collection Time: 05/14/18  8:01 AM   Result Value Ref Range    TSH 0.931 0.358 - 3.740 uIU/mL     Xr Spine Lumb 2 Or 3 V    Result Date: 5/14/2018  LUMBAR SPINE RADIOGRAPHS, 5/14/2018 CLINICAL HISTORY:  Chronic low back pain which is severe for the past 2 days. TECHNIQUE: AP and lateral views of the lumbar spine with coned down view of the lumbosacral junction. Comparison: CT abdomen 4/20/2018 FINDINGS: Five lumbar type vertebrae are visualized. Alignment is maintained at all levels. Vertebral body height is maintained at all levels in the lumbar spine. A moderate anterior compression deformity is seen at T12. However, this does not demonstrate clear acute features in a similar compression deformity was seen on the prior CT scan and therefore this is not felt to be acute. Fusion is once again seen between the L4 and L5 vertebral bodies. The posterior elements, transverse processes, and sacroiliac joints are intact. Mild to moderate discogenic degenerative changes are seen throughout the lumbar spine. Moderate facet hypertrophic changes are seen in the lower lumbar spine. There is direct apposition of spinous processes with mild intervening sclerotic changes in the mid and lower lumbar spine suggesting a Baastrup's deformity which can be a source of chronic pain. Irregular lucency is seen likely representing the patient's lytic bony disease which was better assessed by CT and described as stable on the recent CT scan. The patient is status post cholecystectomy. IMPRESSION: 1. No acute changes evident by plain film imaging. 2.  Irregular lucency likely representing the patient's known osseous lytic metastatic disease. 3.  Chronic degenerative changes as described above. 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 These can be sources of chronic pain.      Xr Pelv Ap Only    Result Date: 5/14/2018  Pelvis single view 5/14/2018 CLINICAL HISTORY: Acute on chronic low back pain and bilateral hip pain. Unable to ambulate. More severe for 2 days. History of multiple myeloma. COMPARISON: CT the pelvis 4/20/2018. FINDINGS: 2 frontal views of the pelvis are submitted for evaluation. The lumbar spine is partially included in the field of imaging and will be described in a separate report of lumbar spine x-rays obtained. No abnormal widening is seen of the sacroiliac joints or pubic symphysis. No acute fracture line is seen of the pelvic ring. No definite acute fracture is seen of the proximal hips although dedicated imaging should be performed if hip fracture is strongly suspected. Multiple lucent lesions are seen. These are most clearly demonstrated in the left parasymphyseal pubic bone and right inferior pubic ramus. These likely represent the lytic metastatic disease described on the prior CT scan of the pelvis which was described as not significantly changed. Moderate degenerative changes are seen of the left hip mainly consisting of joint space loss with some superior acetabular hypertrophy. IMPRESSION: 1. Lytic osseous metastatic disease without acute osseous changes appreciated. 2. Moderate chronic degenerative changes of the left hip. Patient not able to ambulate despite 2 doses of morphine. Discussed with oncology and they will see.

## 2018-05-14 NOTE — PROGRESS NOTES
Problem: Falls - Risk of  Goal: *Absence of Falls  Document Bee Fall Risk and appropriate interventions in the flowsheet.    Outcome: Progressing Towards Goal  Fall Risk Interventions:

## 2018-05-14 NOTE — H&P
[facility]        Inpatient Hematology / Oncology History and Physical    Reason for Asmission:  Intractable pain    History of Present Illness:  Mr. Ben Soriano is a 65 y.o. male admitted on 5/14/2018 with a primary diagnosis of The primary encounter diagnosis was Acute midline low back pain without sciatica. Diagnoses of Multiple myeloma, remission status unspecified ([address] and Prostate cancer ([facility] were also pertinent to this visit. Jess Mildred His PMH includes CHF, Afib, hx of defibrillator implant, HTN and hx of prostate, colon, and thyroid cancer with multiple myeloma. He is a patient of Dr. Katina Koroma, currently on treatment with Revlimid/Dex which was just started back on 5/4/18 after his thyroidectomy. Pt states he has not started radioactive iodine because it is currently on backorder. He has had some mild fairly long-term back pain attributed to his myeloma. He presented to ED with c/o worsening lower back pain, worse when he woke up this AM.  Denies any changes in bowel or bladder habits. No tingling or numbness in extremities. Xray of lumbar spine with no acute changes - showed known osseous lytic lesions and chronic degenerative changes. 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 He will be admitted for further evaluation and management of his intractable back pain. Review of Systems:  Constitutional +fatigue. Denies fever, chills, weight loss, appetite changes, night sweats. HEENT Denies trauma, blurry vision, hearing loss, ear pain, nosebleeds, sore throat, neck pain and ear discharge. Skin Denies lesions or rashes. Lungs Denies dyspnea, cough, sputum production or hemoptysis. Cardiovascular Denies chest pain, palpitations, or lower extremity edema. Gastrointestinal Denies nausea, vomiting, changes in bowel habits, bloody or black stools, abdominal pain.     Denies dysuria, frequency or hesitancy of urination. Neuro Denies headaches, visual changes or ataxia. Denies dizziness, tingling, tremors, sensory change, speech change, focal weakness or headaches. Hematology Denies easy bruising or bleeding, denies gingival bleeding or epistaxis. Endo +hx of thyroidectomy. Denies heat/cold intolerance, denies diabetes. MSK +lower back pain. Denies arthralgias, myalgias or frequent falls. Psychiatric/Behavioral Denies depression and substance abuse. The patient is not nervous/anxious.          Allergies   Allergen Reactions    Lisinopril Cough    Pcn [Penicillins] Swelling     Past Medical History:   Diagnosis Date    Arrhythmia     LBBB    Arthritis     BMI 30.0-30.9,adult     BMI 30.5    Cardiomyopathy ([address]     followed by [facility]    Cholelithiases     Chronic systolic heart failure ([address] 9/13/2011    [place] [employer] class II-III heart failure symptoms    Gout     H/O: pituitary tumor     X2 benign, removed    High cholesterol     History of thyroid cancer     History of vertigo     no recent complications or episodes    Hurthle cell carcinoma of thyroid ([address]     Hx of radiation therapy to prostate 2004    Hyperlipidemia 11/18/2015    Hypertension     Hypothyroid     hypo- had portion of thyroid removed due to cancer    ICD (implantable cardiac defibrillator) in place 9/2011    [employer] BIV/ICD, Gen change 3.2.16    LBBB (left bundle branch block) 11/18/2015    Malaise and fatigue 11/18/2015    Malignant neoplasm of prostate ([address]     Mass of colon     right colon mass    Multiple myeloma ([address]     Sinus node dysfunction ([address]      Past Surgical History:   Procedure Laterality Date    HX CHOLECYSTECTOMY  2013    HX COLONOSCOPY      dx with Right colon mass     HX GI      benign polyps removed    HX HEART CATHETERIZATION      HX HEENT  2008    thyroidetomy partial tumor from pituitary x2    HX PACEMAKER  2011/2016    biotronik biv-icd    HX THYROIDECTOMY  2008  HX THYROIDECTOMY  01/26/2018    completion thyroidectomy    HX TUMOR REMOVAL  1990/2010    pituitary tumor removed X2     Family History   Problem Relation Age of Onset    Heart Disease Sister     Heart Attack Sister     Stroke Mother     Thyroid Disease Neg Hx     Diabetes Neg Hx      Social History     Social History    Marital status:      Spouse name: N/A    Number of children: N/A    Years of education: N/A     Occupational History    Not on file.      Social History Main Topics    Smoking status: Never Smoker    Smokeless tobacco: Never Used    Alcohol use Yes      Comment: very rare    Drug use: No    Sexual activity: Yes     Partners: Female     Other Topics Concern    Not on file     Social History Narrative     Current Facility-Administered Medications   Medication Dose Route Frequency Provider Last Rate Last Dose    apixaban (ELIQUIS) tablet 5 mg  5 mg Oral BID Joslyn Perkins NP        carvedilol (COREG) tablet 6.25 mg  6.25 mg Oral BID WITH MEALS Joslyn Perkins NP        chlorhexidine (PERIDEX) 0.12 % mouthwash 15 mL  15 mL Swish and Spit BID Joslyn Perkins NP       Dean Prior Jamia Reddish ON 5/15/2018] cyanocobalamin tablet 1,000 mcg  1,000 mcg Oral DAILY Joslyn Perkins NP        cyclobenzaprine (FLEXERIL) tablet 10 mg  10 mg Oral TID PRN Joslyn Perkins NP        [START ON 5/15/2018] levothyroxine (SYNTHROID) tablet 137 mcg  137 mcg Oral ACB Joslyn Perkins NP       Dean Prior Jamia Reddish ON 5/15/2018] pantoprazole (PROTONIX) tablet 40 mg  40 mg Oral ACB Joslyn Perkins NP        oxyCODONE IR (OXY-IR) immediate release tablet 15 mg  15 mg Oral Q4H PRN Joslyn Perkins NP        [START ON 5/15/2018] polyethylene glycol (MIRALAX) packet 17 g  17 g Oral DAILY Joslyn Perkins NP        rosuvastatin (CRESTOR) tablet 10 mg  10 mg Oral QHS Joslyn Perkins NP        Jamia Reddish ON 5/15/2018] torsemide (DEMADEX) tablet 20 mg  20 mg Oral DAILY Joslyn Perkins NP       Dean Prior HYDROmorphone (PF) (DILAUDID) injection 1 mg  1 mg IntraVENous Q3H PRN Kate Fletcher NP   1 mg at 18 1335    predniSONE (DELTASONE) tablet 60 mg  60 mg Oral DAILY WITH BREAKFAST Candi Jiang NP           OBJECTIVE:  Patient Vitals for the past 8 hrs:   BP Temp Pulse Resp SpO2 Height Weight   18 1338 136/72 98.4 °F (36.9 °C) 77 18 94 % - -   18 1301 150/83 - 83 - 95 % - -   18 1245 - - 81 - 97 % - -   18 1031 116/64 - 71 - 96 % - -   18 0902 128/60 - 73 - 90 % - -   18 0750 - - - - 96 % - -   18 0745 (!) 158/92 97.9 °F (36.6 °C) 84 18 96 % 6' 2\" (1.88 m) 190 lb (86.2 kg)     Temp (24hrs), Av.2 °F (36.8 °C), Min:97.9 °F (36.6 °C), Max:98.4 °F (36.9 °C)         Physical Exam:  Constitutional: Well developed, well nourished male in no acute distress, lying in the hospital bed. Wife at bedside. HEENT: Normocephalic and atraumatic. Oropharynx is clear, mucous membranes are moist.  Extraocular muscles are intact. Sclerae anicteric. Neck supple without JVD. No thyromegaly present. Skin Warm and dry. No bruising and no rash noted. No erythema. No pallor. Respiratory Lungs are clear to auscultation bilaterally without wheezes, rales or rhonchi, normal air exchange without accessory muscle use. CVS Normal rate, regular rhythm and normal S1 and S2. No murmurs, gallops, or rubs. Abdomen Soft, nontender and nondistended, normoactive bowel sounds. +Hernia. No hepatosplenomegaly. Neuro Grossly nonfocal with no obvious sensory or motor deficits. MSK Normal range of motion in general.  No edema and no tenderness. Psych Appropriate mood and affect.         Labs:    Recent Results (from the past 24 hour(s))   CBC WITH AUTOMATED DIFF    Collection Time: 18  8:01 AM   Result Value Ref Range    WBC 5.9 4.3 - 11.1 K/uL    RBC 5.15 4.23 - 5.67 M/uL    HGB 12.2 (L) 13.6 - 17.2 g/dL    HCT 36.2 (L) 41.1 - 50.3 %    MCV 70.3 (L) 79.6 - 97.8 FL    MCH 23.7 (L) 26.1 - 32.9 PG    MCHC 33.7 31.4 - 35.0 g/dL    RDW 15.3 (H) 11.9 - 14.6 %    PLATELET 173 (L) 985 - 450 K/uL    MPV Cannot be calculated 10.8 - 14.1 FL    DF AUTOMATED      NEUTROPHILS 60 43 - 78 %    LYMPHOCYTES 29 13 - 44 %    MONOCYTES 9 4.0 - 12.0 %    EOSINOPHILS 1 0.5 - 7.8 %    BASOPHILS 0 0.0 - 2.0 %    IMMATURE GRANULOCYTES 1 0.0 - 5.0 %    ABS. NEUTROPHILS 3.5 1.7 - 8.2 K/UL    ABS. LYMPHOCYTES 1.7 0.5 - 4.6 K/UL    ABS. MONOCYTES 0.5 0.1 - 1.3 K/UL    ABS. EOSINOPHILS 0.1 0.0 - 0.8 K/UL    ABS. BASOPHILS 0.0 0.0 - 0.2 K/UL    ABS. IMM. GRANS. 0.1 0.0 - 0.5 K/UL   METABOLIC PANEL, COMPREHENSIVE    Collection Time: 05/14/18  8:01 AM   Result Value Ref Range    Sodium 139 136 - 145 mmol/L    Potassium 3.4 (L) 3.5 - 5.1 mmol/L    Chloride 98 98 - 107 mmol/L    CO2 31 21 - 32 mmol/L    Anion gap 10 7 - 16 mmol/L    Glucose 110 (H) 65 - 100 mg/dL    BUN 34 (H) 8 - 23 MG/DL    Creatinine 1.53 (H) 0.8 - 1.5 MG/DL    GFR est AA 57 (L) >60 ml/min/1.73m2    GFR est non-AA 47 (L) >60 ml/min/1.73m2    Calcium 9.8 8.3 - 10.4 MG/DL    Bilirubin, total 0.9 0.2 - 1.1 MG/DL    ALT (SGPT) 38 12 - 65 U/L    AST (SGOT) 44 (H) 15 - 37 U/L    Alk. phosphatase 110 50 - 136 U/L    Protein, total 7.4 6.3 - 8.2 g/dL    Albumin 3.6 3.2 - 4.6 g/dL    Globulin 3.8 (H) 2.3 - 3.5 g/dL    A-G Ratio 0.9 (L) 1.2 - 3.5     TSH 3RD GENERATION    Collection Time: 05/14/18  8:01 AM   Result Value Ref Range    TSH 0.931 0.358 - 3.740 uIU/mL       Imaging:  XR SPINE LUMB 2 OR 3 V [[id number]] Collected: 05/14/18 0847      Order Status: Completed Updated: 05/14/18 0852     Narrative:       LUMBAR SPINE RADIOGRAPHS, 5/14/2018    CLINICAL HISTORY:  Chronic low back pain which is severe for the past 2 days. TECHNIQUE: AP and lateral views of the lumbar spine with coned down view of the  lumbosacral junction.     Comparison: CT abdomen 4/20/2018    FINDINGS:  Five lumbar type vertebrae are visualized.  Alignment is maintained at all  levels.  Vertebral body height is maintained at all levels in the lumbar spine. A moderate anterior compression deformity is seen at T12. However, this does not  demonstrate clear acute features in a similar compression deformity was seen on  the prior CT scan and therefore this is not felt to be acute. Fusion is once  again seen between the L4 and L5 vertebral bodies. The posterior elements,  transverse processes, and sacroiliac joints are intact. Mild to moderate  discogenic degenerative changes are seen throughout the lumbar spine. Moderate  facet hypertrophic changes are seen in the lower lumbar spine. There is direct  apposition of spinous processes with mild intervening sclerotic changes in the  mid and lower lumbar spine suggesting a Baastrup's deformity which can be a  source of chronic pain. Irregular lucency is seen likely representing the  patient's lytic bony disease which was better assessed by CT and described as  stable on the recent CT scan. The patient is status post cholecystectomy.       Impression:       IMPRESSION:  1.   No acute changes evident by plain film imaging. 2.  Irregular lucency likely representing the patient's known osseous lytic  metastatic disease. 3.  Chronic degenerative changes as described above. 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 These can be  sources of chronic pain.         XR PELV AP ONLY [[id number]] Collected: 05/14/18 0843     Order Status: Completed Updated: 05/14/18 0849     Narrative:       Pelvis single view 5/14/2018    CLINICAL HISTORY: Acute on chronic low back pain and bilateral hip pain. Unable  to ambulate. More severe for 2 days. History of multiple myeloma. COMPARISON: CT the pelvis 4/20/2018. FINDINGS:  2 frontal views of the pelvis are submitted for evaluation.  The lumbar spine is  partially included in the field of imaging and will be described in a separate  report of lumbar spine x-rays obtained. No abnormal widening is seen of the  sacroiliac joints or pubic symphysis. No acute fracture line is seen of the  pelvic ring. No definite acute fracture is seen of the proximal hips although  dedicated imaging should be performed if hip fracture is strongly suspected. Multiple lucent lesions are seen. These are most clearly demonstrated in the  left parasymphyseal pubic bone and right inferior pubic ramus. These likely  represent the lytic metastatic disease described on the prior CT scan of the  pelvis which was described as not significantly changed. Moderate degenerative  changes are seen of the left hip mainly consisting of joint space loss with some  superior acetabular hypertrophy.       Impression:       IMPRESSION:  1. Lytic osseous metastatic disease without acute osseous changes appreciated. 2. Moderate chronic degenerative changes of the left hip.          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  ICD-9-CM: 227.3  3/15/2018        Thyroid mass ICD-10-CM: E07.9  ICD-9-CM: 246.9  2/8/2018        Sinus bradycardia ICD-10-CM: R00.1  ICD-9-CM: 427.89  1/12/2018        PAF (paroxysmal atrial fibrillation) ([facility] ICD-10-CM: I48.0  ICD-9-CM: 427.31  11/26/2017        Chronic systolic CHF (congestive heart failure) ([facility] ICD-10-CM: I50.22  ICD-9-CM: 428.22, 428.0  11/26/2017        Pain ICD-10-CM: R52  ICD-9-CM: 780.96  11/20/2017        MARY (acute kidney injury) ([facility] ICD-10-CM: N17.9  ICD-9-CM: 584.9  11/20/2017        Acute renal failure (ARF) ([facility] ICD-10-CM: N17.9  ICD-9-CM: 584.9  11/18/2017        Intractable pain ICD-10-CM: R52  ICD-9-CM: 780.96  1/11/2017 Acute kidney injury [facility]) ICD-10-CM: N17.9  ICD-9-CM: 584.9  1/11/2017        Pancytopenia ([facility] ICD-10-CM: U06.069  ICD-9-CM: 284.19  1/11/2017        Constipation ICD-10-CM: K59.00  ICD-9-CM: 564.00  1/11/2017        Multiple myeloma ([facility] ICD-10-CM: C90.00  ICD-9-CM: 203.00  1/2/2017        Postural imbalance ICD-10-CM: R29.3  ICD-9-CM: 729.90  12/14/2016        Polyneuropathy ICD-10-CM: G62.9  ICD-9-CM: 356.9  12/14/2016        Fall ICD-10-CM: Z67. Reece An  ICD-9-CM: E888.9  12/14/2016        Encounter for medication management ICD-10-CM: Z79.899  ICD-9-CM: V58.69  12/14/2016        Urinary retention ICD-10-CM: R33.9  ICD-9-CM: 788.20  6/6/2016        Colonic mass ICD-10-CM: K63.9  ICD-9-CM: 569.89  3/23/2016        Hyperlipidemia ICD-10-CM: E78.5  ICD-9-CM: 272.4  11/18/2015        Vertigo ICD-10-CM: L03  ICD-9-CM: 780.4  11/18/2015        Malaise and fatigue ICD-10-CM: R53.81, R53.83  ICD-9-CM: 780.79  11/18/2015        Cardiomyopathy ([facility] ICD-10-CM: I42.9  ICD-9-CM: 425.4  11/18/2015        LBBB (left bundle branch block) ICD-10-CM: I44.7  ICD-9-CM: 426.3  11/18/2015        Arthritis ICD-10-CM: M19.90  ICD-9-CM: 716.90  Unknown        Arrhythmia ICD-10-CM: I49.9  ICD-9-CM: 427.9  Unknown    Overview Signed 6/18/2013  2:07 PM by Tyrel Stanley MD     LBBB             Cholelithiases ICD-10-CM: M19.92  ICD-9-CM: 574.20  Unknown        Hx of radiation therapy to prostate ICD-10-CM: Z92.3  ICD-9-CM: V15.3  Unknown        Chronic systolic heart failure ([facility] ICD-10-CM: I50.22  ICD-9-CM: 428.22  9/13/2011        Automatic implantable cardioverter-defibrillator in situ ICD-10-CM: Z95.810  ICD-9-CM: V45.02  9/1/2011                PLAN:  Hx of prostate, colon, and thyroid cancers with multiple myeloma  - currently on treatment with Revlimid/Dex  - s/p thyroidectomy, waiting to start on radioactive iodine (currently on backorder per pt)    Intractable lower back pain  - Xray of lumbar spine with no acute changes - showed known osseous lytic lesions and chronic degenerative changes. 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 - Pt unable to have MRI d/t implanted defibrillator. CT L-spine ordered.   - Dilaudid ordered PRN  - Pulse steroid dosing    Continue home meds  Steven SOPs  [facility]  [address] [address]  Office : [phone]  Fax : [phone]

## 2018-05-14 NOTE — PROGRESS NOTES
[address] via stretcher to [address]  Returned from [address]

## 2018-05-14 NOTE — PROGRESS NOTES
Initial visit to assess pt's spiritual needs. Ministry of presence & prayer to demonstrate caring & concern, convey emotional & spiritual support.     Chaplain Vandana Xiao MDiv,ThM,PhD

## 2018-05-14 NOTE — PROGRESS NOTES
Pt received to room 502 via stretcher from ED. Spouse at bedside. Pt c/o 9/10 back and abd pain. 1mg IV dilaudid given. Dual skin assessment completed with Dorothye Curling; no issues identified. Pt instructed to use call bell for any needs.

## 2018-05-14 NOTE — PROGRESS NOTES
Bedside shift change report given to  (oncoming nurse) by Eben Thomas RN (offgoing nurse). Report included the following information SBAR, Kardex and MAR. Dilaudid and oxy IR given for pain control. CT L spine completed. Resting quietly; no needs at present.

## 2018-05-14 NOTE — ED NOTES
TRANSFER - OUT REPORT:    Verbal report given to Melodie(name) on Bevelyn Check  being transferred to 502(unit) for routine progression of care       Report consisted of patients Situation, Background, Assessment and   Recommendations(SBAR). Information from the following report(s) ED Summary was reviewed with the receiving nurse. Lines:   Peripheral IV 05/14/18 Right Antecubital (Active)   Site Assessment Clean, dry, & intact 5/14/2018  8:02 AM   Phlebitis Assessment 0 5/14/2018  8:02 AM   Infiltration Assessment 0 5/14/2018  8:02 AM   Dressing Status Clean, dry, & intact 5/14/2018  8:02 AM        Opportunity for questions and clarification was provided.       Patient transported with:  transport

## 2018-05-14 NOTE — PROGRESS NOTES
CM met with pt at bedside. Pt's wife, Kristi Pink [phone], also present. Pt currently lives with spouse. 4 stairs at entry. DME in the home includes a walker and a cane. No other DME requested. No oxygen in the home. Pt reports being independent with ADLs and still drives. No issues obtaining medication via insurance provider. Pt plans to DC home but is agreeable to [facility] if indicated. CM will continue to follow for any needs,     Care Management Interventions  PCP Verified by CM:  Yes  Last Visit to PCP: 05/10/18  Transition of Care Consult (CM Consult): Discharge Planning  Current Support Network: Lives with Spouse  Confirm Follow Up Transport: Family  Plan discussed with Pt/Family/Caregiver: Yes  Freedom of Choice Offered: Yes  Discharge Location  Discharge Placement: Unable to determine at this time

## 2018-05-15 PROBLEM — S32.000A COMPRESSION FRACTURE OF LUMBAR VERTEBRA (HCC): Status: ACTIVE | Noted: 2018-05-15

## 2018-05-15 LAB
ALBUMIN SERPL-MCNC: 3 G/DL (ref 3.2–4.6)
ALBUMIN/GLOB SERPL: 0.9 {RATIO} (ref 1.2–3.5)
ALP SERPL-CCNC: 96 U/L (ref 50–136)
ALT SERPL-CCNC: 32 U/L (ref 12–65)
ANION GAP SERPL CALC-SCNC: 9 MMOL/L (ref 7–16)
AST SERPL-CCNC: 34 U/L (ref 15–37)
BASOPHILS # BLD: 0 K/UL (ref 0–0.2)
BASOPHILS NFR BLD: 0 % (ref 0–2)
BILIRUB SERPL-MCNC: 0.5 MG/DL (ref 0.2–1.1)
BUN SERPL-MCNC: 21 MG/DL (ref 8–23)
CALCIUM SERPL-MCNC: 9.4 MG/DL (ref 8.3–10.4)
CHLORIDE SERPL-SCNC: 103 MMOL/L (ref 98–107)
CO2 SERPL-SCNC: 28 MMOL/L (ref 21–32)
CREAT SERPL-MCNC: 1.05 MG/DL (ref 0.8–1.5)
DIFFERENTIAL METHOD BLD: ABNORMAL
EOSINOPHIL # BLD: 0 K/UL (ref 0–0.8)
EOSINOPHIL NFR BLD: 0 % (ref 0.5–7.8)
ERYTHROCYTE [DISTWIDTH] IN BLOOD BY AUTOMATED COUNT: 15.1 % (ref 11.9–14.6)
GLOBULIN SER CALC-MCNC: 3.5 G/DL (ref 2.3–3.5)
GLUCOSE SERPL-MCNC: 148 MG/DL (ref 65–100)
HCT VFR BLD AUTO: 33.6 % (ref 41.1–50.3)
HGB BLD-MCNC: 11.3 G/DL (ref 13.6–17.2)
IMM GRANULOCYTES # BLD: 0 K/UL (ref 0–0.5)
IMM GRANULOCYTES NFR BLD AUTO: 0 % (ref 0–5)
LYMPHOCYTES # BLD: 1.3 K/UL (ref 0.5–4.6)
LYMPHOCYTES NFR BLD: 22 % (ref 13–44)
MAGNESIUM SERPL-MCNC: 1.9 MG/DL (ref 1.8–2.4)
MCH RBC QN AUTO: 23.5 PG (ref 26.1–32.9)
MCHC RBC AUTO-ENTMCNC: 33.6 G/DL (ref 31.4–35)
MCV RBC AUTO: 70 FL (ref 79.6–97.8)
MONOCYTES # BLD: 0.2 K/UL (ref 0.1–1.3)
MONOCYTES NFR BLD: 4 % (ref 4–12)
NEUTS SEG # BLD: 4.4 K/UL (ref 1.7–8.2)
NEUTS SEG NFR BLD: 74 % (ref 43–78)
PLATELET # BLD AUTO: 126 K/UL (ref 150–450)
PMV BLD AUTO: ABNORMAL FL (ref 10.8–14.1)
POTASSIUM SERPL-SCNC: 3.6 MMOL/L (ref 3.5–5.1)
PROT SERPL-MCNC: 6.5 G/DL (ref 6.3–8.2)
RBC # BLD AUTO: 4.8 M/UL (ref 4.23–5.67)
SODIUM SERPL-SCNC: 140 MMOL/L (ref 136–145)
WBC # BLD AUTO: 6 K/UL (ref 4.3–11.1)

## 2018-05-15 PROCEDURE — 80053 COMPREHEN METABOLIC PANEL: CPT | Performed by: NURSE PRACTITIONER

## 2018-05-15 PROCEDURE — 77030032490 HC SLV COMPR SCD KNE COVD -B

## 2018-05-15 PROCEDURE — 85025 COMPLETE CBC W/AUTO DIFF WBC: CPT | Performed by: NURSE PRACTITIONER

## 2018-05-15 PROCEDURE — 74011250636 HC RX REV CODE- 250/636: Performed by: NURSE PRACTITIONER

## 2018-05-15 PROCEDURE — 36415 COLL VENOUS BLD VENIPUNCTURE: CPT | Performed by: NURSE PRACTITIONER

## 2018-05-15 PROCEDURE — 83883 ASSAY NEPHELOMETRY NOT SPEC: CPT | Performed by: INTERNAL MEDICINE

## 2018-05-15 PROCEDURE — 74011636637 HC RX REV CODE- 636/637: Performed by: NURSE PRACTITIONER

## 2018-05-15 PROCEDURE — 65270000029 HC RM PRIVATE

## 2018-05-15 PROCEDURE — 99218 HC RM OBSERVATION: CPT

## 2018-05-15 PROCEDURE — 83735 ASSAY OF MAGNESIUM: CPT | Performed by: NURSE PRACTITIONER

## 2018-05-15 PROCEDURE — 74011250637 HC RX REV CODE- 250/637: Performed by: NURSE PRACTITIONER

## 2018-05-15 PROCEDURE — 99233 SBSQ HOSP IP/OBS HIGH 50: CPT | Performed by: INTERNAL MEDICINE

## 2018-05-15 PROCEDURE — 86334 IMMUNOFIX E-PHORESIS SERUM: CPT | Performed by: INTERNAL MEDICINE

## 2018-05-15 PROCEDURE — 84165 PROTEIN E-PHORESIS SERUM: CPT | Performed by: INTERNAL MEDICINE

## 2018-05-15 RX ORDER — POLYETHYLENE GLYCOL 3350 17 G/17G
17 POWDER, FOR SOLUTION ORAL
Status: DISCONTINUED | OUTPATIENT
Start: 2018-05-15 | End: 2018-05-23 | Stop reason: HOSPADM

## 2018-05-15 RX ADMIN — APIXABAN 5 MG: 5 TABLET, FILM COATED ORAL at 07:50

## 2018-05-15 RX ADMIN — ROSUVASTATIN CALCIUM 10 MG: 5 TABLET, FILM COATED ORAL at 22:31

## 2018-05-15 RX ADMIN — PREDNISONE 60 MG: 50 TABLET ORAL at 07:51

## 2018-05-15 RX ADMIN — CARVEDILOL 6.25 MG: 6.25 TABLET, FILM COATED ORAL at 18:02

## 2018-05-15 RX ADMIN — OXYCODONE HYDROCHLORIDE 15 MG: 15 TABLET ORAL at 22:31

## 2018-05-15 RX ADMIN — POTASSIUM CHLORIDE 20 MEQ: 1500 TABLET, EXTENDED RELEASE ORAL at 18:02

## 2018-05-15 RX ADMIN — POTASSIUM CHLORIDE 20 MEQ: 1500 TABLET, EXTENDED RELEASE ORAL at 07:50

## 2018-05-15 RX ADMIN — LEVOTHYROXINE SODIUM 137 MCG: 50 TABLET ORAL at 09:32

## 2018-05-15 RX ADMIN — CYCLOBENZAPRINE HYDROCHLORIDE 10 MG: 10 TABLET, FILM COATED ORAL at 07:51

## 2018-05-15 RX ADMIN — CYANOCOBALAMIN TAB 1000 MCG 1000 MCG: 1000 TAB at 07:50

## 2018-05-15 RX ADMIN — CARVEDILOL 6.25 MG: 6.25 TABLET, FILM COATED ORAL at 07:50

## 2018-05-15 RX ADMIN — CHLORHEXIDINE GLUCONATE 15 ML: 1.2 RINSE ORAL at 18:05

## 2018-05-15 RX ADMIN — OXYCODONE HYDROCHLORIDE 15 MG: 15 TABLET ORAL at 07:51

## 2018-05-15 RX ADMIN — HYDROMORPHONE HYDROCHLORIDE 1 MG: 1 INJECTION, SOLUTION INTRAMUSCULAR; INTRAVENOUS; SUBCUTANEOUS at 07:51

## 2018-05-15 RX ADMIN — PANTOPRAZOLE SODIUM 40 MG: 40 TABLET, DELAYED RELEASE ORAL at 07:50

## 2018-05-15 RX ADMIN — TORSEMIDE 20 MG: 20 TABLET ORAL at 07:50

## 2018-05-15 NOTE — PROGRESS NOTES
END OF SHIFT NOTE:    Intake/Output  05/14 1901 - 05/15 0700  In: -   Out: 550 [Urine:550]   Voiding: YES  Catheter: NO  Drain:              Stool:  1 occurrences. Stool Assessment  Stool Color: Brown (05/14/18 2210)  Stool Appearance: Soft (05/14/18 2210)  Stool Amount: Small (05/14/18 2210)  Stool Source/Status: Rectum (05/14/18 2210)    Emesis:  0 occurrences. VITAL SIGNS  Patient Vitals for the past 12 hrs:   Temp Pulse Resp BP SpO2   05/15/18 0418 97.4 °F (36.3 °C) 72 16 133/76 97 %   05/15/18 0009 97.4 °F (36.3 °C) 67 16 118/57 98 %   05/14/18 1917 97.4 °F (36.3 °C) 70 16 134/75 95 %       Pain Assessment  Pain 1  Pain Scale 1: Visual (05/14/18 2218)  Pain Intensity 1: 0 (05/14/18 2218)  Patient Stated Pain Goal: 0 (05/14/18 2218)  Pain Reassessment 1: Patient sleeping (05/14/18 2218)  Pain Location 1: Back (05/14/18 2125)  Pain Orientation 1: Lower (05/14/18 2125)  Pain Intervention(s) 1: Medication (see MAR) (05/14/18 2125)    Ambulating  No    Additional Information: Pt is alert and oriented. Dialudid and flexeril given once during the night. Pt had a calm quiet night. No other needs voiced. Shift report given to oncoming nurse at the bedside.     Garrick Santana RN

## 2018-05-15 NOTE — CONSULTS
Department of Interventional Radiology  [phone]        Consult Note     Patient: Hetal Najera MRN: [id number]  SSN: [id number]    YOB: 1940  Age: 65 y.o. Sex: male      Referring Physician: Oncology    Consult Date: 5/15/2018     Subjective:     Chief Complaint: back pain    History of Present Illness: Hetal Najera is a 65 y.o. male who is seen in consultation for possible kyphoplasty. Pt awoke early yesterday with the abrupt onset of mid low back pain. EMS transported him to the ED and he was admitted for intractable back pain. Hx significant for multiple myeloma diagnosed 2016, chronic but very tolerable back pain, prostate, colon and most recently, thyroid cancer. The pain is severe and limits most all movement. Pain also at rest.  No radiculopathy. CT scan reviewed and compared to prior imaging. Acute/subacute L1/L2 VCF, old T12 fracture. No MRI-has PPM/ICD.     Past Medical History:   Diagnosis Date    Arrhythmia     LBBB    Arthritis     BMI 30.0-30.9,adult     BMI 30.5    Cardiomyopathy ([address]     followed by [address] Card[facility]    Cholelithiases     Chronic systolic heart failure ([address] 9/13/2011    [place] [employer] class II-III heart failure symptoms    Gout     H/O: pituitary tumor     X2 benign, removed    High cholesterol     History of thyroid cancer     History of vertigo     no recent complications or episodes    Hurthle cell carcinoma of thyroid ([address]     Hx of radiation therapy to prostate 2004    Hyperlipidemia 11/18/2015    Hypertension     Hypothyroid     hypo- had portion of thyroid removed due to cancer    ICD (implantable cardiac defibrillator) in place 9/2011    B[employer]ron[employer] BIV/ICD, Gen change 3.2.16    LBBB (left bundle branch block) 11/18/2015    Malaise and fatigue 11/18/2015    Malignant neoplasm of prostate ([address]     Mass of colon     right colon mass    Multiple myeloma (HCC)     Sinus node dysfunction (HCC)      Past Surgical History:   Procedure Laterality Date    HX CHOLECYSTECTOMY  2013    HX COLONOSCOPY      dx with Right colon mass     HX GI      benign polyps removed    HX HEART CATHETERIZATION      HX HEENT  2008    thyroidetomy partial tumor from pituitary x2    HX PACEMAKER  2011/2016    biotronik biv-icd    HX THYROIDECTOMY  2008    HX THYROIDECTOMY  01/26/2018    completion thyroidectomy    HX TUMOR REMOVAL  1990/2010    pituitary tumor removed X2      Family History   Problem Relation Age of Onset    Heart Disease Sister     Heart Attack Sister     Stroke Mother     Thyroid Disease Neg Hx     Diabetes Neg Hx      Social History   Substance Use Topics    Smoking status: Never Smoker    Smokeless tobacco: Never Used    Alcohol use Yes      Comment: very rare      Allergies   Allergen Reactions    Lisinopril Cough    Pcn [Penicillins] Swelling     Current Facility-Administered Medications   Medication Dose Route Frequency    carvedilol (COREG) tablet 6.25 mg  6.25 mg Oral BID WITH MEALS    chlorhexidine (PERIDEX) 0.12 % mouthwash 15 mL  15 mL Swish and Spit BID    cyanocobalamin tablet 1,000 mcg  1,000 mcg Oral DAILY    cyclobenzaprine (FLEXERIL) tablet 10 mg  10 mg Oral TID PRN    levothyroxine (SYNTHROID) tablet 137 mcg  137 mcg Oral ACB    pantoprazole (PROTONIX) tablet 40 mg  40 mg Oral ACB    oxyCODONE IR (OXY-IR) immediate release tablet 15 mg  15 mg Oral Q4H PRN    polyethylene glycol (MIRALAX) packet 17 g  17 g Oral DAILY    rosuvastatin (CRESTOR) tablet 10 mg  10 mg Oral QHS    torsemide (DEMADEX) tablet 20 mg  20 mg Oral DAILY    HYDROmorphone (PF) (DILAUDID) injection 1 mg  1 mg IntraVENous Q3H PRN    predniSONE (DELTASONE) tablet 60 mg  60 mg Oral DAILY WITH BREAKFAST    potassium chloride (K-DUR, KLOR-CON) SR tablet 20 mEq  20 mEq Oral BID        Review of Systems:  A detailed 10 organ review of systems is obtained with pertinent positives as listed in the History of Present Illness and Past Medical History. All others are negative. Objective:     Physical Exam:  Visit Vitals    /77 (BP 1 Location: Left arm, BP Patient Position: At rest)    Pulse 75    Temp 98 °F (36.7 °C)    Resp 16    Ht 6' 2\" (1.88 m)    Wt 85.5 kg (188 lb 8 oz)    SpO2 95%    BMI 24.2 kg/m2      HEART: regular rate and rhythm  LUNG: clear to auscultation bilaterally  ABDOMEN: soft, nontender  EXTREMITIES: normal strength, tone, and muscle mass, no deformities, no erythema, induration, or nodules   MS:  Tender mid upper lumbar spine. Lab/Data Review:  BMP:   Lab Results   Component Value Date/Time     05/15/2018 06:28 AM    K 3.6 05/15/2018 06:28 AM     05/15/2018 06:28 AM    CO2 28 05/15/2018 06:28 AM    AGAP 9 05/15/2018 06:28 AM     (H) 05/15/2018 06:28 AM    BUN 21 05/15/2018 06:28 AM    CREA 1.05 05/15/2018 06:28 AM    GFRAA >60 05/15/2018 06:28 AM    GFRNA >60 05/15/2018 06:28 AM     CBC:   Lab Results   Component Value Date/Time    WBC 6.0 05/15/2018 06:28 AM    HGB 11.3 (L) 05/15/2018 06:28 AM    HCT 33.6 (L) 05/15/2018 06:28 AM     (L) 05/15/2018 06:28 AM     COAGS: No results found for: APTT, PTP, INR      Assessment/Plan:   Intractable back pain, Multiple myeloma, Acute/subacute L1, L2 vertebral compression fracture. Discussed kyphoplasty with  Mr. Ben Soriano and he would like to proceed. Will check with anesthesia and schedule ASAP.      Art Chiu PA-C

## 2018-05-15 NOTE — PROGRESS NOTES
END OF SHIFT NOTE:    Intake/Output  05/15 0701 - 05/15 1900  In: 960 [P.O.:960]  Out: 1300 [Urine:1300]   Voiding: YES  Catheter: NO  Drain:              Stool:  2 occurrences. Stool Assessment  Stool Color: Jm Cinnamon (05/15/18 1237)  Stool Appearance: Soft (05/15/18 1237)  Stool Amount: Medium (05/15/18 1237)  Stool Source/Status: Rectum (05/15/18 1237)    Emesis:  0 occurrences. VITAL SIGNS  Patient Vitals for the past 12 hrs:   Temp Pulse Resp BP SpO2   05/15/18 1435 97.6 °F (36.4 °C) 78 16 131/72 95 %   05/15/18 1100 98 °F (36.7 °C) 75 16 136/77 95 %   05/15/18 0740 98.3 °F (36.8 °C) 69 16 133/72 97 %       Pain Assessment  Pain 1  Pain Scale 1: Numeric (0 - 10) (05/15/18 0853)  Pain Intensity 1: 6 (05/15/18 0853)  Patient Stated Pain Goal: 0 (05/15/18 0803)  Pain Reassessment 1: Yes (05/15/18 0853)  Pain Location 1: Back (05/15/18 0803)  Pain Orientation 1: Lower (05/14/18 2125)  Pain Description 1: Constant (10/10 with movement) (05/15/18 0803)  Pain Intervention(s) 1: Medication (see MAR) (05/15/18 0803)    Ambulating  No    Additional Information: kyphoplasty scheduled for Friday    Shift report will be given to oncoming nurse at the bedside.     Cliffton Primrose, RN

## 2018-05-15 NOTE — PROGRESS NOTES
[facility]        Inpatient Hematology / Oncology Progress Note      Admission Date: 2018  7:42 AM  Reason for Admission/Hospital Course: Intractable pain      24 Hour Events:  Afebrile, VSS  IR consulted for possible kyphoplasty  Pain better with dilaudid    ROS:  Constitutional: +weakness; negative for fever, chills. CV: Negative for chest pain, palpitations, edema. Respiratory: Negative for dyspnea, cough, wheezing. GI: Negative for nausea, abdominal pain, diarrhea. MSK: +lower back pain    10 point review of systems is otherwise negative with the exception of the elements mentioned above in the HPI. Allergies   Allergen Reactions    Lisinopril Cough    Pcn [Penicillins] Swelling       OBJECTIVE:  Patient Vitals for the past 8 hrs:   BP Temp Pulse Resp SpO2   05/15/18 0740 133/72 98.3 °F (36.8 °C) 69 16 97 %   05/15/18 0418 133/76 97.4 °F (36.3 °C) 72 16 97 %     Temp (24hrs), Av.8 °F (36.6 °C), Min:97.4 °F (36.3 °C), Max:98.4 °F (36.9 °C)    05/15 0701 - 05/15 1900  In: -   Out: 400 [Urine:400]    Physical Exam:  Constitutional: Well developed, well nourished male in no acute distress, lying in the hospital bed. Wife at bedside. HEENT: Normocephalic and atraumatic. Oropharynx is clear, mucous membranes are moist.  Extraocular muscles are intact. Sclerae anicteric. Neck supple without JVD. No thyromegaly present.          Skin Warm and dry. No bruising and no rash noted. No erythema. No pallor. Respiratory Lungs are clear to auscultation bilaterally without wheezes, rales or rhonchi, normal air exchange without accessory muscle use. CVS Normal rate, regular rhythm and normal S1 and S2. No murmurs, gallops, or rubs. Abdomen Soft, nontender and nondistended, normoactive bowel sounds. +Hernia. No hepatosplenomegaly. Neuro Grossly nonfocal with no obvious sensory or motor deficits. MSK Normal range of motion in general.  No edema and no tenderness. Psych Appropriate mood and affect.           Labs:    Recent Labs      05/15/18   0628  05/14/18   0801   WBC  6.0  5.9   RBC  4.80  5.15   HGB  11.3*  12.2*   HCT  33.6*  36.2*   MCV  70.0*  70.3*   MCH  23.5*  23.7*   MCHC  33.6  33.7   RDW  15.1*  15.3*   PLT  126*  147*   GRANS  74  60   LYMPH  22  29   MONOS  4  9   EOS  0*  1   BASOS  0  0   IG  0  1   DF  AUTOMATED  AUTOMATED   ANEU  4.4  3.5   ABL  1.3  1.7   ABM  0.2  0.5   REA  0.0  0.1   ABB  0.0  0.0   AIG  0.0  0.1      Recent Labs      05/15/18   0628  05/14/18   0801   NA  140  139   K  3.6  3.4*   CL  103  98   CO2  28  31   AGAP  9  10   GLU  148*  110*   BUN  21  34*   CREA  1.05  1.53*   GFRAA  >60  57*   GFRNA  >60  47*   CA  9.4  9.8   SGOT  34  44*   AP  96  110   TP  6.5  7.4   ALB  3.0*  3.6   GLOB  3.5  3.8*   AGRAT  0.9*  0.9*   MG  1.9   --          Imaging:  CT SPINE Jaiden Celis CONT [[id number]] Collected: 05/14/18 1632      Order Status: Completed Updated: 05/14/18 1657     Addenda:         Addendum:   DC4 The significant findings in this report have been referred to the Imaging Navigator in order to communicate to the referring provider or his/her   designee as outlined in Section II.C.2.a.ii-iii of the ACR Practice Guideline for Communication of Diagnostic Imaging Findings.       Signed: 05/14/18 1655 by Aleyda Sanford MD     Narrative:       Exam:  CT lumbar spine with contrast    History: worsening back pain; hx of multiple myeloma, 77 years Male severe back  pain    Technique: Standard departmental protocol CT of the lumbar spine was performed  after uneventful intravenous administration of 100 cc of nonionic IV contrast.   Coronal and sagittal reformats were obtained.  Radiation dose reduction  techniques were used for this study:  Our CT scanners use one or all of the  following: Automated exposure control, adjustment of the mA and/or kVp according  to patient's size, iterative reconstruction.     Comparison: CT torso April 20, 2018    Findings: Lear Cotton is diffuse narrowing the spinal canal, consistent with  congenital spinal canal stenosis.  Normal alignment.  There appears to be  partial fusion of the L4 and L5 vertebral bodies.  There are anterior bridging  osteophytes at L5-S1.  Mild central loss of vertebral body height is seen with  superior endplate sclerosis of the L2 and L3 vertebral bodies, these could  represent mild acute to subacute compression fractures, and appear possibly new  since the prior CT torso.  Moderate anterior compression deformity of the T12  vertebral body with anterior bridging osteophytes appears unchanged.  A large  posterior disc osteophyte complex is seen at L4-L5 causing mild spinal canal  narrowing measuring 12 mm in AP dimension.  A large posterior disc osteophyte  complex is seen at L5-S1 without significant spinal canal stenosis.  However  there appears to be severe bilateral neuroforaminal stenosis at this level.  No  evidence of significant spinal canal stenosis.  Normal mineralization. Visualized prevertebral and paravertebral soft tissues unremarkable.       Impression:       Impression:  1.  Suspect acute to subacute mild central compression fractures of the L2 and  L3 vertebral bodies. 2.  Severe bilateral neuroforaminal stenosis at L5-S1.     CT SPINE Orysia Crick WO CONT [[id number]]      Order Status: Canceled      XR SPINE LUMB 2 OR 3 V [[id number]] Collected: 05/14/18 0847     Order Status: Completed Updated: 05/14/18 0852     Narrative:       LUMBAR SPINE RADIOGRAPHS, 5/14/2018    CLINICAL HISTORY:  Chronic low back pain which is severe for the past 2 days. TECHNIQUE: AP and lateral views of the lumbar spine with coned down view of the  lumbosacral junction. Comparison: CT abdomen 4/20/2018    FINDINGS:  Five lumbar type vertebrae are visualized.  Alignment is maintained at all  levels.  Vertebral body height is maintained at all levels in the lumbar spine.   A moderate anterior compression deformity is seen at T12. However, this does not  demonstrate clear acute features in a similar compression deformity was seen on  the prior CT scan and therefore this is not felt to be acute. Fusion is once  again seen between the L4 and L5 vertebral bodies. The posterior elements,  transverse processes, and sacroiliac joints are intact. Mild to moderate  discogenic degenerative changes are seen throughout the lumbar spine. Moderate  facet hypertrophic changes are seen in the lower lumbar spine. There is direct  apposition of spinous processes with mild intervening sclerotic changes in the  mid and lower lumbar spine suggesting a Baastrup's deformity which can be a  source of chronic pain. Irregular lucency is seen likely representing the  patient's lytic bony disease which was better assessed by CT and described as  stable on the recent CT scan. The patient is status post cholecystectomy.       Impression:       IMPRESSION:  1.   No acute changes evident by plain film imaging. 2.  Irregular lucency likely representing the patient's known osseous lytic  metastatic disease. 3.  Chronic degenerative changes as described above. 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 These can be  sources of chronic pain.         XR PELV AP ONLY [[id number]] Collected: 05/14/18 0843     Order Status: Completed Updated: 05/14/18 0849     Narrative:       Pelvis single view 5/14/2018    CLINICAL HISTORY: Acute on chronic low back pain and bilateral hip pain. Unable  to ambulate. More severe for 2 days. History of multiple myeloma. COMPARISON: CT the pelvis 4/20/2018. FINDINGS:  2 frontal views of the pelvis are submitted for evaluation. The lumbar spine is  partially included in the field of imaging and will be described in a separate  report of lumbar spine x-rays obtained. No abnormal widening is seen of the  sacroiliac joints or pubic symphysis.  No acute fracture line is seen of the  pelvic ring. No definite acute fracture is seen of the proximal hips although  dedicated imaging should be performed if hip fracture is strongly suspected. Multiple lucent lesions are seen. These are most clearly demonstrated in the  left parasymphyseal pubic bone and right inferior pubic ramus. These likely  represent the lytic metastatic disease described on the prior CT scan of the  pelvis which was described as not significantly changed. Moderate degenerative  changes are seen of the left hip mainly consisting of joint space loss with some  superior acetabular hypertrophy.       Impression:       IMPRESSION:  1. Lytic osseous metastatic disease without acute osseous changes appreciated. 2. Moderate chronic degenerative changes of the left hip.          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  ICD-9-CM: 227.3  3/15/2018        Thyroid mass ICD-10-CM: E07.9  ICD-9-CM: 246.9  2/8/2018        Sinus bradycardia ICD-10-CM: R00.1  ICD-9-CM: 427.89  1/12/2018        PAF (paroxysmal atrial fibrillation) (HCC) ICD-10-CM: I48.0  ICD-9-CM: 427.31  11/26/2017        Chronic systolic CHF (congestive heart failure) ([facility] ICD-10-CM: I50.22  ICD-9-CM: 428.22, 428.0  11/26/2017        Pain ICD-10-CM: R52  ICD-9-CM: 780.96  11/20/2017        MARY (acute kidney injury) ([facility] ICD-10-CM: N17.9  ICD-9-CM: 584.9  11/20/2017        Acute renal failure (ARF) ([facility] ICD-10-CM: N17.9  ICD-9-CM: 584.9  11/18/2017        Intractable pain ICD-10-CM: R52  ICD-9-CM: 780.96  1/11/2017        Acute kidney injury ([facility] ICD-10-CM: N17.9  ICD-9-CM: 584.9  1/11/2017        Pancytopenia ([facility] ICD-10-CM: Z17.181  ICD-9-CM: 284.19  1/11/2017        Constipation ICD-10-CM: K59.00  ICD-9-CM: 564.00  1/11/2017        Multiple myeloma ([facility] ICD-10-CM: C90.00  ICD-9-CM: 203.00  1/2/2017        Postural imbalance ICD-10-CM: R29.3  ICD-9-CM: 729.90  12/14/2016        Polyneuropathy ICD-10-CM: G62.9  ICD-9-CM: 356.9  12/14/2016        Fall ICD-10-CM: L71. Montine Living  ICD-9-CM: E888.9  12/14/2016        Encounter for medication management ICD-10-CM: Z79.899  ICD-9-CM: V58.69  12/14/2016        Urinary retention ICD-10-CM: R33.9  ICD-9-CM: 788.20  6/6/2016        Colonic mass ICD-10-CM: K63.9  ICD-9-CM: 569.89  3/23/2016        Hyperlipidemia ICD-10-CM: E78.5  ICD-9-CM: 272.4  11/18/2015        Vertigo ICD-10-CM: Z48  ICD-9-CM: 780.4  11/18/2015        Malaise and fatigue ICD-10-CM: R53.81, R53.83  ICD-9-CM: 780.79  11/18/2015        Cardiomyopathy ([facility] ICD-10-CM: I42.9  ICD-9-CM: 425.4  11/18/2015        LBBB (left bundle branch block) ICD-10-CM: I44.7  ICD-9-CM: 426.3  11/18/2015        Arthritis ICD-10-CM: M19.90  ICD-9-CM: 716.90  Unknown        Arrhythmia ICD-10-CM: I49.9  ICD-9-CM: 427.9  Unknown    Overview Signed 6/18/2013  2:07 PM by Elbert Balbuena MD     LBBB             Cholelithiases ICD-10-CM: Y73.38  ICD-9-CM: 574.20  Unknown        Hx of radiation therapy to prostate ICD-10-CM: Z92.3  ICD-9-CM: V15.3  Unknown        Chronic systolic heart failure ([facility] ICD-10-CM: I50.22  ICD-9-CM: 428.22  9/13/2011        Automatic implantable cardioverter-defibrillator in situ ICD-10-CM: Z95.810  ICD-9-CM: V45.02  9/1/2011            Mr. Jordi Hogue is a 65 y.o. male admitted on 5/14/2018 with a primary diagnosis of intractable back pain. His PMH includes CHF, Afib, hx of defibrillator implant, HTN and hx of prostate, colon, and thyroid cancer with multiple myeloma. He is a patient of Dr. Florecita Bob, currently on treatment with Revlimid/Dex which was just started back on 5/4/18 after his thyroidectomy.   Pt states he has not started radioactive iodine because it is currently on backorder. He has had some mild fairly long-term back pain attributed to his myeloma. He presented to ED with c/o worsening lower back pain, worse when he woke up this AM.  Denies any changes in bowel or bladder habits. No tingling or numbness in extremities. Xray of lumbar spine with no acute changes - showed known osseous lytic lesions and chronic degenerative changes. 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 PLAN:  Hx of prostate, colon, and thyroid cancers with multiple myeloma  - currently on treatment with Revlimid/Dex  - s/p thyroidectomy, waiting to start on radioactive iodine (currently on backorder per pt)     Intractable lower back pain  - Xray of lumbar spine with no acute changes - showed known osseous lytic lesions and chronic degenerative changes. 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 - Pt unable to have MRI d/t implanted defibrillator. CT L-spine ordered. - Dilaudid ordered PRN  - Pulse steroid dosing  5/15 CT revealed acute to subacute mild central compression fractures of L2 and L3 vertebral bodies and severe b/l neuroforaminal stenosis at L5-S1. IR consulted for kyphoplasty.   Currently on prednisone 60mg.     Continue home meds  Steven SOPs  On Eliquis (holding Eliquis in case pt can have kyphoplasty - SCDs in meantime)              Lissett Siegel NP   [facility]  [address] [address]  Office : [phone]  Fax : [phone]

## 2018-05-16 LAB
ALBUMIN SERPL ELPH-MCNC: 3.46 G/DL (ref 3.2–5.6)
ALBUMIN/GLOB SERPL: 1.2 {RATIO}
ALPHA1 GLOB SERPL ELPH-MCNC: 0.26 G/DL (ref 0.1–0.4)
ALPHA2 GLOB SERPL ELPH-MCNC: 0.77 G/DL (ref 0.4–1.2)
B-GLOBULIN SERPL QL ELPH: 0.86 G/DL (ref 0.6–1.3)
GAMMA GLOB MFR SERPL ELPH: 1.06 G/DL (ref 0.5–1.6)
IGA SERPL-MCNC: 15 MG/DL (ref 85–499)
IGG SERPL-MCNC: 1153 MG/DL (ref 610–1616)
IGM SERPL-MCNC: 16 MG/DL (ref 35–242)
KAPPA LC FREE SER-MCNC: 8.7 MG/L (ref 3.3–19.4)
KAPPA LC FREE/LAMBDA FREE SER: 0.04 {RATIO} (ref 0.26–1.65)
LAMBDA LC FREE SERPL-MCNC: 203.15 MG/L (ref 5.71–26.3)
M PROTEIN SERPL ELPH-MCNC: 0.8 G/DL
PROT PATTERN SERPL ELPH-IMP: ABNORMAL
PROT PATTERN SPEC IFE-IMP: ABNORMAL
PROT SERPL-MCNC: 6.4 G/DL (ref 6.3–8.2)

## 2018-05-16 PROCEDURE — 97162 PT EVAL MOD COMPLEX 30 MIN: CPT

## 2018-05-16 PROCEDURE — 99232 SBSQ HOSP IP/OBS MODERATE 35: CPT | Performed by: INTERNAL MEDICINE

## 2018-05-16 PROCEDURE — 65270000029 HC RM PRIVATE

## 2018-05-16 PROCEDURE — 86580 TB INTRADERMAL TEST: CPT | Performed by: NURSE PRACTITIONER

## 2018-05-16 PROCEDURE — 74011250637 HC RX REV CODE- 250/637: Performed by: NURSE PRACTITIONER

## 2018-05-16 PROCEDURE — 74011250636 HC RX REV CODE- 250/636: Performed by: NURSE PRACTITIONER

## 2018-05-16 PROCEDURE — 74011636637 HC RX REV CODE- 636/637: Performed by: NURSE PRACTITIONER

## 2018-05-16 PROCEDURE — 97116 GAIT TRAINING THERAPY: CPT

## 2018-05-16 PROCEDURE — 74011000302 HC RX REV CODE- 302: Performed by: NURSE PRACTITIONER

## 2018-05-16 RX ADMIN — POTASSIUM CHLORIDE 20 MEQ: 1500 TABLET, EXTENDED RELEASE ORAL at 08:16

## 2018-05-16 RX ADMIN — TUBERCULIN PURIFIED PROTEIN DERIVATIVE 5 UNITS: 5 INJECTION, SOLUTION INTRADERMAL at 15:38

## 2018-05-16 RX ADMIN — CHLORHEXIDINE GLUCONATE 15 ML: 1.2 RINSE ORAL at 16:40

## 2018-05-16 RX ADMIN — OXYCODONE HYDROCHLORIDE 15 MG: 15 TABLET ORAL at 15:38

## 2018-05-16 RX ADMIN — CARVEDILOL 6.25 MG: 6.25 TABLET, FILM COATED ORAL at 16:40

## 2018-05-16 RX ADMIN — PREDNISONE 60 MG: 50 TABLET ORAL at 08:16

## 2018-05-16 RX ADMIN — POTASSIUM CHLORIDE 20 MEQ: 1500 TABLET, EXTENDED RELEASE ORAL at 16:40

## 2018-05-16 RX ADMIN — ROSUVASTATIN CALCIUM 10 MG: 5 TABLET, FILM COATED ORAL at 22:19

## 2018-05-16 RX ADMIN — PANTOPRAZOLE SODIUM 40 MG: 40 TABLET, DELAYED RELEASE ORAL at 08:16

## 2018-05-16 RX ADMIN — CHLORHEXIDINE GLUCONATE 15 ML: 1.2 RINSE ORAL at 08:16

## 2018-05-16 RX ADMIN — OXYCODONE HYDROCHLORIDE 15 MG: 15 TABLET ORAL at 08:15

## 2018-05-16 RX ADMIN — TORSEMIDE 20 MG: 20 TABLET ORAL at 08:16

## 2018-05-16 RX ADMIN — LEVOTHYROXINE SODIUM 137 MCG: 50 TABLET ORAL at 08:15

## 2018-05-16 RX ADMIN — CARVEDILOL 6.25 MG: 6.25 TABLET, FILM COATED ORAL at 08:16

## 2018-05-16 RX ADMIN — CYANOCOBALAMIN TAB 1000 MCG 1000 MCG: 1000 TAB at 08:16

## 2018-05-16 RX ADMIN — HYDROMORPHONE HYDROCHLORIDE 1 MG: 1 INJECTION, SOLUTION INTRAMUSCULAR; INTRAVENOUS; SUBCUTANEOUS at 16:40

## 2018-05-16 RX ADMIN — CYCLOBENZAPRINE HYDROCHLORIDE 10 MG: 10 TABLET, FILM COATED ORAL at 19:44

## 2018-05-16 NOTE — PROGRESS NOTES
Problem: Falls - Risk of  Goal: *Absence of Falls  Document Bee Fall Risk and appropriate interventions in the flowsheet. Outcome: Progressing Towards Goal  Fall Risk Interventions:            Medication Interventions: Patient to call before getting OOB, Teach patient to arise slowly    Elimination Interventions: Call light in reach, Patient to call for help with toileting needs, Urinal in reach             Problem: Pressure Injury - Risk of  Goal: *Prevention of pressure injury  Document Mervin Scale and appropriate interventions in the flowsheet. Outcome: Progressing Towards Goal  Pressure Injury Interventions:             Activity Interventions: Pressure redistribution bed/mattress(bed type)    Mobility Interventions: Pressure redistribution bed/mattress (bed type)    Nutrition Interventions: Document food/fluid/supplement intake, Offer support with meals,snacks and hydration

## 2018-05-16 NOTE — PROGRESS NOTES
Problem: Mobility Impaired (Adult and Pediatric)  Goal: *Acute Goals and Plan of Care (Insert Text)  1. Mr. Leo Caruso will perform supine to sit and sit to supine independently in 7 days. 2.  Mr. Leo Caruso will perform sit to stand and bed to chair independently in 7 days. 3.  Mr. Leo Caruso will perform gait 250 ft with rolling walker or least restrictive device independently in 7 days. 4.  Mr. Leo Caruso will perform up and down 4 steps with rail independently in 7 days. PHYSICAL THERAPY: Initial Assessment 5/16/2018  INPATIENT: Hospital Day: 3  Payor: CARE IMPROVEMENT PLUS / [place] / Product Type: [employer] Medicare /      NAME/AGE/GENDER: Lo Lundy is a 65 y.o. male   PRIMARY DIAGNOSIS: Low back pain, unspecified back pain laterality, unspecified chronicity, with sciatica presence unspecified [M54.5]  Low back pain, unspecified back pain laterality, unspecified chronicity, with sciatica presence unspecified [M54.5] <principal problem not specified> <principal problem not specified>  Procedure(s) (LRB):  IR ANESTHESIA/KYPHOPLASTY L1, L2  ROOM 502 (N/A)     ICD-10: Treatment Diagnosis:    · Generalized Muscle Weakness (M62.81)  · Difficulty in walking, Not elsewhere classified (R26.2)  · Low Back Pain (M54.5)   Precaution/Allergies:  Lisinopril and Pcn [penicillins]      ASSESSMENT:     Mr. Leo Caruso presents a very kind gentleman who somehow is able to smile through his pain. He is clearly in pain but he wants so desperately to move that he was all for PT coming to work with him. He is scheduled for a kyphoplasty on Friday. He has dealt with back pain for years but it just recently got a lot worse. Today he performed bed mobility with contact guard to min assist.  Transfers needed bed elevated and contact guard. Gait it was obvious how hard he was pushing down on the walker for support but he went about 50 ft. Very slowly. Not at a functional pace.   His wife is present and has her own back issues so he must be independent in order to return home. She can not help him. Discussed the potential for a post acute rehab stay and the patients wife was very appreciative of even having the discussion because she told me how worried she was about being able to care for him. At this point we all decided to wait and see what happens after the kyphoplasty and go from there. CM has been notified of plan. If Mr. Onel Jamisno does go home he will need a rolling walker. This section established at most recent assessment   PROBLEM LIST (Impairments causing functional limitations):  1. Decreased Strength  2. Decreased Transfer Abilities  3. Decreased Ambulation Ability/Technique  4. Increased Pain  5. Decreased Activity Tolerance  6. Decreased Pacing Skills   INTERVENTIONS PLANNED: (Benefits and precautions of physical therapy have been discussed with the patient.)  1. Bed Mobility  2. Gait Training  3. Therapeutic Activites  4. Therapeutic Exercise/Strengthening  5. Transfer Training     TREATMENT PLAN: Frequency/Duration: 3 times a week for duration of hospital stay  Rehabilitation Potential For Stated Goals: Good     RECOMMENDED REHABILITATION/EQUIPMENT: (at time of discharge pending progress): Due to the probability of continued deficits (see above) this patient will likely need continued skilled physical therapy after discharge. Equipment:    to be determined. HISTORY:   History of Present Injury/Illness (Reason for Referral):  Mr. Onel Jamison is a 65 y.o. male admitted on 5/14/2018 with a primary diagnosis of The primary encounter diagnosis was Acute midline low back pain without sciatica. Diagnoses of Multiple myeloma, remission status unspecified ([address] and Prostate cancer ([address] were also pertinent to this visit. .       His PMH includes CHF, Afib, hx of defibrillator implant, HTN and hx of prostate, colon, and thyroid cancer with multiple myeloma.   He is a patient of Dr. Marlin Hamilton, currently on treatment with Revlimid/Dex which was just started back on 5/4/18 after his thyroidectomy. Pt states he has not started radioactive iodine because it is currently on backorder. He has had some mild fairly long-term back pain attributed to his myeloma. He presented to ED with c/o worsening lower back pain, worse when he woke up this AM.  Denies any changes in bowel or bladder habits. No tingling or numbness in extremities. Xray of lumbar spine with no acute changes - showed known osseous lytic lesions and chronic degenerative changes. 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 He will be admitted for further evaluation and management of his intractable back pain.     Past Medical History/Comorbidities:   Mr. Santa Patel  has a past medical history of Arrhythmia; Arthritis; BMI 30.0-30.9,adult; Cardiomyopathy ([address] Cholelithiases; Chronic systolic heart failure ([address] (9/13/2011); Gout; H/O: pituitary tumor; High cholesterol; History of thyroid cancer; History of vertigo; Hurthle cell carcinoma of thyroid ([address] Hx of radiation therapy to prostate (2004); Hyperlipidemia (11/18/2015); Hypertension; Hypothyroid; ICD (implantable cardiac defibrillator) in place (9/2011); LBBB (left bundle branch block) (11/18/2015); Malaise and fatigue (11/18/2015); Malignant neoplasm of prostate ([address] Mass of colon; Multiple myeloma ([address] and Sinus node dysfunction ([address] Mr. Santa Patel  has a past surgical history that includes hx heent (2008); hx cholecystectomy (2013); hx gi; hx heart catheterization; hx pacemaker (2011/2016); hx colonoscopy; hx thyroidectomy (2008); hx tumor removal (1990/2010); and hx thyroidectomy (01/26/2018).   Social History/Living Environment:   Home Environment: Private residence  # Steps to Enter: 4  One/Two Story Residence: One story  Living Alone: No  Support Systems: Spouse/Significant Other/Partner (however she has back issues herself and can not physically assist him.)  Patient Expects to be Discharged to[de-identified] Unknown  Current DME Used/Available at Home: [employer], straight (he was a walker but it is his mother in laws who is 5 2 and he is over 6 ft. )  Prior Level of Function/Work/Activity:  Has dealt with back pain for many years but never used an assistive device. age, multiple myeloma   Number of Personal Factors/Comorbidities that affect the Plan of Care: 1-2: MODERATE COMPLEXITY   EXAMINATION:   Most Recent Physical Functioning:   Gross Assessment:  AROM: Generally decreased, functional  Strength: Generally decreased, functional  Sensation: Intact               Posture:     Balance:  Sitting: Intact  Standing: Impaired; With support  Standing - Static: Fair  Standing - Dynamic : Fair Bed Mobility:  Rolling: Supervision  Supine to Sit: Contact guard assistance  Sit to Supine: Minimum assistance  Scooting: Supervision  Wheelchair Mobility:     Transfers:  Sit to Stand: Contact guard assistance (bed elevated)  Stand to Sit: Contact guard assistance (bed elevated)  Gait:            Body Structures Involved:  1. Muscles Body Functions Affected:  1. Movement Related Activities and Participation Affected:  1. Mobility   Number of elements that affect the Plan of Care: 3: MODERATE COMPLEXITY   CLINICAL PRESENTATION:   Presentation: Evolving clinical presentation with changing clinical characteristics: MODERATE COMPLEXITY   CLINICAL DECISION MAKIN [facility] Inpatient Short Form  How much difficulty does the patient currently have. .. Unable A Lot A Little None   1. Turning over in bed (including adjusting bedclothes, sheets and blankets)? [] 1   [] 2   [x] 3   [] 4   2. Sitting down on and standing up from a chair with arms ( e.g., wheelchair, bedside commode, etc.)   [] 1   [] 2   [x] 3   [] 4   3. Moving from lying on back to sitting on the side of the bed?    [] 1   [] 2   [x] 3   [] 4   How much help from another person does the patient currently need. .. Total A Lot A Little None   4. Moving to and from a bed to a chair (including a wheelchair)? [] 1   [] 2   [x] 3   [] 4   5. Need to walk in hospital room? [] 1   [x] 2   [] 3   [] 4   6. Climbing 3-5 steps with a railing? [] 1   [x] 2   [] 3   [] 4   © 2007, Trustees of [address] [place] [address] [address] [id number], under license to [employer]. All rights reserved      Score:  Initial: 16 Most Recent: X (Date: -- )    Interpretation of Tool:  Represents activities that are increasingly more difficult (i.e. Bed mobility, Transfers, Gait). Score 24 23 22-20 19-15 14-10 9-7 6     Modifier CH CI CJ CK CL CM CN      ? Mobility - Walking and Moving Around:     - CURRENT STATUS: CK - 40%-59% impaired, limited or restricted    - GOAL STATUS: CK - 40%-59% impaired, limited or restricted    - D/C STATUS:  ---------------To be determined---------------  Payor: CARE IMPROVEMENT PLUS [place] / Product Type: Managed Care Medicare /      Medical Necessity:     · Patient is expected to demonstrate progress in functional technique to decrease assistance required with mobility and gait. .  Reason for Services/Other Comments:  · Patient continues to require present interventions due to patient's inability to function at baseline. .   Use of outcome tool(s) and clinical judgement create a POC that gives a: Questionable prediction of patient's progress: MODERATE COMPLEXITY            TREATMENT:   (In addition to Assessment/Re-Assessment sessions the following treatments were rendered)   Pre-treatment Symptoms/Complaints:  Pain  Pain: Initial:   Pain Intensity 1: 6  Pain Location 1: Back  Pain Orientation 1: Lower  Pain Intervention(s) 1: Emotional support  Post Session:  Pain but happy he got up and walked. Gait Training (  10):  Gait training to improve and/or restore physical functioning as related to mobility.   Ambulated   with   using a   and minimal related to their stride length, heel strike and pelvis position and motion to promote proper body mechanics. Instruction in performance of gait training to correct stance phase, stride length and pelvis position and motion. Assessment    Braces/Orthotics/Lines/Etc:   · O2 Device: Room air  Treatment/Session Assessment:    · Response to Treatment:  good  · Interdisciplinary Collaboration:   o Registered Nurse  · After treatment position/precautions:   o Supine in bed  o Call light within reach  o RN notified   · Compliance with Program/Exercises: Will assess as treatment progresses. · Recommendations/Intent for next treatment session: \"Next visit will focus on advancements to more challenging activities and reduction in assistance provided\".   Total Treatment Duration:  PT Patient Time In/Time Out  Time In: 1400  Time Out: 1435    Stacie Arenas, PT

## 2018-05-16 NOTE — PROGRESS NOTES
[facility]        Inpatient Hematology / Oncology Progress Note      Admission Date: 2018  7:42 AM  Reason for Admission/Hospital Course: Low back pain, unspecified back pain laterality, unspecified chronicity, with sciatica presence unspecified [M54.5]      24 Hour Events:  Afebrile, VSS  Kyphoplasty in IR scheduled for Friday  Pain controlled okay with dilaudid    ROS:  Constitutional: +weakness; negative for fever, chills. CV: Negative for chest pain, palpitations, edema. Respiratory: Negative for dyspnea, cough, wheezing. GI: Negative for nausea, abdominal pain, diarrhea. MSK: +lower back pain    10 point review of systems is otherwise negative with the exception of the elements mentioned above in the HPI. Allergies   Allergen Reactions    Lisinopril Cough    Pcn [Penicillins] Swelling       OBJECTIVE:  Patient Vitals for the past 8 hrs:   BP Temp Pulse Resp SpO2   18 0650 142/74 97.5 °F (36.4 °C) 78 18 96 %   18 0400 133/75 97.5 °F (36.4 °C) 73 18 93 %     Temp (24hrs), Av.6 °F (36.4 °C), Min:97.4 °F (36.3 °C), Max:98 °F (36.7 °C)     0701 -  1900  In: 150 [P.O.:150]  Out: 350 [Urine:350]    Physical Exam:  Constitutional: Well developed, well nourished male in no acute distress, lying in the hospital bed. HEENT: Normocephalic and atraumatic. Oropharynx is clear, mucous membranes are moist.  Extraocular muscles are intact. Sclerae anicteric. Neck supple. Skin Warm and dry. No bruising and no rash noted. No erythema. No pallor. Respiratory Lungs are clear to auscultation bilaterally without wheezes, rales or rhonchi, normal air exchange without accessory muscle use. CVS Normal rate, regular rhythm and normal S1 and S2. No murmurs, gallops, or rubs. Abdomen Soft, nontender and nondistended, normoactive bowel sounds. +Hernia. No hepatosplenomegaly. Neuro Grossly nonfocal with no obvious sensory or motor deficits.    MSK Normal range of motion in general.  No edema and no tenderness.    Psych Appropriate mood and affect.           Labs:      Recent Labs      05/15/18   0628  05/14/18   0801   WBC  6.0  5.9   RBC  4.80  5.15   HGB  11.3*  12.2*   HCT  33.6*  36.2*   MCV  70.0*  70.3*   MCH  23.5*  23.7*   MCHC  33.6  33.7   RDW  15.1*  15.3*   PLT  126*  147*   GRANS  74  60   LYMPH  22  29   MONOS  4  9   EOS  0*  1   BASOS  0  0   IG  0  1   DF  AUTOMATED  AUTOMATED   ANEU  4.4  3.5   ABL  1.3  1.7   ABM  0.2  0.5   REA  0.0  0.1   ABB  0.0  0.0   AIG  0.0  0.1        Recent Labs      05/15/18   1054  05/15/18   0628  05/14/18   0801   NA   --   140  139   K   --   3.6  3.4*   CL   --   103  98   CO2   --   28  31   AGAP   --   9  10   GLU   --   148*  110*   BUN   --   21  34*   CREA   --   1.05  1.53*   GFRAA   --   >60  57*   GFRNA   --   >60  47*   CA   --   9.4  9.8   SGOT   --   34  44*   AP   --   96  110   TP  6.4  6.5  7.4   ALB   --   3.0*  3.6   GLOB   --   3.5  3.8*   AGRAT  PENDING  0.9*  0.9*   MG   --   1.9   --          Imaging:  CT SPINE Corwin Justice CONT [[id number]] Collected: 05/14/18 1632      Order Status: Completed Updated: 05/14/18 1657     Addenda:         Addendum:   DC4 The significant findings in this report have been referred to the Imaging Navigator in order to communicate to the referring provider or his/her   designee as outlined in Section II.C.2.a.ii-iii of the ACR Practice Guideline for Communication of Diagnostic Imaging Findings.       Signed: 05/14/18 1655 by Zane Shetty MD     Narrative:       Exam:  CT lumbar spine with contrast    History: worsening back pain; hx of multiple myeloma, 77 years Male severe back  pain    Technique: Standard departmental protocol CT of the lumbar spine was performed  after uneventful intravenous administration of 100 cc of nonionic IV contrast.   Coronal and sagittal reformats were obtained.  Radiation dose reduction  techniques were used for this study:  Our CT scanners use one or all of the  following: Automated exposure control, adjustment of the mA and/or kVp according  to patient's size, iterative reconstruction. Comparison: CT torso April 20, 2018    Findings: Shakira Bryan is diffuse narrowing the spinal canal, consistent with  congenital spinal canal stenosis.  Normal alignment.  There appears to be  partial fusion of the L4 and L5 vertebral bodies.  There are anterior bridging  osteophytes at L5-S1.  Mild central loss of vertebral body height is seen with  superior endplate sclerosis of the L2 and L3 vertebral bodies, these could  represent mild acute to subacute compression fractures, and appear possibly new  since the prior CT torso.  Moderate anterior compression deformity of the T12  vertebral body with anterior bridging osteophytes appears unchanged.  A large  posterior disc osteophyte complex is seen at L4-L5 causing mild spinal canal  narrowing measuring 12 mm in AP dimension.  A large posterior disc osteophyte  complex is seen at L5-S1 without significant spinal canal stenosis.  However  there appears to be severe bilateral neuroforaminal stenosis at this level.  No  evidence of significant spinal canal stenosis.  Normal mineralization. Visualized prevertebral and paravertebral soft tissues unremarkable.       Impression:       Impression:  1.  Suspect acute to subacute mild central compression fractures of the L2 and  L3 vertebral bodies. 2.  Severe bilateral neuroforaminal stenosis at L5-S1.     CT SPINE [facility] CONT [[id number]]      Order Status: Canceled      XR SPINE LUMB 2 OR 3 V [[id number]] Collected: 05/14/18 0847     Order Status: Completed Updated: 05/14/18 0852     Narrative:       LUMBAR SPINE RADIOGRAPHS, 5/14/2018    CLINICAL HISTORY:  Chronic low back pain which is severe for the past 2 days. TECHNIQUE: AP and lateral views of the lumbar spine with coned down view of the  lumbosacral junction.     Comparison: CT abdomen 4/20/2018    FINDINGS:  Five lumbar type vertebrae are visualized.  Alignment is maintained at all  levels.  Vertebral body height is maintained at all levels in the lumbar spine. A moderate anterior compression deformity is seen at T12. However, this does not  demonstrate clear acute features in a similar compression deformity was seen on  the prior CT scan and therefore this is not felt to be acute. Fusion is once  again seen between the L4 and L5 vertebral bodies. The posterior elements,  transverse processes, and sacroiliac joints are intact. Mild to moderate  discogenic degenerative changes are seen throughout the lumbar spine. Moderate  facet hypertrophic changes are seen in the lower lumbar spine. There is direct  apposition of spinous processes with mild intervening sclerotic changes in the  mid and lower lumbar spine suggesting a Baastrup's deformity which can be a  source of chronic pain. Irregular lucency is seen likely representing the  patient's lytic bony disease which was better assessed by CT and described as  stable on the recent CT scan. The patient is status post cholecystectomy.       Impression:       IMPRESSION:  1.   No acute changes evident by plain film imaging. 2.  Irregular lucency likely representing the patient's known osseous lytic  metastatic disease. 3.  Chronic degenerative changes as described above. 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 These can be  sources of chronic pain.         XR PELV AP ONLY [[id number]] Collected: 05/14/18 0843     Order Status: Completed Updated: 05/14/18 0849     Narrative:       Pelvis single view 5/14/2018    CLINICAL HISTORY: Acute on chronic low back pain and bilateral hip pain. Unable  to ambulate. More severe for 2 days. History of multiple myeloma. COMPARISON: CT the pelvis 4/20/2018. FINDINGS:  2 frontal views of the pelvis are submitted for evaluation.  The lumbar spine is  partially included in the field of imaging and will be described in a separate  report of lumbar spine x-rays obtained. No abnormal widening is seen of the  sacroiliac joints or pubic symphysis. No acute fracture line is seen of the  pelvic ring. No definite acute fracture is seen of the proximal hips although  dedicated imaging should be performed if hip fracture is strongly suspected. Multiple lucent lesions are seen. These are most clearly demonstrated in the  left parasymphyseal pubic bone and right inferior pubic ramus. These likely  represent the lytic metastatic disease described on the prior CT scan of the  pelvis which was described as not significantly changed. Moderate degenerative  changes are seen of the left hip mainly consisting of joint space loss with some  superior acetabular hypertrophy.       Impression:       IMPRESSION:  1. Lytic osseous metastatic disease without acute osseous changes appreciated. 2. Moderate chronic degenerative changes of the left hip.          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  ICD-9-CM: 227.3  3/15/2018        Thyroid mass ICD-10-CM: E07.9  ICD-9-CM: 246.9  2/8/2018        Sinus bradycardia ICD-10-CM: R00.1  ICD-9-CM: 427.89  1/12/2018        PAF (paroxysmal atrial fibrillation) (HCC) ICD-10-CM: I48.0  ICD-9-CM: 427.31  11/26/2017        Chronic systolic CHF (congestive heart failure) (HCC) ICD-10-CM: I50.22  ICD-9-CM: 428.22, 428.0  11/26/2017        Pain ICD-10-CM: R52  ICD-9-CM: 780.96  11/20/2017        MARY (acute kidney injury) [facility]) ICD-10-CM: N17.9  ICD-9-CM: 584.9  11/20/2017        Acute renal failure (ARF) ([facility] ICD-10-CM: N17.9  ICD-9-CM: 584.9  11/18/2017        Intractable pain ICD-10-CM: R52  ICD-9-CM: 780.96  1/11/2017        Acute kidney injury [facility]) ICD-10-CM: N17.9  ICD-9-CM: 584.9  1/11/2017        Pancytopenia ([facility] ICD-10-CM: I30.887  ICD-9-CM: 284.19  1/11/2017        Constipation ICD-10-CM: K59.00  ICD-9-CM: 564.00  1/11/2017        Multiple myeloma ([facility] ICD-10-CM: C90.00  ICD-9-CM: 203.00  1/2/2017        Postural imbalance ICD-10-CM: R29.3  ICD-9-CM: 729.90  12/14/2016        Polyneuropathy ICD-10-CM: G62.9  ICD-9-CM: 356.9  12/14/2016        Fall ICD-10-CM: L23. Maritza Galea  ICD-9-CM: E888.9  12/14/2016        Encounter for medication management ICD-10-CM: Z79.899  ICD-9-CM: V58.69  12/14/2016        Urinary retention ICD-10-CM: R33.9  ICD-9-CM: 788.20  6/6/2016        Colonic mass ICD-10-CM: K63.9  ICD-9-CM: 569.89  3/23/2016        Hyperlipidemia ICD-10-CM: E78.5  ICD-9-CM: 272.4  11/18/2015        Vertigo ICD-10-CM: I68  ICD-9-CM: 780.4  11/18/2015        Malaise and fatigue ICD-10-CM: R53.81, R53.83  ICD-9-CM: 780.79  11/18/2015        Cardiomyopathy ([facility] ICD-10-CM: I42.9  ICD-9-CM: 425.4  11/18/2015        LBBB (left bundle branch block) ICD-10-CM: I44.7  ICD-9-CM: 426.3  11/18/2015        Arthritis ICD-10-CM: M19.90  ICD-9-CM: 716.90  Unknown        Arrhythmia ICD-10-CM: I49.9  ICD-9-CM: 427.9  Unknown    Overview Signed 6/18/2013  2:07 PM by Erin Saldana MD     LBBB             Cholelithiases ICD-10-CM: V58.05  ICD-9-CM: 574.20  Unknown        Hx of radiation therapy to prostate ICD-10-CM: Z92.3  ICD-9-CM: V15.3  Unknown        Chronic systolic heart failure ([facility] ICD-10-CM: I50.22  ICD-9-CM: 428.22  9/13/2011        Automatic implantable cardioverter-defibrillator in situ ICD-10-CM: Z95.810  ICD-9-CM: V45.02  9/1/2011            Mr. Tobias Hull is a 65 y.o. male admitted on 5/14/2018 with a primary diagnosis of intractable back pain. His PMH includes CHF, Afib, hx of defibrillator implant, HTN and hx of prostate, colon, and thyroid cancer with multiple myeloma. He is a patient of Dr. Marlin Hamilton, currently on treatment with Revlimid/Dex which was just started back on 5/4/18 after his thyroidectomy. Pt states he has not started radioactive iodine because it is currently on backorder. He has had some mild fairly long-term back pain attributed to his myeloma. He presented to ED with c/o worsening lower back pain, worse when he woke up this AM.  Denies any changes in bowel or bladder habits. No tingling or numbness in extremities. Xray of lumbar spine with no acute changes - showed known osseous lytic lesions and chronic degenerative changes. 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 PLAN:  Hx of prostate, colon, and thyroid cancers with multiple myeloma  - currently on treatment with Revlimid/Dex  - s/p thyroidectomy, waiting to start on radioactive iodine (currently on backorder per pt)     Intractable lower back pain  - Xray of lumbar spine with no acute changes - showed known osseous lytic lesions and chronic degenerative changes. 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 - Pt unable to have MRI d/t implanted defibrillator. CT L-spine ordered. - Dilaudid ordered PRN  - Pulse steroid dosing  5/15 CT revealed acute to subacute mild central compression fractures of L2 and L3 vertebral bodies and severe b/l neuroforaminal stenosis at L5-S1. IR consulted for kyphoplasty. Currently on prednisone 60mg.   5/16 For kyphoplasty on Friday - due to taking one dose of Eliquis.      Continue home meds  Steven SOPs  On Eliquis (holding Eliquis in case pt can have kyphoplasty - SCDs in meantime)  Consult PT to get out of bed              [facility]  [address] [place] [address] Avenue  Office : [phone]  Fax : [phone]

## 2018-05-16 NOTE — PROGRESS NOTES
END OF SHIFT NOTE:    Intake/Output  05/16 0701 - 05/16 1900  In: 6194 [P.O.:1390]  Out: 626 [Urine:625]   Voiding: YES  Catheter: NO  Drain:              Stool:  2 occurrences. Stool Assessment  Stool Color: Earmon Rom (05/15/18 2340)  Stool Appearance: Soft (05/16/18 0817)  Stool Amount: Medium (05/15/18 2340)  Stool Source/Status: Rectum (05/15/18 2340)    Emesis:  0 occurrences. VITAL SIGNS  Patient Vitals for the past 12 hrs:   Temp Pulse Resp BP SpO2   05/16/18 1514 97.7 °F (36.5 °C) 65 19 115/66 97 %   05/16/18 1123 98.1 °F (36.7 °C) 78 20 141/69 95 %       Pain Assessment  Pain 1  Pain Scale 1: Numeric (0 - 10) (05/16/18 1640)  Pain Intensity 1: 8 (05/16/18 1640)  Patient Stated Pain Goal: 0 (05/15/18 0803)  Pain Reassessment 1: Yes (05/16/18 1640)  Pain Onset 1: after resting (05/15/18 2023)  Pain Location 1: Back (05/16/18 1640)  Pain Orientation 1: Lower (05/16/18 1435)  Pain Description 1: Constant (05/16/18 1435)  Pain Intervention(s) 1: Medication (see MAR) (05/16/18 1640)    Ambulating  Yes    Additional Information: PPD placed at 1500      Shift report given to oncoming nurse at the bedside.     Shanel Paez

## 2018-05-16 NOTE — PROGRESS NOTES
CM will continue to follow pt for DC needs after procedure later this week. Family provided with facilities list-PPD placed-if STR is indicated. They selected [facility],as pt has been there in the past and was pleased with the care. If pt goes home, PT recommending a rolling walker, as current walker pt has belonged to a relative and is not size appropriate. CM will send referral to [place] [id number] if DC plan is confirmed as home.

## 2018-05-17 ENCOUNTER — ANESTHESIA EVENT (OUTPATIENT)
Dept: SURGERY | Age: 78
DRG: 478 | End: 2018-05-17
Payer: MEDICARE

## 2018-05-17 LAB
MM INDURATION POC: 0 MM (ref 0–5)
PPD POC: 0 NEGATIVE

## 2018-05-17 PROCEDURE — 74011250636 HC RX REV CODE- 250/636: Performed by: NURSE PRACTITIONER

## 2018-05-17 PROCEDURE — 74011250637 HC RX REV CODE- 250/637: Performed by: NURSE PRACTITIONER

## 2018-05-17 PROCEDURE — 65270000029 HC RM PRIVATE

## 2018-05-17 PROCEDURE — 99232 SBSQ HOSP IP/OBS MODERATE 35: CPT | Performed by: INTERNAL MEDICINE

## 2018-05-17 PROCEDURE — 97110 THERAPEUTIC EXERCISES: CPT

## 2018-05-17 PROCEDURE — 74011636637 HC RX REV CODE- 636/637: Performed by: NURSE PRACTITIONER

## 2018-05-17 RX ORDER — OXYCODONE HYDROCHLORIDE 15 MG/1
30 TABLET ORAL
Status: DISCONTINUED | OUTPATIENT
Start: 2018-05-17 | End: 2018-05-23 | Stop reason: HOSPADM

## 2018-05-17 RX ADMIN — POTASSIUM CHLORIDE 20 MEQ: 1500 TABLET, EXTENDED RELEASE ORAL at 08:23

## 2018-05-17 RX ADMIN — TORSEMIDE 20 MG: 20 TABLET ORAL at 08:23

## 2018-05-17 RX ADMIN — CARVEDILOL 6.25 MG: 6.25 TABLET, FILM COATED ORAL at 17:28

## 2018-05-17 RX ADMIN — CHLORHEXIDINE GLUCONATE 15 ML: 1.2 RINSE ORAL at 17:28

## 2018-05-17 RX ADMIN — HYDROMORPHONE HYDROCHLORIDE 1 MG: 1 INJECTION, SOLUTION INTRAMUSCULAR; INTRAVENOUS; SUBCUTANEOUS at 23:48

## 2018-05-17 RX ADMIN — OXYCODONE HYDROCHLORIDE 15 MG: 15 TABLET ORAL at 08:22

## 2018-05-17 RX ADMIN — OXYCODONE HYDROCHLORIDE 30 MG: 15 TABLET ORAL at 23:48

## 2018-05-17 RX ADMIN — ROSUVASTATIN CALCIUM 10 MG: 5 TABLET, FILM COATED ORAL at 21:08

## 2018-05-17 RX ADMIN — LEVOTHYROXINE SODIUM 137 MCG: 50 TABLET ORAL at 08:23

## 2018-05-17 RX ADMIN — OXYCODONE HYDROCHLORIDE 30 MG: 15 TABLET ORAL at 11:25

## 2018-05-17 RX ADMIN — CHLORHEXIDINE GLUCONATE 15 ML: 1.2 RINSE ORAL at 08:22

## 2018-05-17 RX ADMIN — CYCLOBENZAPRINE HYDROCHLORIDE 10 MG: 10 TABLET, FILM COATED ORAL at 21:08

## 2018-05-17 RX ADMIN — PREDNISONE 60 MG: 50 TABLET ORAL at 08:23

## 2018-05-17 RX ADMIN — POTASSIUM CHLORIDE 20 MEQ: 1500 TABLET, EXTENDED RELEASE ORAL at 17:28

## 2018-05-17 RX ADMIN — CARVEDILOL 6.25 MG: 6.25 TABLET, FILM COATED ORAL at 08:23

## 2018-05-17 RX ADMIN — CYCLOBENZAPRINE HYDROCHLORIDE 10 MG: 10 TABLET, FILM COATED ORAL at 08:22

## 2018-05-17 RX ADMIN — PANTOPRAZOLE SODIUM 40 MG: 40 TABLET, DELAYED RELEASE ORAL at 08:23

## 2018-05-17 RX ADMIN — CYANOCOBALAMIN TAB 1000 MCG 1000 MCG: 1000 TAB at 08:23

## 2018-05-17 NOTE — PROGRESS NOTES
While DC plan is still not confirmed, CM ordered rolling walker for pt. Referral sent to [facility] Dr. Florecita Bob is hoping that home with home health will be appropriate-not STR. CM will continue to follow.

## 2018-05-17 NOTE — PROGRESS NOTES
CM sent preliminary referral to [facility] per request of pt and wife. CM will send updated notes following procedure scheduled for Friday.

## 2018-05-17 NOTE — PROGRESS NOTES
[facility] Hematology & Oncology        Inpatient Hematology / Oncology Progress Note      Admission Date: 2018  7:42 AM  Reason for Admission/Hospital Course: Low back pain, unspecified back pain laterality, unspecified chronicity, with sciatica presence unspecified [M54.5]  Low back pain, unspecified back pain laterality, unspecified chronicity, with sciatica presence unspecified [M54.5]      24 Hour Events:  Afebrile, VSS  IR kyphoplasty tomorrow  Increased oxy IR to 30mg q 4    ROS:  Constitutional: +weakness; negative for fever, chills. CV: Negative for chest pain, palpitations, edema. Respiratory: Negative for dyspnea, cough, wheezing. GI: Negative for nausea, abdominal pain, diarrhea. MSK: +lower back pain    10 point review of systems is otherwise negative with the exception of the elements mentioned above in the HPI. Allergies   Allergen Reactions    Lisinopril Cough    Pcn [Penicillins] Swelling       OBJECTIVE:  Patient Vitals for the past 8 hrs:   BP Temp Pulse Resp SpO2   18 0714 123/75 97.5 °F (36.4 °C) 75 18 94 %   18 0403 123/77 97.8 °F (36.6 °C) 74 17 95 %     Temp (24hrs), Av.8 °F (36.6 °C), Min:97.5 °F (36.4 °C), Max:98.1 °F (36.7 °C)     0701 -  1900  In: 240 [P.O.:240]  Out: 300 [Urine:300]    Physical Exam:  Constitutional: Well developed, well nourished male in no acute distress, lying in the hospital bed. HEENT: Normocephalic and atraumatic. Oropharynx is clear, mucous membranes are moist.  Extraocular muscles are intact. Sclerae anicteric.           Skin Warm and dry. No bruising and no rash noted. No erythema. No pallor. Respiratory Lungs are clear to auscultation bilaterally without wheezes, rales or rhonchi, normal air exchange without accessory muscle use. CVS Normal rate, regular rhythm and normal S1 and S2. No murmurs, gallops, or rubs. Abdomen Soft, nontender and nondistended, normoactive bowel sounds.      Neuro Grossly nonfocal with no obvious sensory or motor deficits. MSK Normal range of motion in general.  No edema and no tenderness. Psych Appropriate mood and affect.           Labs:      Recent Labs      05/15/18   0628   WBC  6.0   RBC  4.80   HGB  11.3*   HCT  33.6*   MCV  70.0*   MCH  23.5*   MCHC  33.6   RDW  15.1*   PLT  126*   GRANS  74   LYMPH  22   MONOS  4   EOS  0*   BASOS  0   IG  0   DF  AUTOMATED   ANEU  4.4   ABL  1.3   ABM  0.2   REA  0.0   ABB  0.0   AIG  0.0        Recent Labs      05/15/18   1054  05/15/18   0628   NA   --   140   K   --   3.6   CL   --   103   CO2   --   28   AGAP   --   9   GLU   --   148*   BUN   --   21   CREA   --   1.05   GFRAA   --   >60   GFRNA   --   >60   CA   --   9.4   SGOT   --   34   AP   --   96   TP  6.4  6.5   ALB   --   3.0*   GLOB   --   3.5   AGRAT  1.2  0.9*   MG   --   1.9         Imaging:  CT SPINE Uavldo Armenta CONT [[id number]] Collected: 05/14/18 1632      Order Status: Completed Updated: 05/14/18 1657     Addenda:         Addendum:   DC4 The significant findings in this report have been referred to the Imaging Navigator in order to communicate to the referring provider or his/her   designee as outlined in Section II.C.2.a.ii-iii of the ACR Practice Guideline for Communication of Diagnostic Imaging Findings.       Signed: 05/14/18 1655 by Beatrice De León MD     Narrative:       Exam:  CT lumbar spine with contrast    History: worsening back pain; hx of multiple myeloma, 77 years Male severe back  pain    Technique: Standard departmental protocol CT of the lumbar spine was performed  after uneventful intravenous administration of 100 cc of nonionic IV contrast.   Coronal and sagittal reformats were obtained.  Radiation dose reduction  techniques were used for this study:  Our CT scanners use one or all of the  following: Automated exposure control, adjustment of the mA and/or kVp according  to patient's size, iterative reconstruction.     Comparison: CT torso April 20, 2018    Findings: Domenica Simpler is diffuse narrowing the spinal canal, consistent with  congenital spinal canal stenosis.  Normal alignment.  There appears to be  partial fusion of the L4 and L5 vertebral bodies.  There are anterior bridging  osteophytes at L5-S1.  Mild central loss of vertebral body height is seen with  superior endplate sclerosis of the L2 and L3 vertebral bodies, these could  represent mild acute to subacute compression fractures, and appear possibly new  since the prior CT torso.  Moderate anterior compression deformity of the T12  vertebral body with anterior bridging osteophytes appears unchanged.  A large  posterior disc osteophyte complex is seen at L4-L5 causing mild spinal canal  narrowing measuring 12 mm in AP dimension.  A large posterior disc osteophyte  complex is seen at L5-S1 without significant spinal canal stenosis.  However  there appears to be severe bilateral neuroforaminal stenosis at this level.  No  evidence of significant spinal canal stenosis.  Normal mineralization. Visualized prevertebral and paravertebral soft tissues unremarkable.       Impression:       Impression:  1.  Suspect acute to subacute mild central compression fractures of the L2 and  L3 vertebral bodies. 2.  Severe bilateral neuroforaminal stenosis at L5-S1.     CT SPINE Roel Scarce WO CONT [[id number]]      Order Status: Canceled      XR SPINE LUMB 2 OR 3 V [[id number]] Collected: 05/14/18 0847     Order Status: Completed Updated: 05/14/18 0852     Narrative:       LUMBAR SPINE RADIOGRAPHS, 5/14/2018    CLINICAL HISTORY:  Chronic low back pain which is severe for the past 2 days. TECHNIQUE: AP and lateral views of the lumbar spine with coned down view of the  lumbosacral junction. Comparison: CT abdomen 4/20/2018    FINDINGS:  Five lumbar type vertebrae are visualized.  Alignment is maintained at all  levels.  Vertebral body height is maintained at all levels in the lumbar spine.   A moderate anterior compression deformity is seen at T12. However, this does not  demonstrate clear acute features in a similar compression deformity was seen on  the prior CT scan and therefore this is not felt to be acute. Fusion is once  again seen between the L4 and L5 vertebral bodies. The posterior elements,  transverse processes, and sacroiliac joints are intact. Mild to moderate  discogenic degenerative changes are seen throughout the lumbar spine. Moderate  facet hypertrophic changes are seen in the lower lumbar spine. There is direct  apposition of spinous processes with mild intervening sclerotic changes in the  mid and lower lumbar spine suggesting a Baastrup's deformity which can be a  source of chronic pain. Irregular lucency is seen likely representing the  patient's lytic bony disease which was better assessed by CT and described as  stable on the recent CT scan. The patient is status post cholecystectomy.       Impression:       IMPRESSION:  1.   No acute changes evident by plain film imaging. 2.  Irregular lucency likely representing the patient's known osseous lytic  metastatic disease. 3.  Chronic degenerative changes as described above. 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 These can be  sources of chronic pain.         XR PELV AP ONLY [[id number]] Collected: 05/14/18 0843     Order Status: Completed Updated: 05/14/18 0849     Narrative:       Pelvis single view 5/14/2018    CLINICAL HISTORY: Acute on chronic low back pain and bilateral hip pain. Unable  to ambulate. More severe for 2 days. History of multiple myeloma. COMPARISON: CT the pelvis 4/20/2018. FINDINGS:  2 frontal views of the pelvis are submitted for evaluation. The lumbar spine is  partially included in the field of imaging and will be described in a separate  report of lumbar spine x-rays obtained. No abnormal widening is seen of the  sacroiliac joints or pubic symphysis.  No acute fracture line is seen of the  pelvic ring. No definite acute fracture is seen of the proximal hips although  dedicated imaging should be performed if hip fracture is strongly suspected. Multiple lucent lesions are seen. These are most clearly demonstrated in the  left parasymphyseal pubic bone and right inferior pubic ramus. These likely  represent the lytic metastatic disease described on the prior CT scan of the  pelvis which was described as not significantly changed. Moderate degenerative  changes are seen of the left hip mainly consisting of joint space loss with some  superior acetabular hypertrophy.       Impression:       IMPRESSION:  1. Lytic osseous metastatic disease without acute osseous changes appreciated. 2. Moderate chronic degenerative changes of the left hip.          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0  11/26/2017        Pain ICD-10-CM: R52  ICD-9-CM: 780.96  11/20/2017        MARY (acute kidney injury) ([facility] ICD-10-CM: N17.9  ICD-9-CM: 584.9  11/20/2017        Acute renal failure (ARF) ([facility] ICD-10-CM: N17.9  ICD-9-CM: 584.9  11/18/2017        Intractable pain ICD-10-CM: R52  ICD-9-CM: 780.96  1/11/2017        Acute kidney injury [facility]) ICD-10-CM: N17.9  ICD-9-CM: 584.9  1/11/2017        Pancytopenia ([facility] ICD-10-CM: R60.407  ICD-9-CM: 284.19  1/11/2017        Constipation ICD-10-CM: K59.00  ICD-9-CM: 564.00  1/11/2017        Multiple myeloma ([facility] ICD-10-CM: C90.00  ICD-9-CM: 203.00  1/2/2017        Postural imbalance ICD-10-CM: R29.3  ICD-9-CM: 729.90  12/14/2016        Polyneuropathy ICD-10-CM: G62.9  ICD-9-CM: 356.9  12/14/2016        Fall ICD-10-CM: H93. [place]  ICD-9-CM: E888.9  12/14/2016        Encounter for medication management ICD-10-CM: Z79.899  ICD-9-CM: V58.69  12/14/2016        Urinary retention ICD-10-CM: R33.9  ICD-9-CM: 788.20  6/6/2016        Colonic mass ICD-10-CM: K63.9  ICD-9-CM: 569.89  3/23/2016        Hyperlipidemia ICD-10-CM: E78.5  ICD-9-CM: 272.4  11/18/2015        Vertigo ICD-10-CM: I47  ICD-9-CM: 780.4  11/18/2015        Malaise and fatigue ICD-10-CM: R53.81, R53.83  ICD-9-CM: 780.79  11/18/2015        Cardiomyopathy ([facility] ICD-10-CM: I42.9  ICD-9-CM: 425.4  11/18/2015        LBBB (left bundle branch block) ICD-10-CM: I44.7  ICD-9-CM: 426.3  11/18/2015        Arthritis ICD-10-CM: M19.90  ICD-9-CM: 716.90  Unknown        Arrhythmia ICD-10-CM: I49.9  ICD-9-CM: 427.9  Unknown    Overview Signed 6/18/2013  2:07 PM by Jaye Amador MD     LBBB             Cholelithiases ICD-10-CM: V19.83  ICD-9-CM: 574.20  Unknown        Hx of radiation therapy to prostate ICD-10-CM: Z92.3  ICD-9-CM: V15.3  Unknown        Chronic systolic heart failure ([facility] ICD-10-CM: I50.22  ICD-9-CM: 428.22  9/13/2011        Automatic implantable cardioverter-defibrillator in situ ICD-10-CM: Z95.810  ICD-9-CM: V45.02  9/1/2011            Mr. Omid Holden is a 65 y.o. male admitted on 5/14/2018 with a primary diagnosis of intractable back pain. His PMH includes CHF, Afib, hx of defibrillator implant, HTN and hx of prostate, colon, and thyroid cancer with multiple myeloma.   He is a patient of Dr. Jadyn Cordova, currently on treatment with Revlimid/Dex which was just started back on 5/4/18 after his thyroidectomy. Pt states he has not started radioactive iodine because it is currently on backorder. He has had some mild fairly long-term back pain attributed to his myeloma. He presented to ED with c/o worsening lower back pain, worse when he woke up this AM.  Denies any changes in bowel or bladder habits. No tingling or numbness in extremities. Xray of lumbar spine with no acute changes - showed known osseous lytic lesions and chronic degenerative changes. 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 PLAN:  Hx of prostate, colon, and thyroid cancers with multiple myeloma  - currently on treatment with Revlimid/Dex  - s/p thyroidectomy, waiting to start on radioactive iodine (currently on backorder per pt)  5/17 MM labs- [place] Free light chains 8.70/Lambda 203.15  Kappa/Lambda 0.4. M Ok 0.80. IgG 1153 IgA 15  IgM 16     Intractable lower back pain  - Xray of lumbar spine with no acute changes - showed known osseous lytic lesions and chronic degenerative changes. 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 - Pt unable to have MRI d/t implanted defibrillator. CT L-spine ordered. - Dilaudid ordered PRN  - Pulse steroid dosing  5/15 CT revealed acute to subacute mild central compression fractures of L2 and L3 vertebral bodies and severe b/l neuroforaminal stenosis at L5-S1. IR consulted for kyphoplasty. Currently on prednisone 60mg. 5/17 IR Kyphoplasty tomorrow.  Increase Oxy IR 30 mg q 4.      Continue home meds  Steven SOPs  On Eliquis (holding Eliquis in case pt can have kyphoplasty - SCDs in meantime)  Possible discharge Saturday with [address], POLI   [facility] Hematology & Oncology  [address]  Office : [phone]  Fax : [phone]

## 2018-05-17 NOTE — PROGRESS NOTES
[facility] denied pt due to medication Revlimid-$13,000/month. If physician feels that STR is necessary, CM will inquire about possible medication changes.

## 2018-05-17 NOTE — PROGRESS NOTES
Problem: Falls - Risk of  Goal: *Absence of Falls  Document Bee Fall Risk and appropriate interventions in the flowsheet. Outcome: Progressing Towards Goal  Fall Risk Interventions:  Mobility Interventions: Communicate number of staff needed for ambulation/transfer, Patient to call before getting OOB, PT Consult for mobility concerns, Utilize walker, cane, or other assitive device         Medication Interventions: Patient to call before getting OOB, Teach patient to arise slowly    Elimination Interventions: Call light in reach, Patient to call for help with toileting needs, Urinal in reach             Problem: Pressure Injury - Risk of  Goal: *Prevention of pressure injury  Document Mervin Scale and appropriate interventions in the flowsheet. Outcome: Progressing Towards Goal  Pressure Injury Interventions:             Activity Interventions: Pressure redistribution bed/mattress(bed type), PT/OT evaluation    Mobility Interventions: PT/OT evaluation, Pressure redistribution bed/mattress (bed type)    Nutrition Interventions: Document food/fluid/supplement intake, Offer support with meals,snacks and hydration

## 2018-05-17 NOTE — ANESTHESIA PREPROCEDURE EVALUATION
Anesthetic History   No history of anesthetic complications            Review of Systems / Medical History  Patient summary reviewed and pertinent labs reviewed    Pulmonary  Within defined limits                 Neuro/Psych   Within defined limits           Cardiovascular    Hypertension: well controlled      CHF  Dysrhythmias   Pacemaker (ICD/pacer) and hyperlipidemia    Exercise tolerance: >4 METS  Comments: Cardiomyopathy  LBBB  EF 30-35% 12/16    ICD never discharged; fully paced   GI/Hepatic/Renal  Within defined limits              Endo/Other      Hypothyroidism (s/p thyroidectomy for ca)  Obesity, arthritis and cancer (prostate, thyroid and colon; MM)     Other Findings   Comments: Vertigo  Gout  Benign pituitary tumor           Physical Exam    Airway  Mallampati: II  TM Distance: 4 - 6 cm  Neck ROM: normal range of motion   Mouth opening: Normal    Comments: Draining left neck wound Cardiovascular    Rhythm: irregular  Rate: abnormal         Dental      Comments: Sore left lower lip   Pulmonary  Breath sounds clear to auscultation               Abdominal  GI exam deferred       Other Findings            Anesthetic Plan    ASA: 3  Anesthesia type: general          Induction: Intravenous  Anesthetic plan and risks discussed with: Patient

## 2018-05-17 NOTE — PROGRESS NOTES
Problem: Mobility Impaired (Adult and Pediatric)  Goal: *Acute Goals and Plan of Care (Insert Text)  1. Mr. Lissett Perez will perform supine to sit and sit to supine independently in 7 days. 2.  Mr. Lissett Perez will perform sit to stand and bed to chair independently in 7 days. 3.  Mr. Lissett Perez will perform gait 250 ft with rolling walker or least restrictive device independently in 7 days. 4.  Mr. Lissett Perez will perform up and down 4 steps with rail independently in 7 days. PHYSICAL THERAPY: Daily Note, Treatment Day: 1st, PM 5/17/2018  INPATIENT: Hospital Day: 4  Payor: CARE IMPROVEMENT PLUS [place] / Product Type: [employer] Medicare /      NAME/AGE/GENDER: Niyah Hall is a 65 y.o. male   PRIMARY DIAGNOSIS: Low back pain, unspecified back pain laterality, unspecified chronicity, with sciatica presence unspecified [M54.5]  Low back pain, unspecified back pain laterality, unspecified chronicity, with sciatica presence unspecified [M54.5] <principal problem not specified> <principal problem not specified>  Procedure(s) (LRB):  IR ANESTHESIA/KYPHOPLASTY L1, L2  ROOM 502 (N/A)     ICD-10: Treatment Diagnosis:    · Generalized Muscle Weakness (M62.81)  · Difficulty in walking, Not elsewhere classified (R26.2)  · Low Back Pain (M54.5)   Precaution/Allergies:  Lisinopril and Pcn [penicillins]      ASSESSMENT:     Mr. Lissett Perez is supine in bed upon contact and agreeable to PT treatment this afternoon. Pt is scheduled for kyphoplasty tomorrow (5/18/18). Pt reported increased pain after ambulating last session and PT recommended exercises in bed. Pt agreeable. Pt performed exercises tolerating up to 15 repetitions this session. Pt requires active assist for heel slides and hip abduction. Pt left supine in bed with all needs met and within reach. No goals met thus far. Pt will need re-evaluation next visit s/p kyphoplasty.      This section established at most recent assessment   PROBLEM LIST (Impairments causing functional limitations):  1. Decreased Strength  2. Decreased Transfer Abilities  3. Decreased Ambulation Ability/Technique  4. Increased Pain  5. Decreased Activity Tolerance  6. Decreased Pacing Skills   INTERVENTIONS PLANNED: (Benefits and precautions of physical therapy have been discussed with the patient.)  1. Bed Mobility  2. Gait Training  3. Therapeutic Activites  4. Therapeutic Exercise/Strengthening  5. Transfer Training     TREATMENT PLAN: Frequency/Duration: 3 times a week for duration of hospital stay  Rehabilitation Potential For Stated Goals: Good     RECOMMENDED REHABILITATION/EQUIPMENT: (at time of discharge pending progress): Due to the probability of continued deficits (see above) this patient will likely need continued skilled physical therapy after discharge. Equipment:    to be determined. HISTORY:   History of Present Injury/Illness (Reason for Referral):  Mr. Omid Holden is a 65 y.o. male admitted on 5/14/2018 with a primary diagnosis of The primary encounter diagnosis was Acute midline low back pain without sciatica. Diagnoses of Multiple myeloma, remission status unspecified ([facility] and Prostate cancer ([facility] were also pertinent to this visit. .       His PMH includes CHF, Afib, hx of defibrillator implant, HTN and hx of prostate, colon, and thyroid cancer with multiple myeloma. He is a patient of Dr. Jadyn Cordova, currently on treatment with Revlimid/Dex which was just started back on 5/4/18 after his thyroidectomy. Pt states he has not started radioactive iodine because it is currently on backorder. He has had some mild fairly long-term back pain attributed to his myeloma. He presented to ED with c/o worsening lower back pain, worse when he woke up this AM.  Denies any changes in bowel or bladder habits. No tingling or numbness in extremities. Xray of lumbar spine with no acute changes - showed known osseous lytic lesions and chronic degenerative changes.   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 He will be admitted for further evaluation and management of his intractable back pain.     Past Medical History/Comorbidities:   Mr. Nuzhat Virgen  has a past medical history of Arrhythmia; Arthritis; BMI 30.0-30.9,adult; Cardiomyopathy ([address] Cholelithiases; Chronic systolic heart failure ([address] (9/13/2011); Gout; H/O: pituitary tumor; High cholesterol; History of thyroid cancer; History of vertigo; Hurthle cell carcinoma of thyroid ([address] Hx of radiation therapy to prostate (2004); Hyperlipidemia (11/18/2015); Hypertension; Hypothyroid; ICD (implantable cardiac defibrillator) in place (9/2011); LBBB (left bundle branch block) (11/18/2015); Malaise and fatigue (11/18/2015); Malignant neoplasm of prostate ([facility] Mass of colon; Multiple myeloma ([facility] [facility] and Sinus node dysfunction ([facility] [facility] Mr. Nuzhat Virgen  has a past surgical history that includes hx heent (2008); hx cholecystectomy (2013); hx gi; hx heart catheterization; hx pacemaker (2011/2016); hx colonoscopy; hx thyroidectomy (2008); hx tumor removal (1990/2010); and hx thyroidectomy (01/26/2018). Social History/Living Environment:   Home Environment: Private residence  # Steps to Enter: 4  One/Two Story Residence: One story  Living Alone: No  Support Systems: Spouse/Significant Other/Partner (however she has back issues herself and can not physically assist him.)  Patient Expects to be Discharged to[de-identified] Unknown  Current DME Used/Available at Home: Cane, straight (he was a walker but it is his mother in laws who is 5 2 and he is over 6 ft. )  Prior Level of Function/Work/Activity:  Has dealt with back pain for many years but never used an assistive device.    age, multiple myeloma   Number of Personal Factors/Comorbidities that affect the Plan of Care: 1-2: MODERATE COMPLEXITY   EXAMINATION:   Most Recent Physical Functioning:   Gross Assessment:                  Posture:     Balance: Bed Mobility:     Wheelchair Mobility:     Transfers:     Gait:            Body Structures Involved:  1. Muscles Body Functions Affected:  1. Movement Related Activities and Participation Affected:  1. Mobility   Number of elements that affect the Plan of Care: 3: MODERATE COMPLEXITY   CLINICAL PRESENTATION:   Presentation: Evolving clinical presentation with changing clinical characteristics: MODERATE COMPLEXITY   CLINICAL DECISION MAKIN [facility] Mobility Inpatient Short Form  How much difficulty does the patient currently have. .. Unable A Lot A Little None   1. Turning over in bed (including adjusting bedclothes, sheets and blankets)? [] 1   [] 2   [x] 3   [] 4   2. Sitting down on and standing up from a chair with arms ( e.g., wheelchair, bedside commode, etc.)   [] 1   [] 2   [x] 3   [] 4   3. Moving from lying on back to sitting on the side of the bed? [] 1   [] 2   [x] 3   [] 4   How much help from another person does the patient currently need. .. Total A Lot A Little None   4. Moving to and from a bed to a chair (including a wheelchair)? [] 1   [] 2   [x] 3   [] 4   5. Need to walk in hospital room? [] 1   [x] 2   [] 3   [] 4   6. Climbing 3-5 steps with a railing? [] 1   [x] 2   [] 3   [] 4   © , Trustees of [address] [place] [address] [facility], under license to [employer]. All rights reserved      Score:  Initial: 16 Most Recent: X (Date: -- )    Interpretation of Tool:  Represents activities that are increasingly more difficult (i.e. Bed mobility, Transfers, Gait). Score 24 23 22-20 19-15 14-10 9-7 6     Modifier CH CI CJ CK CL CM CN      ?  Mobility - Walking and Moving Around:     - CURRENT STATUS: CK - 40%-59% impaired, limited or restricted    - GOAL STATUS: CJ - 20%-39% impaired, limited or restricted    - D/C STATUS:  ---------------To be determined---------------  Payor: CARE IMPROVEMENT PLUS [place] / Product Type: [employer] Medicare /      Medical Necessity:     · Patient is expected to demonstrate progress in functional technique to decrease assistance required with mobility and gait. .  Reason for Services/Other Comments:  · Patient continues to require present interventions due to patient's inability to function at baseline. .   Use of outcome tool(s) and clinical judgement create a POC that gives a: Questionable prediction of patient's progress: MODERATE COMPLEXITY            TREATMENT:   (In addition to Assessment/Re-Assessment sessions the following treatments were rendered)   Pre-treatment Symptoms/Complaints:  2/10 pain  Pain: Initial:   Pain Intensity 1: 2  Post Session:  Increased with exercises 3/10      Therapeutic Exercise: (13 Minutes):  Exercises per grid below to improve mobility and strength. Required minimal visual, verbal, manual and tactile cues to promote proper body alignment, promote proper body posture and promote proper body mechanics. Progressed range and repetitions as indicated. Date:  5/17/18 Date:   Date:     Activity/Exercise Parameters Parameters Parameters   Ankle pumps 15 X  B     Quad set 15 X B      Glute set 15 X B      Heel slides 15  X B AA     Hip abduction 15 X  B AA                       Braces/Orthotics/Lines/Etc:   · O2 Device: Room air  Treatment/Session Assessment:    · Response to Treatment:  fair  · Interdisciplinary Collaboration:   o Physical Therapist  o Registered Nurse  · After treatment position/precautions:   o Supine in bed  o Bed in low position  o Call light within reach  o RN notified   · Compliance with Program/Exercises: Will assess as treatment progresses. · Recommendations/Intent for next treatment session: \"Next visit will focus on advancements to more challenging activities and reduction in assistance provided\".   Total Treatment Duration:  PT Patient Time In/Time Out  Time In: 1535  Time Out: [address]

## 2018-05-18 ENCOUNTER — APPOINTMENT (OUTPATIENT)
Dept: INTERVENTIONAL RADIOLOGY/VASCULAR | Age: 78
DRG: 478 | End: 2018-05-18
Attending: NURSE PRACTITIONER
Payer: MEDICARE

## 2018-05-18 ENCOUNTER — ANESTHESIA (OUTPATIENT)
Dept: SURGERY | Age: 78
DRG: 478 | End: 2018-05-18
Payer: MEDICARE

## 2018-05-18 LAB
MM INDURATION POC: NORMAL MM (ref 0–5)
PPD POC: NORMAL NEGATIVE

## 2018-05-18 PROCEDURE — 74011250636 HC RX REV CODE- 250/636

## 2018-05-18 PROCEDURE — 77030007884 HC KT SPN FX KYPH -I2

## 2018-05-18 PROCEDURE — 74011000250 HC RX REV CODE- 250

## 2018-05-18 PROCEDURE — 0QU03JZ SUPPLEMENT LUMBAR VERTEBRA WITH SYNTHETIC SUBSTITUTE, PERCUTANEOUS APPROACH: ICD-10-PCS | Performed by: RADIOLOGY

## 2018-05-18 PROCEDURE — 74011250637 HC RX REV CODE- 250/637: Performed by: NURSE PRACTITIONER

## 2018-05-18 PROCEDURE — C1713 ANCHOR/SCREW BN/BN,TIS/BN: HCPCS

## 2018-05-18 PROCEDURE — 65270000029 HC RM PRIVATE

## 2018-05-18 PROCEDURE — 77030037400 HC ADH TISS HI VISC EXOFIN CHMP -B

## 2018-05-18 PROCEDURE — 77030021783 HC SYS CEM DEL MEDT -D

## 2018-05-18 PROCEDURE — 0QS03ZZ REPOSITION LUMBAR VERTEBRA, PERCUTANEOUS APPROACH: ICD-10-PCS | Performed by: RADIOLOGY

## 2018-05-18 PROCEDURE — 76060000035 HC ANESTHESIA 2 TO 2.5 HR

## 2018-05-18 PROCEDURE — 77030026361 HC SYR INFL XII KYPH -C

## 2018-05-18 PROCEDURE — 74011000258 HC RX REV CODE- 258: Performed by: RADIOLOGY

## 2018-05-18 PROCEDURE — 74011000250 HC RX REV CODE- 250: Performed by: RADIOLOGY

## 2018-05-18 PROCEDURE — 74011250636 HC RX REV CODE- 250/636: Performed by: ANESTHESIOLOGY

## 2018-05-18 PROCEDURE — 77030021782 HC SYS CEM CART DEL KYPH -C

## 2018-05-18 PROCEDURE — 74011250637 HC RX REV CODE- 250/637: Performed by: RADIOLOGY

## 2018-05-18 PROCEDURE — 74011250636 HC RX REV CODE- 250/636: Performed by: NURSE PRACTITIONER

## 2018-05-18 PROCEDURE — 88305 TISSUE EXAM BY PATHOLOGIST: CPT | Performed by: INTERNAL MEDICINE

## 2018-05-18 PROCEDURE — 0Q903ZX DRAINAGE OF LUMBAR VERTEBRA, PERCUTANEOUS APPROACH, DIAGNOSTIC: ICD-10-PCS | Performed by: RADIOLOGY

## 2018-05-18 PROCEDURE — 88311 DECALCIFY TISSUE: CPT | Performed by: INTERNAL MEDICINE

## 2018-05-18 PROCEDURE — 77030008477 HC STYL SATN SLP COVD -A: Performed by: ANESTHESIOLOGY

## 2018-05-18 PROCEDURE — 77030019940 HC BLNKT HYPOTHRM STRY -B: Performed by: ANESTHESIOLOGY

## 2018-05-18 PROCEDURE — 77030003666 HC NDL SPINAL BD -A

## 2018-05-18 PROCEDURE — 99232 SBSQ HOSP IP/OBS MODERATE 35: CPT | Performed by: INTERNAL MEDICINE

## 2018-05-18 PROCEDURE — 77030019908 HC STETH ESOPH SIMS -A: Performed by: ANESTHESIOLOGY

## 2018-05-18 PROCEDURE — 76210000006 HC OR PH I REC 0.5 TO 1 HR

## 2018-05-18 PROCEDURE — 74011250636 HC RX REV CODE- 250/636: Performed by: RADIOLOGY

## 2018-05-18 PROCEDURE — 77030034849

## 2018-05-18 PROCEDURE — 22514 PERQ VERTEBRAL AUGMENTATION: CPT

## 2018-05-18 PROCEDURE — 88342 IMHCHEM/IMCYTCHM 1ST ANTB: CPT | Performed by: INTERNAL MEDICINE

## 2018-05-18 PROCEDURE — 77030008703 HC TU ET UNCUF COVD -A: Performed by: ANESTHESIOLOGY

## 2018-05-18 PROCEDURE — 77030011218 HC DEV BIOP BN KYPH -C

## 2018-05-18 PROCEDURE — 77030034843 HC TAMP SPN BN INFL EXP II KYPH -H

## 2018-05-18 RX ORDER — ONDANSETRON 2 MG/ML
INJECTION INTRAMUSCULAR; INTRAVENOUS AS NEEDED
Status: DISCONTINUED | OUTPATIENT
Start: 2018-05-18 | End: 2018-05-18 | Stop reason: HOSPADM

## 2018-05-18 RX ORDER — BUPIVACAINE HYDROCHLORIDE 5 MG/ML
5-100 INJECTION, SOLUTION EPIDURAL; INTRACAUDAL ONCE
Status: COMPLETED | OUTPATIENT
Start: 2018-05-18 | End: 2018-05-18

## 2018-05-18 RX ORDER — ROCURONIUM BROMIDE 10 MG/ML
INJECTION, SOLUTION INTRAVENOUS AS NEEDED
Status: DISCONTINUED | OUTPATIENT
Start: 2018-05-18 | End: 2018-05-18 | Stop reason: HOSPADM

## 2018-05-18 RX ORDER — OXYCODONE HYDROCHLORIDE 30 MG/1
30 TABLET ORAL
Qty: 90 TAB | Refills: 0 | Status: SHIPPED | OUTPATIENT
Start: 2018-05-18 | End: 2018-06-25 | Stop reason: SDUPTHER

## 2018-05-18 RX ORDER — BUPIVACAINE HYDROCHLORIDE 5 MG/ML
5-50 INJECTION, SOLUTION EPIDURAL; INTRACAUDAL ONCE
Status: COMPLETED | OUTPATIENT
Start: 2018-05-18 | End: 2018-05-18

## 2018-05-18 RX ORDER — PROPOFOL 10 MG/ML
INJECTION, EMULSION INTRAVENOUS AS NEEDED
Status: DISCONTINUED | OUTPATIENT
Start: 2018-05-18 | End: 2018-05-18 | Stop reason: HOSPADM

## 2018-05-18 RX ORDER — PREDNISONE 20 MG/1
40 TABLET ORAL
Status: DISCONTINUED | OUTPATIENT
Start: 2018-05-19 | End: 2018-05-18

## 2018-05-18 RX ORDER — OXYCODONE HYDROCHLORIDE 5 MG/1
10 TABLET ORAL
Status: DISCONTINUED | OUTPATIENT
Start: 2018-05-18 | End: 2018-05-23 | Stop reason: HOSPADM

## 2018-05-18 RX ORDER — NEOSTIGMINE METHYLSULFATE 1 MG/ML
INJECTION INTRAVENOUS AS NEEDED
Status: DISCONTINUED | OUTPATIENT
Start: 2018-05-18 | End: 2018-05-18 | Stop reason: HOSPADM

## 2018-05-18 RX ORDER — DEXAMETHASONE SODIUM PHOSPHATE 4 MG/ML
INJECTION, SOLUTION INTRA-ARTICULAR; INTRALESIONAL; INTRAMUSCULAR; INTRAVENOUS; SOFT TISSUE AS NEEDED
Status: DISCONTINUED | OUTPATIENT
Start: 2018-05-18 | End: 2018-05-18 | Stop reason: HOSPADM

## 2018-05-18 RX ORDER — NALOXONE HYDROCHLORIDE 0.4 MG/ML
0.1 INJECTION, SOLUTION INTRAMUSCULAR; INTRAVENOUS; SUBCUTANEOUS AS NEEDED
Status: DISCONTINUED | OUTPATIENT
Start: 2018-05-18 | End: 2018-05-23 | Stop reason: HOSPADM

## 2018-05-18 RX ORDER — FENTANYL CITRATE 50 UG/ML
INJECTION, SOLUTION INTRAMUSCULAR; INTRAVENOUS AS NEEDED
Status: DISCONTINUED | OUTPATIENT
Start: 2018-05-18 | End: 2018-05-18 | Stop reason: HOSPADM

## 2018-05-18 RX ORDER — SODIUM CHLORIDE, SODIUM LACTATE, POTASSIUM CHLORIDE, CALCIUM CHLORIDE 600; 310; 30; 20 MG/100ML; MG/100ML; MG/100ML; MG/100ML
INJECTION, SOLUTION INTRAVENOUS
Status: DISCONTINUED | OUTPATIENT
Start: 2018-05-18 | End: 2018-05-18 | Stop reason: HOSPADM

## 2018-05-18 RX ORDER — SODIUM CHLORIDE 0.9 % (FLUSH) 0.9 %
5-10 SYRINGE (ML) INJECTION AS NEEDED
Status: DISCONTINUED | OUTPATIENT
Start: 2018-05-18 | End: 2018-05-23 | Stop reason: HOSPADM

## 2018-05-18 RX ORDER — METAXALONE 800 MG/1
800 TABLET ORAL 3 TIMES DAILY
Status: DISCONTINUED | OUTPATIENT
Start: 2018-05-18 | End: 2018-05-23 | Stop reason: HOSPADM

## 2018-05-18 RX ORDER — LIDOCAINE HYDROCHLORIDE 10 MG/ML
1-20 INJECTION INFILTRATION; PERINEURAL
Status: DISCONTINUED | OUTPATIENT
Start: 2018-05-18 | End: 2018-05-23 | Stop reason: HOSPADM

## 2018-05-18 RX ORDER — LIDOCAINE HYDROCHLORIDE 20 MG/ML
INJECTION, SOLUTION EPIDURAL; INFILTRATION; INTRACAUDAL; PERINEURAL AS NEEDED
Status: DISCONTINUED | OUTPATIENT
Start: 2018-05-18 | End: 2018-05-18 | Stop reason: HOSPADM

## 2018-05-18 RX ORDER — HYDROMORPHONE HYDROCHLORIDE 1 MG/ML
0.5 INJECTION, SOLUTION INTRAMUSCULAR; INTRAVENOUS; SUBCUTANEOUS
Status: DISCONTINUED | OUTPATIENT
Start: 2018-05-18 | End: 2018-05-23 | Stop reason: HOSPADM

## 2018-05-18 RX ORDER — GLYCOPYRROLATE 0.2 MG/ML
INJECTION INTRAMUSCULAR; INTRAVENOUS AS NEEDED
Status: DISCONTINUED | OUTPATIENT
Start: 2018-05-18 | End: 2018-05-18 | Stop reason: HOSPADM

## 2018-05-18 RX ORDER — SODIUM CHLORIDE, SODIUM LACTATE, POTASSIUM CHLORIDE, CALCIUM CHLORIDE 600; 310; 30; 20 MG/100ML; MG/100ML; MG/100ML; MG/100ML
125 INJECTION, SOLUTION INTRAVENOUS CONTINUOUS
Status: DISCONTINUED | OUTPATIENT
Start: 2018-05-18 | End: 2018-05-19

## 2018-05-18 RX ADMIN — ROCURONIUM BROMIDE 50 MG: 10 INJECTION, SOLUTION INTRAVENOUS at 08:27

## 2018-05-18 RX ADMIN — BUPIVACAINE HYDROCHLORIDE 50 MG: 5 INJECTION, SOLUTION EPIDURAL; INTRACAUDAL at 09:45

## 2018-05-18 RX ADMIN — SODIUM CHLORIDE, SODIUM LACTATE, POTASSIUM CHLORIDE, AND CALCIUM CHLORIDE 125 ML/HR: 600; 310; 30; 20 INJECTION, SOLUTION INTRAVENOUS at 12:51

## 2018-05-18 RX ADMIN — ROSUVASTATIN CALCIUM 10 MG: 5 TABLET, FILM COATED ORAL at 22:03

## 2018-05-18 RX ADMIN — HYDROMORPHONE HYDROCHLORIDE 1 MG: 1 INJECTION, SOLUTION INTRAMUSCULAR; INTRAVENOUS; SUBCUTANEOUS at 22:03

## 2018-05-18 RX ADMIN — CHLORHEXIDINE GLUCONATE 15 ML: 1.2 RINSE ORAL at 16:56

## 2018-05-18 RX ADMIN — PROPOFOL 150 MG: 10 INJECTION, EMULSION INTRAVENOUS at 08:27

## 2018-05-18 RX ADMIN — BUPIVACAINE HYDROCHLORIDE 50 MG: 5 INJECTION, SOLUTION EPIDURAL; INTRACAUDAL at 09:28

## 2018-05-18 RX ADMIN — CARVEDILOL 6.25 MG: 6.25 TABLET, FILM COATED ORAL at 17:17

## 2018-05-18 RX ADMIN — GLYCOPYRROLATE 0.3 MG: 0.2 INJECTION INTRAMUSCULAR; INTRAVENOUS at 10:14

## 2018-05-18 RX ADMIN — FENTANYL CITRATE 100 MCG: 50 INJECTION, SOLUTION INTRAMUSCULAR; INTRAVENOUS at 08:25

## 2018-05-18 RX ADMIN — TORSEMIDE 20 MG: 20 TABLET ORAL at 12:52

## 2018-05-18 RX ADMIN — NEOSTIGMINE METHYLSULFATE 3 MG: 1 INJECTION INTRAVENOUS at 10:14

## 2018-05-18 RX ADMIN — LIDOCAINE HYDROCHLORIDE 6 ML: 10 INJECTION, SOLUTION INFILTRATION; PERINEURAL at 09:38

## 2018-05-18 RX ADMIN — ONDANSETRON 4 MG: 2 INJECTION INTRAMUSCULAR; INTRAVENOUS at 10:08

## 2018-05-18 RX ADMIN — POTASSIUM CHLORIDE 20 MEQ: 1500 TABLET, EXTENDED RELEASE ORAL at 17:17

## 2018-05-18 RX ADMIN — POTASSIUM CHLORIDE 20 MEQ: 1500 TABLET, EXTENDED RELEASE ORAL at 12:52

## 2018-05-18 RX ADMIN — VANCOMYCIN HYDROCHLORIDE 500 MG: 1 INJECTION, POWDER, LYOPHILIZED, FOR SOLUTION INTRAVENOUS at 08:40

## 2018-05-18 RX ADMIN — BUPIVACAINE HYDROCHLORIDE 50 MG: 5 INJECTION, SOLUTION EPIDURAL; INTRACAUDAL at 09:12

## 2018-05-18 RX ADMIN — LIDOCAINE HYDROCHLORIDE 6 ML: 10 INJECTION, SOLUTION INFILTRATION; PERINEURAL at 09:20

## 2018-05-18 RX ADMIN — CYANOCOBALAMIN TAB 1000 MCG 1000 MCG: 1000 TAB at 12:52

## 2018-05-18 RX ADMIN — METAXALONE 800 MG: 800 TABLET ORAL at 22:03

## 2018-05-18 RX ADMIN — METAXALONE 800 MG: 800 TABLET ORAL at 16:54

## 2018-05-18 RX ADMIN — LIDOCAINE HYDROCHLORIDE 6 ML: 10 INJECTION, SOLUTION INFILTRATION; PERINEURAL at 09:03

## 2018-05-18 RX ADMIN — LIDOCAINE HYDROCHLORIDE 100 MG: 20 INJECTION, SOLUTION EPIDURAL; INFILTRATION; INTRACAUDAL; PERINEURAL at 08:25

## 2018-05-18 RX ADMIN — SODIUM CHLORIDE, SODIUM LACTATE, POTASSIUM CHLORIDE, CALCIUM CHLORIDE: 600; 310; 30; 20 INJECTION, SOLUTION INTRAVENOUS at 07:31

## 2018-05-18 RX ADMIN — SODIUM CHLORIDE, SODIUM LACTATE, POTASSIUM CHLORIDE, AND CALCIUM CHLORIDE 125 ML/HR: 600; 310; 30; 20 INJECTION, SOLUTION INTRAVENOUS at 23:21

## 2018-05-18 RX ADMIN — DEXAMETHASONE SODIUM PHOSPHATE 4 MG: 4 INJECTION, SOLUTION INTRA-ARTICULAR; INTRALESIONAL; INTRAMUSCULAR; INTRAVENOUS; SOFT TISSUE at 08:40

## 2018-05-18 NOTE — PROCEDURES
Department of Interventional Radiology  [phone]        Interventional Radiology Brief Procedure Note    Patient: Naty Watkins MRN: [id number]  SSN: [id number]    YOB: 1940  Age: 65 y.o. Sex: male      Date of Procedure: 5/18/2018    Pre-Procedure Diagnosis: L1, L2, L3 Vertebral Compression Fractures. Multiple Myeloma, Thyroid Cancer, Colon Cancer, Prostate Cancer. Post-Procedure Diagnosis: SAME    Procedure(s): Core biopsy and Kyphoplasty--L1, L2, L3    Brief Description of Procedure: as above    Performed By: Romeo Villeda MD     Assistants: None    Anesthesia:General    Estimated Blood Loss: Less than 20 ml    Specimens: Bone-Formalin    Implants: See Above    Findings: Soft bone at all three levels. Complications: None    Recommendations: 2 hour bedrest.  Skelaxin for muscle spasm (nonsedating). Ambulate with assistance later today. Physical Therapy consult. Follow Up: Office in 2-4 weeks.       Signed By: Romeo Villeda MD     May 18, 2018

## 2018-05-18 NOTE — PROGRESS NOTES
Primary nurse, Monika, notified that IR will place transport in at 0700 for patient to come to IR for procedure.

## 2018-05-18 NOTE — PROGRESS NOTES
Department of Interventional Radiology  [phone]        Note     Patient: Francisco Swanson MRN: [id number]  SSN: [id number]    YOB: 1940  Age: 65 y.o. Sex: male      Referring Physician: Nnamdi Solomon MD    Consult Date: 5/18/2018     Subjective:     Chief Complaint: Back Pain    History of Present Illness: Francisco Swanson is a 65 y.o. male with chronic low level back pain and an old T12 Vertebral Compression Fracture. He awoke w severe back pain 5/14/18 and was admitted via the ED. CT imaging demonstrates an old T12 VCF, new L1 and L2 VCF since 4/20/18, and probable new VCF at L3 since 4/20/28. Height loss at L3 is less noticeable than at the other levels. Unfortunately, we cannot obtain an MRI due to his defibrillator.         Past Medical History:   Diagnosis Date    Arrhythmia     LBBB    Arthritis     BMI 30.0-30.9,adult     BMI 30.5    Cardiomyopathy ([address]     followed by [facility]    Cholelithiases     Chronic systolic heart failure ([address] 9/13/2011    [place] [employer] class II-III heart failure symptoms    Gout     H/O: pituitary tumor     X2 benign, removed    High cholesterol     History of thyroid cancer     History of vertigo     no recent complications or episodes    Hurthle cell carcinoma of thyroid ([address]     Hx of radiation therapy to prostate 2004    Hyperlipidemia 11/18/2015    Hypertension     Hypothyroid     hypo- had portion of thyroid removed due to cancer    ICD (implantable cardiac defibrillator) in place 9/2011    [employer] BIV/ICD, Gen change 3.2.16    LBBB (left bundle branch block) 11/18/2015    Malaise and fatigue 11/18/2015    Malignant neoplasm of prostate ([address]     Mass of colon     right colon mass    Multiple myeloma ([address]     Sinus node dysfunction ([address]      Past Surgical History:   Procedure Laterality Date    HX CHOLECYSTECTOMY  2013    HX COLONOSCOPY      dx with Right colon mass     HX GI      benign polyps removed    HX HEART CATHETERIZATION      HX HEENT  2008    thyroidetomy partial tumor from pituitary x2    HX PACEMAKER  2011/2016    biotronik biv-icd    HX THYROIDECTOMY  2008    HX THYROIDECTOMY  01/26/2018    completion thyroidectomy    HX TUMOR REMOVAL  1990/2010    pituitary tumor removed X2      Family History   Problem Relation Age of Onset    Heart Disease Sister     Heart Attack Sister     Stroke Mother     Thyroid Disease Neg Hx     Diabetes Neg Hx      Social History   Substance Use Topics    Smoking status: Never Smoker    Smokeless tobacco: Never Used    Alcohol use Yes      Comment: very rare      Allergies   Allergen Reactions    Lisinopril Cough    Pcn [Penicillins] Swelling     Current Facility-Administered Medications   Medication Dose Route Frequency    lidocaine (XYLOCAINE) 10 mg/mL (1 %) injection 1-20 mL  1-20 mL SubCUTAneous Rad Multiple    sodium bicarbonate (4%) (NEUT) injection 1-2 mL  1-2 mL SubCUTAneous ONCE    bupivacaine (PF) (MARCAINE) 0.5 % (5 mg/mL) injection 5-100 mg  5-100 mg SubCUTAneous ONCE    vancomycin (VANCOCIN) 500 mg in 0.9% sodium chloride 100 mL IVPB  500 mg IntraVENous ONCE    oxyCODONE IR (OXY-IR) immediate release tablet 30 mg  30 mg Oral Q4H PRN    polyethylene glycol (MIRALAX) packet 17 g  17 g Oral DAILY PRN    carvedilol (COREG) tablet 6.25 mg  6.25 mg Oral BID WITH MEALS    chlorhexidine (PERIDEX) 0.12 % mouthwash 15 mL  15 mL Swish and Spit BID    cyanocobalamin tablet 1,000 mcg  1,000 mcg Oral DAILY    cyclobenzaprine (FLEXERIL) tablet 10 mg  10 mg Oral TID PRN    levothyroxine (SYNTHROID) tablet 137 mcg  137 mcg Oral ACB    pantoprazole (PROTONIX) tablet 40 mg  40 mg Oral ACB    rosuvastatin (CRESTOR) tablet 10 mg  10 mg Oral QHS    torsemide (DEMADEX) tablet 20 mg  20 mg Oral DAILY    HYDROmorphone (PF) (DILAUDID) injection 1 mg  1 mg IntraVENous Q3H PRN    predniSONE (DELTASONE) tablet 60 mg  60 mg Oral DAILY WITH BREAKFAST    potassium chloride (K-DUR, KLOR-CON) SR tablet 20 mEq  20 mEq Oral BID        Review of Systems:  A detailed 10 organ review of systems is obtained with pertinent positives as listed in the History of Present Illness and Past Medical History. All others are negative. Objective:     Physical Exam:  Visit Vitals    BP (!) 149/94 (BP 1 Location: Left arm, BP Patient Position: At rest)    Pulse 75    Temp 98 °F (36.7 °C)    Resp 16    Ht 6' 2\" (1.88 m)    Wt 85.3 kg (188 lb)    SpO2 96%    BMI 24.14 kg/m2     Lab/Data Review:  BMP: No results found for: NA, K, CL, CO2, AGAP, GLU, BUN, CREA, GFRAA, GFRNA  CMP: No results found for: NA, K, CL, CO2, AGAP, GLU, BUN, CREA, GFRAA, GFRNA, CA, MG, PHOS, ALB, TBIL, TP, ALB, GLOB, AGRAT, SGOT, ALT, GPT  CBC: No results found for: WBC, HGB, HGBEXT, HCT, HCTEXT, PLT, PLTEXT, HGBEXT, HCTEXT, PLTEXT  COAGS: No results found for: APTT, PTP, INR      Assessment/Plan:     Active Problems:    Multiple myeloma (HCC) (1/2/2017)      Intractable pain (1/11/2017)      Compression fracture of lumbar vertebra ([address] (5/15/2018)       I have discussed the technique of Kyphoplasty w Biopsy with Mr Brea Mina this morning. I explained that there are definite fractures at L1 and L2, probable fracture at L3. He would like to proceed with Kyphoplasty at all 3 levels, if possible. I feel that this is a very reasonable approach. We will proceed with Kyphoplasty at L1, L2 and L3 today.   Will discuss with the Anesthesiology Team.     Deann Vega MD

## 2018-05-18 NOTE — DISCHARGE SUMMARY
[facility] Hematology & Oncology: Inpatient Hematology / Oncology Discharge Summary Note    Patient ID:  Arsenio Joe  [id number]  65 y.o.  1940    Admit Date: 5/14/2018    Discharge Date: 5/22/2018    Admission Diagnoses: Low back pain, unspecified back pain laterality, unspecified chronicity, with sciatica presence unspecified [M54.5]  Low back pain, unspecified back pain laterality, unspecified chronicity, with sciatica presence unspecified [M54.5]    Discharge Diagnoses:  Principal Diagnosis: <principal problem not specified>  Active Problems:    Multiple myeloma ([facility] (1/2/2017)      Intractable pain (1/11/2017)      Compression fracture of lumbar vertebra ([facility] (5/15/2018)        Hospital Course:  Mr. Arabella Walters is a 65 y.o. male admitted on 5/14/2018 with a primary diagnosis of The primary encounter diagnosis was Acute midline low back pain without sciatica. Diagnoses of Multiple myeloma, remission status unspecified ([facility] and Prostate cancer ([facility] were also pertinent to this visit. His PMH includes CHF, Afib, hx of defibrillator implant, HTN and hx of prostate, colon, and thyroid cancer with multiple myeloma. He is a patient of Dr. Morro Drake, currently on treatment with Revlimid/Dex which was just started back on 5/4/18 after his thyroidectomy. Pt states he has not started radioactive iodine because it is currently on backorder. He has had some mild fairly long-term back pain attributed to his myeloma. He presented to ED with c/o worsening lower back pain, worse when he woke up the morning of admission. Xray of lumbar spine with no acute changes - showed known osseous lytic lesions and chronic degenerative changes. 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 CT revealed acute to subacute mild central compression fractures of L2 and L3 vertebral bodies and severe b/l neuroforaminal stenosis at L5-S1. IR performed kyphoplasty on 5/18. Pain controlled with Oxycodone. Prior to discharge, pt stated that he had 2 episodes of coughing up blood-tinged sputum. Has been afebrile. Denies chest pain, shortness of breath, or any other symptoms. CXR revealed mild consolidation in RLL. He will be given his first dose of Levaquin before discharge and will continue upon discharge to complete antibx therapy. RN also noted mild bleeding at site of kyphoplasty. IR recommends to hold his Eliquis x 3 days (OK to resume Eliquis on 5/26). He is feeling well and is ready to be discharged to rehab. He will not take Revlimid/Dex while at rehab - OK'd by Dr. Morro Drake. He is scheduled to f/u on 5/29 with Dr. Morro Drake. Advised to call with fever, chills, uncontrollable symptoms, or with any other concerns. Pt verbalizes understanding. Script for oxycodone given to patient. I have reviewed the patient's controlled substance prescription history, as maintained in the [place] prescription monitoring program, so that the prescriptions(s) for a controlled substance can be given. Consults:  IP CONSULT TO INTERVENTIONAL RADIOLOGY    Pertinent Diagnostic Studies:   Labs:    Recent Labs      05/21/18   1038  05/19/18   0414   WBC  5.3  8.1   HGB  13.0*  11.2*   PLT  111*  139*   ANEU  2.8  5.3      Recent Labs      05/21/18   1038  05/19/18   0414   NA  139  143   K  3.8  4.0   CL  99  103   CO2  33*  33*   GLU  109*  86   BUN  29*  31*   CREA  1.25  1.25   CA  9.5  9.0   SGOT  46*  38*   AP  116  111   TP  7.1  6.2*   ALB  2.8*  2.6*   MG   --   1.8       Imaging:  XR CHEST PA LAT [[id number]] Collected: 05/22/18 1355      Order Status: Completed Updated: 05/22/18 8073     Narrative:       AP LATERAL CHEST X-RAY    HISTORY: Cough    COMPARISON: December 11, 2017    FINDINGS: A cardiac defibrillator device is present. The cardiac silhouette  appears enlarged. Treated upper lumbar compression deformities are present.   There is minimal consolidation in the right lower lobe. Pleural spaces are  clear.       Impression:       IMPRESSION: Minimal consolidation in the right lower lobe.     IR KYPHOPLASTY LUMBAR [[id number]] Collected: 05/18/18 1048     Order Status: Completed Updated: 05/18/18 1058     Narrative:       Title: L1, L2, and L3 Kyphoplasty combined with bone biopsy. History: Very pleasant 65year-old gentleman with painful L1, L2, and L3  vertebral compression fractures.  There is no significant radiation of pain. Recent CT scan performed 5/14/2018 was compared with prior CT scans (4/20/2018)  and demonstrates what is most likely acute fractures at L1, L2, and L3. Vertical height loss at L1 and L2 is significant, height loss at L3 was less  prominent.  However, given the overall moth-eaten appearance of the bones, and  the extent of the back pain, I have recommended treating all 3 levels  simultaneously.  After discussing the options, the patient requests 3 level  treatment today, as well. Lizbeth Pickard also agrees to multilevel bone biopsy. Patient's history is significant for multiple myeloma, thyroid cancer, colon  cancer, and prostate cancer. Lizbeth Pickard has had chronic low back pain secondary to the  multiple myeloma, however this is significantly worsened 5/14/2018 to the point  that he is unable to ambulate.      Consent: Informed written and oral consent was obtained from Mr. Santa Patel after  explanation of benefits and risks (including, but not limited to: Infection,  Hemorrhage, Nerve injury, Paralysis).  The patient's questions were answered to  his satisfaction. Lizbeth Pickard stated understanding and requested that we proceed. : Stefanie Moulton MD, I personally performed the entire procedure.     Procedure:  Following the successful induction of general endotracheal  anesthesia, a transurethral Cespedes catheter was placed.  The patient was then  placed prone on the fluoroscopy table.  All contact points were appropriately  padded.  Maximal sterile barrier technique (including:  cap, mask, sterile gown,  sterile gloves, sterile sheet, hand hygiene, and chlorhexidene for cutaneous  antisepsis) were used. With the patient prone, the skin of the back was prepped  and draped in the standard sterile fashion. Local field block was achieved with  1% lidocaine. Using fluoroscopy, the Kyphon trocar was advanced through the right pedicle to  the anterior portion of the L1 vertebral body. In an identical fashion, the left  pedicle at L1 was traversed using the trocar.  Core biopsies were obtained from  both trochars.  15 mm Bone Tamps were inflated to form an intravertebral void,  and to attempt to restore vertebral body height.      In identical fashion, right and left transpedicular trochars were placed at L2  and at L3.  Core biopsies were obtained from both trochars at both of these  levels.  15 mm bone tamps were inflated through each of these trochars. Once the cement was mixed and ready, the Bone Tamps at L1 were removed and  cement was injected through both transpedicular trocars under direct  fluoroscopy.  Similarly, cement was administered at L2 and then L3. All trocars were removed without incident. Hemostasis was achieved with gentle  manual compression. Dermabond was applied. Complications: None. Radiation Exposure Indices:  Reference Air Kerma (Ka,r) = 357 mGy  Dose Area Product/Kerma Area Product (DAP/WILLIAM/PKA) = 2892.0 cGy-cm2  Fluoroscopy Exposure Time = 9 minutes 24 seconds  Fluorographic Images = 2     Contrast:  0 milliliters. Medications: General endotracheal anesthesia. Continuous pulse oximetry, heart  rate, and blood pressure monitoring was performed with an independent, trained  observer present.  Vancomycin 500 mg IV surgical prophylaxis.     Findings: Old compression deformity at T12.  New or compression deformities of  the L1, L2, and L3 vertebral body.  L1 and L2 were not the most notable anterior  wedge-shaped compressions.  Successful cement administration.  No cement  extravasation.       Impression:       Impression: Successful L1, L2, L3 vertebral body core biopsy and balloon  Kyphoplasty. Plan: The patient will remain at bedrest for 2 hours with his head at  approximately 30-45 degrees elevation. The patient will be returned to his  hospital room.  I have requested a physical therapy consultation to help with  strengthening and ambulation.  I have ordered Skelaxin 800 mg by mouth 3 times a  day. Recommend return to clinic in 2-4 weeks for followup evaluation.          CT SPINE Cristofer Gonzalez CONT [[id number]] Collected: 05/14/18 1632     Order Status: Completed Updated: 05/14/18 1657     Addenda:         Addendum:   DC4 The significant findings in this report have been referred to the Imaging Navigator in order to communicate to the referring provider or his/her   designee as outlined in Section II.C.2.a.ii-iii of the ACR Practice Guideline for Communication of Diagnostic Imaging Findings.       Signed: 05/14/18 1655 by Reynaldo Mtz MD     Narrative:       Exam:  CT lumbar spine with contrast    History: worsening back pain; hx of multiple myeloma, 77 years Male severe back  pain    Technique: Standard departmental protocol CT of the lumbar spine was performed  after uneventful intravenous administration of 100 cc of nonionic IV contrast.   Coronal and sagittal reformats were obtained.  Radiation dose reduction  techniques were used for this study:  Our CT scanners use one or all of the  following: Automated exposure control, adjustment of the mA and/or kVp according  to patient's size, iterative reconstruction.     Comparison: CT torso April 20, 2018    Findings: Margaretha Jews is diffuse narrowing the spinal canal, consistent with  congenital spinal canal stenosis.  Normal alignment.  There appears to be  partial fusion of the L4 and L5 vertebral bodies.  There are anterior bridging  osteophytes at L5-S1.  Mild central loss of vertebral body height is seen with  superior endplate sclerosis of the L2 and L3 vertebral bodies, these could  represent mild acute to subacute compression fractures, and appear possibly new  since the prior CT torso.  Moderate anterior compression deformity of the T12  vertebral body with anterior bridging osteophytes appears unchanged.  A large  posterior disc osteophyte complex is seen at L4-L5 causing mild spinal canal  narrowing measuring 12 mm in AP dimension.  A large posterior disc osteophyte  complex is seen at L5-S1 without significant spinal canal stenosis.  However  there appears to be severe bilateral neuroforaminal stenosis at this level.  No  evidence of significant spinal canal stenosis.  Normal mineralization. Visualized prevertebral and paravertebral soft tissues unremarkable.       Impression:       Impression:  1.  Suspect acute to subacute mild central compression fractures of the L2 and  L3 vertebral bodies. 2.  Severe bilateral neuroforaminal stenosis at L5-S1.     CT SPINE [place] WO CONT [[id number]]      Order Status: Canceled      XR SPINE LUMB 2 OR 3 V [[id number]] Collected: 05/14/18 0847     Order Status: Completed Updated: 05/14/18 0852     Narrative:       LUMBAR SPINE RADIOGRAPHS, 5/14/2018    CLINICAL HISTORY:  Chronic low back pain which is severe for the past 2 days. TECHNIQUE: AP and lateral views of the lumbar spine with coned down view of the  lumbosacral junction. Comparison: CT abdomen 4/20/2018    FINDINGS:  Five lumbar type vertebrae are visualized.  Alignment is maintained at all  levels.  Vertebral body height is maintained at all levels in the lumbar spine. A moderate anterior compression deformity is seen at T12. However, this does not  demonstrate clear acute features in a similar compression deformity was seen on  the prior CT scan and therefore this is not felt to be acute. Fusion is once  again seen between the L4 and L5 vertebral bodies.  The posterior elements,  transverse processes, and sacroiliac joints are intact. Mild to moderate  discogenic degenerative changes are seen throughout the lumbar spine. Moderate  facet hypertrophic changes are seen in the lower lumbar spine. There is direct  apposition of spinous processes with mild intervening sclerotic changes in the  mid and lower lumbar spine suggesting a Baastrup's deformity which can be a  source of chronic pain. Irregular lucency is seen likely representing the  patient's lytic bony disease which was better assessed by CT and described as  stable on the recent CT scan. The patient is status post cholecystectomy.       Impression:       IMPRESSION:  1.   No acute changes evident by plain film imaging. 2.  Irregular lucency likely representing the patient's known osseous lytic  metastatic disease. 3.  Chronic degenerative changes as described above. 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 These can be  sources of chronic pain.         XR PELV AP ONLY [[id number]] Collected: 05/14/18 0843     Order Status: Completed Updated: 05/14/18 0849     Narrative:       Pelvis single view 5/14/2018    CLINICAL HISTORY: Acute on chronic low back pain and bilateral hip pain. Unable  to ambulate. More severe for 2 days. History of multiple myeloma. COMPARISON: CT the pelvis 4/20/2018. FINDINGS:  2 frontal views of the pelvis are submitted for evaluation. The lumbar spine is  partially included in the field of imaging and will be described in a separate  report of lumbar spine x-rays obtained. No abnormal widening is seen of the  sacroiliac joints or pubic symphysis. No acute fracture line is seen of the  pelvic ring. No definite acute fracture is seen of the proximal hips although  dedicated imaging should be performed if hip fracture is strongly suspected. Multiple lucent lesions are seen.  These are most clearly demonstrated in the  left parasymphyseal pubic bone and right inferior pubic ramus. These likely  represent the lytic metastatic disease described on the prior CT scan of the  pelvis which was described as not significantly changed. Moderate degenerative  changes are seen of the left hip mainly consisting of joint space loss with some  superior acetabular hypertrophy.       Impression:       IMPRESSION:  1. Lytic osseous metastatic disease without acute osseous changes appreciated. 2. Moderate chronic degenerative changes of the left hip. Current Discharge Medication List      START taking these medications    Details   levoFLOXacin (LEVAQUIN) 750 mg tablet Take 1 Tab by mouth every twenty-four (24) hours for 6 days. Qty: 6 Tab, Refills: 0         CONTINUE these medications which have CHANGED    Details   oxyCODONE IR (ROXICODONE) 30 mg immediate release tablet Take 1 Tab by mouth every four (4) hours as needed for Pain. Max Daily Amount: 180 mg.  Qty: 90 Tab, Refills: 0    Associated Diagnoses: Closed compression fracture of L2 lumbar vertebra with routine healing, subsequent encounter; Multiple myeloma, remission status unspecified ([facility]         CONTINUE these medications which have NOT CHANGED    Details   apixaban (ELIQUIS) 5 mg tablet Take 5 mg by mouth two (2) times a day. polyethylene glycol (MIRALAX) 17 gram packet Take 17 g by mouth daily. amino ac/whey prot conc, isol (WHEY PROTEIN PO) Take  by mouth. cyclobenzaprine (FLEXERIL) 10 mg tablet Take 1 Tab by mouth three (3) times daily as needed for Muscle Spasm(s). Qty: 90 Tab, Refills: 0    Associated Diagnoses: Acute right-sided low back pain without sciatica; Multiple myeloma not having achieved remission (HCC)      levothyroxine (SYNTHROID) 137 mcg tablet Take 137 mcg by mouth Daily (before breakfast).   Qty: 90 Tab, Refills: 3    Associated Diagnoses: Postsurgical hypothyroidism      carvedilol (COREG) 6.25 mg tablet Take 1 Tab by mouth two (2) times daily (with meals). Qty: 60 Tab, Refills: 0      torsemide (DEMADEX) 20 mg tablet Take 1 Tab by mouth daily. Indications: Edema, Pulmonary Edema due to Chronic Heart Failure  Qty: 30 Tab, Refills: 11    Associated Diagnoses: Chronic systolic heart failure ([address] Localized edema      ibuprofen (ADVIL) 200 mg tablet Take  by mouth. chlorhexidine (PERIDEX) 0.12 % solution 15 mL by Swish and Spit route two (2) times a day. omeprazole (PRILOSEC) 20 mg capsule Take 20 mg by mouth daily. cyanocobalamin (VITAMIN B-12) 1,000 mcg sublingual tablet Take 1,000 mcg by mouth daily. cholecalciferol, vitamin D3, (VITAMIN D3) 2,000 unit tab Take  by mouth. rosuvastatin (CRESTOR) 10 mg tablet Take 10 mg by mouth nightly. STOP taking these medications       lenalidomide (REVLIMID) 10 mg cap Comments:   Reason for Stopping:         dexamethasone (DECADRON) 4 mg tablet Comments:   Reason for Stopping:                   OBJECTIVE:  Patient Vitals for the past 8 hrs:   BP Temp Pulse Resp SpO2   18 1125 125/80 97.5 °F (36.4 °C) 60 18 96 %   18 1047 - - - - 94 %   18 0748 124/79 97.5 °F (36.4 °C) 81 18 96 %     Temp (24hrs), Av.5 °F (36.4 °C), Min:97.3 °F (36.3 °C), Max:97.7 °F (36.5 °C)     0701 -  1900  In: 200 [P.O.:980]  Out: 250 [Urine:250]    Physical Exam:  Constitutional: Well developed, well nourished male in no acute distress, sitting comfortably on the hospital bed. HEENT: Normocephalic and atraumatic. Oropharynx is clear, mucous membranes are moist.  Extraocular muscles are intact. Sclerae anicteric. Neck supple without JVD. No thyromegaly present. Lymph node   Deferred   Skin Warm and dry. No bruising and no rash noted. No erythema. No pallor. Respiratory Lungs are clear to auscultation bilaterally without wheezes, rales or rhonchi, normal air exchange without accessory muscle use. CVS Normal rate, regular rhythm and normal S1 and S2.   No murmurs, gallops, or rubs.   Abdomen Soft, nontender and nondistended, normoactive bowel sounds. No palpable mass. No hepatosplenomegaly. Neuro Grossly nonfocal with no obvious sensory or motor deficits. MSK Normal range of motion in general.  No edema and no tenderness. Psych Appropriate mood and affect. ASSESSMENT:    Active Problems:    Multiple myeloma ([facility] (1/2/2017)      Intractable pain (1/11/2017)      Compression fracture of lumbar vertebra ([facility] (5/15/2018)        DISPOSITION:  Follow-up Appointments   Procedures    FOLLOW UP VISIT Appointment in: One Week Please schedule f/u with Dr. Mary Clay within 1 week     Please schedule f/u with Dr. Mary Clay within 1 week     Standing Status:   Standing     Number of Occurrences:   1     Order Specific Question:   Appointment in     Answer: One Week           Over 30 minutes was spent in discharge planning and coordination of care.             Tyler Sewell NP  [facility]  [address] [address]  Office : [phone]  Fax : [phone]

## 2018-05-18 NOTE — PROGRESS NOTES
[facility] Hematology & Oncology        Inpatient Hematology / Oncology Progress Note      Admission Date: 2018  7:42 AM  Reason for Admission/Hospital Course: Low back pain, unspecified back pain laterality, unspecified chronicity, with sciatica presence unspecified [M54.5]  Low back pain, unspecified back pain laterality, unspecified chronicity, with sciatica presence unspecified [M54.5]      24 Hour Events:  Afebrile, VSS  S/p kyphoplasty this AM  Wife at bedside    ROS:  Constitutional: +weakness; negative for fever, chills. CV: Negative for chest pain, palpitations, edema. Respiratory: Negative for dyspnea, cough, wheezing. GI: Negative for nausea, abdominal pain, diarrhea. MSK: +lower back pain    10 point review of systems is otherwise negative with the exception of the elements mentioned above in the HPI. Allergies   Allergen Reactions    Lisinopril Cough    Pcn [Penicillins] Swelling       OBJECTIVE:  Patient Vitals for the past 8 hrs:   BP Temp Pulse Resp SpO2 Height Weight   18 1145 155/80 97.6 °F (36.4 °C) 70 16 95 % - -   18 1108 166/88 - 69 16 96 % - -   18 1103 167/88 98.3 °F (36.8 °C) 70 16 95 % - -   18 1058 157/79 - 69 16 96 % - -   18 1053 158/79 - 69 16 96 % - -   18 1048 (!) 171/93 - 69 16 97 % - -   18 1043 (!) 168/91 - 70 14 100 % - -   18 1039 (!) 166/94 - 69 16 97 % - -   18 1035 179/89 98.2 °F (36.8 °C) 69 12 98 % - -   18 0705 (!) 149/94 98 °F (36.7 °C) 75 16 96 % 6' 2\" (1.88 m) 188 lb (85.3 kg)     Temp (24hrs), Av.9 °F (36.6 °C), Min:97.6 °F (36.4 °C), Max:98.3 °F (36.8 °C)    701 - 1900  In: 500 [I.V.:500]  Out: 15     Physical Exam:  Constitutional: Well developed, well nourished male in no acute distress, lying in the hospital bed. HEENT: Normocephalic and atraumatic. Oropharynx is clear, mucous membranes are moist.  Extraocular muscles are intact.   Sclerae anicteric.           Skin Warm and dry. No bruising and no rash noted. No erythema. No pallor. Respiratory Lungs are clear to auscultation bilaterally without wheezes, rales or rhonchi, normal air exchange without accessory muscle use. CVS Normal rate, regular rhythm and normal S1 and S2. No murmurs, gallops, or rubs. Abdomen Soft, nontender and nondistended, normoactive bowel sounds. Neuro Grossly nonfocal with no obvious sensory or motor deficits. MSK Normal range of motion in general.  No edema and no tenderness. Psych Appropriate mood and affect.           Labs:      No results for input(s): WBC, RBC, HGB, HCT, MCV, MCH, MCHC, RDW, PLT, GRANS, LYMPH, MONOS, EOS, BASOS, IG, DF, ANEU, ABL, ABM, REA, ABB, AIG, HGBEXT, HCTEXT, PLTEXT, HGBEXT, HCTEXT, PLTEXT in the last 72 hours. No lab exists for component: MPV     No results for input(s): NA, K, CL, CO2, AGAP, GLU, BUN, CREA, BUCR, GFRAA, GFRNA, CA, TBIL, GPT, SGOT, AP, TP, ALB, GLOB, AGRAT, MG, PHOS in the last 72 hours.       Imaging:  CT SPINE Sirisha Asa CONT [[id number]] Collected: 05/14/18 1632      Order Status: Completed Updated: 05/14/18 1657     Addenda:         Addendum:   DC4 The significant findings in this report have been referred to the Imaging Navigator in order to communicate to the referring provider or his/her   designee as outlined in Section II.C.2.a.ii-iii of the ACR Practice Guideline for Communication of Diagnostic Imaging Findings.       Signed: 05/14/18 1655 by Hannah Pineda MD     Narrative:       Exam:  CT lumbar spine with contrast    History: worsening back pain; hx of multiple myeloma, 77 years Male severe back  pain    Technique: Standard departmental protocol CT of the lumbar spine was performed  after uneventful intravenous administration of 100 cc of nonionic IV contrast.   Coronal and sagittal reformats were obtained.  Radiation dose reduction  techniques were used for this study:  Our CT scanners use one or all of the  following: Automated exposure control, adjustment of the mA and/or kVp according  to patient's size, iterative reconstruction. Comparison: CT torso April 20, 2018    Findings: Shakira Bryan is diffuse narrowing the spinal canal, consistent with  congenital spinal canal stenosis.  Normal alignment.  There appears to be  partial fusion of the L4 and L5 vertebral bodies.  There are anterior bridging  osteophytes at L5-S1.  Mild central loss of vertebral body height is seen with  superior endplate sclerosis of the L2 and L3 vertebral bodies, these could  represent mild acute to subacute compression fractures, and appear possibly new  since the prior CT torso.  Moderate anterior compression deformity of the T12  vertebral body with anterior bridging osteophytes appears unchanged.  A large  posterior disc osteophyte complex is seen at L4-L5 causing mild spinal canal  narrowing measuring 12 mm in AP dimension.  A large posterior disc osteophyte  complex is seen at L5-S1 without significant spinal canal stenosis.  However  there appears to be severe bilateral neuroforaminal stenosis at this level.  No  evidence of significant spinal canal stenosis.  Normal mineralization. Visualized prevertebral and paravertebral soft tissues unremarkable.       Impression:       Impression:  1.  Suspect acute to subacute mild central compression fractures of the L2 and  L3 vertebral bodies. 2.  Severe bilateral neuroforaminal stenosis at L5-S1.     CT SPINE [facility] WO CONT [[id number]]      Order Status: Canceled      XR SPINE LUMB 2 OR 3 V [[id number]] Collected: 05/14/18 0847     Order Status: Completed Updated: 05/14/18 0852     Narrative:       LUMBAR SPINE RADIOGRAPHS, 5/14/2018    CLINICAL HISTORY:  Chronic low back pain which is severe for the past 2 days. TECHNIQUE: AP and lateral views of the lumbar spine with coned down view of the  lumbosacral junction.     Comparison: CT abdomen 4/20/2018    FINDINGS:  Five lumbar type vertebrae are visualized.  Alignment is maintained at all  levels.  Vertebral body height is maintained at all levels in the lumbar spine. A moderate anterior compression deformity is seen at T12. However, this does not  demonstrate clear acute features in a similar compression deformity was seen on  the prior CT scan and therefore this is not felt to be acute. Fusion is once  again seen between the L4 and L5 vertebral bodies. The posterior elements,  transverse processes, and sacroiliac joints are intact. Mild to moderate  discogenic degenerative changes are seen throughout the lumbar spine. Moderate  facet hypertrophic changes are seen in the lower lumbar spine. There is direct  apposition of spinous processes with mild intervening sclerotic changes in the  mid and lower lumbar spine suggesting a Baastrup's deformity which can be a  source of chronic pain. Irregular lucency is seen likely representing the  patient's lytic bony disease which was better assessed by CT and described as  stable on the recent CT scan. The patient is status post cholecystectomy.       Impression:       IMPRESSION:  1.   No acute changes evident by plain film imaging. 2.  Irregular lucency likely representing the patient's known osseous lytic  metastatic disease. 3.  Chronic degenerative changes as described above. 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 These can be  sources of chronic pain.         XR PELV AP ONLY [[id number]] Collected: 05/14/18 0843     Order Status: Completed Updated: 05/14/18 0849     Narrative:       Pelvis single view 5/14/2018    CLINICAL HISTORY: Acute on chronic low back pain and bilateral hip pain. Unable  to ambulate. More severe for 2 days. History of multiple myeloma. COMPARISON: CT the pelvis 4/20/2018. FINDINGS:  2 frontal views of the pelvis are submitted for evaluation.  The lumbar spine is  partially included in the field of imaging and will be described in a separate  report of lumbar spine x-rays obtained. No abnormal widening is seen of the  sacroiliac joints or pubic symphysis. No acute fracture line is seen of the  pelvic ring. No definite acute fracture is seen of the proximal hips although  dedicated imaging should be performed if hip fracture is strongly suspected. Multiple lucent lesions are seen. These are most clearly demonstrated in the  left parasymphyseal pubic bone and right inferior pubic ramus. These likely  represent the lytic metastatic disease described on the prior CT scan of the  pelvis which was described as not significantly changed. Moderate degenerative  changes are seen of the left hip mainly consisting of joint space loss with some  superior acetabular hypertrophy.       Impression:       IMPRESSION:  1. Lytic osseous metastatic disease without acute osseous changes appreciated. 2. Moderate chronic degenerative changes of the left hip.          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  ICD-9-CM: 227.3  3/15/2018        Thyroid mass ICD-10-CM: E07.9  ICD-9-CM: 246.9  2/8/2018        Sinus bradycardia ICD-10-CM: R00.1  ICD-9-CM: 427.89  1/12/2018        PAF (paroxysmal atrial fibrillation) ([facility] ICD-10-CM: I48.0  ICD-9-CM: 427.31  11/26/2017        Chronic systolic CHF (congestive heart failure) ([facility] ICD-10-CM: I50.22  ICD-9-CM: 428.22, 428.0  11/26/2017        Pain ICD-10-CM: R52  ICD-9-CM: 780.96  11/20/2017        MARY (acute kidney injury) ([facility] ICD-10-CM: N17.9  ICD-9-CM: 584.9  11/20/2017 Acute renal failure (ARF) ([facility] ICD-10-CM: N17.9  ICD-9-CM: 584.9  11/18/2017        Intractable pain ICD-10-CM: R52  ICD-9-CM: 780.96  1/11/2017        Acute kidney injury [facility]) ICD-10-CM: N17.9  ICD-9-CM: 584.9  1/11/2017        Pancytopenia ([place] [address].) ICD-10-CM: T73.992  ICD-9-CM: 284.19  1/11/2017        Constipation ICD-10-CM: K59.00  ICD-9-CM: 564.00  1/11/2017        Multiple myeloma ([place] [address].) ICD-10-CM: C90.00  ICD-9-CM: 203.00  1/2/2017        Postural imbalance ICD-10-CM: R29.3  ICD-9-CM: 729.90  12/14/2016        Polyneuropathy ICD-10-CM: G62.9  ICD-9-CM: 356.9  12/14/2016        Fall ICD-10-CM: Z86. Yocasta Belfast  ICD-9-CM: E888.9  12/14/2016        Encounter for medication management ICD-10-CM: Z79.899  ICD-9-CM: V58.69  12/14/2016        Urinary retention ICD-10-CM: R33.9  ICD-9-CM: 788.20  6/6/2016        Colonic mass ICD-10-CM: K63.9  ICD-9-CM: 569.89  3/23/2016        Hyperlipidemia ICD-10-CM: E78.5  ICD-9-CM: 272.4  11/18/2015        Vertigo ICD-10-CM: A27  ICD-9-CM: 780.4  11/18/2015        Malaise and fatigue ICD-10-CM: R53.81, R53.83  ICD-9-CM: 780.79  11/18/2015        Cardiomyopathy ([place] [address].) ICD-10-CM: I42.9  ICD-9-CM: 425.4  11/18/2015        LBBB (left bundle branch block) ICD-10-CM: I44.7  ICD-9-CM: 426.3  11/18/2015        Arthritis ICD-10-CM: M19.90  ICD-9-CM: 716.90  Unknown        Arrhythmia ICD-10-CM: I49.9  ICD-9-CM: 427.9  Unknown    Overview Signed 6/18/2013  2:07 PM by Gertrude Milner MD     LBBB             Cholelithiases ICD-10-CM: E09.28  ICD-9-CM: 574.20  Unknown        Hx of radiation therapy to prostate ICD-10-CM: Z92.3  ICD-9-CM: V15.3  Unknown        Chronic systolic heart failure ([facility] ICD-10-CM: I50.22  ICD-9-CM: 428.22  9/13/2011        Automatic implantable cardioverter-defibrillator in situ ICD-10-CM: Z95.810  ICD-9-CM: V45.02  9/1/2011            Mr. Leo Caruso is a 65 y.o. male admitted on 5/14/2018 with a primary diagnosis of intractable back pain.  His PMH includes CHF, Afib, hx of defibrillator implant, HTN and hx of prostate, colon, and thyroid cancer with multiple myeloma. He is a patient of Dr. Katy Hendrix, currently on treatment with Revlimid/Dex which was just started back on 5/4/18 after his thyroidectomy. Pt states he has not started radioactive iodine because it is currently on backorder. He has had some mild fairly long-term back pain attributed to his myeloma. He presented to ED with c/o worsening lower back pain, worse when he woke up this AM.  Denies any changes in bowel or bladder habits. No tingling or numbness in extremities. Xray of lumbar spine with no acute changes - showed known osseous lytic lesions and chronic degenerative changes. 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 PLAN:  Hx of prostate, colon, and thyroid cancers with multiple myeloma  - currently on treatment with Revlimid/Dex  - s/p thyroidectomy, waiting to start on radioactive iodine (currently on backorder per pt)  5/17 MM labs- Diller Free light chains 8.70/Lambda 203.15  Kappa/Lambda 0.4. M Ok 0.80. IgG 1153 IgA 15  IgM 16     Intractable lower back pain  - Xray of lumbar spine with no acute changes - showed known osseous lytic lesions and chronic degenerative changes. 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 - Pt unable to have MRI d/t implanted defibrillator. CT L-spine ordered. - Dilaudid ordered PRN  - Pulse steroid dosing  5/15 CT revealed acute to subacute mild central compression fractures of L2 and L3 vertebral bodies and severe b/l neuroforaminal stenosis at L5-S1. IR consulted for kyphoplasty. Currently on prednisone 60mg. 5/17 IR Kyphoplasty tomorrow.  Increase Oxy IR 30 mg q 4.   5/18 s/p Kyphoplasty this AM.     Continue home meds  Steven SOPs  On Eliquis  Possible discharge home tomorrow with [address]imes, POLI [employer] Hematology & Oncology  [address] [place] [id number] [facility]  Office : [phone]  Fax : [phone]

## 2018-05-18 NOTE — PERIOP NOTES
TRANSFER - OUT REPORT:    Verbal report given to Joseph Grimes on [employer]  being transferred to [facility] for routine post - op       Report consisted of patients Situation, Background, Assessment and   Recommendations(SBAR). Information from the following report(s) SBAR, OR Summary, Procedure Summary, Intake/Output and MAR was reviewed with the receiving nurse. Lines:   Peripheral IV 05/14/18 Right Antecubital (Active)   Site Assessment Clean, dry, & intact 5/18/2018 10:35 AM   Phlebitis Assessment 0 5/18/2018 10:35 AM   Infiltration Assessment 0 5/18/2018 10:35 AM   Dressing Status Clean, dry, & intact; Occlusive 5/18/2018 10:35 AM   Dressing Type Transparent;Tape 5/18/2018 10:35 AM   Hub Color/Line Status Green;Capped 5/18/2018 10:35 AM   Alcohol Cap Used No 5/18/2018 10:35 AM       Peripheral IV 05/18/18 Right Forearm (Active)   Site Assessment Clean, dry, & intact 5/18/2018 10:35 AM   Phlebitis Assessment 0 5/18/2018 10:35 AM   Infiltration Assessment 0 5/18/2018 10:35 AM   Dressing Status Clean, dry, & intact; Occlusive 5/18/2018 10:35 AM   Dressing Type Transparent;Tape 5/18/2018 10:35 AM   Hub Color/Line Status Green; Infusing 5/18/2018 10:35 AM   Alcohol Cap Used No 5/18/2018 10:35 AM        Opportunity for questions and clarification was provided. Patient transported with:   O2 @ 2 liters    VTE prophylaxis orders have been written for [employer]. Patient and family given floor number and nurses name. Family updated re: pt status after security code verified.

## 2018-05-18 NOTE — PROGRESS NOTES
Problem: Falls - Risk of  Goal: *Absence of Falls  Document Bee Fall Risk and appropriate interventions in the flowsheet. Outcome: Progressing Towards Goal  Fall Risk Interventions:  Mobility Interventions: Communicate number of staff needed for ambulation/transfer, Patient to call before getting OOB         Medication Interventions: Patient to call before getting OOB, Teach patient to arise slowly    Elimination Interventions: Call light in reach             Problem: Pressure Injury - Risk of  Goal: *Prevention of pressure injury  Document Mervin Scale and appropriate interventions in the flowsheet. Outcome: Progressing Towards Goal  Pressure Injury Interventions:             Activity Interventions: Pressure redistribution bed/mattress(bed type)    Mobility Interventions: Pressure redistribution bed/mattress (bed type)    Nutrition Interventions: Document food/fluid/supplement intake

## 2018-05-18 NOTE — PROGRESS NOTES
IR Nurse Pre-Procedure Checklist Part 2          Consent signed: Yes    H&P complete:  Yes    Antibiotics: Yes    Airway/Mallampati Done: Yes    Shaved: Not applicable    Pregnancy Form:Not applicable    Patient Position: Yes    MD Side: Yes     Biopsy Worksheet: Yes    Specimen Medium: Yes

## 2018-05-18 NOTE — PROGRESS NOTES
TRANSFER - IN REPORT:    Verbal report received from Cody Jimenez on [facility] [employer]  being received from IR for ordered procedure      Report consisted of patients Situation, Background, Assessment and   Recommendations(SBAR). Information from the following report(s) SBAR was reviewed with the receiving nurse. Opportunity for questions and clarification was provided. Assessment completed upon patients arrival to unit and care assumed.

## 2018-05-18 NOTE — ANESTHESIA POSTPROCEDURE EVALUATION
Post-Anesthesia Evaluation and Assessment    Patient: Guilherme Juarez MRN: [id number]  SSN: [id number]    YOB: 1940  Age: 65 y.o. Sex: male       Cardiovascular Function/Vital Signs  Visit Vitals    /88    Pulse 69    Temp 36.8 °C (98.3 °F)    Resp 16    Ht 6' 2\" (1.88 m)    Wt 85.3 kg (188 lb)    SpO2 96%    BMI 24.14 kg/m2       Patient is status post general anesthesia for Procedure(s):  IR ANESTHESIA/KYPHOPLASTY L1, L2 /   ROOM 502. Nausea/Vomiting: None    Postoperative hydration reviewed and adequate. Pain:  Pain Scale 1: Numeric (0 - 10) (05/18/18 1103)  Pain Intensity 1: 0 (05/18/18 1103)   Managed    Neurological Status:   Neuro (WDL): Within Defined Limits (05/18/18 1103)  Neuro  Neurologic State: Alert (05/17/18 2040)  Cognition: Appropriate decision making; Appropriate for age attention/concentration; Appropriate safety awareness; Follows commands (05/17/18 2040)  LUE Motor Response: Purposeful (05/18/18 1103)  LLE Motor Response: Purposeful (05/18/18 1103)  RUE Motor Response: Purposeful (05/18/18 1103)  RLE Motor Response: Purposeful (05/18/18 1103)   At baseline    Mental Status and Level of Consciousness: Arousable    Pulmonary Status:   O2 Device: Nasal cannula (05/18/18 1103)   Adequate oxygenation and airway patent    Complications related to anesthesia: None    Post-anesthesia assessment completed.  No concerns    Signed By: Damián Nugent MD     May 18, 2018

## 2018-05-18 NOTE — PROGRESS NOTES
Back pain is better. Has not been out of bed yet. Eating dinner without difficulty. Ambulate tonight.     Mikey Schofield MD

## 2018-05-19 LAB
ALBUMIN SERPL-MCNC: 2.6 G/DL (ref 3.2–4.6)
ALBUMIN/GLOB SERPL: 0.7 {RATIO} (ref 1.2–3.5)
ALP SERPL-CCNC: 111 U/L (ref 50–136)
ALT SERPL-CCNC: 37 U/L (ref 12–65)
ANION GAP SERPL CALC-SCNC: 7 MMOL/L (ref 7–16)
AST SERPL-CCNC: 38 U/L (ref 15–37)
BASOPHILS # BLD: 0 K/UL (ref 0–0.2)
BASOPHILS NFR BLD: 0 % (ref 0–2)
BILIRUB SERPL-MCNC: 0.4 MG/DL (ref 0.2–1.1)
BUN SERPL-MCNC: 31 MG/DL (ref 8–23)
CALCIUM SERPL-MCNC: 9 MG/DL (ref 8.3–10.4)
CHLORIDE SERPL-SCNC: 103 MMOL/L (ref 98–107)
CO2 SERPL-SCNC: 33 MMOL/L (ref 21–32)
CREAT SERPL-MCNC: 1.25 MG/DL (ref 0.8–1.5)
DIFFERENTIAL METHOD BLD: ABNORMAL
EOSINOPHIL # BLD: 0.1 K/UL (ref 0–0.8)
EOSINOPHIL NFR BLD: 1 % (ref 0.5–7.8)
ERYTHROCYTE [DISTWIDTH] IN BLOOD BY AUTOMATED COUNT: 15.2 % (ref 11.9–14.6)
GLOBULIN SER CALC-MCNC: 3.6 G/DL (ref 2.3–3.5)
GLUCOSE SERPL-MCNC: 86 MG/DL (ref 65–100)
HCT VFR BLD AUTO: 33.2 % (ref 41.1–50.3)
HGB BLD-MCNC: 11.2 G/DL (ref 13.6–17.2)
IMM GRANULOCYTES # BLD: 0.1 K/UL (ref 0–0.5)
IMM GRANULOCYTES NFR BLD AUTO: 1 % (ref 0–5)
LYMPHOCYTES # BLD: 1.1 K/UL (ref 0.5–4.6)
LYMPHOCYTES NFR BLD: 13 % (ref 13–44)
MAGNESIUM SERPL-MCNC: 1.8 MG/DL (ref 1.8–2.4)
MCH RBC QN AUTO: 23.7 PG (ref 26.1–32.9)
MCHC RBC AUTO-ENTMCNC: 33.7 G/DL (ref 31.4–35)
MCV RBC AUTO: 70.2 FL (ref 79.6–97.8)
MONOCYTES # BLD: 1.6 K/UL (ref 0.1–1.3)
MONOCYTES NFR BLD: 19 % (ref 4–12)
NEUTS SEG # BLD: 5.3 K/UL (ref 1.7–8.2)
NEUTS SEG NFR BLD: 66 % (ref 43–78)
PLATELET # BLD AUTO: 139 K/UL (ref 150–450)
PMV BLD AUTO: ABNORMAL FL (ref 10.8–14.1)
POTASSIUM SERPL-SCNC: 4 MMOL/L (ref 3.5–5.1)
PROT SERPL-MCNC: 6.2 G/DL (ref 6.3–8.2)
RBC # BLD AUTO: 4.73 M/UL (ref 4.23–5.67)
SODIUM SERPL-SCNC: 143 MMOL/L (ref 136–145)
WBC # BLD AUTO: 8.1 K/UL (ref 4.3–11.1)

## 2018-05-19 PROCEDURE — 99232 SBSQ HOSP IP/OBS MODERATE 35: CPT | Performed by: INTERNAL MEDICINE

## 2018-05-19 PROCEDURE — 74011250637 HC RX REV CODE- 250/637: Performed by: NURSE PRACTITIONER

## 2018-05-19 PROCEDURE — 74011250636 HC RX REV CODE- 250/636: Performed by: NURSE PRACTITIONER

## 2018-05-19 PROCEDURE — 74011250636 HC RX REV CODE- 250/636: Performed by: ANESTHESIOLOGY

## 2018-05-19 PROCEDURE — 36415 COLL VENOUS BLD VENIPUNCTURE: CPT | Performed by: NURSE PRACTITIONER

## 2018-05-19 PROCEDURE — 80053 COMPREHEN METABOLIC PANEL: CPT | Performed by: NURSE PRACTITIONER

## 2018-05-19 PROCEDURE — 85025 COMPLETE CBC W/AUTO DIFF WBC: CPT | Performed by: NURSE PRACTITIONER

## 2018-05-19 PROCEDURE — 74011250637 HC RX REV CODE- 250/637: Performed by: RADIOLOGY

## 2018-05-19 PROCEDURE — 83735 ASSAY OF MAGNESIUM: CPT | Performed by: NURSE PRACTITIONER

## 2018-05-19 PROCEDURE — 97110 THERAPEUTIC EXERCISES: CPT

## 2018-05-19 PROCEDURE — 65270000029 HC RM PRIVATE

## 2018-05-19 RX ORDER — LOPERAMIDE HYDROCHLORIDE 2 MG/1
2 CAPSULE ORAL AS NEEDED
Status: DISCONTINUED | OUTPATIENT
Start: 2018-05-19 | End: 2018-05-23 | Stop reason: HOSPADM

## 2018-05-19 RX ADMIN — METAXALONE 800 MG: 800 TABLET ORAL at 21:10

## 2018-05-19 RX ADMIN — SODIUM CHLORIDE, SODIUM LACTATE, POTASSIUM CHLORIDE, AND CALCIUM CHLORIDE 125 ML/HR: 600; 310; 30; 20 INJECTION, SOLUTION INTRAVENOUS at 08:55

## 2018-05-19 RX ADMIN — HYDROMORPHONE HYDROCHLORIDE 0.5 MG: 1 INJECTION, SOLUTION INTRAMUSCULAR; INTRAVENOUS; SUBCUTANEOUS at 17:55

## 2018-05-19 RX ADMIN — POTASSIUM CHLORIDE 20 MEQ: 1500 TABLET, EXTENDED RELEASE ORAL at 08:57

## 2018-05-19 RX ADMIN — METAXALONE 800 MG: 800 TABLET ORAL at 08:55

## 2018-05-19 RX ADMIN — APIXABAN 5 MG: 5 TABLET, FILM COATED ORAL at 08:56

## 2018-05-19 RX ADMIN — CYCLOBENZAPRINE HYDROCHLORIDE 10 MG: 10 TABLET, FILM COATED ORAL at 21:10

## 2018-05-19 RX ADMIN — CARVEDILOL 6.25 MG: 6.25 TABLET, FILM COATED ORAL at 08:56

## 2018-05-19 RX ADMIN — OXYCODONE HYDROCHLORIDE 30 MG: 15 TABLET ORAL at 23:28

## 2018-05-19 RX ADMIN — CYANOCOBALAMIN TAB 1000 MCG 1000 MCG: 1000 TAB at 08:56

## 2018-05-19 RX ADMIN — OXYCODONE HYDROCHLORIDE 30 MG: 15 TABLET ORAL at 14:10

## 2018-05-19 RX ADMIN — HYDROMORPHONE HYDROCHLORIDE 1 MG: 1 INJECTION, SOLUTION INTRAMUSCULAR; INTRAVENOUS; SUBCUTANEOUS at 23:32

## 2018-05-19 RX ADMIN — METAXALONE 800 MG: 800 TABLET ORAL at 16:12

## 2018-05-19 RX ADMIN — CARVEDILOL 6.25 MG: 6.25 TABLET, FILM COATED ORAL at 17:55

## 2018-05-19 RX ADMIN — TORSEMIDE 20 MG: 20 TABLET ORAL at 08:57

## 2018-05-19 RX ADMIN — CHLORHEXIDINE GLUCONATE 15 ML: 1.2 RINSE ORAL at 08:55

## 2018-05-19 RX ADMIN — LEVOTHYROXINE SODIUM 137 MCG: 50 TABLET ORAL at 09:00

## 2018-05-19 RX ADMIN — APIXABAN 5 MG: 5 TABLET, FILM COATED ORAL at 17:55

## 2018-05-19 RX ADMIN — CHLORHEXIDINE GLUCONATE 15 ML: 1.2 RINSE ORAL at 17:55

## 2018-05-19 RX ADMIN — POTASSIUM CHLORIDE 20 MEQ: 1500 TABLET, EXTENDED RELEASE ORAL at 17:55

## 2018-05-19 RX ADMIN — ROSUVASTATIN CALCIUM 10 MG: 5 TABLET, FILM COATED ORAL at 21:10

## 2018-05-19 RX ADMIN — PANTOPRAZOLE SODIUM 40 MG: 40 TABLET, DELAYED RELEASE ORAL at 08:57

## 2018-05-19 RX ADMIN — HYDROMORPHONE HYDROCHLORIDE 1 MG: 1 INJECTION, SOLUTION INTRAMUSCULAR; INTRAVENOUS; SUBCUTANEOUS at 21:07

## 2018-05-19 NOTE — PROGRESS NOTES
END OF SHIFT NOTE:    Pt had restful night, pain well controlled per patient. Cont LR at 125 ml/hr    Intake/Output      Voiding: YES  Catheter: NO  Drain:              Stool:  1 occurrences. Stool Assessment  Stool Color: Dufm Polite (05/17/18 2202)  Stool Appearance: Soft (05/18/18 1928)  Stool Amount: Small (05/17/18 2202)  Stool Source/Status: Rectum (05/17/18 2202)    Emesis:  0 occurrences. VITAL SIGNS  Patient Vitals for the past 12 hrs:   Temp Pulse Resp BP SpO2   05/19/18 0320 97.4 °F (36.3 °C) 74 17 139/83 95 %   05/18/18 2314 97.6 °F (36.4 °C) 88 17 104/62 94 %   05/18/18 1913 97.6 °F (36.4 °C) 90 17 126/70 93 %       Pain Assessment  Pain 1  Pain Scale 1: Numeric (0 - 10) (05/18/18 2321)  Pain Intensity 1: 2 (05/18/18 2321)  Patient Stated Pain Goal: 0 (05/18/18 2321)  Pain Reassessment 1: Patient sleeping (05/19/18 0403)  Pain Onset 1: after resting (05/15/18 2023)  Pain Location 1: Back (05/18/18 2203)  Pain Orientation 1: Lower (05/18/18 2203)  Pain Description 1: Sharp (05/18/18 2203)  Pain Intervention(s) 1: Medication (see MAR) (05/18/18 2203)    Ambulating  No    Additional Information:     Shift report given to oncoming nurse at the bedside.     Nelson Roche

## 2018-05-19 NOTE — PROGRESS NOTES
Problem: Mobility Impaired (Adult and Pediatric)  Goal: *Acute Goals and Plan of Care (Insert Text)  1. Mr. Laly Sheikh will perform supine to sit and sit to supine independently in 7 days. 2.  Mr. Laly Sheikh will perform sit to stand and bed to chair independently in 7 days. 3.  Mr. Laly Sheikh will perform gait 250 ft with rolling walker or least restrictive device independently in 7 days. 4.  Mr. Laly Sheikh will perform up and down 4 steps with rail independently in 7 days. PHYSICAL THERAPY: Daily Note, Treatment Day: 2nd, PM 5/19/2018  INPATIENT: Hospital Day: 6  Payor: CARE IMPROVEMENT PLUS / [place] / Product Type: [employer] Care Medicare /      NAME/AGE/GENDER: Marshall Moreno is a 65 y.o. male   PRIMARY DIAGNOSIS: Low back pain, unspecified back pain laterality, unspecified chronicity, with sciatica presence unspecified [M54.5]  Low back pain, unspecified back pain laterality, unspecified chronicity, with sciatica presence unspecified [M54.5] <principal problem not specified> <principal problem not specified>  Procedure(s) (LRB):  IR ANESTHESIA/KYPHOPLASTY L1, L2 /   ROOM 502 (N/A)  1 Day Post-Op  ICD-10: Treatment Diagnosis:    · Generalized Muscle Weakness (M62.81)  · Difficulty in walking, Not elsewhere classified (R26.2)  · Low Back Pain (M54.5)   Precaution/Allergies:  Lisinopril and Pcn [penicillins]      ASSESSMENT:     Mr. Laly Sheikh required CGx1 for bed mobility and transfers. A RW was used once pt. Was on his feet to promote balance and stability. He was able to ambulate a few steps towards the [facility]. A RW was used for balance and stability. He was not safe to ambulate past bedside. He will benefit from continued PT services while in the hospital to improve his functional deficits. He will benefit from continued PT  services following hospital D/C to facilitate a return to his baseline level of function.      This section established at most recent assessment   PROBLEM LIST (Impairments causing functional limitations):  1. Decreased Strength  2. Decreased Transfer Abilities  3. Decreased Ambulation Ability/Technique  4. Increased Pain  5. Decreased Activity Tolerance  6. Decreased Pacing Skills   INTERVENTIONS PLANNED: (Benefits and precautions of physical therapy have been discussed with the patient.)  1. Bed Mobility  2. Gait Training  3. Therapeutic Activites  4. Therapeutic Exercise/Strengthening  5. Transfer Training     TREATMENT PLAN: Frequency/Duration: 3 times a week for duration of hospital stay  Rehabilitation Potential For Stated Goals: Good     RECOMMENDED REHABILITATION/EQUIPMENT: (at time of discharge pending progress): Due to the probability of continued deficits (see above) this patient will likely need continued skilled physical therapy after discharge. Equipment:    to be determined. HISTORY:   History of Present Injury/Illness (Reason for Referral):  Mr. Arabella Walters is a 65 y.o. male admitted on 5/14/2018 with a primary diagnosis of The primary encounter diagnosis was Acute midline low back pain without sciatica. Diagnoses of Multiple myeloma, remission status unspecified ([facility] [address] and Prostate cancer ([facility] [address] were also pertinent to this visit. .       His PMH includes CHF, Afib, hx of defibrillator implant, HTN and hx of prostate, colon, and thyroid cancer with multiple myeloma. He is a patient of Dr. Morro Drake, currently on treatment with Revlimid/Dex which was just started back on 5/4/18 after his thyroidectomy. Pt states he has not started radioactive iodine because it is currently on backorder. He has had some mild fairly long-term back pain attributed to his myeloma. He presented to ED with c/o worsening lower back pain, worse when he woke up this AM.  Denies any changes in bowel or bladder habits. No tingling or numbness in extremities. Xray of lumbar spine with no acute changes - showed known osseous lytic lesions and chronic degenerative changes.   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 He will be admitted for further evaluation and management of his intractable back pain.     Past Medical History/Comorbidities:   Mr. Olivia Iraheta  has a past medical history of Arrhythmia; Arthritis; BMI 30.0-30.9,adult; Cardiomyopathy ([address] Cholelithiases; Chronic systolic heart failure ([address] (9/13/2011); Gout; H/O: pituitary tumor; High cholesterol; History of thyroid cancer; History of vertigo; Hurthle cell carcinoma of thyroid ([address] Hx of radiation therapy to prostate (2004); Hyperlipidemia (11/18/2015); Hypertension; Hypothyroid; ICD (implantable cardiac defibrillator) in place (9/2011); LBBB (left bundle branch block) (11/18/2015); Malaise and fatigue (11/18/2015); Malignant neoplasm of prostate ([address] Mass of colon; Multiple myeloma ([address] and Sinus node dysfunction ([address] Mr. Olivia Iraheta  has a past surgical history that includes hx heent (2008); hx cholecystectomy (2013); hx gi; hx heart catheterization; hx pacemaker (2011/2016); hx colonoscopy; hx thyroidectomy (2008); hx tumor removal (1990/2010); and hx thyroidectomy (01/26/2018). Social History/Living Environment:   Home Environment: Private residence  # Steps to Enter: 4  One/Two Story Residence: One story  Living Alone: No  Support Systems: Spouse/Significant Other/Partner (however she has back issues herself and can not physically assist him.)  Patient Expects to be Discharged to[de-identified] Unknown  Current DME Used/Available at Home: Cane, straight (he was a walker but it is his mother in laws who is 5 2 and he is over 6 ft. )  Prior Level of Function/Work/Activity:  Has dealt with back pain for many years but never used an assistive device.    age, multiple myeloma   Number of Personal Factors/Comorbidities that affect the Plan of Care: 1-2: MODERATE COMPLEXITY   EXAMINATION:   Most Recent Physical Functioning:   Gross Assessment:  Strength: Grossly decreased, non-functional               Posture:  Posture (WDL): Exceptions to WDL  Posture Assessment: Forward head  Balance:  Sitting: Impaired  Standing: Impaired Bed Mobility:  Rolling: Contact guard assistance  Sit to Supine: Contact guard assistance  Wheelchair Mobility:     Transfers:  Sit to Stand: Contact guard assistance  Stand to Sit: Contact guard assistance  Gait:     Distance (ft): 5 Feet (ft)  Assistive Device: Walker, rolling  Ambulation - Level of Assistance: Minimal assistance      Body Structures Involved:  1. Muscles Body Functions Affected:  1. Movement Related Activities and Participation Affected:  1. Mobility   Number of elements that affect the Plan of Care: 3: MODERATE COMPLEXITY   CLINICAL PRESENTATION:   Presentation: Evolving clinical presentation with changing clinical characteristics: MODERATE COMPLEXITY   CLINICAL DECISION MAKIN [facility] Inpatient Short Form  How much difficulty does the patient currently have. .. Unable A Lot A Little None   1. Turning over in bed (including adjusting bedclothes, sheets and blankets)? [] 1   [] 2   [x] 3   [] 4   2. Sitting down on and standing up from a chair with arms ( e.g., wheelchair, bedside commode, etc.)   [] 1   [] 2   [x] 3   [] 4   3. Moving from lying on back to sitting on the side of the bed? [] 1   [] 2   [x] 3   [] 4   How much help from another person does the patient currently need. .. Total A Lot A Little None   4. Moving to and from a bed to a chair (including a wheelchair)? [] 1   [] 2   [x] 3   [] 4   5. Need to walk in hospital room? [] 1   [x] 2   [] 3   [] 4   6. Climbing 3-5 steps with a railing? [] 1   [x] 2   [] 3   [] 4   © , Trustees of [address] [place] [address] [address] [id number], under license to [employer].  All rights reserved      Score:  Initial: 16 Most Recent: X (Date: -- )    Interpretation of Tool:  Represents activities that are increasingly more difficult (i.e. Bed mobility, Transfers, Gait). Score 24 23 22-20 19-15 14-10 9-7 6     Modifier CH CI CJ CK CL CM CN      ? Mobility - Walking and Moving Around:     - CURRENT STATUS: CK - 40%-59% impaired, limited or restricted    - GOAL STATUS: CJ - 20%-39% impaired, limited or restricted    - D/C STATUS:  ---------------To be determined---------------  Payor: CARE IMPROVEMENT PLUS [place] / Product Type: [employer] Medicare /      Medical Necessity:     · Patient is expected to demonstrate progress in functional technique to decrease assistance required with mobility and gait. .  Reason for Services/Other Comments:  · Patient continues to require present interventions due to patient's inability to function at baseline. .   Use of outcome tool(s) and clinical judgement create a POC that gives a: Questionable prediction of patient's progress: MODERATE COMPLEXITY            TREATMENT:   (In addition to Assessment/Re-Assessment sessions the following treatments were rendered)   Pre-treatment Symptoms/Complaints:  2/10 pain  Pain: Initial:   Pain Intensity 1: 4  Post Session:  Increased with exercises 3/10     Therapeutic Activity: (   23 minutes): Therapeutic activities including bed transfers, ambulation on level ground to improve mobility, strength, balance and coordination. Required minimal A   to promote motor control of bilateral, lower extremity(s). Braces/Orthotics/Lines/Etc:   · O2 Device: Room air  Treatment/Session Assessment:    · Response to Treatment:  fair  · Interdisciplinary Collaboration:   o Physical Therapist  o Registered Nurse  · After treatment position/precautions:   o Supine in bed  o Bed in low position  o Call light within reach  o RN notified   · Compliance with Program/Exercises: Will assess as treatment progresses. · Recommendations/Intent for next treatment session:   \"Next visit will focus on advancements to more challenging activities and reduction in assistance provided\".   Total Treatment Duration:  PT Patient Time In/Time Out  Time In: 1341  Time Out: 24[address] Danilo Escalante, PT

## 2018-05-19 NOTE — PROGRESS NOTES
[facility] Hematology & Oncology        Inpatient Hematology / Oncology Progress Note      Admission Date: 2018  7:42 AM  Reason for Admission/Hospital Course: Low back pain, unspecified back pain laterality, unspecified chronicity, with sciatica presence unspecified [M54.5]  Low back pain, unspecified back pain laterality, unspecified chronicity, with sciatica presence unspecified [M54.5]      24 Hour Events:  Afebrile, VSS  S/p kyphoplasty yesterday  Back pain continues, some improvement  Right side/flank pain continues      ROS:  Constitutional: +weakness; negative for fever, chills. CV: Negative for chest pain, palpitations, edema. Respiratory: Negative for dyspnea, cough, wheezing. GI: Negative for nausea, abdominal pain, diarrhea. MSK: +lower back pain    10 point review of systems is otherwise negative with the exception of the elements mentioned above in the HPI. Allergies   Allergen Reactions    Lisinopril Cough    Pcn [Penicillins] Swelling       OBJECTIVE:  Patient Vitals for the past 8 hrs:   BP Temp Pulse Resp SpO2   18 0648 139/73 97.5 °F (36.4 °C) 71 18 95 %     Temp (24hrs), Av.6 °F (36.4 °C), Min:97.4 °F (36.3 °C), Max:97.7 °F (36.5 °C)     0701 -  1900  In: -   Out: 250 [Urine:250]    Physical Exam:  Constitutional: Well developed, well nourished male in no acute distress, lying in the hospital bed. HEENT: Normocephalic and atraumatic. Oropharynx is clear, mucous membranes are moist.  Extraocular muscles are intact. Sclerae anicteric.           Skin Warm and dry. No bruising and no rash noted. No erythema. No pallor. Respiratory Lungs are clear to auscultation bilaterally without wheezes, rales or rhonchi, normal air exchange without accessory muscle use. CVS Normal rate, regular rhythm and normal S1 and S2. No murmurs, gallops, or rubs. Abdomen Soft, nontender and nondistended, normoactive bowel sounds.      Neuro Grossly nonfocal with no obvious sensory or motor deficits. MSK Normal range of motion in general.  No edema and no tenderness. Psych Appropriate mood and affect.           Labs:      Recent Labs      05/19/18 0414   WBC  8.1   RBC  4.73   HGB  11.2*   HCT  33.2*   MCV  70.2*   MCH  23.7*   MCHC  33.7   RDW  15.2*   PLT  139*   GRANS  66   LYMPH  13   MONOS  19*   EOS  1   BASOS  0   IG  1   DF  AUTOMATED   ANEU  5.3   ABL  1.1   ABM  1.6*   REA  0.1   ABB  0.0   AIG  0.1        Recent Labs      05/19/18 0414   NA  143   K  4.0   CL  103   CO2  33*   AGAP  7   GLU  86   BUN  31*   CREA  1.25   GFRAA  >60   GFRNA  59*   CA  9.0   SGOT  38*   AP  111   TP  6.2*   ALB  2.6*   GLOB  3.6*   AGRAT  0.7*   MG  1.8         Imaging:  CT SPINE Gaurav Steamburg CONT [[id number]] Collected: 05/14/18 1632      Order Status: Completed Updated: 05/14/18 1657     Addenda:         Addendum:   DC4 The significant findings in this report have been referred to the Imaging Navigator in order to communicate to the referring provider or his/her   designee as outlined in Section II.C.2.a.ii-iii of the ACR Practice Guideline for Communication of Diagnostic Imaging Findings.       Signed: 05/14/18 1655 by Tanya Hudson MD     Narrative:       Exam:  CT lumbar spine with contrast    History: worsening back pain; hx of multiple myeloma, 77 years Male severe back  pain    Technique: Standard departmental protocol CT of the lumbar spine was performed  after uneventful intravenous administration of 100 cc of nonionic IV contrast.   Coronal and sagittal reformats were obtained.  Radiation dose reduction  techniques were used for this study:  Our CT scanners use one or all of the  following: Automated exposure control, adjustment of the mA and/or kVp according  to patient's size, iterative reconstruction. Comparison: CT torso April 20, 2018    Findings: Yaa Henning is diffuse narrowing the spinal canal, consistent with  congenital spinal canal stenosis.  Normal alignment.  There appears to be  partial fusion of the L4 and L5 vertebral bodies.  There are anterior bridging  osteophytes at L5-S1.  Mild central loss of vertebral body height is seen with  superior endplate sclerosis of the L2 and L3 vertebral bodies, these could  represent mild acute to subacute compression fractures, and appear possibly new  since the prior CT torso.  Moderate anterior compression deformity of the T12  vertebral body with anterior bridging osteophytes appears unchanged.  A large  posterior disc osteophyte complex is seen at L4-L5 causing mild spinal canal  narrowing measuring 12 mm in AP dimension.  A large posterior disc osteophyte  complex is seen at L5-S1 without significant spinal canal stenosis.  However  there appears to be severe bilateral neuroforaminal stenosis at this level.  No  evidence of significant spinal canal stenosis.  Normal mineralization. Visualized prevertebral and paravertebral soft tissues unremarkable.       Impression:       Impression:  1.  Suspect acute to subacute mild central compression fractures of the L2 and  L3 vertebral bodies. 2.  Severe bilateral neuroforaminal stenosis at L5-S1.     CT SPINE Anil Diaz WO CONT [[id number]]      Order Status: Canceled      XR SPINE LUMB 2 OR 3 V [[id number]] Collected: 05/14/18 0847     Order Status: Completed Updated: 05/14/18 0852     Narrative:       LUMBAR SPINE RADIOGRAPHS, 5/14/2018    CLINICAL HISTORY:  Chronic low back pain which is severe for the past 2 days. TECHNIQUE: AP and lateral views of the lumbar spine with coned down view of the  lumbosacral junction. Comparison: CT abdomen 4/20/2018    FINDINGS:  Five lumbar type vertebrae are visualized.  Alignment is maintained at all  levels.  Vertebral body height is maintained at all levels in the lumbar spine. A moderate anterior compression deformity is seen at T12.  However, this does not  demonstrate clear acute features in a similar compression deformity was seen on  the prior CT scan and therefore this is not felt to be acute. Fusion is once  again seen between the L4 and L5 vertebral bodies. The posterior elements,  transverse processes, and sacroiliac joints are intact. Mild to moderate  discogenic degenerative changes are seen throughout the lumbar spine. Moderate  facet hypertrophic changes are seen in the lower lumbar spine. There is direct  apposition of spinous processes with mild intervening sclerotic changes in the  mid and lower lumbar spine suggesting a Baastrup's deformity which can be a  source of chronic pain. Irregular lucency is seen likely representing the  patient's lytic bony disease which was better assessed by CT and described as  stable on the recent CT scan. The patient is status post cholecystectomy.       Impression:       IMPRESSION:  1.   No acute changes evident by plain film imaging. 2.  Irregular lucency likely representing the patient's known osseous lytic  metastatic disease. 3.  Chronic degenerative changes as described above. 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 These can be  sources of chronic pain.         XR PELV AP ONLY [[id number]] Collected: 05/14/18 0843     Order Status: Completed Updated: 05/14/18 0849     Narrative:       Pelvis single view 5/14/2018    CLINICAL HISTORY: Acute on chronic low back pain and bilateral hip pain. Unable  to ambulate. More severe for 2 days. History of multiple myeloma. COMPARISON: CT the pelvis 4/20/2018. FINDINGS:  2 frontal views of the pelvis are submitted for evaluation. The lumbar spine is  partially included in the field of imaging and will be described in a separate  report of lumbar spine x-rays obtained. No abnormal widening is seen of the  sacroiliac joints or pubic symphysis. No acute fracture line is seen of the  pelvic ring.  No definite acute fracture is seen of the proximal hips although  dedicated imaging should be performed if hip fracture is strongly suspected. Multiple lucent lesions are seen. These are most clearly demonstrated in the  left parasymphyseal pubic bone and right inferior pubic ramus. These likely  represent the lytic metastatic disease described on the prior CT scan of the  pelvis which was described as not significantly changed. Moderate degenerative  changes are seen of the left hip mainly consisting of joint space loss with some  superior acetabular hypertrophy.       Impression:       IMPRESSION:  1. Lytic osseous metastatic disease without acute osseous changes appreciated. 2. Moderate chronic degenerative changes of the left hip.          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  ICD-9-CM: 227.3  3/15/2018        Thyroid mass ICD-10-CM: E07.9  ICD-9-CM: 246.9  2/8/2018        Sinus bradycardia ICD-10-CM: R00.1  ICD-9-CM: 427.89  1/12/2018        PAF (paroxysmal atrial fibrillation) (HCC) ICD-10-CM: I48.0  ICD-9-CM: 427.31  11/26/2017        Chronic systolic CHF (congestive heart failure) ([facility] ICD-10-CM: I50.22  ICD-9-CM: 428.22, 428.0  11/26/2017        Pain ICD-10-CM: R52  ICD-9-CM: 780.96  11/20/2017        MARY (acute kidney injury) ([facility] ICD-10-CM: N17.9  ICD-9-CM: 584.9  11/20/2017        Acute renal failure (ARF) ([facility] ICD-10-CM: N17.9  ICD-9-CM: 584.9  11/18/2017        Intractable pain ICD-10-CM: R52  ICD-9-CM: 780.96  1/11/2017        Acute kidney injury ([facility] ICD-10-CM: N17.9  ICD-9-CM: 584.9  1/11/2017        Pancytopenia ([facility] ICD-10-CM: P80.832  ICD-9-CM: 284.19 1/11/2017        Constipation ICD-10-CM: K59.00  ICD-9-CM: 564.00  1/11/2017        Multiple myeloma ([facility] ICD-10-CM: C90.00  ICD-9-CM: 203.00  1/2/2017        Postural imbalance ICD-10-CM: R29.3  ICD-9-CM: 729.90  12/14/2016        Polyneuropathy ICD-10-CM: G62.9  ICD-9-CM: 356.9  12/14/2016        Fall ICD-10-CM: C67. Richardean Carbon  ICD-9-CM: E888.9  12/14/2016        Encounter for medication management ICD-10-CM: Z79.899  ICD-9-CM: V58.69  12/14/2016        Urinary retention ICD-10-CM: R33.9  ICD-9-CM: 788.20  6/6/2016        Colonic mass ICD-10-CM: K63.9  ICD-9-CM: 569.89  3/23/2016        Hyperlipidemia ICD-10-CM: E78.5  ICD-9-CM: 272.4  11/18/2015        Vertigo ICD-10-CM: P57  ICD-9-CM: 780.4  11/18/2015        Malaise and fatigue ICD-10-CM: R53.81, R53.83  ICD-9-CM: 780.79  11/18/2015        Cardiomyopathy ([facility] ICD-10-CM: I42.9  ICD-9-CM: 425.4  11/18/2015        LBBB (left bundle branch block) ICD-10-CM: I44.7  ICD-9-CM: 426.3  11/18/2015        Arthritis ICD-10-CM: M19.90  ICD-9-CM: 716.90  Unknown        Arrhythmia ICD-10-CM: I49.9  ICD-9-CM: 427.9  Unknown    Overview Signed 6/18/2013  2:07 PM by Lennox Houston, MD     LBBB             Cholelithiases ICD-10-CM: P31.45  ICD-9-CM: 574.20  Unknown        Hx of radiation therapy to prostate ICD-10-CM: Z92.3  ICD-9-CM: V15.3  Unknown        Chronic systolic heart failure ([facility] ICD-10-CM: I50.22  ICD-9-CM: 428.22  9/13/2011        Automatic implantable cardioverter-defibrillator in situ ICD-10-CM: Z95.810  ICD-9-CM: V45.02  9/1/2011            Mr. Brea Mina is a 65 y.o. male admitted on 5/14/2018 with a primary diagnosis of intractable back pain. His PMH includes CHF, Afib, hx of defibrillator implant, HTN and hx of prostate, colon, and thyroid cancer with multiple myeloma. He is a patient of Dr. Edward De La Torre, currently on treatment with Revlimid/Dex which was just started back on 5/4/18 after his thyroidectomy.   Pt states he has not started radioactive iodine because it is currently on backorder. He has had some mild fairly long-term back pain attributed to his myeloma. He presented to ED with c/o worsening lower back pain, worse when he woke up this AM.  Denies any changes in bowel or bladder habits. No tingling or numbness in extremities. Xray of lumbar spine with no acute changes - showed known osseous lytic lesions and chronic degenerative changes. 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 PLAN:  Hx of prostate, colon, and thyroid cancers with multiple myeloma  - currently on treatment with Revlimid/Dex  - s/p thyroidectomy, waiting to start on radioactive iodine (currently on backorder per pt)  5/17 MM labs- Tensed Free light chains 8.70/Lambda 203.15  Kappa/Lambda 0.4. M Ok 0.80. IgG 1153 IgA 15  IgM 16     Intractable lower back pain  - Xray of lumbar spine with no acute changes - showed known osseous lytic lesions and chronic degenerative changes. 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 - Pt unable to have MRI d/t implanted defibrillator. CT L-spine ordered. - Dilaudid ordered PRN  - Pulse steroid dosing  5/15 CT revealed acute to subacute mild central compression fractures of L2 and L3 vertebral bodies and severe b/l neuroforaminal stenosis at L5-S1. IR consulted for kyphoplasty. Currently on prednisone 60mg. 5/17 IR Kyphoplasty tomorrow. Increase Oxy IR 30 mg q 4.   5/18 s/p Kyphoplasty this AM.     Continue home meds  Steven SOPs  On Eliquis    Disposition:  He remains weak and has not been out of bed since kyphoplasty. Would appreciate PT/OT involvement and recommendations of home health services vs rehab. He states pain continues, but has only taken 1 dose of IV Dilaudid in past 24 hours and none of his po pain meds.   He was encouraged to at least get up to the bedside chair with either PT/OT or nursing assistance.             Julia Henao, [facility]  [address] [address]  Office : [phone]  Fax : [phone]

## 2018-05-19 NOTE — PROGRESS NOTES
Problem: Falls - Risk of  Goal: *Absence of Falls  Outcome: Progressing Towards Goal  Fall Risk Interventions:  Mobility Interventions: Bed/chair exit alarm, Communicate number of staff needed for ambulation/transfer, Patient to call before getting OOB         Medication Interventions: Teach patient to arise slowly, Patient to call before getting OOB, Evaluate medications/consider consulting pharmacy    Elimination Interventions: Patient to call for help with toileting needs, Urinal in reach, Toileting schedule/hourly rounds

## 2018-05-20 PROCEDURE — 74011250637 HC RX REV CODE- 250/637: Performed by: INTERNAL MEDICINE

## 2018-05-20 PROCEDURE — 65270000029 HC RM PRIVATE

## 2018-05-20 PROCEDURE — 74011250637 HC RX REV CODE- 250/637: Performed by: NURSE PRACTITIONER

## 2018-05-20 PROCEDURE — 74011250636 HC RX REV CODE- 250/636: Performed by: NURSE PRACTITIONER

## 2018-05-20 PROCEDURE — 99232 SBSQ HOSP IP/OBS MODERATE 35: CPT | Performed by: INTERNAL MEDICINE

## 2018-05-20 PROCEDURE — 74011250637 HC RX REV CODE- 250/637: Performed by: RADIOLOGY

## 2018-05-20 RX ADMIN — HYDROMORPHONE HYDROCHLORIDE 1 MG: 1 INJECTION, SOLUTION INTRAMUSCULAR; INTRAVENOUS; SUBCUTANEOUS at 19:46

## 2018-05-20 RX ADMIN — CHLORHEXIDINE GLUCONATE 15 ML: 1.2 RINSE ORAL at 08:50

## 2018-05-20 RX ADMIN — OXYCODONE HYDROCHLORIDE 30 MG: 15 TABLET ORAL at 22:00

## 2018-05-20 RX ADMIN — PANTOPRAZOLE SODIUM 40 MG: 40 TABLET, DELAYED RELEASE ORAL at 08:46

## 2018-05-20 RX ADMIN — APIXABAN 5 MG: 5 TABLET, FILM COATED ORAL at 08:46

## 2018-05-20 RX ADMIN — APIXABAN 5 MG: 5 TABLET, FILM COATED ORAL at 17:24

## 2018-05-20 RX ADMIN — TORSEMIDE 20 MG: 20 TABLET ORAL at 08:45

## 2018-05-20 RX ADMIN — CARVEDILOL 6.25 MG: 6.25 TABLET, FILM COATED ORAL at 16:20

## 2018-05-20 RX ADMIN — METAXALONE 800 MG: 800 TABLET ORAL at 15:08

## 2018-05-20 RX ADMIN — CARVEDILOL 6.25 MG: 6.25 TABLET, FILM COATED ORAL at 08:46

## 2018-05-20 RX ADMIN — ROSUVASTATIN CALCIUM 10 MG: 5 TABLET, FILM COATED ORAL at 21:55

## 2018-05-20 RX ADMIN — POTASSIUM CHLORIDE 20 MEQ: 1500 TABLET, EXTENDED RELEASE ORAL at 17:24

## 2018-05-20 RX ADMIN — CYANOCOBALAMIN TAB 1000 MCG 1000 MCG: 1000 TAB at 08:46

## 2018-05-20 RX ADMIN — POLYETHYLENE GLYCOL (3350) 17 G: 17 POWDER, FOR SOLUTION ORAL at 19:01

## 2018-05-20 RX ADMIN — POTASSIUM CHLORIDE 20 MEQ: 1500 TABLET, EXTENDED RELEASE ORAL at 08:46

## 2018-05-20 RX ADMIN — LEVOTHYROXINE SODIUM 137 MCG: 50 TABLET ORAL at 08:45

## 2018-05-20 RX ADMIN — CHLORHEXIDINE GLUCONATE 15 ML: 1.2 RINSE ORAL at 17:24

## 2018-05-20 RX ADMIN — CYCLOBENZAPRINE HYDROCHLORIDE 10 MG: 10 TABLET, FILM COATED ORAL at 19:45

## 2018-05-20 RX ADMIN — METAXALONE 800 MG: 800 TABLET ORAL at 08:46

## 2018-05-20 RX ADMIN — METAXALONE 800 MG: 800 TABLET ORAL at 21:55

## 2018-05-20 RX ADMIN — OXYCODONE HYDROCHLORIDE 30 MG: 15 TABLET ORAL at 08:45

## 2018-05-20 RX ADMIN — HYDROMORPHONE HYDROCHLORIDE 1 MG: 1 INJECTION, SOLUTION INTRAMUSCULAR; INTRAVENOUS; SUBCUTANEOUS at 16:20

## 2018-05-20 RX ADMIN — OXYCODONE HYDROCHLORIDE 30 MG: 15 TABLET ORAL at 15:08

## 2018-05-20 NOTE — PROGRESS NOTES
END OF SHIFT NOTE:    Intake/Output  05/20 0701 - 05/20 1900  In: 240 [P.O.:240]  Out: 675 [Urine:675]   Voiding: YES  Catheter: NO  Drain:              Stool:  0 occurrences. Stool Assessment  Stool Color: Georgekarlene Piety (05/17/18 2202)  Stool Appearance: Soft (05/19/18 1935)  Stool Amount: Small (05/17/18 2202)  Stool Source/Status: Rectum (05/17/18 2202)    Emesis:  0 occurrences. VITAL SIGNS  Patient Vitals for the past 12 hrs:   Temp Pulse Resp BP SpO2   05/20/18 1505 97.5 °F (36.4 °C) 76 18 117/63 92 %   05/20/18 1105 98.4 °F (36.9 °C) 87 18 113/76 95 %   05/20/18 0705 98.2 °F (36.8 °C) 79 18 108/70 90 %       Pain Assessment  Pain 1  Pain Scale 1: Visual (05/20/18 1700)  Pain Intensity 1: 0 (05/20/18 1700)  Patient Stated Pain Goal: 0 (05/20/18 1700)  Pain Reassessment 1: Patient sleeping (05/20/18 1700)  Pain Onset 1: with movement (05/20/18 0839)  Pain Location 1: Back (05/20/18 1620)  Pain Orientation 1: Lower (05/20/18 1620)  Pain Description 1: Aching (05/20/18 1620)  Pain Intervention(s) 1: Medication (see MAR) (05/20/18 1620)    Ambulating  No    Additional Information: Pt too weak to work with PT. Wants to go to [facility] rehab. He is ok to D/C from ONC perspective. Skelaxin and Oxy IR given together; pt states that works well for him. Pt received one dose of Dilaudid IV. No complaints noted at this time. Shift report given to WESLEY Banuelos oncoming nurse at the bedside.     Gaurav Erickson RN

## 2018-05-20 NOTE — PROGRESS NOTES
END OF SHIFT NOTE:    Pt had very restful night. Patients states that tonight was the first good rest he has gotten in weeks. In good spirits. Prn iv and po pain meds given overnight. Intake/Output  05/19 1901 - 05/20 0700  In: 669 [I.V.:669]  Out: 400 [Urine:400]   Voiding: YES  Catheter: NO  Drain:              Stool:  0 occurrences. Stool Assessment  Stool Color: Georgeanna Piety (05/17/18 2202)  Stool Appearance: Soft (05/19/18 1935)  Stool Amount: Small (05/17/18 2202)  Stool Source/Status: Rectum (05/17/18 2202)    Emesis:  0 occurrences. VITAL SIGNS  Patient Vitals for the past 12 hrs:   Temp Pulse Resp BP SpO2   05/20/18 0450 97.4 °F (36.3 °C) 75 18 112/58 97 %   05/19/18 2347 97.6 °F (36.4 °C) 75 18 107/72 96 %       Pain Assessment  Pain 1  Pain Scale 1: Numeric (0 - 10) (05/19/18 1935)  Pain Intensity 1: 3 (05/19/18 1935)  Patient Stated Pain Goal: 0 (05/19/18 1935)  Pain Reassessment 1: Patient sleeping (05/20/18 0256)  Pain Onset 1: pta (05/19/18 1612)  Pain Location 1: Back (05/19/18 1612)  Pain Orientation 1: Lower (05/18/18 2203)  Pain Description 1: Fronie Chetna (05/18/18 2203)  Pain Intervention(s) 1: Medication (see MAR) (05/19/18 1612)    Ambulating  No    Additional Information:     Shift report given to oncoming nurse at the bedside.     Estiven Jones

## 2018-05-20 NOTE — PROGRESS NOTES
[facility]        Inpatient Hematology / Oncology Progress Note      Admission Date: 2018  7:42 AM  Reason for Admission/Hospital Course: Low back pain, unspecified back pain laterality, unspecified chronicity, with sciatica presence unspecified [M54.5]  Low back pain, unspecified back pain laterality, unspecified chronicity, with sciatica presence unspecified [M54.5]      24 Hour Events:  Afebrile, VSS  S/p kyphoplasty -pain controlled  Evaluated by PT yesterday-needs rehab      ROS:  Constitutional: +weakness; negative for fever, chills. CV: Negative for chest pain, palpitations, edema. Respiratory: Negative for dyspnea, cough, wheezing. GI: Negative for nausea, abdominal pain, diarrhea. MSK: +lower back pain    10 point review of systems is otherwise negative with the exception of the elements mentioned above in the HPI. Allergies   Allergen Reactions    Lisinopril Cough    Pcn [Penicillins] Swelling       OBJECTIVE:  Patient Vitals for the past 8 hrs:   BP Temp Pulse Resp SpO2 Weight   18 1105 113/76 98.4 °F (36.9 °C) 87 18 95 % -   18 0705 108/70 98.2 °F (36.8 °C) 79 18 90 % -   18 0451 - - - - - 195 lb 8 oz (88.7 kg)   18 0450 112/58 97.4 °F (36.3 °C) 75 18 97 % -     Temp (24hrs), Av.8 °F (36.6 °C), Min:97.4 °F (36.3 °C), Max:98.4 °F (36.9 °C)     07 -  1900  In: 240 [P.O.:240]  Out: 325 [Urine:325]    Physical Exam:  Constitutional: Well developed, well nourished male in no acute distress, lying in the hospital bed. HEENT: Normocephalic and atraumatic. Oropharynx is clear, mucous membranes are moist.  Extraocular muscles are intact. Sclerae anicteric.           Skin Warm and dry. No bruising and no rash noted. No erythema. No pallor. Respiratory Lungs are clear to auscultation bilaterally without wheezes, rales or rhonchi, normal air exchange without accessory muscle use.     CVS Normal rate, regular rhythm and normal S1 and S2. No murmurs, gallops, or rubs. Abdomen Soft, nontender and nondistended, normoactive bowel sounds. Neuro Grossly nonfocal with no obvious sensory or motor deficits. MSK Normal range of motion in general.  No edema and no tenderness. Psych Appropriate mood and affect.           Labs:      Recent Labs      05/19/18 0414   WBC  8.1   RBC  4.73   HGB  11.2*   HCT  33.2*   MCV  70.2*   MCH  23.7*   MCHC  33.7   RDW  15.2*   PLT  139*   GRANS  66   LYMPH  13   MONOS  19*   EOS  1   BASOS  0   IG  1   DF  AUTOMATED   ANEU  5.3   ABL  1.1   ABM  1.6*   REA  0.1   ABB  0.0   AIG  0.1        Recent Labs      05/19/18 0414   NA  143   K  4.0   CL  103   CO2  33*   AGAP  7   GLU  86   BUN  31*   CREA  1.25   GFRAA  >60   GFRNA  59*   CA  9.0   SGOT  38*   AP  111   TP  6.2*   ALB  2.6*   GLOB  3.6*   AGRAT  0.7*   MG  1.8         Imaging:  CT SPINE Carlitos Lot CONT [[id number]] Collected: 05/14/18 1632      Order Status: Completed Updated: 05/14/18 1657     Addenda:         Addendum:   DC4 The significant findings in this report have been referred to the Imaging Navigator in order to communicate to the referring provider or his/her   designee as outlined in Section II.C.2.a.ii-iii of the ACR Practice Guideline for Communication of Diagnostic Imaging Findings.       Signed: 05/14/18 1655 by William Goins MD     Narrative:       Exam:  CT lumbar spine with contrast    History: worsening back pain; hx of multiple myeloma, 77 years Male severe back  pain    Technique: Standard departmental protocol CT of the lumbar spine was performed  after uneventful intravenous administration of 100 cc of nonionic IV contrast.   Coronal and sagittal reformats were obtained.  Radiation dose reduction  techniques were used for this study:  Our CT scanners use one or all of the  following: Automated exposure control, adjustment of the mA and/or kVp according  to patient's size, iterative reconstruction.     Comparison: CT torso April 20, 2018    Findings: Lear Cotton is diffuse narrowing the spinal canal, consistent with  congenital spinal canal stenosis.  Normal alignment.  There appears to be  partial fusion of the L4 and L5 vertebral bodies.  There are anterior bridging  osteophytes at L5-S1.  Mild central loss of vertebral body height is seen with  superior endplate sclerosis of the L2 and L3 vertebral bodies, these could  represent mild acute to subacute compression fractures, and appear possibly new  since the prior CT torso.  Moderate anterior compression deformity of the T12  vertebral body with anterior bridging osteophytes appears unchanged.  A large  posterior disc osteophyte complex is seen at L4-L5 causing mild spinal canal  narrowing measuring 12 mm in AP dimension.  A large posterior disc osteophyte  complex is seen at L5-S1 without significant spinal canal stenosis.  However  there appears to be severe bilateral neuroforaminal stenosis at this level.  No  evidence of significant spinal canal stenosis.  Normal mineralization. Visualized prevertebral and paravertebral soft tissues unremarkable.       Impression:       Impression:  1.  Suspect acute to subacute mild central compression fractures of the L2 and  L3 vertebral bodies. 2.  Severe bilateral neuroforaminal stenosis at L5-S1.     CT SPINE Orysia Crick WO CONT [[id number]]      Order Status: Canceled      XR SPINE LUMB 2 OR 3 V [[id number]] Collected: 05/14/18 0847     Order Status: Completed Updated: 05/14/18 0852     Narrative:       LUMBAR SPINE RADIOGRAPHS, 5/14/2018    CLINICAL HISTORY:  Chronic low back pain which is severe for the past 2 days. TECHNIQUE: AP and lateral views of the lumbar spine with coned down view of the  lumbosacral junction. Comparison: CT abdomen 4/20/2018    FINDINGS:  Five lumbar type vertebrae are visualized.  Alignment is maintained at all  levels.  Vertebral body height is maintained at all levels in the lumbar spine.   A moderate anterior compression deformity is seen at T12. However, this does not  demonstrate clear acute features in a similar compression deformity was seen on  the prior CT scan and therefore this is not felt to be acute. Fusion is once  again seen between the L4 and L5 vertebral bodies. The posterior elements,  transverse processes, and sacroiliac joints are intact. Mild to moderate  discogenic degenerative changes are seen throughout the lumbar spine. Moderate  facet hypertrophic changes are seen in the lower lumbar spine. There is direct  apposition of spinous processes with mild intervening sclerotic changes in the  mid and lower lumbar spine suggesting a Baastrup's deformity which can be a  source of chronic pain. Irregular lucency is seen likely representing the  patient's lytic bony disease which was better assessed by CT and described as  stable on the recent CT scan. The patient is status post cholecystectomy.       Impression:       IMPRESSION:  1.   No acute changes evident by plain film imaging. 2.  Irregular lucency likely representing the patient's known osseous lytic  metastatic disease. 3.  Chronic degenerative changes as described above. 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 These can be  sources of chronic pain.         XR PELV AP ONLY [[id number]] Collected: 05/14/18 0843     Order Status: Completed Updated: 05/14/18 0849     Narrative:       Pelvis single view 5/14/2018    CLINICAL HISTORY: Acute on chronic low back pain and bilateral hip pain. Unable  to ambulate. More severe for 2 days. History of multiple myeloma. COMPARISON: CT the pelvis 4/20/2018. FINDINGS:  2 frontal views of the pelvis are submitted for evaluation. The lumbar spine is  partially included in the field of imaging and will be described in a separate  report of lumbar spine x-rays obtained. No abnormal widening is seen of the  sacroiliac joints or pubic symphysis.  No acute fracture line is seen of the  pelvic ring. No definite acute fracture is seen of the proximal hips although  dedicated imaging should be performed if hip fracture is strongly suspected. Multiple lucent lesions are seen. These are most clearly demonstrated in the  left parasymphyseal pubic bone and right inferior pubic ramus. These likely  represent the lytic metastatic disease described on the prior CT scan of the  pelvis which was described as not significantly changed. Moderate degenerative  changes are seen of the left hip mainly consisting of joint space loss with some  superior acetabular hypertrophy.       Impression:       IMPRESSION:  1. Lytic osseous metastatic disease without acute osseous changes appreciated. 2. Moderate chronic degenerative changes of the left hip.          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  ICD-9-CM: 227.3  3/15/2018        Thyroid mass ICD-10-CM: E07.9  ICD-9-CM: 246.9  2/8/2018        Sinus bradycardia ICD-10-CM: R00.1  ICD-9-CM: 427.89  1/12/2018        PAF (paroxysmal atrial fibrillation) ([facility] ICD-10-CM: I48.0  ICD-9-CM: 427.31  11/26/2017        Chronic systolic CHF (congestive heart failure) ([facility] ICD-10-CM: I50.22  ICD-9-CM: 428.22, 428.0  11/26/2017        Pain ICD-10-CM: R52  ICD-9-CM: 780.96  11/20/2017        MARY (acute kidney injury) ([facility] ICD-10-CM: N17.9  ICD-9-CM: 584.9  11/20/2017        Acute renal failure (ARF) ([facility] ICD-10-CM: N17.9  ICD-9-CM: 584.9  11/18/2017        Intractable pain ICD-10-CM: R52  ICD-9-CM: 780.96 1/11/2017        Acute kidney injury [facility]) ICD-10-CM: N17.9  ICD-9-CM: 584.9  1/11/2017        Pancytopenia ([facility] ICD-10-CM: N25.332  ICD-9-CM: 284.19  1/11/2017        Constipation ICD-10-CM: K59.00  ICD-9-CM: 564.00  1/11/2017        Multiple myeloma ([facility] ICD-10-CM: C90.00  ICD-9-CM: 203.00  1/2/2017        Postural imbalance ICD-10-CM: R29.3  ICD-9-CM: 729.90  12/14/2016        Polyneuropathy ICD-10-CM: G62.9  ICD-9-CM: 356.9  12/14/2016        Fall ICD-10-CM: C38. Shi   ICD-9-CM: E888.9  12/14/2016        Encounter for medication management ICD-10-CM: Z79.899  ICD-9-CM: V58.69  12/14/2016        Urinary retention ICD-10-CM: R33.9  ICD-9-CM: 788.20  6/6/2016        Colonic mass ICD-10-CM: K63.9  ICD-9-CM: 569.89  3/23/2016        Hyperlipidemia ICD-10-CM: E78.5  ICD-9-CM: 272.4  11/18/2015        Vertigo ICD-10-CM: U95  ICD-9-CM: 780.4  11/18/2015        Malaise and fatigue ICD-10-CM: R53.81, R53.83  ICD-9-CM: 780.79  11/18/2015        Cardiomyopathy ([facility] ICD-10-CM: I42.9  ICD-9-CM: 425.4  11/18/2015        LBBB (left bundle branch block) ICD-10-CM: I44.7  ICD-9-CM: 426.3  11/18/2015        Arthritis ICD-10-CM: M19.90  ICD-9-CM: 716.90  Unknown        Arrhythmia ICD-10-CM: I49.9  ICD-9-CM: 427.9  Unknown    Overview Signed 6/18/2013  2:07 PM by Yocasta Fofana MD     LBBB             Cholelithiases ICD-10-CM: B35.98  ICD-9-CM: 574.20  Unknown        Hx of radiation therapy to prostate ICD-10-CM: Z92.3  ICD-9-CM: V15.3  Unknown        Chronic systolic heart failure ([facility] ICD-10-CM: I50.22  ICD-9-CM: 428.22  9/13/2011        Automatic implantable cardioverter-defibrillator in situ ICD-10-CM: Z95.810  ICD-9-CM: V45.02  9/1/2011            Mr. Onel Jamison is a 65 y.o. male admitted on 5/14/2018 with a primary diagnosis of intractable back pain. His PMH includes CHF, Afib, hx of defibrillator implant, HTN and hx of prostate, colon, and thyroid cancer with multiple myeloma.   He is a patient of Dr. Marlin Hamilton, currently on treatment with Revlimid/Dex which was just started back on 5/4/18 after his thyroidectomy. Pt states he has not started radioactive iodine because it is currently on backorder. He has had some mild fairly long-term back pain attributed to his myeloma. He presented to ED with c/o worsening lower back pain, worse when he woke up this AM.  Denies any changes in bowel or bladder habits. No tingling or numbness in extremities. Xray of lumbar spine with no acute changes - showed known osseous lytic lesions and chronic degenerative changes. 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 PLAN:  Hx of prostate, colon, and thyroid cancers with multiple myeloma  - currently on treatment with Revlimid/Dex  - s/p thyroidectomy, waiting to start on radioactive iodine (currently on backorder per pt)  5/17 MM labs- [place] Free light chains 8.70/Lambda 203.15  Kappa/Lambda 0.4. M Ok 0.80. IgG 1153 IgA 15  IgM 16     Intractable lower back pain  - Xray of lumbar spine with no acute changes - showed known osseous lytic lesions and chronic degenerative changes. 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 - Pt unable to have MRI d/t implanted defibrillator. CT L-spine ordered. - Dilaudid ordered PRN  - Pulse steroid dosing  5/15 CT revealed acute to subacute mild central compression fractures of L2 and L3 vertebral bodies and severe b/l neuroforaminal stenosis at L5-S1. IR consulted for kyphoplasty. Currently on prednisone 60mg. 5/17 IR Kyphoplasty tomorrow. Increase Oxy IR 30 mg q 4.   5/18 s/p Kyphoplasty this AM.  5/20 pain controlled     Continue home meds  Steven SOPs  On Eliquis    Disposition:  Pain is controlled. After getting out of bed with PT yesterday, it was suggested that he go to rehab. PPD already placed and read.   If possible, he would prefer to go back to [place] for his rehab. No new complaints. He resting well yesterday and last night.               Saw Epstein NP   [facility]  [id number] [address]  Office : [phone]  Fax : [phone]

## 2018-05-20 NOTE — PROGRESS NOTES
Problem: Falls - Risk of  Goal: *Absence of Falls  Document Bee Fall Risk and appropriate interventions in the flowsheet. Outcome: Progressing Towards Goal  Fall Risk Interventions:  Mobility Interventions: Patient to call before getting OOB, PT Consult for mobility concerns         Medication Interventions: Evaluate medications/consider consulting pharmacy, Patient to call before getting OOB, Teach patient to arise slowly    Elimination Interventions: Call light in reach, Elevated toilet seat, Patient to call for help with toileting needs             Problem: Pressure Injury - Risk of  Goal: *Prevention of pressure injury  Document Mervin Scale and appropriate interventions in the flowsheet. Outcome: Progressing Towards Goal  Pressure Injury Interventions:             Activity Interventions: Increase time out of bed, Pressure redistribution bed/mattress(bed type), PT/OT evaluation    Mobility Interventions: Pressure redistribution bed/mattress (bed type), PT/OT evaluation    Nutrition Interventions: Document food/fluid/supplement intake

## 2018-05-21 LAB
ALBUMIN SERPL-MCNC: 2.8 G/DL (ref 3.2–4.6)
ALBUMIN/GLOB SERPL: 0.7 {RATIO} (ref 1.2–3.5)
ALP SERPL-CCNC: 116 U/L (ref 50–136)
ALT SERPL-CCNC: 41 U/L (ref 12–65)
ANION GAP SERPL CALC-SCNC: 7 MMOL/L (ref 7–16)
AST SERPL-CCNC: 46 U/L (ref 15–37)
BASOPHILS # BLD: 0 K/UL (ref 0–0.2)
BASOPHILS NFR BLD: 0 % (ref 0–2)
BILIRUB SERPL-MCNC: 0.6 MG/DL (ref 0.2–1.1)
BUN SERPL-MCNC: 29 MG/DL (ref 8–23)
CALCIUM SERPL-MCNC: 9.5 MG/DL (ref 8.3–10.4)
CHLORIDE SERPL-SCNC: 99 MMOL/L (ref 98–107)
CO2 SERPL-SCNC: 33 MMOL/L (ref 21–32)
CREAT SERPL-MCNC: 1.25 MG/DL (ref 0.8–1.5)
DIFFERENTIAL METHOD BLD: ABNORMAL
EOSINOPHIL # BLD: 0.1 K/UL (ref 0–0.8)
EOSINOPHIL NFR BLD: 2 % (ref 0.5–7.8)
ERYTHROCYTE [DISTWIDTH] IN BLOOD BY AUTOMATED COUNT: 15.3 % (ref 11.9–14.6)
GLOBULIN SER CALC-MCNC: 4.3 G/DL (ref 2.3–3.5)
GLUCOSE BLD STRIP.AUTO-MCNC: 114 MG/DL (ref 65–100)
GLUCOSE SERPL-MCNC: 109 MG/DL (ref 65–100)
HCT VFR BLD AUTO: 37.7 % (ref 41.1–50.3)
HGB BLD-MCNC: 13 G/DL (ref 13.6–17.2)
IMM GRANULOCYTES # BLD: 0.1 K/UL (ref 0–0.5)
IMM GRANULOCYTES NFR BLD AUTO: 1 % (ref 0–5)
LYMPHOCYTES # BLD: 1.2 K/UL (ref 0.5–4.6)
LYMPHOCYTES NFR BLD: 23 % (ref 13–44)
MCH RBC QN AUTO: 24.3 PG (ref 26.1–32.9)
MCHC RBC AUTO-ENTMCNC: 34.5 G/DL (ref 31.4–35)
MCV RBC AUTO: 70.3 FL (ref 79.6–97.8)
MONOCYTES # BLD: 1.2 K/UL (ref 0.1–1.3)
MONOCYTES NFR BLD: 22 % (ref 4–12)
NEUTS SEG # BLD: 2.8 K/UL (ref 1.7–8.2)
NEUTS SEG NFR BLD: 52 % (ref 43–78)
PLATELET # BLD AUTO: 111 K/UL (ref 150–450)
PMV BLD AUTO: ABNORMAL FL (ref 10.8–14.1)
POTASSIUM SERPL-SCNC: 3.8 MMOL/L (ref 3.5–5.1)
PROT SERPL-MCNC: 7.1 G/DL (ref 6.3–8.2)
RBC # BLD AUTO: 5.36 M/UL (ref 4.23–5.67)
SODIUM SERPL-SCNC: 139 MMOL/L (ref 136–145)
WBC # BLD AUTO: 5.3 K/UL (ref 4.3–11.1)

## 2018-05-21 PROCEDURE — 99232 SBSQ HOSP IP/OBS MODERATE 35: CPT | Performed by: INTERNAL MEDICINE

## 2018-05-21 PROCEDURE — 82962 GLUCOSE BLOOD TEST: CPT

## 2018-05-21 PROCEDURE — 74011250636 HC RX REV CODE- 250/636: Performed by: ANESTHESIOLOGY

## 2018-05-21 PROCEDURE — 65270000029 HC RM PRIVATE

## 2018-05-21 PROCEDURE — 36415 COLL VENOUS BLD VENIPUNCTURE: CPT | Performed by: NURSE PRACTITIONER

## 2018-05-21 PROCEDURE — 74011250637 HC RX REV CODE- 250/637: Performed by: RADIOLOGY

## 2018-05-21 PROCEDURE — 97530 THERAPEUTIC ACTIVITIES: CPT

## 2018-05-21 PROCEDURE — 74011250637 HC RX REV CODE- 250/637: Performed by: NURSE PRACTITIONER

## 2018-05-21 PROCEDURE — 85025 COMPLETE CBC W/AUTO DIFF WBC: CPT | Performed by: NURSE PRACTITIONER

## 2018-05-21 PROCEDURE — 80053 COMPREHEN METABOLIC PANEL: CPT | Performed by: NURSE PRACTITIONER

## 2018-05-21 RX ADMIN — APIXABAN 5 MG: 5 TABLET, FILM COATED ORAL at 07:47

## 2018-05-21 RX ADMIN — CARVEDILOL 6.25 MG: 6.25 TABLET, FILM COATED ORAL at 17:10

## 2018-05-21 RX ADMIN — CHLORHEXIDINE GLUCONATE 15 ML: 1.2 RINSE ORAL at 17:10

## 2018-05-21 RX ADMIN — PANTOPRAZOLE SODIUM 40 MG: 40 TABLET, DELAYED RELEASE ORAL at 07:47

## 2018-05-21 RX ADMIN — LEVOTHYROXINE SODIUM 137 MCG: 50 TABLET ORAL at 07:53

## 2018-05-21 RX ADMIN — TORSEMIDE 20 MG: 20 TABLET ORAL at 07:52

## 2018-05-21 RX ADMIN — CYANOCOBALAMIN TAB 1000 MCG 1000 MCG: 1000 TAB at 07:45

## 2018-05-21 RX ADMIN — OXYCODONE HYDROCHLORIDE 30 MG: 15 TABLET ORAL at 22:52

## 2018-05-21 RX ADMIN — OXYCODONE HYDROCHLORIDE 30 MG: 15 TABLET ORAL at 07:47

## 2018-05-21 RX ADMIN — CHLORHEXIDINE GLUCONATE 15 ML: 1.2 RINSE ORAL at 07:48

## 2018-05-21 RX ADMIN — METAXALONE 800 MG: 800 TABLET ORAL at 21:22

## 2018-05-21 RX ADMIN — ROSUVASTATIN CALCIUM 10 MG: 5 TABLET, FILM COATED ORAL at 21:22

## 2018-05-21 RX ADMIN — METAXALONE 800 MG: 800 TABLET ORAL at 07:45

## 2018-05-21 RX ADMIN — HYDROMORPHONE HYDROCHLORIDE 0.5 MG: 1 INJECTION, SOLUTION INTRAMUSCULAR; INTRAVENOUS; SUBCUTANEOUS at 13:59

## 2018-05-21 RX ADMIN — OXYCODONE HYDROCHLORIDE 30 MG: 15 TABLET ORAL at 17:11

## 2018-05-21 RX ADMIN — CARVEDILOL 6.25 MG: 6.25 TABLET, FILM COATED ORAL at 07:53

## 2018-05-21 RX ADMIN — POTASSIUM CHLORIDE 20 MEQ: 1500 TABLET, EXTENDED RELEASE ORAL at 07:45

## 2018-05-21 RX ADMIN — METAXALONE 800 MG: 800 TABLET ORAL at 17:10

## 2018-05-21 RX ADMIN — POTASSIUM CHLORIDE 20 MEQ: 1500 TABLET, EXTENDED RELEASE ORAL at 17:10

## 2018-05-21 RX ADMIN — OXYCODONE HYDROCHLORIDE 30 MG: 15 TABLET ORAL at 11:49

## 2018-05-21 RX ADMIN — APIXABAN 5 MG: 5 TABLET, FILM COATED ORAL at 17:11

## 2018-05-21 NOTE — PROGRESS NOTES
Per Dr. Latoya Faye, pt can hold Revlimid and will not need IV pain meds while in rehab. Jace Herrera at [facility] contacted. Awaiting to hear when pt can go.

## 2018-05-21 NOTE — PROGRESS NOTES
Problem: Falls - Risk of  Goal: *Absence of Falls  Document Bee Fall Risk and appropriate interventions in the flowsheet.    Outcome: Progressing Towards Goal  Fall Risk Interventions:  Mobility Interventions: Patient to call before getting OOB         Medication Interventions: Patient to call before getting OOB    Elimination Interventions: Call light in reach, Patient to call for help with toileting needs

## 2018-05-21 NOTE — PROGRESS NOTES
Problem: Mobility Impaired (Adult and Pediatric)  Goal: *Acute Goals and Plan of Care (Insert Text)  1. Mr. Teresa Mai will perform supine to sit and sit to supine independently in 7 days. 2.  Mr. Teresa Mai will perform sit to stand and bed to chair independently in 7 days. 3.  Mr. Teresa Mai will perform gait 250 ft with rolling walker or least restrictive device independently in 7 days. 4.  Mr. Teresa Mai will perform up and down 4 steps with rail independently in 7 days. PHYSICAL THERAPY: Daily Note, Treatment Day: 3rd, AM 5/21/2018  INPATIENT: Hospital Day: 8  Payor: CARE IMPROVEMENT PLUS / [place] / Product Type: Managed Care Medicare /      NAME/AGE/GENDER: Connie Will is a 65 y.o. male   PRIMARY DIAGNOSIS: Low back pain, unspecified back pain laterality, unspecified chronicity, with sciatica presence unspecified [M54.5]  Low back pain, unspecified back pain laterality, unspecified chronicity, with sciatica presence unspecified [M54.5] <principal problem not specified> <principal problem not specified>  Procedure(s) (LRB):  IR ANESTHESIA/KYPHOPLASTY L1, L2 /   ROOM 502 (N/A)  3 Days Post-Op  ICD-10: Treatment Diagnosis:    · Generalized Muscle Weakness (M62.81)  · Difficulty in walking, Not elsewhere classified (R26.2)  · Low Back Pain (M54.5)   Precaution/Allergies:  Lisinopril and Pcn [penicillins]      ASSESSMENT:     Mr. Teresa Mai supine on arrival, agreeable to PT. Pt on 2 L/min O2 via n.c, states pain 4/10 at rest. Pt has had pain medication. Pt performed log rolling and and supine to sit with CGA. Improved bed mobility this date. Pt transfers with CGA from elevated bed due to pt's height. Pt with increased ambulation using RW and CGA to MIN A. Pt with forward flexed posture at times, multiple cues for walker safety and posture with ambulation. Pt follow with chair for safety due to unsteady gait, seated break after 70' for 2-3 min. Pt then able to ambulate back to room for 80' into bathroom. Pt overall improved with mobility from previous treatment. PT to cont to follow for acute care needs. Pt would benefit from rehab at discharge to cont to address bed mobility, transfers, and ambulation needs. This section established at most recent assessment   PROBLEM LIST (Impairments causing functional limitations):  1. Decreased Strength  2. Decreased Transfer Abilities  3. Decreased Ambulation Ability/Technique  4. Increased Pain  5. Decreased Activity Tolerance  6. Decreased Pacing Skills   INTERVENTIONS PLANNED: (Benefits and precautions of physical therapy have been discussed with the patient.)  1. Bed Mobility  2. Gait Training  3. Therapeutic Activites  4. Therapeutic Exercise/Strengthening  5. Transfer Training     TREATMENT PLAN: Frequency/Duration: 3 times a week for duration of hospital stay  Rehabilitation Potential For Stated Goals: Good     RECOMMENDED REHABILITATION/EQUIPMENT: (at time of discharge pending progress): Due to the probability of continued deficits (see above) this patient will likely need continued skilled physical therapy after discharge. Equipment:    to be determined. HISTORY:   History of Present Injury/Illness (Reason for Referral):  Mr. Onel Jamison is a 65 y.o. male admitted on 5/14/2018 with a primary diagnosis of The primary encounter diagnosis was Acute midline low back pain without sciatica. Diagnoses of Multiple myeloma, remission status unspecified ([address] and Prostate cancer ([facility] [address] [facility] were also pertinent to this visit. .       His PMH includes CHF, Afib, hx of defibrillator implant, HTN and hx of prostate, colon, and thyroid cancer with multiple myeloma. He is a patient of Dr. Marlin Hamilton, currently on treatment with Revlimid/Dex which was just started back on 5/4/18 after his thyroidectomy. Pt states he has not started radioactive iodine because it is currently on backorder. He has had some mild fairly long-term back pain attributed to his myeloma.   He presented to ED with c/o worsening lower back pain, worse when he woke up this AM.  Denies any changes in bowel or bladder habits. No tingling or numbness in extremities. Xray of lumbar spine with no acute changes - showed known osseous lytic lesions and chronic degenerative changes. 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 He will be admitted for further evaluation and management of his intractable back pain.     Past Medical History/Comorbidities:   Mr. Jamison Hylton  has a past medical history of Arrhythmia; Arthritis; BMI 30.0-30.9,adult; Cardiomyopathy ([address] Cholelithiases; Chronic systolic heart failure ([address] (9/13/2011); Gout; H/O: pituitary tumor; High cholesterol; History of thyroid cancer; History of vertigo; Hurthle cell carcinoma of thyroid ([address] Hx of radiation therapy to prostate (2004); Hyperlipidemia (11/18/2015); Hypertension; Hypothyroid; ICD (implantable cardiac defibrillator) in place (9/2011); LBBB (left bundle branch block) (11/18/2015); Malaise and fatigue (11/18/2015); Malignant neoplasm of prostate ([address] Mass of colon; Multiple myeloma ([address] and Sinus node dysfunction ([address] Mr. Jamison Hylton  has a past surgical history that includes hx heent (2008); hx cholecystectomy (2013); hx gi; hx heart catheterization; hx pacemaker (2011/2016); hx colonoscopy; hx thyroidectomy (2008); hx tumor removal (1990/2010); and hx thyroidectomy (01/26/2018).   Social History/Living Environment:   Home Environment: Private residence  # Steps to Enter: 4  One/Two Story Residence: One story  Living Alone: No  Support Systems: Spouse/Significant Other/Partner (however she has back issues herself and can not physically assist him.)  Patient Expects to be Discharged to[de-identified] Unknown  Current DME Used/Available at Home: Cane, straight (he was a walker but it is his mother in laws who is 5 2 and he is over 6 ft. )  Prior Level of Function/Work/Activity:  Has dealt with back pain for many years but never used an assistive device. age, multiple myeloma   Number of Personal Factors/Comorbidities that affect the Plan of Care: 1-2: MODERATE COMPLEXITY   EXAMINATION:   Most Recent Physical Functioning:   Gross Assessment:                  Posture:  Posture Assessment: Forward head, Rounded shoulders  Balance:  Sitting: Intact; With support  Standing: Impaired  Standing - Static: Fair  Standing - Dynamic : Fair Bed Mobility:  Rolling: Contact guard assistance (through logrolling)  Supine to Sit: Contact guard assistance  Sit to Supine:  (left up in bathroom, PCT aware)  Scooting: Supervision  Wheelchair Mobility:     Transfers:  Sit to Stand: Contact guard assistance  Stand to Sit: Contact guard assistance  Bed to Chair: Contact guard assistance;Minimum assistance  Gait:     Base of Support: Center of gravity altered; Widened  Speed/Fatoumata: Slow  Step Length: Left shortened;Right shortened  Swing Pattern: Left symmetrical;Right symmetrical  Gait Abnormalities: Antalgic;Decreased step clearance (back and R side pain)  Distance (ft): 70 Feet (ft) (70' x 2, seated break in chair 2-3 min, unsteady at times)  Assistive Device: Gait belt;Walker, rolling (following with chair)  Ambulation - Level of Assistance: Minimal assistance  Interventions: Safety awareness training;Verbal cues (posture, walker safety)      Body Structures Involved:  1. Muscles Body Functions Affected:  1. Movement Related Activities and Participation Affected:  1. Mobility   Number of elements that affect the Plan of Care: 3: MODERATE COMPLEXITY   CLINICAL PRESENTATION:   Presentation: Evolving clinical presentation with changing clinical characteristics: MODERATE COMPLEXITY   CLINICAL DECISION MAKIN [facility] Mobility Inpatient Short Form  How much difficulty does the patient currently have. .. Unable A Lot A Little None   1.   Turning over in bed (including adjusting bedclothes, sheets and blankets)? [] 1   [] 2   [x] 3   [] 4   2. Sitting down on and standing up from a chair with arms ( e.g., wheelchair, bedside commode, etc.)   [] 1   [] 2   [x] 3   [] 4   3. Moving from lying on back to sitting on the side of the bed? [] 1   [] 2   [x] 3   [] 4   How much help from another person does the patient currently need. .. Total A Lot A Little None   4. Moving to and from a bed to a chair (including a wheelchair)? [] 1   [] 2   [x] 3   [] 4   5. Need to walk in hospital room? [] 1   [x] 2   [] 3   [] 4   6. Climbing 3-5 steps with a railing? [] 1   [x] 2   [] 3   [] 4   © 2007, Trustees of [address] [place] [id number], under license to [employer]. All rights reserved      Score:  Initial: 16 Most Recent: X (Date: -- )    Interpretation of Tool:  Represents activities that are increasingly more difficult (i.e. Bed mobility, Transfers, Gait). Score 24 23 22-20 19-15 14-10 9-7 6     Modifier CH CI CJ CK CL CM CN      ? Mobility - Walking and Moving Around:     - CURRENT STATUS: CK - 40%-59% impaired, limited or restricted    - GOAL STATUS: CJ - 20%-39% impaired, limited or restricted    - D/C STATUS:  ---------------To be determined---------------  Payor: CARE IMPROVEMENT PLUS [place] [employer] / Product Type: Managed Care Medicare /      Medical Necessity:     · Patient is expected to demonstrate progress in functional technique to decrease assistance required with mobility and gait. .  Reason for Services/Other Comments:  · Patient continues to require present interventions due to patient's inability to function at baseline. .   Use of outcome tool(s) and clinical judgement create a POC that gives a: Questionable prediction of patient's progress: MODERATE COMPLEXITY            TREATMENT:   (In addition to Assessment/Re-Assessment sessions the following treatments were rendered)   Pre-treatment Symptoms/Complaints:  \"do we have to right now? \"  Pain: Initial: Pain Intensity 1: 4  Pain Location 1: Back  Pain Intervention(s) 1: Exercise, Repositioned  Post Session:  Increased with exercise/mobility 7/10     Therapeutic Activity: (   23 minutes): Therapeutic activities including bed transfers, ambulation on level ground to improve mobility, strength, balance and coordination. Required minimal A Safety awareness training;Verbal cues (posture, walker safety) to promote motor control of bilateral, lower extremity(s). Walker safety and postural correction. Braces/Orthotics/Lines/Etc:   · O2 at 2 L/min via n.c with ambulation  Treatment/Session Assessment:    · Response to Treatment:  Good, improved overall mobility  · Interdisciplinary Collaboration:   o Physical Therapist  o Registered Nurse  o Rehabilitation Attendant  · After treatment position/precautions:   o Bed/Chair-wheels locked  o Call light within reach  o Family at bedside  o pt in bathroom, PCT aware    · Compliance with Program/Exercises: Will assess as treatment progresses. · Recommendations/Intent for next treatment session: \"Next visit will focus on advancements to more challenging activities and reduction in assistance provided\".   Total Treatment Duration:  PT Patient Time In/Time Out  Time In: 1047  Time Out: [address], PT

## 2018-05-21 NOTE — PROGRESS NOTES
Pt has been accepted to [facility], as Dr. Edward De La Torre has approved the holding of Revlimid while in [facility]. Pt to transport Tuesday at [facility]. Room 321 Report [phone].

## 2018-05-21 NOTE — PROGRESS NOTES
END OF SHIFT NOTE:    Intake/Output  05/20 1901 - 05/21 0700  In: -   Out: 237 [Urine:675]   Voiding: YES  Catheter: NO  Drain:              Stool:  0 occurrences. Stool Assessment  Stool Color: Sue Boom (05/17/18 2202)  Stool Appearance: Soft (05/19/18 1935)  Stool Amount: Small (05/17/18 2202)  Stool Source/Status: Rectum (05/17/18 2202)    Emesis:  0 occurrences. VITAL SIGNS  Patient Vitals for the past 12 hrs:   Temp Pulse Resp BP SpO2   05/20/18 2328 97.3 °F (36.3 °C) 82 18 129/63 96 %   05/20/18 1956 97.6 °F (36.4 °C) 78 18 109/66 93 %       Pain Assessment  Pain 1  Pain Scale 1: Visual (05/21/18 0005)  Pain Intensity 1: 0 (05/21/18 0005)  Patient Stated Pain Goal: 0 (05/20/18 1700)  Pain Reassessment 1: Patient sleeping (05/20/18 1700)  Pain Onset 1: with movement (05/20/18 0839)  Pain Location 1: Back (05/20/18 1620)  Pain Orientation 1: Lower (05/20/18 1620)  Pain Description 1: Aching (05/20/18 1620)  Pain Intervention(s) 1: Medication (see MAR) (05/20/18 1620)    Ambulating  No    Additional Information:     Shift report given to oncoming nurse at the bedside.     Yane Downey

## 2018-05-21 NOTE — PROGRESS NOTES
CM sent updated referral to [facility] and inquired if pt would be accepted if Revlimid was held-awaiting response. CM unsure if holding medication is an option-just ruling out any other barriers. Will follow up with medical team if Revlimid is only barrier to acceptance to STR.

## 2018-05-21 NOTE — PROGRESS NOTES
CM received call From Bertha at [facility]. If pt stays off Revlimid and IV pain medication, he can go to [facility]. NP, Agnes Umana, informed. CM will follow to hear if those things can be held.

## 2018-05-21 NOTE — PROGRESS NOTES
END OF SHIFT NOTE:    Pt received Oxy 3x this shift for pain. Plans for pt to DC to [facility] tomorrow. NO c/o N/V/D. NO needs at present. Intake/Output  05/21 0701 - 05/21 1900  In: 2478 [P.O.:1220]  Out: 350 [Urine:350]   Voiding: YES  Catheter: NO  Drain:              Stool:  0 occurrences. Stool Assessment  Stool Color: Adah Kawasaki (05/17/18 2202)  Stool Appearance: Soft (05/19/18 1935)  Stool Amount: Small (05/17/18 2202)  Stool Source/Status: Rectum (05/17/18 2202)    Emesis:  0 occurrences. VITAL SIGNS  Patient Vitals for the past 12 hrs:   Temp Pulse Resp BP SpO2   05/21/18 1542 97.5 °F (36.4 °C) 89 18 121/63 93 %   05/21/18 1125 97.5 °F (36.4 °C) 60 18 125/80 96 %   05/21/18 1047 - - - - 94 %   05/21/18 0748 97.5 °F (36.4 °C) 81 18 124/79 96 %       Pain Assessment  Pain 1  Pain Scale 1: Numeric (0 - 10) (05/21/18 1801)  Pain Intensity 1: 4 (05/21/18 1801)  Patient Stated Pain Goal: 0 (05/20/18 1700)  Pain Reassessment 1: Yes (05/21/18 1801)  Pain Onset 1: with movement (05/21/18 1149)  Pain Location 1: Back (05/21/18 1711)  Pain Orientation 1: Lower (05/21/18 1711)  Pain Description 1: Aching (05/21/18 1711)  Pain Intervention(s) 1: Medication (see MAR) (05/21/18 1711)    Ambulating  No- getting up with PT to chair      Additional Information:     Shift report will be  given to oncoming nurse at the bedside.     Lizbeth Sullivan

## 2018-05-21 NOTE — PROGRESS NOTES
[facility]        Inpatient Hematology / Oncology Progress Note      Admission Date: 2018  7:42 AM  Reason for Admission/Hospital Course: Low back pain, unspecified back pain laterality, unspecified chronicity, with sciatica presence unspecified [M54.5]  Low back pain, unspecified back pain laterality, unspecified chronicity, with sciatica presence unspecified [M54.5]      24 Hour Events:  Afebrile, VSS  S/p kyphoplasty   Plan to discharge tomorrow to [facility] rehab  Wife at bedside      ROS:  Constitutional: +weakness; negative for fever, chills. CV: Negative for chest pain, palpitations, edema. Respiratory: Negative for dyspnea, cough, wheezing. GI: Negative for nausea, abdominal pain, diarrhea. MSK: +lower back pain - pain improved    10 point review of systems is otherwise negative with the exception of the elements mentioned above in the HPI. Allergies   Allergen Reactions    Lisinopril Cough    Pcn [Penicillins] Swelling       OBJECTIVE:  Patient Vitals for the past 8 hrs:   BP Temp Pulse Resp SpO2   18 1125 125/80 97.5 °F (36.4 °C) 60 18 96 %   18 1047 - - - - 94 %   18 0748 124/79 97.5 °F (36.4 °C) 81 18 96 %     Temp (24hrs), Av.5 °F (36.4 °C), Min:97.3 °F (36.3 °C), Max:97.7 °F (36.5 °C)     0701 -  1900  In: 56 [P.O.:740]  Out: 250 [Urine:250]    Physical Exam:  Constitutional: Well developed, well nourished male in no acute distress, sitting comfortably in the bedside recliner. HEENT: Normocephalic and atraumatic. Oropharynx is clear, mucous membranes are moist.  Extraocular muscles are intact. Sclerae anicteric.           Skin Warm and dry. No bruising and no rash noted. No erythema. No pallor. Respiratory Lungs are clear to auscultation bilaterally without wheezes, rales or rhonchi, normal air exchange without accessory muscle use. CVS Normal rate, regular rhythm and normal S1 and S2.   No murmurs, gallops, or rubs.   Abdomen Soft, nontender and nondistended, normoactive bowel sounds. Neuro Grossly nonfocal with no obvious sensory or motor deficits. MSK Normal range of motion in general.  No edema and no tenderness.    Psych Appropriate mood and affect.           Labs:      Recent Labs      05/21/18   1038  05/19/18   0414   WBC  5.3  8.1   RBC  5.36  4.73   HGB  13.0*  11.2*   HCT  37.7*  33.2*   MCV  70.3*  70.2*   MCH  24.3*  23.7*   MCHC  34.5  33.7   RDW  15.3*  15.2*   PLT  111*  139*   GRANS  52  66   LYMPH  23  13   MONOS  22*  19*   EOS  2  1   BASOS  0  0   IG  1  1   DF  AUTOMATED  AUTOMATED   ANEU  2.8  5.3   ABL  1.2  1.1   ABM  1.2  1.6*   REA  0.1  0.1   ABB  0.0  0.0   AIG  0.1  0.1        Recent Labs      05/21/18   1038  05/19/18   0414   NA  139  143   K  3.8  4.0   CL  99  103   CO2  33*  33*   AGAP  7  7   GLU  109*  86   BUN  29*  31*   CREA  1.25  1.25   GFRAA  >60  >60   GFRNA  59*  59*   CA  9.5  9.0   SGOT  46*  38*   AP  116  111   TP  7.1  6.2*   ALB  2.8*  2.6*   GLOB  4.3*  3.6*   AGRAT  0.7*  0.7*   MG   --   1.8         Imaging:  CT SPINE Gisselle George CONT [[id number]] Collected: 05/14/18 1632      Order Status: Completed Updated: 05/14/18 1657     Addenda:         Addendum:   DC4 The significant findings in this report have been referred to the Imaging Navigator in order to communicate to the referring provider or his/her   designee as outlined in Section II.C.2.a.ii-iii of the ACR Practice Guideline for Communication of Diagnostic Imaging Findings.       Signed: 05/14/18 1655 by Cassy Fonseca MD     Narrative:       Exam:  CT lumbar spine with contrast    History: worsening back pain; hx of multiple myeloma, 77 years Male severe back  pain    Technique: Standard departmental protocol CT of the lumbar spine was performed  after uneventful intravenous administration of 100 cc of nonionic IV contrast.   Coronal and sagittal reformats were obtained.  Radiation dose reduction  techniques were used for this study:  Our CT scanners use one or all of the  following: Automated exposure control, adjustment of the mA and/or kVp according  to patient's size, iterative reconstruction. Comparison: CT torso April 20, 2018    Findings: Neida Peters is diffuse narrowing the spinal canal, consistent with  congenital spinal canal stenosis.  Normal alignment.  There appears to be  partial fusion of the L4 and L5 vertebral bodies.  There are anterior bridging  osteophytes at L5-S1.  Mild central loss of vertebral body height is seen with  superior endplate sclerosis of the L2 and L3 vertebral bodies, these could  represent mild acute to subacute compression fractures, and appear possibly new  since the prior CT torso.  Moderate anterior compression deformity of the T12  vertebral body with anterior bridging osteophytes appears unchanged.  A large  posterior disc osteophyte complex is seen at L4-L5 causing mild spinal canal  narrowing measuring 12 mm in AP dimension.  A large posterior disc osteophyte  complex is seen at L5-S1 without significant spinal canal stenosis.  However  there appears to be severe bilateral neuroforaminal stenosis at this level.  No  evidence of significant spinal canal stenosis.  Normal mineralization. Visualized prevertebral and paravertebral soft tissues unremarkable.       Impression:       Impression:  1.  Suspect acute to subacute mild central compression fractures of the L2 and  L3 vertebral bodies. 2.  Severe bilateral neuroforaminal stenosis at L5-S1.     CT SPINE Anil Diaz WO CONT [[id number]]      Order Status: Canceled      XR SPINE LUMB 2 OR 3 V [[id number]] Collected: 05/14/18 0847     Order Status: Completed Updated: 05/14/18 0852     Narrative:       LUMBAR SPINE RADIOGRAPHS, 5/14/2018    CLINICAL HISTORY:  Chronic low back pain which is severe for the past 2 days.     TECHNIQUE: AP and lateral views of the lumbar spine with coned down view of the  lumbosacral junction. Comparison: CT abdomen 4/20/2018    FINDINGS:  Five lumbar type vertebrae are visualized.  Alignment is maintained at all  levels.  Vertebral body height is maintained at all levels in the lumbar spine. A moderate anterior compression deformity is seen at T12. However, this does not  demonstrate clear acute features in a similar compression deformity was seen on  the prior CT scan and therefore this is not felt to be acute. Fusion is once  again seen between the L4 and L5 vertebral bodies. The posterior elements,  transverse processes, and sacroiliac joints are intact. Mild to moderate  discogenic degenerative changes are seen throughout the lumbar spine. Moderate  facet hypertrophic changes are seen in the lower lumbar spine. There is direct  apposition of spinous processes with mild intervening sclerotic changes in the  mid and lower lumbar spine suggesting a Baastrup's deformity which can be a  source of chronic pain. Irregular lucency is seen likely representing the  patient's lytic bony disease which was better assessed by CT and described as  stable on the recent CT scan. The patient is status post cholecystectomy.       Impression:       IMPRESSION:  1.   No acute changes evident by plain film imaging. 2.  Irregular lucency likely representing the patient's known osseous lytic  metastatic disease. 3.  Chronic degenerative changes as described above. 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 These can be  sources of chronic pain.         XR PELV AP ONLY [[id number]] Collected: 05/14/18 0843     Order Status: Completed Updated: 05/14/18 0849     Narrative:       Pelvis single view 5/14/2018    CLINICAL HISTORY: Acute on chronic low back pain and bilateral hip pain. Unable  to ambulate. More severe for 2 days. History of multiple myeloma. COMPARISON: CT the pelvis 4/20/2018.     FINDINGS:  2 frontal views of the pelvis are submitted for evaluation. The lumbar spine is  partially included in the field of imaging and will be described in a separate  report of lumbar spine x-rays obtained. No abnormal widening is seen of the  sacroiliac joints or pubic symphysis. No acute fracture line is seen of the  pelvic ring. No definite acute fracture is seen of the proximal hips although  dedicated imaging should be performed if hip fracture is strongly suspected. Multiple lucent lesions are seen. These are most clearly demonstrated in the  left parasymphyseal pubic bone and right inferior pubic ramus. These likely  represent the lytic metastatic disease described on the prior CT scan of the  pelvis which was described as not significantly changed. Moderate degenerative  changes are seen of the left hip mainly consisting of joint space loss with some  superior acetabular hypertrophy.       Impression:       IMPRESSION:  1. Lytic osseous metastatic disease without acute osseous changes appreciated. 2. Moderate chronic degenerative changes of the left hip.          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 ICD-10-CM: I50.22  ICD-9-CM: 428.22, 428.0  11/26/2017        Pain ICD-10-CM: R52  ICD-9-CM: 780.96  11/20/2017 MARY (acute kidney injury) ([facility] ICD-10-CM: N17.9  ICD-9-CM: 584.9  11/20/2017        Acute renal failure (ARF) ([facility] ICD-10-CM: N17.9  ICD-9-CM: 584.9  11/18/2017        Intractable pain ICD-10-CM: R52  ICD-9-CM: 780.96  1/11/2017        Acute kidney injury [facility]) ICD-10-CM: N17.9  ICD-9-CM: 584.9  1/11/2017        Pancytopenia ([facility] ICD-10-CM: J46.108  ICD-9-CM: 284.19  1/11/2017        Constipation ICD-10-CM: K59.00  ICD-9-CM: 564.00  1/11/2017        Multiple myeloma ([facility] ICD-10-CM: C90.00  ICD-9-CM: 203.00  1/2/2017        Postural imbalance ICD-10-CM: R29.3  ICD-9-CM: 729.90  12/14/2016        Polyneuropathy ICD-10-CM: G62.9  ICD-9-CM: 356.9  12/14/2016        Fall ICD-10-CM: O72. Jolan Bismark  ICD-9-CM: E888.9  12/14/2016        Encounter for medication management ICD-10-CM: Z79.899  ICD-9-CM: V58.69  12/14/2016        Urinary retention ICD-10-CM: R33.9  ICD-9-CM: 788.20  6/6/2016        Colonic mass ICD-10-CM: K63.9  ICD-9-CM: 569.89  3/23/2016        Hyperlipidemia ICD-10-CM: E78.5  ICD-9-CM: 272.4  11/18/2015        Vertigo ICD-10-CM: V38  ICD-9-CM: 780.4  11/18/2015        Malaise and fatigue ICD-10-CM: R53.81, R53.83  ICD-9-CM: 780.79  11/18/2015        Cardiomyopathy ([facility] ICD-10-CM: I42.9  ICD-9-CM: 425.4  11/18/2015        LBBB (left bundle branch block) ICD-10-CM: I44.7  ICD-9-CM: 426.3  11/18/2015        Arthritis ICD-10-CM: M19.90  ICD-9-CM: 716.90  Unknown        Arrhythmia ICD-10-CM: I49.9  ICD-9-CM: 427.9  Unknown    Overview Signed 6/18/2013  2:07 PM by Josse Figueroa MD     LBBB             Cholelithiases ICD-10-CM: E21.95  ICD-9-CM: 574.20  Unknown        Hx of radiation therapy to prostate ICD-10-CM: Z92.3  ICD-9-CM: V15.3  Unknown        Chronic systolic heart failure ([facility] ICD-10-CM: I50.22  ICD-9-CM: 428.22  9/13/2011        Automatic implantable cardioverter-defibrillator in situ ICD-10-CM: Z95.810  ICD-9-CM: V45.02  9/1/2011            Mr. Louis Burgos is a 65 y.o. male admitted on 5/14/2018 with a primary diagnosis of intractable back pain. His PMH includes CHF, Afib, hx of defibrillator implant, HTN and hx of prostate, colon, and thyroid cancer with multiple myeloma. He is a patient of Dr. Rai Garcia, currently on treatment with Revlimid/Dex which was just started back on 5/4/18 after his thyroidectomy. Pt states he has not started radioactive iodine because it is currently on backorder. He has had some mild fairly long-term back pain attributed to his myeloma. He presented to ED with c/o worsening lower back pain, worse when he woke up this AM.  Denies any changes in bowel or bladder habits. No tingling or numbness in extremities. Xray of lumbar spine with no acute changes - showed known osseous lytic lesions and chronic degenerative changes. 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 PLAN:  Hx of prostate, colon, and thyroid cancers with multiple myeloma  - currently on treatment with Revlimid/Dex  - s/p thyroidectomy, waiting to start on radioactive iodine (currently on backorder per pt)  5/17 MM labs- [place] Free light chains 8.70/Lambda 203.15  Kappa/Lambda 0.4. M Ok 0.80. IgG 1153 IgA 15  IgM 16  5/21 Unable to go to rehab on Revlimid. Spoke with Dr. Rai Garcia - ok to hold Revlimid while at rehab     Intractable lower back pain  - Xray of lumbar spine with no acute changes - showed known osseous lytic lesions and chronic degenerative changes. 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 - Pt unable to have MRI d/t implanted defibrillator. CT L-spine ordered. - Dilaudid ordered PRN  - Pulse steroid dosing  5/15 CT revealed acute to subacute mild central compression fractures of L2 and L3 vertebral bodies and severe b/l neuroforaminal stenosis at L5-S1. IR consulted for kyphoplasty. Currently on prednisone 60mg. 5/17 IR Kyphoplasty tomorrow.  Increase Oxy IR 30 mg q 4.   5/18 s/p Kyphoplasty this AM.  5/20 pain controlled     Continue home meds  Steven SOPs  On Eliquis    Disposition:  Anticipate discharge to [facility]. Agnes Umana NP   [facility]  [address] [address]  Office : [phone]  Fax : [phone]     Attending Addendum:  I personally evaluated the patient with POLI Marques, and agree with the assessment, findings and plan as documented. Appears well, heart regular without murmur, lungs clear, abdomen benign. Sitting in chair. S/p kyphoplasty on 5/18 - still with some pain with movement. Currently pain well controlled. Wife at the bedside updated. Likely d/c to [facility] tomorrow.           Jody Funez MD  [facility]  [address] [place] [address]  Office : [phone]  Fax : [phone]

## 2018-05-22 ENCOUNTER — APPOINTMENT (OUTPATIENT)
Dept: GENERAL RADIOLOGY | Age: 78
DRG: 478 | End: 2018-05-22
Attending: NURSE PRACTITIONER
Payer: MEDICARE

## 2018-05-22 LAB
ALBUMIN SERPL-MCNC: 2.5 G/DL (ref 3.2–4.6)
ALBUMIN SERPL-MCNC: 2.6 G/DL (ref 3.2–4.6)
ALBUMIN/GLOB SERPL: 0.6 {RATIO} (ref 1.2–3.5)
ALBUMIN/GLOB SERPL: 0.6 {RATIO} (ref 1.2–3.5)
ALP SERPL-CCNC: 137 U/L (ref 50–136)
ALP SERPL-CCNC: 152 U/L (ref 50–136)
ALT SERPL-CCNC: 136 U/L (ref 12–65)
ALT SERPL-CCNC: 165 U/L (ref 12–65)
ANION GAP SERPL CALC-SCNC: 7 MMOL/L (ref 7–16)
ANION GAP SERPL CALC-SCNC: 9 MMOL/L (ref 7–16)
AST SERPL-CCNC: 135 U/L (ref 15–37)
AST SERPL-CCNC: 186 U/L (ref 15–37)
BASOPHILS # BLD: 0 K/UL (ref 0–0.2)
BASOPHILS NFR BLD: 0 % (ref 0–2)
BILIRUB SERPL-MCNC: 0.6 MG/DL (ref 0.2–1.1)
BILIRUB SERPL-MCNC: 0.8 MG/DL (ref 0.2–1.1)
BUN SERPL-MCNC: 39 MG/DL (ref 8–23)
BUN SERPL-MCNC: 39 MG/DL (ref 8–23)
CALCIUM SERPL-MCNC: 9.3 MG/DL (ref 8.3–10.4)
CALCIUM SERPL-MCNC: 9.4 MG/DL (ref 8.3–10.4)
CHLORIDE SERPL-SCNC: 100 MMOL/L (ref 98–107)
CHLORIDE SERPL-SCNC: 98 MMOL/L (ref 98–107)
CO2 SERPL-SCNC: 28 MMOL/L (ref 21–32)
CO2 SERPL-SCNC: 30 MMOL/L (ref 21–32)
CREAT SERPL-MCNC: 1.69 MG/DL (ref 0.8–1.5)
CREAT SERPL-MCNC: 2.01 MG/DL (ref 0.8–1.5)
DIFFERENTIAL METHOD BLD: ABNORMAL
EOSINOPHIL # BLD: 0 K/UL (ref 0–0.8)
EOSINOPHIL NFR BLD: 0 % (ref 0.5–7.8)
ERYTHROCYTE [DISTWIDTH] IN BLOOD BY AUTOMATED COUNT: 15.4 % (ref 11.9–14.6)
GLOBULIN SER CALC-MCNC: 4.3 G/DL (ref 2.3–3.5)
GLOBULIN SER CALC-MCNC: 4.5 G/DL (ref 2.3–3.5)
GLUCOSE SERPL-MCNC: 119 MG/DL (ref 65–100)
GLUCOSE SERPL-MCNC: 199 MG/DL (ref 65–100)
HCT VFR BLD AUTO: 37.7 % (ref 41.1–50.3)
HGB BLD-MCNC: 13 G/DL (ref 13.6–17.2)
IMM GRANULOCYTES # BLD: 0 K/UL (ref 0–0.5)
IMM GRANULOCYTES NFR BLD AUTO: 0 % (ref 0–5)
LYMPHOCYTES # BLD: 1 K/UL (ref 0.5–4.6)
LYMPHOCYTES NFR BLD: 13 % (ref 13–44)
MAGNESIUM SERPL-MCNC: 2 MG/DL (ref 1.8–2.4)
MCH RBC QN AUTO: 24.3 PG (ref 26.1–32.9)
MCHC RBC AUTO-ENTMCNC: 34.5 G/DL (ref 31.4–35)
MCV RBC AUTO: 70.5 FL (ref 79.6–97.8)
MONOCYTES # BLD: 1.1 K/UL (ref 0.1–1.3)
MONOCYTES NFR BLD: 15 % (ref 4–12)
NEUTS SEG # BLD: 5.4 K/UL (ref 1.7–8.2)
NEUTS SEG NFR BLD: 72 % (ref 43–78)
PLATELET # BLD AUTO: 106 K/UL (ref 150–450)
PMV BLD AUTO: ABNORMAL FL (ref 10.8–14.1)
POTASSIUM SERPL-SCNC: 4.2 MMOL/L (ref 3.5–5.1)
POTASSIUM SERPL-SCNC: 4.6 MMOL/L (ref 3.5–5.1)
PROT SERPL-MCNC: 6.8 G/DL (ref 6.3–8.2)
PROT SERPL-MCNC: 7.1 G/DL (ref 6.3–8.2)
RBC # BLD AUTO: 5.35 M/UL (ref 4.23–5.67)
SODIUM SERPL-SCNC: 135 MMOL/L (ref 136–145)
SODIUM SERPL-SCNC: 137 MMOL/L (ref 136–145)
WBC # BLD AUTO: 7.5 K/UL (ref 4.3–11.1)

## 2018-05-22 PROCEDURE — 74011250637 HC RX REV CODE- 250/637: Performed by: NURSE PRACTITIONER

## 2018-05-22 PROCEDURE — 74011250637 HC RX REV CODE- 250/637: Performed by: RADIOLOGY

## 2018-05-22 PROCEDURE — 71046 X-RAY EXAM CHEST 2 VIEWS: CPT

## 2018-05-22 PROCEDURE — 65270000029 HC RM PRIVATE

## 2018-05-22 PROCEDURE — 36415 COLL VENOUS BLD VENIPUNCTURE: CPT | Performed by: NURSE PRACTITIONER

## 2018-05-22 PROCEDURE — 80053 COMPREHEN METABOLIC PANEL: CPT | Performed by: NURSE PRACTITIONER

## 2018-05-22 PROCEDURE — 74011250636 HC RX REV CODE- 250/636: Performed by: NURSE PRACTITIONER

## 2018-05-22 PROCEDURE — 85025 COMPLETE CBC W/AUTO DIFF WBC: CPT | Performed by: NURSE PRACTITIONER

## 2018-05-22 PROCEDURE — 83735 ASSAY OF MAGNESIUM: CPT | Performed by: NURSE PRACTITIONER

## 2018-05-22 PROCEDURE — 99232 SBSQ HOSP IP/OBS MODERATE 35: CPT | Performed by: INTERNAL MEDICINE

## 2018-05-22 RX ORDER — LEVOFLOXACIN 750 MG/1
750 TABLET ORAL EVERY 24 HOURS
Qty: 6 TAB | Refills: 0 | Status: SHIPPED | OUTPATIENT
Start: 2018-05-23 | End: 2018-05-29

## 2018-05-22 RX ORDER — SODIUM CHLORIDE 9 MG/ML
100 INJECTION, SOLUTION INTRAVENOUS CONTINUOUS
Status: DISCONTINUED | OUTPATIENT
Start: 2018-05-22 | End: 2018-05-23 | Stop reason: HOSPADM

## 2018-05-22 RX ADMIN — LEVOTHYROXINE SODIUM 137 MCG: 50 TABLET ORAL at 07:46

## 2018-05-22 RX ADMIN — CYANOCOBALAMIN TAB 1000 MCG 1000 MCG: 1000 TAB at 07:45

## 2018-05-22 RX ADMIN — POTASSIUM CHLORIDE 20 MEQ: 1500 TABLET, EXTENDED RELEASE ORAL at 07:45

## 2018-05-22 RX ADMIN — PANTOPRAZOLE SODIUM 40 MG: 40 TABLET, DELAYED RELEASE ORAL at 07:47

## 2018-05-22 RX ADMIN — ROSUVASTATIN CALCIUM 10 MG: 5 TABLET, FILM COATED ORAL at 21:58

## 2018-05-22 RX ADMIN — TORSEMIDE 20 MG: 20 TABLET ORAL at 07:47

## 2018-05-22 RX ADMIN — METAXALONE 800 MG: 800 TABLET ORAL at 21:57

## 2018-05-22 RX ADMIN — CARVEDILOL 6.25 MG: 6.25 TABLET, FILM COATED ORAL at 17:14

## 2018-05-22 RX ADMIN — METAXALONE 800 MG: 800 TABLET ORAL at 17:14

## 2018-05-22 RX ADMIN — CHLORHEXIDINE GLUCONATE 15 ML: 1.2 RINSE ORAL at 07:45

## 2018-05-22 RX ADMIN — OXYCODONE HYDROCHLORIDE 30 MG: 15 TABLET ORAL at 19:25

## 2018-05-22 RX ADMIN — METAXALONE 800 MG: 800 TABLET ORAL at 07:47

## 2018-05-22 RX ADMIN — CHLORHEXIDINE GLUCONATE 15 ML: 1.2 RINSE ORAL at 17:14

## 2018-05-22 RX ADMIN — OXYCODONE HYDROCHLORIDE 30 MG: 15 TABLET ORAL at 03:13

## 2018-05-22 RX ADMIN — LEVOFLOXACIN 750 MG: 500 TABLET, FILM COATED ORAL at 17:14

## 2018-05-22 RX ADMIN — SODIUM CHLORIDE 100 ML/HR: 900 INJECTION, SOLUTION INTRAVENOUS at 17:00

## 2018-05-22 RX ADMIN — CARVEDILOL 6.25 MG: 6.25 TABLET, FILM COATED ORAL at 07:46

## 2018-05-22 RX ADMIN — APIXABAN 5 MG: 5 TABLET, FILM COATED ORAL at 07:46

## 2018-05-22 NOTE — PROGRESS NOTES
END OF SHIFT NOTE:    Intake/Output  05/22 0701 - 05/22 1900  In: -   Out: 50 [Urine:50]   Voiding: YES  Catheter: NO  Drain:              Stool:  0 occurrences. Stool Assessment  Stool Color: Si Joshua (05/17/18 2202)  Stool Appearance: Soft (05/21/18 1850)  Stool Amount: Small (05/17/18 2202)  Stool Source/Status: Rectum (05/17/18 2202)    Emesis:  0 occurrences. VITAL SIGNS  Patient Vitals for the past 12 hrs:   Temp Pulse Resp BP SpO2   05/22/18 0716 96.6 °F (35.9 °C) 97 18 132/82 92 %   05/22/18 0340 97.9 °F (36.6 °C) (!) 57 18 94/56 96 %   05/22/18 0046 98.1 °F (36.7 °C) 91 18 96/53 92 %   05/21/18 2140 98.8 °F (37.1 °C) (!) 109 20 144/71 (!) 86 %   05/21/18 2002 97.6 °F (36.4 °C) 72 16 106/52 94 %       Pain Assessment  Pain 1  Pain Scale 1: Visual (05/22/18 0358)  Pain Intensity 1: 0 (05/22/18 0358)  Patient Stated Pain Goal: 0 (05/22/18 0358)  Pain Reassessment 1: Patient sleeping (05/22/18 0358)  Pain Onset 1: with movement (05/21/18 1149)  Pain Location 1: Back (05/22/18 0315)  Pain Orientation 1: Lower (05/22/18 0315)  Pain Description 1: Aching (05/22/18 0315)  Pain Intervention(s) 1: Medication (see MAR) (05/22/18 0315)    Ambulating  No    Additional Information: Pt is alert and oriented. Pt required pain meds continuously through the night. No other needs voiced. Shift report given to oncoming nurse at the bedside.     Cira Murray, RN

## 2018-05-22 NOTE — PROGRESS NOTES
Spoke with Elham Zimmerman RN at [facility] rehab about patient not going today due to elevated LFTs and high creatinine.

## 2018-05-22 NOTE — PROGRESS NOTES
2206- This RN notified by patient of \"coughing up blood\". Pt stated this is the first time this has happened. Pt has coughed up blood tinged sputum 2x in  [place]. Patricia Hernández NP notified. Also spoke about patients fluctuating BPs and HRs. New orders for CXR, sputum sample and additional labs. Also, this RN noticed bottom L incision from kyphoplasty bleeding and slightly swollen with small amts of blood on changing pad. Spoke with IR, per Dr. Wright Diver recommends holding Eliquis for 3 days and apply dressing and pressure as needed. 1817-  END OF SHIFT NOTE:    Pt has not needed pain medication from this RN this shift. Pt D/C TBD once labs and patient are stable. No c/o N/V/D. VSS. No needs at present. Intake/Output  05/22 0701 - 05/22 1900  In: 564.5 [P.O.:480; I.V.:84.5]  Out: 400 [Urine:400]   Voiding: YES  Catheter: NO  Drain:              Stool:  0 occurrences. Stool Assessment  Stool Color: Dorothy Poplin (05/17/18 2202)  Stool Appearance: Soft (05/21/18 1850)  Stool Amount: Small (05/17/18 2202)  Stool Source/Status: Rectum (05/17/18 2202)    Emesis:  0 occurrences. VITAL SIGNS  Patient Vitals for the past 12 hrs:   Temp Pulse Resp BP SpO2   05/22/18 1553 97.5 °F (36.4 °C) 84 18 118/66 93 %   05/22/18 1138 97.4 °F (36.3 °C) 92 18 122/67 94 %   05/22/18 0716 96.6 °F (35.9 °C) 97 18 132/82 92 %       Pain Assessment  Pain 1  Pain Scale 1: Numeric (0 - 10) (05/22/18 1400)  Pain Intensity 1: 4 (05/22/18 1400)  Patient Stated Pain Goal: 0 (05/22/18 0358)  Pain Reassessment 1: Patient sleeping (05/22/18 0358)  Pain Onset 1: with movement (05/21/18 1149)  Pain Location 1: Back (05/22/18 0315)  Pain Orientation 1: Lower (05/22/18 0315)  Pain Description 1: Aching (05/22/18 0315)  Pain Intervention(s) 1: Medication (see MAR) (05/22/18 0315)    Ambulating  No    Additional Information:     Shift report will be  given to oncoming nurse at the bedside.     Philippe Cano

## 2018-05-22 NOTE — PROGRESS NOTES
[facility]        Inpatient Hematology / Oncology Progress Note      Admission Date: 2018  7:42 AM  Reason for Admission/Hospital Course: Low back pain, unspecified back pain laterality, unspecified chronicity, with sciatica presence unspecified [M54.5]  Low back pain, unspecified back pain laterality, unspecified chronicity, with sciatica presence unspecified [M54.5]      24 Hour Events:  Afebrile, VSS  S/p kyphoplasty   Discharged delayed d/t pt's c/o blood-tinged sputum - CXR pending  Wife at bedside      ROS:  Constitutional: +weakness; negative for fever, chills. CV: Negative for chest pain, palpitations, edema. Respiratory: +cough +blood-tinged sputum. Negative for dyspnea, wheezing. GI: Negative for nausea, abdominal pain, diarrhea. MSK: +lower back pain - pain improved    10 point review of systems is otherwise negative with the exception of the elements mentioned above in the HPI. Allergies   Allergen Reactions    Lisinopril Cough    Pcn [Penicillins] Swelling       OBJECTIVE:  Patient Vitals for the past 8 hrs:   BP Temp Pulse Resp SpO2   18 1138 122/67 97.4 °F (36.3 °C) 92 18 94 %   18 0716 132/82 96.6 °F (35.9 °C) 97 18 92 %     Temp (24hrs), Av.7 °F (36.5 °C), Min:96.6 °F (35.9 °C), Max:98.8 °F (37.1 °C)     0701 -  1900  In: 480 [P.O.:480]  Out: 275 [Urine:275]    Physical Exam:  Constitutional: Well developed, well nourished male in no acute distress, sitting comfortably in the hospital bed. HEENT: Normocephalic and atraumatic. Oropharynx is clear, mucous membranes are moist.  Extraocular muscles are intact. Sclerae anicteric.           Skin Warm and dry. No bruising and no rash noted. No erythema. No pallor. Respiratory Lungs are clear to auscultation bilaterally without wheezes, rales or rhonchi, normal air exchange without accessory muscle use. CVS Normal rate, regular rhythm and normal S1 and S2.   No murmurs, gallops, or rubs. Abdomen Soft, nontender and nondistended, normoactive bowel sounds. Neuro Grossly nonfocal with no obvious sensory or motor deficits. MSK Normal range of motion in general.  No edema and no tenderness.    Psych Appropriate mood and affect.           Labs:      Recent Labs      05/22/18   0946  05/21/18   1038   WBC  7.5  5.3   RBC  5.35  5.36   HGB  13.0*  13.0*   HCT  37.7*  37.7*   MCV  70.5*  70.3*   MCH  24.3*  24.3*   MCHC  34.5  34.5   RDW  15.4*  15.3*   PLT  106*  111*   GRANS  72  52   LYMPH  13  23   MONOS  15*  22*   EOS  0*  2   BASOS  0  0   IG  0  1   DF  AUTOMATED  AUTOMATED   ANEU  5.4  2.8   ABL  1.0  1.2   ABM  1.1  1.2   REA  0.0  0.1   ABB  0.0  0.0   AIG  0.0  0.1        Recent Labs      05/22/18   0946  05/21/18   1038   NA  135*  139   K  4.6  3.8   CL  98  99   CO2  30  33*   AGAP  7  7   GLU  199*  109*   BUN  39*  29*   CREA  2.01*  1.25   GFRAA  42*  >60   GFRNA  34*  59*   CA  9.4  9.5   SGOT  186*  46*   AP  152*  116   TP  7.1  7.1   ALB  2.6*  2.8*   GLOB  4.5*  4.3*   AGRAT  0.6*  0.7*   MG  2.0   --          Imaging:  CT SPINE Nelson Latif CONT [[id number]] Collected: 05/14/18 1632      Order Status: Completed Updated: 05/14/18 1657     Addenda:         Addendum:   DC4 The significant findings in this report have been referred to the Imaging Navigator in order to communicate to the referring provider or his/her   designee as outlined in Section II.C.2.a.ii-iii of the ACR Practice Guideline for Communication of Diagnostic Imaging Findings.       Signed: 05/14/18 5755 by John Bañuelos MD     Narrative:       Exam:  CT lumbar spine with contrast    History: worsening back pain; hx of multiple myeloma, 77 years Male severe back  pain    Technique: Standard departmental protocol CT of the lumbar spine was performed  after uneventful intravenous administration of 100 cc of nonionic IV contrast.   Coronal and sagittal reformats were obtained.  Radiation dose reduction  techniques were used for this study:  Our CT scanners use one or all of the  following: Automated exposure control, adjustment of the mA and/or kVp according  to patient's size, iterative reconstruction. Comparison: CT torso April 20, 2018    Findings: Rondi Fails is diffuse narrowing the spinal canal, consistent with  congenital spinal canal stenosis.  Normal alignment.  There appears to be  partial fusion of the L4 and L5 vertebral bodies.  There are anterior bridging  osteophytes at L5-S1.  Mild central loss of vertebral body height is seen with  superior endplate sclerosis of the L2 and L3 vertebral bodies, these could  represent mild acute to subacute compression fractures, and appear possibly new  since the prior CT torso.  Moderate anterior compression deformity of the T12  vertebral body with anterior bridging osteophytes appears unchanged.  A large  posterior disc osteophyte complex is seen at L4-L5 causing mild spinal canal  narrowing measuring 12 mm in AP dimension.  A large posterior disc osteophyte  complex is seen at L5-S1 without significant spinal canal stenosis.  However  there appears to be severe bilateral neuroforaminal stenosis at this level.  No  evidence of significant spinal canal stenosis.  Normal mineralization. Visualized prevertebral and paravertebral soft tissues unremarkable.       Impression:       Impression:  1.  Suspect acute to subacute mild central compression fractures of the L2 and  L3 vertebral bodies. 2.  Severe bilateral neuroforaminal stenosis at L5-S1.     CT SPINE Uvaldo Armenta WO CONT [[id number]]      Order Status: Canceled      XR SPINE LUMB 2 OR 3 V [[id number]] Collected: 05/14/18 0847     Order Status: Completed Updated: 05/14/18 0852     Narrative:       LUMBAR SPINE RADIOGRAPHS, 5/14/2018    CLINICAL HISTORY:  Chronic low back pain which is severe for the past 2 days.     TECHNIQUE: AP and lateral views of the lumbar spine with coned down view of the  lumbosacral junction. Comparison: CT abdomen 4/20/2018    FINDINGS:  Five lumbar type vertebrae are visualized.  Alignment is maintained at all  levels.  Vertebral body height is maintained at all levels in the lumbar spine. A moderate anterior compression deformity is seen at T12. However, this does not  demonstrate clear acute features in a similar compression deformity was seen on  the prior CT scan and therefore this is not felt to be acute. Fusion is once  again seen between the L4 and L5 vertebral bodies. The posterior elements,  transverse processes, and sacroiliac joints are intact. Mild to moderate  discogenic degenerative changes are seen throughout the lumbar spine. Moderate  facet hypertrophic changes are seen in the lower lumbar spine. There is direct  apposition of spinous processes with mild intervening sclerotic changes in the  mid and lower lumbar spine suggesting a Baastrup's deformity which can be a  source of chronic pain. Irregular lucency is seen likely representing the  patient's lytic bony disease which was better assessed by CT and described as  stable on the recent CT scan. The patient is status post cholecystectomy.       Impression:       IMPRESSION:  1.   No acute changes evident by plain film imaging. 2.  Irregular lucency likely representing the patient's known osseous lytic  metastatic disease. 3.  Chronic degenerative changes as described above. 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 These can be  sources of chronic pain.         XR PELV AP ONLY [[id number]] Collected: 05/14/18 0843     Order Status: Completed Updated: 05/14/18 0849     Narrative:       Pelvis single view 5/14/2018    CLINICAL HISTORY: Acute on chronic low back pain and bilateral hip pain. Unable  to ambulate. More severe for 2 days. History of multiple myeloma. COMPARISON: CT the pelvis 4/20/2018.     FINDINGS:  2 frontal views of the pelvis are submitted for evaluation. The lumbar spine is  partially included in the field of imaging and will be described in a separate  report of lumbar spine x-rays obtained. No abnormal widening is seen of the  sacroiliac joints or pubic symphysis. No acute fracture line is seen of the  pelvic ring. No definite acute fracture is seen of the proximal hips although  dedicated imaging should be performed if hip fracture is strongly suspected. Multiple lucent lesions are seen. These are most clearly demonstrated in the  left parasymphyseal pubic bone and right inferior pubic ramus. These likely  represent the lytic metastatic disease described on the prior CT scan of the  pelvis which was described as not significantly changed. Moderate degenerative  changes are seen of the left hip mainly consisting of joint space loss with some  superior acetabular hypertrophy.       Impression:       IMPRESSION:  1. Lytic osseous metastatic disease without acute osseous changes appreciated. 2. Moderate chronic degenerative changes of the left hip.          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  ICD-9-CM: 227.3  3/15/2018        Thyroid mass ICD-10-CM: E07.9  ICD-9-CM: 246.9  2/8/2018        Sinus bradycardia ICD-10-CM: R00.1  ICD-9-CM: 427.89  1/12/2018        PAF (paroxysmal atrial fibrillation) ([facility] ICD-10-CM: I48.0  ICD-9-CM: 427.31  11/26/2017        Chronic systolic CHF (congestive heart failure) ([facility] ICD-10-CM: I50.22  ICD-9-CM: 428.22, 428.0  11/26/2017        Pain ICD-10-CM: R52  ICD-9-CM: 780.96  11/20/2017 MARY (acute kidney injury) ([facility] ICD-10-CM: N17.9  ICD-9-CM: 584.9  11/20/2017        Acute renal failure (ARF) ([facility] ICD-10-CM: N17.9  ICD-9-CM: 584.9  11/18/2017        Intractable pain ICD-10-CM: R52  ICD-9-CM: 780.96  1/11/2017        Acute kidney injury [facility]) ICD-10-CM: N17.9  ICD-9-CM: 584.9  1/11/2017        Pancytopenia ([facility] ICD-10-CM: Y57.974  ICD-9-CM: 284.19  1/11/2017        Constipation ICD-10-CM: K59.00  ICD-9-CM: 564.00  1/11/2017        Multiple myeloma ([facility] ICD-10-CM: C90.00  ICD-9-CM: 203.00  1/2/2017        Postural imbalance ICD-10-CM: R29.3  ICD-9-CM: 729.90  12/14/2016        Polyneuropathy ICD-10-CM: G62.9  ICD-9-CM: 356.9  12/14/2016        Fall ICD-10-CM: J37. Prabhu Iroquois  ICD-9-CM: E888.9  12/14/2016        Encounter for medication management ICD-10-CM: Z79.899  ICD-9-CM: V58.69  12/14/2016        Urinary retention ICD-10-CM: R33.9  ICD-9-CM: 788.20  6/6/2016        Colonic mass ICD-10-CM: K63.9  ICD-9-CM: 569.89  3/23/2016        Hyperlipidemia ICD-10-CM: E78.5  ICD-9-CM: 272.4  11/18/2015        Vertigo ICD-10-CM: Y97  ICD-9-CM: 780.4  11/18/2015        Malaise and fatigue ICD-10-CM: R53.81, R53.83  ICD-9-CM: 780.79  11/18/2015        Cardiomyopathy ([facility] ICD-10-CM: I42.9  ICD-9-CM: 425.4  11/18/2015        LBBB (left bundle branch block) ICD-10-CM: I44.7  ICD-9-CM: 426.3  11/18/2015        Arthritis ICD-10-CM: M19.90  ICD-9-CM: 716.90  Unknown        Arrhythmia ICD-10-CM: I49.9  ICD-9-CM: 427.9  Unknown    Overview Signed 6/18/2013  2:07 PM by Jaye Amador MD     LBBB             Cholelithiases ICD-10-CM: A40.83  ICD-9-CM: 574.20  Unknown        Hx of radiation therapy to prostate ICD-10-CM: Z92.3  ICD-9-CM: V15.3  Unknown        Chronic systolic heart failure ([facility] ICD-10-CM: I50.22  ICD-9-CM: 428.22  9/13/2011        Automatic implantable cardioverter-defibrillator in situ ICD-10-CM: Z95.810  ICD-9-CM: V45.02  9/1/2011            Mr. Omid Holden is a 65 y.o. male admitted on 5/14/2018 with a primary diagnosis of intractable back pain. His PMH includes CHF, Afib, hx of defibrillator implant, HTN and hx of prostate, colon, and thyroid cancer with multiple myeloma. He is a patient of Dr. Lizbeth Lagos, currently on treatment with Revlimid/Dex which was just started back on 5/4/18 after his thyroidectomy. Pt states he has not started radioactive iodine because it is currently on backorder. He has had some mild fairly long-term back pain attributed to his myeloma. He presented to ED with c/o worsening lower back pain, worse when he woke up this AM.  Denies any changes in bowel or bladder habits. No tingling or numbness in extremities. Xray of lumbar spine with no acute changes - showed known osseous lytic lesions and chronic degenerative changes. 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 PLAN:  Hx of prostate, colon, and thyroid cancers with multiple myeloma  - currently on treatment with Revlimid/Dex  - s/p thyroidectomy, waiting to start on radioactive iodine (currently on backorder per pt)  5/17 MM labs- [place] Free light chains 8.70/Lambda 203.15  Kappa/Lambda 0.4. M Ok 0.80. IgG 1153 IgA 15  IgM 16  5/21 Unable to go to rehab on Revlimid. Spoke with Dr. Lizbeth Lagos - ok to hold Revlimid while at rehab     Intractable lower back pain  - Xray of lumbar spine with no acute changes - showed known osseous lytic lesions and chronic degenerative changes. 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 - Pt unable to have MRI d/t implanted defibrillator. CT L-spine ordered. - Dilaudid ordered PRN  - Pulse steroid dosing  5/15 CT revealed acute to subacute mild central compression fractures of L2 and L3 vertebral bodies and severe b/l neuroforaminal stenosis at L5-S1. IR consulted for kyphoplasty. Currently on prednisone 60mg. 5/17 IR Kyphoplasty tomorrow.  Increase Oxy IR 30 mg q 4.   5/18 s/p Kyphoplasty this AM.  5/20 pain controlled  5/22 RN noted mild bleeding at site of kyphoplasty. Contacted IR who recommends to hold Eliquis x 3 days    Blood-tinged sputum  5/22 Pt reports 2 episodes of coughing up blood-tinged sputum. CXR pending. Sputum cx ordered.     Continue home meds  Steven SOPs  On Eliquis (hold until 5/26)    Disposition:  Anticipate discharge to [facility] either later today or tomorrow pending CXR results.             Jeramie Briseno NP   [facility]  [id number] [place] [address]  Office : [phone]  Fax : [phone]

## 2018-05-22 NOTE — PROGRESS NOTES
Per request of POLI Briseno, transport to [facility] set to [place] instead of 12pm.  CM will follow.

## 2018-05-22 NOTE — PROGRESS NOTES
[facility]ematology & Oncology        Inpatient Hematology / Oncology Progress Note      Admission Date: 2018  7:42 AM  Reason for Admission/Hospital Course: Low back pain, unspecified back pain laterality, unspecified chronicity, with sciatica presence unspecified [M54.5]  Low back pain, unspecified back pain laterality, unspecified chronicity, with sciatica presence unspecified [M54.5]      24 Hour Events:  Afebrile, VSS  S/p kyphoplasty   Pt c/o coughing up blood-tinged sputum this AM  Cr up to 2.01  Elevated LFTs  Wife at bedside      ROS:  Constitutional: +weakness; negative for fever, chills. CV: Negative for chest pain, palpitations, edema. Respiratory: Negative for dyspnea, cough, wheezing. GI: Negative for nausea, abdominal pain, diarrhea. MSK: +lower back pain - pain improved    10 point review of systems is otherwise negative with the exception of the elements mentioned above in the HPI. Allergies   Allergen Reactions    Lisinopril Cough    Pcn [Penicillins] Swelling       OBJECTIVE:  Patient Vitals for the past 8 hrs:   BP Temp Pulse Resp SpO2   18 1553 118/66 97.5 °F (36.4 °C) 84 18 93 %   18 1138 122/67 97.4 °F (36.3 °C) 92 18 94 %     Temp (24hrs), Av.7 °F (36.5 °C), Min:96.6 °F (35.9 °C), Max:98.8 °F (37.1 °C)     0701 -  1900  In: 480 [P.O.:480]  Out: 400 [Urine:400]    Physical Exam:  Constitutional: Well developed, well nourished male in no acute distress, sitting comfortably in the hospital bed. HEENT: Normocephalic and atraumatic. Oropharynx is clear, mucous membranes are moist.  Extraocular muscles are intact. Sclerae anicteric.           Skin Warm and dry. No bruising and no rash noted. No erythema. No pallor. Respiratory Lungs are clear to auscultation bilaterally without wheezes, rales or rhonchi, normal air exchange without accessory muscle use. CVS Normal rate, regular rhythm and normal S1 and S2.   No murmurs, gallops, or rubs.   Abdomen Soft, nontender and nondistended, normoactive bowel sounds. Neuro Grossly nonfocal with no obvious sensory or motor deficits. MSK Normal range of motion in general.  No edema and no tenderness.    Psych Appropriate mood and affect.       Labs:      Recent Labs      05/22/18   0946  05/21/18   1038   WBC  7.5  5.3   RBC  5.35  5.36   HGB  13.0*  13.0*   HCT  37.7*  37.7*   MCV  70.5*  70.3*   MCH  24.3*  24.3*   MCHC  34.5  34.5   RDW  15.4*  15.3*   PLT  106*  111*   GRANS  72  52   LYMPH  13  23   MONOS  15*  22*   EOS  0*  2   BASOS  0  0   IG  0  1   DF  AUTOMATED  AUTOMATED   ANEU  5.4  2.8   ABL  1.0  1.2   ABM  1.1  1.2   REA  0.0  0.1   ABB  0.0  0.0   AIG  0.0  0.1        Recent Labs      05/22/18   0946  05/21/18   1038   NA  135*  139   K  4.6  3.8   CL  98  99   CO2  30  33*   AGAP  7  7   GLU  199*  109*   BUN  39*  29*   CREA  2.01*  1.25   GFRAA  42*  >60   GFRNA  34*  59*   CA  9.4  9.5   SGOT  186*  46*   AP  152*  116   TP  7.1  7.1   ALB  2.6*  2.8*   GLOB  4.5*  4.3*   AGRAT  0.6*  0.7*   MG  2.0   --          Imaging:  CT SPINE Roel Scarce CONT [[id number]] Collected: 05/14/18 1632      Order Status: Completed Updated: 05/14/18 1657     Addenda:         Addendum:   DC4 The significant findings in this report have been referred to the Imaging Navigator in order to communicate to the referring provider or his/her   designee as outlined in Section II.C.2.a.ii-iii of the ACR Practice Guideline for Communication of Diagnostic Imaging Findings.       Signed: 05/14/18 1655 by David Jolly MD     Narrative:       Exam:  CT lumbar spine with contrast    History: worsening back pain; hx of multiple myeloma, 77 years Male severe back  pain    Technique: Standard departmental protocol CT of the lumbar spine was performed  after uneventful intravenous administration of 100 cc of nonionic IV contrast.   Coronal and sagittal reformats were obtained.  Radiation dose reduction  techniques were used for this study:  Our CT scanners use one or all of the  following: Automated exposure control, adjustment of the mA and/or kVp according  to patient's size, iterative reconstruction. Comparison: CT torso April 20, 2018    Findings: Jose Luis Gens is diffuse narrowing the spinal canal, consistent with  congenital spinal canal stenosis.  Normal alignment.  There appears to be  partial fusion of the L4 and L5 vertebral bodies.  There are anterior bridging  osteophytes at L5-S1.  Mild central loss of vertebral body height is seen with  superior endplate sclerosis of the L2 and L3 vertebral bodies, these could  represent mild acute to subacute compression fractures, and appear possibly new  since the prior CT torso.  Moderate anterior compression deformity of the T12  vertebral body with anterior bridging osteophytes appears unchanged.  A large  posterior disc osteophyte complex is seen at L4-L5 causing mild spinal canal  narrowing measuring 12 mm in AP dimension.  A large posterior disc osteophyte  complex is seen at L5-S1 without significant spinal canal stenosis.  However  there appears to be severe bilateral neuroforaminal stenosis at this level.  No  evidence of significant spinal canal stenosis.  Normal mineralization. Visualized prevertebral and paravertebral soft tissues unremarkable.       Impression:       Impression:  1.  Suspect acute to subacute mild central compression fractures of the L2 and  L3 vertebral bodies. 2.  Severe bilateral neuroforaminal stenosis at L5-S1.     CT SPINE Ole Gondola WO CONT [[id number]]      Order Status: Canceled      XR SPINE LUMB 2 OR 3 V [[id number]] Collected: 05/14/18 0847     Order Status: Completed Updated: 05/14/18 0852     Narrative:       LUMBAR SPINE RADIOGRAPHS, 5/14/2018    CLINICAL HISTORY:  Chronic low back pain which is severe for the past 2 days. TECHNIQUE: AP and lateral views of the lumbar spine with coned down view of the  lumbosacral junction.     Comparison: CT abdomen 4/20/2018    FINDINGS:  Five lumbar type vertebrae are visualized.  Alignment is maintained at all  levels.  Vertebral body height is maintained at all levels in the lumbar spine. A moderate anterior compression deformity is seen at T12. However, this does not  demonstrate clear acute features in a similar compression deformity was seen on  the prior CT scan and therefore this is not felt to be acute. Fusion is once  again seen between the L4 and L5 vertebral bodies. The posterior elements,  transverse processes, and sacroiliac joints are intact. Mild to moderate  discogenic degenerative changes are seen throughout the lumbar spine. Moderate  facet hypertrophic changes are seen in the lower lumbar spine. There is direct  apposition of spinous processes with mild intervening sclerotic changes in the  mid and lower lumbar spine suggesting a Baastrup's deformity which can be a  source of chronic pain. Irregular lucency is seen likely representing the  patient's lytic bony disease which was better assessed by CT and described as  stable on the recent CT scan. The patient is status post cholecystectomy.       Impression:       IMPRESSION:  1.   No acute changes evident by plain film imaging. 2.  Irregular lucency likely representing the patient's known osseous lytic  metastatic disease. 3.  Chronic degenerative changes as described above. 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 These can be  sources of chronic pain.         XR PELV AP ONLY [[id number]] Collected: 05/14/18 0843     Order Status: Completed Updated: 05/14/18 0849     Narrative:       Pelvis single view 5/14/2018    CLINICAL HISTORY: Acute on chronic low back pain and bilateral hip pain. Unable  to ambulate. More severe for 2 days. History of multiple myeloma. COMPARISON: CT the pelvis 4/20/2018. FINDINGS:  2 frontal views of the pelvis are submitted for evaluation.  The lumbar spine is  partially included in the field of imaging and will be described in a separate  report of lumbar spine x-rays obtained. No abnormal widening is seen of the  sacroiliac joints or pubic symphysis. No acute fracture line is seen of the  pelvic ring. No definite acute fracture is seen of the proximal hips although  dedicated imaging should be performed if hip fracture is strongly suspected. Multiple lucent lesions are seen. These are most clearly demonstrated in the  left parasymphyseal pubic bone and right inferior pubic ramus. These likely  represent the lytic metastatic disease described on the prior CT scan of the  pelvis which was described as not significantly changed. Moderate degenerative  changes are seen of the left hip mainly consisting of joint space loss with some  superior acetabular hypertrophy.       Impression:       IMPRESSION:  1. Lytic osseous metastatic disease without acute osseous changes appreciated. 2. Moderate chronic degenerative changes of the left hip.          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  ICD-9-CM: 227.3  3/15/2018        Thyroid mass ICD-10-CM: E07.9  ICD-9-CM: 246.9  2/8/2018        Sinus bradycardia ICD-10-CM: R00.1  ICD-9-CM: 427.89  1/12/2018        PAF (paroxysmal atrial fibrillation) (HCC) ICD-10-CM: I48.0  ICD-9-CM: 427.31  11/26/2017        Chronic systolic CHF (congestive heart failure) (HCC) ICD-10-CM: I50.22  ICD-9-CM: 428.22, 428.0  11/26/2017        Pain ICD-10-CM: R52  ICD-9-CM: 780.96  11/20/2017        MARY (acute kidney injury) [facility]) ICD-10-CM: N17.9  ICD-9-CM: 584.9  11/20/2017        Acute renal failure (ARF) ([facility] ICD-10-CM: N17.9  ICD-9-CM: 584.9  11/18/2017        Intractable pain ICD-10-CM: R52  ICD-9-CM: 780.96  1/11/2017        Acute kidney injury [facility]) ICD-10-CM: N17.9  ICD-9-CM: 584.9  1/11/2017        Pancytopenia ([facility] ICD-10-CM: L05.456  ICD-9-CM: 284.19  1/11/2017        Constipation ICD-10-CM: K59.00  ICD-9-CM: 564.00  1/11/2017        Multiple myeloma ([facility] ICD-10-CM: C90.00  ICD-9-CM: 203.00  1/2/2017        Postural imbalance ICD-10-CM: R29.3  ICD-9-CM: 729.90  12/14/2016        Polyneuropathy ICD-10-CM: G62.9  ICD-9-CM: 356.9  12/14/2016        Fall ICD-10-CM: G87. Joana Hale  ICD-9-CM: E888.9  12/14/2016        Encounter for medication management ICD-10-CM: Z79.899  ICD-9-CM: V58.69  12/14/2016        Urinary retention ICD-10-CM: R33.9  ICD-9-CM: 788.20  6/6/2016        Colonic mass ICD-10-CM: K63.9  ICD-9-CM: 569.89  3/23/2016        Hyperlipidemia ICD-10-CM: E78.5  ICD-9-CM: 272.4  11/18/2015        Vertigo ICD-10-CM: K05  ICD-9-CM: 780.4  11/18/2015        Malaise and fatigue ICD-10-CM: R53.81, R53.83  ICD-9-CM: 780.79  11/18/2015        Cardiomyopathy ([facility] ICD-10-CM: I42.9  ICD-9-CM: 425.4  11/18/2015        LBBB (left bundle branch block) ICD-10-CM: I44.7  ICD-9-CM: 426.3  11/18/2015        Arthritis ICD-10-CM: M19.90  ICD-9-CM: 716.90  Unknown        Arrhythmia ICD-10-CM: I49.9  ICD-9-CM: 427.9  Unknown    Overview Signed 6/18/2013  2:07 PM by Antonio Murguia MD     LBBB             Cholelithiases ICD-10-CM: D80.49  ICD-9-CM: 574.20  Unknown        Hx of radiation therapy to prostate ICD-10-CM: Z92.3  ICD-9-CM: V15.3  Unknown        Chronic systolic heart failure ([facility] ICD-10-CM: I50.22  ICD-9-CM: 428.22  9/13/2011        Automatic implantable cardioverter-defibrillator in situ ICD-10-CM: Z95.810  ICD-9-CM: V45.02  9/1/2011            Mr. Ben Soriano is a 65 y.o. male admitted on 5/14/2018 with a primary diagnosis of intractable back pain. His PMH includes CHF, Afib, hx of defibrillator implant, HTN and hx of prostate, colon, and thyroid cancer with multiple myeloma. He is a patient of Dr. Katina Koroma, currently on treatment with Revlimid/Dex which was just started back on 5/4/18 after his thyroidectomy. Pt states he has not started radioactive iodine because it is currently on backorder. He has had some mild fairly long-term back pain attributed to his myeloma. He presented to ED with c/o worsening lower back pain, worse when he woke up this AM.  Denies any changes in bowel or bladder habits. No tingling or numbness in extremities. Xray of lumbar spine with no acute changes - showed known osseous lytic lesions and chronic degenerative changes. 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 PLAN:  Hx of prostate, colon, and thyroid cancers with multiple myeloma  - currently on treatment with Revlimid/Dex  - s/p thyroidectomy, waiting to start on radioactive iodine (currently on backorder per pt)  5/17 MM labs- [place] Free light chains 8.70/Lambda 203.15  Kappa/Lambda 0.4. M Ok 0.80. IgG 1153 IgA 15  IgM 16  5/21 Unable to go to rehab on Revlimid. Spoke with Dr. Katina Koroma - ok to hold Revlimid while at rehab     Intractable lower back pain  - Xray of lumbar spine with no acute changes - showed known osseous lytic lesions and chronic degenerative changes. 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 - Pt unable to have MRI d/t implanted defibrillator. CT L-spine ordered. - Dilaudid ordered PRN  - Pulse steroid dosing  5/15 CT revealed acute to subacute mild central compression fractures of L2 and L3 vertebral bodies and severe b/l neuroforaminal stenosis at L5-S1. IR consulted for kyphoplasty. Currently on prednisone 60mg. 5/17 IR Kyphoplasty tomorrow. Increase Oxy IR 30 mg q 4. 5/18 s/p Kyphoplasty this AM.  5/20 pain controlled    Blood-tinged sputum  5/22 Pt reports 2 episodes of coughing up blood-tinged sputum. CXR pending. Sputum cx ordered. CXR showed mild consolidation in RLL. Start Levaquin. MARY  5/22 Cr up to 2.01. IVF ordered. Repeat CMP. Elevated LFTs  5/22 Repeat CMP.    Continue home meds  Steven SOPs  On Eliquis    Disposition:  TBD. Once stable, can [facility] to [facility] [place]. Erma Olszewski, NP [facility]  [address]  Office : [phone]  Fax : [phone]     Attending Addendum:  I personally evaluated the patient with NP Elda Riding, and agree with the assessment, findings and plan as documented. Appears well, heart regular without murmur, lungs clear, abdomen benign. Reported blood tinged sputum today after coughing. He feels well. Pain better controlled. Started on Levaquin for RLL consolidation. Cr and LFTS increased.   Will re-evaluate in am.          MD [facility]  [address]  Office : [phone]  Fax : [phone]

## 2018-05-22 NOTE — PROGRESS NOTES
Verbal report given to Yaniv Arora at [facility] (name) on [address]      Report consisted of patients Situation, Background, Assessment and   Recommendations(SBAR). Information from the following report(s) SBAR, MAR and Recent Results was reviewed with the receiving nurse. Opportunity for questions and clarification was provided.       Patient transported via [place] and  with:   O2 @ 2 liters

## 2018-05-23 VITALS
TEMPERATURE: 97.5 F | DIASTOLIC BLOOD PRESSURE: 72 MMHG | HEIGHT: 74 IN | WEIGHT: 196.1 LBS | SYSTOLIC BLOOD PRESSURE: 114 MMHG | BODY MASS INDEX: 25.17 KG/M2 | HEART RATE: 87 BPM | RESPIRATION RATE: 16 BRPM | OXYGEN SATURATION: 93 %

## 2018-05-23 LAB
ALBUMIN SERPL-MCNC: 2.3 G/DL (ref 3.2–4.6)
ALBUMIN/GLOB SERPL: 0.6 {RATIO} (ref 1.2–3.5)
ALP SERPL-CCNC: 116 U/L (ref 50–136)
ALT SERPL-CCNC: 96 U/L (ref 12–65)
ANION GAP SERPL CALC-SCNC: 9 MMOL/L (ref 7–16)
AST SERPL-CCNC: 91 U/L (ref 15–37)
BASOPHILS # BLD: 0 K/UL (ref 0–0.2)
BASOPHILS NFR BLD: 0 % (ref 0–2)
BILIRUB SERPL-MCNC: 0.6 MG/DL (ref 0.2–1.1)
BUN SERPL-MCNC: 31 MG/DL (ref 8–23)
CALCIUM SERPL-MCNC: 9 MG/DL (ref 8.3–10.4)
CHLORIDE SERPL-SCNC: 102 MMOL/L (ref 98–107)
CO2 SERPL-SCNC: 27 MMOL/L (ref 21–32)
CREAT SERPL-MCNC: 1.3 MG/DL (ref 0.8–1.5)
DIFFERENTIAL METHOD BLD: ABNORMAL
EOSINOPHIL # BLD: 0 K/UL (ref 0–0.8)
EOSINOPHIL NFR BLD: 0 % (ref 0.5–7.8)
ERYTHROCYTE [DISTWIDTH] IN BLOOD BY AUTOMATED COUNT: 15.3 % (ref 11.9–14.6)
GLOBULIN SER CALC-MCNC: 4.1 G/DL (ref 2.3–3.5)
GLUCOSE SERPL-MCNC: 92 MG/DL (ref 65–100)
HCT VFR BLD AUTO: 32.1 % (ref 41.1–50.3)
HGB BLD-MCNC: 10.9 G/DL (ref 13.6–17.2)
IMM GRANULOCYTES # BLD: 0.1 K/UL (ref 0–0.5)
IMM GRANULOCYTES NFR BLD AUTO: 1 % (ref 0–5)
LYMPHOCYTES # BLD: 1.8 K/UL (ref 0.5–4.6)
LYMPHOCYTES NFR BLD: 22 % (ref 13–44)
MCH RBC QN AUTO: 23.9 PG (ref 26.1–32.9)
MCHC RBC AUTO-ENTMCNC: 34 G/DL (ref 31.4–35)
MCV RBC AUTO: 70.4 FL (ref 79.6–97.8)
MONOCYTES # BLD: 0.8 K/UL (ref 0.1–1.3)
MONOCYTES NFR BLD: 10 % (ref 4–12)
NEUTS SEG # BLD: 5.3 K/UL (ref 1.7–8.2)
NEUTS SEG NFR BLD: 67 % (ref 43–78)
PLATELET # BLD AUTO: 105 K/UL (ref 150–450)
PMV BLD AUTO: ABNORMAL FL (ref 10.8–14.1)
POTASSIUM SERPL-SCNC: 3.8 MMOL/L (ref 3.5–5.1)
PROT SERPL-MCNC: 6.4 G/DL (ref 6.3–8.2)
RBC # BLD AUTO: 4.56 M/UL (ref 4.23–5.67)
SODIUM SERPL-SCNC: 138 MMOL/L (ref 136–145)
WBC # BLD AUTO: 8 K/UL (ref 4.3–11.1)

## 2018-05-23 PROCEDURE — 80053 COMPREHEN METABOLIC PANEL: CPT | Performed by: NURSE PRACTITIONER

## 2018-05-23 PROCEDURE — 74011250636 HC RX REV CODE- 250/636: Performed by: NURSE PRACTITIONER

## 2018-05-23 PROCEDURE — 74011250637 HC RX REV CODE- 250/637: Performed by: NURSE PRACTITIONER

## 2018-05-23 PROCEDURE — 99239 HOSP IP/OBS DSCHRG MGMT >30: CPT | Performed by: INTERNAL MEDICINE

## 2018-05-23 PROCEDURE — 85025 COMPLETE CBC W/AUTO DIFF WBC: CPT | Performed by: NURSE PRACTITIONER

## 2018-05-23 PROCEDURE — 74011250637 HC RX REV CODE- 250/637: Performed by: RADIOLOGY

## 2018-05-23 PROCEDURE — 36415 COLL VENOUS BLD VENIPUNCTURE: CPT | Performed by: NURSE PRACTITIONER

## 2018-05-23 RX ADMIN — SODIUM CHLORIDE 100 ML/HR: 900 INJECTION, SOLUTION INTRAVENOUS at 02:58

## 2018-05-23 RX ADMIN — TORSEMIDE 20 MG: 20 TABLET ORAL at 07:34

## 2018-05-23 RX ADMIN — CYANOCOBALAMIN TAB 1000 MCG 1000 MCG: 1000 TAB at 07:35

## 2018-05-23 RX ADMIN — OXYCODONE HYDROCHLORIDE 30 MG: 15 TABLET ORAL at 07:34

## 2018-05-23 RX ADMIN — CHLORHEXIDINE GLUCONATE 15 ML: 1.2 RINSE ORAL at 07:35

## 2018-05-23 RX ADMIN — OXYCODONE HYDROCHLORIDE 30 MG: 15 TABLET ORAL at 04:09

## 2018-05-23 RX ADMIN — POTASSIUM CHLORIDE 20 MEQ: 1500 TABLET, EXTENDED RELEASE ORAL at 07:35

## 2018-05-23 RX ADMIN — PANTOPRAZOLE SODIUM 40 MG: 40 TABLET, DELAYED RELEASE ORAL at 07:35

## 2018-05-23 RX ADMIN — LEVOTHYROXINE SODIUM 137 MCG: 50 TABLET ORAL at 07:35

## 2018-05-23 RX ADMIN — METAXALONE 800 MG: 800 TABLET ORAL at 07:35

## 2018-05-23 RX ADMIN — CARVEDILOL 6.25 MG: 6.25 TABLET, FILM COATED ORAL at 07:34

## 2018-05-23 NOTE — INTERDISCIPLINARY ROUNDS
Interdisciplinary rounds with  Care Management, Nursing, Nurse Practitioner and Physician    Underlying Disease: Multiple Myeloma   Presenting Problem: intractable back pain  Anti-infectives:   Daily Goal : trend labs from yesterday  Possible Discharge: today  Expected Discharge Needs: going to [facility] rehab today  Other: Labs improved from yesterday, ok to d/c today. F/U out pt Mon.

## 2018-05-23 NOTE — DISCHARGE SUMMARY
[facility]: Inpatient Hematology / Oncology Discharge Summary Note    Patient ID:  Chester Chauhan  [id number]  65 y.o.  1940    Admit Date: 5/14/2018    Discharge Date: 5/23/2018    Admission Diagnoses: Low back pain, unspecified back pain laterality, unspecified chronicity, with sciatica presence unspecified [M54.5]  Low back pain, unspecified back pain laterality, unspecified chronicity, with sciatica presence unspecified [M54.5]    Discharge Diagnoses:  Principal Diagnosis: <principal problem not specified>  Active Problems:    Multiple myeloma ([facility] (1/2/2017)      Intractable pain (1/11/2017)      Compression fracture of lumbar vertebra ([facility] (5/15/2018)        Hospital Course:  Mr. Sary Mcdaniel is a 65 y.o. male admitted on 5/14/2018 with a primary diagnosis of The primary encounter diagnosis was Acute midline low back pain without sciatica. Diagnoses of Multiple myeloma, remission status unspecified ([facility] and Prostate cancer ([facility] were also pertinent to this visit. His PMH includes CHF, Afib, hx of defibrillator implant, HTN and hx of prostate, colon, and thyroid cancer with multiple myeloma. He is a patient of Dr. Amanda Foster, currently on treatment with Revlimid/Dex which was just started back on 5/4/18 after his thyroidectomy. Pt states he has not started radioactive iodine because it is currently on backorder. He has had some mild fairly long-term back pain attributed to his myeloma. He presented to ED with c/o worsening lower back pain, worse when he woke up the morning of admission. Xray of lumbar spine with no acute changes - showed known osseous lytic lesions and chronic degenerative changes. 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 CT revealed acute to subacute mild central compression fractures of L2 and L3 vertebral bodies and severe b/l neuroforaminal stenosis at L5-S1. IR performed kyphoplasty on 5/18. Pain controlled with Oxycodone. Yesterday, pt stated that he had 2 episodes of coughing up blood-tinged sputum. Has been afebrile. Denies chest pain, shortness of breath, or any other symptoms. CXR revealed mild consolidation in RLL. He was started on Levaquin yesterday and will continue upon discharge to complete antibx therapy. Sputum cx pending. RN also noted mild bleeding at site of kyphoplasty. IR recommends to hold his Eliquis x 3 days (OK to resume Eliquis on 5/26). Also Cr up to 2 yesterday. Down to 1.3 today after fluids. LFTs were also noted to be elevated yesterday, but they have also improved today. He is feeling well and is ready to be discharged to rehab. He will not take Revlimid/Dex while at rehab - OK'd by Dr. Moise Greenberg. He is scheduled to f/u on 5/29 with Dr. Moise Greenberg. Advised to call with fever, chills, uncontrollable symptoms, or with any other concerns. Pt verbalizes understanding. Script for oxycodone given to patient. I have reviewed the patient's controlled substance prescription history, as maintained in the [place] prescription monitoring program, so that the prescriptions(s) for a controlled substance can be given.     Consults:  IP CONSULT TO INTERVENTIONAL RADIOLOGY    Pertinent Diagnostic Studies:   Labs:    Recent Labs      05/23/18   0802  05/22/18   0946  05/21/18   1038   WBC  8.0  7.5  5.3   HGB  10.9*  13.0*  13.0*   PLT  105*  106*  111*   ANEU  5.3  5.4  2.8    Recent Labs      05/23/18   0802  05/22/18   1707  05/22/18   0946   NA  138  137  135*   K  3.8  4.2  4.6   CL  102  100  98   CO2  27  28  30   GLU  92  119*  199*   BUN  31*  39*  39*   CREA  1.30  1.69*  2.01*   CA  9.0  9.3  9.4   SGOT  91*  135*  186*   AP  116  137*  152*   TP  6.4  6.8  7.1   ALB  2.3*  2.5*  2.6*   MG   --    --   2.0       Imaging:  XR CHEST PA LAT [[id number]] Collected: 05/22/18 1062      Order Status: Completed Updated: 05/22/18 7985     Narrative:       AP LATERAL CHEST X-RAY    HISTORY: Cough    COMPARISON: December 11, 2017    FINDINGS: A cardiac defibrillator device is present. The cardiac silhouette  appears enlarged. Treated upper lumbar compression deformities are present. There is minimal consolidation in the right lower lobe. Pleural spaces are  clear.       Impression:       IMPRESSION: Minimal consolidation in the right lower lobe.     IR KYPHOPLASTY LUMBAR [[id number]] Collected: 05/18/18 1048     Order Status: Completed Updated: 05/18/18 1058     Narrative:       Title: L1, L2, and L3 Kyphoplasty combined with bone biopsy. History: Very pleasant 65year-old gentleman with painful L1, L2, and L3  vertebral compression fractures.  There is no significant radiation of pain. Recent CT scan performed 5/14/2018 was compared with prior CT scans (4/20/2018)  and demonstrates what is most likely acute fractures at L1, L2, and L3. Vertical height loss at L1 and L2 is significant, height loss at L3 was less  prominent.  However, given the overall moth-eaten appearance of the bones, and  the extent of the back pain, I have recommended treating all 3 levels  simultaneously.  After discussing the options, the patient requests 3 level  treatment today, as well. [facility] also agrees to multilevel bone biopsy. Patient's history is significant for multiple myeloma, thyroid cancer, colon  cancer, and prostate cancer. [facility] has had chronic low back pain secondary to the  multiple myeloma, however this is significantly worsened 5/14/2018 to the point  that he is unable to ambulate.      Consent: Informed written and oral consent was obtained from Mr. Jocelyne Hernández after  explanation of benefits and risks (including, but not limited to: Infection,  Hemorrhage, Nerve injury, Paralysis).  The patient's questions were answered to  his satisfaction. [facility] stated understanding and requested that we proceed. : Serene Payton MD, I personally performed the entire procedure.     Procedure:  Following the successful induction of general endotracheal  anesthesia, a transurethral Cespedes catheter was placed.  The patient was then  placed prone on the fluoroscopy table.  All contact points were appropriately  padded.  Maximal sterile barrier technique (including:  cap, mask, sterile gown,  sterile gloves, sterile sheet, hand hygiene, and chlorhexidene for cutaneous  antisepsis) were used. With the patient prone, the skin of the back was prepped  and draped in the standard sterile fashion. Local field block was achieved with  1% lidocaine. Using fluoroscopy, the Kyphon trocar was advanced through the right pedicle to  the anterior portion of the L1 vertebral body. In an identical fashion, the left  pedicle at L1 was traversed using the trocar.  Core biopsies were obtained from  both trochars.  15 mm Bone Tamps were inflated to form an intravertebral void,  and to attempt to restore vertebral body height.      In identical fashion, right and left transpedicular trochars were placed at L2  and at L3.  Core biopsies were obtained from both trochars at both of these  levels.  15 mm bone tamps were inflated through each of these trochars. Once the cement was mixed and ready, the Bone Tamps at L1 were removed and  cement was injected through both transpedicular trocars under direct  fluoroscopy.  Similarly, cement was administered at L2 and then L3. All trocars were removed without incident. Hemostasis was achieved with gentle  manual compression. Dermabond was applied. Complications: None. Radiation Exposure Indices:  Reference Air Kerma (Ka,r) = 357 mGy  Dose Area Product/Kerma Area Product (DAP/WILLIAM/PKA) = 2892.0 cGy-cm2  Fluoroscopy Exposure Time = 9 minutes 24 seconds  Fluorographic Images = 2     Contrast:  0 milliliters. Medications: General endotracheal anesthesia.  Continuous pulse oximetry, heart  rate, and blood pressure monitoring was performed with an independent, trained  observer present.  Vancomycin 500 mg IV surgical prophylaxis. Findings: Old compression deformity at T12.  New or compression deformities of  the L1, L2, and L3 vertebral body.  L1 and L2 were not the most notable anterior  wedge-shaped compressions.  Successful cement administration.  No cement  extravasation.       Impression:       Impression: Successful L1, L2, L3 vertebral body core biopsy and balloon  Kyphoplasty. Plan: The patient will remain at bedrest for 2 hours with his head at  approximately 30-45 degrees elevation. The patient will be returned to his  hospital room.  I have requested a physical therapy consultation to help with  strengthening and ambulation.  I have ordered Skelaxin 800 mg by mouth 3 times a  day. Recommend return to clinic in 2-4 weeks for followup evaluation.          CT SPINE Adelaide Corley CONT [[id number]] Collected: 05/14/18 1632     Order Status: Completed Updated: 05/14/18 1657     Addenda:         Addendum:   DC4 The significant findings in this report have been referred to the Imaging Navigator in order to communicate to the referring provider or his/her   designee as outlined in Section II.C.2.a.ii-iii of the ACR Practice Guideline for Communication of Diagnostic Imaging Findings.       Signed: 05/14/18 1655 by Kaitlin Helm MD     Narrative:       Exam:  CT lumbar spine with contrast    History: worsening back pain; hx of multiple myeloma, 77 years Male severe back  pain    Technique: Standard departmental protocol CT of the lumbar spine was performed  after uneventful intravenous administration of 100 cc of nonionic IV contrast.   Coronal and sagittal reformats were obtained.  Radiation dose reduction  techniques were used for this study:  Our CT scanners use one or all of the  following: Automated exposure control, adjustment of the mA and/or kVp according  to patient's size, iterative reconstruction.     Comparison: CT torso April 20, 2018    Findings: Danika Montanez is diffuse narrowing the spinal canal, consistent with  congenital spinal canal stenosis.  Normal alignment.  There appears to be  partial fusion of the L4 and L5 vertebral bodies.  There are anterior bridging  osteophytes at L5-S1.  Mild central loss of vertebral body height is seen with  superior endplate sclerosis of the L2 and L3 vertebral bodies, these could  represent mild acute to subacute compression fractures, and appear possibly new  since the prior CT torso.  Moderate anterior compression deformity of the T12  vertebral body with anterior bridging osteophytes appears unchanged.  A large  posterior disc osteophyte complex is seen at L4-L5 causing mild spinal canal  narrowing measuring 12 mm in AP dimension.  A large posterior disc osteophyte  complex is seen at L5-S1 without significant spinal canal stenosis.  However  there appears to be severe bilateral neuroforaminal stenosis at this level.  No  evidence of significant spinal canal stenosis.  Normal mineralization. Visualized prevertebral and paravertebral soft tissues unremarkable.       Impression:       Impression:  1.  Suspect acute to subacute mild central compression fractures of the L2 and  L3 vertebral bodies. 2.  Severe bilateral neuroforaminal stenosis at L5-S1.     CT SPINE Germain Prado WO CONT [[id number]]      Order Status: Canceled      XR SPINE LUMB 2 OR 3 V [[id number]] Collected: 05/14/18 0847     Order Status: Completed Updated: 05/14/18 0852     Narrative:       LUMBAR SPINE RADIOGRAPHS, 5/14/2018    CLINICAL HISTORY:  Chronic low back pain which is severe for the past 2 days. TECHNIQUE: AP and lateral views of the lumbar spine with coned down view of the  lumbosacral junction. Comparison: CT abdomen 4/20/2018    FINDINGS:  Five lumbar type vertebrae are visualized.  Alignment is maintained at all  levels.  Vertebral body height is maintained at all levels in the lumbar spine.   A moderate anterior compression deformity is seen at T12. However, this does not  demonstrate clear acute features in a similar compression deformity was seen on  the prior CT scan and therefore this is not felt to be acute. Fusion is once  again seen between the L4 and L5 vertebral bodies. The posterior elements,  transverse processes, and sacroiliac joints are intact. Mild to moderate  discogenic degenerative changes are seen throughout the lumbar spine. Moderate  facet hypertrophic changes are seen in the lower lumbar spine. There is direct  apposition of spinous processes with mild intervening sclerotic changes in the  mid and lower lumbar spine suggesting a Baastrup's deformity which can be a  source of chronic pain. Irregular lucency is seen likely representing the  patient's lytic bony disease which was better assessed by CT and described as  stable on the recent CT scan. The patient is status post cholecystectomy.       Impression:       IMPRESSION:  1.   No acute changes evident by plain film imaging. 2.  Irregular lucency likely representing the patient's known osseous lytic  metastatic disease. 3.  Chronic degenerative changes as described above. 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 These can be  sources of chronic pain.         XR PELV AP ONLY [[id number]] Collected: 05/14/18 0843     Order Status: Completed Updated: 05/14/18 0849     Narrative:       Pelvis single view 5/14/2018    CLINICAL HISTORY: Acute on chronic low back pain and bilateral hip pain. Unable  to ambulate. More severe for 2 days. History of multiple myeloma. COMPARISON: CT the pelvis 4/20/2018. FINDINGS:  2 frontal views of the pelvis are submitted for evaluation. The lumbar spine is  partially included in the field of imaging and will be described in a separate  report of lumbar spine x-rays obtained. No abnormal widening is seen of the  sacroiliac joints or pubic symphysis.  No acute fracture line is seen of the  pelvic ring. No definite acute fracture is seen of the proximal hips although  dedicated imaging should be performed if hip fracture is strongly suspected. Multiple lucent lesions are seen. These are most clearly demonstrated in the  left parasymphyseal pubic bone and right inferior pubic ramus. These likely  represent the lytic metastatic disease described on the prior CT scan of the  pelvis which was described as not significantly changed. Moderate degenerative  changes are seen of the left hip mainly consisting of joint space loss with some  superior acetabular hypertrophy.       Impression:       IMPRESSION:  1. Lytic osseous metastatic disease without acute osseous changes appreciated. 2. Moderate chronic degenerative changes of the left hip. Current Discharge Medication List      START taking these medications    Details   levoFLOXacin (LEVAQUIN) 750 mg tablet Take 1 Tab by mouth every twenty-four (24) hours for 6 days. Qty: 6 Tab, Refills: 0         CONTINUE these medications which have CHANGED    Details   oxyCODONE IR (ROXICODONE) 30 mg immediate release tablet Take 1 Tab by mouth every four (4) hours as needed for Pain. Max Daily Amount: 180 mg.  Qty: 90 Tab, Refills: 0    Associated Diagnoses: Closed compression fracture of L2 lumbar vertebra with routine healing, subsequent encounter; Multiple myeloma, remission status unspecified ([facility]         CONTINUE these medications which have NOT CHANGED    Details   apixaban (ELIQUIS) 5 mg tablet Take 5 mg by mouth two (2) times a day. polyethylene glycol (MIRALAX) 17 gram packet Take 17 g by mouth daily. amino ac/whey prot conc, isol (WHEY PROTEIN PO) Take  by mouth. cyclobenzaprine (FLEXERIL) 10 mg tablet Take 1 Tab by mouth three (3) times daily as needed for Muscle Spasm(s).   Qty: 90 Tab, Refills: 0    Associated Diagnoses: Acute right-sided low back pain without sciatica; Multiple myeloma not having achieved remission ([address] levothyroxine (SYNTHROID) 137 mcg tablet Take 137 mcg by mouth Daily (before breakfast). Qty: 90 Tab, Refills: 3    Associated Diagnoses: Postsurgical hypothyroidism      carvedilol (COREG) 6.25 mg tablet Take 1 Tab by mouth two (2) times daily (with meals). Qty: 60 Tab, Refills: 0      torsemide (DEMADEX) 20 mg tablet Take 1 Tab by mouth daily. Indications: Edema, Pulmonary Edema due to Chronic Heart Failure  Qty: 30 Tab, Refills: 11    Associated Diagnoses: Chronic systolic heart failure ([address] Localized edema      ibuprofen (ADVIL) 200 mg tablet Take  by mouth. chlorhexidine (PERIDEX) 0.12 % solution 15 mL by Swish and Spit route two (2) times a day. omeprazole (PRILOSEC) 20 mg capsule Take 20 mg by mouth daily. cyanocobalamin (VITAMIN B-12) 1,000 mcg sublingual tablet Take 1,000 mcg by mouth daily. cholecalciferol, vitamin D3, (VITAMIN D3) 2,000 unit tab Take  by mouth. rosuvastatin (CRESTOR) 10 mg tablet Take 10 mg by mouth nightly. STOP taking these medications       lenalidomide (REVLIMID) 10 mg cap Comments:   Reason for Stopping:         dexamethasone (DECADRON) 4 mg tablet Comments:   Reason for Stopping:                   OBJECTIVE:  Patient Vitals for the past 8 hrs:   BP Temp Pulse Resp SpO2   18 0707 114/72 97.5 °F (36.4 °C) 87 16 93 %   18 0352 112/69 97.4 °F (36.3 °C) 80 16 95 %     Temp (24hrs), Av.5 °F (36.4 °C), Min:97.4 °F (36.3 °C), Max:97.6 °F (36.4 °C)     0701 -  1900  In: 941 [P.O.:240; I.V.:335]  Out: 425 [Urine:425]    Physical Exam:  Constitutional: Well developed, well nourished male in no acute distress, sitting comfortably in the hospital bed. HEENT: Normocephalic and atraumatic. Oropharynx is clear, mucous membranes are moist.  Extraocular muscles are intact.  Sclerae anicteric.             Skin Warm and dry.  No bruising and no rash noted.  No erythema.  No pallor.     Respiratory Lungs are clear to auscultation bilaterally without wheezes, rales or rhonchi, normal air exchange without accessory muscle use. CVS Normal rate, regular rhythm and normal S1 and S2.  No murmurs, gallops, or rubs. Abdomen Soft, nontender and nondistended, normoactive bowel sounds.     Neuro Grossly nonfocal with no obvious sensory or motor deficits. MSK Normal range of motion in general.  No edema and no tenderness. Psych Appropriate mood and affect.            ASSESSMENT:    Active Problems:    Multiple myeloma ([facility] (1/2/2017)      Intractable pain (1/11/2017)      Compression fracture of lumbar vertebra ([facility] (5/15/2018)        DISPOSITION:  Follow-up scheduled with Dr. Myesha Chavarria on 5/29 at 10:15am      Over 30 minutes was spent in discharge planning and coordination of care. Alfredito Chamberlain NP  [facility]  [address] [place] [address]  Office : [phone]  Fax : [phone]     Attending Addendum:  I personally evaluated the patient with POLI Pack, and agree with the assessment, findings and plan as documented. Appears well, heart regular without murmur, lungs with scattered rhonchi, abdomen benign. Had another episode of some bld in his sputum. Labs including Cr and LFTS improved since yesterday. Eliquis is on hold at this time per IR after kyphoplasty. He's scheduled to see Dr Myesha Chavarria in a few days but was urged to call if the hemoptysis does not stop, if he develops worsening back pain or new pain, fevers or any bleeding. We will check his cbc/cmp at the rehab facility to assess for resolution of lab abnormalities. He verbalized understanding and was eager to be going to rehab.           Eusebia Gonzalez MD  [facility]  [address] [place] [address]  Office : [phone]  Fax : [phone]

## 2018-05-24 ENCOUNTER — PATIENT OUTREACH (OUTPATIENT)
Dept: CASE MANAGEMENT | Age: 78
End: 2018-05-24

## 2018-05-24 NOTE — PROGRESS NOTES
This note will not be viewable in [address] Patient discharged to [facility] on 5/23/18.  [facility] outreach postponed for 21 days due to discharge to non-preferred network SNF

## 2018-05-29 ENCOUNTER — PATIENT OUTREACH (OUTPATIENT)
Dept: CASE MANAGEMENT | Age: 78
End: 2018-05-29

## 2018-05-29 ENCOUNTER — HOSPITAL ENCOUNTER (OUTPATIENT)
Dept: LAB | Age: 78
Discharge: HOME | End: 2018-05-29
Payer: MEDICARE

## 2018-05-29 DIAGNOSIS — C90.00 MULTIPLE MYELOMA, REMISSION STATUS UNSPECIFIED (HCC): ICD-10-CM

## 2018-05-29 LAB
ALBUMIN SERPL-MCNC: 2.9 G/DL (ref 3.2–4.6)
ALBUMIN/GLOB SERPL: 0.6 {RATIO} (ref 1.2–3.5)
ALP SERPL-CCNC: 103 U/L (ref 50–136)
ALT SERPL-CCNC: 43 U/L (ref 12–65)
ANION GAP SERPL CALC-SCNC: 7 MMOL/L (ref 7–16)
AST SERPL-CCNC: 55 U/L (ref 15–37)
BASOPHILS # BLD: 0.1 K/UL (ref 0–0.2)
BASOPHILS NFR BLD MANUAL: 2 % (ref 0–2)
BILIRUB SERPL-MCNC: 0.5 MG/DL (ref 0.2–1.1)
BUN SERPL-MCNC: 12 MG/DL (ref 8–23)
CALCIUM SERPL-MCNC: 9.8 MG/DL (ref 8.3–10.4)
CHLORIDE SERPL-SCNC: 99 MMOL/L (ref 98–107)
CO2 SERPL-SCNC: 29 MMOL/L (ref 21–32)
CREAT SERPL-MCNC: 1.47 MG/DL (ref 0.8–1.5)
DIFFERENTIAL METHOD BLD: ABNORMAL
ERYTHROCYTE [DISTWIDTH] IN BLOOD BY AUTOMATED COUNT: 15.1 % (ref 11.9–14.6)
GLOBULIN SER CALC-MCNC: 4.8 G/DL (ref 2.3–3.5)
GLUCOSE SERPL-MCNC: 123 MG/DL (ref 65–100)
HCT VFR BLD AUTO: 34.4 % (ref 41.1–50.3)
HGB BLD-MCNC: 11.6 G/DL (ref 13.6–17.2)
LYMPHOCYTES # BLD: 1.2 K/UL (ref 0.5–4.6)
LYMPHOCYTES NFR BLD MANUAL: 32 % (ref 16–44)
MAGNESIUM SERPL-MCNC: 1.8 MG/DL (ref 1.8–2.4)
MCH RBC QN AUTO: 24 PG (ref 26.1–32.9)
MCHC RBC AUTO-ENTMCNC: 33.7 G/DL (ref 31.4–35)
MCV RBC AUTO: 71.2 FL (ref 79.6–97.8)
MONOCYTES # BLD: 0.2 K/UL (ref 0.1–1.3)
MONOCYTES NFR BLD MANUAL: 4 % (ref 3–9)
NEUTS BAND NFR BLD MANUAL: 2 % (ref 0–10)
NEUTS SEG # BLD: 2.3 K/UL (ref 1.7–8.2)
NEUTS SEG NFR BLD MANUAL: 60 % (ref 47–75)
NRBC # BLD: 0.01 K/UL (ref 0–0.2)
PLATELET # BLD AUTO: 173 K/UL (ref 150–450)
PLATELET COMMENTS,PCOM: ADEQUATE
PMV BLD AUTO: 8.7 FL (ref 10.8–14.1)
POTASSIUM SERPL-SCNC: 3.4 MMOL/L (ref 3.5–5.1)
PROT SERPL-MCNC: 7.7 G/DL (ref 6.3–8.2)
RBC # BLD AUTO: 4.83 M/UL (ref 4.23–5.67)
RBC MORPH BLD: ABNORMAL
SODIUM SERPL-SCNC: 135 MMOL/L (ref 136–145)
WBC # BLD AUTO: 3.8 K/UL (ref 4.3–11.1)
WBC MORPH BLD: ABNORMAL

## 2018-05-29 PROCEDURE — 80053 COMPREHEN METABOLIC PANEL: CPT | Performed by: INTERNAL MEDICINE

## 2018-05-29 PROCEDURE — 36415 COLL VENOUS BLD VENIPUNCTURE: CPT | Performed by: INTERNAL MEDICINE

## 2018-05-29 PROCEDURE — 85025 COMPLETE CBC W/AUTO DIFF WBC: CPT | Performed by: INTERNAL MEDICINE

## 2018-05-29 PROCEDURE — 83735 ASSAY OF MAGNESIUM: CPT | Performed by: INTERNAL MEDICINE

## 2018-05-29 NOTE — PROGRESS NOTES
5/29/18:  Patient in for hospital follow-up with Dr. Jean Carlos Sanz. Patient current at [facility] and unsure of discharge date. Dr. Jean Carlos Sanz assessed patient and reviewed labs. Patient still holding revlimid until discharged from rehab facility. Patient to return to see Dr. Jean Carlos Sanz in ~3 weeks for labs and zometa. Transport called and patient transported back to facility.

## 2018-06-11 ENCOUNTER — PATIENT OUTREACH (OUTPATIENT)
Dept: CASE MANAGEMENT | Age: 78
End: 2018-06-11

## 2018-06-11 NOTE — PROGRESS NOTES
6/11/18:  Patient called and reports he will be discharged from [facility] on 6/12. He has appt with Dr. Zane Clay on 6/25.

## 2018-06-14 ENCOUNTER — PATIENT OUTREACH (OUTPATIENT)
Dept: CASE MANAGEMENT | Age: 78
End: 2018-06-14

## 2018-06-14 NOTE — PROGRESS NOTES
This note will not be viewable in [address] [facility] follow up call S/P rehab discharge. Spoke with patient, who reports he is doing well, except for continued minimal pain. Patient is slowly transitioning to previous activity level in his home. No problems with obtaining medications or transportation. Patient has follow up scheduled as directed at DC from SNF. No concerns or needs identified, will close case as patient is progessing well with no needs identified and no further [facility] outreach indicated.

## 2018-06-25 ENCOUNTER — PATIENT OUTREACH (OUTPATIENT)
Dept: CASE MANAGEMENT | Age: 78
End: 2018-06-25

## 2018-06-25 ENCOUNTER — HOSPITAL ENCOUNTER (OUTPATIENT)
Dept: INFUSION THERAPY | Age: 78
Discharge: HOME | End: 2018-06-25
Payer: MEDICARE

## 2018-06-25 ENCOUNTER — HOSPITAL ENCOUNTER (OUTPATIENT)
Dept: LAB | Age: 78
Discharge: HOME | End: 2018-06-25
Payer: MEDICARE

## 2018-06-25 DIAGNOSIS — C90.00 MULTIPLE MYELOMA, REMISSION STATUS UNSPECIFIED (HCC): ICD-10-CM

## 2018-06-25 DIAGNOSIS — C90.00 MULTIPLE MYELOMA NOT HAVING ACHIEVED REMISSION (HCC): ICD-10-CM

## 2018-06-25 LAB
ALBUMIN SERPL-MCNC: 3.4 G/DL (ref 3.2–4.6)
ALBUMIN/GLOB SERPL: 0.6 {RATIO} (ref 1.2–3.5)
ALP SERPL-CCNC: 97 U/L (ref 50–136)
ALT SERPL-CCNC: 22 U/L (ref 12–65)
ANION GAP SERPL CALC-SCNC: 8 MMOL/L (ref 7–16)
AST SERPL-CCNC: 55 U/L (ref 15–37)
BASOPHILS # BLD: 0 K/UL (ref 0–0.2)
BASOPHILS NFR BLD: 0 % (ref 0–2)
BILIRUB SERPL-MCNC: 0.7 MG/DL (ref 0.2–1.1)
BUN SERPL-MCNC: 13 MG/DL (ref 8–23)
CALCIUM SERPL-MCNC: 10.9 MG/DL (ref 8.3–10.4)
CHLORIDE SERPL-SCNC: 101 MMOL/L (ref 98–107)
CO2 SERPL-SCNC: 25 MMOL/L (ref 21–32)
CREAT SERPL-MCNC: 1.51 MG/DL (ref 0.8–1.5)
DIFFERENTIAL METHOD BLD: ABNORMAL
EOSINOPHIL # BLD: 0 K/UL (ref 0–0.8)
EOSINOPHIL NFR BLD: 0 % (ref 0.5–7.8)
ERYTHROCYTE [DISTWIDTH] IN BLOOD BY AUTOMATED COUNT: 15.7 % (ref 11.9–14.6)
GLOBULIN SER CALC-MCNC: 5.8 G/DL (ref 2.3–3.5)
GLUCOSE SERPL-MCNC: 119 MG/DL (ref 65–100)
HCT VFR BLD AUTO: 32.9 % (ref 41.1–50.3)
HGB BLD-MCNC: 11 G/DL (ref 13.6–17.2)
LYMPHOCYTES # BLD: 2.4 K/UL (ref 0.5–4.6)
LYMPHOCYTES NFR BLD: 42 % (ref 13–44)
MAGNESIUM SERPL-MCNC: 1.3 MG/DL (ref 1.8–2.4)
MCH RBC QN AUTO: 23.8 PG (ref 26.1–32.9)
MCHC RBC AUTO-ENTMCNC: 33.4 G/DL (ref 31.4–35)
MCV RBC AUTO: 71.2 FL (ref 79.6–97.8)
MONOCYTES # BLD: 0.6 K/UL (ref 0.1–1.3)
MONOCYTES NFR BLD: 11 % (ref 4–12)
NEUTS SEG # BLD: 2.7 K/UL (ref 1.7–8.2)
NEUTS SEG NFR BLD: 47 % (ref 43–78)
NRBC # BLD: 0.03 K/UL (ref 0–0.2)
PHOSPHATE SERPL-MCNC: 3.5 MG/DL (ref 2.3–3.7)
PLATELET # BLD AUTO: 210 K/UL (ref 150–450)
PMV BLD AUTO: 9.1 FL (ref 10.8–14.1)
POTASSIUM SERPL-SCNC: 3.9 MMOL/L (ref 3.5–5.1)
PROT SERPL-MCNC: 9.2 G/DL (ref 6.3–8.2)
RBC # BLD AUTO: 4.62 M/UL (ref 4.23–5.67)
SODIUM SERPL-SCNC: 134 MMOL/L (ref 136–145)
WBC # BLD AUTO: 5.8 K/UL (ref 4.3–11.1)

## 2018-06-25 PROCEDURE — 83883 ASSAY NEPHELOMETRY NOT SPEC: CPT | Performed by: INTERNAL MEDICINE

## 2018-06-25 PROCEDURE — 85025 COMPLETE CBC W/AUTO DIFF WBC: CPT | Performed by: INTERNAL MEDICINE

## 2018-06-25 PROCEDURE — 86334 IMMUNOFIX E-PHORESIS SERUM: CPT | Performed by: INTERNAL MEDICINE

## 2018-06-25 PROCEDURE — 80053 COMPREHEN METABOLIC PANEL: CPT | Performed by: INTERNAL MEDICINE

## 2018-06-25 PROCEDURE — 74011250636 HC RX REV CODE- 250/636: Performed by: INTERNAL MEDICINE

## 2018-06-25 PROCEDURE — 36415 COLL VENOUS BLD VENIPUNCTURE: CPT | Performed by: INTERNAL MEDICINE

## 2018-06-25 PROCEDURE — 74011000258 HC RX REV CODE- 258: Performed by: INTERNAL MEDICINE

## 2018-06-25 PROCEDURE — 82784 ASSAY IGA/IGD/IGG/IGM EACH: CPT | Performed by: INTERNAL MEDICINE

## 2018-06-25 PROCEDURE — 96361 HYDRATE IV INFUSION ADD-ON: CPT

## 2018-06-25 PROCEDURE — 83735 ASSAY OF MAGNESIUM: CPT | Performed by: INTERNAL MEDICINE

## 2018-06-25 PROCEDURE — 84100 ASSAY OF PHOSPHORUS: CPT | Performed by: INTERNAL MEDICINE

## 2018-06-25 PROCEDURE — 96374 THER/PROPH/DIAG INJ IV PUSH: CPT

## 2018-06-25 RX ADMIN — SODIUM CHLORIDE 500 ML: 900 INJECTION, SOLUTION INTRAVENOUS at 16:43

## 2018-06-25 RX ADMIN — ZOLEDRONIC ACID 3.5 MG: 0.8 INJECTION, SOLUTION, CONCENTRATE INTRAVENOUS at 17:15

## 2018-06-25 NOTE — PROGRESS NOTES
Arrived to the [facility]. Zometa completed. Patient tolerated well. Any issues or concerns during appointment: None. Patient aware of next infusion appointment on 07.23.2018 at 1600. Discharged in wheel chair to spouse.

## 2018-06-25 NOTE — PROGRESS NOTES
6/25/18- Pt seen in the office with Dr Marky Elias. Pt has now completed his stay in the rehab facility. Pt to restart his rev/dex starting today. Pt does complain of pain so Dr Gregory Chandra the pt to increase the frequency of his oxycodone. Calcium today resulted at 10.9, pt to proceed to infusion today for Zometa and will return on 7/6 to see the NP for a tox check and labs and in 4 weeks to see Dr Marky Elias with labs and zometa.

## 2018-06-26 LAB
ALBUMIN SERPL ELPH-MCNC: 3.95 G/DL (ref 3.2–5.6)
ALBUMIN/GLOB SERPL: 0.8 {RATIO}
ALPHA1 GLOB SERPL ELPH-MCNC: 0.26 G/DL (ref 0.1–0.4)
ALPHA2 GLOB SERPL ELPH-MCNC: 0.78 G/DL (ref 0.4–1.2)
B-GLOBULIN SERPL QL ELPH: 1.04 G/DL (ref 0.6–1.3)
GAMMA GLOB MFR SERPL ELPH: 2.77 G/DL (ref 0.5–1.6)
IGA SERPL-MCNC: 12 MG/DL (ref 85–499)
IGG SERPL-MCNC: 3428 MG/DL (ref 610–1616)
IGM SERPL-MCNC: 19 MG/DL (ref 35–242)
KAPPA LC FREE SER-MCNC: 5.99 MG/L (ref 3.3–19.4)
KAPPA LC FREE/LAMBDA FREE SER: 0.01 {RATIO} (ref 0.26–1.65)
LAMBDA LC FREE SERPL-MCNC: 1010.23 MG/L (ref 5.71–26.3)
M PROTEIN SERPL ELPH-MCNC: 2.31 G/DL
PROT PATTERN SERPL ELPH-IMP: ABNORMAL
PROT PATTERN SPEC IFE-IMP: ABNORMAL
PROT SERPL-MCNC: 8.8 G/DL (ref 6.3–8.2)

## 2018-07-06 ENCOUNTER — HOSPITAL ENCOUNTER (OUTPATIENT)
Dept: LAB | Age: 78
Discharge: HOME | DRG: 841 | End: 2018-07-06
Payer: MEDICARE

## 2018-07-06 ENCOUNTER — HOSPITAL ENCOUNTER (INPATIENT)
Age: 78
LOS: 6 days | Discharge: HOME | DRG: 841 | End: 2018-07-12
Attending: INTERNAL MEDICINE | Admitting: INTERNAL MEDICINE
Payer: MEDICARE

## 2018-07-06 ENCOUNTER — PATIENT OUTREACH (OUTPATIENT)
Dept: CASE MANAGEMENT | Age: 78
End: 2018-07-06

## 2018-07-06 DIAGNOSIS — C90.00 MULTIPLE MYELOMA, REMISSION STATUS UNSPECIFIED (HCC): ICD-10-CM

## 2018-07-06 DIAGNOSIS — N17.9 AKI (ACUTE KIDNEY INJURY) (HCC): ICD-10-CM

## 2018-07-06 DIAGNOSIS — C90.02 MULTIPLE MYELOMA IN RELAPSE (HCC): ICD-10-CM

## 2018-07-06 DIAGNOSIS — N17.9 ACUTE KIDNEY INJURY (HCC): ICD-10-CM

## 2018-07-06 DIAGNOSIS — S32.020D CLOSED COMPRESSION FRACTURE OF L2 LUMBAR VERTEBRA WITH ROUTINE HEALING, SUBSEQUENT ENCOUNTER: ICD-10-CM

## 2018-07-06 DIAGNOSIS — R52 INTRACTABLE PAIN: ICD-10-CM

## 2018-07-06 DIAGNOSIS — E88.3 TUMOR LYSIS SYNDROME: ICD-10-CM

## 2018-07-06 LAB
ALBUMIN SERPL-MCNC: 3.5 G/DL (ref 3.2–4.6)
ALBUMIN/GLOB SERPL: 0.6 {RATIO} (ref 1.2–3.5)
ALP SERPL-CCNC: 71 U/L (ref 50–136)
ALT SERPL-CCNC: 33 U/L (ref 12–65)
ANION GAP SERPL CALC-SCNC: 12 MMOL/L (ref 7–16)
AST SERPL-CCNC: 59 U/L (ref 15–37)
BASOPHILS # BLD: 0.1 K/UL (ref 0–0.2)
BASOPHILS NFR BLD MANUAL: 2 % (ref 0–2)
BILIRUB SERPL-MCNC: 1 MG/DL (ref 0.2–1.1)
BUN SERPL-MCNC: 53 MG/DL (ref 8–23)
CALCIUM SERPL-MCNC: 7.9 MG/DL (ref 8.3–10.4)
CHLORIDE SERPL-SCNC: 100 MMOL/L (ref 98–107)
CO2 SERPL-SCNC: 20 MMOL/L (ref 21–32)
CREAT SERPL-MCNC: 2.76 MG/DL (ref 0.8–1.5)
DIFFERENTIAL METHOD BLD: ABNORMAL
EOSINOPHIL # BLD: 0.1 K/UL (ref 0–0.8)
EOSINOPHIL NFR BLD MANUAL: 2 % (ref 1–8)
ERYTHROCYTE [DISTWIDTH] IN BLOOD BY AUTOMATED COUNT: 15.8 % (ref 11.9–14.6)
GLOBULIN SER CALC-MCNC: 5.7 G/DL (ref 2.3–3.5)
GLUCOSE SERPL-MCNC: 100 MG/DL (ref 65–100)
HCT VFR BLD AUTO: 29 % (ref 41.1–50.3)
HGB BLD-MCNC: 10.1 G/DL (ref 13.6–17.2)
LYMPHOCYTES # BLD: 1.4 K/UL (ref 0.5–4.6)
LYMPHOCYTES NFR BLD MANUAL: 40 % (ref 16–44)
MAGNESIUM SERPL-MCNC: 1.3 MG/DL (ref 1.8–2.4)
MCH RBC QN AUTO: 24.1 PG (ref 26.1–32.9)
MCHC RBC AUTO-ENTMCNC: 34.8 G/DL (ref 31.4–35)
MCV RBC AUTO: 69.2 FL (ref 79.6–97.8)
METAMYELOCYTES NFR BLD MANUAL: 6 %
MONOCYTES # BLD: 0.3 K/UL (ref 0.1–1.3)
MONOCYTES NFR BLD MANUAL: 8 % (ref 3–9)
NEUTS SEG # BLD: 1.6 K/UL (ref 1.7–8.2)
NEUTS SEG NFR BLD MANUAL: 42 % (ref 47–75)
NRBC # BLD: 0.02 K/UL (ref 0–0.2)
PLATELET # BLD AUTO: 144 K/UL (ref 150–450)
PLATELET COMMENTS,PCOM: ADEQUATE
PMV BLD AUTO: 11 FL (ref 10.8–14.1)
POTASSIUM SERPL-SCNC: 3.3 MMOL/L (ref 3.5–5.1)
PROT SERPL-MCNC: 9.2 G/DL (ref 6.3–8.2)
RBC # BLD AUTO: 4.19 M/UL (ref 4.23–5.67)
RBC MORPH BLD: ABNORMAL
SODIUM SERPL-SCNC: 132 MMOL/L (ref 136–145)
WBC # BLD AUTO: 3.5 K/UL (ref 4.3–11.1)
WBC MORPH BLD: ABNORMAL

## 2018-07-06 PROCEDURE — 80053 COMPREHEN METABOLIC PANEL: CPT | Performed by: INTERNAL MEDICINE

## 2018-07-06 PROCEDURE — 83735 ASSAY OF MAGNESIUM: CPT | Performed by: INTERNAL MEDICINE

## 2018-07-06 PROCEDURE — 74011250636 HC RX REV CODE- 250/636: Performed by: NURSE PRACTITIONER

## 2018-07-06 PROCEDURE — 77030020263 HC SOL INJ SOD CL0.9% LFCR 1000ML

## 2018-07-06 PROCEDURE — 99223 1ST HOSP IP/OBS HIGH 75: CPT | Performed by: INTERNAL MEDICINE

## 2018-07-06 PROCEDURE — 74011250637 HC RX REV CODE- 250/637: Performed by: NURSE PRACTITIONER

## 2018-07-06 PROCEDURE — 36415 COLL VENOUS BLD VENIPUNCTURE: CPT | Performed by: INTERNAL MEDICINE

## 2018-07-06 PROCEDURE — 85025 COMPLETE CBC W/AUTO DIFF WBC: CPT | Performed by: INTERNAL MEDICINE

## 2018-07-06 PROCEDURE — 74011250637 HC RX REV CODE- 250/637: Performed by: INTERNAL MEDICINE

## 2018-07-06 PROCEDURE — 77030032490 HC SLV COMPR SCD KNE COVD -B

## 2018-07-06 PROCEDURE — 65270000029 HC RM PRIVATE

## 2018-07-06 RX ORDER — LANOLIN ALCOHOL/MO/W.PET/CERES
400 CREAM (GRAM) TOPICAL 3 TIMES DAILY
Status: DISCONTINUED | OUTPATIENT
Start: 2018-07-06 | End: 2018-07-07

## 2018-07-06 RX ORDER — CARVEDILOL 6.25 MG/1
6.25 TABLET ORAL 2 TIMES DAILY WITH MEALS
Status: DISCONTINUED | OUTPATIENT
Start: 2018-07-06 | End: 2018-07-12 | Stop reason: HOSPADM

## 2018-07-06 RX ORDER — AMOXICILLIN 250 MG
1 CAPSULE ORAL 2 TIMES DAILY
Status: DISCONTINUED | OUTPATIENT
Start: 2018-07-06 | End: 2018-07-09

## 2018-07-06 RX ORDER — ATORVASTATIN CALCIUM 10 MG/1
20 TABLET, FILM COATED ORAL DAILY
Status: DISCONTINUED | OUTPATIENT
Start: 2018-07-07 | End: 2018-07-12 | Stop reason: HOSPADM

## 2018-07-06 RX ORDER — LUBIPROSTONE 24 UG/1
24 CAPSULE, GELATIN COATED ORAL 2 TIMES DAILY WITH MEALS
Status: DISCONTINUED | OUTPATIENT
Start: 2018-07-06 | End: 2018-07-12 | Stop reason: HOSPADM

## 2018-07-06 RX ORDER — TORSEMIDE 20 MG/1
20 TABLET ORAL DAILY
Status: DISCONTINUED | OUTPATIENT
Start: 2018-07-07 | End: 2018-07-12 | Stop reason: HOSPADM

## 2018-07-06 RX ORDER — MIRTAZAPINE 15 MG/1
15 TABLET, FILM COATED ORAL
Status: DISCONTINUED | OUTPATIENT
Start: 2018-07-06 | End: 2018-07-12 | Stop reason: HOSPADM

## 2018-07-06 RX ORDER — SODIUM CHLORIDE 9 MG/ML
100 INJECTION, SOLUTION INTRAVENOUS AS NEEDED
Status: DISCONTINUED | OUTPATIENT
Start: 2018-07-06 | End: 2018-07-06

## 2018-07-06 RX ORDER — LANOLIN ALCOHOL/MO/W.PET/CERES
1000 CREAM (GRAM) TOPICAL DAILY
Status: DISCONTINUED | OUTPATIENT
Start: 2018-07-07 | End: 2018-07-12 | Stop reason: HOSPADM

## 2018-07-06 RX ORDER — OXYCODONE HYDROCHLORIDE 15 MG/1
30 TABLET ORAL
Status: DISCONTINUED | OUTPATIENT
Start: 2018-07-06 | End: 2018-07-12 | Stop reason: HOSPADM

## 2018-07-06 RX ORDER — POTASSIUM CHLORIDE 20 MEQ/1
20 TABLET, EXTENDED RELEASE ORAL 2 TIMES DAILY
Status: DISCONTINUED | OUTPATIENT
Start: 2018-07-07 | End: 2018-07-10

## 2018-07-06 RX ORDER — PANTOPRAZOLE SODIUM 40 MG/1
40 TABLET, DELAYED RELEASE ORAL
Status: DISCONTINUED | OUTPATIENT
Start: 2018-07-07 | End: 2018-07-12 | Stop reason: HOSPADM

## 2018-07-06 RX ORDER — SODIUM CHLORIDE 9 MG/ML
100 INJECTION, SOLUTION INTRAVENOUS CONTINUOUS
Status: DISCONTINUED | OUTPATIENT
Start: 2018-07-06 | End: 2018-07-11

## 2018-07-06 RX ORDER — CHLORHEXIDINE GLUCONATE 1.2 MG/ML
15 RINSE ORAL 2 TIMES DAILY
Status: DISCONTINUED | OUTPATIENT
Start: 2018-07-06 | End: 2018-07-12 | Stop reason: HOSPADM

## 2018-07-06 RX ORDER — POTASSIUM CHLORIDE 750 MG/1
10 TABLET, EXTENDED RELEASE ORAL 2 TIMES DAILY
Status: DISCONTINUED | OUTPATIENT
Start: 2018-07-06 | End: 2018-07-06

## 2018-07-06 RX ORDER — FENTANYL 100 UG/H
1 PATCH TRANSDERMAL
Status: DISCONTINUED | OUTPATIENT
Start: 2018-07-06 | End: 2018-07-12 | Stop reason: HOSPADM

## 2018-07-06 RX ADMIN — Medication 400 MG: at 21:18

## 2018-07-06 RX ADMIN — APIXABAN 5 MG: 5 TABLET, FILM COATED ORAL at 17:07

## 2018-07-06 RX ADMIN — CARVEDILOL 6.25 MG: 6.25 TABLET, FILM COATED ORAL at 17:07

## 2018-07-06 RX ADMIN — STANDARDIZED SENNA CONCENTRATE AND DOCUSATE SODIUM 1 TABLET: 8.6; 5 TABLET, FILM COATED ORAL at 17:07

## 2018-07-06 RX ADMIN — SODIUM CHLORIDE 100 ML/HR: 900 INJECTION, SOLUTION INTRAVENOUS at 13:43

## 2018-07-06 RX ADMIN — CHLORHEXIDINE GLUCONATE 15 ML: 1.2 RINSE ORAL at 17:07

## 2018-07-06 RX ADMIN — POTASSIUM CHLORIDE 10 MEQ: 10 TABLET, EXTENDED RELEASE ORAL at 17:07

## 2018-07-06 RX ADMIN — OXYCODONE HYDROCHLORIDE 30 MG: 15 TABLET ORAL at 13:38

## 2018-07-06 RX ADMIN — SODIUM CHLORIDE 100 ML/HR: 900 INJECTION, SOLUTION INTRAVENOUS at 20:00

## 2018-07-06 RX ADMIN — MIRTAZAPINE 15 MG: 15 TABLET, FILM COATED ORAL at 21:18

## 2018-07-06 NOTE — PROGRESS NOTES
Shift: 07/06/18 (1208-3334)    AM Handoff Nurse: Report received from Cj Hunter RN    Pt came from outpatient cancer center via wheelchair with friend. IV placed. Fluids started. Pain medicine given for pain 10/10. No other abnormalities noted at this time. RN will continue to monitor. Dual-skin assessment completed by Geronimo Ibarra RN and Vianney Corral RN. Pt's skin is dry, flaky, and intact. Lotion given to pt. Sacrum is intact. No abnormalities noted. Pain: Pt c/o abdominal and lower back pain. Pain managed through PRN oxy IR. See MAR for details. Neuro: A&Ox4. BLE tingling. Cardio: S1/S2. SR-ST. CV VS WNL for specified parameters. Resp: RA. Clear bilaterally. Symmetric chest wall excursion. Resp VS WNL. GI/: Voiding. Unable to assess urine characeristics. Last BM: 07/06/18 after lactulose administration. See MAR for details. No c/o N/V. Diet: Tolerating diet. Fair intake noted. See I&O for further details. Ambulation: Pt ambulates with an asisst x1. No falls during the shift. Additional Information: Trip Delong NP stated that pt was to be on NS at 100mL/hr continuously, not PRN. RN modified in orders. Started on Mg and increased K to 20mEq BID PO. Continue with POC.      EOS Handoff Nurse: Isael Rutherford RN    VITAL SIGNS  Patient Vitals for the past 12 hrs:   Temp Pulse Resp BP SpO2   07/06/18 1707 - 82 - 118/66 -   07/06/18 1535 97.7 °F (36.5 °C) 86 18 102/42 96 %   07/06/18 1256 97.2 °F (36.2 °C) (!) 103 18 123/61 99 %

## 2018-07-06 NOTE — PROGRESS NOTES
Pt was seen and labs reviewed by Thalia Chandra, POLI and Dr. Inder Kerns. Pt reports 10/10 abd pain, but he is not taking his oral pain meds because he is constipated (no BM x at least a week per pt) and does not want to add to that. He has had a weight loss of 10 lbs since 6/25, and he reports he has no appetite. Pt reports that he is very weak. Creatinine up to 2.76. Dr Inder Kerns assessed pt and would like to admit pt for MARY, intractable pain. Will not renew Revlimid Rx at this time per Dr. Inder Kerns, as kidney function is declining. Pt verbalizes understanding of plan. Pt to be transported to 5th floor DT room 516 by his neighbor. Pt ambulatory with walker and in no acute distress upon leaving cancer center.

## 2018-07-06 NOTE — H&P
Inpatient Hematology / Oncology History and Physical    Reason for Asmission:  Multiple Myeloma  MARY  Intractable Pain  MARY (acute kidney injury) ([facility]  Intractable pain    History of Present Illness:  Mr. Marty Rojas is a 65 y.o. male admitted on 7/6/2018 with a primary diagnosis of MARY and intractable pain. 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 During recent admission he had kyphoplasty due to worsening lumbar compression fracture and then went to rehab for 3 weeks but was not allowed to take his Revlimid/Dex during that time. He was seen in the office on 6/25 with increased back and abdominal pain, fatigue and hypercalcemia that responded well to Zometa. He was restarted on Rev/Dex and was brought in today for follow up. He continues to rate his pain as 10/10, but is not taking OxyIR for breakthrough due to constipation. His PCP switched him from [address] [place] [address] to [address] [place] [address] yesterday-sample given for constipation. He states last BM 1 week ago, passing gas, no nausea or vomiting. He only took 1 tabs of Oxy yesterday. He has lost another 10 pounds since last visit due to poor appetite. He admits to only drinking 2 16 oz bottles of water daily. He is utilizing a walker today and complains of increased fatigue and weakness. Labs obtained, Na+ 132, K+ 3.3, Creatinine up to 2.76, Mg+ 1.3. Ca+ stable at 7.9. He will be admitted for MARY, intractable pain and to receive supportive care. Of note, his wife is currently an inpatient due to a bowel obstruction. Review of Systems:  Constitutional +poor appetite, weight loss, fatigue, weakness Denies fever, chills, night sweats. HEENT Denies trauma, blurry vision, hearing loss, ear pain, nosebleeds, sore throat, neck pain and ear discharge. Skin Denies lesions or rashes.    Lungs Denies dyspnea, cough, sputum production or hemoptysis. Cardiovascular Denies chest pain, palpitations, or lower extremity edema. Gastrointestinal +constipation, abd pain/fullness Denies nausea, vomiting,  bloody or black stools.  Denies dysuria, frequency or hesitancy of urination. Neuro Denies headaches, visual changes or ataxia. Denies dizziness, tingling, tremors, sensory change, speech change, focal weakness or headaches. Hematology Denies easy bruising or bleeding, denies gingival bleeding or epistaxis. Endo +thyroidectomy s/p cancer Denies heat/cold intolerance, denies diabetes. MSK +back pain, generalized weakness Denies arthralgias, myalgias or frequent falls. Psychiatric/Behavioral Denies depression and substance abuse. The patient is not nervous/anxious.          Allergies   Allergen Reactions    Ace Inhibitors Other (comments)    Lisinopril Cough    Pcn [Penicillins] Swelling     Past Medical History:   Diagnosis Date    Arrhythmia     LBBB    Arthritis     BMI 30.0-30.9,adult     BMI 30.5    Cardiomyopathy ([address]     followed by [facility]    Cholelithiases     Chronic systolic heart failure ([address] 9/13/2011    [place] Ass[place] class II-III heart failure symptoms    Gout     H/O: pituitary tumor     X2 benign, removed    High cholesterol     History of thyroid cancer     History of vertigo     no recent complications or episodes    Hurthle cell carcinoma of thyroid ([address]     Hx of radiation therapy to prostate 2004    Hyperlipidemia 11/18/2015    Hypertension     Hypothyroid     hypo- had portion of thyroid removed due to cancer    ICD (implantable cardiac defibrillator) in place 9/2011    B[employer] BIV/ICD, Gen change 3.2.16    LBBB (left bundle branch block) 11/18/2015    Malaise and fatigue 11/18/2015    Malignant neoplasm of prostate ([address]     Mass of colon     right colon mass    Multiple myeloma (HCC)     Sinus node dysfunction (HCC)      Past Surgical History:   Procedure Laterality Date  HX CHOLECYSTECTOMY  2013    HX COLONOSCOPY      dx with Right colon mass     HX GI      benign polyps removed    HX HEART CATHETERIZATION      HX HEENT  2008    thyroidetomy partial tumor from pituitary x2    HX PACEMAKER  2011/2016    biotronik biv-icd    HX THYROIDECTOMY  2008    HX THYROIDECTOMY  01/26/2018    completion thyroidectomy    HX TUMOR REMOVAL  1990/2010    pituitary tumor removed X2     Family History   Problem Relation Age of Onset    Heart Disease Sister     Heart Attack Sister     Stroke Mother     Thyroid Disease Neg Hx     Diabetes Neg Hx      Social History     Social History    Marital status:      Spouse name: N/A    Number of children: N/A    Years of education: N/A     Occupational History    Not on file. Social History Main Topics    Smoking status: Never Smoker    Smokeless tobacco: Never Used    Alcohol use Yes      Comment: very rare    Drug use: No    Sexual activity: Yes     Partners: Female     Other Topics Concern    Not on file     Social History Narrative     Current Facility-Administered Medications   Medication Dose Route Frequency Provider Last Rate Last Dose    apixaban (ELIQUIS) tablet 5 mg  5 mg Oral BID Ludmila Rodriguez NP        [START ON 7/7/2018] atorvastatin (LIPITOR) tablet 20 mg  20 mg Oral DAILY Ludmila Rodriguez NP        carvedilol (COREG) tablet 6.25 mg  6.25 mg Oral BID WITH MEALS Ludmila Rodriguez NP        chlorhexidine (PERIDEX) 0.12 % mouthwash 15 mL  15 mL Swish and Spit BID Ludmila Rodriguez NP        . PHARMACY TO SUBSTITUTE PER PROTOCOL (Reordered from: cyanocobalamin (VITAMIN B-12) 1,000 mcg sublingual tablet)    Per Protocol Ludmila Rodriguez NP        [START ON 7/7/2018] levothyroxine (SYNTHROID) tablet 137 mcg  137 mcg Oral ACB Ludmila Rodriguez NP        . PHARMACY TO SUBSTITUTE PER PROTOCOL (Reordered from: lubiPROStone (AMITIZA) 24 mcg capsule)    Per Protocol Ludmila Rodriguez NP        mirtazapine (Phyllistine Bright) tablet 15 mg  15 mg Oral QHS Julia Henao NP        [START ON 2018] pantoprazole (PROTONIX) tablet 40 mg  40 mg Oral ACB Julia Henao NP        oxyCODONE IR (OXY-IR) immediate release tablet 30 mg  30 mg Oral Q4H PRN Julia Henao NP   30 mg at 18 1338    potassium chloride (KLOR-CON) tablet 10 mEq  10 mEq Oral BID Julia Henao NP        [START ON 2018] torsemide (DEMADEX) tablet 20 mg  20 mg Oral DAILY Julia Henao NP        fentaNYL (DURAGESIC) 100 mcg/hr patch 1 Patch  1 Patch TransDERmal Q72H Julia Henao NP        senna-docusate (PERICOLACE) 8.6-50 mg per tablet 1 Tab  1 Tab Oral BID Julia Henao NP        lactulose [facility]) solution 20 g  20 g Oral DAILY PRN Julia Henao NP        0.9% sodium chloride infusion  100 mL/hr IntraVENous PRN Julia Henao NP           OBJECTIVE:  Patient Vitals for the past 8 hrs:   BP Temp Pulse Resp SpO2 Weight   18 1256 123/61 97.2 °F (36.2 °C) (!) 103 18 99 % 171 lb 3.2 oz (77.7 kg)     Temp (24hrs), Av.6 °F (36.4 °C), Min:97.2 °F (36.2 °C), Max:97.9 °F (36.6 °C)         Physical Exam:  Constitutional: Well developed, well nourished male in no acute distress, sitting comfortably in the exam chair. HEENT: Normocephalic and atraumatic. Oropharynx is clear, mucous membranes are moist. Sclerae anicteric. Neck supple without JVD. No thyromegaly present. Skin Warm and dry. No bruising and no rash noted. No erythema. No pallor. Respiratory Lungs are clear to auscultation bilaterally without wheezes, rales or rhonchi, normal air exchange without accessory muscle use. CVS Normal rate, regular rhythm and normal S1 and S2. No murmurs, gallops, or rubs. Abdomen Soft, nontender and nondistended, normoactive bowel sounds. No palpable mass. No hepatosplenomegaly. Neuro Grossly nonfocal with no obvious sensory or motor deficits. MSK Normal range of motion in general.  No edema and no tenderness. Psych Appropriate mood and affect. Labs:    Recent Results (from the past 24 hour(s))   CBC WITH AUTOMATED DIFF    Collection Time: 07/06/18 10:32 AM   Result Value Ref Range    WBC 3.5 (L) 4.3 - 11.1 K/uL    RBC 4.19 (L) 4.23 - 5.67 M/uL    HGB 10.1 (L) 13.6 - 17.2 g/dL    HCT 29.0 (L) 41.1 - 50.3 %    MCV 69.2 (L) 79.6 - 97.8 FL    MCH 24.1 (L) 26.1 - 32.9 PG    MCHC 34.8 31.4 - 35.0 g/dL    RDW 15.8 (H) 11.9 - 14.6 %    PLATELET 161 (L) 799 - 450 K/uL    MPV 11.0 10.8 - 14.1 FL    ABSOLUTE NRBC 0.02 0.0 - 0.2 K/uL    NEUTROPHILS 42 (L) 47 - 75 %    LYMPHOCYTES 40 16 - 44 %    MONOCYTES 8 3 - 9 %    EOSINOPHILS 2 1 - 8 %    BASOPHILS 2 0 - 2 %    METAMYELOCYTES 6 %    ABS. NEUTROPHILS 1.6 (L) 1.7 - 8.2 K/UL    ABS. LYMPHOCYTES 1.4 0.5 - 4.6 K/UL    ABS. MONOCYTES 0.3 0.1 - 1.3 K/UL    ABS. EOSINOPHILS 0.1 0.0 - 0.8 K/UL    ABS. BASOPHILS 0.1 0.0 - 0.2 K/UL    RBC COMMENTS OCCASIONAL  OVALOCYTES        RBC COMMENTS MICROCYTOSIS      RBC COMMENTS SLIGHT  HYPOCHROMIA        RBC COMMENTS SLIGHT  POLYCHROMASIA        RBC COMMENTS RARE  SCHISTOCYTES        RBC COMMENTS MODERATE  ANISOCYTOSIS + POIKILOCYTOSIS        WBC COMMENTS Result Confirmed By Smear      PLATELET COMMENTS ADEQUATE      DF MANUAL     METABOLIC PANEL, COMPREHENSIVE    Collection Time: 07/06/18 10:32 AM   Result Value Ref Range    Sodium 132 (L) 136 - 145 mmol/L    Potassium 3.3 (L) 3.5 - 5.1 mmol/L    Chloride 100 98 - 107 mmol/L    CO2 20 (L) 21 - 32 mmol/L    Anion gap 12 7 - 16 mmol/L    Glucose 100 65 - 100 mg/dL    BUN 53 (H) 8 - 23 MG/DL    Creatinine 2.76 (H) 0.8 - 1.5 MG/DL    GFR est AA 29 (L) >60 ml/min/1.73m2    GFR est non-AA 24 (L) >60 ml/min/1.73m2    Calcium 7.9 (L) 8.3 - 10.4 MG/DL    Bilirubin, total 1.0 0.2 - 1.1 MG/DL    ALT (SGPT) 33 12 - 65 U/L    AST (SGOT) 59 (H) 15 - 37 U/L    Alk.  phosphatase 71 50 - 136 U/L    Protein, total 9.2 (H) 6.3 - 8.2 g/dL    Albumin 3.5 3.2 - 4.6 g/dL    Globulin 5.7 (H) 2.3 - 3.5 g/dL A-G Ratio 0.6 (L) 1.2 - 3.5     MAGNESIUM    Collection Time: 07/06/18 10:32 AM   Result Value Ref Range    Magnesium 1.3 (LL) 1.8 - 2.4 mg/dL       Imaging:  [unfilled]    ASSESSMENT:  Problem List  Date Reviewed: 7/5/2018          Codes Class Noted    Compression fracture of lumbar vertebra ([facility] ICD-10-CM: S32.000A  ICD-9-CM: 805.4  5/15/2018        Hurthle cell carcinoma of thyroid ([facility] ICD-10-CM: C73  ICD-9-CM: 193  Unknown        Hurthle cell carcinoma ([facility] ICD-10-CM: C73  ICD-9-CM: 193  3/15/2018        Postsurgical hypothyroidism ICD-10-CM: E89.0  ICD-9-CM: 244.0  3/15/2018        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  ICD-9-CM: 227.3  3/15/2018        Thyroid mass ICD-10-CM: E07.9  ICD-9-CM: 246.9  2/8/2018        Sinus bradycardia ICD-10-CM: R00.1  ICD-9-CM: 427.89  1/12/2018        PAF (paroxysmal atrial fibrillation) (HCC) ICD-10-CM: I48.0  ICD-9-CM: 427.31  11/26/2017        Chronic systolic CHF (congestive heart failure) ([facility] ICD-10-CM: I50.22  ICD-9-CM: 428.22, 428.0  11/26/2017        Pain ICD-10-CM: R52  ICD-9-CM: 780.96  11/20/2017        MARY (acute kidney injury) ([facility] ICD-10-CM: N17.9  ICD-9-CM: 584.9  11/20/2017        Acute renal failure (ARF) ([facility] ICD-10-CM: N17.9  ICD-9-CM: 584.9  11/18/2017        Intractable pain ICD-10-CM: R52  ICD-9-CM: 780.96  1/11/2017        Acute kidney injury ([facility] ICD-10-CM: N17.9  ICD-9-CM: 584.9  1/11/2017        Pancytopenia ([facility] ICD-10-CM: K37.415  ICD-9-CM: 284.19  1/11/2017        Constipation ICD-10-CM: K59.00  ICD-9-CM: 564.00  1/11/2017        Multiple myeloma ([facility] ICD-10-CM: C90.00  ICD-9-CM: 203.00  1/2/2017        Postural imbalance ICD-10-CM: R29.3  ICD-9-CM: 729.90  12/14/2016        Polyneuropathy ICD-10-CM: G62.9  ICD-9-CM: 356.9  12/14/2016        Fall ICD-10-CM: Q07. Maciel Yousif  ICD-9-CM: D424.5  12/14/2016        Encounter for medication management ICD-10-CM: Z79.899  ICD-9-CM: V58.69  12/14/2016        Urinary retention ICD-10-CM: R33.9  ICD-9-CM: 788.20  6/6/2016        Colonic mass ICD-10-CM: K63.9  ICD-9-CM: 569.89  3/23/2016        Hyperlipidemia ICD-10-CM: E78.5  ICD-9-CM: 272.4  11/18/2015        Vertigo ICD-10-CM: M29  ICD-9-CM: 780.4  11/18/2015        Malaise and fatigue ICD-10-CM: R53.81, R53.83  ICD-9-CM: 780.79  11/18/2015        Cardiomyopathy ([facility] ICD-10-CM: I42.9  ICD-9-CM: 425.4  11/18/2015        LBBB (left bundle branch block) ICD-10-CM: I44.7  ICD-9-CM: 426.3  11/18/2015        Arthritis ICD-10-CM: M19.90  ICD-9-CM: 716.90  Unknown        Arrhythmia ICD-10-CM: I49.9  ICD-9-CM: 427.9  Unknown    Overview Signed 6/18/2013  2:07 PM by Rula Ledezma MD     LBBB             Cholelithiases ICD-10-CM: G36.78  ICD-9-CM: 574.20  Unknown        Hx of radiation therapy to prostate ICD-10-CM: Z92.3  ICD-9-CM: V15.3  Unknown        Chronic systolic heart failure ([facility] ICD-10-CM: I50.22  ICD-9-CM: 428.22  9/13/2011        Automatic implantable cardioverter-defibrillator in situ ICD-10-CM: Z95.810  ICD-9-CM: V45.02  9/1/2011                PLAN:  Multiple Myeloma  -hold Revlimid, may need to start Cytoxan     MARY  -IV hydration (NS @100ml/hr), closely monitor Cr and for fluid overload    Constipation  -pericolace BID, amitiza, lactulose prn    Pain-Abdominal/Back  -Increase fentanyl patch to 100 mcg, Oxy IR 30 mg Q4h prn     Increased weakness  -consult PT/OT    Electrolyte Imbalances  -replace per Steven SOPs    Poor appetite  -Remeron 15 mg    Supportive care  Follow Steven SOPs  Currently on Eliquis 5 mg BID, apply SCDs for DVT prophylaxis        Nicolas Hamming, NP  [facility] Hematology and Oncology  [address] [address]  Office : [phone]  Fax : [phone]

## 2018-07-07 ENCOUNTER — APPOINTMENT (OUTPATIENT)
Dept: GENERAL RADIOLOGY | Age: 78
DRG: 841 | End: 2018-07-07
Attending: NURSE PRACTITIONER
Payer: MEDICARE

## 2018-07-07 LAB
ALBUMIN SERPL-MCNC: 2.6 G/DL (ref 3.2–4.6)
ALBUMIN/GLOB SERPL: 0.6 {RATIO} (ref 1.2–3.5)
ALP SERPL-CCNC: 54 U/L (ref 50–136)
ALT SERPL-CCNC: 25 U/L (ref 12–65)
ANION GAP SERPL CALC-SCNC: 11 MMOL/L (ref 7–16)
AST SERPL-CCNC: 46 U/L (ref 15–37)
BASOPHILS # BLD: 0 K/UL (ref 0–0.2)
BASOPHILS NFR BLD: 0 % (ref 0–2)
BILIRUB SERPL-MCNC: 0.6 MG/DL (ref 0.2–1.1)
BUN SERPL-MCNC: 39 MG/DL (ref 8–23)
CALCIUM SERPL-MCNC: 7.3 MG/DL (ref 8.3–10.4)
CHLORIDE SERPL-SCNC: 108 MMOL/L (ref 98–107)
CO2 SERPL-SCNC: 23 MMOL/L (ref 21–32)
CREAT SERPL-MCNC: 2.06 MG/DL (ref 0.8–1.5)
DIFFERENTIAL METHOD BLD: ABNORMAL
EOSINOPHIL # BLD: 0 K/UL (ref 0–0.8)
EOSINOPHIL NFR BLD: 1 % (ref 0.5–7.8)
ERYTHROCYTE [DISTWIDTH] IN BLOOD BY AUTOMATED COUNT: 16 % (ref 11.9–14.6)
GLOBULIN SER CALC-MCNC: 4.7 G/DL (ref 2.3–3.5)
GLUCOSE SERPL-MCNC: 72 MG/DL (ref 65–100)
HCT VFR BLD AUTO: 25.1 % (ref 41.1–50.3)
HGB BLD-MCNC: 8.6 G/DL (ref 13.6–17.2)
IMM GRANULOCYTES # BLD: 0.1 K/UL (ref 0–0.5)
IMM GRANULOCYTES NFR BLD AUTO: 2 % (ref 0–5)
LYMPHOCYTES # BLD: 1.3 K/UL (ref 0.5–4.6)
LYMPHOCYTES NFR BLD: 43 % (ref 13–44)
MAGNESIUM SERPL-MCNC: 1.2 MG/DL (ref 1.8–2.4)
MCH RBC QN AUTO: 23.4 PG (ref 26.1–32.9)
MCHC RBC AUTO-ENTMCNC: 34.3 G/DL (ref 31.4–35)
MCV RBC AUTO: 68.4 FL (ref 79.6–97.8)
MONOCYTES # BLD: 0.4 K/UL (ref 0.1–1.3)
MONOCYTES NFR BLD: 12 % (ref 4–12)
NEUTS SEG # BLD: 1.3 K/UL (ref 1.7–8.2)
NEUTS SEG NFR BLD: 42 % (ref 43–78)
PLATELET # BLD AUTO: 132 K/UL (ref 150–450)
PMV BLD AUTO: ABNORMAL FL (ref 10.8–14.1)
POTASSIUM SERPL-SCNC: 3.3 MMOL/L (ref 3.5–5.1)
PROT SERPL-MCNC: 7.3 G/DL (ref 6.3–8.2)
RBC # BLD AUTO: 3.67 M/UL (ref 4.23–5.67)
SODIUM SERPL-SCNC: 142 MMOL/L (ref 136–145)
URATE SERPL-MCNC: 10.3 MG/DL (ref 2.6–6)
WBC # BLD AUTO: 3 K/UL (ref 4.3–11.1)

## 2018-07-07 PROCEDURE — 99233 SBSQ HOSP IP/OBS HIGH 50: CPT | Performed by: INTERNAL MEDICINE

## 2018-07-07 PROCEDURE — 83735 ASSAY OF MAGNESIUM: CPT | Performed by: NURSE PRACTITIONER

## 2018-07-07 PROCEDURE — 36415 COLL VENOUS BLD VENIPUNCTURE: CPT | Performed by: NURSE PRACTITIONER

## 2018-07-07 PROCEDURE — 77030020263 HC SOL INJ SOD CL0.9% LFCR 1000ML

## 2018-07-07 PROCEDURE — 74011250637 HC RX REV CODE- 250/637: Performed by: INTERNAL MEDICINE

## 2018-07-07 PROCEDURE — 80053 COMPREHEN METABOLIC PANEL: CPT | Performed by: NURSE PRACTITIONER

## 2018-07-07 PROCEDURE — 74011000258 HC RX REV CODE- 258: Performed by: NURSE PRACTITIONER

## 2018-07-07 PROCEDURE — 84550 ASSAY OF BLOOD/URIC ACID: CPT | Performed by: NURSE PRACTITIONER

## 2018-07-07 PROCEDURE — 74011250637 HC RX REV CODE- 250/637: Performed by: NURSE PRACTITIONER

## 2018-07-07 PROCEDURE — 74011000258 HC RX REV CODE- 258: Performed by: INTERNAL MEDICINE

## 2018-07-07 PROCEDURE — 97161 PT EVAL LOW COMPLEX 20 MIN: CPT

## 2018-07-07 PROCEDURE — 74011250636 HC RX REV CODE- 250/636: Performed by: NURSE PRACTITIONER

## 2018-07-07 PROCEDURE — 74011250636 HC RX REV CODE- 250/636: Performed by: INTERNAL MEDICINE

## 2018-07-07 PROCEDURE — 74018 RADEX ABDOMEN 1 VIEW: CPT

## 2018-07-07 PROCEDURE — 65270000029 HC RM PRIVATE

## 2018-07-07 PROCEDURE — 3E03305 INTRODUCTION OF OTHER ANTINEOPLASTIC INTO PERIPHERAL VEIN, PERCUTANEOUS APPROACH: ICD-10-PCS | Performed by: INTERNAL MEDICINE

## 2018-07-07 PROCEDURE — 85025 COMPLETE CBC W/AUTO DIFF WBC: CPT | Performed by: NURSE PRACTITIONER

## 2018-07-07 RX ORDER — LANOLIN ALCOHOL/MO/W.PET/CERES
400 CREAM (GRAM) TOPICAL 4 TIMES DAILY
Status: DISCONTINUED | OUTPATIENT
Start: 2018-07-07 | End: 2018-07-12 | Stop reason: HOSPADM

## 2018-07-07 RX ORDER — POTASSIUM CHLORIDE 14.9 MG/ML
20 INJECTION INTRAVENOUS
Status: DISPENSED | OUTPATIENT
Start: 2018-07-07 | End: 2018-07-07

## 2018-07-07 RX ORDER — ONDANSETRON 2 MG/ML
8 INJECTION INTRAMUSCULAR; INTRAVENOUS EVERY 8 HOURS
Status: COMPLETED | OUTPATIENT
Start: 2018-07-07 | End: 2018-07-08

## 2018-07-07 RX ORDER — POTASSIUM CHLORIDE 29.8 MG/ML
40 INJECTION INTRAVENOUS ONCE
Status: DISCONTINUED | OUTPATIENT
Start: 2018-07-07 | End: 2018-07-07 | Stop reason: SDUPTHER

## 2018-07-07 RX ORDER — ALLOPURINOL 100 MG/1
100 TABLET ORAL DAILY
Status: DISCONTINUED | OUTPATIENT
Start: 2018-07-07 | End: 2018-07-12 | Stop reason: HOSPADM

## 2018-07-07 RX ORDER — CALCIUM CARBONATE 500(1250)
500 TABLET ORAL
Status: DISCONTINUED | OUTPATIENT
Start: 2018-07-07 | End: 2018-07-12 | Stop reason: HOSPADM

## 2018-07-07 RX ORDER — MAGNESIUM SULFATE HEPTAHYDRATE 40 MG/ML
4 INJECTION, SOLUTION INTRAVENOUS ONCE
Status: COMPLETED | OUTPATIENT
Start: 2018-07-07 | End: 2018-07-07

## 2018-07-07 RX ADMIN — MIRTAZAPINE 15 MG: 15 TABLET, FILM COATED ORAL at 21:18

## 2018-07-07 RX ADMIN — POTASSIUM CHLORIDE 20 MEQ: 200 INJECTION, SOLUTION INTRAVENOUS at 12:52

## 2018-07-07 RX ADMIN — Medication 400 MG: at 08:28

## 2018-07-07 RX ADMIN — ONDANSETRON 8 MG: 2 INJECTION INTRAMUSCULAR; INTRAVENOUS at 15:35

## 2018-07-07 RX ADMIN — Medication 500 MG: at 17:27

## 2018-07-07 RX ADMIN — Medication 500 MG: at 08:28

## 2018-07-07 RX ADMIN — CARVEDILOL 6.25 MG: 6.25 TABLET, FILM COATED ORAL at 17:26

## 2018-07-07 RX ADMIN — CYANOCOBALAMIN TAB 1000 MCG 1000 MCG: 1000 TAB at 08:28

## 2018-07-07 RX ADMIN — CHLORHEXIDINE GLUCONATE 15 ML: 1.2 RINSE ORAL at 17:27

## 2018-07-07 RX ADMIN — STANDARDIZED SENNA CONCENTRATE AND DOCUSATE SODIUM 1 TABLET: 8.6; 5 TABLET, FILM COATED ORAL at 17:26

## 2018-07-07 RX ADMIN — ATORVASTATIN CALCIUM 20 MG: 10 TABLET, FILM COATED ORAL at 08:28

## 2018-07-07 RX ADMIN — POTASSIUM CHLORIDE 20 MEQ: 1500 TABLET, EXTENDED RELEASE ORAL at 17:26

## 2018-07-07 RX ADMIN — PANTOPRAZOLE SODIUM 40 MG: 40 TABLET, DELAYED RELEASE ORAL at 08:28

## 2018-07-07 RX ADMIN — APIXABAN 5 MG: 5 TABLET, FILM COATED ORAL at 17:27

## 2018-07-07 RX ADMIN — ONDANSETRON 8 MG: 2 INJECTION INTRAMUSCULAR; INTRAVENOUS at 21:19

## 2018-07-07 RX ADMIN — CYCLOPHOSPHAMIDE 2000 MG: 2 INJECTION, POWDER, FOR SOLUTION INTRAVENOUS; ORAL at 17:16

## 2018-07-07 RX ADMIN — OXYCODONE HYDROCHLORIDE 30 MG: 15 TABLET ORAL at 21:18

## 2018-07-07 RX ADMIN — TORSEMIDE 20 MG: 20 TABLET ORAL at 08:28

## 2018-07-07 RX ADMIN — SODIUM CHLORIDE 3 MG: 900 INJECTION, SOLUTION INTRAVENOUS at 20:06

## 2018-07-07 RX ADMIN — CARVEDILOL 6.25 MG: 6.25 TABLET, FILM COATED ORAL at 08:28

## 2018-07-07 RX ADMIN — Medication 400 MG: at 21:18

## 2018-07-07 RX ADMIN — Medication 500 MG: at 12:51

## 2018-07-07 RX ADMIN — SODIUM CHLORIDE 100 ML/HR: 900 INJECTION, SOLUTION INTRAVENOUS at 04:44

## 2018-07-07 RX ADMIN — STANDARDIZED SENNA CONCENTRATE AND DOCUSATE SODIUM 1 TABLET: 8.6; 5 TABLET, FILM COATED ORAL at 08:28

## 2018-07-07 RX ADMIN — MAGNESIUM SULFATE IN WATER 4 G: 40 INJECTION, SOLUTION INTRAVENOUS at 08:50

## 2018-07-07 RX ADMIN — ALLOPURINOL 100 MG: 100 TABLET ORAL at 15:40

## 2018-07-07 RX ADMIN — POTASSIUM CHLORIDE 20 MEQ: 1500 TABLET, EXTENDED RELEASE ORAL at 08:28

## 2018-07-07 RX ADMIN — CHLORHEXIDINE GLUCONATE 15 ML: 1.2 RINSE ORAL at 08:28

## 2018-07-07 RX ADMIN — Medication 400 MG: at 12:51

## 2018-07-07 RX ADMIN — APIXABAN 5 MG: 5 TABLET, FILM COATED ORAL at 08:28

## 2018-07-07 RX ADMIN — LEVOTHYROXINE SODIUM 137 MCG: 50 TABLET ORAL at 08:27

## 2018-07-07 RX ADMIN — Medication 400 MG: at 17:27

## 2018-07-07 RX ADMIN — DEXAMETHASONE SODIUM PHOSPHATE 40 MG: 10 INJECTION, SOLUTION INTRAMUSCULAR; INTRAVENOUS at 15:40

## 2018-07-07 NOTE — PROGRESS NOTES
[address] Hematology Oncology Inpatient Hematology / Oncology Progress Note Admission Date: 2018 12:46 PM 
Reason for Admission/Hospital Course: Multiple Myeloma MARY Intractable Pain MARY (acute kidney injury) ([facility] Intractable pain 24 Hour Events: 
Abdominal pain continues Last formed BM was 4-5 days ago, now with liquid stools and feelings of constipation No new symptoms ROS: 
Constitutional: Positive for weakness, malaise, fatigue. Negative for fever or chills. CV: Negative for chest pain, palpitations, edema. Respiratory: Negative for dyspnea, cough, wheezing. GI: Positive for abdominal pain, diarrhea, constipation. 10 point review of systems is otherwise negative with the exception of the elements mentioned above in the HPI. Allergies Allergen Reactions  Ace Inhibitors Other (comments)  Lisinopril Cough  Pcn [Penicillins] Swelling OBJECTIVE: 
Patient Vitals for the past 8 hrs: 
 BP Temp Pulse Resp SpO2  
18 1145 106/63 98.2 °F (36.8 °C) 82 16 97 % 18 0744 102/51 98.4 °F (36.9 °C) 70 16 98 % Temp (24hrs), Av.8 °F (36.6 °C), Min:97.4 °F (36.3 °C), Max:98.4 °F (36.9 °C) 
 
 07 -  1900 In: 120 [P.O.:120] Out: 125 [Urine:125] Physical Exam: 
Constitutional: Well developed, thin, elderly male in no acute distress, sitting comfortably in the bedside chair. HEENT: Normocephalic and atraumatic. Oropharynx is clear, mucous membranes are moist. Extraocular muscles are intact. Sclerae anicteric. Neck supple. Skin Warm and dry. No bruising and no rash noted. No erythema. No pallor. Respiratory Lungs are clear to auscultation bilaterally without wheezes, rales or rhonchi, normal air exchange without accessory muscle use. CVS Normal rate, regular rhythm and normal S1 and S2. No murmurs, gallops, or rubs. Abdomen Soft, tender and nondistended, hyperactive bowel sounds. No palpable mass.   No hepatosplenomegaly. Neuro Grossly nonfocal with no obvious sensory or motor deficits. MSK Normal range of motion in general.  No edema and no tenderness. Psych Appropriate mood and affect. Labs: 
  Recent Labs  
   07/07/18 0313 07/06/18 
 1032 WBC  3.0*  3.5*  
RBC  3.67*  4.19* HGB  8.6*  10.1* HCT  25.1*  29.0* MCV  68.4*  69.2*  
MCH  23.4*  24.1*  
MCHC  34.3  34.8  
RDW  16.0*  15.8*  
PLT  132*  144* GRANS  42*  42* LYMPH  43  40 MONOS  12  8 EOS  1  2  
BASOS  0  2 IG  2   --   
DF  AUTOMATED  MANUAL ANEU  1.3*  1.6* ABL  1.3  1.4 ABM  0.4  0.3 REA  0.0  0.1 ABB  0.0  0.1 AIG  0.1   --   
 
 Recent Labs  
   07/07/18 
 0313  07/06/18 
 1032 NA  142  132* K  3.3*  3.3*  
CL  108*  100 CO2  23  20* AGAP  11  12 GLU  72  100 BUN  39*  53* CREA  2.06*  2.76* GFRAA  40*  29* GFRNA  33*  24* CA  7.3*  7.9*  
SGOT  46*  59* AP  54  71  
TP  7.3  9.2* ALB  2.6*  3.5 GLOB  4.7*  5.7* AGRAT  0.6*  0.6* MG  1.2*  1.3* Imaging: XR ABD (KUB) [[id number]] Collected: 07/07/18 1430   
  Order Status: Completed Updated: 07/07/18 1433  
  Narrative:    
  KUB CLINICAL INDICATION: Abdominal pain, constipation FINDINGS: Two supine views of the abdomen and pelvis show nonspecific bowel gas 
pattern with air noted over the rectum. No dilated loops of small bowel evident. Cholecystectomy clips are noted. Betha Lute Post kyphoplasty changes noted in the lumbar 
spine. An AICD is in place. 
  
  Impression:    
  IMPRESSION: No acute abdominal or pelvic abnormality.  
   
 
 
 
ASSESSMENT: 
 
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 ICD-9-CM: 227.3  3/15/2018 Thyroid mass ICD-10-CM: E07.9 ICD-9-CM: 246.9  2/8/2018 Sinus bradycardia ICD-10-CM: R00.1 ICD-9-CM: 427.89  1/12/2018 PAF (paroxysmal atrial fibrillation) (HCC) ICD-10-CM: I48.0 ICD-9-CM: 427.31  11/26/2017 Chronic systolic CHF (congestive heart failure) (HCC) ICD-10-CM: T26.88 ICD-9-CM: 428.22, 428.0  11/26/2017 Pain ICD-10-CM: R52 ICD-9-CM: 780.96  11/20/2017 MARY (acute kidney injury) ([facility] ICD-10-CM: N17.9 ICD-9-CM: 584.9  11/20/2017 Acute renal failure (ARF) (HCC) ICD-10-CM: N17.9 ICD-9-CM: 584.9  11/18/2017 Intractable pain ICD-10-CM: I85 ICD-9-CM: 780.96  1/11/2017 Acute kidney injury ([facility] ICD-10-CM: N17.9 ICD-9-CM: 584.9  1/11/2017 Pancytopenia ([facility] ICD-10-CM: R95.682 ICD-9-CM: 284.19  1/11/2017 Constipation ICD-10-CM: K59.00 ICD-9-CM: 564.00  1/11/2017 Multiple myeloma (HCC) ICD-10-CM: C90.00 ICD-9-CM: 203.00  1/2/2017 Postural imbalance ICD-10-CM: R29.3 ICD-9-CM: 729.90  12/14/2016 Polyneuropathy ICD-10-CM: G62.9 ICD-9-CM: 356.9  12/14/2016 Fall ICD-10-CM: W19. Rose Merritt ICD-9-CM: D688.9  12/14/2016 Encounter for medication management ICD-10-CM: Y54.367 ICD-9-CM: V58.69  12/14/2016 Urinary retention ICD-10-CM: R33.9 ICD-9-CM: 788.20  6/6/2016 Colonic mass ICD-10-CM: K63.9 ICD-9-CM: 569.89  3/23/2016 Hyperlipidemia ICD-10-CM: E78.5 ICD-9-CM: 272.4  11/18/2015 Vertigo ICD-10-CM: Z51 ICD-9-CM: 780.4  11/18/2015 Malaise and fatigue ICD-10-CM: R53.81, R53.83 ICD-9-CM: 780.79  11/18/2015 Cardiomyopathy ([facility] ICD-10-CM: I42.9 ICD-9-CM: 425.4  11/18/2015 LBBB (left bundle branch block) ICD-10-CM: I44.7 ICD-9-CM: 426.3  11/18/2015 Arthritis ICD-10-CM: M19.90 ICD-9-CM: 716.90  Unknown  Arrhythmia ICD-10-CM: I49.9 ICD-9-CM: 427.9  Unknown Overview Signed 6/18/2013  2:07 PM by Nirmala Dao MD  
  LBBB Cholelithiases ICD-10-CM: K80.20 ICD-9-CM: 574.20  Unknown Hx of radiation therapy to prostate ICD-10-CM: Z92.3 ICD-9-CM: V15.3  Unknown Chronic systolic heart failure (HCC) ICD-10-CM: I50.22 ICD-9-CM: 428.22  9/13/2011 Automatic implantable cardioverter-defibrillator in situ ICD-10-CM: Z95.810 ICD-9-CM: V45.02  9/1/2011 Mr. Evangelist White is a 65 y.o. male admitted on 7/6/2018 with a primary diagnosis of MARY and intractable pain.  
  
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 During recent admission he had kyphoplasty due to worsening lumbar compression fracture and then went to rehab for 3 weeks but was not allowed to take his Revlimid/Dex during that time. He was seen in the office on 6/25 with increased back and abdominal pain, fatigue and hypercalcemia that responded well to Zometa. He was restarted on Rev/Dex and was brought in today for follow up. He continues to rate his pain as 10/10, but is not taking OxyIR for breakthrough due to constipation. His PCP switched him from [address] [place] [address] to [address] [place] [address] yesterday-sample given for constipation. He states last BM 1 week ago, passing gas, no nausea or vomiting. He only took 1 tabs of Oxy yesterday. He has lost another 10 pounds since last visit due to poor appetite. He admits to only drinking 2 16 oz bottles of water daily. He is utilizing a walker today and complains of increased fatigue and weakness. Labs obtained, Na+ 132, K+ 3.3, Creatinine up to 2.76, Mg+ 1.3. Ca+ stable at 7.9. He will be admitted for MARY, intractable pain and to receive supportive care. Of note, his wife is currently an inpatient due to a bowel obstruction.  
 
PLAN: 
Multiple Myeloma 
-hold Revlimid, may need to start Cytoxan 
07/07 Start Cytoxan 2000 mg x 1 today and pulse dose steroids, 40 mg Decadron daily x 4 days. Plan to start oral ixazomib and daratumumab in 2-3 weeks.   
  
MARY 
-IV hydration (NS @100ml/hr), closely monitor Cr and for fluid overload 
 07/07 Uric acid > 10. Give rasburicase x 1. Start allopurinol daily. Constipation 
-pericolace BID, amitiza, lactulose prn 
07/07 Abdominal pain and cramping, obtain KUB.  
  
Pain-Abdominal/Back 
-Increase fentanyl patch to 100 mcg, Oxy IR 30 mg Q4h prn  
  
Increased weakness 
-consult PT/OT 
  
Electrolyte Imbalances 
-replace per Steven SOPs 
  
Poor appetite 
-Remeron 15 mg 
  
Supportive care Follow Steven SOPs Currently on Eliquis 5 mg BID, apply SCDs for DVT prophylaxis Sanaz Yi  [address] Hematology Oncology [address] [place] [address] Office : [phone] Fax : [phone] Attending Addendum: 
I personally evaluated the patient with Virgie Britton N.P.,  and agree with the assessment, findings and plan as documented. Appears stable, he will receive Cytoxan/Dex today. Mingo Beaver MD 
[address] [place] [address] Office : [phone] Fax : [phone]

## 2018-07-07 NOTE — INTERDISCIPLINARY ROUNDS
IDR completed MD NP and charge RN involved to receive rasburicase and cytoxan today. Obtain KUB since no good BM since last Tuesday. Probably discharge Wednesday

## 2018-07-07 NOTE — PROGRESS NOTES
Problem: Pressure Injury - Risk of 
Goal: *Prevention of pressure injury Document Mervin Scale and appropriate interventions in the flowsheet. Outcome: Progressing Towards Goal 
Pressure Injury Interventions: 
Sensory Interventions: Assess changes in LOC, Maintain/enhance activity level, Pressure redistribution bed/mattress (bed type) Moisture Interventions: Maintain skin hydration (lotion/cream) Activity Interventions: Increase time out of bed, Pressure redistribution bed/mattress(bed type) Mobility Interventions: HOB 30 degrees or less, Pressure redistribution bed/mattress (bed type) Nutrition Interventions: Offer support with meals,snacks and hydration, Document food/fluid/supplement intake Friction and Shear Interventions: HOB 30 degrees or less Problem: Falls - Risk of 
Goal: *Absence of Falls Document Cy Sheldon Fall Risk and appropriate interventions in the flowsheet. Outcome: Progressing Towards Goal 
Fall Risk Interventions: 
Mobility Interventions: Communicate number of staff needed for ambulation/transfer, Patient to call before getting OOB Medication Interventions: Evaluate medications/consider consulting pharmacy, Patient to call before getting OOB, Teach patient to arise slowly Elimination Interventions: Call light in reach, Patient to call for help with toileting needs, Toilet paper/wipes in reach, Urinal in reach

## 2018-07-07 NOTE — PROGRESS NOTES
Problem: Mobility Impaired (Adult and Pediatric) Goal: *Acute Goals and Plan of Care (Insert Text) STG: 
(1.)Mr. Mari Crabtree will move from supine to sit and sit to supine  with MODIFIED INDEPENDENCE within 3 treatment day(s). (2.)Mr. Mari Crabtree will transfer from bed to chair and chair to bed with SUPERVISION using the least restrictive device within 3 treatment day(s). (3.)Mr. Mari Crabtree will ambulate with SUPERVISION for 50 feet with the least restrictive device within 3 treatment day(s). LTG: 
(1.)Mr. Mari Crabtree will move from supine to sit and sit to supine  in bed with INDEPENDENCE within 7 treatment day(s). (2.)Mr. Mari Crabtree will transfer from bed to chair and chair to bed with MODIFIED INDEPENDENCE using the least restrictive device within 7 treatment day(s). (3.)Mr. Mari Crabtree will ambulate with MODIFIED INDEPENDENCE for 150 feet with the least restrictive device within 7 treatment day(s). ________________________________________________________________________________________________ PHYSICAL THERAPY: Initial Assessment, Treatment Day: Day of Assessment, AM 7/7/2018 INPATIENT: Hospital Day: 2 Payor: CARE IMPROVEMENT PLUS [place] / Product Type: Managed Care Medicare /  
  
NAME/AGE/GENDER: Francisco Swanson is a 65 y.o. male PRIMARY DIAGNOSIS: Multiple Myeloma MARY Intractable Pain MARY (acute kidney injury) ([facility] [address] Intractable pain <principal problem not specified> <principal problem not specified> 
  
  
ICD-10: Treatment Diagnosis:  
 · Generalized Muscle Weakness (M62.81) · Difficulty in walking, Not elsewhere classified (R26.2) Precaution/Allergies: 
Ace inhibitors; Lisinopril; and Pcn [penicillins] ASSESSMENT:  
 
Mr. Mari Crabtree presents supine in bed and is agreeable to work with PT. He is able to perform bed mobility with supervision but patient increased with mobility.   He performed sit to stand and transfers with CGA but needed min A to safely ambulate in the room with a rolling walker. He ambulated with forward flex posture due to increased abdominal and back pain with movement. Patient returned to supine and pain eased with rest.  He reports that he lives with his wife who was just discharged from the hospital 1-2 days ago but heart related issues. He reports they help each other around the house. He has been using a rolling walker for the last few months since his kyphoplasty. He continues to benefit from skilled PT to improve his functional mobility and decrease his risk of falls. This section established at most recent assessment PROBLEM LIST (Impairments causing functional limitations): 1. Decreased Strength 2. Decreased ADL/Functional Activities 3. Decreased Transfer Abilities 4. Decreased Ambulation Ability/Technique 5. Decreased Balance 6. Increased Pain 7. Decreased Activity Tolerance 8. Decreased [place] with Home Exercise Program 
 INTERVENTIONS PLANNED: (Benefits and precautions of physical therapy have been discussed with the patient.) 1. Balance Exercise 2. Bed Mobility 3. Family Education 4. Gait Training 5. Home Exercise Program (HEP) 6. Neuromuscular Re-education/Strengthening 7. Therapeutic Activites 8. Therapeutic Exercise/Strengthening 9. Transfer Training 10. Group Therapy TREATMENT PLAN: Frequency/Duration: 3 times a week for duration of hospital stay Rehabilitation Potential For Stated Goals: Good RECOMMENDED REHABILITATION/EQUIPMENT: (at time of discharge pending progress): Due to the probability of continued deficits (see above) this patient will likely need continued skilled physical therapy after discharge. Equipment:  
 to be determined based on progress HISTORY:  
History of Present Injury/Illness (Reason for Referral): Mr. Geoffrey Braun is a 65 y.o. male admitted on 7/6/2018 with a primary diagnosis of MARY and intractable pain.  
  
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 During recent admission he had kyphoplasty due to worsening lumbar compression fracture and then went to rehab for 3 weeks but was not allowed to take his Revlimid/Dex during that time. He was seen in the office on 6/25 with increased back and abdominal pain, fatigue and hypercalcemia that responded well to Zometa. He was restarted on Rev/Dex and was brought in today for follow up. He continues to rate his pain as 10/10, but is not taking OxyIR for breakthrough due to constipation. His PCP switched him from [address] [place] [address] to [facility] yesterday-sample given for constipation. He states last BM 1 week ago, passing gas, no nausea or vomiting. He only took 1 tabs of Oxy yesterday. He has lost another 10 pounds since last visit due to poor appetite. He admits to only drinking 2 16 oz bottles of water daily. He is utilizing a walker today and complains of increased fatigue and weakness. Labs obtained, Na+ 132, K+ 3.3, Creatinine up to 2.76, Mg+ 1.3. Ca+ stable at 7.9. He will be admitted for MARY, intractable pain and to receive supportive care. Of note, his wife is currently an inpatient due to a bowel obstruction. Past Medical History/Comorbidities:  
Mr. Solis Norris  has a past medical history of Arrhythmia; Arthritis; BMI 30.0-30.9,adult; Cardiomyopathy ([address] Cholelithiases; Chronic systolic heart failure ([address] (9/13/2011); Gout; H/O: pituitary tumor; High cholesterol; History of thyroid cancer; History of vertigo; Hurthle cell carcinoma of thyroid ([address] Hx of radiation therapy to prostate (2004); Hyperlipidemia (11/18/2015); Hypertension; Hypothyroid; ICD (implantable cardiac defibrillator) in place (9/2011); LBBB (left bundle branch block) (11/18/2015); Malaise and fatigue (11/18/2015); Malignant neoplasm of prostate ([address] Mass of colon; Multiple myeloma ([address] and Sinus node dysfunction ([address]   Mr. Solis Norris  has a past surgical history that includes hx heent (2008); hx cholecystectomy (2013); hx gi; hx heart catheterization; hx pacemaker (2011/2016); hx colonoscopy; hx thyroidectomy (2008); hx tumor removal (1990/2010); and hx thyroidectomy (01/26/2018). Social History/Living Environment:  
Home Environment: Private residence # Steps to Enter: 4 Rails to Enter: Yes One/Two Story Residence: One story Living Alone: No 
Support Systems: Spouse/Significant Other/Partner Patient Expects to be Discharged to[de-identified] Private residence Current DME Used/Available at Home: Walker, rolling Prior Level of Function/Work/Activity: 
Patient reports being independent- modified independent at baseline. Has been using a rolling walker for a few months. Number of Personal Factors/Comorbidities that affect the Plan of Care: 1-2: MODERATE COMPLEXITY EXAMINATION:  
Most Recent Physical Functioning:  
Gross Assessment: 
AROM: Generally decreased, functional 
PROM: Generally decreased, functional 
Strength: Generally decreased, functional 
Sensation: Intact Posture: 
Posture (WDL): Exceptions to [facility] Posture Assessment: Forward head, Rounded shoulders, Trunk flexion Balance: 
Sitting: Intact Standing: Impaired Standing - Static: Good;Constant support Standing - Dynamic : Fair Bed Mobility: 
Rolling: Supervision Supine to Sit: Supervision Sit to Supine: Supervision Scooting: Supervision Wheelchair Mobility: 
  
Transfers: 
Sit to Stand: Contact guard assistance Stand to Sit: Contact guard assistance Bed to Chair: Contact guard assistance Gait: 
  
Base of Support: Widened Speed/Fatoumata: Shuffled; Slow Step Length: Left shortened;Right shortened Gait Abnormalities: Decreased step clearance; Ataxic Distance (ft): 15 Feet (ft) Assistive Device: Walker, rolling Ambulation - Level of Assistance: Minimal assistance Interventions: Safety awareness training; Tactile cues; Verbal cues Body Structures Involved: 1. Nerves 2. Thoracic Cage 3. Bones 4. Joints 5. Muscles 6. Ligaments Body Functions Affected: 1. Sensory/Pain 2. Hematological 
3. Immune 4. Neuromusculoskeletal 
5. Movement Related Activities and Participation Affected: 1. Mobility 2. Self Care 3. Domestic Life 4. Interpersonal Interactions and Relationships Number of elements that affect the Plan of Care: 4+: HIGH COMPLEXITY CLINICAL PRESENTATION:  
Presentation: Evolving clinical presentation with changing clinical characteristics: MODERATE COMPLEXITY CLINICAL DECISION MAKIN[address] [place] [address] AM-PAC 6 Clicks Basic Mobility Inpatient Short Form How much difficulty does the patient currently have. .. Unable A Lot A Little None 1. Turning over in bed (including adjusting bedclothes, sheets and blankets)? [] 1   [] 2   [x] 3   [] 4  
2. Sitting down on and standing up from a chair with arms ( e.g., wheelchair, bedside commode, etc.)   [] 1   [] 2   [x] 3   [] 4  
3. Moving from lying on back to sitting on the side of the bed? [] 1   [] 2   [x] 3   [] 4 How much help from another person does the patient currently need. .. Total A Lot A Little None 4. Moving to and from a bed to a chair (including a wheelchair)? [] 1   [] 2   [x] 3   [] 4  
5. Need to walk in hospital room? [] 1   [] 2   [x] 3   [] 4  
6. Climbing 3-5 steps with a railing? [] 1   [x] 2   [] 3   [] 4  
© , Trustees of [address] [place] [address], under license to [employer]. All rights reserved Score:  Initial: 17 Most Recent: X (Date: -- ) Interpretation of Tool:  Represents activities that are increasingly more difficult (i.e. Bed mobility, Transfers, Gait). Score 24 23 22-20 19-15 14-10 9-7 6 Modifier CH CI CJ CK CL CM CN Payor: CARE IMPROVEMENT PLUS [place] [employer] [employer] / Product Type: Managed Care Medicare /   
 
Medical Necessity:    
· Patient is expected to demonstrate progress in strength, balance and functional technique to increase independence with ADLs and IADLs. Reason for Services/Other Comments: 
· Patient continues to require modification of therapeutic interventions to increase complexity of exercises. Use of outcome tool(s) and clinical judgement create a POC that gives a: Clear prediction of patient's progress: LOW COMPLEXITY  
  
 
 
 
TREATMENT:  
(In addition to Assessment/Re-Assessment sessions the following treatments were rendered) Pre-treatment Symptoms/Complaints:  Reports patient with movement in his chest/abdomen Pain: Initial:  
Pain Intensity 1: 3 Pain Location 1: Abdomen, Back Pain Intervention(s) 1: Position  Post Session:  0/10 at rest in supine Assessment/Reassessment only, no treatment provided today Braces/Orthotics/Lines/Etc:  
· IV 
· O2 Device: Room air Treatment/Session Assessment:   
· Response to Treatment:  No pain post session · Interdisciplinary Collaboration:  
o Registered Nurse · After treatment position/precautions:  
o Supine in bed 
o Bed/Chair-wheels locked 
o Call light within reach 
o RN notified · Compliance with Program/Exercises: Will assess as treatment progresses. · Recommendations/Intent for next treatment session: \"Next visit will focus on advancements to more challenging activities and reduction in assistance provided\". Total Treatment Duration: PT Patient Time In/Time Out Time In: 2361 Time Out: 6379 Ion Landrum DPT

## 2018-07-07 NOTE — PROGRESS NOTES
Pt upset about care insists that he did not get assistance to the bathroom in a timely manner. Charge RN tried to apologize regarding delay in care. Pt very upset and wanted to talk to upper  called and will follow up first thing Monday morning and nursing supervisor to come talk with pt pt now. Pt made aware of plan and apologies  Given again

## 2018-07-07 NOTE — PROGRESS NOTES
Problem: Nutrition Deficit Goal: *Optimize nutritional status Nutrition Reason for assessment: Referral received from nursing admission Malnutrition Screening Tool for recently lost 2-13# without trying and eating poorly due to decreased appetite. Consult received for general nutrition management and supplementation : \"Recent weight loss of 10lbs with a poor appetite. \" (RN generated) Assessment:  
Diet order(s): regular Food/Nutrition Patient History:  Pt with significant PMH for colon cancer (s/p resection in 2016), thyroid cancer (thyroidectomy 4/2018) and recent kyphoplasty d/t lumbar compression fracture. He is receiving tx for MM (2017). Pt states that he has had extremely poor oral intake over the past 1 week d/t overall decreased appetite and constipation. His wife was also in the hospital (discharged yesterday) with SBO, per pt. He states that he does cook, but his wife is the one who \"gets on to him about eating. \"  Pt states that he can not really swallow foods when he has to make himself eat. Per pt, he ate a good lunch today and has been requesting boost with meals. He states that he does make a protein drink at home. Note that he is on Remeron. Anthropometrics: , 
Vitals 7/6/2018 Height 6' 2\" Weight: 77.1 kg (169 lb 14.4 oz), Weight Source: Standing scale (comment), Body mass index is 21.81 kg/(m^2). BMI class of underweight. WT / BMI 7/6/2018 7/6/2018 7/5/2018 6/27/2018 6/25/2018 WEIGHT 169 lb 14.4 oz 170 lb 11.2 oz 178 lb 179 lb 180 lb WT / BMI 6/20/2018 5/29/2018 5/22/2018 5/10/2018 5/7/2018 WEIGHT 187 lb 189 lb 1.6 oz 196 lb 1.6 oz 190 lb 9.6 oz 196 lb WT / BMI 5/3/2018 4/24/2018 3/28/2018 3/15/2018 3/8/2018 3/5/2018 WEIGHT 196 lb 198 lb 198 lb 202 lb 197 lb 197 lb WT / BMI 2/8/2018 1/30/2018 1/15/2018 1/5/2018 12/11/2017 WEIGHT 192 lb 12.8 oz 191 lb 187 lb 191 lb 193 lb WT / BMI 12/5/2017 WEIGHT 212 lb 8 oz Per weights listed in EMR, potential for a 43 pound, 20.2% clinically significant weight loss over 6 months and a recent 10 pound, 5.6% clinically significant weight loss in ~1.5 weeks. Meets Criteria for Malnutrition in the context of Chronic Illness [x] Severe Malnutrition, as evidenced by: 
 [x] Severe loss of muscle mass 
 [] Nutritional intake of <75% of energy intake compared to estimated energy needs for > 1 month 
 [x] Weight loss of >5% in 1 month, >7.5% in 3 months,  >10% in 6 months, or >20% in 12 months 
 [] Severe edema [x] Severe loss of subcutaneous fat 
 [] Functional decline Macronutrient needs: EER:  7864-2226 kcal /day (25-30 kcal/kg listed BW) EPR:   grams protein/day (1-1.3 grams/kg listed BW) Intake/Comparative Standards:  Average intake for past 2 day(s)/4 recorded meal(s): 45%. This potentially meets ~54% of kcal and ~56% of protein needs. Boost Plus provides 360 calories and 14 grams PRO per bottle. Nutrition Diagnosis: Severe/chronic malnutrition r/t hypermetabolic disease state as evidenced by pt with MM, weight loss noted above, and signs of muscle wasting and fat loss. Intervention: 
Meals and snacks: Continue current diet. Provided pt with menu. Nutrition Supplement Therapy: add boost plus TID Encouraged small, frequent meals, protein drinks in-between meals at discharge. Discharge nutrition plan: Too soon to determine. Rose Araujo [address], [address] [place] [address], [facility] [address], [phone]

## 2018-07-07 NOTE — PROGRESS NOTES
Patient to receive Cytoxan 2000mg IV x1 this afternoon. 1515: Chemo education completed by this RN. Reinforced side effects and proper chemotherapy precautions. Education materials provided. Patient denies any questions or concerns at this time. This RN informed patient that if he did have any questions in the future to not hesitate to ask. Chemotherapy consent signed by this RN and patient. 1540: Pre-medications given, see MAR.  
1710: 22G prepherial IV in L arm, positive blood return noted, flushed without difficulty. 1716: Cytoxan verified with Justen Vitale, WESLEY and started infusing at 325mL/hr due to patient only having peripheral access, connections taped, informed patient that if he has any pain/swelling at the site to call RN. 1836: positive blood return noted from IV. Pt tolerating chemo at this time.

## 2018-07-07 NOTE — PROGRESS NOTES
END OF SHIFT NOTE: 
 
Intake/Output Voiding: YES Catheter: NO 
Drain:   
 
 
 
 
 
Stool:  2 occurrences. Stool Assessment Stool Color: Vallery Hamming (07/07/18 1820) Stool Appearance: Loose (07/07/18 1820) Stool Amount: Small (07/07/18 1820) Stool Source/Status: Rectum (07/07/18 1820) Emesis:  0 occurrences. VITAL SIGNS Patient Vitals for the past 12 hrs: 
 Temp Pulse Resp BP SpO2  
07/07/18 1530 98.1 °F (36.7 °C) 82 16 104/60 95 % 07/07/18 1145 98.2 °F (36.8 °C) 82 16 106/63 97 % 07/07/18 0744 98.4 °F (36.9 °C) 70 16 102/51 98 % Pain Assessment Pain 1 Pain Scale 1: Numeric (0 - 10) (07/07/18 0900) Pain Intensity 1: 3 (07/07/18 0900) Patient Stated Pain Goal: 0 (07/07/18 0800) Pain Reassessment 1: Yes (07/06/18 1408) Pain Onset 1: pta (07/06/18 1931) Pain Location 1: Abdomen;Back (07/07/18 0900) Pain Orientation 1: Lower (07/06/18 1931) Pain Description 1: Aching (07/06/18 1931) Pain Intervention(s) 1: Position (07/07/18 0900) Ambulating Yes Additional Information:  
 
Shift report given to oncoming nurse at the bedside. Anthony Perez

## 2018-07-08 LAB
ALBUMIN SERPL-MCNC: 2.3 G/DL (ref 3.2–4.6)
ALBUMIN/GLOB SERPL: 0.5 {RATIO} (ref 1.2–3.5)
ALP SERPL-CCNC: 51 U/L (ref 50–136)
ALT SERPL-CCNC: 21 U/L (ref 12–65)
ANION GAP SERPL CALC-SCNC: 11 MMOL/L (ref 7–16)
AST SERPL-CCNC: 37 U/L (ref 15–37)
BASOPHILS # BLD: 0 K/UL (ref 0–0.2)
BASOPHILS NFR BLD: 0 % (ref 0–2)
BILIRUB SERPL-MCNC: 0.7 MG/DL (ref 0.2–1.1)
BUN SERPL-MCNC: 27 MG/DL (ref 8–23)
CALCIUM SERPL-MCNC: 7 MG/DL (ref 8.3–10.4)
CHLORIDE SERPL-SCNC: 111 MMOL/L (ref 98–107)
CO2 SERPL-SCNC: 20 MMOL/L (ref 21–32)
CREAT SERPL-MCNC: 1.6 MG/DL (ref 0.8–1.5)
DIFFERENTIAL METHOD BLD: ABNORMAL
EOSINOPHIL # BLD: 0 K/UL (ref 0–0.8)
EOSINOPHIL NFR BLD: 0 % (ref 0.5–7.8)
ERYTHROCYTE [DISTWIDTH] IN BLOOD BY AUTOMATED COUNT: 16.1 % (ref 11.9–14.6)
GLOBULIN SER CALC-MCNC: 4.6 G/DL (ref 2.3–3.5)
GLUCOSE SERPL-MCNC: 128 MG/DL (ref 65–100)
HCT VFR BLD AUTO: 24.2 % (ref 41.1–50.3)
HGB BLD-MCNC: 8.2 G/DL (ref 13.6–17.2)
IMM GRANULOCYTES # BLD: 0 K/UL (ref 0–0.5)
IMM GRANULOCYTES NFR BLD AUTO: 1 % (ref 0–5)
LYMPHOCYTES # BLD: 0.8 K/UL (ref 0.5–4.6)
LYMPHOCYTES NFR BLD: 25 % (ref 13–44)
MAGNESIUM SERPL-MCNC: 1.6 MG/DL (ref 1.8–2.4)
MCH RBC QN AUTO: 23.2 PG (ref 26.1–32.9)
MCHC RBC AUTO-ENTMCNC: 33.9 G/DL (ref 31.4–35)
MCV RBC AUTO: 68.6 FL (ref 79.6–97.8)
MONOCYTES # BLD: 0.1 K/UL (ref 0.1–1.3)
MONOCYTES NFR BLD: 3 % (ref 4–12)
NEUTS SEG # BLD: 2.4 K/UL (ref 1.7–8.2)
NEUTS SEG NFR BLD: 71 % (ref 43–78)
PLATELET # BLD AUTO: 140 K/UL (ref 150–450)
PMV BLD AUTO: ABNORMAL FL (ref 10.8–14.1)
POTASSIUM SERPL-SCNC: 4.1 MMOL/L (ref 3.5–5.1)
PROT SERPL-MCNC: 6.9 G/DL (ref 6.3–8.2)
RBC # BLD AUTO: 3.53 M/UL (ref 4.23–5.67)
SODIUM SERPL-SCNC: 142 MMOL/L (ref 136–145)
URATE SERPL-MCNC: 3.7 MG/DL (ref 2.6–6)
WBC # BLD AUTO: 3.3 K/UL (ref 4.3–11.1)

## 2018-07-08 PROCEDURE — P9040 RBC LEUKOREDUCED IRRADIATED: HCPCS | Performed by: NURSE PRACTITIONER

## 2018-07-08 PROCEDURE — 97535 SELF CARE MNGMENT TRAINING: CPT

## 2018-07-08 PROCEDURE — 97166 OT EVAL MOD COMPLEX 45 MIN: CPT

## 2018-07-08 PROCEDURE — 83735 ASSAY OF MAGNESIUM: CPT | Performed by: NURSE PRACTITIONER

## 2018-07-08 PROCEDURE — 86644 CMV ANTIBODY: CPT | Performed by: NURSE PRACTITIONER

## 2018-07-08 PROCEDURE — 74011250636 HC RX REV CODE- 250/636: Performed by: NURSE PRACTITIONER

## 2018-07-08 PROCEDURE — 74011250637 HC RX REV CODE- 250/637: Performed by: NURSE PRACTITIONER

## 2018-07-08 PROCEDURE — 86900 BLOOD TYPING SEROLOGIC ABO: CPT | Performed by: NURSE PRACTITIONER

## 2018-07-08 PROCEDURE — 99233 SBSQ HOSP IP/OBS HIGH 50: CPT | Performed by: INTERNAL MEDICINE

## 2018-07-08 PROCEDURE — 74011000258 HC RX REV CODE- 258: Performed by: INTERNAL MEDICINE

## 2018-07-08 PROCEDURE — 85025 COMPLETE CBC W/AUTO DIFF WBC: CPT | Performed by: NURSE PRACTITIONER

## 2018-07-08 PROCEDURE — 74011250636 HC RX REV CODE- 250/636: Performed by: INTERNAL MEDICINE

## 2018-07-08 PROCEDURE — 36430 TRANSFUSION BLD/BLD COMPNT: CPT

## 2018-07-08 PROCEDURE — 86923 COMPATIBILITY TEST ELECTRIC: CPT | Performed by: NURSE PRACTITIONER

## 2018-07-08 PROCEDURE — 74011250637 HC RX REV CODE- 250/637: Performed by: INTERNAL MEDICINE

## 2018-07-08 PROCEDURE — 84550 ASSAY OF BLOOD/URIC ACID: CPT | Performed by: NURSE PRACTITIONER

## 2018-07-08 PROCEDURE — 36415 COLL VENOUS BLD VENIPUNCTURE: CPT | Performed by: NURSE PRACTITIONER

## 2018-07-08 PROCEDURE — 77030039270 HC TU BLD FLTR CARD -A

## 2018-07-08 PROCEDURE — 80053 COMPREHEN METABOLIC PANEL: CPT | Performed by: NURSE PRACTITIONER

## 2018-07-08 PROCEDURE — 65270000029 HC RM PRIVATE

## 2018-07-08 PROCEDURE — 77030020263 HC SOL INJ SOD CL0.9% LFCR 1000ML

## 2018-07-08 RX ORDER — MAGNESIUM SULFATE HEPTAHYDRATE 40 MG/ML
4 INJECTION, SOLUTION INTRAVENOUS ONCE
Status: COMPLETED | OUTPATIENT
Start: 2018-07-08 | End: 2018-07-08

## 2018-07-08 RX ORDER — DIPHENHYDRAMINE HYDROCHLORIDE 50 MG/ML
25 INJECTION, SOLUTION INTRAMUSCULAR; INTRAVENOUS
Status: DISCONTINUED | OUTPATIENT
Start: 2018-07-08 | End: 2018-07-12 | Stop reason: HOSPADM

## 2018-07-08 RX ORDER — ACETAMINOPHEN 325 MG/1
650 TABLET ORAL
Status: DISCONTINUED | OUTPATIENT
Start: 2018-07-08 | End: 2018-07-12 | Stop reason: HOSPADM

## 2018-07-08 RX ORDER — SODIUM CHLORIDE 9 MG/ML
250 INJECTION, SOLUTION INTRAVENOUS AS NEEDED
Status: DISCONTINUED | OUTPATIENT
Start: 2018-07-08 | End: 2018-07-12 | Stop reason: HOSPADM

## 2018-07-08 RX ADMIN — CYANOCOBALAMIN TAB 1000 MCG 1000 MCG: 1000 TAB at 07:50

## 2018-07-08 RX ADMIN — DIPHENHYDRAMINE HYDROCHLORIDE 25 MG: 50 INJECTION, SOLUTION INTRAMUSCULAR; INTRAVENOUS at 16:24

## 2018-07-08 RX ADMIN — CARVEDILOL 6.25 MG: 6.25 TABLET, FILM COATED ORAL at 16:24

## 2018-07-08 RX ADMIN — MIRTAZAPINE 15 MG: 15 TABLET, FILM COATED ORAL at 20:41

## 2018-07-08 RX ADMIN — STANDARDIZED SENNA CONCENTRATE AND DOCUSATE SODIUM 1 TABLET: 8.6; 5 TABLET, FILM COATED ORAL at 18:13

## 2018-07-08 RX ADMIN — Medication 500 MG: at 13:41

## 2018-07-08 RX ADMIN — OXYCODONE HYDROCHLORIDE 30 MG: 15 TABLET ORAL at 20:40

## 2018-07-08 RX ADMIN — Medication 400 MG: at 13:41

## 2018-07-08 RX ADMIN — Medication 500 MG: at 07:52

## 2018-07-08 RX ADMIN — MAGNESIUM SULFATE IN WATER 4 G: 40 INJECTION, SOLUTION INTRAVENOUS at 10:28

## 2018-07-08 RX ADMIN — CARVEDILOL 6.25 MG: 6.25 TABLET, FILM COATED ORAL at 07:52

## 2018-07-08 RX ADMIN — ALLOPURINOL 100 MG: 100 TABLET ORAL at 07:51

## 2018-07-08 RX ADMIN — TORSEMIDE 20 MG: 20 TABLET ORAL at 07:49

## 2018-07-08 RX ADMIN — LEVOTHYROXINE SODIUM 137 MCG: 50 TABLET ORAL at 07:49

## 2018-07-08 RX ADMIN — POTASSIUM CHLORIDE 20 MEQ: 1500 TABLET, EXTENDED RELEASE ORAL at 18:13

## 2018-07-08 RX ADMIN — OXYCODONE HYDROCHLORIDE 30 MG: 15 TABLET ORAL at 10:33

## 2018-07-08 RX ADMIN — APIXABAN 5 MG: 5 TABLET, FILM COATED ORAL at 18:12

## 2018-07-08 RX ADMIN — Medication 400 MG: at 20:41

## 2018-07-08 RX ADMIN — SODIUM CHLORIDE 100 ML/HR: 900 INJECTION, SOLUTION INTRAVENOUS at 03:28

## 2018-07-08 RX ADMIN — Medication 400 MG: at 18:13

## 2018-07-08 RX ADMIN — Medication 500 MG: at 16:24

## 2018-07-08 RX ADMIN — CHLORHEXIDINE GLUCONATE 15 ML: 1.2 RINSE ORAL at 18:13

## 2018-07-08 RX ADMIN — PANTOPRAZOLE SODIUM 40 MG: 40 TABLET, DELAYED RELEASE ORAL at 07:52

## 2018-07-08 RX ADMIN — CHLORHEXIDINE GLUCONATE 15 ML: 1.2 RINSE ORAL at 07:49

## 2018-07-08 RX ADMIN — ONDANSETRON 8 MG: 2 INJECTION INTRAMUSCULAR; INTRAVENOUS at 05:16

## 2018-07-08 RX ADMIN — ACETAMINOPHEN 650 MG: 325 TABLET ORAL at 16:24

## 2018-07-08 RX ADMIN — ATORVASTATIN CALCIUM 20 MG: 10 TABLET, FILM COATED ORAL at 07:48

## 2018-07-08 RX ADMIN — DEXAMETHASONE SODIUM PHOSPHATE 40 MG: 10 INJECTION, SOLUTION INTRAMUSCULAR; INTRAVENOUS at 10:25

## 2018-07-08 RX ADMIN — LUBIPROSTONE 24 MCG: 24 CAPSULE, GELATIN COATED ORAL at 18:53

## 2018-07-08 RX ADMIN — POTASSIUM CHLORIDE 20 MEQ: 1500 TABLET, EXTENDED RELEASE ORAL at 07:52

## 2018-07-08 RX ADMIN — Medication 400 MG: at 07:51

## 2018-07-08 RX ADMIN — STANDARDIZED SENNA CONCENTRATE AND DOCUSATE SODIUM 1 TABLET: 8.6; 5 TABLET, FILM COATED ORAL at 07:51

## 2018-07-08 RX ADMIN — APIXABAN 5 MG: 5 TABLET, FILM COATED ORAL at 07:52

## 2018-07-08 NOTE — PROGRESS NOTES
Pt hypotensive w/bps 83/43, 81/49, and 89/47. HR 74, O2 97%, oral temp 98.4. Pt asymptomatic denying pain/dizziness/lighthead/SOB. Pt resting quietly. Cytoxan infusion complete. Pt tolerated well. Naldo Olea NP notified. No new orders. Will continue to monitor.

## 2018-07-08 NOTE — PROGRESS NOTES
Problem: Pressure Injury - Risk of  Goal: *Prevention of pressure injury  Document Mervin Scale and appropriate interventions in the flowsheet. Outcome: Progressing Towards Goal  Pressure Injury Interventions:  Sensory Interventions: Assess changes in LOC, Maintain/enhance activity level, Pressure redistribution bed/mattress (bed type)    Moisture Interventions: Maintain skin hydration (lotion/cream)    Activity Interventions: Increase time out of bed, Pressure redistribution bed/mattress(bed type)    Mobility Interventions: HOB 30 degrees or less, Pressure redistribution bed/mattress (bed type)    Nutrition Interventions: Offer support with meals,snacks and hydration, Document food/fluid/supplement intake    Friction and Shear Interventions: HOB 30 degrees or less               Problem: Falls - Risk of  Goal: *Absence of Falls  Document Bee Fall Risk and appropriate interventions in the flowsheet.    Outcome: Progressing Towards Goal  Fall Risk Interventions:  Mobility Interventions: Communicate number of staff needed for ambulation/transfer, Patient to call before getting OOB         Medication Interventions: Evaluate medications/consider consulting pharmacy, Patient to call before getting OOB, Teach patient to arise slowly    Elimination Interventions: Call light in reach, Patient to call for help with toileting needs, Toilet paper/wipes in reach, Urinal in reach

## 2018-07-08 NOTE — PROGRESS NOTES
END OF SHIFT NOTE:    Intake/Output  07/07 1901 - 07/08 0700  In: 2472 [I.V.:2472]  Out: 375 [Urine:375]   Voiding: YES  Catheter: NO  Drain:              Stool:  0 occurrences. Stool Assessment  Stool Color: Brown (07/07/18 1820)  Stool Appearance: Loose (07/07/18 1820)  Stool Amount: Small (07/07/18 1820)  Stool Source/Status: Rectum (07/07/18 1820)    Emesis:  0 occurrences. VITAL SIGNS  Patient Vitals for the past 12 hrs:   Temp Pulse Resp BP SpO2   07/08/18 0436 97.5 °F (36.4 °C) 70 16 97/56 98 %   07/08/18 0017 97.9 °F (36.6 °C) 71 18 91/51 96 %   07/07/18 2014 98.4 °F (36.9 °C) 73 16 (!) 89/47 97 %       Pain Assessment  Pain 1  Pain Scale 1: Numeric (0 - 10) (07/07/18 1926)  Pain Intensity 1: 3 (07/07/18 1926)  Patient Stated Pain Goal: 0 (07/07/18 1926)  Pain Reassessment 1: Yes (07/06/18 1408)  Pain Onset 1: pta (07/06/18 1931)  Pain Location 1: Abdomen;Back (07/07/18 0900)  Pain Orientation 1: Lower (07/06/18 1931)  Pain Description 1: Aching (07/06/18 1931)  Pain Intervention(s) 1: Position (07/07/18 0900)    Ambulating  Yes    Additional Information: Pt resting quietly. No visible s/sx of distress. Oxy given once for abd and back pain. No other needs voiced at this time. Pt bp 97/56 this morning. Shift report given to oncoming nurse at the bedside.     Bettie Sargent RN

## 2018-07-08 NOTE — PROGRESS NOTES
[address] Hematology Oncology Inpatient Hematology / Oncology Progress Note Admission Date: 2018 12:46 PM 
Reason for Admission/Hospital Course: Multiple Myeloma MARY Intractable Pain MARY (acute kidney injury) ([facility] [address] Intractable pain 24 Hour Events: 
Abdominal pain somewhat better - KUB negative A couple of small stool balls today No new symptoms ROS: 
Constitutional: Positive for weakness, fatigue. Negative for fever or chills. CV: Negative for chest pain, palpitations, edema. Respiratory: Negative for dyspnea, cough, wheezing. GI: Positive for abdominal pain, diarrhea, constipation. 10 point review of systems is otherwise negative with the exception of the elements mentioned above in the HPI. Allergies Allergen Reactions  Ace Inhibitors Other (comments)  Lisinopril Cough  Pcn [Penicillins] Swelling OBJECTIVE: 
Patient Vitals for the past 8 hrs: 
 BP Temp Pulse Resp SpO2  
18 1135 - 97.8 °F (36.6 °C) 94 16 97 % 18 0806 119/60 97.5 °F (36.4 °C) 71 16 97 % Temp (24hrs), Av.9 °F (36.6 °C), Min:97.5 °F (36.4 °C), Max:98.4 °F (36.9 °C) 
 
 07 -  1900 In: 360 [P.O.:360] Out: 125 [Urine:125] Physical Exam: 
Constitutional: Well developed, thin, elderly male in no acute distress, sitting comfortably in the bedside chair. HEENT: Normocephalic and atraumatic. Oropharynx is clear, mucous membranes are moist. Extraocular muscles are intact. Sclerae anicteric. Neck supple. Skin Warm and dry. No bruising and no rash noted. No erythema. No pallor. Respiratory Lungs are clear to auscultation bilaterally without wheezes, rales or rhonchi, normal air exchange without accessory muscle use. CVS Normal rate, regular rhythm and normal S1 and S2. No murmurs, gallops, or rubs. Abdomen Soft, non-tender and nondistended, normoactive bowel sounds. No palpable mass. No hepatosplenomegaly. + hernia noted.    
Neuro Grossly nonfocal with no obvious sensory or motor deficits. MSK Normal range of motion in general.  No edema and no tenderness. Psych Appropriate mood and affect. Labs: 
   
Recent Labs  
   07/08/18 0542 07/07/18 0313 07/06/18 
 1032 WBC  3.3*  3.0*  3.5*  
RBC  3.53*  3.67*  4.19* HGB  8.2*  8.6*  10.1* HCT  24.2*  25.1*  29.0* MCV  68.6*  68.4*  69.2*  
MCH  23.2*  23.4*  24.1*  
MCHC  33.9  34.3  34.8  
RDW  16.1*  16.0*  15.8*  
PLT  140*  132*  144* GRANS  71  42*  42* LYMPH  25  43  40 MONOS  3*  12  8 EOS  0*  1  2  
BASOS  0  0  2 IG  1  2   --   
DF  AUTOMATED  AUTOMATED  MANUAL ANEU  2.4  1.3*  1.6* ABL  0.8  1.3  1.4 ABM  0.1  0.4  0.3 REA  0.0  0.0  0.1 ABB  0.0  0.0  0.1 AIG  0.0  0.1   --   
 
  
Recent Labs  
   07/08/18 
 0542  07/07/18 0313 07/06/18 
 1032 NA  142  142  132* K  4.1  3.3*  3.3*  
CL  111*  108*  100 CO2  20*  23  20* AGAP  11  11  12 GLU  128*  72  100 BUN  27*  39*  53* CREA  1.60*  2.06*  2.76* GFRAA  54*  40*  29* GFRNA  45*  33*  24* CA  7.0*  7.3*  7.9*  
SGOT  37  46*  59* AP  51  54  71 TP  6.9  7.3  9.2* ALB  2.3*  2.6*  3.5 GLOB  4.6*  4.7*  5.7* AGRAT  0.5*  0.6*  0.6* MG  1.6*  1.2*  1.3* Imaging: XR ABD (KUB) [[id number]] Collected: 07/07/18 1430   
  Order Status: Completed Updated: 07/07/18 1433  
  Narrative:    
  KUJANES CLINICAL INDICATION: Abdominal pain, constipation FINDINGS: Two supine views of the abdomen and pelvis show nonspecific bowel gas 
pattern with air noted over the rectum. No dilated loops of small bowel evident. Cholecystectomy clips are noted. Lorrain Passey Post kyphoplasty changes noted in the lumbar 
spine. An AICD is in place. 
  
  Impression:    
  IMPRESSION: No acute abdominal or pelvic abnormality.  
   
 
 
 
ASSESSMENT: 
 
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 ([facility] ICD-10-CM: I97 ICD-9-CM: 193  Unknown Hurthle cell carcinoma ([facility] ICD-10-CM: U82 ICD-9-CM: 090  3/15/2018 Postsurgical hypothyroidism ICD-10-CM: E89.0 ICD-9-CM: 244.0  3/15/2018 Pituitary macroadenoma ([facility] ICD-10-CM: D35.2 ICD-9-CM: 227.3  3/15/2018 Thyroid mass ICD-10-CM: E07.9 ICD-9-CM: 246.9  2/8/2018 Sinus bradycardia ICD-10-CM: R00.1 ICD-9-CM: 427.89  1/12/2018 PAF (paroxysmal atrial fibrillation) (HCC) ICD-10-CM: I48.0 ICD-9-CM: 427.31  11/26/2017 Chronic systolic CHF (congestive heart failure) (HCC) ICD-10-CM: A99.50 ICD-9-CM: 428.22, 428.0  11/26/2017 Pain ICD-10-CM: R52 ICD-9-CM: 780.96  11/20/2017 MARY (acute kidney injury) ([facility] ICD-10-CM: N17.9 ICD-9-CM: 584.9  11/20/2017 Acute renal failure (ARF) (HCC) ICD-10-CM: N17.9 ICD-9-CM: 584.9  11/18/2017 Intractable pain ICD-10-CM: S36 ICD-9-CM: 780.96  1/11/2017 Acute kidney injury ([facility] ICD-10-CM: N17.9 ICD-9-CM: 584.9  1/11/2017 Pancytopenia ([facility] ICD-10-CM: J12.374 ICD-9-CM: 284.19  1/11/2017 Constipation ICD-10-CM: K59.00 ICD-9-CM: 564.00  1/11/2017 Multiple myeloma (HCC) ICD-10-CM: C90.00 ICD-9-CM: 203.00  1/2/2017 Postural imbalance ICD-10-CM: R29.3 ICD-9-CM: 729.90  12/14/2016 Polyneuropathy ICD-10-CM: G62.9 ICD-9-CM: 356.9  12/14/2016 Fall ICD-10-CM: W19. Rose Chin ICD-9-CM: G637.5  12/14/2016 Encounter for medication management ICD-10-CM: L53.249 ICD-9-CM: V58.69  12/14/2016 Urinary retention ICD-10-CM: R33.9 ICD-9-CM: 788.20  6/6/2016 Colonic mass ICD-10-CM: K63.9 ICD-9-CM: 569.89  3/23/2016 Hyperlipidemia ICD-10-CM: E78.5 ICD-9-CM: 272.4  11/18/2015 Vertigo ICD-10-CM: N49 ICD-9-CM: 780.4  11/18/2015 Malaise and fatigue ICD-10-CM: R53.81, R53.83 ICD-9-CM: 780.79  11/18/2015 Cardiomyopathy ([facility] [address] ICD-10-CM: I42.9 ICD-9-CM: 425.4  11/18/2015 LBBB (left bundle branch block) ICD-10-CM: I44.7 ICD-9-CM: 426.3  11/18/2015 Arthritis ICD-10-CM: M19.90 ICD-9-CM: 716.90  Unknown Arrhythmia ICD-10-CM: I49.9 ICD-9-CM: 427.9  Unknown Overview Signed 6/18/2013  2:07 PM by Keo Cohen MD  
  LBBB Cholelithiases ICD-10-CM: K80.20 ICD-9-CM: 574.20  Unknown Hx of radiation therapy to prostate ICD-10-CM: Z92.3 ICD-9-CM: V15.3  Unknown Chronic systolic heart failure (HCC) ICD-10-CM: I50.22 ICD-9-CM: 428.22  9/13/2011 Automatic implantable cardioverter-defibrillator in situ ICD-10-CM: Z95.810 ICD-9-CM: V45.02  9/1/2011 Mr. Crystal Jamison is a 65 y.o. male admitted on 7/6/2018 with a primary diagnosis of MARY and intractable pain.  
  
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 During recent admission he had kyphoplasty due to worsening lumbar compression fracture and then went to rehab for 3 weeks but was not allowed to take his Revlimid/Dex during that time. He was seen in the office on 6/25 with increased back and abdominal pain, fatigue and hypercalcemia that responded well to Zometa. He was restarted on Rev/Dex and was brought in today for follow up. He continues to rate his pain as 10/10, but is not taking OxyIR for breakthrough due to constipation. His PCP switched him from [address] [place] [address] to [facility]-sample given for constipation. He states last BM 1 week ago, passing gas, no nausea or vomiting. He only took 1 tabs of Oxy yesterday. He has lost another 10 pounds since last visit due to poor appetite. He admits to only drinking 2 16 oz bottles of water daily. He is utilizing a walker today and complains of increased fatigue and weakness.   Labs obtained, Na+ 132, K+ 3.3, Creatinine up to 2.76, Mg+ 1.3.  Ca+ stable at 7.9. He will be admitted for MARY, intractable pain and to receive supportive care. Of note, his wife is currently an inpatient due to a bowel obstruction. PLAN: 
Multiple Myeloma 
-hold Revlimid, may need to start Cytoxan 
07/07 Start Cytoxan 2000 mg x 1 today and pulse dose steroids, 40 mg Decadron daily x 4 days. Plan to start oral ixazomib and daratumumab in 2-3 weeks. 07/08 Tolerated well.   
  
MARY 
-IV hydration (NS @100ml/hr), closely monitor Cr and for fluid overload 
 07/07 Uric acid > 10. Give rasburicase x 1. Start allopurinol daily. 07/08 Uric acid down to 3.6. Creatinine continues to improve. Constipation 
-pericolace BID, amitiza, lactulose prn 
07/07 Abdominal pain and cramping, obtain KUB. 07/08 KUB negative. Patient may take his own Amitiza.  
  
Pain-Abdominal/Back 
-Increase fentanyl patch to 100 mcg, Oxy IR 30 mg Q4h prn  
  
Increased weakness 
-consult PT/OT 
  
Electrolyte Imbalances 
-replace per Steven SOPs 
  
Poor appetite 
-Remeron 15 mg 
  
Supportive care Follow Steven SOPs Currently on Eliquis 5 mg BID, apply SCDs for DVT prophylaxis Disposition: Home on Tuesday or Wednesday. Consult to IR placed for port-a-cath placement. E-mail sent to navigators to start process of getting oral ixazomib and daratumumab approved. Will need follow up with Dr. Judith Denny in 2-3 weeks to start this. Manuel Arechiga, POLI [address] Hematology Oncology [address] [place] [address] Office : [phone] Fax : [phone] Attending Addendum: 
I personally evaluated the patient with Shaheed Martinez N.P.,  and agree with the assessment, findings and plan as documented. Appears stable, we will arrange for him to have a Mediport inserted tomorrow. Ani Carvajal MD 
[address] Office : [phone] Fax : [phone]

## 2018-07-08 NOTE — PROGRESS NOTES
END OF SHIFT NOTE:    Intake/Output      Voiding: YES  Catheter: NO  Drain:              Stool:  1 occurrences. Stool Assessment  Stool Color: Brown (07/08/18 1025)  Stool Appearance: Soft (07/08/18 1025)  Stool Amount: Small (07/08/18 1025)  Stool Source/Status: Rectum (07/08/18 1025)    Emesis:  0 occurrences. VITAL SIGNS  Patient Vitals for the past 12 hrs:   Temp Pulse Resp BP SpO2   07/08/18 1737 97.2 °F (36.2 °C) 70 18 98/48 98 %   07/08/18 1524 98.1 °F (36.7 °C) 70 16 93/50 98 %   07/08/18 1135 97.8 °F (36.6 °C) 94 16 - 97 %   07/08/18 0806 97.5 °F (36.4 °C) 71 16 119/60 97 %       Pain Assessment  Pain 1  Pain Scale 1: Numeric (0 - 10) (07/08/18 1446)  Pain Intensity 1: 0 (07/08/18 1446)  Patient Stated Pain Goal: 0 (07/08/18 1130)  Pain Reassessment 1: Yes (07/08/18 1130)  Pain Onset 1: pta (07/06/18 1931)  Pain Location 1: Abdomen;Back (07/08/18 1034)  Pain Orientation 1: Lower (07/06/18 1931)  Pain Description 1: Aching (07/06/18 1931)  Pain Intervention(s) 1: Medication (see MAR) (07/08/18 1034)    Ambulating  Yes    Additional Information:     Shift report given to oncoming nurse at the bedside.     Pérez Guo

## 2018-07-08 NOTE — PROGRESS NOTES
Problem: Self Care Deficits Care Plan (Adult)  Goal: *Acute Goals and Plan of Care (Insert Text)  1. Patient will complete lower body bathing and dressing with modified indepdnence and adaptive equipment as needed. 2. Patient will complete toileting with modified independence. 3. Patient will tolerate 30 minutes of OT treatment with 2 rest breaks to increase activity tolerance for ADLs. 4. Patient will complete functional transfers with modified independence and adaptive equipment as needed. Timeframe: 7 visits     OCCUPATIONAL THERAPY: Initial Assessment, Treatment Day: Day of Assessment and AM 7/8/2018  INPATIENT: Hospital Day: 3  Payor: CARE IMPROVEMENT PLUS / [place] / Product Type: Managed Care Medicare /      NAME/AGE/GENDER: Davina Chadwick is a 65 y.o. male   PRIMARY DIAGNOSIS:  Multiple Myeloma  MARY  Intractable Pain  MARY (acute kidney injury) ([facility] [address]  Intractable pain <principal problem not specified> <principal problem not specified>        ICD-10: Treatment Diagnosis:    · Generalized Muscle Weakness (M62.81)  · Other lack of cordination (R27.8)   Precautions/Allergies:     Ace inhibitors; Lisinopril; and Pcn [penicillins]      ASSESSMENT:     Mr. Onel Jamison presents sitting up in chair when therapy enters. Prior to admission, pt was living at home with his wife and receiving home health OT/PT/nursing. Pt was using performing basic ADLs with Mod I and IADLs with minimal assist. Today, pt is A/O x4 and cooperative with therapy. Pt ambulates from chair to bed with CGA and sits EOB with good sitting balance. Pt dons/doffs socks with supervision and becomes a little SO, requiring a rest break. Pt ambulates to bathroom and stands at the sink while holding onto sink for constant balance support and performs grooming and oral hygiene task. Pt tolerates standing for 4-5 minutes and then requires seated rest break due to fatigue.  Pt is educated on some energy conservation techniques for performing ADLs. Pt would benefit from skilled acute OT to improve independence in ADLs and functional mobility. This section established at most recent assessment   PROBLEM LIST (Impairments causing functional limitations):  1. Decreased Strength  2. Decreased ADL/Functional Activities  3. Decreased Transfer Abilities  4. Decreased Balance  5. Decreased Activity Tolerance  6. Increased Fatigue  7. Increased Shortness of Breath   INTERVENTIONS PLANNED: (Benefits and precautions of occupational therapy have been discussed with the patient.)  1. Activities of daily living training  2. Adaptive equipment training  3. Balance training  4. Clothing management  5. Donning&doffing training  6. Manual therapy training  7. Therapeutic activity  8. Therapeutic exercise     TREATMENT PLAN: Frequency/Duration: Follow patient 3x/week to address above goals. Rehabilitation Potential For Stated Goals: Good     RECOMMENDED REHABILITATION/EQUIPMENT: (at time of discharge pending progress): Due to the probability of continued deficits (see above) this patient will likely need continued skilled occupational therapy after discharge. Equipment:    None at this time              OCCUPATIONAL PROFILE AND HISTORY:   History of Present Injury/Illness (Reason for Referral):  See H&P  Past Medical History/Comorbidities:   Mr. Marty Rojas  has a past medical history of Arrhythmia; Arthritis; BMI 30.0-30.9,adult; Cardiomyopathy ([address] Cholelithiases; Chronic systolic heart failure ([address] (9/13/2011); Gout; H/O: pituitary tumor; High cholesterol; History of thyroid cancer; History of vertigo; Hurthle cell carcinoma of thyroid ([address] Hx of radiation therapy to prostate (2004); Hyperlipidemia (11/18/2015); Hypertension; Hypothyroid; ICD (implantable cardiac defibrillator) in place (9/2011); LBBB (left bundle branch block) (11/18/2015); Malaise and fatigue (11/18/2015); Malignant neoplasm of prostate ([address]  Mass of colon; Multiple myeloma ([address] and Sinus node dysfunction ([facility] [address] Mr. Marty Rojas  has a past surgical history that includes hx heent (2008); hx cholecystectomy (2013); hx gi; hx heart catheterization; hx pacemaker (2011/2016); hx colonoscopy; hx thyroidectomy (2008); hx tumor removal (1990/2010); and hx thyroidectomy (01/26/2018). Social History/Living Environment:   Home Environment: Private residence  # Steps to Enter: 4  Rails to Enter: Yes  One/Two Story Residence: One story  Living Alone: No  Support Systems: Spouse/Significant Other/Partner  Patient Expects to be Discharged to[de-identified] Private residence  Current DME Used/Available at Home: Walker  Prior Level of Function/Work/Activity:  See Above. Personal Factors:          Age:  65 y.o. Number of Personal Factors/Comorbidities that affect the Plan of Care: Expanded review of therapy/medical records (1-2):  MODERATE COMPLEXITY   ASSESSMENT OF OCCUPATIONAL PERFORMANCE[de-identified]   Activities of Daily Living:   Basic ADLs (From Assessment) Complex ADLs (From Assessment)   Feeding: Modified independent  Oral Facial Hygiene/Grooming: Modified Independent  Bathing: Supervision  Upper Body Dressing: Supervision  Lower Body Dressing: Stand-by assistance  Toileting: Supervision     Grooming/Bathing/Dressing Activities of Daily Living   Grooming  Washing Face: Supervision/set-up  Brushing Teeth: Supervision/set-up  Cues: Verbal cues provided  Adaptive Equipment: Electric toothbrush                       Lower Body Dressing Assistance  Socks: Modified independent Bed/Mat Mobility  Supine to Sit: Supervision  Sit to Supine: Supervision  Sit to Stand: Contact guard assistance  Bed to Chair: Contact guard assistance  Scooting: Supervision       Most Recent Physical Functioning:   Gross Assessment:  AROM: Generally decreased, functional  PROM: Generally decreased, functional  Strength: Generally decreased, functional               Posture:  Posture (WDL): Exceptions to WDL  Posture Assessment:  Forward head, Rounded shoulders, Trunk flexion  Balance:  Sitting: Intact  Standing: Impaired  Standing - Static: Good;Constant support  Standing - Dynamic : Fair Bed Mobility:  Supine to Sit: Supervision  Sit to Supine: Supervision  Scooting: Supervision  Wheelchair Mobility:     Transfers:  Sit to Stand: Contact guard assistance  Stand to Sit: Contact guard assistance  Bed to Chair: Contact guard assistance            Patient Vitals for the past 6 hrs:   BP Patient Position SpO2 Pulse   18 1135 At rest 97 % 94       Mental Status  Neurologic State: Alert  Orientation Level: Disoriented X4  Cognition: Appropriate decision making                          Physical Skills Involved:  1. Range of Motion  2. Balance  3. Strength  4. Activity Tolerance Cognitive Skills Affected (resulting in the inability to perform in a timely and safe manner):  1. Executive Function Psychosocial Skills Affected:  1. Habits/Routines  2. Environmental Adaptation  3. Social Roles   Number of elements that affect the Plan of Care: 3-5:  MODERATE COMPLEXITY   CLINICAL DECISION MAKIN[address] [place] [id number] AM-PAC 6 Clicks   Daily Activity Inpatient Short Form  How much help from another person does the patient currently need. .. Total A Lot A Little None   1. Putting on and taking off regular lower body clothing? [] 1   [] 2   [x] 3   [] 4   2. Bathing (including washing, rinsing, drying)? [] 1   [] 2   [x] 3   [] 4   3. Toileting, which includes using toilet, bedpan or urinal?   [] 1   [] 2   [x] 3   [] 4   4. Putting on and taking off regular upper body clothing? [] 1   [] 2   [] 3   [x] 4   5. Taking care of personal grooming such as brushing teeth? [] 1   [] 2   [] 3   [x] 4   6. Eating meals? [] 1   [] 2   [] 3   [x] 4   © , Trustees of [address] [place] [address], under license to [employer].  All rights reserved      Score:  Initial:21  Most Recent: X (Date: -- )    Interpretation of Tool:  Represents activities that are increasingly more difficult (i.e. Bed mobility, Transfers, Gait). Score 24 23 22-20 19-15 14-10 9-7 6     Modifier CH CI CJ CK CL CM CN      ? Self Care:     - CURRENT STATUS: CJ - 20%-39% impaired, limited or restricted    - GOAL STATUS: CI - 1%-19% impaired, limited or restricted    - D/C STATUS:  ---------------To be determined---------------  Payor: CARE IMPROVEMENT PLUS [place] / Product Type: [employer] Care Medicare /      Medical Necessity:     · Patient demonstrates good rehab potential due to higher previous functional level. Reason for Services/Other Comments:  · Patient continues to require skilled intervention due to medical complications. Use of outcome tool(s) and clinical judgement create a POC that gives a: MODERATE COMPLEXITY         TREATMENT:   (In addition to Assessment/Re-Assessment sessions the following treatments were rendered)     Pre-treatment Symptoms/Complaints:    Pain: Initial:   Pain Intensity 1: 0  Post Session:  same     Self Care: (10): Procedure(s) (per grid) utilized to improve and/or restore self-care/home management as related to dressing and grooming. Required minimal verbal and   cueing to facilitate activities of daily living skills. Braces/Orthotics/Lines/Etc:   · O2 Device: Room air  Treatment/Session Assessment:    · Response to Treatment:  Pt pleasant and in bed with needs in reach. · Interdisciplinary Collaboration:   o Occupational Therapist  o Registered Nurse  · After treatment position/precautions:   o Up in chair  o Bed alarm/tab alert on  o Bed/Chair-wheels locked   · Compliance with Program/Exercises: Will assess as treatment progresses. · Recommendations/Intent for next treatment session: \"Next visit will focus on advancements to more challenging activities\".   Total Treatment Duration:  OT Patient Time In/Time Out  Time In: 1342  Time Out: [address] [place] [address]

## 2018-07-09 ENCOUNTER — HOSPITAL ENCOUNTER (OUTPATIENT)
Dept: INFUSION THERAPY | Age: 78
End: 2018-07-09

## 2018-07-09 ENCOUNTER — APPOINTMENT (OUTPATIENT)
Dept: GENERAL RADIOLOGY | Age: 78
DRG: 841 | End: 2018-07-09
Attending: NURSE PRACTITIONER
Payer: MEDICARE

## 2018-07-09 LAB
ALBUMIN SERPL-MCNC: 2.3 G/DL (ref 3.2–4.6)
ALBUMIN/GLOB SERPL: 0.5 {RATIO} (ref 1.2–3.5)
ALP SERPL-CCNC: 47 U/L (ref 50–136)
ALT SERPL-CCNC: 19 U/L (ref 12–65)
ANION GAP SERPL CALC-SCNC: 9 MMOL/L (ref 7–16)
AST SERPL-CCNC: 32 U/L (ref 15–37)
BASOPHILS # BLD: 0 K/UL (ref 0–0.2)
BASOPHILS NFR BLD: 0 % (ref 0–2)
BILIRUB SERPL-MCNC: 0.6 MG/DL (ref 0.2–1.1)
BUN SERPL-MCNC: 36 MG/DL (ref 8–23)
CALCIUM SERPL-MCNC: 7.4 MG/DL (ref 8.3–10.4)
CHLORIDE SERPL-SCNC: 110 MMOL/L (ref 98–107)
CO2 SERPL-SCNC: 22 MMOL/L (ref 21–32)
CREAT SERPL-MCNC: 1.67 MG/DL (ref 0.8–1.5)
DIFFERENTIAL METHOD BLD: ABNORMAL
EOSINOPHIL # BLD: 0 K/UL (ref 0–0.8)
EOSINOPHIL NFR BLD: 0 % (ref 0.5–7.8)
ERYTHROCYTE [DISTWIDTH] IN BLOOD BY AUTOMATED COUNT: 17.2 % (ref 11.9–14.6)
GLOBULIN SER CALC-MCNC: 4.4 G/DL (ref 2.3–3.5)
GLUCOSE SERPL-MCNC: 112 MG/DL (ref 65–100)
HCT VFR BLD AUTO: 24.7 % (ref 41.1–50.3)
HGB BLD-MCNC: 8.6 G/DL (ref 13.6–17.2)
IMM GRANULOCYTES # BLD: 0 K/UL (ref 0–0.5)
IMM GRANULOCYTES NFR BLD AUTO: 0 % (ref 0–5)
LYMPHOCYTES # BLD: 0.4 K/UL (ref 0.5–4.6)
LYMPHOCYTES NFR BLD: 8 % (ref 13–44)
MAGNESIUM SERPL-MCNC: 2.1 MG/DL (ref 1.8–2.4)
MCH RBC QN AUTO: 24.1 PG (ref 26.1–32.9)
MCHC RBC AUTO-ENTMCNC: 34.8 G/DL (ref 31.4–35)
MCV RBC AUTO: 69.2 FL (ref 79.6–97.8)
MONOCYTES # BLD: 0.5 K/UL (ref 0.1–1.3)
MONOCYTES NFR BLD: 9 % (ref 4–12)
NEUTS SEG # BLD: 4.7 K/UL (ref 1.7–8.2)
NEUTS SEG NFR BLD: 83 % (ref 43–78)
PLATELET # BLD AUTO: 140 K/UL (ref 150–450)
PMV BLD AUTO: ABNORMAL FL (ref 10.8–14.1)
POTASSIUM SERPL-SCNC: 4.3 MMOL/L (ref 3.5–5.1)
PROT SERPL-MCNC: 6.7 G/DL (ref 6.3–8.2)
RBC # BLD AUTO: 3.57 M/UL (ref 4.23–5.67)
SODIUM SERPL-SCNC: 141 MMOL/L (ref 136–145)
URATE SERPL-MCNC: 1.6 MG/DL (ref 2.6–6)
WBC # BLD AUTO: 5.7 K/UL (ref 4.3–11.1)

## 2018-07-09 PROCEDURE — 65270000029 HC RM PRIVATE

## 2018-07-09 PROCEDURE — 36415 COLL VENOUS BLD VENIPUNCTURE: CPT | Performed by: NURSE PRACTITIONER

## 2018-07-09 PROCEDURE — 85025 COMPLETE CBC W/AUTO DIFF WBC: CPT | Performed by: NURSE PRACTITIONER

## 2018-07-09 PROCEDURE — 74011250636 HC RX REV CODE- 250/636: Performed by: INTERNAL MEDICINE

## 2018-07-09 PROCEDURE — 74011250636 HC RX REV CODE- 250/636: Performed by: NURSE PRACTITIONER

## 2018-07-09 PROCEDURE — 76450000000

## 2018-07-09 PROCEDURE — 83735 ASSAY OF MAGNESIUM: CPT | Performed by: NURSE PRACTITIONER

## 2018-07-09 PROCEDURE — 84550 ASSAY OF BLOOD/URIC ACID: CPT | Performed by: NURSE PRACTITIONER

## 2018-07-09 PROCEDURE — 74011250637 HC RX REV CODE- 250/637: Performed by: NURSE PRACTITIONER

## 2018-07-09 PROCEDURE — 97530 THERAPEUTIC ACTIVITIES: CPT

## 2018-07-09 PROCEDURE — 77030020263 HC SOL INJ SOD CL0.9% LFCR 1000ML

## 2018-07-09 PROCEDURE — 80053 COMPREHEN METABOLIC PANEL: CPT | Performed by: NURSE PRACTITIONER

## 2018-07-09 PROCEDURE — 71046 X-RAY EXAM CHEST 2 VIEWS: CPT

## 2018-07-09 PROCEDURE — 97110 THERAPEUTIC EXERCISES: CPT

## 2018-07-09 PROCEDURE — 74011000258 HC RX REV CODE- 258: Performed by: INTERNAL MEDICINE

## 2018-07-09 PROCEDURE — 74011250637 HC RX REV CODE- 250/637: Performed by: INTERNAL MEDICINE

## 2018-07-09 PROCEDURE — 72072 X-RAY EXAM THORAC SPINE 3VWS: CPT

## 2018-07-09 PROCEDURE — 99233 SBSQ HOSP IP/OBS HIGH 50: CPT | Performed by: INTERNAL MEDICINE

## 2018-07-09 RX ORDER — AMOXICILLIN 250 MG
2 CAPSULE ORAL 2 TIMES DAILY
Status: DISCONTINUED | OUTPATIENT
Start: 2018-07-09 | End: 2018-07-12 | Stop reason: HOSPADM

## 2018-07-09 RX ORDER — HYDROMORPHONE HYDROCHLORIDE 2 MG/ML
0.5 INJECTION, SOLUTION INTRAMUSCULAR; INTRAVENOUS; SUBCUTANEOUS
Status: DISCONTINUED | OUTPATIENT
Start: 2018-07-09 | End: 2018-07-09

## 2018-07-09 RX ORDER — HYDROMORPHONE HYDROCHLORIDE 2 MG/ML
1 INJECTION, SOLUTION INTRAMUSCULAR; INTRAVENOUS; SUBCUTANEOUS
Status: DISCONTINUED | OUTPATIENT
Start: 2018-07-09 | End: 2018-07-12 | Stop reason: HOSPADM

## 2018-07-09 RX ADMIN — CHLORHEXIDINE GLUCONATE 15 ML: 1.2 RINSE ORAL at 17:20

## 2018-07-09 RX ADMIN — OXYCODONE HYDROCHLORIDE 30 MG: 15 TABLET ORAL at 07:37

## 2018-07-09 RX ADMIN — LEVOTHYROXINE SODIUM 137 MCG: 50 TABLET ORAL at 07:38

## 2018-07-09 RX ADMIN — Medication 500 MG: at 12:13

## 2018-07-09 RX ADMIN — ATORVASTATIN CALCIUM 20 MG: 10 TABLET, FILM COATED ORAL at 07:37

## 2018-07-09 RX ADMIN — APIXABAN 5 MG: 5 TABLET, FILM COATED ORAL at 07:39

## 2018-07-09 RX ADMIN — CARVEDILOL 6.25 MG: 6.25 TABLET, FILM COATED ORAL at 17:20

## 2018-07-09 RX ADMIN — STANDARDIZED SENNA CONCENTRATE AND DOCUSATE SODIUM 2 TABLET: 8.6; 5 TABLET, FILM COATED ORAL at 17:20

## 2018-07-09 RX ADMIN — PANTOPRAZOLE SODIUM 40 MG: 40 TABLET, DELAYED RELEASE ORAL at 07:38

## 2018-07-09 RX ADMIN — CYANOCOBALAMIN TAB 1000 MCG 1000 MCG: 1000 TAB at 07:38

## 2018-07-09 RX ADMIN — Medication 500 MG: at 17:20

## 2018-07-09 RX ADMIN — DIPHENHYDRAMINE HYDROCHLORIDE 25 MG: 50 INJECTION, SOLUTION INTRAMUSCULAR; INTRAVENOUS at 02:56

## 2018-07-09 RX ADMIN — LUBIPROSTONE 24 MCG: 24 CAPSULE, GELATIN COATED ORAL at 17:19

## 2018-07-09 RX ADMIN — CHLORHEXIDINE GLUCONATE 15 ML: 1.2 RINSE ORAL at 07:40

## 2018-07-09 RX ADMIN — MIRTAZAPINE 15 MG: 15 TABLET, FILM COATED ORAL at 21:00

## 2018-07-09 RX ADMIN — LUBIPROSTONE 24 MCG: 24 CAPSULE, GELATIN COATED ORAL at 07:43

## 2018-07-09 RX ADMIN — OXYCODONE HYDROCHLORIDE 30 MG: 15 TABLET ORAL at 21:05

## 2018-07-09 RX ADMIN — POTASSIUM CHLORIDE 20 MEQ: 1500 TABLET, EXTENDED RELEASE ORAL at 17:20

## 2018-07-09 RX ADMIN — Medication 400 MG: at 07:38

## 2018-07-09 RX ADMIN — CARVEDILOL 6.25 MG: 6.25 TABLET, FILM COATED ORAL at 07:38

## 2018-07-09 RX ADMIN — Medication 400 MG: at 20:59

## 2018-07-09 RX ADMIN — OXYCODONE HYDROCHLORIDE 30 MG: 15 TABLET ORAL at 02:56

## 2018-07-09 RX ADMIN — HYDROMORPHONE HYDROCHLORIDE 0.5 MG: 2 INJECTION, SOLUTION INTRAMUSCULAR; INTRAVENOUS; SUBCUTANEOUS at 10:37

## 2018-07-09 RX ADMIN — DIPHENHYDRAMINE HYDROCHLORIDE 25 MG: 50 INJECTION, SOLUTION INTRAMUSCULAR; INTRAVENOUS at 21:05

## 2018-07-09 RX ADMIN — SODIUM CHLORIDE 100 ML/HR: 900 INJECTION, SOLUTION INTRAVENOUS at 20:58

## 2018-07-09 RX ADMIN — ALLOPURINOL 100 MG: 100 TABLET ORAL at 07:38

## 2018-07-09 RX ADMIN — STANDARDIZED SENNA CONCENTRATE AND DOCUSATE SODIUM 1 TABLET: 8.6; 5 TABLET, FILM COATED ORAL at 07:38

## 2018-07-09 RX ADMIN — Medication 400 MG: at 17:20

## 2018-07-09 RX ADMIN — DEXAMETHASONE SODIUM PHOSPHATE 40 MG: 10 INJECTION, SOLUTION INTRAMUSCULAR; INTRAVENOUS at 07:41

## 2018-07-09 RX ADMIN — TORSEMIDE 20 MG: 20 TABLET ORAL at 07:39

## 2018-07-09 RX ADMIN — Medication 400 MG: at 12:13

## 2018-07-09 RX ADMIN — Medication 500 MG: at 07:38

## 2018-07-09 RX ADMIN — POTASSIUM CHLORIDE 20 MEQ: 1500 TABLET, EXTENDED RELEASE ORAL at 07:38

## 2018-07-09 RX ADMIN — OXYCODONE HYDROCHLORIDE 30 MG: 15 TABLET ORAL at 17:28

## 2018-07-09 NOTE — CONSULTS
Palliative Care    Patient: Niyah Hall MRN: [id number]  SSN: [id number]    YOB: 1940  Age: 65 y.o. Sex: male       Date of Request: 7/9/2018  Date of Consult:  7/9/2018  Reason for Consult:  pain and symptom management  Requesting Physician: Oral Bansal NP     Assessment/Plan:     Principal Diagnosis:    Pain, back  M54.9  Additional Diagnoses:   · Constipation, Unspecified  K59.00  · Counseling, Encounter for Medical Advice  Z71.9  · Encounter for Palliative Care  Z51.5    Palliative Performance Scale (PPS):  PPS: 79    Medical Decision Making:   Reviewed and summarized chart from admission to present. Discussed case with appropriate providers: Jace Slaughter, primary RN; Oral Bansal NP  Reviewed laboratory and x-ray data: CBC, BMP, recent imaging    Patient sitting on edge of bed, no distress noted. Introduced the role of palliative care. Patient states he wants to get his pain where can \"do the things I normally do\". He currently rates his pain a 6/10. It has been a 10/10 and 8/10 over the past few days. He feels that oxycodone is helpful, but not has helpful as it used to be. Patient locates his pain to his thoracic spine and across his chest.  He states that this location is new, and began hurting this admission, he states it started after he began Cytoxan. He describes the pain as sharp, and reminds him of the pain he normally feels in his lumbar spine. He denies dyspnea, nausea, heaviness/pressure/tightness, dizziness, palpitations, etc.  Continue Fentanyl 100mcg TD and oxycodone 30mg every 4 hours prn. Increase Dilaudid IV to 1mg every 4 hours prn. Discussed with Mali Monroe, will obtain CXR and XR thoracic spine given this pain is in a new location. Will continue to follow.     Will discuss findings with members of the interdisciplinary team.      Thank you for this referral.          .    Subjective:     History obtained from:  Patient, Care Provider and Chart    Chief Complaint: Back and chest pain. History of Present Illness:  Mr. Evangelist White is a 66 yo male with PMH of multiple myeloma, resected colon cancer in 2016, resected thyroid cancer in 6331, systolic heart failure, HTN, HLD, gout, and other history as listed below. He presented to [facility] on 7/6 for follow up. He reported uncontrolled pain, and not taking oxycodone IR due to constipation. He was also found to have MARY due to poor PO intake. He was directly admitted from [facility] on 7/6 due to uncontrolled pain and MARY.      Advance Directive: Yes       Code Status:  Full Code            Health Care Power of : Unknown    Past Medical History:   Diagnosis Date    Arrhythmia     LBBB    Arthritis     BMI 30.0-30.9,adult     BMI 30.5    Cardiomyopathy ([address]     followed by [facility]    Cholelithiases     Chronic systolic heart failure ([address] 9/13/2011    [place] Ass. class II-III heart failure symptoms    Gout     H/O: pituitary tumor     X2 benign, removed    High cholesterol     History of thyroid cancer     History of vertigo     no recent complications or episodes    Hurthle cell carcinoma of thyroid ([address]     Hx of radiation therapy to prostate 2004    Hyperlipidemia 11/18/2015    Hypertension     Hypothyroid     hypo- had portion of thyroid removed due to cancer    ICD (implantable cardiac defibrillator) in place 9/2011    B[employer] BIV/ICD, Gen change 3.2.16    LBBB (left bundle branch block) 11/18/2015    Malaise and fatigue 11/18/2015    Malignant neoplasm of prostate ([address]     Mass of colon     right colon mass    Multiple myeloma ([address]     Sinus node dysfunction ([address]       Past Surgical History:   Procedure Laterality Date    HX CHOLECYSTECTOMY  2013    HX COLONOSCOPY      dx with Right colon mass     HX GI      benign polyps removed    HX HEART CATHETERIZATION      HX HEENT  2008    thyroidetomy partial tumor from pituitary x2    HX PACEMAKER  2011/2016    biotronik biv-icd    HX THYROIDECTOMY  2008    HX THYROIDECTOMY  01/26/2018    completion thyroidectomy    HX TUMOR REMOVAL  1990/2010    pituitary tumor removed X2     Family History   Problem Relation Age of Onset    Heart Disease Sister     Heart Attack Sister     Stroke Mother     Thyroid Disease Neg Hx     Diabetes Neg Hx       Social History   Substance Use Topics    Smoking status: Never Smoker    Smokeless tobacco: Never Used    Alcohol use Yes      Comment: very rare     Prior to Admission medications    Medication Sig Start Date End Date Taking? Authorizing Provider   atorvastatin (LIPITOR) 20 mg tablet Take 1 Tab by mouth daily. 6/29/18  Yes Elvin Arizmendi MD   apixaban (ELIQUIS) 5 mg tablet Take 5 mg by mouth two (2) times a day. Yes Historical Provider   amino ac/whey prot conc, isol (WHEY PROTEIN PO) Take  by mouth. Yes Historical Provider   ixazomib 2.3 mg cap Take 1 Cap by mouth every seven (7) days. 7/9/18   Rosalie Ram MD   lenalidomide (REVLIMID) 10 mg cap Take 1 Cap by mouth daily. For 3 weeks and then off for 1 week. 7/6/18   Rosalie Ram MD   mirtazapine (REMERON) 15 mg tablet Take 1 Tab by mouth nightly. 7/5/18   Elvin Arizmendi MD   lubiPROStone (AMITIZA) 24 mcg capsule Take 1 Cap by mouth two (2) times daily (with meals) for 30 days. 7/5/18 8/4/18  Elvin Arizmendi MD   potassium chloride SA (MICRO-K) 10 mEq capsule Take 1 Cap by mouth two (2) times a day. 6/29/18   Elvin Arizmendi MD   docosanol (ABREVA) 10 % topical cream Apply  to affected area five (5) times daily. Historical Provider   oxyCODONE IR (ROXICODONE) 30 mg immediate release tablet Take 1 Tab by mouth every four (4) hours as needed for Pain. Max Daily Amount: 180 mg. 6/25/18   Rosalie Ram MD   cromolyn sodium (CROMOLYN NA) by Nasal route. Historical Provider   fentaNYL (DURAGESIC) 75 mcg/hr 1 Patch by TransDERmal route every seventy-two (72) hours.  Max Daily Amount: 1 Patch. 6/20/18   Elvin Arizmendi MD levothyroxine (SYNTHROID) 137 mcg tablet Take 137 mcg by mouth Daily (before breakfast). 5/4/18   Deepa Coates MD   carvedilol (COREG) 6.25 mg tablet Take 1 Tab by mouth two (2) times daily (with meals). 11/26/17   Boyd Salinas NP   torsemide (DEMADEX) 20 mg tablet Take 1 Tab by mouth daily. Indications: Edema, Pulmonary Edema due to Chronic Heart Failure 11/8/17   Aide Veras MD   chlorhexidine (PERIDEX) 0.12 % solution 15 mL by Swish and Spit route two (2) times a day. Historical Provider   omeprazole (PRILOSEC) 20 mg capsule Take 20 mg by mouth daily. Historical Provider   cyanocobalamin (VITAMIN B-12) 1,000 mcg sublingual tablet Take 1,000 mcg by mouth daily. Historical Provider   cholecalciferol, vitamin D3, (VITAMIN D3) 2,000 unit tab Take  by mouth. Historical Provider       Allergies   Allergen Reactions    Ace Inhibitors Other (comments)    Lisinopril Cough    Pcn [Penicillins] Swelling        Review of Systems:  A comprehensive review of systems was negative except for:   Gastrointestinal: Positive for constipation, improving. Musculoskeletal: Positive for sharp pain across back and chest.     Objective:     Visit Vitals    /64 (BP 1 Location: Left arm)    Pulse 74    Temp 97.8 °F (36.6 °C)    Resp 18    Wt 81.4 kg (179 lb 8 oz)    SpO2 99%    BMI 23.05 kg/m2        Physical Exam:    General:  Cooperative. No acute distress. Eyes:  Conjunctivae/corneas clear. Nose: Nares normal. Septum midline. Neck: Supple, symmetrical, trachea midline. Lungs:   Clear to auscultation bilaterally, unlabored. Heart:  Regular rate and rhythm. Abdomen:   Soft, non-tender, non-distended. Extremities: Normal, atraumatic, no cyanosis or edema. Skin: Skin color, texture, turgor normal. No rash. Neurologic: Nonfocal.   Psych: Alert and oriented.       Assessment:     Hospital Problems  Date Reviewed: 7/6/2018          Codes Class Noted POA    MARY (acute kidney injury) ([facility] ICD-10-CM: N17.9  ICD-9-CM: 584.9  11/20/2017 Unknown        Intractable pain ICD-10-CM: R52  ICD-9-CM: 780.96  1/11/2017 Unknown              Signed By: Nader Montanez NP     July 9, 2018

## 2018-07-09 NOTE — PROGRESS NOTES
Problem: Mobility Impaired (Adult and Pediatric)  Goal: *Acute Goals and Plan of Care (Insert Text)  STG:  (1.)Mr. Solis Norris will move from supine to sit and sit to supine  with MODIFIED INDEPENDENCE within 3 treatment day(s). Met 7/9/2018   (2.)Mr. Solis Norris will transfer from bed to chair and chair to bed with SUPERVISION using the least restrictive device within 3 treatment day(s). (3.)Mr. Solis Norris will ambulate with SUPERVISION for 50 feet with the least restrictive device within 3 treatment day(s). LTG:  (1.)Mr. Solis Norris will move from supine to sit and sit to supine  in bed with INDEPENDENCE within 7 treatment day(s). (2.)Mr. Solis Norris will transfer from bed to chair and chair to bed with MODIFIED INDEPENDENCE using the least restrictive device within 7 treatment day(s). Modified 7/9/2018 (3.)Mr. Solis Norris will ambulate with MODIFIED INDEPENDENCE for 750 feet with the least restrictive device within 7 treatment day(s). ________________________________________________________________________________________________      PHYSICAL THERAPY: Daily Note, Treatment Day: 1st, PM 7/9/2018  INPATIENT: Hospital Day: 4  Payor: CARE IMPROVEMENT PLUS [place] / Product Type: Assured Labor Care Medicare /      NAME/AGE/GENDER: Renzo Meng is a 65 y.o. male   PRIMARY DIAGNOSIS: Multiple Myeloma  MARY  Intractable Pain  MARY (acute kidney injury) ([facility]  Intractable pain <principal problem not specified> <principal problem not specified>        ICD-10: Treatment Diagnosis:    · Generalized Muscle Weakness (M62.81)  · Difficulty in walking, Not elsewhere classified (R26.2)   Precaution/Allergies:  Ace inhibitors; Lisinopril; and Pcn [penicillins]      ASSESSMENT:     Mr. Solis Norris presents supine in bed and is agreeable to work with PT. States that he would like pain medication, but does not want me to notify his RN stating that it has already been taken care of.   He is able to perform bed mobility with modified independence. He performed sit to stand and transfers with SBA and ambulated 600' with a rolling walker and CGA for safety. He ambulated with forward flex posture. Patient then performed exercises in sitting. He has been using a rolling walker for the last few months since his kyphoplasty. Patient demonstrated progress with gait today. He continues to benefit from skilled PT to improve his functional mobility and decrease his risk of falls. This section established at most recent assessment   PROBLEM LIST (Impairments causing functional limitations):  1. Decreased Strength  2. Decreased ADL/Functional Activities  3. Decreased Transfer Abilities  4. Decreased Ambulation Ability/Technique  5. Decreased Balance  6. Increased Pain  7. Decreased Activity Tolerance  8. Decreased [place] with Home Exercise Program   INTERVENTIONS PLANNED: (Benefits and precautions of physical therapy have been discussed with the patient.)  1. Balance Exercise  2. Bed Mobility  3. Family Education  4. Gait Training  5. Home Exercise Program (HEP)  6. Neuromuscular Re-education/Strengthening  7. Therapeutic Activites  8. Therapeutic Exercise/Strengthening  9. Transfer Training  10. Group Therapy     TREATMENT PLAN: Frequency/Duration: 3 times a week for duration of hospital stay  Rehabilitation Potential For Stated Goals: Good     RECOMMENDED REHABILITATION/EQUIPMENT: (at time of discharge pending progress): Due to the probability of continued deficits (see above) this patient will likely need continued skilled physical therapy after discharge.   Equipment:    to be determined based on progress              HISTORY:   History of Present Injury/Illness (Reason for Referral):  Mr. Nuzhat Virgen is a 65 y.o. male admitted on 7/6/2018 with a primary diagnosis of MARY and intractable pain.      He is a patient of Dr Elina Brewer with a history of resected colon cancer (2016), multiple myeloma (2017), Thyroid cancer-Hurthle cell carcinoma, s/p thyroidectomy April 2018. During recent admission he had kyphoplasty due to worsening lumbar compression fracture and then went to rehab for 3 weeks but was not allowed to take his Revlimid/Dex during that time. He was seen in the office on 6/25 with increased back and abdominal pain, fatigue and hypercalcemia that responded well to Zometa. He was restarted on Rev/Dex and was brought in today for follow up. He continues to rate his pain as 10/10, but is not taking OxyIR for breakthrough due to constipation. His PCP switched him from [address] [place] [address] to [facility] yesterday-sample given for constipation. He states last BM 1 week ago, passing gas, no nausea or vomiting. He only took 1 tabs of Oxy yesterday. He has lost another 10 pounds since last visit due to poor appetite. He admits to only drinking 2 16 oz bottles of water daily. He is utilizing a walker today and complains of increased fatigue and weakness. Labs obtained, Na+ 132, K+ 3.3, Creatinine up to 2.76, Mg+ 1.3. Ca+ stable at 7.9. He will be admitted for MARY, intractable pain and to receive supportive care. Of note, his wife is currently an inpatient due to a bowel obstruction. Past Medical History/Comorbidities:   Mr. Kara Bamberger  has a past medical history of Arrhythmia; Arthritis; BMI 30.0-30.9,adult; Cardiomyopathy ([address] Cholelithiases; Chronic systolic heart failure ([address] (9/13/2011); Gout; H/O: pituitary tumor; High cholesterol; History of thyroid cancer; History of vertigo; Hurthle cell carcinoma of thyroid ([address] Hx of radiation therapy to prostate (2004); Hyperlipidemia (11/18/2015); Hypertension; Hypothyroid; ICD (implantable cardiac defibrillator) in place (9/2011); LBBB (left bundle branch block) (11/18/2015); Malaise and fatigue (11/18/2015); Malignant neoplasm of prostate ([address] Mass of colon; Multiple myeloma ([address] and Sinus node dysfunction ([address]   Mr. Kara Bamberger  has a past surgical history that includes hx heent (2008); hx sheba (2013); hx gi; hx heart catheterization; hx pacemaker (2011/2016); hx colonoscopy; hx thyroidectomy (2008); hx tumor removal (1990/2010); and hx thyroidectomy (01/26/2018). Social History/Living Environment:   Home Environment: Private residence  # Steps to Enter: 4  Rails to Enter: Yes  One/Two Story Residence: One story  Living Alone: No  Support Systems: Spouse/Significant Other/Partner  Patient Expects to be Discharged to[de-identified] Private residence  Current DME Used/Available at Home: [employer]  Prior Level of Function/Work/Activity:  Patient reports being independent- modified independent at baseline. Has been using a rolling walker for a few months. Number of Personal Factors/Comorbidities that affect the Plan of Care: 1-2: MODERATE COMPLEXITY   EXAMINATION:   Most Recent Physical Functioning:   Gross Assessment:                  Posture:     Balance:  Sitting: Intact  Standing: Impaired  Standing - Static: Fair  Standing - Dynamic : Fair Bed Mobility:  Supine to Sit: Modified independent  Scooting: Modified independent  Wheelchair Mobility:     Transfers:  Sit to Stand: Supervision  Stand to Sit: Supervision  Gait:     Base of Support: Widened  Speed/Fatoumata: Slow  Step Length: Right shortened;Left shortened  Gait Abnormalities: Decreased step clearance; Other (flexed trunk)  Distance (ft): 600 Feet (ft)  Assistive Device: Gait belt;Walker, rolling  Ambulation - Level of Assistance: Contact guard assistance  Interventions: Safety awareness training;Verbal cues      Body Structures Involved:  1. Nerves  2. Thoracic Cage  3. Bones  4. Joints  5. Muscles  6. Ligaments Body Functions Affected:  1. Sensory/Pain  2. Hematological  3. Immune  4. Neuromusculoskeletal  5. Movement Related Activities and Participation Affected:  1. Mobility  2. Self Care  3. Domestic Life  4.  Interpersonal Interactions and Relationships   Number of elements that affect the Plan of Care: 4+: HIGH COMPLEXITY   CLINICAL PRESENTATION: Presentation: Evolving clinical presentation with changing clinical characteristics: MODERATE COMPLEXITY   CLINICAL DECISION MAKIN [facility] Mobility Inpatient Short Form  How much difficulty does the patient currently have. .. Unable A Lot A Little None   1. Turning over in bed (including adjusting bedclothes, sheets and blankets)? [] 1   [] 2   [x] 3   [] 4   2. Sitting down on and standing up from a chair with arms ( e.g., wheelchair, bedside commode, etc.)   [] 1   [] 2   [x] 3   [] 4   3. Moving from lying on back to sitting on the side of the bed? [] 1   [] 2   [x] 3   [] 4   How much help from another person does the patient currently need. .. Total A Lot A Little None   4. Moving to and from a bed to a chair (including a wheelchair)? [] 1   [] 2   [x] 3   [] 4   5. Need to walk in hospital room? [] 1   [] 2   [x] 3   [] 4   6. Climbing 3-5 steps with a railing? [] 1   [x] 2   [] 3   [] 4   © , Trustees of [facility] [employer], under license to [employer]. All rights reserved      Score:  Initial: 17 Most Recent: X (Date: -- )    Interpretation of Tool:  Represents activities that are increasingly more difficult (i.e. Bed mobility, Transfers, Gait). Score 24 23 22-20 19-15 14-10 9-7 6     Modifier CH CI CJ CK CL CM CN        Payor: CARE IMPROVEMENT PLUS [place] / Product Type: Managed Care Medicare /      Medical Necessity:     · Patient is expected to demonstrate progress in strength, balance and functional technique to increase independence with ADLs and IADLs. Reason for Services/Other Comments:  · Patient continues to require modification of therapeutic interventions to increase complexity of exercises.    Use of outcome tool(s) and clinical judgement create a POC that gives a: Clear prediction of patient's progress: LOW COMPLEXITY            TREATMENT:   (In addition to Assessment/Re-Assessment sessions the following treatments were rendered)   Pre-treatment Symptoms/Complaints:  Reports patient with movement in his chest/abdomen   Pain: Initial:   Pain Intensity 1: 4  Pain Location 1: Back  Pain Intervention(s) 1: Ambulation/Increased Activity  Post Session:  Unchanged. Therapeutic Activity: (    15 minutes): Therapeutic activities including Bed transfers, Chair transfers and Ambulation on level ground to improve mobility, strength and balance. Required minimal Safety awareness training;Verbal cues to promote safe use of RW and increased upright posture. .     Therapeutic Exercise: (  10 minutes):  Exercises per grid below to improve strength and coordination. Required minimal verbal cues to promote proper body alignment. Progressed complexity of movement as indicated. DATE: 7/09/18       Ambulation        Hip Flexion x25 AB       Long Arc Quads x10 AB       Knee Squeezes        Ankle DF/PF x25 AB                                        Key:  A=active, AA=active assisted, P=passive, B=bilaterally, R=right, L=left   DF=dorsiflexion, PF=plantarflexion      Braces/Orthotics/Lines/Etc:   · IV  · O2 Device: Room air  Treatment/Session Assessment:    · Response to Treatment:  No pain post session   · Interdisciplinary Collaboration:   o Registered Nurse  · After treatment position/precautions:   o Supine in bed  o Bed/Chair-wheels locked  o Call light within reach  o RN notified   · Compliance with Program/Exercises: Will assess as treatment progresses. · Recommendations/Intent for next treatment session: \"Next visit will focus on advancements to more challenging activities and reduction in assistance provided\".   Total Treatment Duration:  PT Patient Time In/Time Out  Time In: 1630  Time Out: [address], PT, DPT

## 2018-07-09 NOTE — PROGRESS NOTES
END OF SHIFT NOTE:    Pt has received oxy 2x and dilaudid 1x this shift for pain. CXR and Thoracic XR done. Port insertion to be done when Eliquis has been held for minimum 48 hours. HOLD ELIQUIS until after port insertion. No c/o N/V/D. VSS. No needs at present. Intake/Output  07/09 0701 - 07/09 1900  In: 8574 [P.O.:775; I.V.:698]  Out: 1200 [Urine:1200]   Voiding: YES  Catheter: NO  Drain:              Stool:  2 occurrences. Stool Assessment  Stool Color: Bernardine Larry (07/09/18 1348)  Stool Appearance: Hard (07/09/18 1348)  Stool Amount: Small (07/09/18 1348)  Stool Source/Status: Rectum (07/09/18 1348)    Emesis:  0 occurrences. VITAL SIGNS  Patient Vitals for the past 12 hrs:   Temp Pulse Resp BP SpO2   07/09/18 1503 97.8 °F (36.6 °C) 74 18 128/64 99 %   07/09/18 1055 97.4 °F (36.3 °C) 70 18 107/59 97 %   07/09/18 0710 97.9 °F (36.6 °C) 72 18 109/56 98 %       Pain Assessment  Pain 1  Pain Scale 1: Numeric (0 - 10) (07/09/18 1630)  Pain Intensity 1: 4 (07/09/18 1630)  Patient Stated Pain Goal: 0 (07/09/18 0820)  Pain Reassessment 1: Yes (07/09/18 1115)  Pain Onset 1: ongoing (07/09/18 1630)  Pain Location 1: Back (07/09/18 1630)  Pain Orientation 1: Upper (07/09/18 1039)  Pain Description 1: Aching;Constant (07/09/18 1039)  Pain Intervention(s) 1: Ambulation/Increased Activity (07/09/18 1630)    Ambulating  Yes    Additional Information:     Shift report will be given to Alysa Fuller RN at the bedside.     Kee Acharya

## 2018-07-09 NOTE — PROGRESS NOTES
Problem: Pressure Injury - Risk of  Goal: *Prevention of pressure injury  Document Mervin Scale and appropriate interventions in the flowsheet. Outcome: Progressing Towards Goal  Pressure Injury Interventions:  Sensory Interventions: Assess changes in LOC    Moisture Interventions: Maintain skin hydration (lotion/cream)    Activity Interventions: Increase time out of bed    Mobility Interventions: HOB 30 degrees or less    Nutrition Interventions: Offer support with meals,snacks and hydration    Friction and Shear Interventions: HOB 30 degrees or less               Problem: Falls - Risk of  Goal: *Absence of Falls  Document Bee Fall Risk and appropriate interventions in the flowsheet.    Outcome: Progressing Towards Goal  Fall Risk Interventions:  Mobility Interventions: Communicate number of staff needed for ambulation/transfer         Medication Interventions: Evaluate medications/consider consulting pharmacy, Teach patient to arise slowly    Elimination Interventions: Call light in reach, Patient to call for help with toileting needs

## 2018-07-09 NOTE — PROGRESS NOTES
END OF SHIFT NOTE:  Pt rested well after receiving pain meds twice and IV benadryl once. Intake/Output  07/08 1901 - 07/09 0700  In: 389.2   Out: 325 [Urine:325]   Voiding: YES  Catheter: NO  Drain:              Stool:  0 occurrences. Stool Assessment  Stool Color: Brown (07/08/18 1025)  Stool Appearance: Soft (07/08/18 1025)  Stool Amount: Small (07/08/18 1025)  Stool Source/Status: Rectum (07/08/18 1025)    Emesis:  0 occurrences. VITAL SIGNS  Patient Vitals for the past 12 hrs:   Temp Pulse Resp BP SpO2   07/09/18 0259 97.6 °F (36.4 °C) 73 18 120/53 98 %   07/09/18 0002 97.5 °F (36.4 °C) 71 18 105/60 98 %   07/08/18 2004 97.8 °F (36.6 °C) 67 18 107/57 99 %       Pain Assessment  Pain 1  Pain Scale 1: Numeric (0 - 10) (07/09/18 0302)  Pain Intensity 1: 6 (07/09/18 0302)  Patient Stated Pain Goal: 0 (07/09/18 0302)  Pain Reassessment 1: Patient sleeping (07/09/18 0238)  Pain Onset 1: intermittent (07/09/18 0302)  Pain Location 1: Abdomen (07/09/18 0302)  Pain Orientation 1: Lower (07/06/18 1931)  Pain Description 1: Aching;Dull (07/09/18 0302)  Pain Intervention(s) 1: Medication (see MAR) (07/09/18 0302)    Ambulating  Yes    Additional Information:     Shift report given to be given to oncoming nurse at the bedside.     Kareem Villela, RN

## 2018-07-09 NOTE — CDMP QUERY
Please clarify if this patient is being treated/managed for:    Severe protein-calorie malnutrition, in the setting of multiple myeloma, requiring treatment with RD consult. =>Other Explanation of clinical findings  =>Unable to Determine (no explanation of clinical findings)    The medical record reflects the following:    Risk Factors: multiple myeloma, decreased appetite, hx colon cancer      Clinical Indicators: PER RD NOTE    Meets Criteria for Malnutrition in the context of Chronic Illness   [x] Severe Malnutrition, as evidenced by:                        [x] Severe loss of muscle mass                        [] Nutritional intake of <75% of energy intake compared to estimated energy needs for > 1 month                        [x] Weight loss of >5% in 1 month, >7.5% in 3 months,  >10% in 6 months, or >20% in 12 months                        [] Severe edema                        [x] Severe loss of subcutaneous fat                        [] Functional decline         Treatment:  Boost plus TID with current diet. Please clarify and document your clinical opinion in the progress notes and discharge summary including the definitive and/or presumptive diagnosis, (suspected or probable), related to the above clinical findings. Please include clinical findings supporting your diagnosis.     Thanks,  Nancy Matthews RN CDS  Compliant Documentation Management Program  [phone]

## 2018-07-09 NOTE — PROGRESS NOTES
[facility] Inpatient Hematology / Oncology Progress Note Admission Date: 2018 12:46 PM 
Reason for Admission/Hospital Course: Multiple Myeloma MARY Intractable Pain MARY (acute kidney injury) ([address] Intractable pain 24 Hour Events: No new symptoms Port placement held DT patient got his eliquis this am 
Ongoing back pain and right upper abdominal pain Large RUQ hernia ROS: 
Constitutional: Positive for weakness, fatigue. Negative for fever or chills. CV: Negative for chest pain, palpitations, edema. Respiratory: Negative for dyspnea, cough, wheezing. GI: Positive for abdominal pain, diarrhea, constipation. 10 point review of systems is otherwise negative with the exception of the elements mentioned above in the HPI. Allergies Allergen Reactions  Ace Inhibitors Other (comments)  Lisinopril Cough  Pcn [Penicillins] Swelling OBJECTIVE: 
Patient Vitals for the past 8 hrs: 
 BP Temp Pulse Resp SpO2  
18 1055 107/59 97.4 °F (36.3 °C) 70 18 97 % 18 0710 109/56 97.9 °F (36.6 °C) 72 18 98 % Temp (24hrs), Av.6 °F (36.4 °C), Min:97.2 °F (36.2 °C), Max:98.1 °F (36.7 °C) 
 
 07 -  1900 In: 480 [P.O.:480] Out: 700 [Urine:700] Physical Exam: 
Constitutional: Well developed, elderly male in no acute distress, sitting comfortably in the bed HEENT: Normocephalic and atraumatic. Oropharynx is clear, mucous membranes are moist. Extraocular muscles are intact. Sclerae anicteric. Neck supple. Skin Warm and dry. No bruising and no rash noted. No erythema. No pallor. Respiratory Lungs are clear to auscultation bilaterally without wheezes, rales or rhonchi, normal air exchange without accessory muscle use. CVS Normal rate, regular rhythm and normal S1 and S2. No murmurs, gallops, or rubs. Abdomen Soft, non-tender and nondistended, normoactive bowel sounds. No palpable mass. No hepatosplenomegaly. + hernia noted. Neuro Grossly nonfocal with no obvious sensory or motor deficits. MSK Normal range of motion in general.  No edema and no tenderness. Psych Appropriate mood and affect. Labs: 
   
Recent Labs  
   07/09/18 0457 07/08/18 
 0542  07/07/18 
 8305 WBC  5.7  3.3*  3.0*  
RBC  3.57*  3.53*  3.67* HGB  8.6*  8.2*  8.6* HCT  24.7*  24.2*  25.1* MCV  69.2*  68.6*  68.4*  
MCH  24.1*  23.2*  23.4*  
MCHC  34.8  33.9  34.3  
RDW  17.2*  16.1*  16.0*  
PLT  140*  140*  132* GRANS  83*  71  42* LYMPH  8*  25  43 MONOS  9  3*  12 EOS  0*  0*  1 BASOS  0  0  0 IG  0  1  2 DF  AUTOMATED  AUTOMATED  AUTOMATED ANEU  4.7  2.4  1.3* ABL  0.4*  0.8  1.3 ABM  0.5  0.1  0.4 REA  0.0  0.0  0.0 ABB  0.0  0.0  0.0 AIG  0.0  0.0  0.1 Recent Labs  
   07/09/18 0457 07/08/18 
 0542  07/07/18 
 6355 NA  141  142  142  
K  4.3  4.1  3.3*  
CL  110*  111*  108* CO2  22  20*  23 AGAP  9  11  11 GLU  112*  128*  72 BUN  36*  27*  39* CREA  1.67*  1.60*  2.06* GFRAA  51*  54*  40* GFRNA  43*  45*  33* CA  7.4*  7.0*  7.3*  
SGOT  32  37  46* AP  47*  51  54 TP  6.7  6.9  7.3 ALB  2.3*  2.3*  2.6*  
GLOB  4.4*  4.6*  4.7* AGRAT  0.5*  0.5*  0.6* MG  2.1  1.6*  1.2* Imaging: XR ABD (KUB) [[id number]] Collected: 07/07/18 1430   
  Order Status: Completed Updated: 07/07/18 1433  
  Narrative:    
  KUB CLINICAL INDICATION: Abdominal pain, constipation FINDINGS: Two supine views of the abdomen and pelvis show nonspecific bowel gas 
pattern with air noted over the rectum. No dilated loops of small bowel evident. Cholecystectomy clips are noted. Lakeisha Duenas Post kyphoplasty changes noted in the lumbar 
spine. An AICD is in place. 
  
  Impression:    
  IMPRESSION: No acute abdominal or pelvic abnormality.  
   
 
 
 
ASSESSMENT: 
 
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.2 ICD-9-CM: 227.3  3/15/2018 Thyroid mass ICD-10-CM: E07.9 ICD-9-CM: 246.9  2/8/2018 Sinus bradycardia ICD-10-CM: R00.1 ICD-9-CM: 427.89  1/12/2018 PAF (paroxysmal atrial fibrillation) (HCC) ICD-10-CM: I48.0 ICD-9-CM: 427.31  11/26/2017 Chronic systolic CHF (congestive heart failure) (HCC) ICD-10-CM: L27.81 ICD-9-CM: 428.22, 428.0  11/26/2017 Pain ICD-10-CM: R52 ICD-9-CM: 780.96  11/20/2017 MARY (acute kidney injury) ([facility] ICD-10-CM: N17.9 ICD-9-CM: 584.9  11/20/2017 Acute renal failure (ARF) (HCC) ICD-10-CM: N17.9 ICD-9-CM: 584.9  11/18/2017 Intractable pain ICD-10-CM: Y42 ICD-9-CM: 780.96  1/11/2017 Acute kidney injury ([facility] ICD-10-CM: N17.9 ICD-9-CM: 584.9  1/11/2017 Pancytopenia ([facility] ICD-10-CM: I70.046 ICD-9-CM: 284.19  1/11/2017 Constipation ICD-10-CM: K59.00 ICD-9-CM: 564.00  1/11/2017 Multiple myeloma (HCC) ICD-10-CM: C90.00 ICD-9-CM: 203.00  1/2/2017 Postural imbalance ICD-10-CM: R29.3 ICD-9-CM: 729.90  12/14/2016 Polyneuropathy ICD-10-CM: G62.9 ICD-9-CM: 356.9  12/14/2016 Fall ICD-10-CM: W19. Daniela Crouch ICD-9-CM: Z015.6  12/14/2016 Encounter for medication management ICD-10-CM: C23.846 ICD-9-CM: V58.69  12/14/2016 Urinary retention ICD-10-CM: R33.9 ICD-9-CM: 788.20  6/6/2016 Colonic mass ICD-10-CM: K63.9 ICD-9-CM: 569.89  3/23/2016 Hyperlipidemia ICD-10-CM: E78.5 ICD-9-CM: 272.4  11/18/2015 Vertigo ICD-10-CM: U23 ICD-9-CM: 780.4  11/18/2015 Malaise and fatigue ICD-10-CM: R53.81, R53.83 ICD-9-CM: 780.79 11/18/2015 Cardiomyopathy ([facility] ICD-10-CM: I42.9 ICD-9-CM: 425.4  11/18/2015 LBBB (left bundle branch block) ICD-10-CM: I44.7 ICD-9-CM: 426.3  11/18/2015 Arthritis ICD-10-CM: M19.90 ICD-9-CM: 716.90  Unknown Arrhythmia ICD-10-CM: I49.9 ICD-9-CM: 427.9  Unknown Overview Signed 6/18/2013  2:07 PM by Diego Braun MD  
  LBBB Cholelithiases ICD-10-CM: K80.20 ICD-9-CM: 574.20  Unknown Hx of radiation therapy to prostate ICD-10-CM: Z92.3 ICD-9-CM: V15.3  Unknown Chronic systolic heart failure (HCC) ICD-10-CM: I50.22 ICD-9-CM: 428.22  9/13/2011 Automatic implantable cardioverter-defibrillator in situ ICD-10-CM: Z95.810 ICD-9-CM: V45.02  9/1/2011 Mr. Teresa Mai is a 65 y.o. male admitted on 7/6/2018 with a primary diagnosis of MARY and intractable pain.  
  
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 During recent admission he had kyphoplasty due to worsening lumbar compression fracture and then went to rehab for 3 weeks but was not allowed to take his Revlimid/Dex during that time. He was seen in the office on 6/25 with increased back and abdominal pain, fatigue and hypercalcemia that responded well to Zometa. He was restarted on Rev/Dex and was brought in today for follow up. He continues to rate his pain as 10/10, but is not taking OxyIR for breakthrough due to constipation. His PCP switched him from [address] [place] [address] to [facility]-sample given for constipation. He states last BM 1 week ago, passing gas, no nausea or vomiting. He only took 1 tabs of Oxy yesterday. He has lost another 10 pounds since last visit due to poor appetite. He admits to only drinking 2 16 oz bottles of water daily. He is utilizing a walker today and complains of increased fatigue and weakness.   Labs obtained, Na+ 132, K+ 3.3, Creatinine up to 2.76, Mg+ 1.3. Ca+ stable at 7.9. He will be admitted for MARY, intractable pain and to receive supportive care. Of note, his wife is currently an inpatient due to a bowel obstruction. PLAN: 
Multiple Myeloma 
-hold Revlimid, may need to start Cytoxan 
07/07 Start Cytoxan 2000 mg x 1 today and pulse dose steroids, 40 mg Decadron daily x 4 days. Plan to start oral ixazomib and daratumumab in 2-3 weeks. 07/08 Tolerated well.   
  
MARY 
-IV hydration (NS @100ml/hr), closely monitor Cr and for fluid overload 
 07/07 Uric acid > 10. Give rasburicase x 1. Start allopurinol daily. 07/08 Uric acid down to 3.6. Creatinine continues to improve. 07/09 creatinine 1.67 Constipation 
-pericolace BID, amitiza, lactulose prn 
07/07 Abdominal pain and cramping, obtain KUB. 07/08 KUB negative. Patient may take his own Amitiza. 7/9 constipation resolved 
  
Pain-Abdominal/Back 
-Increase fentanyl patch to 100 mcg, Oxy IR 30 mg Q4h prn  
  
Increased weakness 
-consult PT/OT 
  
Electrolyte Imbalances 
-replace per Steven SOPs 
  
Poor appetite 
-Remeron 15 mg 
  
Supportive care Follow Steven SOPs Currently on Eliquis 5 mg BID, apply SCDs for DVT prophylaxis Disposition: Patient does not feel that his pain is controlled to where he can be discharged. PC consulted. Eliquis on hold starting this morning for 48 hours for port to be placed. E-mail sent to navigators to start process of getting oral ixazomib and daratumumab approved. Will need follow up with Dr. Barrington Pinto in 2-3 weeks to start this. Tete Rosas  [facility] [id number] [place] [address] Office : [phone] Fax : [phone] Patient seen, examed and discussed with NP, agree with above history/assessment/plan.  65 y.o.male h/o colon and thyroid cancer, MM since 2016, recent fracture and [facility] stay interrupted rx for 2 months then developed rapid disease progression with TLS and MARY, responded as above managed, continue rx and arrange next line outpt. Trudi Hopson M.D. [address] Office : [phone] Fax : [phone]

## 2018-07-10 ENCOUNTER — ANESTHESIA EVENT (OUTPATIENT)
Dept: SURGERY | Age: 78
DRG: 841 | End: 2018-07-10
Payer: MEDICARE

## 2018-07-10 ENCOUNTER — APPOINTMENT (OUTPATIENT)
Dept: INFUSION THERAPY | Age: 78
End: 2018-07-10

## 2018-07-10 LAB
ALBUMIN SERPL-MCNC: 2.4 G/DL (ref 3.2–4.6)
ALBUMIN/GLOB SERPL: 0.6 {RATIO} (ref 1.2–3.5)
ALP SERPL-CCNC: 45 U/L (ref 50–136)
ALT SERPL-CCNC: 21 U/L (ref 12–65)
ANION GAP SERPL CALC-SCNC: 8 MMOL/L (ref 7–16)
AST SERPL-CCNC: 38 U/L (ref 15–37)
BASOPHILS # BLD: 0 K/UL (ref 0–0.2)
BASOPHILS NFR BLD: 0 % (ref 0–2)
BILIRUB SERPL-MCNC: 0.5 MG/DL (ref 0.2–1.1)
BUN SERPL-MCNC: 38 MG/DL (ref 8–23)
CALCIUM SERPL-MCNC: 7.6 MG/DL (ref 8.3–10.4)
CHLORIDE SERPL-SCNC: 111 MMOL/L (ref 98–107)
CO2 SERPL-SCNC: 23 MMOL/L (ref 21–32)
CREAT SERPL-MCNC: 1.48 MG/DL (ref 0.8–1.5)
DIFFERENTIAL METHOD BLD: ABNORMAL
EOSINOPHIL # BLD: 0 K/UL (ref 0–0.8)
EOSINOPHIL NFR BLD: 0 % (ref 0.5–7.8)
ERYTHROCYTE [DISTWIDTH] IN BLOOD BY AUTOMATED COUNT: 17 % (ref 11.9–14.6)
GLOBULIN SER CALC-MCNC: 4.3 G/DL (ref 2.3–3.5)
GLUCOSE SERPL-MCNC: 94 MG/DL (ref 65–100)
HCT VFR BLD AUTO: 25.8 % (ref 41.1–50.3)
HGB BLD-MCNC: 8.8 G/DL (ref 13.6–17.2)
IMM GRANULOCYTES # BLD: 0 K/UL (ref 0–0.5)
IMM GRANULOCYTES NFR BLD AUTO: 0 % (ref 0–5)
LYMPHOCYTES # BLD: 0.3 K/UL (ref 0.5–4.6)
LYMPHOCYTES NFR BLD: 6 % (ref 13–44)
MAGNESIUM SERPL-MCNC: 1.8 MG/DL (ref 1.8–2.4)
MCH RBC QN AUTO: 23.7 PG (ref 26.1–32.9)
MCHC RBC AUTO-ENTMCNC: 34.1 G/DL (ref 31.4–35)
MCV RBC AUTO: 69.5 FL (ref 79.6–97.8)
MONOCYTES # BLD: 0.3 K/UL (ref 0.1–1.3)
MONOCYTES NFR BLD: 7 % (ref 4–12)
NEUTS SEG # BLD: 3.7 K/UL (ref 1.7–8.2)
NEUTS SEG NFR BLD: 87 % (ref 43–78)
PLATELET # BLD AUTO: 129 K/UL (ref 150–450)
PMV BLD AUTO: ABNORMAL FL (ref 10.8–14.1)
POTASSIUM SERPL-SCNC: 4.9 MMOL/L (ref 3.5–5.1)
PROT SERPL-MCNC: 6.7 G/DL (ref 6.3–8.2)
RBC # BLD AUTO: 3.71 M/UL (ref 4.23–5.67)
SODIUM SERPL-SCNC: 142 MMOL/L (ref 136–145)
URATE SERPL-MCNC: 1 MG/DL (ref 2.6–6)
WBC # BLD AUTO: 4.3 K/UL (ref 4.3–11.1)

## 2018-07-10 PROCEDURE — 74011000258 HC RX REV CODE- 258: Performed by: INTERNAL MEDICINE

## 2018-07-10 PROCEDURE — 74011250636 HC RX REV CODE- 250/636: Performed by: NURSE PRACTITIONER

## 2018-07-10 PROCEDURE — 84550 ASSAY OF BLOOD/URIC ACID: CPT | Performed by: NURSE PRACTITIONER

## 2018-07-10 PROCEDURE — 97535 SELF CARE MNGMENT TRAINING: CPT

## 2018-07-10 PROCEDURE — 74011250637 HC RX REV CODE- 250/637: Performed by: INTERNAL MEDICINE

## 2018-07-10 PROCEDURE — 85025 COMPLETE CBC W/AUTO DIFF WBC: CPT | Performed by: NURSE PRACTITIONER

## 2018-07-10 PROCEDURE — 99232 SBSQ HOSP IP/OBS MODERATE 35: CPT | Performed by: INTERNAL MEDICINE

## 2018-07-10 PROCEDURE — 83735 ASSAY OF MAGNESIUM: CPT | Performed by: NURSE PRACTITIONER

## 2018-07-10 PROCEDURE — 97530 THERAPEUTIC ACTIVITIES: CPT

## 2018-07-10 PROCEDURE — 77030020263 HC SOL INJ SOD CL0.9% LFCR 1000ML

## 2018-07-10 PROCEDURE — 80053 COMPREHEN METABOLIC PANEL: CPT | Performed by: NURSE PRACTITIONER

## 2018-07-10 PROCEDURE — 36415 COLL VENOUS BLD VENIPUNCTURE: CPT | Performed by: NURSE PRACTITIONER

## 2018-07-10 PROCEDURE — 74011250637 HC RX REV CODE- 250/637: Performed by: NURSE PRACTITIONER

## 2018-07-10 PROCEDURE — 65270000029 HC RM PRIVATE

## 2018-07-10 PROCEDURE — 74011250636 HC RX REV CODE- 250/636: Performed by: INTERNAL MEDICINE

## 2018-07-10 RX ADMIN — LUBIPROSTONE 24 MCG: 24 CAPSULE, GELATIN COATED ORAL at 16:49

## 2018-07-10 RX ADMIN — SODIUM CHLORIDE 100 ML/HR: 900 INJECTION, SOLUTION INTRAVENOUS at 11:40

## 2018-07-10 RX ADMIN — CARVEDILOL 6.25 MG: 6.25 TABLET, FILM COATED ORAL at 08:57

## 2018-07-10 RX ADMIN — CHLORHEXIDINE GLUCONATE 15 ML: 1.2 RINSE ORAL at 16:45

## 2018-07-10 RX ADMIN — ATORVASTATIN CALCIUM 20 MG: 10 TABLET, FILM COATED ORAL at 08:57

## 2018-07-10 RX ADMIN — ALLOPURINOL 100 MG: 100 TABLET ORAL at 08:57

## 2018-07-10 RX ADMIN — CYANOCOBALAMIN TAB 1000 MCG 1000 MCG: 1000 TAB at 08:57

## 2018-07-10 RX ADMIN — Medication 400 MG: at 21:08

## 2018-07-10 RX ADMIN — PANTOPRAZOLE SODIUM 40 MG: 40 TABLET, DELAYED RELEASE ORAL at 08:58

## 2018-07-10 RX ADMIN — Medication 500 MG: at 16:44

## 2018-07-10 RX ADMIN — Medication 500 MG: at 11:40

## 2018-07-10 RX ADMIN — OXYCODONE HYDROCHLORIDE 30 MG: 15 TABLET ORAL at 19:09

## 2018-07-10 RX ADMIN — Medication 400 MG: at 16:44

## 2018-07-10 RX ADMIN — Medication 500 MG: at 08:57

## 2018-07-10 RX ADMIN — Medication 400 MG: at 11:40

## 2018-07-10 RX ADMIN — CHLORHEXIDINE GLUCONATE 15 ML: 1.2 RINSE ORAL at 09:00

## 2018-07-10 RX ADMIN — STANDARDIZED SENNA CONCENTRATE AND DOCUSATE SODIUM 2 TABLET: 8.6; 5 TABLET, FILM COATED ORAL at 08:57

## 2018-07-10 RX ADMIN — LEVOTHYROXINE SODIUM 137 MCG: 50 TABLET ORAL at 08:57

## 2018-07-10 RX ADMIN — Medication 400 MG: at 08:58

## 2018-07-10 RX ADMIN — LUBIPROSTONE 24 MCG: 24 CAPSULE, GELATIN COATED ORAL at 09:08

## 2018-07-10 RX ADMIN — CARVEDILOL 6.25 MG: 6.25 TABLET, FILM COATED ORAL at 16:44

## 2018-07-10 RX ADMIN — TORSEMIDE 20 MG: 20 TABLET ORAL at 08:58

## 2018-07-10 RX ADMIN — MIRTAZAPINE 15 MG: 15 TABLET, FILM COATED ORAL at 21:07

## 2018-07-10 RX ADMIN — STANDARDIZED SENNA CONCENTRATE AND DOCUSATE SODIUM 2 TABLET: 8.6; 5 TABLET, FILM COATED ORAL at 16:44

## 2018-07-10 RX ADMIN — DEXAMETHASONE SODIUM PHOSPHATE 40 MG: 10 INJECTION, SOLUTION INTRAMUSCULAR; INTRAVENOUS at 08:57

## 2018-07-10 NOTE — PROGRESS NOTES
END OF SHIFT NOTE: Pt slept well through the night. No needs voiced. Will continue to monitor. Intake/Output      Voiding: YES  Catheter: NO  Drain:              Stool:  0 occurrences. Stool Assessment  Stool Color: Letitia Free (07/09/18 1348)  Stool Appearance: Hard (07/09/18 1348)  Stool Amount: Small (07/09/18 1348)  Stool Source/Status: Rectum (07/09/18 1348)    Emesis:  0 occurrences. VITAL SIGNS  Patient Vitals for the past 12 hrs:   Temp Pulse Resp BP SpO2   07/09/18 2320 97.8 °F (36.6 °C) 87 18 122/77 96 %   07/09/18 1930 98 °F (36.7 °C) 80 18 112/62 98 %       Pain Assessment  Pain 1  Pain Scale 1: Visual (07/10/18 0242)  Pain Intensity 1: 0 (07/10/18 0242)  Patient Stated Pain Goal: 0 (07/10/18 0242)  Pain Reassessment 1: Patient sleeping (07/10/18 0242)  Pain Onset 1: ongoing (07/09/18 1630)  Pain Location 1: Back (07/09/18 1630)  Pain Orientation 1: Upper (07/09/18 1039)  Pain Description 1: Aching;Constant (07/09/18 1039)  Pain Intervention(s) 1: Medication (see MAR) (07/09/18 2109)    Ambulating  Yes    Additional Information:     Shift report given to be given to oncoming nurse at the bedside.     Claudetta Roa, RN

## 2018-07-10 NOTE — INTERDISCIPLINARY ROUNDS
Interdisciplinary Rounds completed. Charge RN, NP and SW present. Eliquis on hold for 48 hours. Port placement hopefully Wednesday at some point. PC consulted to adjust pain medication. Pt able to d/c after port is placed either 7/11 or 7/12.

## 2018-07-10 NOTE — INTERDISCIPLINARY ROUNDS
Interdisciplinary Rounds completed. Charge RN, NP and SW present.     Port to be placed tomorrow then pt ok to discharge.

## 2018-07-10 NOTE — PROGRESS NOTES
Problem: Mobility Impaired (Adult and Pediatric)  Goal: *Acute Goals and Plan of Care (Insert Text)  STG:  (1.)Mr. Teresa Mai will move from supine to sit and sit to supine  with MODIFIED INDEPENDENCE within 3 treatment day(s). Met 7/9/2018   (2.)Mr. Teresa Mai will transfer from bed to chair and chair to bed with SUPERVISION using the least restrictive device within 3 treatment day(s). (3.)Mr. Teresa Mai will ambulate with SUPERVISION for 50 feet with the least restrictive device within 3 treatment day(s). LTG:  (1.)Mr. Teresa Mai will move from supine to sit and sit to supine  in bed with INDEPENDENCE within 7 treatment day(s). (2.)Mr. Teresa Mai will transfer from bed to chair and chair to bed with MODIFIED INDEPENDENCE using the least restrictive device within 7 treatment day(s). Modified 7/9/2018 (3.)Mr. Teresa Mai will ambulate with MODIFIED INDEPENDENCE for 750 feet with the least restrictive device within 7 treatment day(s). ________________________________________________________________________________________________      PHYSICAL THERAPY: Daily Note, Treatment Day: 2nd, AM 7/10/2018  INPATIENT: Hospital Day: 5  Payor: CARE IMPROVEMENT PLUS [place] / Product Type: [employer] Care Medicare /      NAME/AGE/GENDER: Connie Will is a 65 y.o. male   PRIMARY DIAGNOSIS: Multiple Myeloma  MARY  Intractable Pain  MARY (acute kidney injury) ([facility]  Intractable pain <principal problem not specified> <principal problem not specified>        ICD-10: Treatment Diagnosis:    · Generalized Muscle Weakness (M62.81)  · Difficulty in walking, Not elsewhere classified (R26.2)   Precaution/Allergies:  Ace inhibitors; Lisinopril; and Pcn [penicillins]      ASSESSMENT:     Mr. Teresa Mai was anxious to get up and walk today. He does very well. No complaints of pain at all. Good pace. Has command of the rolling walker. Increased distance to 700 ft. When finished wanted to sit edge of bed to eat and not in the chair. Thought he would be more comfortable in the chair with back history but did not want to. This section established at most recent assessment   PROBLEM LIST (Impairments causing functional limitations):  1. Decreased Strength  2. Decreased ADL/Functional Activities  3. Decreased Transfer Abilities  4. Decreased Ambulation Ability/Technique  5. Decreased Balance  6. Increased Pain  7. Decreased Activity Tolerance  8. Decreased [place] with Home Exercise Program   INTERVENTIONS PLANNED: (Benefits and precautions of physical therapy have been discussed with the patient.)  1. Balance Exercise  2. Bed Mobility  3. Family Education  4. Gait Training  5. Home Exercise Program (HEP)  6. Neuromuscular Re-education/Strengthening  7. Therapeutic Activites  8. Therapeutic Exercise/Strengthening  9. Transfer Training  10. Group Therapy     TREATMENT PLAN: Frequency/Duration: 3 times a week for duration of hospital stay  Rehabilitation Potential For Stated Goals: Good     RECOMMENDED REHABILITATION/EQUIPMENT: (at time of discharge pending progress): Due to the probability of continued deficits (see above) this patient will likely need continued skilled physical therapy after discharge. Equipment:    to be determined based on progress              HISTORY:   History of Present Injury/Illness (Reason for Referral):  Mr. Santa Patel is a 65 y.o. male admitted on 7/6/2018 with a primary diagnosis of MARY and intractable pain.      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 During recent admission he had kyphoplasty due to worsening lumbar compression fracture and then went to rehab for 3 weeks but was not allowed to take his Revlimid/Dex during that time. He was seen in the office on 6/25 with increased back and abdominal pain, fatigue and hypercalcemia that responded well to Zometa.   He was restarted on Rev/Dex and was brought in today for follow up. He continues to rate his pain as 10/10, but is not taking OxyIR for breakthrough due to constipation. His PCP switched him from [address] [place] [address] to [address] [place] [address] yesterday-sample given for constipation. He states last BM 1 week ago, passing gas, no nausea or vomiting. He only took 1 tabs of Oxy yesterday. He has lost another 10 pounds since last visit due to poor appetite. He admits to only drinking 2 16 oz bottles of water daily. He is utilizing a walker today and complains of increased fatigue and weakness. Labs obtained, Na+ 132, K+ 3.3, Creatinine up to 2.76, Mg+ 1.3. Ca+ stable at 7.9. He will be admitted for MARY, intractable pain and to receive supportive care. Of note, his wife is currently an inpatient due to a bowel obstruction. Past Medical History/Comorbidities:   Mr. Marty Rojas  has a past medical history of Arrhythmia; Arthritis; BMI 30.0-30.9,adult; Cardiomyopathy ([address] Cholelithiases; Chronic systolic heart failure ([address] (9/13/2011); Gout; H/O: pituitary tumor; High cholesterol; History of thyroid cancer; History of vertigo; Hurthle cell carcinoma of thyroid ([address] Hx of radiation therapy to prostate (2004); Hyperlipidemia (11/18/2015); Hypertension; Hypothyroid; ICD (implantable cardiac defibrillator) in place (9/2011); LBBB (left bundle branch block) (11/18/2015); Malaise and fatigue (11/18/2015); Malignant neoplasm of prostate ([address] Mass of colon; Multiple myeloma ([address] and Sinus node dysfunction ([address] Mr. Marty Rojas  has a past surgical history that includes hx heent (2008); hx cholecystectomy (2013); hx gi; hx heart catheterization; hx pacemaker (2011/2016); hx colonoscopy; hx thyroidectomy (2008); hx tumor removal (1990/2010); and hx thyroidectomy (01/26/2018).   Social History/Living Environment:   Home Environment: Private residence  # Steps to Enter: 4  Rails to Enter: Yes  One/Two Story Residence: One story  Living Alone: No  Support Systems: Spouse/Significant Other/Partner  Patient Expects to be Discharged to[de-identified] Private residence  Current DME Used/Available at Home: [employer] Mckinney  Prior Level of Function/Work/Activity:  Patient reports being independent- modified independent at baseline. Has been using a rolling walker for a few months. Number of Personal Factors/Comorbidities that affect the Plan of Care: 1-2: MODERATE COMPLEXITY   EXAMINATION:   Most Recent Physical Functioning:   Gross Assessment:                  Posture:     Balance:  Sitting: Intact  Standing: Impaired; With support  Standing - Static: Fair  Standing - Dynamic : Fair Bed Mobility:     Wheelchair Mobility:     Transfers:  Sit to Stand: Supervision  Stand to Sit: Supervision  Gait:     Base of Support: Widened  Step Length: Right shortened;Left shortened  Distance (ft): 700 Feet (ft)  Assistive Device: Walker, rolling  Ambulation - Level of Assistance: Stand-by assistance      Body Structures Involved:  1. Nerves  2. Thoracic Cage  3. Bones  4. Joints  5. Muscles  6. Ligaments Body Functions Affected:  1. Sensory/Pain  2. Hematological  3. Immune  4. Neuromusculoskeletal  5. Movement Related Activities and Participation Affected:  1. Mobility  2. Self Care  3. Domestic Life  4. Interpersonal Interactions and Relationships   Number of elements that affect the Plan of Care: 4+: HIGH COMPLEXITY   CLINICAL PRESENTATION:   Presentation: Evolving clinical presentation with changing clinical characteristics: MODERATE COMPLEXITY   CLINICAL DECISION MAKIN [facility] Inpatient Short Form  How much difficulty does the patient currently have. .. Unable A Lot A Little None   1. Turning over in bed (including adjusting bedclothes, sheets and blankets)? [] 1   [] 2   [x] 3   [] 4   2. Sitting down on and standing up from a chair with arms ( e.g., wheelchair, bedside commode, etc.)   [] 1   [] 2   [x] 3   [] 4   3.   Moving from lying on back to sitting on the side of the bed? [] 1   [] 2   [x] 3   [] 4   How much help from another person does the patient currently need. .. Total A Lot A Little None   4. Moving to and from a bed to a chair (including a wheelchair)? [] 1   [] 2   [x] 3   [] 4   5. Need to walk in hospital room? [] 1   [] 2   [x] 3   [] 4   6. Climbing 3-5 steps with a railing? [] 1   [x] 2   [] 3   [] 4   © 2007, Trustees of [facility] [employer], under license to [employer]. All rights reserved      Score:  Initial: 17 Most Recent: X (Date: -- )    Interpretation of Tool:  Represents activities that are increasingly more difficult (i.e. Bed mobility, Transfers, Gait). Score 24 23 22-20 19-15 14-10 9-7 6     Modifier CH CI CJ CK CL CM CN        Payor: CARE IMPROVEMENT PLUS [place] / Product Type: Managed Care Medicare /      Medical Necessity:     · Patient is expected to demonstrate progress in strength, balance and functional technique to increase independence with ADLs and IADLs. Reason for Services/Other Comments:  · Patient continues to require modification of therapeutic interventions to increase complexity of exercises. Use of outcome tool(s) and clinical judgement create a POC that gives a: Clear prediction of patient's progress: LOW COMPLEXITY            TREATMENT:   (In addition to Assessment/Re-Assessment sessions the following treatments were rendered)   Pre-treatment Symptoms/Complaints:  No complaints. Wanting to move. Pain: Initial:   Pain Intensity 1: 0  Post Session:  No complaints. Therapeutic Activity: (    15 minutes): Therapeutic activities including Bed transfers, Chair transfers and Ambulation on level ground to improve mobility, strength and balance. Required minimal   to promote safe use of RW and increased upright posture. .     Therapeutic Exercise: (  minutes):  Exercises per grid below to improve strength and coordination.   Required minimal verbal cues to promote proper body alignment. Progressed complexity of movement as indicated. DATE: 7/09/18       Ambulation        Hip Flexion x25 AB       Long Arc Quads x10 AB       Knee Squeezes        Ankle DF/PF x25 AB                                        Key:  A=active, AA=active assisted, P=passive, B=bilaterally, R=right, L=left   DF=dorsiflexion, PF=plantarflexion      Braces/Orthotics/Lines/Etc:   · IV  · O2 Device: Room air  Treatment/Session Assessment:    · Response to Treatment:  No pain post session   · Interdisciplinary Collaboration:   o Registered Nurse  · After treatment position/precautions:   o Call light within reach  o RN notified  o Sitting edge of bed to eat. · Compliance with Program/Exercises: Will assess as treatment progresses. · Recommendations/Intent for next treatment session: \"Next visit will focus on advancements to more challenging activities and reduction in assistance provided\".   Total Treatment Duration:  PT Patient Time In/Time Out  Time In: 1155  Time Out: 1210    Herve Arenas, PT

## 2018-07-10 NOTE — PROGRESS NOTES
Palliative Care Progress Note    Patient: Tia Cohen MRN: [id number]  SSN: [id number]    YOB: 1940  Age: 65 y.o. Sex: male       Assessment/Plan:     Chief Complaint/Interval History: Pain better this morning. Principal Diagnosis:    · Pain, back  M54.9    Additional Diagnoses:   · Constipation, Unspecified  K59.00  · Counseling, Encounter for Medical Advice  Z71.9  · Encounter for Palliative Care  Z51.5    Palliative Performance Scale (PPS)  PPS: 79    Medical Decision Making:   Reviewed and summarized notes over previous 24 hours  Discussed case with appropriate providers: Clare Moore, NP; Florentino Das, primary RN  Reviewed laboratory and x-ray data: CBC, CMP    Patient resting in bed, no distress noted, no family present. Patient reports his back is better today. He has only used 3 doses of breakthrough oxycodone IR. After discussion, patient feels that his pain is manageable with everything he normally does at home. Continue current regimen. He is having BMs, continue Pericolace 2 tabs BID and Amitiza. Patient becomes tearful during my visit with Carole at the bedside as well. He states he has a \"strange feeling\" about this cancer. Patient has had colon cancer and thyroid cancer surgically removed. Discussed this, and how this cancer is so different. He reflects on his normally upbeat attitude and trust in God's plan and does not know why he is emotional.  Provided support and validated the normalcy of feeling many emotions in the journey. After visit, patient was ambulating in the hallway with PT, in good spirits. Will discuss findings with members of the interdisciplinary team.         More than 50% of this 25 minute visit was spent counseling and coordination of care as outlined above. Subjective:     Review of Systems:  A comprehensive review of systems was negative except for:   Gastrointestinal: Positive for constipation, improving.   Musculoskeletal: Positive for back pain, improving. Objective:     Visit Vitals    /78 (BP 1 Location: Right arm, BP Patient Position: Supine)    Pulse 70    Temp 97.5 °F (36.4 °C)    Resp 18    Wt 82.4 kg (181 lb 9.6 oz)    SpO2 99%    BMI 23.32 kg/m2       Physical Exam:    General:  Cooperative. No acute distress. Eyes:  Conjunctivae/corneas clear    Nose: Nares normal. Septum midline. Neck: Supple, symmetrical, trachea midline. Lungs:   Clear to auscultation bilaterally, unlabored. Heart:  Regular rate and rhythm. Abdomen:   Soft, non-tender, non-distended. Extremities: Normal, atraumatic, no cyanosis or edema   Skin: Skin color, texture, turgor normal. No rash. Neurologic: Nonfocal.   Psych: Alert and oriented. Appropriately tearful.      Signed By: Lonny Dennison NP     July 10, 2018

## 2018-07-10 NOTE — PROGRESS NOTES
Problem: Self Care Deficits Care Plan (Adult)  Goal: *Acute Goals and Plan of Care (Insert Text)  1. Patient will complete lower body bathing and dressing with modified indepdnence and adaptive equipment as needed. 2. Patient will complete toileting with modified independence. 3. Patient will tolerate 30 minutes of OT treatment with 2 rest breaks to increase activity tolerance for ADLs. 4. Patient will complete functional transfers with modified independence and adaptive equipment as needed. Timeframe: 7 visits     OCCUPATIONAL THERAPY: Daily Note and Treatment Day: 2nd 7/10/2018  INPATIENT: Hospital Day: 5  Payor: CARE IMPROVEMENT PLUS / [place] / Product Type: [facility] Medicare /      NAME/AGE/GENDER: Sharon Torres is a 65 y.o. male   PRIMARY DIAGNOSIS:  Multiple Myeloma  MARY  Intractable Pain  MARY (acute kidney injury) ([facility] [address]  Intractable pain <principal problem not specified> <principal problem not specified>        ICD-10: Treatment Diagnosis:    · Generalized Muscle Weakness (M62.81)  · Other lack of cordination (R27.8)   Precautions/Allergies:     Ace inhibitors; Lisinopril; and Pcn [penicillins]      ASSESSMENT:     Mr. Carmella Raya  was living at home with his wife and receiving home health OT/PT/nursing. Pt was using performing basic ADLs with Mod I and IADLs with minimal assist.     7/10/2018: Patient agreeable and motivated to work with therapy. Treatment included toileting and grooming in standing. Patient then practiced UB/LB therapeutic exercises at his request to continue POC from [facility] therapy he was receiving prior to admission. Required rest breaks during sets. Patient is concerned about standing tolerance for standing ADLs/ IADLs  (meal prep, grooming at sink, etc) once he gets home so practiced unsupported standing to increase tolerance. He was able to stand for 3 minutes without rest break or support. He required minimal assistance for transfer back to bed.  Overall, patient is progressing nicely and is most importantly motivated to regain his strength and independence. Will continue to follow. This section established at most recent assessment   PROBLEM LIST (Impairments causing functional limitations):  1. Decreased Strength  2. Decreased ADL/Functional Activities  3. Decreased Transfer Abilities  4. Decreased Balance  5. Decreased Activity Tolerance  6. Increased Fatigue  7. Increased Shortness of Breath   INTERVENTIONS PLANNED: (Benefits and precautions of occupational therapy have been discussed with the patient.)  1. Activities of daily living training  2. Adaptive equipment training  3. Balance training  4. Clothing management  5. Donning&doffing training  6. Manual therapy training  7. Therapeutic activity  8. Therapeutic exercise     TREATMENT PLAN: Frequency/Duration: Follow patient 3x/week to address above goals. Rehabilitation Potential For Stated Goals: Good     RECOMMENDED REHABILITATION/EQUIPMENT: (at time of discharge pending progress): Due to the probability of continued deficits (see above) this patient will likely need continued skilled occupational therapy after discharge. Equipment:    None at this time              OCCUPATIONAL PROFILE AND HISTORY:   History of Present Injury/Illness (Reason for Referral):  See H&P  Past Medical History/Comorbidities:   Mr. Danie Michelle  has a past medical history of Arrhythmia; Arthritis; BMI 30.0-30.9,adult; Cardiomyopathy ([address] Cholelithiases; Chronic systolic heart failure ([address] (9/13/2011); Gout; H/O: pituitary tumor; High cholesterol; History of thyroid cancer; History of vertigo; Hurthle cell carcinoma of thyroid ([address] Hx of radiation therapy to prostate (2004); Hyperlipidemia (11/18/2015); Hypertension; Hypothyroid; ICD (implantable cardiac defibrillator) in place (9/2011); LBBB (left bundle branch block) (11/18/2015); Malaise and fatigue (11/18/2015); Malignant neoplasm of prostate ([address]  Mass of colon; Multiple myeloma ([facility] [address] and Sinus node dysfunction ([facility] [address] Mr. Geoffrey Braun  has a past surgical history that includes hx heent (2008); hx cholecystectomy (2013); hx gi; hx heart catheterization; hx pacemaker (2011/2016); hx colonoscopy; hx thyroidectomy (2008); hx tumor removal (1990/2010); and hx thyroidectomy (01/26/2018). Social History/Living Environment:   Home Environment: Private residence  # Steps to Enter: 4  Rails to Enter: Yes  One/Two Story Residence: One story  Living Alone: No  Support Systems: Spouse/Significant Other/Partner  Patient Expects to be Discharged to[de-identified] Private residence  Current DME Used/Available at Home: Walker  Prior Level of Function/Work/Activity:  See Above. Personal Factors:          Age:  65 y.o. Number of Personal Factors/Comorbidities that affect the Plan of Care: Expanded review of therapy/medical records (1-2):  MODERATE COMPLEXITY   ASSESSMENT OF OCCUPATIONAL PERFORMANCE[de-identified]   Activities of Daily Living:   Basic ADLs (From Assessment) Complex ADLs (From Assessment)   Feeding: Modified independent  Oral Facial Hygiene/Grooming: Modified Independent  Bathing: Supervision  Upper Body Dressing: Supervision  Lower Body Dressing: Stand-by assistance  Toileting: Supervision     Grooming/Bathing/Dressing Activities of Daily Living   Grooming  Grooming Assistance: Supervision/set up (in standing)  Washing Hands: Supervision/set-up Cognitive Retraining  Safety/Judgement: Awareness of environment; Fall prevention           Toileting  Toileting Assistance: Supervision/set up  Bladder Hygiene: Supervision/set-up  Bowel Hygiene: Supervision/set-up  Clothing Management: Supervision/set-up     Functional Transfers  Bathroom Mobility: Modified independent  Toilet Transfer : Supervision     Bed/Mat Mobility  Sit to Supine: Stand-by assistance  Sit to Stand: Contact guard assistance  Bed to Chair: Minimum assistance       Most Recent Physical Functioning:   Gross Assessment: Posture:  Posture (WDL): Exceptions to WDL  Posture Assessment: Forward head, Rounded shoulders, Trunk flexion  Balance:  Sitting: Intact  Standing: Impaired  Standing - Static: Fair  Standing - Dynamic : Fair Bed Mobility:  Sit to Supine: Stand-by assistance  Wheelchair Mobility:     Transfers:  Sit to Stand: Contact guard assistance  Stand to Sit: Contact guard assistance  Bed to Chair: Minimum assistance            Patient Vitals for the past 6 hrs:   BP BP Patient Position SpO2 Pulse   07/10/18 1139 130/78 Supine 99 % 70       Mental Status  Neurologic State: Alert  Orientation Level: Oriented X4  Cognition: Follows commands  Perception: Appears intact  Perseveration: No perseveration noted  Safety/Judgement: Awareness of environment, Fall prevention                          Physical Skills Involved:  1. Range of Motion  2. Balance  3. Strength  4. Activity Tolerance Cognitive Skills Affected (resulting in the inability to perform in a timely and safe manner):  1. Executive Function Psychosocial Skills Affected:  1. Habits/Routines  2. Environmental Adaptation  3. Social Roles   Number of elements that affect the Plan of Care: 3-5:  MODERATE COMPLEXITY   CLINICAL DECISION MAKIN[address] [place] [id number] AM-PAC 6 Clicks   Daily Activity Inpatient Short Form  How much help from another person does the patient currently need. .. Total A Lot A Little None   1. Putting on and taking off regular lower body clothing? [] 1   [] 2   [x] 3   [] 4   2. Bathing (including washing, rinsing, drying)? [] 1   [] 2   [x] 3   [] 4   3. Toileting, which includes using toilet, bedpan or urinal?   [] 1   [] 2   [x] 3   [] 4   4. Putting on and taking off regular upper body clothing? [] 1   [] 2   [] 3   [x] 4   5. Taking care of personal grooming such as brushing teeth? [] 1   [] 2   [] 3   [x] 4   6. Eating meals? [] 1   [] 2   [] 3   [x] 4   © , Trustees of [address] [place] [address] [address] [address], under license to [employer].  All rights reserved      Score:  Initial:21  Most Recent: X (Date: -- )    Interpretation of Tool:  Represents activities that are increasingly more difficult (i.e. Bed mobility, Transfers, Gait). Score 24 23 22-20 19-15 14-10 9-7 6     Modifier CH CI CJ CK CL CM CN      ? Self Care:     - CURRENT STATUS: CJ - 20%-39% impaired, limited or restricted    - GOAL STATUS: CI - 1%-19% impaired, limited or restricted    - D/C STATUS:  ---------------To be determined---------------  Payor: CARE IMPROVEMENT PLUS [place] / Product Type: [employer] Medicare /      Medical Necessity:     · Patient demonstrates good rehab potential due to higher previous functional level. Reason for Services/Other Comments:  · Patient continues to require skilled intervention due to medical complications. Use of outcome tool(s) and clinical judgement create a POC that gives a: MODERATE COMPLEXITY         TREATMENT:   (In addition to Assessment/Re-Assessment sessions the following treatments were rendered)     Pre-treatment Symptoms/Complaints:    Pain: Initial:   Pain Intensity 1: 0  Post Session:  same     Self Care: (8 minutes): Procedure(s) (per grid) utilized to improve and/or restore self-care/home management as related to toileting, grooming and bathroom mobility. Required minimal verbal cueing to facilitate activities of daily living skills and compensatory activities. Therapeutic Activity: (    15 minutes): Therapeutic activities including Chair transfers and UB/LB exercises and timed standing to improve mobility, strength and activity tolerance for standing ADLs. Required minimal   to promote dynamic balance in standing. Practiced unsupported standing in preparation for standing ADLs at home. Rest breaks required between every set.       Date:  7/10 Date:   Date:     Activity/Exercise Parameters Parameters Parameters   Marching in place  1 x 15 reps BLE     Long arc quads 2 x 15 reps BLE Chair push up 1 x 15 reps     Shoulder horizontal abduction/ adduction 1 x 15 reps     Shoulder flexion  1 x 15 reps     Shoulder circumduction 1 x 15 reps     Unsupported standing  3 minutes 0 seconds             Braces/Orthotics/Lines/Etc:   · O2 Device: Room air  Treatment/Session Assessment:    · Response to Treatment:  Pt pleasant and in bed with needs in reach. · Interdisciplinary Collaboration:   o Occupational Therapist  o Registered Nurse  · After treatment position/precautions:   o Supine in bed  o Bed/Chair-wheels locked  o Bed in low position  o Call light within reach  o RN notified   · Compliance with Program/Exercises: Will assess as treatment progresses. · Recommendations/Intent for next treatment session: \"Next visit will focus on advancements to more challenging activities\".   Total Treatment Duration:  OT Patient Time In/Time Out  Time In: 2346  Time Out: SRIDHAR Robbins/JAIME

## 2018-07-10 NOTE — PROGRESS NOTES
END OF SHIFT NOTE:    Intake/Output      Voiding: YES  Catheter: NO  Drain:              Stool:  1 occurrences. Stool Assessment  Stool Color: Brown (07/10/18 1140)  Stool Appearance: Hard (07/10/18 1140)  Stool Amount: Small (07/10/18 1140)  Stool Source/Status: Rectum (07/10/18 1140)    Emesis:  0 occurrences. VITAL SIGNS  Patient Vitals for the past 12 hrs:   Temp Pulse Resp BP SpO2   07/10/18 1544 98 °F (36.7 °C) 67 18 111/57 97 %   07/10/18 1139 97.5 °F (36.4 °C) 70 18 130/78 99 %   07/10/18 0719 97.9 °F (36.6 °C) 71 18 137/69 99 %       Pain Assessment  Pain 1  Pain Scale 1: Numeric (0 - 10) (07/10/18 1909)  Pain Intensity 1: 6 (07/10/18 1909)  Patient Stated Pain Goal: 0 (07/10/18 1430)  Pain Reassessment 1: Patient sleeping (07/10/18 0242)  Pain Onset 1: ongoing (07/09/18 1630)  Pain Location 1: Back (07/10/18 1909)  Pain Orientation 1: Upper (07/09/18 1039)  Pain Description 1: Aching;Constant (07/10/18 1909)  Pain Intervention(s) 1: Medication (see MAR) (07/10/18 1909)    Ambulating  Yes    Additional Information:   Pt had a good day, ambulated in hallways with walker and PT. Tolerated well. Met with PC, pt was tearful at first, saying he \"feels different\" about this cancer. Port placement scheduled tomorrow 0900, NPO at MN. Pain medication given x1, see MAR. Shift report given to oncoming nurse at the bedside.     Constantino Harvey RN

## 2018-07-10 NOTE — PROGRESS NOTES
[address] Hematology Oncology Inpatient Hematology / Oncology Progress Note Admission Date: 2018 12:46 PM 
Reason for Admission/Hospital Course: Multiple Myeloma MARY Intractable Pain MARY (acute kidney injury) ([address] Intractable pain 24 Hour Events: No new symptoms Port to be placed tomorrow-confirmed with IR 
PC helping with pain control ROS: 
Constitutional: Positive for weakness, fatigue. Negative for fever or chills. CV: Negative for chest pain, palpitations, edema. Respiratory: Negative for dyspnea, cough, wheezing. GI: Positive for abdominal pain, diarrhea, constipation. 10 point review of systems is otherwise negative with the exception of the elements mentioned above in the HPI. Allergies Allergen Reactions  Ace Inhibitors Other (comments)  Lisinopril Cough  Pcn [Penicillins] Swelling OBJECTIVE: 
Patient Vitals for the past 8 hrs: 
 BP Temp Pulse Resp SpO2  
07/10/18 0719 137/69 97.9 °F (36.6 °C) 71 18 99 % Temp (24hrs), Av.8 °F (36.6 °C), Min:97.4 °F (36.3 °C), Max:98 °F (36.7 °C) 
 
07/10 0701 - 07/10 1900 In: 480 [P.O.:480] Out: 500 [Urine:500] Physical Exam: 
Constitutional: Well developed, elderly male in no acute distress, sitting comfortably in the bed HEENT: Normocephalic and atraumatic. Oropharynx is clear, mucous membranes are moist. Extraocular muscles are intact. Sclerae anicteric. Neck supple. Skin Warm and dry. No bruising and no rash noted. No erythema. No pallor. Respiratory Lungs are clear to auscultation bilaterally without wheezes, rales or rhonchi, normal air exchange without accessory muscle use. CVS Normal rate, regular rhythm and normal S1 and S2. No murmurs, gallops, or rubs. Abdomen Soft, non-tender and nondistended, normoactive bowel sounds. No palpable mass. No hepatosplenomegaly. + hernia noted. Neuro Grossly nonfocal with no obvious sensory or motor deficits.   
MSK Normal range of motion in general.  No edema and no tenderness. Psych Appropriate mood and affect. Labs: 
   
Recent Labs  
   07/10/18 
 0525  07/09/18 
 0457  07/08/18 
 0542 WBC  4.3  5.7  3.3*  
RBC  3.71*  3.57*  3.53* HGB  8.8*  8.6*  8.2* HCT  25.8*  24.7*  24.2*  
MCV  69.5*  69.2*  68.6*  
MCH  23.7*  24.1*  23.2*  
MCHC  34.1  34.8  33.9 RDW  17.0*  17.2*  16.1*  
PLT  129*  140*  140* GRANS  87*  83*  71 LYMPH  6*  8*  25  
MONOS  7  9  3* EOS  0*  0*  0*  
BASOS  0  0  0 IG  0  0  1 DF  AUTOMATED  AUTOMATED  AUTOMATED ANEU  3.7  4.7  2.4 ABL  0.3*  0.4*  0.8 ABM  0.3  0.5  0.1 REA  0.0  0.0  0.0 ABB  0.0  0.0  0.0 AIG  0.0  0.0  0.0 Recent Labs  
   07/10/18 
 0525  07/09/18 
 0457  07/08/18 
 0542 NA  142  141  142  
K  4.9  4.3  4.1 CL  111*  110*  111* CO2  23  22  20* AGAP  8  9  11 GLU  94  112*  128* BUN  38*  36*  27* CREA  1.48  1.67*  1.60* GFRAA  59*  51*  54* GFRNA  49*  43*  45* CA  7.6*  7.4*  7.0*  
SGOT  38*  32  37 AP  45*  47*  51  
TP  6.7  6.7  6.9 ALB  2.4*  2.3*  2.3*  
GLOB  4.3*  4.4*  4.6* AGRAT  0.6*  0.5*  0.5* MG  1.8  2.1  1.6* Imaging: XR ABD (KUB) [[id number]] Collected: 07/07/18 1430   
  Order Status: Completed Updated: 07/07/18 1433  
  Narrative:    
  KUB CLINICAL INDICATION: Abdominal pain, constipation FINDINGS: Two supine views of the abdomen and pelvis show nonspecific bowel gas 
pattern with air noted over the rectum. No dilated loops of small bowel evident. Cholecystectomy clips are noted. Slick Dials Post kyphoplasty changes noted in the lumbar 
spine. An AICD is in place. 
  
  Impression:    
  IMPRESSION: No acute abdominal or pelvic abnormality.  
   
 
 
 
ASSESSMENT: 
 
Problem List  Date Reviewed: 7/6/2018 Codes Class Noted Compression fracture of lumbar vertebra (HCC) ICD-10-CM: S32.000A ICD-9-CM: 805.4  5/15/2018  Hurthle cell carcinoma of thyroid ([facility] [address] ICD-10-CM: C73 
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.2 ICD-9-CM: 227.3  3/15/2018 Thyroid mass ICD-10-CM: E07.9 ICD-9-CM: 246.9  2/8/2018 Sinus bradycardia ICD-10-CM: R00.1 ICD-9-CM: 427.89  1/12/2018 PAF (paroxysmal atrial fibrillation) (HCC) ICD-10-CM: I48.0 ICD-9-CM: 427.31  11/26/2017 Chronic systolic CHF (congestive heart failure) ([facility]) ICD-10-CM: N64.96 ICD-9-CM: 428.22, 428.0  11/26/2017 Pain ICD-10-CM: R52 ICD-9-CM: 780.96  11/20/2017 MARY (acute kidney injury) ([facility] ICD-10-CM: N17.9 ICD-9-CM: 584.9  11/20/2017 Acute renal failure (ARF) (HCC) ICD-10-CM: N17.9 ICD-9-CM: 584.9  11/18/2017 Intractable pain ICD-10-CM: S63 ICD-9-CM: 780.96  1/11/2017 Acute kidney injury ([facility] ICD-10-CM: N17.9 ICD-9-CM: 584.9  1/11/2017 Pancytopenia ([facility] ICD-10-CM: B69.063 ICD-9-CM: 284.19  1/11/2017 Constipation ICD-10-CM: K59.00 ICD-9-CM: 564.00  1/11/2017 Multiple myeloma (HCC) ICD-10-CM: C90.00 ICD-9-CM: 203.00  1/2/2017 Postural imbalance ICD-10-CM: R29.3 ICD-9-CM: 729.90  12/14/2016 Polyneuropathy ICD-10-CM: G62.9 ICD-9-CM: 356.9  12/14/2016 Fall ICD-10-CM: W19. Shi  ICD-9-CM: Z901.2  12/14/2016 Encounter for medication management ICD-10-CM: S74.832 ICD-9-CM: V58.69  12/14/2016 Urinary retention ICD-10-CM: R33.9 ICD-9-CM: 788.20  6/6/2016 Colonic mass ICD-10-CM: K63.9 ICD-9-CM: 569.89  3/23/2016 Hyperlipidemia ICD-10-CM: E78.5 ICD-9-CM: 272.4  11/18/2015 Vertigo ICD-10-CM: X00 ICD-9-CM: 780.4  11/18/2015 Malaise and fatigue ICD-10-CM: R53.81, R53.83 ICD-9-CM: 780.79  11/18/2015 Cardiomyopathy ([facility] [address] ICD-10-CM: I42.9 ICD-9-CM: 425.4  11/18/2015  LBBB (left bundle branch block) ICD-10-CM: I44.7 ICD-9-CM: 426.3  11/18/2015 Arthritis ICD-10-CM: M19.90 ICD-9-CM: 716.90  Unknown Arrhythmia ICD-10-CM: I49.9 ICD-9-CM: 427.9  Unknown Overview Signed 6/18/2013  2:07 PM by Erin Saldana MD  
  LBBB Cholelithiases ICD-10-CM: K80.20 ICD-9-CM: 574.20  Unknown Hx of radiation therapy to prostate ICD-10-CM: Z92.3 ICD-9-CM: V15.3  Unknown Chronic systolic heart failure (HCC) ICD-10-CM: I50.22 ICD-9-CM: 428.22  9/13/2011 Automatic implantable cardioverter-defibrillator in situ ICD-10-CM: Z95.810 ICD-9-CM: V45.02  9/1/2011 Mr. Tobias Hull is a 65 y.o. male admitted on 7/6/2018 with a primary diagnosis of MARY and intractable pain.  
  
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 During recent admission he had kyphoplasty due to worsening lumbar compression fracture and then went to rehab for 3 weeks but was not allowed to take his Revlimid/Dex during that time. He was seen in the office on 6/25 with increased back and abdominal pain, fatigue and hypercalcemia that responded well to Zometa. He was restarted on Rev/Dex and was brought in today for follow up. He continues to rate his pain as 10/10, but is not taking OxyIR for breakthrough due to constipation. His PCP switched him from [address] [place] [address] to [address] [place] [address] yesterday-sample given for constipation. He states last BM 1 week ago, passing gas, no nausea or vomiting. He only took 1 tabs of Oxy yesterday. He has lost another 10 pounds since last visit due to poor appetite. He admits to only drinking 2 16 oz bottles of water daily. He is utilizing a walker today and complains of increased fatigue and weakness. Labs obtained, Na+ 132, K+ 3.3, Creatinine up to 2.76, Mg+ 1.3. Ca+ stable at 7.9.   He will be admitted for MARY, intractable pain and to receive supportive care. Of note, his wife is currently an inpatient due to a bowel obstruction. PLAN: 
Multiple Myeloma 
-hold Revlimid, may need to start Cytoxan 
07/07 Start Cytoxan 2000 mg x 1 today and pulse dose steroids, 40 mg Decadron daily x 4 days. Plan to start oral ixazomib and daratumumab in 2-3 weeks. 07/08 Tolerated well.   
  
MARY 
-IV hydration (NS @100ml/hr), closely monitor Cr and for fluid overload 
 07/07 Uric acid > 10. Give rasburicase x 1. Start allopurinol daily. 07/08 Uric acid down to 3.6. Creatinine continues to improve. 07/09 creatinine 1.67 
07/10 Cr 1.48 Constipation 
-pericolace BID, amitiza, lactulose prn 
07/07 Abdominal pain and cramping, obtain KUB. 07/08 KUB negative. Patient may take his own Amitiza. 7/9 constipation resolved 
  
Pain-Abdominal/Back 
-Increase fentanyl patch to 100 mcg, Oxy IR 30 mg Q4h prn  
7/10 PC assisting 
  
Increased weakness 
-consult PT/OT 
  
Electrolyte Imbalances 
-replace per Steven SOPs 
  
Poor appetite 
-Remeron 15 mg 
  
Supportive care Follow Steven SOPs Currently on Eliquis 5 mg BID, apply SCDs for DVT prophylaxis Possibly home tomorrow after port placement. Loida Siegel  [address] Hematology Oncology [address] [place] [address] Office : [phone] Fax : [phone] Patient seen, examed and discussed with NP, agree with above history/assessment/plan. 65 y.o.male h/o colon and thyroid cancer, MM since 2016, recent fracture and NH stay interrupted rx for 2 months then developed rapid disease progression with TLS and MARY, responded as above managed, continue rx and arrange next line outpt, place port and plan for placement. 
  
 
Marcela Marquez M.D. [address] Office : [phone] Fax : [phone]

## 2018-07-11 ENCOUNTER — ANESTHESIA (OUTPATIENT)
Dept: SURGERY | Age: 78
DRG: 841 | End: 2018-07-11
Payer: MEDICARE

## 2018-07-11 ENCOUNTER — APPOINTMENT (OUTPATIENT)
Dept: INTERVENTIONAL RADIOLOGY/VASCULAR | Age: 78
DRG: 841 | End: 2018-07-11
Attending: INTERNAL MEDICINE
Payer: MEDICARE

## 2018-07-11 LAB
ABO + RH BLD: NORMAL
ALBUMIN SERPL-MCNC: 2.3 G/DL (ref 3.2–4.6)
ALBUMIN/GLOB SERPL: 0.6 {RATIO} (ref 1.2–3.5)
ALP SERPL-CCNC: 44 U/L (ref 50–136)
ALT SERPL-CCNC: 37 U/L (ref 12–65)
ANION GAP SERPL CALC-SCNC: 9 MMOL/L (ref 7–16)
AST SERPL-CCNC: 52 U/L (ref 15–37)
BASOPHILS # BLD: 0 K/UL (ref 0–0.2)
BASOPHILS NFR BLD: 0 % (ref 0–2)
BILIRUB SERPL-MCNC: 0.5 MG/DL (ref 0.2–1.1)
BLD PROD TYP BPU: NORMAL
BLD PROD TYP BPU: NORMAL
BLOOD GROUP ANTIBODIES SERPL: NORMAL
BPU ID: NORMAL
BPU ID: NORMAL
BUN SERPL-MCNC: 40 MG/DL (ref 8–23)
CALCIUM SERPL-MCNC: 7.2 MG/DL (ref 8.3–10.4)
CHLORIDE SERPL-SCNC: 110 MMOL/L (ref 98–107)
CO2 SERPL-SCNC: 22 MMOL/L (ref 21–32)
CREAT SERPL-MCNC: 1.38 MG/DL (ref 0.8–1.5)
CROSSMATCH RESULT,%XM: NORMAL
CROSSMATCH RESULT,%XM: NORMAL
DIFFERENTIAL METHOD BLD: ABNORMAL
EOSINOPHIL # BLD: 0 K/UL (ref 0–0.8)
EOSINOPHIL NFR BLD: 0 % (ref 0.5–7.8)
ERYTHROCYTE [DISTWIDTH] IN BLOOD BY AUTOMATED COUNT: 17.1 % (ref 11.9–14.6)
GLOBULIN SER CALC-MCNC: 4.1 G/DL (ref 2.3–3.5)
GLUCOSE SERPL-MCNC: 136 MG/DL (ref 65–100)
HCT VFR BLD AUTO: 24.1 % (ref 41.1–50.3)
HGB BLD-MCNC: 8.4 G/DL (ref 13.6–17.2)
IMM GRANULOCYTES # BLD: 0 K/UL (ref 0–0.5)
IMM GRANULOCYTES NFR BLD AUTO: 0 % (ref 0–5)
LYMPHOCYTES # BLD: 0.1 K/UL (ref 0.5–4.6)
LYMPHOCYTES NFR BLD: 3 % (ref 13–44)
MAGNESIUM SERPL-MCNC: 1.6 MG/DL (ref 1.8–2.4)
MCH RBC QN AUTO: 24.1 PG (ref 26.1–32.9)
MCHC RBC AUTO-ENTMCNC: 34.9 G/DL (ref 31.4–35)
MCV RBC AUTO: 69.1 FL (ref 79.6–97.8)
MONOCYTES # BLD: 0.1 K/UL (ref 0.1–1.3)
MONOCYTES NFR BLD: 4 % (ref 4–12)
NEUTS SEG # BLD: 3 K/UL (ref 1.7–8.2)
NEUTS SEG NFR BLD: 93 % (ref 43–78)
PLATELET # BLD AUTO: 138 K/UL (ref 150–450)
PMV BLD AUTO: ABNORMAL FL (ref 10.8–14.1)
POTASSIUM SERPL-SCNC: 4.3 MMOL/L (ref 3.5–5.1)
PROT SERPL-MCNC: 6.4 G/DL (ref 6.3–8.2)
RBC # BLD AUTO: 3.49 M/UL (ref 4.23–5.67)
SODIUM SERPL-SCNC: 141 MMOL/L (ref 136–145)
SPECIMEN EXP DATE BLD: NORMAL
STATUS OF UNIT,%ST: NORMAL
STATUS OF UNIT,%ST: NORMAL
UNIT DIVISION, %UDIV: 0
UNIT DIVISION, %UDIV: 0
URATE SERPL-MCNC: 1.6 MG/DL (ref 2.6–6)
WBC # BLD AUTO: 3.2 K/UL (ref 4.3–11.1)

## 2018-07-11 PROCEDURE — 77030037400 HC ADH TISS HI VISC EXOFIN CHMP -B

## 2018-07-11 PROCEDURE — 74011000250 HC RX REV CODE- 250: Performed by: PHYSICIAN ASSISTANT

## 2018-07-11 PROCEDURE — C1788 PORT, INDWELLING, IMP: HCPCS

## 2018-07-11 PROCEDURE — 74011000250 HC RX REV CODE- 250

## 2018-07-11 PROCEDURE — 77030031131 HC SUT MXN P COVD -B

## 2018-07-11 PROCEDURE — 36561 INSERT TUNNELED CV CATH: CPT

## 2018-07-11 PROCEDURE — 74011250636 HC RX REV CODE- 250/636: Performed by: NURSE PRACTITIONER

## 2018-07-11 PROCEDURE — 74011250637 HC RX REV CODE- 250/637: Performed by: NURSE PRACTITIONER

## 2018-07-11 PROCEDURE — 83735 ASSAY OF MAGNESIUM: CPT | Performed by: NURSE PRACTITIONER

## 2018-07-11 PROCEDURE — 84550 ASSAY OF BLOOD/URIC ACID: CPT | Performed by: NURSE PRACTITIONER

## 2018-07-11 PROCEDURE — 76060000033 HC ANESTHESIA 1 TO 1.5 HR

## 2018-07-11 PROCEDURE — 74011250636 HC RX REV CODE- 250/636

## 2018-07-11 PROCEDURE — 02H633Z INSERTION OF INFUSION DEVICE INTO RIGHT ATRIUM, PERCUTANEOUS APPROACH: ICD-10-PCS | Performed by: PHYSICIAN ASSISTANT

## 2018-07-11 PROCEDURE — 77030031139 HC SUT VCRL2 J&J -A

## 2018-07-11 PROCEDURE — 85025 COMPLETE CBC W/AUTO DIFF WBC: CPT | Performed by: NURSE PRACTITIONER

## 2018-07-11 PROCEDURE — 77030003560 HC NDL HUBR BARD -A

## 2018-07-11 PROCEDURE — 77030020263 HC SOL INJ SOD CL0.9% LFCR 1000ML

## 2018-07-11 PROCEDURE — 74011250636 HC RX REV CODE- 250/636: Performed by: PHYSICIAN ASSISTANT

## 2018-07-11 PROCEDURE — 74011250637 HC RX REV CODE- 250/637: Performed by: INTERNAL MEDICINE

## 2018-07-11 PROCEDURE — 80053 COMPREHEN METABOLIC PANEL: CPT | Performed by: NURSE PRACTITIONER

## 2018-07-11 PROCEDURE — 99232 SBSQ HOSP IP/OBS MODERATE 35: CPT | Performed by: INTERNAL MEDICINE

## 2018-07-11 PROCEDURE — 74011000258 HC RX REV CODE- 258: Performed by: PHYSICIAN ASSISTANT

## 2018-07-11 PROCEDURE — 36415 COLL VENOUS BLD VENIPUNCTURE: CPT | Performed by: NURSE PRACTITIONER

## 2018-07-11 PROCEDURE — C1894 INTRO/SHEATH, NON-LASER: HCPCS

## 2018-07-11 PROCEDURE — 65270000029 HC RM PRIVATE

## 2018-07-11 PROCEDURE — 0JH60WZ INSERTION OF TOTALLY IMPLANTABLE VASCULAR ACCESS DEVICE INTO CHEST SUBCUTANEOUS TISSUE AND FASCIA, OPEN APPROACH: ICD-10-PCS | Performed by: PHYSICIAN ASSISTANT

## 2018-07-11 RX ORDER — SODIUM CHLORIDE 0.9 % (FLUSH) 0.9 %
5-10 SYRINGE (ML) INJECTION AS NEEDED
Status: CANCELLED | OUTPATIENT
Start: 2018-07-11

## 2018-07-11 RX ORDER — HEPARIN SODIUM (PORCINE) LOCK FLUSH IV SOLN 100 UNIT/ML 100 UNIT/ML
300 SOLUTION INTRAVENOUS AS NEEDED
Status: DISCONTINUED | OUTPATIENT
Start: 2018-07-11 | End: 2018-07-12

## 2018-07-11 RX ORDER — PROPOFOL 10 MG/ML
INJECTION, EMULSION INTRAVENOUS
Status: DISCONTINUED | OUTPATIENT
Start: 2018-07-11 | End: 2018-07-11 | Stop reason: HOSPADM

## 2018-07-11 RX ORDER — SODIUM CHLORIDE, SODIUM LACTATE, POTASSIUM CHLORIDE, CALCIUM CHLORIDE 600; 310; 30; 20 MG/100ML; MG/100ML; MG/100ML; MG/100ML
125 INJECTION, SOLUTION INTRAVENOUS CONTINUOUS
Status: CANCELLED | OUTPATIENT
Start: 2018-07-11

## 2018-07-11 RX ORDER — MIDAZOLAM HYDROCHLORIDE 1 MG/ML
INJECTION, SOLUTION INTRAMUSCULAR; INTRAVENOUS AS NEEDED
Status: DISCONTINUED | OUTPATIENT
Start: 2018-07-11 | End: 2018-07-11 | Stop reason: HOSPADM

## 2018-07-11 RX ORDER — LIDOCAINE HYDROCHLORIDE 20 MG/ML
20-200 INJECTION, SOLUTION INFILTRATION; PERINEURAL ONCE
Status: ACTIVE | OUTPATIENT
Start: 2018-07-11 | End: 2018-07-11

## 2018-07-11 RX ORDER — HEPARIN SODIUM 200 [USP'U]/100ML
1000 INJECTION, SOLUTION INTRAVENOUS AS NEEDED
Status: DISCONTINUED | OUTPATIENT
Start: 2018-07-11 | End: 2018-07-12 | Stop reason: HOSPADM

## 2018-07-11 RX ORDER — SODIUM CHLORIDE, SODIUM LACTATE, POTASSIUM CHLORIDE, CALCIUM CHLORIDE 600; 310; 30; 20 MG/100ML; MG/100ML; MG/100ML; MG/100ML
INJECTION, SOLUTION INTRAVENOUS
Status: DISCONTINUED | OUTPATIENT
Start: 2018-07-11 | End: 2018-07-11 | Stop reason: HOSPADM

## 2018-07-11 RX ORDER — HEPARIN SODIUM (PORCINE) LOCK FLUSH IV SOLN 100 UNIT/ML 100 UNIT/ML
500 SOLUTION INTRAVENOUS ONCE
Status: COMPLETED | OUTPATIENT
Start: 2018-07-11 | End: 2018-07-11

## 2018-07-11 RX ORDER — FENTANYL 100 UG/H
1 PATCH TRANSDERMAL
Qty: 10 PATCH | Refills: 0 | Status: SHIPPED | OUTPATIENT
Start: 2018-07-12 | End: 2018-08-20 | Stop reason: SDUPTHER

## 2018-07-11 RX ORDER — SODIUM CHLORIDE 0.9 % (FLUSH) 0.9 %
5-10 SYRINGE (ML) INJECTION EVERY 8 HOURS
Status: CANCELLED | OUTPATIENT
Start: 2018-07-11

## 2018-07-11 RX ORDER — PROPOFOL 10 MG/ML
INJECTION, EMULSION INTRAVENOUS AS NEEDED
Status: DISCONTINUED | OUTPATIENT
Start: 2018-07-11 | End: 2018-07-11 | Stop reason: HOSPADM

## 2018-07-11 RX ORDER — LIDOCAINE HYDROCHLORIDE 20 MG/ML
INJECTION, SOLUTION EPIDURAL; INFILTRATION; INTRACAUDAL; PERINEURAL AS NEEDED
Status: DISCONTINUED | OUTPATIENT
Start: 2018-07-11 | End: 2018-07-11 | Stop reason: HOSPADM

## 2018-07-11 RX ORDER — SODIUM CHLORIDE, SODIUM LACTATE, POTASSIUM CHLORIDE, CALCIUM CHLORIDE 600; 310; 30; 20 MG/100ML; MG/100ML; MG/100ML; MG/100ML
125 INJECTION, SOLUTION INTRAVENOUS CONTINUOUS
Status: CANCELLED | OUTPATIENT
Start: 2018-07-11 | End: 2018-07-12

## 2018-07-11 RX ORDER — LIDOCAINE HYDROCHLORIDE 10 MG/ML
0.1 INJECTION INFILTRATION; PERINEURAL AS NEEDED
Status: CANCELLED | OUTPATIENT
Start: 2018-07-11

## 2018-07-11 RX ORDER — LIDOCAINE HYDROCHLORIDE AND EPINEPHRINE 20; 10 MG/ML; UG/ML
2-20 INJECTION, SOLUTION INFILTRATION; PERINEURAL ONCE
Status: COMPLETED | OUTPATIENT
Start: 2018-07-11 | End: 2018-07-11

## 2018-07-11 RX ADMIN — OXYCODONE HYDROCHLORIDE 30 MG: 15 TABLET ORAL at 19:07

## 2018-07-11 RX ADMIN — CARVEDILOL 6.25 MG: 6.25 TABLET, FILM COATED ORAL at 17:31

## 2018-07-11 RX ADMIN — PROPOFOL 30 MG: 10 INJECTION, EMULSION INTRAVENOUS at 09:30

## 2018-07-11 RX ADMIN — TORSEMIDE 20 MG: 20 TABLET ORAL at 11:48

## 2018-07-11 RX ADMIN — MIRTAZAPINE 15 MG: 15 TABLET, FILM COATED ORAL at 21:02

## 2018-07-11 RX ADMIN — ATORVASTATIN CALCIUM 20 MG: 10 TABLET, FILM COATED ORAL at 11:49

## 2018-07-11 RX ADMIN — STANDARDIZED SENNA CONCENTRATE AND DOCUSATE SODIUM 2 TABLET: 8.6; 5 TABLET, FILM COATED ORAL at 11:49

## 2018-07-11 RX ADMIN — HEPARIN SODIUM (PORCINE) LOCK FLUSH IV SOLN 100 UNIT/ML 500 UNITS: 100 SOLUTION at 10:05

## 2018-07-11 RX ADMIN — GENTAMICIN SULFATE 420 MG: 40 INJECTION, SOLUTION INTRAMUSCULAR; INTRAVENOUS at 10:00

## 2018-07-11 RX ADMIN — CHLORHEXIDINE GLUCONATE 15 ML: 1.2 RINSE ORAL at 11:48

## 2018-07-11 RX ADMIN — HEPARIN SODIUM 2000 UNITS: 200 INJECTION, SOLUTION INTRAVENOUS at 10:00

## 2018-07-11 RX ADMIN — OXYCODONE HYDROCHLORIDE 30 MG: 15 TABLET ORAL at 23:28

## 2018-07-11 RX ADMIN — CARVEDILOL 6.25 MG: 6.25 TABLET, FILM COATED ORAL at 07:49

## 2018-07-11 RX ADMIN — SODIUM CHLORIDE, SODIUM LACTATE, POTASSIUM CHLORIDE, CALCIUM CHLORIDE: 600; 310; 30; 20 INJECTION, SOLUTION INTRAVENOUS at 09:25

## 2018-07-11 RX ADMIN — MIDAZOLAM HYDROCHLORIDE 1 MG: 1 INJECTION, SOLUTION INTRAMUSCULAR; INTRAVENOUS at 09:25

## 2018-07-11 RX ADMIN — Medication 400 MG: at 21:02

## 2018-07-11 RX ADMIN — SODIUM CHLORIDE 900 MG: 900 INJECTION, SOLUTION INTRAVENOUS at 09:26

## 2018-07-11 RX ADMIN — LIDOCAINE HYDROCHLORIDE,EPINEPHRINE BITARTRATE 200 MG: 20; .01 INJECTION, SOLUTION INFILTRATION; PERINEURAL at 10:00

## 2018-07-11 RX ADMIN — Medication 400 MG: at 17:31

## 2018-07-11 RX ADMIN — SODIUM CHLORIDE 100 ML/HR: 900 INJECTION, SOLUTION INTRAVENOUS at 03:53

## 2018-07-11 RX ADMIN — Medication 500 MG: at 17:31

## 2018-07-11 RX ADMIN — PROPOFOL 50 MCG/KG/MIN: 10 INJECTION, EMULSION INTRAVENOUS at 09:30

## 2018-07-11 RX ADMIN — LIDOCAINE HYDROCHLORIDE 40 MG: 20 INJECTION, SOLUTION EPIDURAL; INFILTRATION; INTRACAUDAL; PERINEURAL at 09:30

## 2018-07-11 RX ADMIN — Medication 500 MG: at 11:48

## 2018-07-11 RX ADMIN — ALLOPURINOL 100 MG: 100 TABLET ORAL at 11:49

## 2018-07-11 RX ADMIN — LUBIPROSTONE 24 MCG: 24 CAPSULE, GELATIN COATED ORAL at 11:49

## 2018-07-11 RX ADMIN — CYANOCOBALAMIN TAB 1000 MCG 1000 MCG: 1000 TAB at 11:49

## 2018-07-11 RX ADMIN — Medication 400 MG: at 11:48

## 2018-07-11 NOTE — PROGRESS NOTES
Stopped by to check on patient. He is off the floor for port placement. Plan is for discharge later today. Will arrange for follow-up with palliative care at the [facility].

## 2018-07-11 NOTE — PROGRESS NOTES
END OF SHIFT NOTE:    Intake/Output      Voiding: YES  Catheter: NO  Drain:              Stool:  3 occurrences. Stool Assessment  Stool Color: Noa Yarely (07/11/18 1734)  Stool Appearance: Watery (07/11/18 1734)  Stool Amount: Medium (07/11/18 1734)  Stool Source/Status: Rectum (07/11/18 1734)    Emesis:  0 occurrences. VITAL SIGNS  Patient Vitals for the past 12 hrs:   Temp Pulse Resp BP SpO2   07/11/18 1915 98.1 °F (36.7 °C) 70 16 113/60 96 %   07/11/18 1534 97.8 °F (36.6 °C) 71 16 110/69 94 %   07/11/18 1101 - 69 13 142/85 100 %   07/11/18 1047 - 88 12 141/83 98 %   07/11/18 1042 - 69 9 144/85 100 %   07/11/18 1037 - 69 8 145/82 100 %   07/11/18 1032 - 74 8 151/84 100 %   07/11/18 1027 - 69 15 144/81 100 %   07/11/18 1022 - 69 12 142/78 100 %   07/11/18 1019 97 °F (36.1 °C) 70 12 142/73 99 %   07/11/18 1017 - 69 9 142/73 99 %   07/11/18 0821 98.1 °F (36.7 °C) 70 17 158/79 99 %       Pain Assessment  Pain 1  Pain Scale 1: Numeric (0 - 10) (07/11/18 1909)  Pain Intensity 1: 5 (07/11/18 1909)  Patient Stated Pain Goal: 0 (07/11/18 1909)  Pain Reassessment 1: Patient sleeping (07/11/18 0314)  Pain Onset 1: ongoing (07/11/18 1909)  Pain Location 1: Back (07/11/18 1909)  Pain Orientation 1: Upper (07/11/18 1909)  Pain Description 1: Aching; Sore (07/11/18 1909)  Pain Intervention(s) 1: Medication (see MAR) (07/11/18 1909)    Ambulating  Yes    Additional Information:   Port placed today, tolerated well. D/c tomorrow. Patient didn't require any pain medication today. Shift report given to oncoming nurse at the bedside.     Raul Douglas RN

## 2018-07-11 NOTE — PROGRESS NOTES
Mr. Kara Bamberger is off the floor for a procedure. Check later as time allows.   Elijah Arenas, PT

## 2018-07-11 NOTE — PROGRESS NOTES
TRANSFER - OUT REPORT:    Verbal report given to Jayda Bell RN (name) on Daria Blankenship  being transferred to [facility] [phone] (unit) for routine progression of care   After port placement. PT can restart Eliquis tomorrow. Report consisted of patients Situation, Background, Assessment and   Recommendations(SBAR). Information from the following report(s) Procedure Summary, Intake/Output, MAR and Recent Results was reviewed with the receiving nurse. Opportunity for questions and clarification was provided. Anesthia administered, ok with Dr. Karina Coffman to go back to room. Pt tolerated procedure well.      Visit Vitals    /85    Pulse 69    Temp 97 °F (36.1 °C)    Resp 13    Wt 82.9 kg (182 lb 11.2 oz)    SpO2 100%    BMI 23.46 kg/m2     Past Medical History:   Diagnosis Date    Arrhythmia     LBBB    Arthritis     BMI 30.0-30.9,adult     BMI 30.5    Cardiomyopathy ([facility] [address]     followed by [address] [place] [address] [address] Card[address]    Cholelithiases     Chronic systolic heart failure ([facility] [address] 9/13/2011    [place] [employer] class II-III heart failure symptoms    Gout     H/O: pituitary tumor     X2 benign, removed    High cholesterol     History of thyroid cancer     History of vertigo     no recent complications or episodes    Hurthle cell carcinoma of thyroid ([facility] [address]     Hx of radiation therapy to prostate 2004    Hyperlipidemia 11/18/2015    Hypertension     Hypothyroid     hypo- had portion of thyroid removed due to cancer    ICD (implantable cardiac defibrillator) in place 9/2011    [employer] BIV/ICD, Gen change 3.2.16    LBBB (left bundle branch block) 11/18/2015    Malaise and fatigue 11/18/2015    Malignant neoplasm of prostate (HCC)     Mass of colon     right colon mass    Multiple myeloma (HCC)     Sinus node dysfunction (HCC)      Peripheral IV 07/06/18 Left Forearm (Active)   Site Assessment Clean, dry, & intact 7/11/2018  7:48 AM   Phlebitis Assessment 0 7/11/2018  7:48 AM   Infiltration Assessment 0 7/11/2018  7:48 AM   Dressing Status New 7/11/2018 10:06 AM   Dressing Type Disk with Chlorhexadine gluconate (CHG); Transparent 7/11/2018 10:06 AM   Hub Color/Line Status Capped; Patent 7/11/2018 10:06 AM   Action Taken Other (comment) 7/7/2018  7:40 PM   Alcohol Cap Used No 7/11/2018  3:14 AM              Venous Access Device right chest port 07/11/18 Upper chest (subclavicular area, right (Active)   Date Accessed (Medial Site) 07/11/18 7/11/2018 10:06 AM   Access Time (Medial Site) 1006 7/11/2018 10:06 AM   Access Needle Size (Site #1) 20 G 7/11/2018 10:06 AM   Access Needle Length (Medial Site) 1 inch 7/11/2018 10:06 AM   Positive Blood Return (Medial Site) Yes 7/11/2018 10:06 AM

## 2018-07-11 NOTE — PROGRESS NOTES
PT awake, able to help change his gowns. Dr. Malini Sun contacted, ok with him to send pt back to floor.     PT given ginger ale per his request.

## 2018-07-11 NOTE — PROGRESS NOTES
TRANSFER - IN REPORT:    Verbal [facility] [employer]  being received from IR for routine post - op      Report consisted of patients Situation, Background, Assessment and   Recommendations(SBAR). Information from the following report(s) SBAR and Kardex was reviewed with the receiving nurse. Opportunity for questions and clarification was provided. Assessment will be completed upon patients arrival to unit and care will be assumed. Can Start Eliquis back tomorrow.

## 2018-07-11 NOTE — PROCEDURES
Department of Interventional Radiology 
[phone] Interventional Radiology Brief Procedure Note Patient: Aliyah Griggs MRN: [id number]  SSN: [id number] YOB: 1940  Age: 65 y.o. Sex: male Date of Procedure: 7/11/2018 Pre-Procedure Diagnosis: multiple myeloma Post-Procedure Diagnosis: SAME Procedure(s): Venous Chest [facility] Brief Description of Procedure: US, fluoro guided right IJ venous chest port placed Performed By: Robertha Galeazzi, PA-C Assistants: None Anesthesia:TIVS/MAC Estimated Blood Loss: Less than 10ml Specimens: None Implants: Subcutaneous Port Findings: catheter tip in right atrium Complications: None Recommendations: ok to use port Follow Up: referring MD 
 
Signed By: Robertha Galeazzi, PA-C July 11, 2018

## 2018-07-11 NOTE — PROGRESS NOTES
END OF SHIFT NOTE:  Pt was NPO past midnight. Pt will be having port placement this AM. No needs voiced. Intake/Output  07/10 1901 - 07/11 0700  In: -   Out: 375 [Urine:375]   Voiding: YES  Catheter: NO  Drain:              Stool:  0 occurrences. Stool Assessment  Stool Color: Gregorio Duel (07/10/18 2030)  Stool Appearance: Formed (07/10/18 2030)  Stool Amount: Medium (07/10/18 2030)  Stool Source/Status: Rectum (07/10/18 2030)    Emesis:  1 occurrences. VITAL SIGNS  Patient Vitals for the past 12 hrs:   Temp Pulse Resp BP SpO2   07/11/18 0349 98.1 °F (36.7 °C) 71 16 134/66 94 %   07/10/18 2242 98.1 °F (36.7 °C) 69 16 114/61 96 %   07/10/18 1948 98 °F (36.7 °C) 70 16 127/71 100 %       Pain Assessment  Pain 1  Pain Scale 1: Visual (07/11/18 0314)  Pain Intensity 1: 0 (07/11/18 0314)  Patient Stated Pain Goal: 0 (07/11/18 0314)  Pain Reassessment 1: Patient sleeping (07/11/18 0314)  Pain Onset 1: ongoing (07/09/18 1630)  Pain Location 1: Back (07/10/18 1909)  Pain Orientation 1: Upper (07/09/18 1039)  Pain Description 1: Aching;Constant (07/10/18 1909)  Pain Intervention(s) 1: Medication (see MAR) (07/10/18 1909)    Ambulating  Yes    Additional Information:     Shift report given to be given to oncoming nurse at the bedside.     Niecy Fajardo RN

## 2018-07-11 NOTE — INTERDISCIPLINARY ROUNDS
Interdisciplinary rounds completed at bedside. SW, NP, and charge nurse present. D/C tomorrow.  CM working to arrange home PT.

## 2018-07-11 NOTE — ANESTHESIA POSTPROCEDURE EVALUATION
Post-Anesthesia Evaluation and Assessment    Patient: Farrah Fagan MRN: [id number]  SSN: [id number]    YOB: 1940  Age: 65 y.o. Sex: male       Cardiovascular Function/Vital Signs  Visit Vitals    /83    Pulse 88    Temp 36.1 °C (97 °F)    Resp 12    Wt 82.9 kg (182 lb 11.2 oz)    SpO2 98%    BMI 23.46 kg/m2       Patient is status post MAC anesthesia for Procedure(s):  PORT INSERT/ ALEJANDRO TO DO/ 516. Nausea/Vomiting: None    Postoperative hydration reviewed and adequate. Pain:  Pain Scale 1: Numeric (0 - 10) (07/11/18 1047)  Pain Intensity 1: 0 (07/11/18 1047)   Managed    Neurological Status:   Neuro (WDL): Within Defined Limits (07/11/18 1047)  Neuro  Neurologic State: Alert; Appropriate for age (07/11/18 6088)  Orientation Level: Oriented X4 (07/11/18 4056)  Cognition: Appropriate decision making; Appropriate for age attention/concentration; Follows commands (07/11/18 0748)  Speech: Clear (07/11/18 0748)  LUE Motor Response: Purposeful (07/11/18 0748)  LLE Motor Response: Purposeful (07/11/18 0748)  RUE Motor Response: Purposeful (07/11/18 0748)  RLE Motor Response: Purposeful (07/11/18 0748)   At baseline    Mental Status and Level of Consciousness: Arousable    Pulmonary Status:   O2 Device: Nasal cannula (07/11/18 1022)   Adequate oxygenation and airway patent    Complications related to anesthesia: None    Post-anesthesia assessment completed.  No concerns    Signed By: Mike Larson MD     July 11, 2018

## 2018-07-11 NOTE — PROGRESS NOTES
[address] Hematology Oncology Inpatient Hematology / Oncology Progress Note Admission Date: 2018 12:46 PM 
Reason for Admission/Hospital Course: Multiple myeloma, remission status unspecified ([facility] [address] [C90.00] 24 Hour Events: 
Afebrile Port placed this morning - tolerated well, no discomfort Pain controlled Ambulating with PT  
 
ROS: 
Constitutional: Positive for weakness, fatigue. Negative for fever or chills. CV: Negative for chest pain, palpitations, edema. Respiratory: Negative for dyspnea, cough, wheezing. GI: Negative for abdominal pain, diarrhea, constipation. 10 point review of systems is otherwise negative with the exception of the elements mentioned above in the HPI. Allergies Allergen Reactions  Ace Inhibitors Other (comments)  Lisinopril Cough  Pcn [Penicillins] Swelling OBJECTIVE: 
Patient Vitals for the past 8 hrs: 
 BP Temp Pulse Resp SpO2  
18 1101 142/85 - 69 13 100 % 18 1047 141/83 - 88 12 98 % 18 1042 144/85 - 69 9 100 % 18 1037 145/82 - 69 8 100 % 18 1032 151/84 - 74 8 100 % 18 1027 144/81 - 69 15 100 % 18 1022 142/78 - 69 12 100 % 18 1019 142/73 97 °F (36.1 °C) 70 12 99 % 18 1017 142/73 - 69 9 99 % 18 0821 158/79 98.1 °F (36.7 °C) 70 17 99 % 18 0723 134/79 97.9 °F (36.6 °C) 71 16 99 % Temp (24hrs), Av.9 °F (36.6 °C), Min:97 °F (36.1 °C), Max:98.1 °F (36.7 °C) 
 
701 -  1900 In: 4775 [I.V.:1348] Out: 0 Physical Exam: 
Constitutional: Well developed, elderly male in no acute distress, sitting comfortably in the bed HEENT: Normocephalic and atraumatic. Oropharynx is clear, mucous membranes are moist. Extraocular muscles are intact. Sclerae anicteric. Neck supple. Skin Warm and dry. No bruising and no rash noted. No erythema. No pallor.    
Respiratory Lungs are clear to auscultation bilaterally without wheezes, rales or rhonchi, normal air exchange without accessory muscle use. CVS Normal rate, regular rhythm and normal S1 and S2. No murmurs, gallops, or rubs. Abdomen Soft, non-tender and nondistended, normoactive bowel sounds. No palpable mass. No hepatosplenomegaly. + hernia noted. Neuro Grossly nonfocal with no obvious sensory or motor deficits. MSK Normal range of motion in general.  No edema and no tenderness. Psych Appropriate mood and affect. Labs: 
   
Recent Labs  
   07/11/18 
 0340  07/10/18 
 0525  07/09/18 
 0457 WBC  3.2*  4.3  5.7  
RBC  3.49*  3.71*  3.57* HGB  8.4*  8.8*  8.6* HCT  24.1*  25.8*  24.7* MCV  69.1*  69.5*  69.2*  
MCH  24.1*  23.7*  24.1*  
MCHC  34.9  34.1  34.8  
RDW  17.1*  17.0*  17.2*  
PLT  138*  129*  140* GRANS  93*  87*  83* LYMPH  3*  6*  8*  
MONOS  4  7  9 EOS  0*  0*  0*  
BASOS  0  0  0 IG  0  0  0  
DF  AUTOMATED  AUTOMATED  AUTOMATED ANEU  3.0  3.7  4.7 ABL  0.1*  0.3*  0.4* ABM  0.1  0.3  0.5 REA  0.0  0.0  0.0 ABB  0.0  0.0  0.0 AIG  0.0  0.0  0.0 Recent Labs  
   07/11/18 
 0340  07/10/18 
 0525  07/09/18 
 0457 NA  141  142  141  
K  4.3  4.9  4.3 CL  110*  111*  110* CO2  22  23  22 AGAP  9  8  9 GLU  136*  94  112* BUN  40*  38*  36* CREA  1.38  1.48  1.67* GFRAA  >60  59*  51* GFRNA  53*  49*  43*  
CA  7.2*  7.6*  7.4* SGOT  52*  38*  32 AP  44*  45*  47* TP  6.4  6.7  6.7 ALB  2.3*  2.4*  2.3*  
GLOB  4.1*  4.3*  4.4* AGRAT  0.6*  0.6*  0.5* MG  1.6*  1.8  2.1 Imaging: XR ABD (KUB) [[id number]] Collected: 07/07/18 1430   
  Order Status: Completed Updated: 07/07/18 1433  
  Narrative:    
  [facility] CLINICAL INDICATION: Abdominal pain, constipation FINDINGS: Two supine views of the abdomen and pelvis show nonspecific bowel gas 
pattern with air noted over the rectum. No dilated loops of small bowel evident. Cholecystectomy clips are noted. Dwayne Saunas  Post kyphoplasty changes noted in the lumbar 
spine. An AICD is in place. 
  
  Impression:    
  IMPRESSION: No acute abdominal or pelvic abnormality.  
   
 
 
 
ASSESSMENT: 
 
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.2 ICD-9-CM: 227.3  3/15/2018 Thyroid mass ICD-10-CM: E07.9 ICD-9-CM: 246.9  2/8/2018 Sinus bradycardia ICD-10-CM: R00.1 ICD-9-CM: 427.89  1/12/2018 PAF (paroxysmal atrial fibrillation) ([facility]) ICD-10-CM: I48.0 ICD-9-CM: 427.31  11/26/2017 Chronic systolic CHF (congestive heart failure) ([facility]) ICD-10-CM: S45.90 ICD-9-CM: 428.22, 428.0  11/26/2017 Pain ICD-10-CM: R52 ICD-9-CM: 780.96  11/20/2017 MARY (acute kidney injury) ([facility] ICD-10-CM: N17.9 ICD-9-CM: 584.9  11/20/2017 Acute renal failure (ARF) ([facility]) ICD-10-CM: N17.9 ICD-9-CM: 584.9  11/18/2017 Intractable pain ICD-10-CM: Y99 ICD-9-CM: 780.96  1/11/2017 Acute kidney injury ([facility] ICD-10-CM: N17.9 ICD-9-CM: 584.9  1/11/2017 Pancytopenia ([facility] ICD-10-CM: H18.491 ICD-9-CM: 284.19  1/11/2017 Constipation ICD-10-CM: K59.00 ICD-9-CM: 564.00  1/11/2017 Multiple myeloma (HCC) ICD-10-CM: C90.00 ICD-9-CM: 203.00  1/2/2017 Postural imbalance ICD-10-CM: R29.3 ICD-9-CM: 729.90  12/14/2016 Polyneuropathy ICD-10-CM: G62.9 ICD-9-CM: 356.9  12/14/2016 Fall ICD-10-CM: W19. Yocasta [place] ICD-9-CM: T843.8  12/14/2016 Encounter for medication management ICD-10-CM: E42.013 ICD-9-CM: V58.69  12/14/2016 Urinary retention ICD-10-CM: R33.9 ICD-9-CM: 788.20  6/6/2016  Colonic mass ICD-10-CM: K63.9 ICD-9-CM: 569.89  3/23/2016 Hyperlipidemia ICD-10-CM: E78.5 ICD-9-CM: 272.4  11/18/2015 Vertigo ICD-10-CM: E48 ICD-9-CM: 780.4  11/18/2015 Malaise and fatigue ICD-10-CM: R53.81, R53.83 ICD-9-CM: 780.79  11/18/2015 Cardiomyopathy ([address] ICD-10-CM: I42.9 ICD-9-CM: 425.4  11/18/2015 LBBB (left bundle branch block) ICD-10-CM: I44.7 ICD-9-CM: 426.3  11/18/2015 Arthritis ICD-10-CM: M19.90 ICD-9-CM: 716.90  Unknown Arrhythmia ICD-10-CM: I49.9 ICD-9-CM: 427.9  Unknown Overview Signed 6/18/2013  2:07 PM by Valentín Guerrero MD  
  LBBB Cholelithiases ICD-10-CM: K80.20 ICD-9-CM: 574.20  Unknown Hx of radiation therapy to prostate ICD-10-CM: Z92.3 ICD-9-CM: V15.3  Unknown Chronic systolic heart failure (HCC) ICD-10-CM: I50.22 ICD-9-CM: 428.22  9/13/2011 Automatic implantable cardioverter-defibrillator in situ ICD-10-CM: Z95.810 ICD-9-CM: V45.02  9/1/2011 Mr. Solis Norris is a 65 y.o. male admitted on 7/6/2018 with a primary diagnosis of MARY and intractable pain.  
  
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 During recent admission he had kyphoplasty due to worsening lumbar compression fracture and then went to rehab for 3 weeks but was not allowed to take his Revlimid/Dex during that time. He was seen in the office on 6/25 with increased back and abdominal pain, fatigue and hypercalcemia that responded well to Zometa. He was restarted on Rev/Dex and was brought in today for follow up. He continues to rate his pain as 10/10, but is not taking OxyIR for breakthrough due to constipation. His PCP switched him from [address] [place] [address] to [facility]-sample given for constipation. He states last BM 1 week ago, passing gas, no nausea or vomiting. He only took 1 tabs of Oxy yesterday.   He has lost another 10 pounds since last visit due to poor appetite. He admits to only drinking 2 16 oz bottles of water daily. He is utilizing a walker today and complains of increased fatigue and weakness. Labs obtained, Na+ 132, K+ 3.3, Creatinine up to 2.76, Mg+ 1.3. Ca+ stable at 7.9. He will be admitted for MARY, intractable pain and to receive supportive care. Of note, his wife is currently an inpatient due to a bowel obstruction. PLAN: 
Multiple Myeloma 
-hold Revlimid, may need to start Cytoxan 
07/07 Start Cytoxan 2000 mg x 1 today and pulse dose steroids, 40 mg Decadron daily x 4 days. Plan to start oral ixazomib and daratumumab in 2-3 weeks. 07/08 Tolerated well.   
  
MARY 
-IV hydration (NS @100ml/hr), closely monitor Cr and for fluid overload 
 07/07 Uric acid > 10. Give rasburicase x 1. Start allopurinol daily. 07/08 Uric acid down to 3.6. Creatinine continues to improve. 07/09 creatinine 1.67 
07/10 Cr 1.48 Resolved Constipation 
-pericolace BID, amitiza, lactulose prn 
07/07 Abdominal pain and cramping, obtain KUB. 07/08 KUB negative. Patient may take his own Amitiza. 7/9 constipation resolved 
  
Pain-Abdominal/Back 
-Increase fentanyl patch to 100 mcg, Oxy IR 30 mg Q4h prn  
7/11 Tolerating current regimen with good control 
  
Increased weakness 
-consult PT/OT 
7/11 ambulated with PT yesterday. OK for discharge home 
  
Electrolyte Imbalances 
-replace per Steven SOPs 
  
Poor appetite 
-Remeron 15 mg 
  
Supportive care Follow Steven SOPs Currently on Eliquis 5 mg BID, apply SCDs for DVT prophylaxis Disposition: Discharge home tomorrow Chester Simmonds, NP [facility] [address] [address] Office : [phone] Fax : [phone] Patient seen, examed and discussed with NP, agree with above history/assessment/plan.  65 y.o.male h/o colon and thyroid cancer, MM since 2016, recent fracture and NH stay interrupted rx for 2 months then developed rapid disease progression with TLS and MARY, responded as above managed, continue rx and arrange next line outpt, placed port and plan for placement. 
Kayla Moser M.D. [address] Office : [phone] Fax : [phone]

## 2018-07-11 NOTE — ANESTHESIA PREPROCEDURE EVALUATION
Anesthetic History   No history of anesthetic complications            Review of Systems / Medical History  Patient summary reviewed, nursing notes reviewed and pertinent labs reviewed    Pulmonary  Within defined limits                 Neuro/Psych   Within defined limits           Cardiovascular    Hypertension: well controlled      CHF  Dysrhythmias   Pacemaker (ICD/pacer) and hyperlipidemia    Exercise tolerance: <4 METS  Comments: Cardiomyopathy  LBBB  EF 30-35% 12/16    ICD never discharged; fully paced   GI/Hepatic/Renal  Within defined limits              Endo/Other      Hypothyroidism (s/p thyroidectomy for ca): well controlled  Obesity, arthritis, cancer (prostate, thyroid and colon; multiple myeloma) and anemia     Other Findings   Comments: Vertigo  Gout  thrombocytopenia         Physical Exam    Airway  Mallampati: II  TM Distance: 4 - 6 cm  Neck ROM: normal range of motion   Mouth opening: Normal    Comments: Draining left neck wound Cardiovascular    Rhythm: irregular  Rate: abnormal         Dental  No notable dental hx       Pulmonary  Breath sounds clear to auscultation               Abdominal  GI exam deferred       Other Findings            Anesthetic Plan    ASA: 4  Anesthesia type: total IV anesthesia            Anesthetic plan and risks discussed with: Patient

## 2018-07-12 VITALS
HEART RATE: 84 BPM | SYSTOLIC BLOOD PRESSURE: 105 MMHG | DIASTOLIC BLOOD PRESSURE: 57 MMHG | BODY MASS INDEX: 23.28 KG/M2 | TEMPERATURE: 97.5 F | WEIGHT: 181.3 LBS | OXYGEN SATURATION: 98 % | RESPIRATION RATE: 18 BRPM

## 2018-07-12 LAB
ALBUMIN SERPL-MCNC: 2.7 G/DL (ref 3.2–4.6)
ALBUMIN/GLOB SERPL: 0.6 {RATIO} (ref 1.2–3.5)
ALP SERPL-CCNC: 52 U/L (ref 50–136)
ALT SERPL-CCNC: 53 U/L (ref 12–65)
ANION GAP SERPL CALC-SCNC: 8 MMOL/L (ref 7–16)
AST SERPL-CCNC: 60 U/L (ref 15–37)
BASOPHILS # BLD: 0 K/UL (ref 0–0.2)
BASOPHILS NFR BLD: 0 % (ref 0–2)
BILIRUB SERPL-MCNC: 0.8 MG/DL (ref 0.2–1.1)
BUN SERPL-MCNC: 31 MG/DL (ref 8–23)
CALCIUM SERPL-MCNC: 7.7 MG/DL (ref 8.3–10.4)
CHLORIDE SERPL-SCNC: 107 MMOL/L (ref 98–107)
CO2 SERPL-SCNC: 27 MMOL/L (ref 21–32)
CREAT SERPL-MCNC: 1.31 MG/DL (ref 0.8–1.5)
DIFFERENTIAL METHOD BLD: ABNORMAL
EOSINOPHIL # BLD: 0 K/UL (ref 0–0.8)
EOSINOPHIL NFR BLD: 1 % (ref 0.5–7.8)
ERYTHROCYTE [DISTWIDTH] IN BLOOD BY AUTOMATED COUNT: 17 % (ref 11.9–14.6)
GLOBULIN SER CALC-MCNC: 4.3 G/DL (ref 2.3–3.5)
GLUCOSE SERPL-MCNC: 86 MG/DL (ref 65–100)
HCT VFR BLD AUTO: 27.8 % (ref 41.1–50.3)
HGB BLD-MCNC: 9.7 G/DL (ref 13.6–17.2)
IMM GRANULOCYTES # BLD: 0 K/UL (ref 0–0.5)
IMM GRANULOCYTES NFR BLD AUTO: 1 % (ref 0–5)
LYMPHOCYTES # BLD: 0.1 K/UL (ref 0.5–4.6)
LYMPHOCYTES NFR BLD: 9 % (ref 13–44)
MAGNESIUM SERPL-MCNC: 1.5 MG/DL (ref 1.8–2.4)
MCH RBC QN AUTO: 24.4 PG (ref 26.1–32.9)
MCHC RBC AUTO-ENTMCNC: 34.9 G/DL (ref 31.4–35)
MCV RBC AUTO: 69.8 FL (ref 79.6–97.8)
MONOCYTES # BLD: 0 K/UL (ref 0.1–1.3)
MONOCYTES NFR BLD: 1 % (ref 4–12)
NEUTS SEG # BLD: 1.3 K/UL (ref 1.7–8.2)
NEUTS SEG NFR BLD: 88 % (ref 43–78)
PLATELET # BLD AUTO: 145 K/UL (ref 150–450)
PMV BLD AUTO: 9.7 FL (ref 10.8–14.1)
POTASSIUM SERPL-SCNC: 3.7 MMOL/L (ref 3.5–5.1)
PROT SERPL-MCNC: 7 G/DL (ref 6.3–8.2)
RBC # BLD AUTO: 3.98 M/UL (ref 4.23–5.67)
SODIUM SERPL-SCNC: 142 MMOL/L (ref 136–145)
URATE SERPL-MCNC: 1.5 MG/DL (ref 2.6–6)
WBC # BLD AUTO: 2.1 K/UL (ref 4.3–11.1)

## 2018-07-12 PROCEDURE — 84550 ASSAY OF BLOOD/URIC ACID: CPT | Performed by: NURSE PRACTITIONER

## 2018-07-12 PROCEDURE — 74011250637 HC RX REV CODE- 250/637: Performed by: INTERNAL MEDICINE

## 2018-07-12 PROCEDURE — 83735 ASSAY OF MAGNESIUM: CPT | Performed by: NURSE PRACTITIONER

## 2018-07-12 PROCEDURE — 99239 HOSP IP/OBS DSCHRG MGMT >30: CPT | Performed by: INTERNAL MEDICINE

## 2018-07-12 PROCEDURE — 74011250637 HC RX REV CODE- 250/637: Performed by: NURSE PRACTITIONER

## 2018-07-12 PROCEDURE — 74011250636 HC RX REV CODE- 250/636: Performed by: INTERNAL MEDICINE

## 2018-07-12 PROCEDURE — 36591 DRAW BLOOD OFF VENOUS DEVICE: CPT

## 2018-07-12 PROCEDURE — 80053 COMPREHEN METABOLIC PANEL: CPT | Performed by: NURSE PRACTITIONER

## 2018-07-12 PROCEDURE — 85025 COMPLETE CBC W/AUTO DIFF WBC: CPT | Performed by: NURSE PRACTITIONER

## 2018-07-12 PROCEDURE — 97530 THERAPEUTIC ACTIVITIES: CPT

## 2018-07-12 RX ORDER — LANOLIN ALCOHOL/MO/W.PET/CERES
400 CREAM (GRAM) TOPICAL 4 TIMES DAILY
Qty: 60 TAB | Refills: 0 | Status: SHIPPED | OUTPATIENT
Start: 2018-07-12 | End: 2018-08-10 | Stop reason: SDUPTHER

## 2018-07-12 RX ORDER — AMOXICILLIN 250 MG
2 CAPSULE ORAL 2 TIMES DAILY
Qty: 60 TAB | Refills: 1 | Status: SHIPPED | OUTPATIENT
Start: 2018-07-12 | End: 2018-08-10 | Stop reason: SDUPTHER

## 2018-07-12 RX ORDER — MAGNESIUM SULFATE HEPTAHYDRATE 40 MG/ML
4 INJECTION, SOLUTION INTRAVENOUS ONCE
Status: COMPLETED | OUTPATIENT
Start: 2018-07-12 | End: 2018-07-12

## 2018-07-12 RX ORDER — HEPARIN 100 UNIT/ML
300 SYRINGE INTRAVENOUS AS NEEDED
Status: DISCONTINUED | OUTPATIENT
Start: 2018-07-12 | End: 2018-07-12 | Stop reason: HOSPADM

## 2018-07-12 RX ADMIN — OXYCODONE HYDROCHLORIDE 30 MG: 15 TABLET ORAL at 03:29

## 2018-07-12 RX ADMIN — Medication 500 MG: at 13:48

## 2018-07-12 RX ADMIN — APIXABAN 5 MG: 5 TABLET, FILM COATED ORAL at 07:47

## 2018-07-12 RX ADMIN — OXYCODONE HYDROCHLORIDE 30 MG: 15 TABLET ORAL at 13:48

## 2018-07-12 RX ADMIN — LUBIPROSTONE 24 MCG: 24 CAPSULE, GELATIN COATED ORAL at 07:54

## 2018-07-12 RX ADMIN — Medication 400 MG: at 07:47

## 2018-07-12 RX ADMIN — PANTOPRAZOLE SODIUM 40 MG: 40 TABLET, DELAYED RELEASE ORAL at 07:46

## 2018-07-12 RX ADMIN — TORSEMIDE 20 MG: 20 TABLET ORAL at 07:54

## 2018-07-12 RX ADMIN — ATORVASTATIN CALCIUM 20 MG: 10 TABLET, FILM COATED ORAL at 07:45

## 2018-07-12 RX ADMIN — ALLOPURINOL 100 MG: 100 TABLET ORAL at 07:46

## 2018-07-12 RX ADMIN — MAGNESIUM SULFATE IN WATER 4 G: 40 INJECTION, SOLUTION INTRAVENOUS at 07:54

## 2018-07-12 RX ADMIN — Medication 500 MG: at 07:46

## 2018-07-12 RX ADMIN — Medication 400 MG: at 13:48

## 2018-07-12 RX ADMIN — STANDARDIZED SENNA CONCENTRATE AND DOCUSATE SODIUM 2 TABLET: 8.6; 5 TABLET, FILM COATED ORAL at 07:45

## 2018-07-12 RX ADMIN — OXYCODONE HYDROCHLORIDE 30 MG: 15 TABLET ORAL at 07:43

## 2018-07-12 RX ADMIN — CHLORHEXIDINE GLUCONATE 15 ML: 1.2 RINSE ORAL at 07:48

## 2018-07-12 RX ADMIN — LEVOTHYROXINE SODIUM 137 MCG: 50 TABLET ORAL at 07:45

## 2018-07-12 RX ADMIN — CARVEDILOL 6.25 MG: 6.25 TABLET, FILM COATED ORAL at 07:50

## 2018-07-12 RX ADMIN — CYANOCOBALAMIN TAB 1000 MCG 1000 MCG: 1000 TAB at 07:46

## 2018-07-12 NOTE — PROGRESS NOTES
Problem: Pressure Injury - Risk of  Goal: *Prevention of pressure injury  Document Mervin Scale and appropriate interventions in the flowsheet. Outcome: Progressing Towards Goal  Pressure Injury Interventions:  Sensory Interventions: Maintain/enhance activity level    Moisture Interventions: Absorbent underpads    Activity Interventions: Increase time out of bed    Mobility Interventions: Pressure redistribution bed/mattress (bed type)    Nutrition Interventions: Document food/fluid/supplement intake    Friction and Shear Interventions: Apply protective barrier, creams and emollients               Problem: Falls - Risk of  Goal: *Absence of Falls  Document Bee Fall Risk and appropriate interventions in the flowsheet.    Outcome: Progressing Towards Goal  Fall Risk Interventions:  Mobility Interventions: Communicate number of staff needed for ambulation/transfer         Medication Interventions: Teach patient to arise slowly    Elimination Interventions: Call light in reach

## 2018-07-12 NOTE — PROGRESS NOTES
[facility] confirmed fax was received at 3:12 PM  7/12/18  ---------------------------------------------------------------------------------------------------------------------------------------------------  SW is following for discharge time and will fax resumption orders to [facility]. Orders will consist of PT, and RN and faxed with discharge summary to [facility].

## 2018-07-12 NOTE — PROGRESS NOTES
Discharge instructions and prescriptions given and reviewed with pt, verbalizes understanding, pt discharged home with family. Awaiting for family at transportation.

## 2018-07-12 NOTE — PROGRESS NOTES
END OF SHIFT NOTE:    Patient rested well. PRN pain meds given x3 with good results. Planning for DC today, home with home PT. Would like to have Fentanyl patch changed prior to DC, due to be changed today at 1300. Also has some concerns regarding pain once he is home, did not require IV pain meds this shift. Intake/Output  07/11 1901 - 07/12 0700  In: 400 [P.O.:400]  Out: 475 [Urine:475]   Voiding: YES  Catheter: NO  Drain:              Stool:  0 occurrences. Stool Assessment  Stool Color: Drucilla Bailey (07/11/18 1734)  Stool Appearance: Watery (07/11/18 1734)  Stool Amount: Medium (07/11/18 1734)  Stool Source/Status: Rectum (07/11/18 1734)    Emesis:  0 occurrences. VITAL SIGNS  Patient Vitals for the past 12 hrs:   Temp Pulse Resp BP SpO2   07/12/18 0424 97.7 °F (36.5 °C) 73 16 119/70 98 %   07/11/18 2330 98.1 °F (36.7 °C) 70 16 138/76 96 %   07/11/18 1915 98.1 °F (36.7 °C) 70 16 113/60 96 %       Pain Assessment  Pain 1  Pain Scale 1: Visual (07/12/18 0424)  Pain Intensity 1: 0 (07/12/18 0424)  Patient Stated Pain Goal: 0 (07/12/18 0424)  Pain Reassessment 1: Patient sleeping (07/12/18 0424)  Pain Onset 1: ongoing (07/12/18 0338)  Pain Location 1: Back (07/12/18 0338)  Pain Orientation 1: Upper (07/12/18 1351)  Pain Description 1: Aching; Sore (07/12/18 0338)  Pain Intervention(s) 1: Medication (see MAR) (07/12/18 1057)    Ambulating  Yes    Additional Information:     Shift report given to oncoming nurse, Ally Currie RN at the bedside.     Tito Johnson

## 2018-07-12 NOTE — DISCHARGE SUMMARY
[employer] [facility]: Inpatient Hematology / Oncology Discharge Summary Note Patient ID: Dennis April [id number] 
43 y.o. 
1940 Admit Date: 7/6/2018 Discharge Date: 7/12/2018 Admission Diagnoses: Multiple myeloma, remission status unspecified ([facility] [C90.00] Discharge Diagnoses: 
Principal Diagnosis: <principal problem not specified> Active Problems: 
  Intractable pain (1/11/2017) MARY (acute kidney injury) ([facility] (11/20/2017) Hospital Course: Mr. Evangelist White is a 65 y.o. male admitted on 7/6/2018 with a primary diagnosis of MARY and intractable pain. 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 During recent admission he had kyphoplasty due to worsening lumbar compression fracture and then went to rehab for 3 weeks but was not allowed to take his Revlimid/Dex during that time. He was seen in the office on 6/25 with increased back and abdominal pain, fatigue and hypercalcemia that responded well to Zometa. He was restarted on Rev/Dex and was brought in today for follow up. He continues to rate his pain as 10/10, but is not taking OxyIR for breakthrough due to constipation. His PCP switched him from [address] [place] [address] to [address] [place] [address] yesterday-sample given for constipation. He states last BM 1 week ago, passing gas, no nausea or vomiting. He has lost another 10 pounds since last visit due to poor appetite. He admits to only drinking 2 16 oz bottles of water daily. He is utilizing a walker today and complains of increased fatigue and weakness. Labs obtained, Na+ 132, K+ 3.3, Creatinine up to 2.76, Mg+ 1.3. Ca+ stable at 7.9. He was admitted for MARY, intractable pain and to receive supportive care. Myeloma labs from 6/25 showed progressive disease.  Revlimid was discontinued and he was treated with cytoxan 2000mg x1 on 7/7 with pulse dose dex daily x 4 days with plans to start oral ixazomib and daratumumab in 2-3 weeks. He was found to have elevated uric acid and received rasburicase. His uric acid is now 1.5 and creatinine has normalized at 1.3. He had a KUB for constipation but has improved with bowel regimen adjustment. His fentanyl was increased to 100mcg (from 75mcg) with oxy IR for breakthrough. His back pain is well controlled with these doses. PT/OT were consulted and he was deemed safe for home. He had a port placed on 7/11. He will resume his eliquis at home but will need to hold if plt count <50,000. His WBC is 2.1 and ANC 1.3 and trending down. Encouraged to call with any fevers, chills or new, worrisome symptoms. He has follow up with Dr Denise Schneider on 7/23 and will have labs/replacements in 1 week.  
  
 
Consults: 
IP CONSULT TO INTERVENTIONAL RADIOLOGY 
IP CONSULT TO PALLIATIVE CARE - PROVIDER Pertinent Diagnostic Studies:  
Labs:   
Recent Labs  
   07/12/18 
 0339 07/11/18 
 0340  07/10/18 
 0525 WBC  2.1*  3.2*  4.3 HGB  9.7*  8.4*  8.8*  
PLT  145*  138*  129* ANEU  1.3*  3.0  3.7 Recent Labs  
   07/12/18 
 0339  07/11/18 
 0340  07/10/18 
 4835 NA  142  141  142  
K  3.7  4.3  4.9 CL  107  110*  111* CO2  27  22  23 GLU  86  136*  94 BUN  31*  40*  38* CREA  1.31  1.38  1.48  
CA  7.7*  7.2*  7.6* SGOT  60*  52*  38* AP  52  44*  45* TP  7.0  6.4  6.7 ALB  2.7*  2.3*  2.4* MG  1.5*  1.6*  1.8 Imaging: 
IR INSERT TUNL CVC W PORT OVER 5 YEARS [[id number]] Collected: 07/11/18 1237   
  Order Status: Completed Updated: 07/11/18 1239  
  Narrative:    
  Title:  Chest port placement. History: 54-year-old male with multiple myeloma : Tonja Gonzalez PA-C Supervising Physician: Yelitza Navarro M.D.  
 
Consent: Informed written and oral consent was obtained from the patient after 
explanation of benefits and risks (including, but not limited to: infection, 
vascular injury, lung injury, and thrombus formation) of procedure. The 
patient's questions were answered to his satisfaction. He stated understanding 
and requested that we proceed. Procedure: Maximal sterile barrier technique employed utilizing hat, facemask, 
hand hygiene, sterile gown, sterile gloves, sterile field drapes, cutaneous skin 
antisepsis, sterile ultrasound probe cover and sterile ultrasound gel. Following routine prep and drape of the right neck and chest, a local field 
block with lidocaine was achieved.  Ultrasound evaluation of all potential 
access sites was performed due to lack of a palpable vein. The vein was not 
palpable due to overlying adipose tissue. Using real-time ultrasound guidance, 
with appropriate image recording, the patent right internal jugular vein was 
accessed. Using fluoroscopy, a peel-away sheath was placed over a wire. A 1-inch incision was made on the right chest wall and a subcutaneous pocket 
created. The catheter was tunneled subcutaneously and passed down the peel-away 
sheath positioning the tip in the right atrium, confirmed fluoroscopically. The 
catheter was cut to the appropriate length and connected to the Harle Constable which was 
then placed in the pocket.  The Port-A-Cath was accessed, aspirated easily, and 
was filled with heparinized saline. All incisions were closed with absorbable suture. Dermabond was placed on the 
skin. Complications: None. Radiation dose:  230.9 cGy. cm2 Dose Area Product. Fluoroscopy time: 30 seconds. 0 Prolene images were obtained Contrast:  0 milliliters. Medications:  45   clindamycin, gentamicin were given for prophylactic 
antibiotic therapy.  Continuous cardiorespiratory monitoring was employed 
throughout. 
  
  Impression:    
  Impression: Uncomplicated right internal jugular vein tunneled power injectable 
chest port placement. Plan: The patient will recover for 1 hour.  The chest port is ready for use. 
 
  
  XR SPINE THORAC 3 V [[id number]] Collected: 07/09/18 1825  
  Order Status: Completed Updated: 07/09/18 1828  
  Narrative:    
  Thoracic Spine INDICATION: Back pain AP and lateral views of the thoracic spine were obtained FINDINGS: There is a T12 compression fracture just above the L1 kyphoplasty. This is probably stable compared to old films. Valerie Hatchet is no evidence of acute 
fracture or subluxation. The vertebrae are well aligned. No bony lesions are 
seen. 
  
  Impression:    
  IMPRESSION: Stable T12 compression fracture.  No definite acute finding. 
  
  XR CHEST PA LAT [[id number]] Collected: 07/09/18 1823  
  Order Status: Completed Updated: 07/09/18 1827  
  Narrative:    
  Chest X-ray INDICATION:  Chest and back pain PA and lateral views of the chest were obtained. FINDINGS: The lungs are clear. There are no infiltrates or effusions. Valerie Hatchet is 
stable cardiomegaly.  Pacemaker is present. Valerie Hatchet are kyphoplasty changes in 
the lumbar spine.  There is stable compression of T12. 
  
  Impression:    
  IMPRESSION: No acute findings in the chest 
  
  XR ABD (KUB) [[id number]] Collected: 07/07/18 1430  
  Order Status: Completed Updated: 07/07/18 1433  
  Narrative:    
  KUB CLINICAL INDICATION: Abdominal pain, constipation FINDINGS: Two supine views of the abdomen and pelvis show nonspecific bowel gas 
pattern with air noted over the rectum. No dilated loops of small bowel evident. Cholecystectomy clips are noted. Betha Lute Post kyphoplasty changes noted in the lumbar 
spine. An AICD is in place. 
  
  Impression:    
  IMPRESSION: No acute abdominal or pelvic abnormality. Current Discharge Medication List  
  
START taking these medications Details  
magnesium oxide (MAG-OX) 400 mg tablet Take 1 Tab by mouth four (4) times daily. Qty: 60 Tab, Refills: 0  
  
senna-docusate (PERICOLACE) 8.6-50 mg per tablet Take 2 Tabs by mouth two (2) times a day.  
Qty: 60 Tab, Refills: 1  
  
fentaNYL (DURAGESIC) 100 mcg/hr PATCH 1 Patch by TransDERmal route every seventy-two (72) hours. Max Daily Amount: 1 Patch. Qty: 10 Patch, Refills: 0 Associated Diagnoses: Intractable pain; [facility] myel[facility] in relapse ([facility] CONTINUE these medications which have CHANGED Details  
ixazomib 2.3 mg cap Take 1 Cap by mouth every seven (7) days. Qty: 4 Cap, Refills: 0 CONTINUE these medications which have NOT CHANGED Details  
atorvastatin (LIPITOR) 20 mg tablet Take 1 Tab by mouth daily. Qty: 90 Tab, Refills: 3  
  
apixaban (ELIQUIS) 5 mg tablet Take 5 mg by mouth two (2) times a day. amino ac/whey prot conc, isol (WHEY PROTEIN PO) Take  by mouth.  
  
mirtazapine (REMERON) 15 mg tablet Take 1 Tab by mouth nightly. Qty: 30 Tab, Refills: 6 Associated Diagnoses: Decreased appetite  
  
lubiPROStone (AMITIZA) 24 mcg capsule Take 1 Cap by mouth two (2) times daily (with meals) for 30 days. Qty: 24 Cap, Refills: 0 Associated Diagnoses: Constipation, unspecified constipation type  
  
potassium chloride SA (MICRO-K) 10 mEq capsule Take 1 Cap by mouth two (2) times a day. Qty: 180 Cap, Refills: 3  
  
docosanol (ABREVA) 10 % topical cream Apply  to affected area five (5) times daily. oxyCODONE IR (ROXICODONE) 30 mg immediate release tablet Take 1 Tab by mouth every four (4) hours as needed for Pain. Max Daily Amount: 180 mg. 
Qty: 180 Tab, Refills: 0 Associated Diagnoses: Closed compression fracture of L2 lumbar vertebra with routine healing, subsequent encounter; Multiple myeloma, remission status unspecified ([facility] cromolyn sodium (CROMOLYN NA) by Nasal route. fentaNYL (DURAGESIC) 75 mcg/hr 1 Patch by TransDERmal route every seventy-two (72) hours. Max Daily Amount: 1 Patch. Qty: 5 Patch, Refills: 0 Associated Diagnoses: Closed compression fracture of L2 lumbar vertebra with routine healing, subsequent encounter; Multiple myeloma not having achieved remission ([facility] levothyroxine (SYNTHROID) 137 mcg tablet Take 137 mcg by mouth Daily (before breakfast). Qty: 90 Tab, Refills: 3 Associated Diagnoses: Postsurgical hypothyroidism  
  
carvedilol (COREG) 6.25 mg tablet Take 1 Tab by mouth two (2) times daily (with meals). Qty: 60 Tab, Refills: 0  
  
torsemide (DEMADEX) 20 mg tablet Take 1 Tab by mouth daily. Indications: Edema, Pulmonary Edema due to Chronic Heart Failure Qty: 30 Tab, Refills: 11  
 Associated Diagnoses: Chronic systolic heart failure ([address] Localized edema  
  
chlorhexidine (PERIDEX) 0.12 % solution 15 mL by Swish and Spit route two (2) times a day. omeprazole (PRILOSEC) 20 mg capsule Take 20 mg by mouth daily. cyanocobalamin (VITAMIN B-12) 1,000 mcg sublingual tablet Take 1,000 mcg by mouth daily. cholecalciferol, vitamin D3, (VITAMIN D3) 2,000 unit tab Take  by mouth. STOP taking these medications  
  
 lenalidomide (REVLIMID) 10 mg cap Comments:  
Reason for Stopping: OBJECTIVE: 
Patient Vitals for the past 8 hrs: 
 BP Temp Pulse Resp SpO2  
18 1214 105/57 97.5 °F (36.4 °C) 84 18 98 % 18 0803 (!) 128/92 97.7 °F (36.5 °C) 75 18 97 % 18 0750 (!) 128/92 - 77 - - Temp (24hrs), Av.8 °F (36.6 °C), Min:97.5 °F (36.4 °C), Max:98.1 °F (36.7 °C) 
 
 07 -  1900 In: 360 [P.O.:360] Out: - Physical Exam: 
Constitutional: Well developed, well nourished male in no acute distress, lying in hospital bed HEENT: Normocephalic and atraumatic. Oropharynx is clear, mucous membranes are moist.   Neck supple Lymph node Deferred Skin Warm and dry. No bruising and no rash noted. No erythema. No pallor. Respiratory Lungs are clear to auscultation bilaterally without wheezes, rales or rhonchi, normal air exchange without accessory muscle use. CVS Normal rate, regular rhythm and normal S1 and S2. No murmurs, gallops, or rubs.   
Abdomen Soft, nontender and nondistended, normoactive bowel sounds. No palpable mass. No hepatosplenomegaly. Neuro Grossly nonfocal with no obvious sensory or motor deficits. MSK Normal range of motion in general.  No edema and no tenderness. Psych Appropriate mood and affect. ASSESSMENT: 
 
Active Problems: 
  Intractable pain (1/11/2017) MARY (acute kidney injury) ([facility] (11/20/2017) DISPOSITION: 
Follow-up Appointments Procedures  FOLLOW UP VISIT Appointment in: One Week - F/u 7/23 with Dr. Cathy Huber as scheduled - Labs 1 week with replacements in infusion as needed - F/u 7/23 with Dr. Cathy Huber as scheduled - Labs 1 week with replacements in infusion as needed Standing Status:   Standing Number of Occurrences:   1 Order Specific Question:   Appointment in Answer: One Week Over 30 minutes was spent in discharge planning and coordination of care. Daniela Tao NP [facility] Hematology & Oncology [address] [place] [address] Office : [phone] Fax : [phone] Patient seen, examed and discussed with NP, agree with above history/assessment/plan. 65 y.o.male h/o colon and thyroid cancer, MM since 2016, recent fracture and NH stay interrupted rx for 2 months then developed rapid disease progression with tumor lysis syndrome and MARY, responded as above managed, continue rx and arrange next line outpt, placed port and DC as above. 
Praveena Castillo M.D. [facility] [id number] [place] [address] [address] Office : [phone] Fax : [phone]

## 2018-07-12 NOTE — PROGRESS NOTES
Problem: Self Care Deficits Care Plan (Adult)  Goal: *Acute Goals and Plan of Care (Insert Text)  1. Patient will complete lower body bathing and dressing with modified indepdnence and adaptive equipment as needed. 2. Patient will complete toileting with modified independence. 3. Patient will tolerate 30 minutes of OT treatment with 2 rest breaks to increase activity tolerance for ADLs. 4. Patient will complete functional transfers with modified independence and adaptive equipment as needed. Timeframe: 7 visits     OCCUPATIONAL THERAPY: Daily Note, Treatment Day: 3rd and AM 7/12/2018  INPATIENT: Hospital Day: 7  Payor: CARE IMPROVEMENT PLUS / [place] / Product Type: Managed Care Medicare /      NAME/AGE/GENDER: Gaston Holloway is a 65 y.o. male   PRIMARY DIAGNOSIS:  Multiple myeloma, remission status unspecified ([facility] [C90.00] <principal problem not specified> <principal problem not specified>  Procedure(s) (LRB):  PORT INSERT/ ALEJANDRO TO DO/ 516 (N/A)  1 Day Post-Op  ICD-10: Treatment Diagnosis:    · Generalized Muscle Weakness (M62.81)  · Other lack of cordination (R27.8)   Precautions/Allergies: fall risk     Ace inhibitors; Lisinopril; and Pcn [penicillins]      ASSESSMENT:     Mr. Jordi Hogue  was living at home with his wife and receiving home health OT/PT/nursing. Pt was using performing basic ADLs with Mod I and IADLs with minimal assist.     7/12/2018: Patient agreeable and motivated to work with therapy. Pt wanted to walk. Pt able to perform bed mobility independently. Pt walked to chair and donned socks with supervision to Mod I. Pt walked around slowly with RW with supervision for 20 min . Pt being discharged later today home . Feel pt may not need skilled OT at home. Pt met goals and is supervision to Mod I with self care. This section established at most recent assessment   PROBLEM LIST (Impairments causing functional limitations):  1.  Decreased Strength  2. Decreased ADL/Functional Activities  3. Decreased Transfer Abilities  4. Decreased Balance  5. Decreased Activity Tolerance  6. Increased Fatigue  7. Increased Shortness of Breath   INTERVENTIONS PLANNED: (Benefits and precautions of occupational therapy have been discussed with the patient.)  1. Activities of daily living training  2. Adaptive equipment training  3. Balance training  4. Clothing management  5. Donning&doffing training  6. Manual therapy training  7. Therapeutic activity  8. Therapeutic exercise   Pt being discharged home  TREATMENT PLAN: Frequency/Duration:Rehabilitation Potential For Stated Goals: Good     RECOMMENDED REHABILITATION/EQUIPMENT: (at time of discharge pending progress): Due to the probability of continued deficits (see above) this patient will likely need continued skilled occupational therapy after discharge. Equipment:    None at this time              OCCUPATIONAL PROFILE AND HISTORY:   History of Present Injury/Illness (Reason for Referral):  See H&P  Past Medical History/Comorbidities:   Mr. Nuzhat Virgen  has a past medical history of Arrhythmia; Arthritis; BMI 30.0-30.9,adult; Cardiomyopathy ([address] Cholelithiases; Chronic systolic heart failure ([address] (9/13/2011); Gout; H/O: pituitary tumor; High cholesterol; History of thyroid cancer; History of vertigo; Hurthle cell carcinoma of thyroid ([address] Hx of radiation therapy to prostate (2004); Hyperlipidemia (11/18/2015); Hypertension; Hypothyroid; ICD (implantable cardiac defibrillator) in place (9/2011); LBBB (left bundle branch block) (11/18/2015); Malaise and fatigue (11/18/2015); Malignant neoplasm of prostate ([address] Mass of colon; Multiple myeloma ([address] and Sinus node dysfunction ([address]   Mr. Nuzhat Virgen  has a past surgical history that includes hx heent (2008); hx cholecystectomy (2013); hx gi; hx heart catheterization; hx pacemaker (2011/2016); hx colonoscopy; hx thyroidectomy (2008); hx tumor removal (1990/2010); and hx thyroidectomy (01/26/2018). Social History/Living Environment:   Home Environment: Private residence  # Steps to Enter: 4  Rails to Enter: Yes  One/Two Story Residence: One story  Living Alone: No  Support Systems: Spouse/Significant Other/Partner  Patient Expects to be Discharged to[de-identified] Private residence  Current DME Used/Available at Home: Walker  Tub or Shower Type: Tub/Shower combination  Prior Level of Function/Work/Activity:  See Above. Personal Factors:          Age:  65 y.o. Number of Personal Factors/Comorbidities that affect the Plan of Care: Expanded review of therapy/medical records (1-2):  MODERATE COMPLEXITY   ASSESSMENT OF OCCUPATIONAL PERFORMANCE[de-identified]   Activities of Daily Living:   Basic ADLs (From Assessment) Complex ADLs (From Assessment)   Feeding: Independent  Oral Facial Hygiene/Grooming: Modified Independent  Bathing: Supervision  Upper Body Dressing: Supervision  Lower Body Dressing: Supervision  Toileting: Supervision     Grooming/Bathing/Dressing Activities of Daily Living     Cognitive Retraining  Safety/Judgement: Awareness of environment                       Bed/Mat Mobility  Supine to Sit: Independent  Sit to Supine: Independent  Sit to Stand: Modified independent  Bed to Chair: Supervision       Most Recent Physical Functioning:   Gross Assessment:  AROM: Within functional limits  Strength: Within functional limits  Coordination: Within functional limits               Posture:  Posture (WDL): Exceptions to WDL  Posture Assessment: Forward head, Rounded shoulders, Trunk flexion  Balance:  Sitting: Intact  Standing: Intact; With support  Standing - Static: Constant support;Good Bed Mobility:  Supine to Sit: Independent  Sit to Supine: Independent  Wheelchair Mobility:     Transfers:  Sit to Stand: Modified independent  Stand to Sit: Modified independent  Bed to Chair: Supervision            Patient Vitals for the past 6 hrs:   BP BP Patient Position SpO2 Pulse   07/12/18 1214 105/57 At rest 98 % 84       Mental Status  Neurologic State: Alert  Orientation Level: Oriented X4  Cognition: Appropriate decision making, Appropriate safety awareness, Follows commands  Perception: Appears intact  Perseveration: No perseveration noted  Safety/Judgement: Awareness of environment                          Physical Skills Involved:  1. Range of Motion  2. Balance  3. Strength  4. Activity Tolerance Cognitive Skills Affected (resulting in the inability to perform in a timely and safe manner):  1. Executive Function Psychosocial Skills Affected:  1. Habits/Routines  2. Environmental Adaptation  3. Social Roles   Number of elements that affect the Plan of Care: 3-5:  MODERATE COMPLEXITY   CLINICAL DECISION MAKIN[address] [place] [address] AM-PAC 6 Clicks   Daily Activity Inpatient Short Form  How much help from another person does the patient currently need. .. Total A Lot A Little None   1. Putting on and taking off regular lower body clothing? [] 1   [] 2   [x] 3   [] 4   2. Bathing (including washing, rinsing, drying)? [] 1   [] 2   [x] 3   [] 4   3. Toileting, which includes using toilet, bedpan or urinal?   [] 1   [] 2   [x] 3   [] 4   4. Putting on and taking off regular upper body clothing? [] 1   [] 2   [] 3   [x] 4   5. Taking care of personal grooming such as brushing teeth? [] 1   [] 2   [] 3   [x] 4   6. Eating meals? [] 1   [] 2   [] 3   [x] 4   © , Trustees of [address] [place] [address], under license to [employer]. All rights reserved      Score:  Initial:21  Most Recent: X (Date: -- )    Interpretation of Tool:  Represents activities that are increasingly more difficult (i.e. Bed mobility, Transfers, Gait). Score 24 23 22-20 19-15 14-10 9-7 6     Modifier CH CI CJ CK CL CM CN      ?  Self Care:     - CURRENT STATUS: CJ - 20%-39% impaired, limited or restricted    - GOAL STATUS: CI - 1%-19% impaired, limited or restricted    - D/C STATUS:  CI - 1%-19% impaired, limited or restricted  Payor: CARE IMPROVEMENT PLUS / [place]PR[place] / Product Type: Managed Care Medicare /      Medical Necessity:     · Patient demonstrates good rehab potential due to higher previous functional level. Reason for Services/Other Comments:  · Patient continues to require skilled intervention due to medical complications. Use of outcome tool(s) and clinical judgement create a POC that gives a: MODERATE COMPLEXITY         TREATMENT:   (In addition to Assessment/Re-Assessment sessions the following treatments were rendered)     Pre-treatment Symptoms/Complaints:    Pain: Initial:   Pain Intensity 1: 0  Post Session:  same     . Therapeutic Activity: (23 minutes): Therapeutic activities include bed mobility , tranfers, functional mobility with supervision to mod I. Pt independent with bed mobility and supervision to Mod I with transfers and functional mobility.               Braces/Orthotics/Lines/Etc:   · O2 Device: Room air  Treatment/Session Assessment:    · Response to Treatment:  Pt pleasant and calm  · Interdisciplinary Collaboration:   o Occupational Therapist  o Registered Nurse  · After treatment position/precautions:   o Supine in bed  o Bed/Chair-wheels locked  o Bed in low position  o Call light within reach  o RN notified   · Compliance with Program/Exercises: Pt discharging home this am  · Recommendations/Intent for next treatment session:  Pt discharging  Total Treatment Duration:  OT Patient Time In/Time Out  Time In: 1136  [facility]

## 2018-07-12 NOTE — DISCHARGE INSTRUCTIONS
DISCHARGE SUMMARY from Nurse    PATIENT INSTRUCTIONS:    After general anesthesia or intravenous sedation, for 24 hours or while taking prescription Narcotics:  · Limit your activities  · Do not drive and operate hazardous machinery  · Do not make important personal or business decisions  · Do  not drink alcoholic beverages  · If you have not urinated within 8 hours after discharge, please contact your surgeon on call. Report the following to your surgeon:  · Excessive pain, swelling, redness or odor of or around the surgical area  · Temperature over 100.5  · Nausea and vomiting lasting longer than 4 hours or if unable to take medications  · Any signs of decreased circulation or nerve impairment to extremity: change in color, persistent  numbness, tingling, coldness or increase pain  · Any questions    What to do at Home:  Recommended activity: Activity as tolerated, per MD instructions    If you experience any of the following symptoms fever > 100.5, nausea, vomiting, pain, chest pain and/or shortness of breath please follow up with MD.    *  Please give a list of your current medications to your Primary Care Provider. *  Please update this list whenever your medications are discontinued, doses are      changed, or new medications (including over-the-counter products) are added. *  Please carry medication information at all times in case of emergency situations. These are general instructions for a healthy lifestyle:    No smoking/ No tobacco products/ Avoid exposure to second hand smoke  Surgeon General's Warning:  Quitting smoking now greatly reduces serious risk to your health.     Obesity, smoking, and sedentary lifestyle greatly increases your risk for illness    A healthy diet, regular physical exercise & weight monitoring are important for maintaining a healthy lifestyle    You may be retaining fluid if you have a history of heart failure or if you experience any of the following symptoms: Weight gain of 3 pounds or more overnight or 5 pounds in a week, increased swelling in our hands or feet or shortness of breath while lying flat in bed. Please call your doctor as soon as you notice any of these symptoms; do not wait until your next office visit. Recognize signs and symptoms of STROKE:    F-face looks uneven    A-arms unable to move or move unevenly    S-speech slurred or non-existent    T-time-call 911 as soon as signs and symptoms begin-DO NOT go       Back to bed or wait to see if you get better-TIME IS BRAIN. Warning Signs of HEART ATTACK     Call 911 if you have these symptoms:   Chest discomfort. Most heart attacks involve discomfort in the center of the chest that lasts more than a few minutes, or that goes away and comes back. It can feel like uncomfortable pressure, squeezing, fullness, or pain.  Discomfort in other areas of the upper body. Symptoms can include pain or discomfort in one or both arms, the back, neck, jaw, or stomach.  Shortness of breath with or without chest discomfort.  Other signs may include breaking out in a cold sweat, nausea, or lightheadedness. Don't wait more than five minutes to call 911 - MINUTES MATTER! Fast action can save your life. Calling 911 is almost always the fastest way to get lifesaving treatment. Emergency Medical Services staff can begin treatment when they arrive -- up to an hour sooner than if someone gets to the hospital by car. The discharge information has been reviewed with the patient. The patient verbalized understanding. Discharge medications reviewed with the patient and appropriate educational materials and side effects teaching were provided. ___________________________________________________________________________________________________________________________________         Acute Kidney Injury: Care Instructions  Your Care Instructions    Acute kidney injury (MARY) is a sudden decrease in kidney function.  This can happen over a period of hours, days or, in some cases, weeks. MARY used to be called acute renal failure, but kidney failure doesn't always happen with MARY. Common causes of MARY are dehydration, blood loss, and medicines. When MARY happens, the kidneys have trouble removing waste and excess fluids from the body. The waste and fluids build up and become harmful. Kidney function may return to normal if the cause of MARY is treated quickly. Your chance of a full recovery depends on what caused the problem, how quickly the cause was treated, and what other medical problems you have. A machine may be used to help your kidneys remove waste and fluids for a short period of time. This is called dialysis. Follow-up care is a key part of your treatment and safety. Be sure to make and go to all appointments, and call your doctor if you are having problems. It's also a good idea to know your test results and keep a list of the medicines you take. How can you care for yourself at home? · Talk to your doctor about how much fluid you should drink. · Eat a balanced diet. Talk to your doctor or a dietitian about what type of diet may be best for you. You may need to limit sodium, potassium, and phosphorus. · If you need dialysis, follow the instructions and schedule for dialysis that your doctor gives you. · Do not smoke. Smoking can make your condition worse. If you need help quitting, talk to your doctor about stop-smoking programs and medicines. These can increase your chances of quitting for good. · Do not drink alcohol. · Review all of your medicines with your doctor. Do not take any medicines, including nonsteroidal anti-inflammatory drugs (NSAIDs) such as ibuprofen (Advil, Motrin) or naproxen (Aleve), unless your doctor says it is safe for you to do so. · Make sure that anyone treating you for any health problem knows that you have had MARY. When should you call for help?   Call 911 anytime you think you may need emergency care. For example, call if:    · You passed out (lost consciousness).    Call your doctor now or seek immediate medical care if:    · You have new or worse nausea and vomiting.     · You have much less urine than normal, or you have no urine.     · You are feeling confused or cannot think clearly.     · You have new or more blood in your urine.     · You have new swelling.     · You are dizzy or lightheaded, or feel like you may faint.    Watch closely for changes in your health, and be sure to contact your doctor if:    · You do not get better as expected. Where can you learn more? Go to http://nakul-sharon.info/. Enter M743 in the search box to learn more about \"Acute Kidney Injury: Care Instructions. \"  Current as of: May 12, 2017  Content Version: 11.7  © 0178-3749 [employer]. Care instructions adapted under license by [employer] (which disclaims liability or warranty for this information). If you have questions about a medical condition or this instruction, always ask your healthcare professional. [address] any warranty or liability for your use of this information.

## 2018-07-12 NOTE — PROGRESS NOTES
Patient discharged home,  Right port needle de-acceced, pt taken downstairs on wheelchair by writer, picked up by his wife.

## 2018-07-13 ENCOUNTER — PATIENT OUTREACH (OUTPATIENT)
Dept: CASE MANAGEMENT | Age: 78
End: 2018-07-13

## 2018-07-13 NOTE — Clinical Note
Patient discharged 7/12/18. DX MARY/Intractable painwith hx of multiple myeloma. Has problems with transportation, spouse also recently in hospital. High risk of readmit. Please see TENNILLE note.  Thanks

## 2018-07-13 NOTE — PROGRESS NOTES
This note will not be viewable in [address] Transition of Care Discharge Follow-up Questionnaire   Date/Time of Call:   7/13/18 11:04am   What was the patient hospitalized for? MARY/Intractable pain  Hx: Colon Ca/multiple myeloma  Recent scan shows advancement of Multiple Myeloma. Does the patient understand his/her diagnosis and/or treatment and what happened during the hospitalization? Spoke with patient, who verbalized understanding of diagnosis and treatment plan. Patient reports he is doing ok, continues to have pain with minimal relief from pain medications. Denies any new problems or concerns. Did the patient receive discharge instructions? Yes, patient received discharge instructions. CM Assessed Risk for Readmission:       Patient stated Risk for Readmission:      Moderate to high risk for readmission due to multiple comorbidities, intractable pain and complications multiple myeloma. Per patient, worsening pain. Review any discharge instructions ([facility] Ask patient if they understand these. Do they have any questions? Discharge instructions reviewed with patient, who verbalized understanding of instructions and denied any questions. Focused on education, follow up and medication compliance. Were home services ordered (nursing, PT, OT, ST, etc.)? Yesresume order placed for [facility]. If so, has the first visit occurred? If not, why? (Assist with coordination of services if necessary.)   No.   Was any DME ordered? No   If so, has it been received? If not, why?  (Assist patient in obtaining DME orders &/or equipment if necessary.) N/A     Complete a review of all medications (new, continued and discontinued meds per the D/C instructions and medication tab in [facility]). Medication review completed with patient, who verbalized limited understanding. Were all new prescriptions filled?   If not, why?  (Assist patient in obtaining medications if necessary  escalate for CCM &/or SW if ongoing issues are verbalized by pt or anticipated)   Patient states he has to  the new prescriptions today        Patient denies any problems with obtaining medication. Does the patient understand the purpose and dosing instructions for all medications? (If patient has questions, provide explanation and education.)   Patient was able to state purpose and dosing for most of his medications. Does the patient have any problems in performing ADLs? (If patient is unable to perform ADLs  what is the limiting factor(s)? Do they have a support system that can assist? If no support system is present, discuss possible assistance that they may be able to obtain. Escalate for CCM/SW if ongoing issues are verbalized by pt or anticipated)   Patient requires some assistance with ADLs. Ambulates with walker. Lives with spouse, who has also recently been in the hospital. Has some help from family. Does the patient have all follow-up appointments scheduled? 7 day f/up with PCP?   (f/up with PCP may be w/in 14 days if patient has a f/up with their specialist w/in 7 days)    7-14 day f/up with specialist?   (or per discharge instructions)    If f/up has not been made  what actions has the care coordinator made to accomplish this? Has transportation been arranged? Patient has scheduled follow up with Dr. John Benedict on 7//23/18. Patient does not have a follow up scheduled with PCP and declined assistance in scheduling. States Dr John Benedict is the doctor he sees for all the problem he is having right now. Patient reports difficulty with transportation to appointments and requested assistance if possible. Any other questions or concerns expressed by the patient? No other questions or concerns verbalized. Discussed referral to CCM with patient for help with transportation information.   Patient agreeable to referral.     Schedule next appointment with TENNILLE Coordinator or refer to RN Case Manager/ per the workflow guidelines. When is care coordinators next follow-up call scheduled? If referred for CCM  what RN care manager was the referral assigned? Patient will be referred Clare Gonzalez RN, CCM and Sang Junior, TIANA for assistance with options for transportation and for follow up due to moderate to high risk of readmission. Patient has CC contact information and advised to call for new concerns.    TENNILLE Call Completed By: Alice Higgins, [address] [place] [address] Coordinator

## 2018-07-16 ENCOUNTER — PATIENT OUTREACH (OUTPATIENT)
Dept: CASE MANAGEMENT | Age: 78
End: 2018-07-16

## 2018-07-16 NOTE — PROGRESS NOTES
10/11/17:  Patient in for monthly follow-up with Dr. Yimi Renee. CT scan reviewed with the patient. CEA high normal but stable. Dr. Yimi Renee discussed need to start zometa for bone strengthening. Patient would like to think about and then start next month. Zometa patient handout given to the patient for his review. CEA repeat in 3 months and CT scan in 6 months per Yimi Renee. Patient to return in one month for labs, ov and possible zometa. Repeat light chains and spep with labs. Patient given medication calendar as well.
warm. No bruising, no ecchymosis and no rash noted. No erythema. Psychiatric: Thought content normal.   Nursing note and vitals reviewed. Assessment:       Diagnosis Orders   1. History of colon polyps             Plan:      Patient is advised high-fiber diet, but supplements and precautions to avoid constipation. If she notices small amount of blood on the tissue, advised sitz baths. If she has any excessive bleeding to contact me. Also explained to the patient regarding colonoscopy findings and polyp histology results. We'll see her in the next 2 years. In between if she has any issues to contact me.

## 2018-07-16 NOTE — PROGRESS NOTES
Ambulatory Care Coordination  Social Work Assessment   Referral from: Catrina Caceres    Previously referred? If so, reason and brief outcome No   Reason for current referral: Transportation    Income information (if needed): Pt declined to provide   Sources of Support: Pt said his wife and neighbors are a big support   ACP set up? Needs information? Pt said he didnt see the point in doing any right now   Medication Cost assistance needed? NA   Referral to CLTC/Medicaid needed? Pt said they are financially okay right now and didnt want to talk about applying for Medicaid    Referral to Medicare Extra Help/LIS program needed? NA   Small home repair needed? NA   MOW referral? NA   Any other concerns/questions? NA   Next steps: LISA BARNHART provided pt with the phone number for [employer] [address]s transportation program    LISA BARNHART will follow up in two weeks to see if pt has heard back from [place] [employer] [email or web address]. Pt did state that he and his wife have a \"nice working car\" and that [employer] hasn't been an issue up until now but he just want's to have other options in case it becomes an issue. \"  Pt said he's worried about he and his wife's health and if they will be able to continue to transport themselves to appointments. This note will not be viewable in [address]

## 2018-07-16 NOTE — PROGRESS NOTES
This note will not be viewable in [address] RNCM spoke w/ pt regarding CCM services. Pt w/ hx robson BARRETO'd from [facility] [place] [place] due to intractable pain  on 7/12/18 . Pt lives w/ wife and has [place] [address] services w/ [facility]. Pt is interested solely in options for transportation at this time. Pt states he left a message at number for [employer] given to him by [facility]. He is not interested in education at this time, but is concerned about transportation to and from [facility]. Wife is also interested in transportation to app. Pt encouraged to reach out to [facility] if no call back from [employer] this wk. RNCM discussed case w/ [facility]. This RNCM will not follow at this time, however available for questions or concerns. Pt has RNCM and SWCM contact information.

## 2018-07-16 NOTE — ACP (ADVANCE CARE PLANNING)
Pt said that he doesn't have an advance directives done at this time and doesn't really think he needs to right now.

## 2018-07-18 ENCOUNTER — APPOINTMENT (OUTPATIENT)
Dept: GENERAL RADIOLOGY | Age: 78
DRG: 809 | End: 2018-07-18
Attending: NURSE PRACTITIONER
Payer: MEDICARE

## 2018-07-18 ENCOUNTER — HOSPITAL ENCOUNTER (OUTPATIENT)
Dept: INFUSION THERAPY | Age: 78
Discharge: HOME | End: 2018-07-18
Payer: MEDICARE

## 2018-07-18 ENCOUNTER — HOSPITAL ENCOUNTER (INPATIENT)
Age: 78
LOS: 3 days | Discharge: HOME | DRG: 809 | End: 2018-07-21
Attending: INTERNAL MEDICINE | Admitting: INTERNAL MEDICINE
Payer: MEDICARE

## 2018-07-18 VITALS
RESPIRATION RATE: 18 BRPM | SYSTOLIC BLOOD PRESSURE: 140 MMHG | DIASTOLIC BLOOD PRESSURE: 56 MMHG | OXYGEN SATURATION: 97 % | TEMPERATURE: 101.1 F | HEART RATE: 92 BPM

## 2018-07-18 DIAGNOSIS — R50.81 NEUTROPENIC FEVER (HCC): ICD-10-CM

## 2018-07-18 DIAGNOSIS — I48.0 PAF (PAROXYSMAL ATRIAL FIBRILLATION) (HCC): ICD-10-CM

## 2018-07-18 DIAGNOSIS — D70.9 NEUTROPENIC FEVER (HCC): ICD-10-CM

## 2018-07-18 DIAGNOSIS — I44.7 LBBB (LEFT BUNDLE BRANCH BLOCK): ICD-10-CM

## 2018-07-18 DIAGNOSIS — R10.84 GENERALIZED ABDOMINAL PAIN: ICD-10-CM

## 2018-07-18 DIAGNOSIS — N17.9 ACUTE KIDNEY INJURY (HCC): ICD-10-CM

## 2018-07-18 DIAGNOSIS — C90.02 MULTIPLE MYELOMA IN RELAPSE (HCC): ICD-10-CM

## 2018-07-18 DIAGNOSIS — C90.00 MULTIPLE MYELOMA, REMISSION STATUS UNSPECIFIED (HCC): ICD-10-CM

## 2018-07-18 DIAGNOSIS — I50.22 CHRONIC SYSTOLIC HEART FAILURE (HCC): ICD-10-CM

## 2018-07-18 DIAGNOSIS — A41.9 SEPSIS, DUE TO UNSPECIFIED ORGANISM: ICD-10-CM

## 2018-07-18 LAB
ALBUMIN SERPL-MCNC: 2.9 G/DL (ref 3.2–4.6)
ALBUMIN/GLOB SERPL: 0.7 {RATIO} (ref 1.2–3.5)
ALP SERPL-CCNC: 100 U/L (ref 50–136)
ALT SERPL-CCNC: 71 U/L (ref 12–65)
ANION GAP SERPL CALC-SCNC: 9 MMOL/L (ref 7–16)
AST SERPL-CCNC: 87 U/L (ref 15–37)
BASOPHILS # BLD: 0 K/UL (ref 0–0.2)
BASOPHILS NFR BLD: 0 % (ref 0–2)
BILIRUB SERPL-MCNC: 0.6 MG/DL (ref 0.2–1.1)
BUN SERPL-MCNC: 15 MG/DL (ref 8–23)
CALCIUM SERPL-MCNC: 8.3 MG/DL (ref 8.3–10.4)
CHLORIDE SERPL-SCNC: 101 MMOL/L (ref 98–107)
CO2 SERPL-SCNC: 23 MMOL/L (ref 21–32)
CREAT SERPL-MCNC: 1.51 MG/DL (ref 0.8–1.5)
DIFFERENTIAL METHOD BLD: ABNORMAL
EOSINOPHIL # BLD: 0 K/UL (ref 0–0.8)
EOSINOPHIL NFR BLD: 0 % (ref 0.5–7.8)
ERYTHROCYTE [DISTWIDTH] IN BLOOD BY AUTOMATED COUNT: 16.8 % (ref 11.9–14.6)
GLOBULIN SER CALC-MCNC: 4.3 G/DL (ref 2.3–3.5)
GLUCOSE SERPL-MCNC: 101 MG/DL (ref 65–100)
HCT VFR BLD AUTO: 26.9 % (ref 41.1–50.3)
HGB BLD-MCNC: 9.4 G/DL (ref 13.6–17.2)
IMM GRANULOCYTES # BLD: 0 K/UL (ref 0–0.5)
IMM GRANULOCYTES NFR BLD AUTO: 0 % (ref 0–5)
LYMPHOCYTES # BLD: 0.2 K/UL (ref 0.5–4.6)
LYMPHOCYTES NFR BLD: 42 % (ref 13–44)
MCH RBC QN AUTO: 24.5 PG (ref 26.1–32.9)
MCHC RBC AUTO-ENTMCNC: 34.9 G/DL (ref 31.4–35)
MCV RBC AUTO: 70.2 FL (ref 79.6–97.8)
MONOCYTES # BLD: 0.3 K/UL (ref 0.1–1.3)
MONOCYTES NFR BLD: 52 % (ref 4–12)
NEUTS SEG # BLD: 0 K/UL (ref 1.7–8.2)
NEUTS SEG NFR BLD: 6 % (ref 43–78)
PLATELET # BLD AUTO: 132 K/UL (ref 150–450)
PLATELET COMMENTS,PCOM: ADEQUATE
PMV BLD AUTO: 9.1 FL (ref 10.8–14.1)
POTASSIUM SERPL-SCNC: 4.4 MMOL/L (ref 3.5–5.1)
PROT SERPL-MCNC: 7.2 G/DL (ref 6.3–8.2)
RBC # BLD AUTO: 3.83 M/UL (ref 4.23–5.67)
RBC MORPH BLD: ABNORMAL
SODIUM SERPL-SCNC: 133 MMOL/L (ref 136–145)
WBC # BLD AUTO: 0.5 K/UL (ref 4.3–11.1)
WBC MORPH BLD: ABNORMAL

## 2018-07-18 PROCEDURE — 74011000258 HC RX REV CODE- 258: Performed by: NURSE PRACTITIONER

## 2018-07-18 PROCEDURE — 74011250636 HC RX REV CODE- 250/636: Performed by: NURSE PRACTITIONER

## 2018-07-18 PROCEDURE — 77030003560 HC NDL HUBR BARD -A

## 2018-07-18 PROCEDURE — 74011250636 HC RX REV CODE- 250/636: Performed by: INTERNAL MEDICINE

## 2018-07-18 PROCEDURE — 99223 1ST HOSP IP/OBS HIGH 75: CPT | Performed by: INTERNAL MEDICINE

## 2018-07-18 PROCEDURE — 99211 OFF/OP EST MAY X REQ PHY/QHP: CPT

## 2018-07-18 PROCEDURE — 96360 HYDRATION IV INFUSION INIT: CPT

## 2018-07-18 PROCEDURE — 87040 BLOOD CULTURE FOR BACTERIA: CPT | Performed by: INTERNAL MEDICINE

## 2018-07-18 PROCEDURE — 71046 X-RAY EXAM CHEST 2 VIEWS: CPT

## 2018-07-18 PROCEDURE — 65270000029 HC RM PRIVATE

## 2018-07-18 PROCEDURE — 74011250637 HC RX REV CODE- 250/637: Performed by: INTERNAL MEDICINE

## 2018-07-18 PROCEDURE — 80053 COMPREHEN METABOLIC PANEL: CPT | Performed by: INTERNAL MEDICINE

## 2018-07-18 PROCEDURE — 85025 COMPLETE CBC W/AUTO DIFF WBC: CPT | Performed by: INTERNAL MEDICINE

## 2018-07-18 PROCEDURE — 74011250637 HC RX REV CODE- 250/637: Performed by: NURSE PRACTITIONER

## 2018-07-18 RX ORDER — HEPARIN 100 UNIT/ML
500 SYRINGE INTRAVENOUS AS NEEDED
Status: DISCONTINUED | OUTPATIENT
Start: 2018-07-18 | End: 2018-07-22 | Stop reason: HOSPADM

## 2018-07-18 RX ORDER — AMIODARONE HYDROCHLORIDE 200 MG/1
200 TABLET ORAL 2 TIMES DAILY
Status: DISCONTINUED | OUTPATIENT
Start: 2018-07-18 | End: 2018-07-21 | Stop reason: HOSPADM

## 2018-07-18 RX ORDER — MIRTAZAPINE 15 MG/1
15 TABLET, FILM COATED ORAL
Status: DISCONTINUED | OUTPATIENT
Start: 2018-07-18 | End: 2018-07-21 | Stop reason: HOSPADM

## 2018-07-18 RX ORDER — VANCOMYCIN 2 GRAM/500 ML IN 0.9 % SODIUM CHLORIDE INTRAVENOUS
2000 ONCE
Status: COMPLETED | OUTPATIENT
Start: 2018-07-18 | End: 2018-07-21

## 2018-07-18 RX ORDER — VANCOMYCIN/0.9 % SOD CHLORIDE 1.5G/250ML
1500 PLASTIC BAG, INJECTION (ML) INTRAVENOUS EVERY 24 HOURS
Status: DISCONTINUED | OUTPATIENT
Start: 2018-07-19 | End: 2018-07-20

## 2018-07-18 RX ORDER — FENTANYL 100 UG/H
1 PATCH TRANSDERMAL
Status: DISCONTINUED | OUTPATIENT
Start: 2018-07-19 | End: 2018-07-21 | Stop reason: HOSPADM

## 2018-07-18 RX ORDER — CARVEDILOL 6.25 MG/1
6.25 TABLET ORAL 2 TIMES DAILY WITH MEALS
Status: DISCONTINUED | OUTPATIENT
Start: 2018-07-18 | End: 2018-07-21 | Stop reason: HOSPADM

## 2018-07-18 RX ORDER — CHLORHEXIDINE GLUCONATE 1.2 MG/ML
15 RINSE ORAL 2 TIMES DAILY
Status: DISCONTINUED | OUTPATIENT
Start: 2018-07-18 | End: 2018-07-21 | Stop reason: HOSPADM

## 2018-07-18 RX ORDER — SODIUM CHLORIDE 9 MG/ML
50 INJECTION, SOLUTION INTRAVENOUS CONTINUOUS
Status: DISCONTINUED | OUTPATIENT
Start: 2018-07-18 | End: 2018-07-21 | Stop reason: HOSPADM

## 2018-07-18 RX ORDER — ONDANSETRON 2 MG/ML
4 INJECTION INTRAMUSCULAR; INTRAVENOUS
Status: DISCONTINUED | OUTPATIENT
Start: 2018-07-18 | End: 2018-07-21 | Stop reason: HOSPADM

## 2018-07-18 RX ORDER — TORSEMIDE 20 MG/1
20 TABLET ORAL DAILY
Status: DISCONTINUED | OUTPATIENT
Start: 2018-07-19 | End: 2018-07-21 | Stop reason: HOSPADM

## 2018-07-18 RX ORDER — OXYCODONE HYDROCHLORIDE 15 MG/1
30 TABLET ORAL
Status: DISCONTINUED | OUTPATIENT
Start: 2018-07-18 | End: 2018-07-21 | Stop reason: HOSPADM

## 2018-07-18 RX ORDER — PROCHLORPERAZINE EDISYLATE 5 MG/ML
5-10 INJECTION INTRAMUSCULAR; INTRAVENOUS
Status: DISCONTINUED | OUTPATIENT
Start: 2018-07-18 | End: 2018-07-21 | Stop reason: HOSPADM

## 2018-07-18 RX ORDER — POTASSIUM CHLORIDE 750 MG/1
10 TABLET, EXTENDED RELEASE ORAL 2 TIMES DAILY
Status: DISCONTINUED | OUTPATIENT
Start: 2018-07-18 | End: 2018-07-21

## 2018-07-18 RX ORDER — OXYCODONE HYDROCHLORIDE 15 MG/1
30 TABLET ORAL
Status: DISCONTINUED | OUTPATIENT
Start: 2018-07-18 | End: 2018-07-18

## 2018-07-18 RX ORDER — VANCOMYCIN 2 GRAM/500 ML IN 0.9 % SODIUM CHLORIDE INTRAVENOUS
2000 ONCE
Status: DISCONTINUED | OUTPATIENT
Start: 2018-07-18 | End: 2018-07-18 | Stop reason: SDUPTHER

## 2018-07-18 RX ORDER — PANTOPRAZOLE SODIUM 40 MG/1
40 TABLET, DELAYED RELEASE ORAL
Status: DISCONTINUED | OUTPATIENT
Start: 2018-07-19 | End: 2018-07-21 | Stop reason: HOSPADM

## 2018-07-18 RX ORDER — ATORVASTATIN CALCIUM 10 MG/1
20 TABLET, FILM COATED ORAL DAILY
Status: DISCONTINUED | OUTPATIENT
Start: 2018-07-19 | End: 2018-07-21 | Stop reason: HOSPADM

## 2018-07-18 RX ORDER — SODIUM CHLORIDE 0.9 % (FLUSH) 0.9 %
10 SYRINGE (ML) INJECTION AS NEEDED
Status: DISCONTINUED | OUTPATIENT
Start: 2018-07-18 | End: 2018-07-22 | Stop reason: HOSPADM

## 2018-07-18 RX ORDER — AMOXICILLIN 250 MG
2 CAPSULE ORAL 2 TIMES DAILY
Status: DISCONTINUED | OUTPATIENT
Start: 2018-07-18 | End: 2018-07-21 | Stop reason: HOSPADM

## 2018-07-18 RX ORDER — SODIUM CHLORIDE 9 MG/ML
1000 INJECTION, SOLUTION INTRAVENOUS ONCE
Status: COMPLETED | OUTPATIENT
Start: 2018-07-18 | End: 2018-07-18

## 2018-07-18 RX ORDER — LANOLIN ALCOHOL/MO/W.PET/CERES
400 CREAM (GRAM) TOPICAL 4 TIMES DAILY
Status: DISCONTINUED | OUTPATIENT
Start: 2018-07-18 | End: 2018-07-21 | Stop reason: HOSPADM

## 2018-07-18 RX ADMIN — OXYCODONE HYDROCHLORIDE 30 MG: 15 TABLET ORAL at 16:02

## 2018-07-18 RX ADMIN — CARVEDILOL 6.25 MG: 6.25 TABLET, FILM COATED ORAL at 18:14

## 2018-07-18 RX ADMIN — STANDARDIZED SENNA CONCENTRATE AND DOCUSATE SODIUM 2 TABLET: 8.6; 5 TABLET, FILM COATED ORAL at 18:12

## 2018-07-18 RX ADMIN — Medication 10 ML: at 11:20

## 2018-07-18 RX ADMIN — MAGNESIUM OXIDE TAB 400 MG (241.3 MG ELEMENTAL MG) 400 MG: 400 (241.3 MG) TAB at 18:12

## 2018-07-18 RX ADMIN — CHLORHEXIDINE GLUCONATE 15 ML: 1.2 RINSE ORAL at 17:41

## 2018-07-18 RX ADMIN — MIRTAZAPINE 15 MG: 15 TABLET, FILM COATED ORAL at 21:19

## 2018-07-18 RX ADMIN — APIXABAN 5 MG: 5 TABLET, FILM COATED ORAL at 18:12

## 2018-07-18 RX ADMIN — CEFEPIME HYDROCHLORIDE 2 G: 2 INJECTION, POWDER, FOR SOLUTION INTRAVENOUS at 12:38

## 2018-07-18 RX ADMIN — SODIUM CHLORIDE 1000 ML: 900 INJECTION, SOLUTION INTRAVENOUS at 11:25

## 2018-07-18 RX ADMIN — MAGNESIUM OXIDE TAB 400 MG (241.3 MG ELEMENTAL MG) 400 MG: 400 (241.3 MG) TAB at 20:37

## 2018-07-18 RX ADMIN — VANCOMYCIN HYDROCHLORIDE 2000 MG: 10 INJECTION, POWDER, LYOPHILIZED, FOR SOLUTION INTRAVENOUS at 13:14

## 2018-07-18 RX ADMIN — AMIODARONE HYDROCHLORIDE 200 MG: 200 TABLET ORAL at 18:12

## 2018-07-18 RX ADMIN — SODIUM CHLORIDE 75 ML/HR: 900 INJECTION, SOLUTION INTRAVENOUS at 12:41

## 2018-07-18 RX ADMIN — OXYCODONE HYDROCHLORIDE 30 MG: 15 TABLET ORAL at 20:37

## 2018-07-18 RX ADMIN — POTASSIUM CHLORIDE 10 MEQ: 10 TABLET, EXTENDED RELEASE ORAL at 18:14

## 2018-07-18 NOTE — H&P
Inpatient Hematology / Oncology History and Physical 
 
Reason for Asmission:  neutropenic fever 
multiple myeloma Sepsis ([facility] [address] History of Present Illness: Mr. Jocelyne Hernández is a 65 y.o. male admitted on 7/18/2018 with a primary diagnosis neutropenic fever. Patient came into infusion center here on 5th floor today for labs and replacements and was found to have temp of 101 and on labs found to have WBC of 0.5 and ANC of 0.0. Blood and urine cultures, UA, CXR obtained and he was started on cefe and vanc and admitted to [facility]. He is a known patient of Dr. Denise Schneider receiving treatment for MM. MM labs from 6/25 showed progressive disease. Revlimid was discontinued and he was treated with Cytoxan x 1 dose and pulse dose dex x 4 days with plans to start ixazomib and daratumumab in 1-2 weeks. Review of Systems: 
Constitutional Fever, Denies  chills, weight loss, appetite changes, fatigue, night sweats. HEENT Denies trauma, blurry vision, hearing loss, ear pain, nosebleeds, sore throat, neck pain Skin Denies lesions or rashes. Lungs Denies dyspnea, cough, sputum production or hemoptysis. Cardiovascular Denies chest pain, palpitations, or lower extremity edema. Gastrointestinal Denies nausea, vomiting, changes in bowel habits, bloody or black stools. Constipation. Abdominal pain in lower quadrant  Dysuria. Denies frequency or hesitancy of urination. Neuro Denies headaches, visual changes or ataxia. Denies dizziness, tingling, tremors, sensory change, speech change, focal weakness or headaches. MSK Back pain Psychiatric/Behavioral The patient is not nervous/anxious. Allergies Allergen Reactions  Ace Inhibitors Other (comments)  Lisinopril Cough  Pcn [Penicillins] Swelling Past Medical History:  
Diagnosis Date  Arrhythmia LBBB  Arthritis  BMI 30.0-30.9,adult BMI 30.5  Cardiomyopathy ([facility] [address] followed by [facility]  Cholelithiases  Chronic systolic heart failure ([facility] [address] 9/13/2011 [place] [employer] class II-III heart failure symptoms  Gout  H/O: pituitary tumor X2 benign, removed  High cholesterol  History of thyroid cancer  History of vertigo   
 no recent complications or episodes  Hurthle cell carcinoma of thyroid ([address]  Hx of radiation therapy to prostate 2004  Hyperlipidemia 11/18/2015  Hypertension  Hypothyroid   
 hypo- had portion of thyroid removed due to cancer  ICD (implantable cardiac defibrillator) in place 9/2011 [employer] BIV/ICD, Gen change 3.2.16  
 LBBB (left bundle branch block) 11/18/2015  Malaise and fatigue 11/18/2015  Malignant neoplasm of prostate ([facility] [address]  Mass of colon   
 right colon mass  Multiple myeloma ([facility] [address]  Sinus node dysfunction (HCC) Past Surgical History:  
Procedure Laterality Date  HX CHOLECYSTECTOMY  2013  HX COLONOSCOPY    
 dx with Right colon mass  HX GI    
 benign polyps removed  HX HEART CATHETERIZATION    
 HX HEENT  2008 thyroidetomy partial tumor from pituitary x2  HX PACEMAKER  2011/2016  
 biotronik biv-icd  HX THYROIDECTOMY  2008  HX THYROIDECTOMY  01/26/2018  
 completion thyroidectomy  HX TUMOR REMOVAL  1990/2010  
 pituitary tumor removed X2 Family History Problem Relation Age of Onset  Heart Disease Sister  Heart Attack Sister  Stroke Mother  Thyroid Disease Neg Hx  Diabetes Neg Hx Social History Social History  Marital status:  Spouse name: N/A  
 Number of children: N/A  
 Years of education: N/A Occupational History  Not on file. Social History Main Topics  Smoking status: Never Smoker  Smokeless tobacco: Never Used  Alcohol use Yes Comment: very rare  Drug use: No  
 Sexual activity: Yes  
  Partners: Female Other Topics Concern  Not on file Social History Narrative Current Facility-Administered Medications Medication Dose Route Frequency Provider Last Rate Last Dose  amiodarone (CORDARONE) tablet 200 mg  200 mg Oral BID Espinoza Bahena NP      
 apixaban [facility]) tablet 5 mg  5 mg Oral BID Espinoza Bahena NP      
 [START ON 7/19/2018] atorvastatin (LIPITOR) tablet 20 mg  20 mg Oral DAILY Espinoza Bahena NP      
 carvedilol (COREG) tablet 6.25 mg  6.25 mg Oral BID WITH MEALS Espinoza Bahena NP      
 chlorhexidine (PERIDEX) 0.12 % mouthwash 15 mL  15 mL Swish and Spit BID Espinoza Bahena NP      
 [START ON 7/19/2018] fentaNYL (DURAGESIC) 100 mcg/hr patch 1 Patch  1 Patch TransDERmal Q72H Espinoza Bahena NP      
 [START ON 7/19/2018] levothyroxine (SYNTHROID) tablet 137 mcg  137 mcg Oral ACB Espinoza Bahena NP      
 magnesium oxide (MAG-OX) tablet 400 mg  400 mg Oral QID Espinoza Bahena NP      
 mirtazapine (REMERON) tablet 15 mg  15 mg Oral QHS Espinoza Bahena NP      
 [START ON 7/19/2018] pantoprazole (PROTONIX) tablet 40 mg  40 mg Oral ACB Espinoza Bahena NP      
 oxyCODONE IR (OXY-IR) immediate release tablet 30 mg  30 mg Oral Q4H PRN Espinoza Bahena NP      
 potassium chloride (KLOR-CON) tablet 10 mEq  10 mEq Oral BID Espinoza Bahena NP      
 senna-docusate (PERICOLACE) 8.6-50 mg per tablet 2 Tab  2 Tab Oral BID Espinoza Bahena NP      
 [START ON 7/19/2018] torsemide (DEMADEX) tablet 20 mg  20 mg Oral DAILY Espinoza Bahena NP      
 [facility] injection 4 mg  4 mg IntraVENous Q4H PRN Espinoza Bahena NP      
 prochlorperazine (COMPAZINE) injection 5-10 mg  5-10 mg IntraVENous Q6H PRN Espinoza Bahena NP      
 0.9% sodium chloride infusion  75 mL/hr IntraVENous CONTINUOUS Espinoza Bahena NP 75 mL/hr at 07/18/18 1241 75 mL/hr at 07/18/18 1241  cefepime (MAXIPIME) 2 g in 0.9% sodium chloride (MBP/ADV) 100 mL  2 g IntraVENous Q12H Espinoza Matter,  mL/hr at 07/18/18 1238 2 g at 07/18/18 1238  vancomycin (VANCOCIN) 2000 mg in  ml infusion  2,000 mg IntraVENous ONCE Espinoza Matter,  mL/hr at 07/18/18 1314 2,000 mg at 18 1314 Facility-Administered Medications Ordered in Other Encounters Medication Dose Route Frequency Provider Last Rate Last Dose  central line flush (saline) syringe 10 mL  10 mL InterCATHeter PRN Glasco MD Tere   10 mL at 18 1120  
 heparin (porcine) pf 500 Units  500 Units InterCATHeter PRN Glasco MD Tere      
 
 
OBJECTIVE: 
No data found. Temp (24hrs), Av.1 °F (38.4 °C), Min:101.1 °F (38.4 °C), Max:101.1 °F (38.4 °C) Physical Exam: 
Constitutional: Well developed, well nourished male in no acute distress, sitting comfortably in the hospital bed. Wife at bedside. HEENT: Normocephalic and atraumatic. Oropharynx is clear, mucous membranes are moist.  Extraocular muscles are intact. Sclerae anicteric. Skin Warm and dry. No bruising and no rash noted. No erythema. No pallor. Respiratory Lungs are clear to auscultation bilaterally without wheezes, rales or rhonchi, normal air exchange without accessory muscle use. CVS Normal rate, regular rhythm and normal S1 and S2. No murmurs, gallops, or rubs. Abdomen Soft, tender lower quadrant and nondistended, normoactive bowel sounds. No palpable mass. No hepatosplenomegaly. Neuro Grossly nonfocal with no obvious sensory or motor deficits. MSK Normal range of motion in general.  No edema and no tenderness. Psych Appropriate mood and affect. Labs:   
Recent Results (from the past 24 hour(s)) CBC WITH AUTOMATED DIFF Collection Time: 18 11:27 AM  
Result Value Ref Range WBC 0.5 (LL) 4.3 - 11.1 K/uL  
 RBC 3.83 (L) 4.23 - 5.67 M/uL HGB 9.4 (L) 13.6 - 17.2 g/dL HCT 26.9 (L) 41.1 - 50.3 % MCV 70.2 (L) 79.6 - 97.8 FL  
 MCH 24.5 (L) 26.1 - 32.9 PG  
 MCHC 34.9 31.4 - 35.0 g/dL  
 RDW 16.8 (H) 11.9 - 14.6 % PLATELET 514 (L) 696 - 450 K/uL MPV 9.1 (L) 10.8 - 14.1 FL  
 NEUTROPHILS 6 (L) 43 - 78 % LYMPHOCYTES 42 13 - 44 % MONOCYTES 52 (H) 4.0 - 12.0 %  EOSINOPHILS 0 (L) 0.5 - 7.8 % BASOPHILS 0 0.0 - 2.0 % IMMATURE GRANULOCYTES 0 0.0 - 5.0 %  
 ABS. NEUTROPHILS 0.0 (L) 1.7 - 8.2 K/UL  
 ABS. LYMPHOCYTES 0.2 (L) 0.5 - 4.6 K/UL  
 ABS. MONOCYTES 0.3 0.1 - 1.3 K/UL  
 ABS. EOSINOPHILS 0.0 0.0 - 0.8 K/UL  
 ABS. BASOPHILS 0.0 0.0 - 0.2 K/UL  
 ABS. IMM. GRANS. 0.0 0.0 - 0.5 K/UL  
 RBC COMMENTS OCCASIONAL 
ANISOCYTOSIS 
    
 RBC COMMENTS SLIGHT 
POLYCHROMASIA 
    
 RBC COMMENTS OCCASIONAL 
BASOPHILIC STIPPLING 
    
 RBC COMMENTS ROULEAUX    
 RBC COMMENTS MICROCYTOSIS    
 WBC COMMENTS Result Confirmed By Smear PLATELET COMMENTS ADEQUATE    
 DF AUTOMATED METABOLIC PANEL, COMPREHENSIVE Collection Time: 07/18/18 11:27 AM  
Result Value Ref Range Sodium 133 (L) 136 - 145 mmol/L Potassium 4.4 3.5 - 5.1 mmol/L Chloride 101 98 - 107 mmol/L  
 CO2 23 21 - 32 mmol/L Anion gap 9 7 - 16 mmol/L Glucose 101 (H) 65 - 100 mg/dL BUN 15 8 - 23 MG/DL Creatinine 1.51 (H) 0.8 - 1.5 MG/DL  
 GFR est AA 58 (L) >60 ml/min/1.73m2 GFR est non-AA 48 (L) >60 ml/min/1.73m2 Calcium 8.3 8.3 - 10.4 MG/DL Bilirubin, total 0.6 0.2 - 1.1 MG/DL  
 ALT (SGPT) 71 (H) 12 - 65 U/L  
 AST (SGOT) 87 (H) 15 - 37 U/L Alk. phosphatase 100 50 - 136 U/L Protein, total 7.2 6.3 - 8.2 g/dL Albumin 2.9 (L) 3.2 - 4.6 g/dL Globulin 4.3 (H) 2.3 - 3.5 g/dL A-G Ratio 0.7 (L) 1.2 - 3.5 Imaging: 
[unfilled] ASSESSMENT: 
Problem List  Date Reviewed: 7/11/2018 Codes Class Noted [facility]epsis [facility] ICD-10-CM: A41.9 ICD-9-CM: 038.9, 995.91  7/18/2018 Compression fracture of lumbar vertebra (HCC) ICD-10-CM: S32.000A ICD-9-CM: 805.4  5/15/2018 Hurthle cell carcinoma of thyroid ([facility] [address] ICD-10-CM: S85 ICD-9-CM: 193  Unknown Hurthle cell carcinoma [facility] ICD-10-CM: U27 ICD-9-CM: 787  3/15/2018 Postsurgical hypothyroidism ICD-10-CM: E89.0 ICD-9-CM: 244.0  3/15/2018 Pituitary macroadenoma ([facility] [address] ICD-10-CM: D35.2 ICD-9-CM: 227.3 3/15/2018 Thyroid mass ICD-10-CM: E07.9 ICD-9-CM: 246.9  2/8/2018 Sinus bradycardia ICD-10-CM: R00.1 ICD-9-CM: 427.89  1/12/2018 PAF (paroxysmal atrial fibrillation) ([facility]) ICD-10-CM: I48.0 ICD-9-CM: 427.31  11/26/2017 Chronic systolic CHF (congestive heart failure) ([facility]) ICD-10-CM: Y62.90 ICD-9-CM: 428.22, 428.0  11/26/2017 Pain ICD-10-CM: R52 ICD-9-CM: 780.96  11/20/2017 MARY (acute kidney injury) ([facility] ICD-10-CM: N17.9 ICD-9-CM: 584.9  11/20/2017 Acute renal failure (ARF) ([facility]) ICD-10-CM: N17.9 ICD-9-CM: 584.9  11/18/2017 Intractable pain ICD-10-CM: Y20 ICD-9-CM: 780.96  1/11/2017 Acute kidney injury ([facility] ICD-10-CM: N17.9 ICD-9-CM: 584.9  1/11/2017 Pancytopenia ([facility] ICD-10-CM: X48.841 ICD-9-CM: 284.19  1/11/2017 Constipation ICD-10-CM: K59.00 ICD-9-CM: 564.00  1/11/2017 Multiple myeloma (HCC) ICD-10-CM: C90.00 ICD-9-CM: 203.00  1/2/2017 Postural imbalance ICD-10-CM: R29.3 ICD-9-CM: 729.90  12/14/2016 Polyneuropathy ICD-10-CM: G62.9 ICD-9-CM: 356.9  12/14/2016 Fall ICD-10-CM: W19. Richardean Carbon ICD-9-CM: U492.4  12/14/2016 Encounter for medication management ICD-10-CM: M68.753 ICD-9-CM: V58.69  12/14/2016 Urinary retention ICD-10-CM: R33.9 ICD-9-CM: 788.20  6/6/2016 Colonic mass ICD-10-CM: K63.9 ICD-9-CM: 569.89  3/23/2016 Hyperlipidemia ICD-10-CM: E78.5 ICD-9-CM: 272.4  11/18/2015 Vertigo ICD-10-CM: P90 ICD-9-CM: 780.4  11/18/2015 Malaise and fatigue ICD-10-CM: R53.81, R53.83 ICD-9-CM: 780.79  11/18/2015 Cardiomyopathy ([facility] [address] ICD-10-CM: I42.9 ICD-9-CM: 425.4  11/18/2015 LBBB (left bundle branch block) ICD-10-CM: I44.7 ICD-9-CM: 426.3  11/18/2015 Arthritis ICD-10-CM: M19.90 ICD-9-CM: 716.90  Unknown Arrhythmia ICD-10-CM: I49.9 ICD-9-CM: 427.9  Unknown Overview Signed 6/18/2013  2:07 PM by Lennox Houston, MD LBBB Cholelithiases ICD-10-CM: K80.20 ICD-9-CM: 574.20  Unknown Hx of radiation therapy to prostate ICD-10-CM: Z92.3 ICD-9-CM: V15.3  Unknown Chronic systolic heart failure (HCC) ICD-10-CM: I50.22 ICD-9-CM: 428.22  9/13/2011 Automatic implantable cardioverter-defibrillator in situ ICD-10-CM: Z95.810 ICD-9-CM: V45.02  9/1/2011 PLAN: 
Multiple Myeloma Plan to start oral ixazomib and daratumumab in 1-2 weeks.  
  
Constipation Patient may take his own Primus Belch Fentanyl patch to 100 mcg, Oxy IR 30 mg Q4h prn Neutropenic fever 7/18 Follow cultures, UA, CXR. Continue cefe and zosyn.  
   
   
Supportive care Follow Steven SOPs Currently on Eliquis 5 mg BID, apply SCDs for DVT prophylaxis Lab studies and imaging studies were personally reviewed. Pertinent old records were reviewed. Rama Singh NP [address] [place] [address] Office : [phone] Fax : [phone] Attending Addendum: 
I personally evaluated the patient with Rama Singh N.P.,  and agree with the assessment, findings and plan as documented. Appears stable, heart regular without murmur, lungs clear, abdomen benign. Elderly lady, being managed for MM, recent therapy w cytoxan (2 gm)/ dex earlier in month, now admitted w neutropenic fever. Supportive care as above including broad spectrum abx, f/u cultures. Sonia Carpenter MD 
[facility] [address] [place] [address] Office : [phone] Fax : [phone]

## 2018-07-18 NOTE — IP AVS SNAPSHOT
Roxane Ortega 
 
 
 [address] [facility] [address] [facility] 
[phone] Patient: Shania Boucher MRN: [id number] :1940 A check sandip indicates which time of day the medication should be taken. My Medications START taking these medications Instructions Each Dose to Equal  
 Morning Noon Evening Bedtime  
 levoFLOXacin 500 mg tablet Commonly known as:  Bárbara Fuentes Your last dose was: Your next dose is: Take 1 Tab by mouth daily for 3 days. 500 mg CHANGE how you take these medications Instructions Each Dose to Equal  
 Morning Noon Evening Bedtime * fentaNYL 100 mcg/hr PATCH Commonly known as:  Emily Guitar What changed: - Another medication with the same name was added. Make sure you understand how and when to take each. - Another medication with the same name was removed. Continue taking this medication, and follow the directions you see here. Your last dose was: Your next dose is:    
   
   
 1 Patch by TransDERmal route every seventy-two (72) hours. Max Daily Amount: 1 Patch. 1 Patch * fentaNYL 100 mcg/hr PATCH Commonly known as:  Emily Guitar Start taking on:  2018 What changed: You were already taking a medication with the same name, and this prescription was added. Make sure you understand how and when to take each. Replaces:  fentaNYL 75 mcg/hr Your last dose was: Your next dose is:    
   
   
 1 Patch by TransDERmal route every seventy-two (72) hours. Max Daily Amount: 1 Patch. 1 Patch * fentaNYL 25 mcg/hr PATCH Commonly known as:  Emily Guitar Start taking on:  2018 What changed: You were already taking a medication with the same name, and this prescription was added. Make sure you understand how and when to take each. Your last dose was: Your next dose is: 1 Patch by TransDERmal route every seventy-two (72) hours. Max Daily Amount: 1 Patch. 1 Patch  
    
   
   
   
  
 * oxyCODONE IR 30 mg immediate release tablet Commonly known as:  Miles Seats What changed:  Another medication with the same name was added. Make sure you understand how and when to take each. Your last dose was: Your next dose is: Take 1 Tab by mouth every four (4) hours as needed for Pain. Max Daily Amount: 180 mg.  
 30 mg  
    
   
   
   
  
 * oxyCODONE IR 30 mg immediate release tablet Commonly known as:  Miles Seats What changed: You were already taking a medication with the same name, and this prescription was added. Make sure you understand how and when to take each. Your last dose was: Your next dose is: Take 1 Tab by mouth every four (4) hours as needed. Max Daily Amount: 180 mg.  
 30 mg  
    
   
   
   
  
 * potassium chloride SA 10 mEq capsule Commonly known as:  Maribell Mirza What changed:  Another medication with the same name was added. Make sure you understand how and when to take each. Your last dose was: Your next dose is: Take 1 Cap by mouth two (2) times a day. 10 mEq * potassium chloride 20 mEq tablet Commonly known as:  K-DUR, KLOR-CON What changed: You were already taking a medication with the same name, and this prescription was added. Make sure you understand how and when to take each. Your last dose was: Your next dose is: Take 1 Tab by mouth two (2) times a day. 20 mEq * Notice: This list has 7 medication(s) that are the same as other medications prescribed for you. Read the directions carefully, and ask your doctor or other care provider to review them with you. CONTINUE taking these medications Instructions Each Dose to Equal  
 Morning Noon Evening Bedtime ABREVA 10 % topical cream  
Generic drug:  docosanol Your last dose was: Your next dose is:    
   
   
 Apply  to affected area five (5) times daily. amiodarone 200 mg tablet Commonly known as:  CORDARONE Your last dose was: Your next dose is: Take 1 Tab by mouth daily. Take 200mg tablet twice a day for two weeks, then decrease to 200 mg 1 tablet daily. 200 mg  
    
   
   
   
  
 atorvastatin 20 mg tablet Commonly known as:  LIPITOR Your last dose was: Your next dose is: Take 1 Tab by mouth daily. 20 mg  
    
   
   
   
  
 carvedilol 6.25 mg tablet Commonly known as:  [place] Your last dose was: Your next dose is: Take 1 Tab by mouth two (2) times daily (with meals). 6.25 mg  
    
   
   
   
  
 chlorhexidine 0.12 % solution Commonly known as:  PERIDEX Your last dose was: Your next dose is:    
   
   
 15 mL by Swish and Spit route two (2) times a day. 15 mL CROMOLYN NA Your last dose was: Your next dose is:    
   
   
 by Nasal route. ELIQUIS 5 mg tablet Generic drug:  apixaban Your last dose was: Your next dose is: Take 5 mg by mouth two (2) times a day. 5 mg  
    
   
   
   
  
 ixazomib 2.3 mg Cap Your last dose was: Your next dose is: Take 1 Cap by mouth every seven (7) days. 2.3 mg  
    
   
   
   
  
 levothyroxine 137 mcg tablet Commonly known as:  SYNTHROID Your last dose was: Your next dose is: Take 137 mcg by mouth Daily (before breakfast). 137 mcg  
    
   
   
   
  
 lubiPROStone 24 mcg capsule Commonly known as:  Neetu Space Your last dose was: Your next dose is: Take 1 Cap by mouth two (2) times daily (with meals) for 30 days. 24 mcg magnesium oxide 400 mg tablet Commonly known as:  MAG-OX Your last dose was: Your next dose is: Take 1 Tab by mouth four (4) times daily. 400 mg  
    
   
   
   
  
 mirtazapine 15 mg tablet Commonly known as:  Teri Vasquez Your last dose was: Your next dose is: Take 1 Tab by mouth nightly. 15 mg  
    
   
   
   
  
 omeprazole 20 mg capsule Commonly known as:  PRILOSEC Your last dose was: Your next dose is: Take 20 mg by mouth daily. 20 mg  
    
   
   
   
  
 senna-docusate 8.6-50 mg per tablet Commonly known as:  Mireya Roller Your last dose was: Your next dose is: Take 2 Tabs by mouth two (2) times a day. 2 Tab  
    
   
   
   
  
 torsemide 20 mg tablet Commonly known as:  DEMADEX Your last dose was: Your next dose is: Take 1 Tab by mouth daily. Indications: Edema, Pulmonary Edema due to Chronic Heart Failure 20 mg  
    
   
   
   
  
 VITAMIN B-12 1,000 mcg sublingual tablet Generic drug:  cyanocobalamin Your last dose was: Your next dose is: Take 1,000 mcg by mouth daily. 1000 mcg VITAMIN D3 2,000 unit Tab Generic drug:  cholecalciferol (vitamin D3) Your last dose was: Your next dose is: Take  by mouth. WHEY PROTEIN PO Your last dose was: Your next dose is: Take  by mouth. ASK your doctor about these medications Instructions Each Dose to Equal  
 Morning Noon Evening Bedtime * fentaNYL 75 mcg/hr Commonly known as:  Pattricia Close Replaced by:  fentaNYL 100 mcg/hr PATCH You also have another medication with the same name that you need to continue taking as instructed. Your last dose was: Your next dose is: 1 Patch by TransDERmal route every seventy-two (72) hours. Max Daily Amount: 1 Patch. 1 Patch * Notice: This list has 1 medication(s) that are the same as other medications prescribed for you. Read the directions carefully, and ask your doctor or other care provider to review them with you. Where to Get Your Medications [facility]  [address], [address] [place] [address] Phone:  [phone]  
  levoFLOXacin 500 mg tablet  
 potassium chloride 20 mEq tablet Information on where to get these meds will be given to you by the nurse or doctor. ! Ask your nurse or doctor about these medications  
  fentaNYL 100 mcg/hr PATCH  
 fentaNYL 25 mcg/hr PATCH  
 oxyCODONE IR 30 mg immediate release tablet

## 2018-07-18 NOTE — PROGRESS NOTES
Bedside shift change report given to  (oncoming nurse) by Sonja Thomas RN (offgoing nurse). Report included the following information SBAR, Kardex and MAR. CXR completed, urine specimen sent. Afebrile throughout shift. Oxy IR given x 1. Pt arrived wearing fentanyl patch on upper back; verified with pharmacy ok to leave in place and change tomorrow.

## 2018-07-18 NOTE — IP AVS SNAPSHOT
Emely Traore 
 
 
 [address] 
[phone] Patient: Arsenio Joe MRN: [id number] :1940 About your hospitalization You were admitted on:  2018 You last received care in the:  [address] You were discharged on:  2018 Why you were hospitalized Your primary diagnosis was:  Not on File Your diagnoses also included:  Sepsis (Hcc) Follow-up Information Follow up With Details Comments Contact Info Gisel Barrow MD   [facility] 3 Dulce Johnson 
[phone] Your Scheduled Appointments 2018  9:40 AM EDT  
REMOTE DEVICE CHECK ORDERS ONLY ENCOUNTER with NAOMI REMOTE AICD 62 [facility] CARDIOLOGY [place] OFFICE ([address] [facility]) 2 [place]  
[facility] [address] Guido Cruz   
[phone] Please remember THIS REMOTE CHECK IS COMPLETED FROM HOME - YOU WILL NOT COME TO THE OFFICE FOR THIS APPOINTMENT. In preparation for this check, please ensure your monitor box is working appropriately. If your monitor requires you to send a transmission, please make sure it is sent by 11:00AM on this day so we can have it processed and resulted to your doctor without delay. If you have a question or problem with the monitor box, please contact your respective company:   [address] [place] [address]/[employer] - [phone]  [employer] - [phone]  [employer] - [phone]  [employer] - [phone]  If you have any further questions or need to move this appointment, we are happy to help and can be reached at [phone]. 2018  3:15 PM EDT  
LAB with [address]  
1607 [facility] OUTREACH INSURANCE ([address]) Nik Esqueda 428 685 [facility]  
[phone] 2018  3:30 PM EDT Follow Up with CONCEPCION Bowman  
 [facility] Hematology and Oncology [facility]) C/ Gilberto Abreu [address] [facility], [place] [address]  
[phone] Monday July 23, 2018  3:45 PM EDT Follow Up with Kamilah Pinon MD  
[facility] Hematology and Oncology [facility]) C/ Gilberto Abreu [address] [facility], [place] [address]  
[phone] Tuesday July 24, 2018  9:00 AM EDT Infusion with FLR[facility] INF[facility] ([address] [place] [address]) 5th Floor Infusion [address] [facility] [address] Joni Castellanos [phone]  [facility], [place] [address]  
[phone] For [facility] SURGICAL [facility] Floor [phone] ext. [phone] [facility] Thursday August 09, 2018  1:20 PM EDT Follow Up with Felecia Oakes MD  
[facility] ENDOCRINOLOGY) [address] [place] [address] [address] [place] [address]  
[phone] Friday August 10, 2018 10:15 AM EDT Extended Office Visit with Kalpesh Collado MD  
[facility] ([address] [facility]) Ashley Giraldo [facility] [address] [phone] Thursday August 16, 2018  3:00 PM EDT Office Visit with Sofya Hickey MD  
[address] [facility] [address] (800 [facility]) [address]  
[facility] 400 [facility]  
[phone] Discharge Orders None A check sandip indicates which time of day the medication should be taken. My Medications START taking these medications Instructions Each Dose to Equal  
 Morning Noon Evening Bedtime  
 levoFLOXacin 500 mg tablet Commonly known as:  Niki Farr Your last dose was: Your next dose is: Take 1 Tab by mouth daily for 3 days. 500 mg CHANGE how you take these medications Instructions Each Dose to Equal  
 Morning Noon Evening Bedtime * fentaNYL 100 mcg/hr PATCH Commonly known as:  Anyi Hughes What changed: - Another medication with the same name was added. Make sure you understand how and when to take each. - Another medication with the same name was removed. Continue taking this medication, and follow the directions you see here. Your last dose was: Your next dose is:    
   
   
 1 Patch by TransDERmal route every seventy-two (72) hours. Max Daily Amount: 1 Patch. 1 Patch * fentaNYL 100 mcg/hr PATCH Commonly known as:  Arville Lena Start taking on:  7/22/2018 What changed: You were already taking a medication with the same name, and this prescription was added. Make sure you understand how and when to take each. Replaces:  fentaNYL 75 mcg/hr Your last dose was: Your next dose is:    
   
   
 1 Patch by TransDERmal route every seventy-two (72) hours. Max Daily Amount: 1 Patch. 1 Patch * fentaNYL 25 mcg/hr PATCH Commonly known as:  Arville [place] Start taking on:  7/23/2018 What changed: You were already taking a medication with the same name, and this prescription was added. Make sure you understand how and when to take each. Your last dose was: Your next dose is:    
   
   
 1 Patch by TransDERmal route every seventy-two (72) hours. Max Daily Amount: 1 Patch. 1 Patch  
    
   
   
   
  
 * oxyCODONE IR 30 mg immediate release tablet Commonly known as:  Chase Tristan What changed:  Another medication with the same name was added. Make sure you understand how and when to take each. Your last dose was: Your next dose is: Take 1 Tab by mouth every four (4) hours as needed for Pain. Max Daily Amount: 180 mg.  
 30 mg  
    
   
   
   
  
 * oxyCODONE IR 30 mg immediate release tablet Commonly known as:  Chase Tristan What changed: You were already taking a medication with the same name, and this prescription was added. Make sure you understand how and when to take each. Your last dose was: Your next dose is: Take 1 Tab by mouth every four (4) hours as needed. Max Daily Amount: 180 mg.  
 30 mg  
    
   
   
   
  
 * potassium chloride SA 10 mEq capsule Commonly known as:  Robina Yates What changed:  Another medication with the same name was added. Make sure you understand how and when to take each. Your last dose was: Your next dose is: Take 1 Cap by mouth two (2) times a day. 10 mEq * potassium chloride 20 mEq tablet Commonly known as:  K-DURDARYLOR-CON What changed: You were already taking a medication with the same name, and this prescription was added. Make sure you understand how and when to take each. Your last dose was: Your next dose is: Take 1 Tab by mouth two (2) times a day. 20 mEq * Notice: This list has 7 medication(s) that are the same as other medications prescribed for you. Read the directions carefully, and ask your doctor or other care provider to review them with you. CONTINUE taking these medications Instructions Each Dose to Equal  
 Morning Noon Evening Bedtime ABREVA 10 % topical cream  
Generic drug:  docosanol Your last dose was: Your next dose is:    
   
   
 Apply  to affected area five (5) times daily. amiodarone 200 mg tablet Commonly known as:  CORDARONE Your last dose was: Your next dose is: Take 1 Tab by mouth daily. Take 200mg tablet twice a day for two weeks, then decrease to 200 mg 1 tablet daily. 200 mg  
    
   
   
   
  
 atorvastatin 20 mg tablet Commonly known as:  LIPITOR Your last dose was: Your next dose is: Take 1 Tab by mouth daily. 20 mg  
    
   
   
   
  
 carvedilol 6.25 mg tablet Commonly known as:  August Read Your last dose was: Your next dose is: Take 1 Tab by mouth two (2) times daily (with meals). 6.25 mg  
    
   
   
   
  
 chlorhexidine 0.12 % solution Commonly known as:  PERIDEX Your last dose was: Your next dose is:    
   
   
 15 mL by Swish and Spit route two (2) times a day. 15 mL CROMOLYN NA Your last dose was: Your next dose is:    
   
   
 by Nasal route. ELIQUIS 5 mg tablet Generic drug:  apixaban Your last dose was: Your next dose is: Take 5 mg by mouth two (2) times a day. 5 mg  
    
   
   
   
  
 ixazomib 2.3 mg Cap Your last dose was: Your next dose is: Take 1 Cap by mouth every seven (7) days. 2.3 mg  
    
   
   
   
  
 levothyroxine 137 mcg tablet Commonly known as:  SYNTHROID Your last dose was: Your next dose is: Take 137 mcg by mouth Daily (before breakfast). 137 mcg  
    
   
   
   
  
 lubiPROStone 24 mcg capsule Commonly known as:  Elena Mendez Your last dose was: Your next dose is: Take 1 Cap by mouth two (2) times daily (with meals) for 30 days. 24 mcg  
    
   
   
   
  
 magnesium oxide 400 mg tablet Commonly known as:  MAG-OX Your last dose was: Your next dose is: Take 1 Tab by mouth four (4) times daily. 400 mg  
    
   
   
   
  
 mirtazapine 15 mg tablet Commonly known as:  Al Rosenberg Your last dose was: Your next dose is: Take 1 Tab by mouth nightly. 15 mg  
    
   
   
   
  
 omeprazole 20 mg capsule Commonly known as:  PRILOSEC Your last dose was: Your next dose is: Take 20 mg by mouth daily. 20 mg  
    
   
   
   
  
 senna-docusate 8.6-50 mg per tablet Commonly known as:  Librado Khalil Your last dose was: Your next dose is: Take 2 Tabs by mouth two (2) times a day. 2 Tab torsemide 20 mg tablet Commonly known as:  DEMADEX Your last dose was: Your next dose is: Take 1 Tab by mouth daily. Indications: Edema, Pulmonary Edema due to Chronic Heart Failure 20 mg  
    
   
   
   
  
 VITAMIN B-12 1,000 mcg sublingual tablet Generic drug:  cyanocobalamin Your last dose was: Your next dose is: Take 1,000 mcg by mouth daily. 1000 mcg VITAMIN D3 2,000 unit Tab Generic drug:  cholecalciferol (vitamin D3) Your last dose was: Your next dose is: Take  by mouth. WHEY PROTEIN PO Your last dose was: Your next dose is: Take  by mouth. ASK your doctor about these medications Instructions Each Dose to Equal  
 Morning Noon Evening Bedtime * fentaNYL 75 mcg/hr Commonly known as:  Bella Oreilly Replaced by:  fentaNYL 100 mcg/hr PATCH You also have another medication with the same name that you need to continue taking as instructed. Your last dose was: Your next dose is:    
   
   
 1 Patch by TransDERmal route every seventy-two (72) hours. Max Daily Amount: 1 Patch. 1 Patch * Notice: This list has 1 medication(s) that are the same as other medications prescribed for you. Read the directions carefully, and ask your doctor or other care provider to review them with you. Where to Get Your Medications [facility]  [address], [address] [place] [address] Phone:  [phone]  
  levoFLOXacin 500 mg tablet  
 potassium chloride 20 mEq tablet Information on where to get these meds will be given to you by the nurse or doctor. !  Ask your nurse or doctor about these medications  
  fentaNYL 100 mcg/hr PATCH  
 fentaNYL 25 mcg/hr PATCH  
 oxyCODONE IR 30 mg immediate release tablet Opioid Education Prescription Opioids: What You Need to Know: 
 
 
After general anesthesia or intravenous sedation, for 24 hours or while taking prescription Narcotics: · Limit your activities · Do not drive and operate hazardous machinery · Do not make important personal or business decisions · Do  not drink alcoholic beverages · If you have not urinated within 8 hours after discharge, please contact your surgeon on call. Report the following to your surgeon: 
· Excessive pain, swelling, redness or odor of or around the surgical area · Temperature over 100.5 · Nausea and vomiting lasting longer than 4 hours or if unable to take medications · Any signs of decreased circulation or nerve impairment to extremity: change in color, persistent  numbness, tingling, coldness or increase pain · Any questions What to do at Home: 
Recommended activity: As tolerated If you experience any of the following symptoms: 
 
- Fever greater than 100.5 
- Uncontrolled Nausea and Vomiting 
- Uncontrolled pain 
- Changes in respiratory status *  Please give a list of your current medications to your Primary Care Provider. *  Please update this list whenever your medications are discontinued, doses are 
    changed, or new medications (including over-the-counter products) are added. *  Please carry medication information at all times in case of emergency situations. These are general instructions for a healthy lifestyle: No smoking/ No tobacco products/ Avoid exposure to second hand smoke Surgeon General's Warning:  Quitting smoking now greatly reduces serious risk to your health. Obesity, smoking, and sedentary lifestyle greatly increases your risk for illness A healthy diet, regular physical exercise & weight monitoring are important for maintaining a healthy lifestyle You may be retaining fluid if you have a history of heart failure or if you experience any of the following symptoms:  Weight gain of 3 pounds or more overnight or 5 pounds in a week, increased swelling in our hands or feet or shortness of breath while lying flat in bed. Please call your doctor as soon as you notice any of these symptoms; do not wait until your next office visit. Recognize signs and symptoms of STROKE: 
 
F-face looks uneven A-arms unable to move or move unevenly S-speech slurred or non-existent T-time-call 911 as soon as signs and symptoms begin-DO NOT go Back to bed or wait to see if you get better-TIME IS BRAIN. Warning Signs of HEART ATTACK Call 911 if you have these symptoms: 
? Chest discomfort. Most heart attacks involve discomfort in the center of the chest that lasts more than a few minutes, or that goes away and comes back. It can feel like uncomfortable pressure, squeezing, fullness, or pain. ? Discomfort in other areas of the upper body. Symptoms can include pain or discomfort in one or both arms, the back, neck, jaw, or stomach. ? Shortness of breath with or without chest discomfort. ? Other signs may include breaking out in a cold sweat, nausea, or lightheadedness. Don't wait more than five minutes to call [address]! Fast action can save your life. Calling 911 is almost always the fastest way to get lifesaving treatment. Emergency Medical Services staff can begin treatment when they arrive  up to an hour sooner than if someone gets to the hospital by car. The discharge information has been reviewed with the {PATIENT PARENT GUARDIAN:20917}. The {PATIENT PARENT GUARDIAN:62198} verbalized understanding. Discharge medications reviewed with the {Terry meds reviewed OUJZ:23806} and appropriate educational materials and side effects teaching were provided. ___________________________________________________________________________________________________________________________________ DISCHARGE INSTRUCTIONS FOR:  NEUTROPENIC PRECAUTIONS Occasionally your chemotherapy can lower your white blood cell count, making you more susceptible to infection. There are certain precautions that you should take in order to help avoid illnesses and infections. These include: 1. Practice good hand washing: * Use soap and water for at least 15 seconds, covering all areas of your hands. * Always wash your hands before eating. * Wash your hands after contact with public surfaces such as door knobs and 
   handles, shopping carts, telephone and elevator buttons. 2.  Avoid large crowds of people (for example: shopping malls, Taoist, airplanes, etc.) * Consider wearing a mask if you must go to densely crowded places. * Avoid sick people and school age children (they often carry more germs with or without symptoms) 3. Avoid fresh flowers (they can carry small insects, fungus and mold). 4.  Thoroughly wash fresh fruits and vegetables: * Peel outer skins and layers (they may also carry small insects and harbor bacteria on their outer layers); it is often better to eat cooked vegetables instead of raw. * Avoid salad bars 5. Cook all meat thoroughly before eating. Eat nutritiously. 6.  Avoid cleaning litter boxes, bird cages, etc.  Avoid gardening or digging until your white blood count improves. 7.  Do not receive vaccinations, especially live vaccinations (and avoid contact with other adults or children who have received that sort of vaccination). Monitor your temperature daily. Contact your physician immediately if you develop any of the following symptoms: Temperature of 100.5 Fahrenheit or greater; Increasing or persistent fatigue; Chills; 
Burning or painful urination; 
A general feeling of being unwell; Shortness of breath, chest pain or productive cough. Francis Valdez, Signature: ________________________ 7/21/2018 Jennifer Clark [employer] Announcement We are excited to announce that we are making your provider's discharge notes available to you in Attunityhart. You will see these notes when they are completed and signed by the physician that discharged you from your recent hospital stay. If you have any questions or concerns about any information you see in Attunityhart, please call the Health Information Department where you were seen or reach out to your Primary Care Provider for more information about your plan of care. Introducing [facility]! Dear Niranjan Schreiber: Thank you for requesting a Mapluck account. Our records indicate that you already have an active Mapluck account. You can access your account anytime at [email or web address] Did you know that you can access your hospital and ER discharge instructions at any time in Mapluck? You can also review all of your test results from your hospital stay or ER visit. Additional Information If you have questions, please visit the Frequently Asked Questions section of the Mapluck website at [email or web address] Remember, Mapluck is NOT to be used for urgent needs. For medical emergencies, dial 911. Now available from your iPhone and Android! Introducing Nnamdi Cantu As a [employer] patient, I wanted to make you aware of our electronic visit tool called Nnamdi Cantu. [employer] [facility] 24/7 allows you to connect within minutes with a medical provider 24 hours a day, seven days a week via a mobile device or tablet or logging into a secure website from your computer.   You can access [employer] 24/7 from anywhere in the [place]. A virtual visit might be right for you when you have a simple condition and feel like you just dont want to get out of bed, or cant get away from work for an appointment, when your regular [facility] provider is not available (evenings, weekends or holidays), or when youre out of town and need minor care. Electronic visits cost only $49 and if the [employer] 24/7 provider determines a prescription is needed to treat your condition, one can be electronically transmitted to a nearby pharmacy*. Please take a moment to enroll today if you have not already done so. The enrollment process is free and takes just a few minutes. To enroll, please download the [employer] 24/7 vadim to your tablet or phone, or visit [email or web address] to enroll on your computer. And, as an [address] [place] [address] patient with a MIGSIF account, the results of your visits will be scanned into your electronic medical record and your primary care provider will be able to view the scanned results. We urge you to continue to see your regular [facility] provider for your ongoing medical care. And while your primary care provider may not be the one available when you seek a [facility] virtual visit, the peace of mind you get from getting a real diagnosis real time can be priceless. For more information on [facility], view our Frequently Asked Questions (FAQs) at [email or web address]. Sincerely, 
 
Elena Lloyd MD 
Chief Medical Officer [address] Isabel Culver *:  certain medications cannot be prescribed via [facility] Providers Seen During Your Hospitalization Provider Specialty Primary office phone Leopoldo Darner, MD Hematology and Oncology [phone] Your Primary Care Physician (PCP) Primary Care Physician Office Phone Office Fax Brian Freed [phone] You are allergic to the following Allergen Reactions Ace Inhibitors Other (comments) Lisinopril Cough Pcn (Penicillins) Swelling Recent Documentation Weight BMI Smoking Status 81.2 kg 22.98 kg/m2 Never Smoker Emergency Contacts Name Discharge Info Relation Home Work Mobile [address] [facility] CAREGIVER [3] Spouse [3] [phone] Patient Belongings The following personal items are in your possession at time of discharge: 
     Visual Aid: Glasses, With patient      Home Medications: None Please provide this summary of care documentation to your next provider. Signatures-by signing, you are acknowledging that this After Visit Summary has been reviewed with you and you have received a copy. Patient Signature:  ____________________________________________________________ Date:  ____________________________________________________________  
  
[facility] Signature:  ____________________________________________________________ Date:  ____________________________________________________________

## 2018-07-18 NOTE — IP AVS SNAPSHOT
Summary of Care Report The Summary of Care report has been created to help improve care coordination. Users with access to [employer] or [employer] [address] (Web-based application) may access additional patient information including the Discharge Summary. If you are not currently a [address] user and need more information, please call the number listed below in the [place] [address] section and ask to be connected with Medical Records. Facility Information Name Address Phone [address] [address] [address] [phone] Patient Information Patient Name Sex BUNNY Woods ([id number]) Male 1940 Discharge Information Admitting Provider Service Area Unit Ramon Montes De Oca MD / [address] / [phone] Discharge Provider Discharge Date/Time Discharge Disposition Destination (none) 2018 (Pending) AHR (none) Patient Language Language ENGLISH [13] Hospital Problems as of 2018  Reviewed: 2018  5:45 AM by Nicole Garzon MD  
  
  
  
 Class Noted - Resolved Last Modified POA Active Problems Sepsis ([facility]  2018 - Present 2018 by Talia Velez NP Unknown Entered by Talia Velez NP Non-Hospital Problems as of 2018  Reviewed: 2018  5:45 AM by Nicole Garzon MD  
  
  
  
 Class Noted - Resolved Last Modified Active Problems Chronic systolic heart failure ([address]  2011 - Present 2011 Entered by Jermaine Harper MD  
  Arthritis  Unknown - Present 2013 by Ruben Mendenhall MD  
  Entered by Ruben Mendenhall MD  
  Arrhythmia  Unknown - Present 2013 by Ruben Mendenhall MD  
  Entered by Ruben Mendenhall MD  
  Overview Signed 2013  2:07 PM by Ruben Mendenhall MD  
   [facility] Automatic implantable cardioverter-defibrillator in situ  9/1/2011 - Present 6/16/2016 by Kaci Norton MD  
  Entered by Beatriz Tena MD  
  Cholelithiases  Unknown - Present 6/18/2013 by Beatriz Tena MD  
  Entered by Beatriz Tena MD  
  Hx of radiation therapy to prostate  Unknown - Present 6/18/2013 by Beatriz Tena MD  
  Entered by Beatriz Tena MD  
  Hyperlipidemia  11/18/2015 - Present 11/18/2015 by Tammi Cordial  
  Entered by Iva Lu  
  Vertigo  11/18/2015 - Present 11/18/2015 by Tammi Cordial  
  Entered by Iva Lu  
  Malaise and fatigue  11/18/2015 - Present 11/18/2015 by Tammi Cordial  
  Entered by Iva Lu  
  Cardiomyopathy ([facility] [address]  11/18/2015 - Present 11/18/2015 by Iva Lu  
  Entered by Iva Lu  
  LBBB (left bundle branch block)  11/18/2015 - Present 11/18/2015 by Tammi Cordial  
  Entered by Iva Lu  
  Colonic mass  3/23/2016 - Present 3/23/2016 by Shant Gamble MD  
  Entered by Shant Gamble MD  
  Urinary retention  6/6/2016 - Present 6/6/2016 by Tammy Robles MD  
  Entered by Tammy Robles MD  
  Postural imbalance  12/14/2016 - Present 12/14/2016 by Helen Menard MD  
  Entered by Helen Menard MD  
  Polyneuropathy  12/14/2016 - Present 12/14/2016 by Helen Menard MD  
  Entered by Helen Menard MD  
  Fall  12/14/2016 - Present 12/14/2016 by Helen Menard MD  
  Entered by Helen Menard MD  
  Encounter for medication management  12/14/2016 - Present 12/14/2016 by Helen Menard MD  
  Entered by Helen Menard MD  
  Multiple myeloma [facility])  1/2/2017 - Present 5/20/2018 by Talisha Bryson NP Entered by Domenic Beatty MD  
  Intractable pain  1/11/2017 - Present 7/6/2018 by Talisha Bryson NP Entered by Talisha Bryson NP Acute kidney injury ([address]  1/11/2017 - Present 1/11/2017 by Talisha Bryson NP Entered by Talisha Bryson NP   Pancytopenia ([address]  1/11/2017 - Present 1/11/2017 by Talisha Bryson NP  
 Entered by Thuan Channel, NP Constipation  1/11/2017 - Present 1/11/2017 by [place] Channel, NP Entered by Thuan Channel, NP Acute renal failure (ARF) ([facility] [address]  11/18/2017 - Present 11/18/2017 by Kiera Blas MD  
  Entered by Kiera Blas MD  
  Pain  11/20/2017 - Present 11/26/2017 by Komal Rodriguez PA Entered by [place] Channel, NP  
  MARY (acute kidney injury) ([address]  11/20/2017 - Present 7/6/2018 by Thuan Channel, NP Entered by Thuan Channel, NP  
  PAF (paroxysmal atrial fibrillation) ([facility] [address]  11/26/2017 - Present 11/26/2017 by Komal Rodriguez, PA Entered by GATITO Ledesma Chronic systolic CHF (congestive heart failure) ([facility] [address]  11/26/2017 - Present 11/26/2017 by Komal Rodriguez, PA Entered by GATITO Ledesma Sinus bradycardia  1/12/2018 - Present 1/12/2018 by Cathy Barron RN Entered by Cathy Barron RN Thyroid mass  2/8/2018 - Present 2/8/2018 by Kiera Blas MD  
  Entered by Kiera Blas MD  
  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  
  Entered by Yasmine Judd MD  
  Postsurgical hypothyroidism  3/15/2018 - Present 3/15/2018 by Yasmine Judd MD  
  Entered by Yasmine Judd MD  
  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  
  Entered by Yasmine Judd MD  
  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  
  Entered by Charlene Long MD  
  Compression fracture of lumbar vertebra ([facility] [address]  5/15/2018 - Present 5/20/2018 by [place] Channel, NP Entered by Erasmo Vazquez NP You are allergic to the following Allergen Reactions Ace Inhibitors Other (comments) Lisinopril Cough Pcn (Penicillins) Swelling Current Discharge Medication List  
  
START taking these medications Dose & Instructions Dispensing Information Comments  
 levoFLOXacin 500 mg tablet Commonly known as:  Chuck Bitter Dose:  500 mg Take 1 Tab by mouth daily for 3 days. Quantity:  3 Tab Refills:  0 CONTINUE these medications which have CHANGED Dose & Instructions Dispensing Information Comments * fentaNYL 100 mcg/hr PATCH Commonly known as:  Govind Smart What changed: - Another medication with the same name was added. Make sure you understand how and when to take each. - Another medication with the same name was removed. Continue taking this medication, and follow the directions you see here. Dose:  1 Patch 1 Patch by TransDERmal route every seventy-two (72) hours. Max Daily Amount: 1 Patch. Quantity:  10 Patch Refills:  0  
   
 * fentaNYL 100 mcg/hr PATCH Commonly known as:  Govind Smart Start taking on:  7/22/2018 What changed: You were already taking a medication with the same name, and this prescription was added. Make sure you understand how and when to take each. Replaces:  fentaNYL 75 mcg/hr Dose:  1 Patch 1 Patch by TransDERmal route every seventy-two (72) hours. Max Daily Amount: 1 Patch. Quantity:  10 Patch Refills:  0  
   
 * fentaNYL 25 mcg/hr PATCH Commonly known as:  Govind Smart Start taking on:  7/23/2018 What changed: You were already taking a medication with the same name, and this prescription was added. Make sure you understand how and when to take each. Dose:  1 Patch 1 Patch by TransDERmal route every seventy-two (72) hours. Max Daily Amount: 1 Patch. Quantity:  10 Patch Refills:  0  
   
 * oxyCODONE IR 30 mg immediate release tablet Commonly known as:  Porsha Larson What changed:  Another medication with the same name was added. Make sure you understand how and when to take each. Dose:  30 mg Take 1 Tab by mouth every four (4) hours as needed for Pain. Max Daily Amount: 180 mg.  
 Quantity:  180 Tab Refills:  0  
   
 * oxyCODONE IR 30 mg immediate release tablet Commonly known as:  Shana Moses What changed: You were already taking a medication with the same name, and this prescription was added. Make sure you understand how and when to take each. Dose:  30 mg Take 1 Tab by mouth every four (4) hours as needed. Max Daily Amount: 180 mg.  
 Quantity:  90 Tab Refills:  0  
   
 * potassium chloride SA 10 mEq capsule Commonly known as:  Elsi Mcghee What changed:  Another medication with the same name was added. Make sure you understand how and when to take each. Dose:  10 mEq Take 1 Cap by mouth two (2) times a day. Quantity:  180 Cap Refills:  3  
   
 * potassium chloride 20 mEq tablet Commonly known as:  K-DUR, KLOR-CON What changed: You were already taking a medication with the same name, and this prescription was added. Make sure you understand how and when to take each. Dose:  20 mEq Take 1 Tab by mouth two (2) times a day. Quantity:  60 Tab Refills:  0  
   
 * Notice: This list has 7 medication(s) that are the same as other medications prescribed for you. Read the directions carefully, and ask your doctor or other care provider to review them with you. CONTINUE these medications which have NOT CHANGED Dose & Instructions Dispensing Information Comments ABREVA 10 % topical cream  
Generic drug:  docosanol Apply  to affected area five (5) times daily. Refills:  0  
   
 amiodarone 200 mg tablet Commonly known as:  CORDARONE Dose:  200 mg Take 1 Tab by mouth daily. Take 200mg tablet twice a day for two weeks, then decrease to 200 mg 1 tablet daily. Quantity:  45 Tab Refills:  0  
   
 atorvastatin 20 mg tablet Commonly known as:  LIPITOR Dose:  20 mg Take 1 Tab by mouth daily. Quantity:  90 Tab Refills:  3  
   
 carvedilol 6.25 mg tablet Commonly known as:  Navid Jiménez Dose:  6.25 mg Take 1 Tab by mouth two (2) times daily (with meals). Quantity:  60 Tab Refills:  0 chlorhexidine 0.12 % solution Commonly known as:  PERIDEX Dose:  15 mL  
15 mL by Swish and Spit route two (2) times a day. Refills:  0  
   
 CROMOLYN NA  
 by Nasal route. Refills:  0  
   
 ELIQUIS 5 mg tablet Generic drug:  apixaban Dose:  5 mg Take 5 mg by mouth two (2) times a day. Refills:  0  
   
 ixazomib 2.3 mg Cap Dose:  2.3 mg Take 1 Cap by mouth every seven (7) days. Quantity:  4 Cap Refills:  0  
   
 levothyroxine 137 mcg tablet Commonly known as:  SYNTHROID Dose:  137 mcg Take 137 mcg by mouth Daily (before breakfast). Quantity:  90 Tab Refills:  3  
   
 lubiPROStone 24 mcg capsule Commonly known as:  Anne Nab Dose:  24 mcg Take 1 Cap by mouth two (2) times daily (with meals) for 30 days. Quantity:  24 Cap Refills:  0  
   
 magnesium oxide 400 mg tablet Commonly known as:  MAG-OX Dose:  400 mg Take 1 Tab by mouth four (4) times daily. Quantity:  60 Tab Refills:  0  
   
 mirtazapine 15 mg tablet Commonly known as:  Britt Tari Dose:  15 mg Take 1 Tab by mouth nightly. Quantity:  30 Tab Refills:  6  
   
 omeprazole 20 mg capsule Commonly known as:  PRILOSEC Dose:  20 mg Take 20 mg by mouth daily. Refills:  0  
   
 senna-docusate 8.6-50 mg per tablet Commonly known as:  Baptiste Akin Dose:  2 Tab Take 2 Tabs by mouth two (2) times a day. Quantity:  60 Tab Refills:  1  
   
 torsemide 20 mg tablet Commonly known as:  DEMADEX Dose:  20 mg Take 1 Tab by mouth daily. Indications: Edema, Pulmonary Edema due to Chronic Heart Failure Quantity:  30 Tab Refills:  11 VITAMIN B-12 1,000 mcg sublingual tablet Generic drug:  cyanocobalamin Dose:  1000 mcg Take 1,000 mcg by mouth daily. Refills:  0  
   
 VITAMIN D3 2,000 unit Tab Generic drug:  cholecalciferol (vitamin D3) Take  by mouth. Refills:  0 WHEY PROTEIN PO Take  by mouth. Refills:  0 ASK your doctor about these medications Dose & Instructions Dispensing Information Comments * fentaNYL 75 mcg/hr Commonly known as:  Chelsy Parlor Replaced by:  fentaNYL 100 mcg/hr PATCH You also have another medication with the same name that you need to continue taking as instructed. Dose:  1 Patch 1 Patch by TransDERmal route every seventy-two (72) hours. Max Daily Amount: 1 Patch. Quantity:  5 Patch Refills:  0  
   
 * Notice: This list has 1 medication(s) that are the same as other medications prescribed for you. Read the directions carefully, and ask your doctor or other care provider to review them with you. Current Immunizations Name Date Influenza Vaccine 10/2/2015, 10/13/2013, 1/28/2010 Pneumococcal Vaccine (Unspecified Type) 10/18/2012 TB Skin Test (PPD) Intradermal 5/16/2018, 1/13/2017 Follow-up Information Follow up With Details Comments Contact Info Janel Garcia MD   [address] 
[phone] Discharge Instructions PATIENT INSTRUCTIONS: 
 
 
F-face looks uneven A-arms unable to move or move unevenly S-speech slurred or non-existent T-time-call 911 as soon as signs and symptoms begin-DO NOT go Back to bed or wait to see if you get better-TIME IS BRAIN. Warning Signs of HEART ATTACK Call 911 if you have these symptoms: 
? Chest discomfort. Most heart attacks involve discomfort in the center of the chest that lasts more than a few minutes, or that goes away and comes back. It can feel like uncomfortable pressure, squeezing, fullness, or pain. ? Discomfort in other areas of the upper body.  Symptoms can include pain or discomfort in one or both arms, the back, neck, jaw, or stomach. ? Shortness of breath with or without chest discomfort. ? Other signs may include breaking out in a cold sweat, nausea, or lightheadedness. Don't wait more than five minutes to call [address]! Fast action can save your life. Calling 911 is almost always the fastest way to get lifesaving treatment. Emergency Medical Services staff can begin treatment when they arrive  up to an hour sooner than if someone gets to the hospital by car. The discharge information has been reviewed with the {PATIENT PARENT GUARDIAN:74278}. The {PATIENT PARENT GUARDIAN:83349} verbalized understanding. Discharge medications reviewed with the {Dishcarge meds reviewed OLTA:90165} and appropriate educational materials and side effects teaching were provided. ___________________________________________________________________________________________________________________________________ DISCHARGE INSTRUCTIONS FOR:  NEUTROPENIC PRECAUTIONS Occasionally your chemotherapy can lower your white blood cell count, making you more susceptible to infection. There are certain precautions that you should take in order to help avoid illnesses and infections. These include: 1. Practice good hand washing: * Use soap and water for at least 15 seconds, covering all areas of your hands. * Always wash your hands before eating. * Wash your hands after contact with public surfaces such as door knobs and 
   handles, shopping carts, telephone and elevator buttons. 2.  Avoid large crowds of people (for example: shopping malls, Islam, airplanes, etc.) * Consider wearing a mask if you must go to densely crowded places. * Avoid sick people and school age children (they often carry more germs with or without symptoms) 3. Avoid fresh flowers (they can carry small insects, fungus and mold). 4.  Thoroughly wash fresh fruits and vegetables: * Peel outer skins and layers (they may also carry small insects and harbor bacteria on their outer layers); it is often better to eat cooked vegetables instead of raw. * Avoid salad bars 5. Cook all meat thoroughly before eating. Eat nutritiously. 6.  Avoid cleaning litter boxes, bird cages, etc.  Avoid gardening or digging until your white blood count improves. 7.  Do not receive vaccinations, especially live vaccinations (and avoid contact with other adults or children who have received that sort of vaccination). Monitor your temperature daily. Contact your physician immediately if you develop any of the following symptoms: 
 
Temperature of 100.5 Fahrenheit or greater; Increasing or persistent fatigue; Chills; 
Burning or painful urination; 
A general feeling of being unwell; Shortness of breath, chest pain or productive cough. Bevelyn Check, Signature: ________________________ 7/21/2018 Richardine Magic Chart Review Routing History Recipient Method Report Sent By Merritt Vaughan MD [[id number]] 2/1/2012  2:06 PM 02/01/2012 Indio Espinosa DO Phone: [phone] In [employer] Routed EsauNatalie, [address] 11/28/2017  9:29 PM 11/28/2017 Eliseo Carranza MD  
Phone: [phone] In Mark Navas MD [[id number]] 12/7/2017 11:09 AM   
 Jeniffer Knox MD  
Fax: [phone] Phone: [phone] Fax Notes Report Jose Cho MD [[id number]] 3/15/2018  7:00 PM 3/15/2018 Indio Espinosa DO Phone: [phone] In Basket Notes Report Jose Cho MD [[id number]] 3/15/2018  7:00 PM 3/15/2018 Eliseo Carranza MD  
Phone: [phone] In Basket Notes Report Jose Cho MD [[id number]] 3/15/2018  7:00 PM 3/15/2018 Eliseo Carranza MD  
Phone: [phone] In Basket Notes Report Jose Cho MD [[id number]] 5/3/2018  1:24 PM 5/3/2018 Earl Ratliff MD  
Fax: [phone] Phone: [phone] Fax Notes Report Tawnya Vázquez MD [[id number]] 5/3/2018  1:24 PM 5/3/2018

## 2018-07-18 NOTE — PROGRESS NOTES
Pharmacokinetic Consult to Pharmacist    Niyah Hall is a 65 y.o. male being treated for febrile neutropenia with vancomycin and cefepime. Lab Results   Component Value Date/Time    BUN 15 07/18/2018 11:27 AM    Creatinine 1.51 (H) 07/18/2018 11:27 AM    WBC 0.5 (LL) 07/18/2018 11:27 AM      CrCl cannot be calculated (Unknown ideal weight.). CULTURES:  pending    Day 1 of vancomycin. Goal trough is 15-20. Vancomycin dose initiated at 2000 mg IV x 1 followed by 1500 mg IV q24h. Levels will be ordered as clinically indicated. Pharmacy will continue to follow. Please call with any questions.      Thank you,  Amelie Cummins, PharmD  Clinical Pharmacist  [phone]

## 2018-07-18 NOTE — PROGRESS NOTES
07/18/18 1248   Dual Skin Pressure Injury Assessment   Dual Skin Pressure Injury Assessment WDL   Second Care Provider (Based on [address] [place] [address]) Luz Elena Mayes RN

## 2018-07-18 NOTE — PROGRESS NOTES
made initial visit. Pt was alert and verbal watching TV with his food tray on the table untouched. Pt appeared comfortable with no pain level expressed or observed.  welcomed pt to [facility] and shared information about  services.  provided spiritual care through presence, pastoral conversation, and assurance of prayer.

## 2018-07-18 NOTE — PROGRESS NOTES
Pt to area c/o weakness. Noted pt hot to touch with fever of 101.1. Labs drawn with blood cultures also X 2 and NP notified. Pt received hydration, tolerated well. Pt admitted to rm 504 with report to primary RN.

## 2018-07-18 NOTE — PROGRESS NOTES
Pt received to room 504 via WC from infusion center. Spouse at bedside. Resting in bed, NAD, VSS. Pt instructed to use call bell for any needs.

## 2018-07-19 ENCOUNTER — APPOINTMENT (OUTPATIENT)
Dept: ULTRASOUND IMAGING | Age: 78
DRG: 809 | End: 2018-07-19
Attending: NURSE PRACTITIONER
Payer: MEDICARE

## 2018-07-19 LAB
ALBUMIN SERPL-MCNC: 2.2 G/DL (ref 3.2–4.6)
ALBUMIN/GLOB SERPL: 0.6 {RATIO} (ref 1.2–3.5)
ALP SERPL-CCNC: 77 U/L (ref 50–136)
ALT SERPL-CCNC: 49 U/L (ref 12–65)
ANION GAP SERPL CALC-SCNC: 8 MMOL/L (ref 7–16)
APPEARANCE UR: CLEAR
AST SERPL-CCNC: 55 U/L (ref 15–37)
BILIRUB SERPL-MCNC: 0.4 MG/DL (ref 0.2–1.1)
BILIRUB UR QL: NEGATIVE
BUN SERPL-MCNC: 12 MG/DL (ref 8–23)
CALCIUM SERPL-MCNC: 7.5 MG/DL (ref 8.3–10.4)
CHLORIDE SERPL-SCNC: 106 MMOL/L (ref 98–107)
CO2 SERPL-SCNC: 22 MMOL/L (ref 21–32)
COLOR UR: YELLOW
CREAT SERPL-MCNC: 1.1 MG/DL (ref 0.8–1.5)
DIFFERENTIAL METHOD BLD: ABNORMAL
ERYTHROCYTE [DISTWIDTH] IN BLOOD BY AUTOMATED COUNT: 16.8 % (ref 11.9–14.6)
GLOBULIN SER CALC-MCNC: 3.4 G/DL (ref 2.3–3.5)
GLUCOSE SERPL-MCNC: 75 MG/DL (ref 65–100)
GLUCOSE UR STRIP.AUTO-MCNC: NEGATIVE MG/DL
HCT VFR BLD AUTO: 21.1 % (ref 41.1–50.3)
HGB BLD-MCNC: 7 G/DL (ref 13.6–17.2)
HGB UR QL STRIP: NEGATIVE
KETONES UR QL STRIP.AUTO: NEGATIVE MG/DL
LEUKOCYTE ESTERASE UR QL STRIP.AUTO: NEGATIVE
MAGNESIUM SERPL-MCNC: 1.6 MG/DL (ref 1.8–2.4)
MCH RBC QN AUTO: 23.7 PG (ref 26.1–32.9)
MCHC RBC AUTO-ENTMCNC: 33.7 G/DL (ref 31.4–35)
MCV RBC AUTO: 70.5 FL (ref 79.6–97.8)
NITRITE UR QL STRIP.AUTO: NEGATIVE
PH UR STRIP: 6 [PH] (ref 5–9)
PLATELET # BLD AUTO: 128 K/UL (ref 150–450)
PLATELET COMMENTS,PCOM: ABNORMAL
PMV BLD AUTO: 8.9 FL (ref 10.8–14.1)
POTASSIUM SERPL-SCNC: 3.9 MMOL/L (ref 3.5–5.1)
PROT SERPL-MCNC: 5.6 G/DL (ref 6.3–8.2)
PROT UR STRIP-MCNC: NEGATIVE MG/DL
RBC # BLD AUTO: 2.95 M/UL (ref 4.23–5.67)
RBC MORPH BLD: ABNORMAL
SODIUM SERPL-SCNC: 136 MMOL/L (ref 136–145)
SP GR UR REFRACTOMETRY: 1.02 (ref 1–1.02)
UROBILINOGEN UR QL STRIP.AUTO: 0.2 EU/DL (ref 0.2–1)
WBC # BLD AUTO: 0.4 K/UL (ref 4.3–11.1)
WBC MORPH BLD: ABNORMAL

## 2018-07-19 PROCEDURE — 74011250636 HC RX REV CODE- 250/636: Performed by: NURSE PRACTITIONER

## 2018-07-19 PROCEDURE — 86923 COMPATIBILITY TEST ELECTRIC: CPT | Performed by: INTERNAL MEDICINE

## 2018-07-19 PROCEDURE — 83735 ASSAY OF MAGNESIUM: CPT | Performed by: NURSE PRACTITIONER

## 2018-07-19 PROCEDURE — 30233N1 TRANSFUSION OF NONAUTOLOGOUS RED BLOOD CELLS INTO PERIPHERAL VEIN, PERCUTANEOUS APPROACH: ICD-10-PCS | Performed by: INTERNAL MEDICINE

## 2018-07-19 PROCEDURE — 86644 CMV ANTIBODY: CPT | Performed by: INTERNAL MEDICINE

## 2018-07-19 PROCEDURE — 81003 URINALYSIS AUTO W/O SCOPE: CPT | Performed by: NURSE PRACTITIONER

## 2018-07-19 PROCEDURE — 36430 TRANSFUSION BLD/BLD COMPNT: CPT

## 2018-07-19 PROCEDURE — 74011000258 HC RX REV CODE- 258: Performed by: NURSE PRACTITIONER

## 2018-07-19 PROCEDURE — 86900 BLOOD TYPING SEROLOGIC ABO: CPT | Performed by: INTERNAL MEDICINE

## 2018-07-19 PROCEDURE — 85025 COMPLETE CBC W/AUTO DIFF WBC: CPT | Performed by: NURSE PRACTITIONER

## 2018-07-19 PROCEDURE — P9040 RBC LEUKOREDUCED IRRADIATED: HCPCS | Performed by: INTERNAL MEDICINE

## 2018-07-19 PROCEDURE — 74011250637 HC RX REV CODE- 250/637: Performed by: NURSE PRACTITIONER

## 2018-07-19 PROCEDURE — 74011250637 HC RX REV CODE- 250/637: Performed by: INTERNAL MEDICINE

## 2018-07-19 PROCEDURE — 77030039270 HC TU BLD FLTR CARD -A

## 2018-07-19 PROCEDURE — 80053 COMPREHEN METABOLIC PANEL: CPT | Performed by: NURSE PRACTITIONER

## 2018-07-19 PROCEDURE — 77030020263 HC SOL INJ SOD CL0.9% LFCR 1000ML

## 2018-07-19 PROCEDURE — 65270000029 HC RM PRIVATE

## 2018-07-19 PROCEDURE — 93971 EXTREMITY STUDY: CPT

## 2018-07-19 PROCEDURE — 74011250636 HC RX REV CODE- 250/636: Performed by: INTERNAL MEDICINE

## 2018-07-19 PROCEDURE — 99233 SBSQ HOSP IP/OBS HIGH 50: CPT | Performed by: INTERNAL MEDICINE

## 2018-07-19 PROCEDURE — 87086 URINE CULTURE/COLONY COUNT: CPT | Performed by: NURSE PRACTITIONER

## 2018-07-19 RX ORDER — SODIUM CHLORIDE 9 MG/ML
250 INJECTION, SOLUTION INTRAVENOUS AS NEEDED
Status: DISCONTINUED | OUTPATIENT
Start: 2018-07-19 | End: 2018-07-21 | Stop reason: HOSPADM

## 2018-07-19 RX ORDER — MORPHINE SULFATE 2 MG/ML
2 INJECTION, SOLUTION INTRAMUSCULAR; INTRAVENOUS
Status: DISCONTINUED | OUTPATIENT
Start: 2018-07-19 | End: 2018-07-21 | Stop reason: HOSPADM

## 2018-07-19 RX ORDER — DIPHENHYDRAMINE HCL 25 MG
25 CAPSULE ORAL
Status: DISCONTINUED | OUTPATIENT
Start: 2018-07-19 | End: 2018-07-21 | Stop reason: HOSPADM

## 2018-07-19 RX ORDER — ACETAMINOPHEN 325 MG/1
650 TABLET ORAL ONCE
Status: COMPLETED | OUTPATIENT
Start: 2018-07-19 | End: 2018-07-19

## 2018-07-19 RX ORDER — LUBIPROSTONE 24 UG/1
24 CAPSULE, GELATIN COATED ORAL 2 TIMES DAILY WITH MEALS
Status: DISCONTINUED | OUTPATIENT
Start: 2018-07-19 | End: 2018-07-21 | Stop reason: HOSPADM

## 2018-07-19 RX ADMIN — APIXABAN 5 MG: 5 TABLET, FILM COATED ORAL at 07:28

## 2018-07-19 RX ADMIN — CARVEDILOL 6.25 MG: 6.25 TABLET, FILM COATED ORAL at 07:28

## 2018-07-19 RX ADMIN — MAGNESIUM OXIDE TAB 400 MG (241.3 MG ELEMENTAL MG) 400 MG: 400 (241.3 MG) TAB at 17:18

## 2018-07-19 RX ADMIN — AMIODARONE HYDROCHLORIDE 200 MG: 200 TABLET ORAL at 07:29

## 2018-07-19 RX ADMIN — STANDARDIZED SENNA CONCENTRATE AND DOCUSATE SODIUM 2 TABLET: 8.6; 5 TABLET, FILM COATED ORAL at 07:29

## 2018-07-19 RX ADMIN — CEFEPIME HYDROCHLORIDE 2 G: 2 INJECTION, POWDER, FOR SOLUTION INTRAVENOUS at 00:42

## 2018-07-19 RX ADMIN — MAGNESIUM OXIDE TAB 400 MG (241.3 MG ELEMENTAL MG) 400 MG: 400 (241.3 MG) TAB at 07:29

## 2018-07-19 RX ADMIN — CHLORHEXIDINE GLUCONATE 15 ML: 1.2 RINSE ORAL at 07:29

## 2018-07-19 RX ADMIN — ATORVASTATIN CALCIUM 20 MG: 10 TABLET, FILM COATED ORAL at 08:12

## 2018-07-19 RX ADMIN — APIXABAN 5 MG: 5 TABLET, FILM COATED ORAL at 17:16

## 2018-07-19 RX ADMIN — OXYCODONE HYDROCHLORIDE 30 MG: 15 TABLET ORAL at 00:41

## 2018-07-19 RX ADMIN — LEVOTHYROXINE SODIUM 137 MCG: 50 TABLET ORAL at 08:13

## 2018-07-19 RX ADMIN — ACETAMINOPHEN 650 MG: 325 TABLET ORAL at 14:46

## 2018-07-19 RX ADMIN — MAGNESIUM OXIDE TAB 400 MG (241.3 MG ELEMENTAL MG) 400 MG: 400 (241.3 MG) TAB at 20:43

## 2018-07-19 RX ADMIN — AMIODARONE HYDROCHLORIDE 200 MG: 200 TABLET ORAL at 17:18

## 2018-07-19 RX ADMIN — OXYCODONE HYDROCHLORIDE 30 MG: 15 TABLET ORAL at 04:45

## 2018-07-19 RX ADMIN — OXYCODONE HYDROCHLORIDE 30 MG: 15 TABLET ORAL at 14:49

## 2018-07-19 RX ADMIN — MORPHINE SULFATE 2 MG: 2 INJECTION, SOLUTION INTRAMUSCULAR; INTRAVENOUS at 20:44

## 2018-07-19 RX ADMIN — CEFEPIME HYDROCHLORIDE 2 G: 2 INJECTION, POWDER, FOR SOLUTION INTRAVENOUS at 12:52

## 2018-07-19 RX ADMIN — VANCOMYCIN HYDROCHLORIDE 1500 MG: 10 INJECTION, POWDER, LYOPHILIZED, FOR SOLUTION INTRAVENOUS at 13:40

## 2018-07-19 RX ADMIN — DIPHENHYDRAMINE HYDROCHLORIDE 25 MG: 25 CAPSULE ORAL at 14:47

## 2018-07-19 RX ADMIN — TORSEMIDE 20 MG: 20 TABLET ORAL at 08:13

## 2018-07-19 RX ADMIN — POTASSIUM CHLORIDE 10 MEQ: 10 TABLET, EXTENDED RELEASE ORAL at 07:28

## 2018-07-19 RX ADMIN — MIRTAZAPINE 15 MG: 15 TABLET, FILM COATED ORAL at 20:43

## 2018-07-19 RX ADMIN — POTASSIUM CHLORIDE 10 MEQ: 10 TABLET, EXTENDED RELEASE ORAL at 17:16

## 2018-07-19 RX ADMIN — PANTOPRAZOLE SODIUM 40 MG: 40 TABLET, DELAYED RELEASE ORAL at 08:13

## 2018-07-19 RX ADMIN — MAGNESIUM OXIDE TAB 400 MG (241.3 MG ELEMENTAL MG) 400 MG: 400 (241.3 MG) TAB at 12:52

## 2018-07-19 RX ADMIN — TBO-FILGRASTIM 480 MCG: 480 INJECTION, SOLUTION SUBCUTANEOUS at 11:53

## 2018-07-19 RX ADMIN — SODIUM CHLORIDE 75 ML/HR: 900 INJECTION, SOLUTION INTRAVENOUS at 08:41

## 2018-07-19 NOTE — PROGRESS NOTES
END OF SHIFT NOTE:     Pt received 1 unit PRBC's. Pt hypotensive at times during this shift, otherwise VSS. No c/o N/V/D. Oxy given 1x for pain. No needs at present. Intake/Output  07/19 0701 - 07/19 1900  In: 5174 [P.O.:840; I.V.:1433]  Out: 200 [Urine:200]   Voiding: YES  Catheter: NO  Drain:              Stool:  1 occurrences. Emesis:  0 occurrences. VITAL SIGNS  Patient Vitals for the past 12 hrs:   Temp Pulse Resp BP SpO2   07/19/18 1756 97.8 °F (36.6 °C) 72 16 (!) 88/52 97 %   07/19/18 1716 - 70 - 107/61 -   07/19/18 1656 98 °F (36.7 °C) 79 14 96/55 96 %   07/19/18 1556 98.2 °F (36.8 °C) 71 14 90/47 96 %   07/19/18 1535 97.9 °F (36.6 °C) 71 16 95/51 96 %   07/19/18 1440 99.2 °F (37.3 °C) 76 16 105/59 96 %   07/19/18 1108 97.3 °F (36.3 °C) 82 16 109/56 96 %   07/19/18 0715 99.1 °F (37.3 °C) 70 16 103/58 93 %       Pain Assessment  Pain 1  Pain Scale 1: Numeric (0 - 10) (07/19/18 1535)  Pain Intensity 1: 5 (07/19/18 1535)  Patient Stated Pain Goal: 0 (07/19/18 0445)  Pain Reassessment 1: Yes (07/19/18 1535)  Pain Onset 1: pta (07/19/18 1449)  Pain Location 1: Abdomen;Back (07/19/18 1449)  Pain Orientation 1: Lower (07/19/18 1449)  Pain Description 1: Aching;Radiating (07/19/18 1449)  Pain Intervention(s) 1: Medication (see MAR) (07/19/18 1449)    Ambulating  Yes    Additional Information:     Shift report will be given to Augusto Alas RN at the bedside.     Bella Najera

## 2018-07-19 NOTE — PROGRESS NOTES
Problem: Falls - Risk of  Goal: *Absence of Falls  Document Bee Fall Risk and appropriate interventions in the flowsheet.    Outcome: Progressing Towards Goal  Fall Risk Interventions:  Mobility Interventions: Patient to call before getting OOB, Communicate number of staff needed for ambulation/transfer         Medication Interventions: Evaluate medications/consider consulting pharmacy, Patient to call before getting OOB    Elimination Interventions: Call light in reach, Patient to call for help with toileting needs

## 2018-07-19 NOTE — PROGRESS NOTES
[facility] Hematology & Oncology Inpatient Hematology / Oncology Progress Note Admission Date: 2018 12:20 PM 
Reason for Admission/Hospital Course: neutropenic fever 
multiple myeloma Sepsis ([address] 24 Hour Events: 
Afebrile, VSS BCx-NGTD On Cef/Vanc (D2) 
 - starting G-CSF  
 
ROS: 
Constitutional: Negative for fever, chills, weakness, malaise, fatigue. CV: Negative for chest pain, palpitations, edema. Respiratory: Negative for dyspnea, cough, wheezing. GI: +abdominal pain. Negative for nausea, diarrhea. 10 point review of systems is otherwise negative with the exception of the elements mentioned above in the HPI. Allergies Allergen Reactions  Ace Inhibitors Other (comments)  Lisinopril Cough  Pcn [Penicillins] Swelling OBJECTIVE: 
Patient Vitals for the past 8 hrs: 
 BP Temp Pulse Resp SpO2 Weight  
18 0715 103/58 99.1 °F (37.3 °C) 70 16 93 % -  
18 0445 - - - - - 181 lb 8 oz (82.3 kg) 18 0330 104/53 98.2 °F (36.8 °C) 72 17 94 % - Temp (24hrs), Av.9 °F (37.2 °C), Min:98.2 °F (36.8 °C), Max:101.1 °F (38.4 °C) 
 
 0701 -  1900 In: 120 [P.O.:120] Out: 200 [Urine:200] Physical Exam: 
Constitutional: Well developed, well nourished male in no acute distress, sitting comfortably in the hospital bed. HEENT: Normocephalic and atraumatic. Oropharynx is clear, mucous membranes are moist.  Extraocular muscles are intact. Sclerae anicteric. Neck supple without JVD. No thyromegaly present. Skin Warm and dry. No bruising and no rash noted. No erythema. No pallor. Respiratory Lungs are clear to auscultation bilaterally without wheezes, rales or rhonchi, normal air exchange without accessory muscle use. CVS Normal rate, regular rhythm and normal S1 and S2. No murmurs, gallops, or rubs. Abdomen Soft, nontender and nondistended, normoactive bowel sounds. No hepatosplenomegaly.   
Neuro Grossly nonfocal with no obvious sensory or motor deficits. MSK Normal range of motion in general.  No tenderness. 1+ LLE edema. Psych Appropriate mood and affect. Labs: 
  Recent Labs  
   07/19/18 
 0506  07/18/18 
 1127  07/17/18 
 8033 WBC  0.4*  0.5*  0.4* RBC  2.95*  3.83*  3.88* HGB  7.0*  9.4*  9.4* HCT  21.1*  26.9*  29.1*  
MCV  70.5*  70.2*  75* MCH  23.7*  24.5*  24.2*  
MCHC  33.7  34.9  32.3 RDW  16.8*  16.8*  19.0*  
PLT  128*  132*  139* GRANS   --   6*  0 LYMPH   --   42   -- MONOS   --   52*  37 EOS   --   0*  0  
BASOS   --   0  3 IG   --   0  3 DF  PENDING  AUTOMATED   --   
ANEU   --   0.0*  0.0* ABL   --   0.2*   --   
ABM   --   0.3  0.1 REA   --   0.0  0.0 ABB   --   0.0  0.0 AIG   --   0.0  0.0 Recent Labs  
   07/19/18 
 0506  07/18/18 
 1127  07/17/18 
 9554 NA  136  133*  130*  
K  3.9  4.4  5.1 CL  106  101  95* CO2  22  23  22 AGAP  8  9   --   
GLU  75  101*  116* BUN  12  15  20 CREA  1.10  1.51*  1.41* BUCR   --    --   14  
GFRAA  >60  58*  55* GFRNA  >60  48*  47* CA  7.5*  8.3  8.5* SGOT  55*  87*  75* AP  77  100  85  
TP  5.6*  7.2  6.3 ALB  2.2*  2.9*  3.4*  
GLOB  3.4  4.3*   --   
AGRAT  0.6*  0.7*  1.2 MG  1.6*   --    --   
 
 
 
Imaging: XR CHEST PA LAT [[id number]] Collected: 07/18/18 1433   
  Order Status: Completed Updated: 07/18/18 1443  
  Narrative:    
  PA LATERAL CHEST X-7/18/2018 HISTORY: Fever. Arrhythmia. Cardiomyopathy. COMPARISON: July 9, 2018 FINDINGS: A right-sided chest port is present with catheter tip at the 
cavoatrial junction. A cardiac defibrillator device is present. There is faint 
density in the right mid chest inferior to the port. This may be a change 
associated with the recent port insertion. Heart size is enlarged. Pleural 
spaces are clear. Pulmonary vascularity is within normal limits.  Multilevel 
degenerative thoracic spondylosis is present with age indeterminate thoracic 
compression deformities. 
  
  Impression:    
  IMPRESSION: 
 
1. Cardiomegaly. 2. Faint density in the periphery of the right mid chest adjacent to a chest 
port. This may be a change associated with the recent port placement. 3. Thoracic spondylosis and compression deformities. ASSESSMENT: 
 
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 ICD-9-CM: 193  Unknown Hurthle cell carcinoma ([facility] ICD-10-CM: U03 ICD-9-CM: 623  3/15/2018 Postsurgical hypothyroidism ICD-10-CM: E89.0 ICD-9-CM: 244.0  3/15/2018 Pituitary macroadenoma ([facility] ICD-10-CM: D35.2 ICD-9-CM: 227.3  3/15/2018 Thyroid mass ICD-10-CM: E07.9 ICD-9-CM: 246.9  2/8/2018 Sinus bradycardia ICD-10-CM: R00.1 ICD-9-CM: 427.89  1/12/2018 PAF (paroxysmal atrial fibrillation) ([facility]) ICD-10-CM: I48.0 ICD-9-CM: 427.31  11/26/2017 Chronic systolic CHF (congestive heart failure) ([facility]) ICD-10-CM: M20.78 ICD-9-CM: 428.22, 428.0  11/26/2017 Pain ICD-10-CM: R52 ICD-9-CM: 780.96  11/20/2017 MARY (acute kidney injury) ([facility] ICD-10-CM: N17.9 ICD-9-CM: 584.9  11/20/2017 Acute renal failure (ARF) ([facility]) ICD-10-CM: N17.9 ICD-9-CM: 584.9  11/18/2017 Intractable pain ICD-10-CM: P66 ICD-9-CM: 780.96  1/11/2017 Acute kidney injury ([facility] ICD-10-CM: N17.9 ICD-9-CM: 584.9  1/11/2017 Pancytopenia ([facility] ICD-10-CM: B88.138 ICD-9-CM: 284.19  1/11/2017 Constipation ICD-10-CM: K59.00 ICD-9-CM: 564.00  1/11/2017 Multiple myeloma (HCC) ICD-10-CM: C90.00 ICD-9-CM: 203.00  1/2/2017 Postural imbalance ICD-10-CM: R29.3 ICD-9-CM: 729.90  12/14/2016 Polyneuropathy ICD-10-CM: G62.9 ICD-9-CM: 356.9 12/14/2016 Fall ICD-10-CM: W19. Yocasta Boylston ICD-9-CM: M441.6  12/14/2016 Encounter for medication management ICD-10-CM: P47.249 ICD-9-CM: V58.69  12/14/2016 Urinary retention ICD-10-CM: R33.9 ICD-9-CM: 788.20  6/6/2016 Colonic mass ICD-10-CM: K63.9 ICD-9-CM: 569.89  3/23/2016 Hyperlipidemia ICD-10-CM: E78.5 ICD-9-CM: 272.4  11/18/2015 Vertigo ICD-10-CM: Z65 ICD-9-CM: 780.4  11/18/2015 Malaise and fatigue ICD-10-CM: R53.81, R53.83 ICD-9-CM: 780.79  11/18/2015 Cardiomyopathy ([address] ICD-10-CM: I42.9 ICD-9-CM: 425.4  11/18/2015 LBBB (left bundle branch block) ICD-10-CM: I44.7 ICD-9-CM: 426.3  11/18/2015 Arthritis ICD-10-CM: M19.90 ICD-9-CM: 716.90  Unknown Arrhythmia ICD-10-CM: I49.9 ICD-9-CM: 427.9  Unknown Overview Signed 6/18/2013  2:07 PM by Gertrude Milner MD  
  LBBB Cholelithiases ICD-10-CM: K80.20 ICD-9-CM: 574.20  Unknown Hx of radiation therapy to prostate ICD-10-CM: Z92.3 ICD-9-CM: V15.3  Unknown Chronic systolic heart failure (HCC) ICD-10-CM: I50.22 ICD-9-CM: 428.22  9/13/2011 Automatic implantable cardioverter-defibrillator in situ ICD-10-CM: Z95.810 ICD-9-CM: V45.02  9/1/2011 Mr. Leo Caruso is a 65 y.o. male admitted on 7/18/2018 with a primary diagnosis neutropenic fever. Patient came into infusion center here on 5th floor today for labs and replacements and was found to have temp of 101 and on labs found to have WBC of 0.5 and ANC of 0.0. Blood and urine cultures, UA, CXR obtained and he was started on cefe and vanc and admitted to 504. He is a known patient of Dr. Modesto Lares receiving treatment for MM. MM labs from 6/25 showed progressive disease. Revlimid was discontinued and he was treated with Cytoxan x 1 dose and pulse dose dex x 4 days with plans to start ixazomib and daratumumab in 1-2 weeks. PLAN: 
Multiple Myeloma 
- s/p Cytoxan x 1, Dex x 4.   Plan to start oral ixazomib and daratumumab in 1-2 weeks.  
   
Constipation - Patient may take his own Amitiza.  
   
Pain-Abdominal/Back 
- Fentanyl patch to 100 mcg, Oxy IR 30 mg Q4h prn  
  
Neutropenic fever 7/18 Follow cultures, UA, CXR. Continue cefe and zosyn. 7/19 Remains afebrile since admission. CXR neg for infx. UCx pending. BCx-NGTD. On Cef/Vanc (D2). Pancytopenia secondary to chemotherapy - Transfuse prn per Steven SOPs 7/19 PRBCs today. . Start G-CSF. LLE edema 7/19 LLE doppler ordered to r/o DVT 
   
   
Supportive care Follow Steven SOPs Currently on Eliquis 5 mg BID (hold for plt <50k) Dany Hines NP [facility] Hematology & Oncology [address] [place] [address] Office : [phone] Fax : [phone] Attending Addendum: 
I personally evaluated the patient with Dany Hines N.POg,  and agree with the assessment, findings and plan as documented. Appears stable, heart regular without murmur, lungs clear, abdomen benign. Continue supportive care. Await ANC recovery Ivette Kawasaki, MD 
[address] Office : [phone] Fax : [phone]

## 2018-07-19 NOTE — PROGRESS NOTES
END OF SHIFT NOTE:    Intake/Output  07/18 1901 - 07/19 0700  In: 2082 [P.O.:357; I.V.:1345]  Out: 200 [Urine:200]   Voiding: YES  Catheter: NO  Drain:              Stool:  0 occurrences. Emesis:  0 occurrences. VITAL SIGNS  Patient Vitals for the past 12 hrs:   Temp Pulse Resp BP SpO2   07/19/18 0330 98.2 °F (36.8 °C) 72 17 104/53 94 %   07/18/18 2321 98.3 °F (36.8 °C) 76 16 92/55 96 %   07/18/18 1923 98.9 °F (37.2 °C) 83 16 102/63 96 %       Pain Assessment  Pain 1  Pain Scale 1: Visual (07/19/18 0527)  Pain Intensity 1: 0 (07/19/18 0527)  Patient Stated Pain Goal: 0 (07/19/18 0445)  Pain Reassessment 1: Patient sleeping (07/19/18 0527)  Pain Onset 1: pta (07/19/18 0445)  Pain Location 1: Abdomen;Back (07/19/18 0445)  Pain Orientation 1: Lower (07/19/18 0042)  Pain Description 1: Radiating (07/19/18 0445)  Pain Intervention(s) 1: Medication (see MAR) (07/19/18 0445)    Ambulating  Yes    Additional Information:   PO oxy given throughout the shift for abd pain radiating to back per pt's request.   Pt c/o BLE numbness this morning. Urine cx sample pending. Hgb 7: one unit PRBC ordered. Type and cross pending. Pt c/o left ankle pain this morning. Upon assessing, this RN noted increased swelling. Pillow placed under the ankle. Unfortunately, pain medication is not available (too soon). This will be passed on to oncoming RN. Will continue to monitor. Shift report given to oncoming nurse at the bedside.     Michael Price RN    \

## 2018-07-19 NOTE — PROGRESS NOTES
Problem: Falls - Risk of  Goal: *Absence of Falls  Document Bee Fall Risk and appropriate interventions in the flowsheet.    Outcome: Progressing Towards Goal  Fall Risk Interventions:  Mobility Interventions: Patient to call before getting OOB, Communicate number of staff needed for ambulation/transfer         Medication Interventions: Evaluate medications/consider consulting pharmacy, Patient to call before getting OOB, Teach patient to arise slowly    Elimination Interventions: Call light in reach, Patient to call for help with toileting needs, Toilet paper/wipes in reach, Urinal in reach

## 2018-07-20 LAB
ABO + RH BLD: NORMAL
ALBUMIN SERPL-MCNC: 2.5 G/DL (ref 3.2–4.6)
ALBUMIN/GLOB SERPL: 0.6 {RATIO} (ref 1.2–3.5)
ALP SERPL-CCNC: 99 U/L (ref 50–136)
ALT SERPL-CCNC: 45 U/L (ref 12–65)
ANION GAP SERPL CALC-SCNC: 10 MMOL/L (ref 7–16)
AST SERPL-CCNC: 49 U/L (ref 15–37)
BASOPHILS # BLD: 0 K/UL (ref 0–0.2)
BASOPHILS NFR BLD: 0 % (ref 0–2)
BILIRUB SERPL-MCNC: 1 MG/DL (ref 0.2–1.1)
BLD PROD TYP BPU: NORMAL
BLOOD GROUP ANTIBODIES SERPL: NORMAL
BPU ID: NORMAL
BUN SERPL-MCNC: 13 MG/DL (ref 8–23)
CALCIUM SERPL-MCNC: 7.8 MG/DL (ref 8.3–10.4)
CHLORIDE SERPL-SCNC: 106 MMOL/L (ref 98–107)
CO2 SERPL-SCNC: 21 MMOL/L (ref 21–32)
CREAT SERPL-MCNC: 1.32 MG/DL (ref 0.8–1.5)
CROSSMATCH RESULT,%XM: NORMAL
DIFFERENTIAL METHOD BLD: ABNORMAL
EOSINOPHIL # BLD: 0 K/UL (ref 0–0.8)
EOSINOPHIL NFR BLD: 0 % (ref 0.5–7.8)
ERYTHROCYTE [DISTWIDTH] IN BLOOD BY AUTOMATED COUNT: 17.3 % (ref 11.9–14.6)
GLOBULIN SER CALC-MCNC: 4 G/DL (ref 2.3–3.5)
GLUCOSE SERPL-MCNC: 83 MG/DL (ref 65–100)
HCT VFR BLD AUTO: 28.4 % (ref 41.1–50.3)
HGB BLD-MCNC: 9.9 G/DL (ref 13.6–17.2)
IMM GRANULOCYTES # BLD: 0.1 K/UL (ref 0–0.5)
IMM GRANULOCYTES NFR BLD AUTO: 3 % (ref 0–5)
LYMPHOCYTES # BLD: 0.3 K/UL (ref 0.5–4.6)
LYMPHOCYTES NFR BLD: 7 % (ref 13–44)
MAGNESIUM SERPL-MCNC: 1.5 MG/DL (ref 1.8–2.4)
MCH RBC QN AUTO: 25.1 PG (ref 26.1–32.9)
MCHC RBC AUTO-ENTMCNC: 34.9 G/DL (ref 31.4–35)
MCV RBC AUTO: 71.9 FL (ref 79.6–97.8)
MONOCYTES # BLD: 0.3 K/UL (ref 0.1–1.3)
MONOCYTES NFR BLD: 6 % (ref 4–12)
NEUTS SEG # BLD: 3.5 K/UL (ref 1.7–8.2)
NEUTS SEG NFR BLD: 84 % (ref 43–78)
PLATELET # BLD AUTO: 154 K/UL (ref 150–450)
PMV BLD AUTO: 9.2 FL (ref 10.8–14.1)
POTASSIUM SERPL-SCNC: 3.8 MMOL/L (ref 3.5–5.1)
PROT SERPL-MCNC: 6.5 G/DL (ref 6.3–8.2)
RBC # BLD AUTO: 3.95 M/UL (ref 4.23–5.67)
SODIUM SERPL-SCNC: 137 MMOL/L (ref 136–145)
SPECIMEN EXP DATE BLD: NORMAL
STATUS OF UNIT,%ST: NORMAL
UNIT DIVISION, %UDIV: 0
VANCOMYCIN TROUGH SERPL-MCNC: 12.5 UG/ML (ref 5–20)
WBC # BLD AUTO: 4.2 K/UL (ref 4.3–11.1)

## 2018-07-20 PROCEDURE — 97161 PT EVAL LOW COMPLEX 20 MIN: CPT

## 2018-07-20 PROCEDURE — 74011000258 HC RX REV CODE- 258: Performed by: NURSE PRACTITIONER

## 2018-07-20 PROCEDURE — 74011250636 HC RX REV CODE- 250/636: Performed by: INTERNAL MEDICINE

## 2018-07-20 PROCEDURE — 76450000000

## 2018-07-20 PROCEDURE — 65270000029 HC RM PRIVATE

## 2018-07-20 PROCEDURE — 83735 ASSAY OF MAGNESIUM: CPT | Performed by: NURSE PRACTITIONER

## 2018-07-20 PROCEDURE — 74011250636 HC RX REV CODE- 250/636: Performed by: NURSE PRACTITIONER

## 2018-07-20 PROCEDURE — 74011250637 HC RX REV CODE- 250/637: Performed by: NURSE PRACTITIONER

## 2018-07-20 PROCEDURE — 74011250637 HC RX REV CODE- 250/637: Performed by: INTERNAL MEDICINE

## 2018-07-20 PROCEDURE — 85025 COMPLETE CBC W/AUTO DIFF WBC: CPT | Performed by: NURSE PRACTITIONER

## 2018-07-20 PROCEDURE — 99233 SBSQ HOSP IP/OBS HIGH 50: CPT | Performed by: INTERNAL MEDICINE

## 2018-07-20 PROCEDURE — 80053 COMPREHEN METABOLIC PANEL: CPT | Performed by: NURSE PRACTITIONER

## 2018-07-20 PROCEDURE — 80202 ASSAY OF VANCOMYCIN: CPT | Performed by: NURSE PRACTITIONER

## 2018-07-20 PROCEDURE — 77030020263 HC SOL INJ SOD CL0.9% LFCR 1000ML

## 2018-07-20 RX ORDER — VANCOMYCIN HYDROCHLORIDE
1250
Status: DISCONTINUED | OUTPATIENT
Start: 2018-07-21 | End: 2018-07-21 | Stop reason: HOSPADM

## 2018-07-20 RX ORDER — MAGNESIUM SULFATE HEPTAHYDRATE 40 MG/ML
2 INJECTION, SOLUTION INTRAVENOUS ONCE
Status: COMPLETED | OUTPATIENT
Start: 2018-07-20 | End: 2018-07-21

## 2018-07-20 RX ORDER — FENTANYL 25 UG/1
1 PATCH TRANSDERMAL
Status: DISCONTINUED | OUTPATIENT
Start: 2018-07-20 | End: 2018-07-21 | Stop reason: HOSPADM

## 2018-07-20 RX ADMIN — CARVEDILOL 6.25 MG: 6.25 TABLET, FILM COATED ORAL at 17:34

## 2018-07-20 RX ADMIN — MAGNESIUM SULFATE HEPTAHYDRATE 2 G: 40 INJECTION, SOLUTION INTRAVENOUS at 06:05

## 2018-07-20 RX ADMIN — CARVEDILOL 6.25 MG: 6.25 TABLET, FILM COATED ORAL at 08:39

## 2018-07-20 RX ADMIN — POTASSIUM CHLORIDE 10 MEQ: 10 TABLET, EXTENDED RELEASE ORAL at 08:38

## 2018-07-20 RX ADMIN — OXYCODONE HYDROCHLORIDE 30 MG: 15 TABLET ORAL at 21:25

## 2018-07-20 RX ADMIN — AMIODARONE HYDROCHLORIDE 200 MG: 200 TABLET ORAL at 08:39

## 2018-07-20 RX ADMIN — LEVOTHYROXINE SODIUM 137 MCG: 50 TABLET ORAL at 08:38

## 2018-07-20 RX ADMIN — CEFEPIME HYDROCHLORIDE 2 G: 2 INJECTION, POWDER, FOR SOLUTION INTRAVENOUS at 12:40

## 2018-07-20 RX ADMIN — TORSEMIDE 20 MG: 20 TABLET ORAL at 08:40

## 2018-07-20 RX ADMIN — ATORVASTATIN CALCIUM 20 MG: 10 TABLET, FILM COATED ORAL at 08:40

## 2018-07-20 RX ADMIN — MORPHINE SULFATE 2 MG: 2 INJECTION, SOLUTION INTRAMUSCULAR; INTRAVENOUS at 18:54

## 2018-07-20 RX ADMIN — OXYCODONE HYDROCHLORIDE 30 MG: 15 TABLET ORAL at 08:37

## 2018-07-20 RX ADMIN — MAGNESIUM OXIDE TAB 400 MG (241.3 MG ELEMENTAL MG) 400 MG: 400 (241.3 MG) TAB at 08:38

## 2018-07-20 RX ADMIN — MORPHINE SULFATE 2 MG: 2 INJECTION, SOLUTION INTRAMUSCULAR; INTRAVENOUS at 06:05

## 2018-07-20 RX ADMIN — POTASSIUM CHLORIDE 10 MEQ: 10 TABLET, EXTENDED RELEASE ORAL at 17:34

## 2018-07-20 RX ADMIN — VANCOMYCIN HYDROCHLORIDE 1500 MG: 10 INJECTION, POWDER, LYOPHILIZED, FOR SOLUTION INTRAVENOUS at 13:27

## 2018-07-20 RX ADMIN — MAGNESIUM OXIDE TAB 400 MG (241.3 MG ELEMENTAL MG) 400 MG: 400 (241.3 MG) TAB at 21:23

## 2018-07-20 RX ADMIN — MIRTAZAPINE 15 MG: 15 TABLET, FILM COATED ORAL at 21:22

## 2018-07-20 RX ADMIN — MORPHINE SULFATE 2 MG: 2 INJECTION, SOLUTION INTRAMUSCULAR; INTRAVENOUS at 15:14

## 2018-07-20 RX ADMIN — OXYCODONE HYDROCHLORIDE 30 MG: 15 TABLET ORAL at 00:27

## 2018-07-20 RX ADMIN — APIXABAN 5 MG: 5 TABLET, FILM COATED ORAL at 08:38

## 2018-07-20 RX ADMIN — AMIODARONE HYDROCHLORIDE 200 MG: 200 TABLET ORAL at 17:35

## 2018-07-20 RX ADMIN — MAGNESIUM OXIDE TAB 400 MG (241.3 MG ELEMENTAL MG) 400 MG: 400 (241.3 MG) TAB at 17:34

## 2018-07-20 RX ADMIN — APIXABAN 5 MG: 5 TABLET, FILM COATED ORAL at 17:34

## 2018-07-20 RX ADMIN — CEFEPIME HYDROCHLORIDE 2 G: 2 INJECTION, POWDER, FOR SOLUTION INTRAVENOUS at 00:27

## 2018-07-20 RX ADMIN — MAGNESIUM OXIDE TAB 400 MG (241.3 MG ELEMENTAL MG) 400 MG: 400 (241.3 MG) TAB at 12:41

## 2018-07-20 RX ADMIN — MORPHINE SULFATE 2 MG: 2 INJECTION, SOLUTION INTRAMUSCULAR; INTRAVENOUS at 01:16

## 2018-07-20 RX ADMIN — PANTOPRAZOLE SODIUM 40 MG: 40 TABLET, DELAYED RELEASE ORAL at 08:38

## 2018-07-20 NOTE — PROGRESS NOTES
Problem: Falls - Risk of  Goal: *Absence of Falls  Document Bee Fall Risk and appropriate interventions in the flowsheet.    Outcome: Progressing Towards Goal  Fall Risk Interventions:  Mobility Interventions: Communicate number of staff needed for ambulation/transfer, Patient to call before getting OOB, PT Consult for mobility concerns, Strengthening exercises (ROM-active/passive)         Medication Interventions: Evaluate medications/consider consulting pharmacy, Patient to call before getting OOB, Teach patient to arise slowly    Elimination Interventions: Call light in reach, Patient to call for help with toileting needs, Toileting schedule/hourly rounds

## 2018-07-20 NOTE — PROGRESS NOTES
Pharmacokinetic Consult to Pharmacist    Niyah Hall is a 65 y.o. male being treated for febrile neutropenia with vancomycin and cefepime. Weight: 82.1 kg (180 lb 14.4 oz)  Lab Results   Component Value Date/Time    BUN 13 07/20/2018 02:53 AM    Creatinine 1.32 07/20/2018 02:53 AM    WBC 4.2 (L) 07/20/2018 02:53 AM      Estimated Creatinine Clearance: 53.6 mL/min (based on Cr of 1.32). Lab Results   Component Value Date/Time    Vancomycin,trough 12.5 07/20/2018 11:53 AM       Day 4 of vancomycin. Goal trough is 15-20. Change to 1.25g q18h with next dose at 0900 tomorrow. Will continue to follow patient. Thank you,  Army Read, Pharm. D.   Clinical Pharmacist  [phone]

## 2018-07-20 NOTE — PROGRESS NOTES
[facility] Hematology & Oncology Inpatient Hematology / Oncology Progress Note Admission Date: 2018 12:20 PM 
Reason for Admission/Hospital Course: neutropenic fever 
multiple myeloma Sepsis ([address] 24 Hour Events: 
Afebrile, VSS BCx-NGTD On Cef/Vanc (D3) 
ANC up to 3.5 
 
ROS: 
Constitutional: Negative for fever, chills, weakness, malaise, fatigue. CV: Negative for chest pain, palpitations, edema. Respiratory: Negative for dyspnea, cough, wheezing. GI: +abdominal pain. Negative for nausea, diarrhea. 10 point review of systems is otherwise negative with the exception of the elements mentioned above in the HPI. Allergies Allergen Reactions  Ace Inhibitors Other (comments)  Lisinopril Cough  Pcn [Penicillins] Swelling OBJECTIVE: 
Patient Vitals for the past 8 hrs: 
 BP Temp Pulse Resp SpO2  
18 1116 121/68 98.3 °F (36.8 °C) 87 19 92 %  
18 0835 114/56 - 79 - -  
18 0717 106/55 97.9 °F (36.6 °C) 80 20 93 % Temp (24hrs), Av.2 °F (36.8 °C), Min:97.8 °F (36.6 °C), Max:99.2 °F (37.3 °C) 
 
 07 -  1900 In: 360 [P.O.:360] Out: - Physical Exam: 
Constitutional: Well developed, well nourished male in no acute distress, lying comfortably in the hospital bed. HEENT: Normocephalic and atraumatic. Oropharynx is clear, mucous membranes are moist.  Extraocular muscles are intact. Sclerae anicteric. Neck supple without JVD. No thyromegaly present. Skin Warm and dry. No bruising and no rash noted. No erythema. No pallor. Respiratory Lungs are clear to auscultation bilaterally without wheezes, rales or rhonchi, normal air exchange without accessory muscle use. CVS Normal rate, regular rhythm and normal S1 and S2. No murmurs, gallops, or rubs. Abdomen Soft, nontender and nondistended, normoactive bowel sounds. No hepatosplenomegaly Neuro Grossly nonfocal with no obvious sensory or motor deficits.   
MSK Normal range of motion in general.  No tenderness. 1+ LLE edema. Psych Appropriate mood and affect. Labs: 
   
Recent Labs  
   07/20/18 
 0253  07/19/18 
 0506  07/18/18 
 1127 WBC  4.2*  0.4*  0.5* RBC  3.95*  2.95*  3.83* HGB  9.9*  7.0*  9.4* HCT  28.4*  21.1*  26.9*  
MCV  71.9*  70.5*  70.2*  
MCH  25.1*  23.7*  24.5*  
MCHC  34.9  33.7  34.9  
RDW  17.3*  16.8*  16.8*  
PLT  154  128*  132* GRANS  84*   --   6*  
LYMPH  7*   --   42 MONOS  6   --   52* EOS  0*   --   0* BASOS  0   --   0 IG  3   --   0  
DF  AUTOMATED  MANUAL  AUTOMATED ANEU  3.5   --   0.0* ABL  0.3*   --   0.2* ABM  0.3   --   0.3 REA  0.0   --   0.0 ABB  0.0   --   0.0 AIG  0.1   --   0.0 Recent Labs  
   07/20/18 
 0253  07/19/18 
 0506  07/18/18 
 1127 NA  137  136  133* K  3.8  3.9  4.4  
CL  106  106  101 CO2  21  22  23 AGAP  10  8  9 GLU  83  75  101* BUN  13  12  15 CREA  1.32  1.10  1.51* GFRAA  >60  >60  58* GFRNA  56*  >60  48* CA  7.8*  7.5*  8.3 SGOT  49*  55*  87* AP  99  77  100  
TP  6.5  5.6*  7.2 ALB  2.5*  2.2*  2.9*  
GLOB  4.0*  3.4  4.3* AGRAT  0.6*  0.6*  0.7* MG  1.5*  1.6*   --   
 
 
 
Imaging: XR CHEST PA LAT [[id number]] Collected: 07/18/18 1439   
  Order Status: Completed Updated: 07/18/18 1443  
  Narrative:    
  PA LATERAL CHEST X-7/18/2018 HISTORY: Fever. Arrhythmia. Cardiomyopathy. COMPARISON: July 9, 2018 FINDINGS: A right-sided chest port is present with catheter tip at the 
cavoatrial junction. A cardiac defibrillator device is present. There is faint 
density in the right mid chest inferior to the port. This may be a change 
associated with the recent port insertion. Heart size is enlarged. Pleural 
spaces are clear. Pulmonary vascularity is within normal limits. Multilevel 
degenerative thoracic spondylosis is present with age indeterminate thoracic 
compression deformities. 
  
  Impression:    
  IMPRESSION: 
 
1. Cardiomegaly. 2. Faint density in the periphery of the right mid chest adjacent to a chest 
port. This may be a change associated with the recent port placement. 3. Thoracic spondylosis and compression deformities. ASSESSMENT: 
 
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 ICD-9-CM: 227.3  3/15/2018 Thyroid mass ICD-10-CM: E07.9 ICD-9-CM: 246.9  2/8/2018 Sinus bradycardia ICD-10-CM: R00.1 ICD-9-CM: 427.89  1/12/2018 PAF (paroxysmal atrial fibrillation) ([facility]) ICD-10-CM: I48.0 ICD-9-CM: 427.31  11/26/2017 Chronic systolic CHF (congestive heart failure) ([facility]) ICD-10-CM: B92.02 ICD-9-CM: 428.22, 428.0  11/26/2017 Pain ICD-10-CM: R52 ICD-9-CM: 780.96  11/20/2017 MARY (acute kidney injury) ([facility] ICD-10-CM: N17.9 ICD-9-CM: 584.9  11/20/2017 Acute renal failure (ARF) ([facility]) ICD-10-CM: N17.9 ICD-9-CM: 584.9  11/18/2017 Intractable pain ICD-10-CM: F86 ICD-9-CM: 780.96  1/11/2017 Acute kidney injury ([facility] ICD-10-CM: N17.9 ICD-9-CM: 584.9  1/11/2017 Pancytopenia ([facility] ICD-10-CM: X74.932 ICD-9-CM: 284.19  1/11/2017 Constipation ICD-10-CM: K59.00 ICD-9-CM: 564.00  1/11/2017 Multiple myeloma (HCC) ICD-10-CM: C90.00 ICD-9-CM: 203.00  1/2/2017 Postural imbalance ICD-10-CM: R29.3 ICD-9-CM: 729.90  12/14/2016 Polyneuropathy ICD-10-CM: G62.9 ICD-9-CM: 356.9  12/14/2016 Fall ICD-10-CM: W19. Amye Del ICD-9-CM: E326.9  12/14/2016 Encounter for medication management ICD-10-CM: A23.524 ICD-9-CM: V58.69  12/14/2016 Urinary retention ICD-10-CM: R33.9 ICD-9-CM: 788.20  6/6/2016 Colonic mass ICD-10-CM: K63.9 ICD-9-CM: 569.89  3/23/2016 Hyperlipidemia ICD-10-CM: E78.5 ICD-9-CM: 272.4  11/18/2015 Vertigo ICD-10-CM: H94 ICD-9-CM: 780.4  11/18/2015 Malaise and fatigue ICD-10-CM: R53.81, R53.83 ICD-9-CM: 780.79  11/18/2015 Cardiomyopathy ([address] ICD-10-CM: I42.9 ICD-9-CM: 425.4  11/18/2015 LBBB (left bundle branch block) ICD-10-CM: I44.7 ICD-9-CM: 426.3  11/18/2015 Arthritis ICD-10-CM: M19.90 ICD-9-CM: 716.90  Unknown Arrhythmia ICD-10-CM: I49.9 ICD-9-CM: 427.9  Unknown Overview Signed 6/18/2013  2:07 PM by Cristina Gonzalez MD  
  LBBB Cholelithiases ICD-10-CM: K80.20 ICD-9-CM: 574.20  Unknown Hx of radiation therapy to prostate ICD-10-CM: Z92.3 ICD-9-CM: V15.3  Unknown Chronic systolic heart failure (HCC) ICD-10-CM: I50.22 ICD-9-CM: 428.22  9/13/2011 Automatic implantable cardioverter-defibrillator in situ ICD-10-CM: Z95.810 ICD-9-CM: V45.02  9/1/2011 Mr. Mari Crabtree is a 65 y.o. male admitted on 7/18/2018 with a primary diagnosis neutropenic fever. Patient came into infusion center here on 5th floor today for labs and replacements and was found to have temp of 101 and on labs found to have WBC of 0.5 and ANC of 0.0. Blood and urine cultures, UA, CXR obtained and he was started on cefe and vanc and admitted to [facility]. He is a known patient of Dr. Rai Garcia receiving treatment for MM. MM labs from 6/25 showed progressive disease. Revlimid was discontinued and he was treated with Cytoxan x 1 dose and pulse dose dex x 4 days with plans to start ixazomib and daratumumab in 1-2 weeks. PLAN: 
Multiple Myeloma 
- s/p Cytoxan x 1, Dex x 4. Plan to start oral ixazomib and daratumumab in 1-2 weeks.  
   
Constipation - Patient may take his own Amitiza.  
   
Pain-Abdominal/Back 
- Fentanyl patch to 100 mcg, Oxy IR 30 mg Q4h prn  
7/20 Pt states pain not well controlled. Consult palliative care for assistance with pain management. 
  
Neutropenic fever 7/18 Follow cultures, UA, CXR. Continue cefe and zosyn. 7/19 Remains afebrile since admission. CXR neg for infx. UCx pending. BCx-NGTD. On Cef/Vanc (D2). 
7/20 Remains afebrile. Cultures NGTD.  con't antibx. Pancytopenia secondary to chemotherapy - Transfuse prn per Steven SOPs 7/19 PRBCs today. . Start G-CSF. LLE edema 7/19 LLE doppler ordered to r/o DVT 
7/20 Doppler negative Jailyn Sutton 
Supportive care Follow Steven SOPs Currently on Eliquis 5 mg BID (hold for plt <50k) Disposition: Was planning on discharging home today. However, pt states his pain is not well controlled. Palliative care consulted. Anticipate discharge home in next day or two. PT consulted for assessment for discharge planning. CM consulted for [facility]. Dany Hines NP [facility] Hematology & Oncology [address] [place] [address] Office : [phone] Fax : [phone] Don Mancini Attending Addendum: 
I personally evaluated the patient with Dany Hines N.P.,  and agree with the assessment, findings and plan as documented. Appears stable, heart regular without murmur, lungs clear, abdomen benign. Optimize abd discomfort control. [address] recovered. Ivette Kawasaki, MD 
[facility] [address] [place] [address] Office : [phone] Fax : [phone]

## 2018-07-20 NOTE — PROGRESS NOTES
Met with patient in room to discuss need of [facility]  / patient states he doesn't think he will need a BSC at home or any other equipment. Patient states he lives at home with his wife / states he is has a cane and walker at home / patient states he does not use any home O2. Will continue to follow for any needs / patient aware to contact CM for any needs or concerns.

## 2018-07-20 NOTE — PROGRESS NOTES
END OF SHIFT NOTE:    Intake/Output  07/19 1901 - 07/20 0700  In: 318 [I.V.:318]  Out: 225 [Urine:225]   Voiding: YES  Catheter: NO  Drain:              Stool:  1 occurrences. Stool Assessment  Stool Color: Carmelita [place] (07/20/18 9114)  Stool Appearance: Soft (07/20/18 7139)  Stool Amount: Medium (07/20/18 9277)  Stool Source/Status: Rectum (07/20/18 8406)    Emesis:  0 occurrences. VITAL SIGNS  Patient Vitals for the past 12 hrs:   Temp Pulse Resp BP SpO2   07/20/18 0229 98.8 °F (37.1 °C) 77 20 124/65 94 %   07/19/18 2305 98.2 °F (36.8 °C) 67 18 126/68 96 %   07/19/18 1910 98.2 °F (36.8 °C) 70 16 106/56 95 %       Pain Assessment  Pain 1  Pain Scale 1: Numeric (0 - 10) (07/20/18 0605)  Pain Intensity 1: 7 (07/20/18 0605)  Patient Stated Pain Goal: 0 (07/20/18 1668)  Pain Reassessment 1: Patient sleeping (07/20/18 0156)  Pain Onset 1: nayely (07/20/18 0605)  Pain Location 1: Abdomen;Back (07/20/18 0605)  Pain Orientation 1: Lower (07/19/18 1449)  Pain Description 1: Radiating (07/20/18 1045)  Pain Intervention(s) 1: Medication (see MAR) (07/20/18 7113)    Ambulating  Yes    Additional Information:   Pt ambulating w/assistance w/unsteady gait. May want to consider PT referral for strengthening exercise. Also consider BSC. IV morphine ordered for pain control. Pt reports no improvement w/IV morphine and PO oxy given intermittently. Pt resting quietly. No visible s/sx of distress. Mg+ bolus ordered d/t 1.5 level    Shift report given to oncoming nurse at the bedside.     Shar Wan RN

## 2018-07-20 NOTE — PROGRESS NOTES
Problem: Mobility Impaired (Adult and Pediatric)  Goal: *Acute Goals and Plan of Care (Insert Text)  No goals established at this time, secondary to patient performing at baseline level. PHYSICAL THERAPY: Initial Assessment, Discharge, AM 7/20/2018  INPATIENT: Hospital Day: 3  Payor: CARE IMPROVEMENT PLUS / [place] / Product Type: Managed Care Medicare /      NAME/AGE/GENDER: Aliyah Griggs is a 65 y.o. male   PRIMARY DIAGNOSIS: neutropenic fever  multiple myeloma  Sepsis ([facility] [address] <principal problem not specified> <principal problem not specified>        ICD-10: Treatment Diagnosis:    · Other abnormalities of gait and mobility (R26.89)   Precaution/Allergies:  Ace inhibitors; Lisinopril; and Pcn [penicillins]      ASSESSMENT:     Mr. Veronica Gong is a 65 y.o male admitted to inpatient stay for neutropenic fever in relation to his multiple myeloma. Pt is supine in bed and agreeable to initial PT evaluation this AM. Pt states he lives in a one story home with 4 steps to enter with his wife, where he reports he is independent with ADLs, occasionally drives, and ambulates with a RW or straight cane. Pt reports he always has abdominal pain and rates his pain at 5/10 this AM. He performs bed mobility and supine-sit transfer with Vane. His gross LE MMT measured 5/5 and he presents with intact sensation to light touch. Pt performed sit-stand transfer with RW and supervision and ambulated in hallway x 500 ft with RW. Pt ambulates with good franco and safety awareness throughout. Pt returned to room and positioned to comfort in bedside chair, all needs within reach, and RN notified. Pt will be discharged form inpatient PT at this time, secondary to performing at his baseline level. Pt educated to inform his physician should he need the assistance of PT services at a later date. Pt verbalized understanding.      This section established at most recent assessment   PROBLEM LIST (Impairments causing functional limitations):  1. No PT problems established at this time, secondary to patient performing at baseline level. INTERVENTIONS PLANNED: (Benefits and precautions of physical therapy have been discussed with the patient.)  1. No PT interventions established at this time, secondary to patient performing at baseline level. TREATMENT PLAN: Frequency/Duration: Patient to be discharged from inpatient PT at this time. Rehabilitation Potential For Stated Goals: n/a     RECOMMENDED REHABILITATION/EQUIPMENT: (at time of discharge pending progress): Due to the probability of continued deficits (see above) this patient will not likely need continued skilled physical therapy after discharge. Equipment:    None at this time              HISTORY:   History of Present Injury/Illness (Reason for Referral):  Per H&P: \"Mr. Quincy Paredes is a 65 y.o. male admitted on 7/18/2018 with a primary diagnosis neutropenic fever. Patient came into infusion center here on 5th floor today for labs and replacements and was found to have temp of 101 and on labs found to have WBC of 0.5 and ANC of 0.0. Blood and urine cultures, UA, CXR obtained and he was started on cefe and vanc and admitted to 504. He is a known patient of Dr. Marky Elias receiving treatment for MM. MM labs from 6/25 showed progressive disease. Revlimid was discontinued and he was treated with Cytoxan x 1 dose and pulse dose dex x 4 days with plans to start ixazomib and daratumumab in 1-2 weeks. \"   Past Medical History/Comorbidities:   Mr. Quincy Paredes  has a past medical history of Arrhythmia; Arthritis; BMI 30.0-30.9,adult; Cardiomyopathy ([address] Cholelithiases; Chronic systolic heart failure ([address] (9/13/2011); Gout; H/O: pituitary tumor; High cholesterol; History of thyroid cancer; History of vertigo; Hurthle cell carcinoma of thyroid ([address] Hx of radiation therapy to prostate (2004); Hyperlipidemia (11/18/2015); Hypertension;  Hypothyroid; ICD (implantable cardiac defibrillator) in place (9/2011); LBBB (left bundle branch block) (11/18/2015); Malaise and fatigue (11/18/2015); Malignant neoplasm of prostate ([facility] [address] Mass of colon; Multiple myeloma ([facility] [address] and Sinus node dysfunction ([facility] [address] Mr. Sary Mcdaniel  has a past surgical history that includes hx heent (2008); hx cholecystectomy (2013); hx gi; hx heart catheterization; hx pacemaker (2011/2016); hx colonoscopy; hx thyroidectomy (2008); hx tumor removal (1990/2010); and hx thyroidectomy (01/26/2018). Social History/Living Environment:   Home Environment: Private residence  # Steps to Enter: 4  One/Two Story Residence: One story  Living Alone: No  Support Systems: Spouse/Significant Other/Partner  Patient Expects to be Discharged to[de-identified] Private residence  Current DME Used/Available at Home: [address], rolling, Reida Nipple, straight, [address] chair  Tub or Shower Type: Shower  Prior Level of Function/Work/Activity:  Independent     Number of Personal Factors/Comorbidities that affect the Plan of Care: 0: LOW COMPLEXITY   EXAMINATION:   Most Recent Physical Functioning:   Gross Assessment:  AROM: Within functional limits  Strength: Within functional limits  Coordination: Within functional limits  Tone: Normal  Sensation: Intact               Posture:  Posture (WDL): Exceptions to WDL  Posture Assessment: Forward head, Trunk flexion, Rounded shoulders  Balance:  Sitting: Intact  Standing: Impaired  Standing - Static: Good  Standing - Dynamic : Fair Bed Mobility:  Rolling: Modified independent  Supine to Sit: Modified independent  Wheelchair Mobility:     Transfers:  Sit to Stand: Supervision  Stand to Sit: Supervision  Gait:     Step Length: Left shortened;Right shortened  Distance (ft): 500 Feet (ft)  Assistive Device: Walker, rolling  Ambulation - Level of Assistance: Modified independent      Body Structures Involved:  1. None Body Functions Affected:  1. None Activities and Participation Affected:  1.  None   Number of elements that affect the Plan of Care: 1-2: LOW COMPLEXITY   CLINICAL PRESENTATION:   Presentation: Stable and uncomplicated: LOW COMPLEXITY   CLINICAL DECISION MAKIN[address] [place] [id number] AM-PAC 6 Clicks   Basic Mobility Inpatient Short Form  How much difficulty does the patient currently have. .. Unable A Lot A Little None   1. Turning over in bed (including adjusting bedclothes, sheets and blankets)? [] 1   [] 2   [] 3   [x] 4   2. Sitting down on and standing up from a chair with arms ( e.g., wheelchair, bedside commode, etc.)   [] 1   [] 2   [] 3   [x] 4   3. Moving from lying on back to sitting on the side of the bed? [] 1   [] 2   [] 3   [x] 4   How much help from another person does the patient currently need. .. Total A Lot A Little None   4. Moving to and from a bed to a chair (including a wheelchair)? [] 1   [] 2   [] 3   [x] 4   5. Need to walk in hospital room? [] 1   [] 2   [] 3   [x] 4   6. Climbing 3-5 steps with a railing? [] 1   [] 2   [] 3   [x] 4   © , Trustees of [address] [place] [address] [address] [address], under license to [employer]. All rights reserved      Score:  Initial: 24  Most Recent: X (Date: -- )    Interpretation of Tool:  Represents activities that are increasingly more difficult (i.e. Bed mobility, Transfers, Gait). Score 24 23 22-20 19-15 14-10 9-7 6     Modifier CH CI CJ CK CL CM CN      ? Mobility - Walking and Moving Around:     - CURRENT STATUS: CH - 0% impaired, limited or restricted    - GOAL STATUS: CH - 0% impaired, limited or restricted    - D/C STATUS:  CH - 0% impaired, limited or restricted  Payor: CARE IMPROVEMENT PLUS / [place] / Product Type: [employer] Medicare /      Medical Necessity:     · Skilled PT intervention not required at this time, secondary to patient performing at his baseline level. Reason for Services/Other Comments:  · Skilled PT intervention not required at this time, secondary to patient performing at his baseline level.    Use of outcome tool(s) and clinical judgement create a POC that gives a: Clear prediction of patient's progress: LOW COMPLEXITY            TREATMENT:   (In addition to Assessment/Re-Assessment sessions the following treatments were rendered)   Pre-treatment Symptoms/Complaints: \"My stomach hurts. I have the pain all the time. \"  Pain: Initial:     5/10 abdomen  Post Session:  5/10 abdomen      Assessment/Reassessment only, no treatment provided today    Braces/Orthotics/Lines/Etc:   · IV  · O2 Device: Room air  Treatment/Session Assessment:    · Response to Treatment:  Patient ambulated x 500 ft with RW in hallway with supervision. Patient reports performing at baseline level. · Interdisciplinary Collaboration:   o Physical Therapist  o Registered Nurse  o Physical Therapy Student  · After treatment position/precautions:   o Up in chair  o Bed/Chair-wheels locked  o Call light within reach  o RN notified   · Compliance with Program/Exercises: compliant all of the time.   · Recommendations/Intent for next treatment session: n/a  Total Treatment Duration:  PT Patient Time In/Time Out  Time In: 1135  Time Out: ASTON Jeffrey

## 2018-07-20 NOTE — CONSULTS
Palliative Care    Patient: Daria Blankenship MRN: [id number]  SSN: [id number]    YOB: 1940  Age: 65 y.o. Sex: male       Date of Request: 7/20/2018  Date of Consult:  7/20/2018  Reason for Consult:  pain and symptom management  Requesting Physician: Candi Jiang NP     Assessment/Plan:     Principal Diagnosis:    Pain, abdomen  R10.9  Additional Diagnoses:   · Constipation, Unspecified  K59.00  · Debility, Unspecified  R53.81  · Counseling, Encounter for Medical Advice  Z71.9  · Encounter for Palliative Care  Z51.5    Palliative Performance Scale (PPS):  PPS: 80    Medical Decision Making:   Reviewed and summarized chart from admission to present. Discussed case with appropriate providers. Reviewed laboratory and x-ray data: CBC, CMP, US    Patient resting in bed, friend is at the bedside. Patient reports diffuse upper abdominal pain. He describes as an ache, never sharp, shooting, or burning. He is not tender. He says \"it's the same pain\". His goal is for pain to be controlled enough to be able to do things, and currently, when he gets up, he has pain \"like you wouldn't believe\". He has a hard time rating from 0-10, because \"you're 5 and my 5 are different\". Encouraged patient to rate pain however he perceives it. He says when he is changing position, it is severe. Discussed pain regimen. Will increase Fentanyl TD from 100-125mcg every 72 hours. Advised that we would need to heavily consider opioid rotation if this increase is not sufficient. Fentanyl 100mcg patch is due to be changed Sunday, would change 25mcg same day and begin every 3 days at this point. Continue oxycodone IR for breakthrough pain. Continue Amitiza BID and Pericolace BID for OIC- he currently denies constipation, last BM today. Will continue to follow.       Will discuss findings with members of the interdisciplinary team.      Thank you for this referral.          .    Subjective:     History obtained from:  Patient, Care Provider and Chart    Chief Complaint: Upper abdominal pain  History of Present Illness:  Mr. Taiwo Zhou is a 64 yo male with PMH of multiple myeloma, resected colon cancer in 2016, resected thyroid cancer in 8344, systolic heart failure, HTN, HLD, gout, and other history as listed below. He was hospitalized from 7/6-7/13 with intractable pain and MARY. He presented to [facility] center 7/18 with complaints of weakness and was noted with fever of 101.1. Labs revealed neutropenia. He was admitted for further management of neutropenic fever.      Advance Directive: Yes       Code Status:  Full Code            Health Care Power of : Unknown    Past Medical History:   Diagnosis Date    Arrhythmia     LBBB    Arthritis     BMI 30.0-30.9,adult     BMI 30.5    Cardiomyopathy ([address]     followed by [facility]    Cholelithiases     Chronic systolic heart failure ([address] 9/13/2011    [place] Ass[place] class II-III heart failure symptoms    Gout     H/O: pituitary tumor     X2 benign, removed    High cholesterol     History of thyroid cancer     History of vertigo     no recent complications or episodes    Hurthle cell carcinoma of thyroid ([address]     Hx of radiation therapy to prostate 2004    Hyperlipidemia 11/18/2015    Hypertension     Hypothyroid     hypo- had portion of thyroid removed due to cancer    ICD (implantable cardiac defibrillator) in place 9/2011    Biotronik BIV/ICD, Gen change 3.2.16    LBBB (left bundle branch block) 11/18/2015    Malaise and fatigue 11/18/2015    Malignant neoplasm of prostate ([address]     Mass of colon     right colon mass    Multiple myeloma ([address]     Sinus node dysfunction ([address]       Past Surgical History:   Procedure Laterality Date    HX CHOLECYSTECTOMY  2013    HX COLONOSCOPY      dx with Right colon mass     HX GI      benign polyps removed    HX HEART CATHETERIZATION      HX HEENT  2008    thyroidetomy partial tumor from pituitary x2    HX PACEMAKER  2011/2016    biotronik biv-icd    HX THYROIDECTOMY  2008    HX THYROIDECTOMY  01/26/2018    completion thyroidectomy    HX TUMOR REMOVAL  1990/2010    pituitary tumor removed X2     Family History   Problem Relation Age of Onset    Heart Disease Sister     Heart Attack Sister     Stroke Mother     Thyroid Disease Neg Hx     Diabetes Neg Hx       Social History   Substance Use Topics    Smoking status: Never Smoker    Smokeless tobacco: Never Used    Alcohol use Yes      Comment: very rare     Prior to Admission medications    Medication Sig Start Date End Date Taking? Authorizing Provider   amiodarone (CORDARONE) 200 mg tablet Take 1 Tab by mouth daily. Take 200mg tablet twice a day for two weeks, then decrease to 200 mg 1 tablet daily. 7/16/18   Mohamud Vaughan MD   ixazomib 2.3 mg cap Take 1 Cap by mouth every seven (7) days. 7/12/18   Janny Reyna MD   magnesium oxide (MAG-OX) 400 mg tablet Take 1 Tab by mouth four (4) times daily. 7/12/18   Janny Reyna MD   senna-docusate (PERICOLACE) 8.6-50 mg per tablet Take 2 Tabs by mouth two (2) times a day. 7/12/18   Janny Reyna MD   fentaNYL (DURAGESIC) 100 mcg/hr PATCH 1 Patch by TransDERmal route every seventy-two (72) hours. Max Daily Amount: 1 Patch. 7/12/18   Janny Reyna MD   mirtazapine (REMERON) 15 mg tablet Take 1 Tab by mouth nightly. 7/5/18   Kendal Lorenzo MD   lubiPROStone (AMITIZA) 24 mcg capsule Take 1 Cap by mouth two (2) times daily (with meals) for 30 days. 7/5/18 8/4/18  Kendal Lorenzo MD   potassium chloride SA (MICRO-K) 10 mEq capsule Take 1 Cap by mouth two (2) times a day. 6/29/18   Kendal Lorenzo MD   atorvastatin (LIPITOR) 20 mg tablet Take 1 Tab by mouth daily. 6/29/18   Kendal Lorenzo MD   docosanol (ABREVA) 10 % topical cream Apply  to affected area five (5) times daily.     Historical Provider   oxyCODONE IR (ROXICODONE) 30 mg immediate release tablet Take 1 Tab by mouth every four (4) hours as needed for Pain. Max Daily Amount: 180 mg. 6/25/18   Ariadna Lopez MD   cromolyn sodium (CROMOLYN NA) by Nasal route. Historical Provider   apixaban (ELIQUIS) 5 mg tablet Take 5 mg by mouth two (2) times a day. Historical Provider   amino ac/whey prot conc, isol (WHEY PROTEIN PO) Take  by mouth. Historical Provider   levothyroxine (SYNTHROID) 137 mcg tablet Take 137 mcg by mouth Daily (before breakfast). 5/4/18   Anil Ruelas MD   carvedilol (COREG) 6.25 mg tablet Take 1 Tab by mouth two (2) times daily (with meals). 11/26/17   Pan Jackson NP   torsemide (DEMADEX) 20 mg tablet Take 1 Tab by mouth daily. Indications: Edema, Pulmonary Edema due to Chronic Heart Failure 11/8/17   Esequiel Schirmer, MD   chlorhexidine (PERIDEX) 0.12 % solution 15 mL by Swish and Spit route two (2) times a day. Historical Provider   omeprazole (PRILOSEC) 20 mg capsule Take 20 mg by mouth daily. Historical Provider   cyanocobalamin (VITAMIN B-12) 1,000 mcg sublingual tablet Take 1,000 mcg by mouth daily. Historical Provider   cholecalciferol, vitamin D3, (VITAMIN D3) 2,000 unit tab Take  by mouth. Historical Provider       Allergies   Allergen Reactions    Ace Inhibitors Other (comments)    Lisinopril Cough    Pcn [Penicillins] Swelling        Review of Systems:  A comprehensive review of systems was negative except for:   Constitutional: Positive for fatigue. Gastrointestinal: Positive for constipation, upper abdominal pain. Objective:     Visit Vitals    /68 (BP 1 Location: Right arm, BP Patient Position: At rest)    Pulse 87    Temp 98.3 °F (36.8 °C)    Resp 19    Wt 82.1 kg (180 lb 14.4 oz)    SpO2 92%    BMI 23.23 kg/m2        Physical Exam:    General:  Cooperative. No acute distress. Eyes:  Conjunctivae/corneas clear    Nose: Nares normal. Septum midline. Neck: Supple, symmetrical, trachea midline. Lungs:   Clear to auscultation bilaterally, unlabored. Heart:  Regular rate and rhythm. Abdomen:   Soft, non-tender, non-distended, large right sided hernia. Extremities: Normal, atraumatic, no cyanosis or edema. Skin: Skin color, texture, turgor normal. No rash. Neurologic: Nonfocal.   Psych: Alert and oriented.       Assessment:     Hospital Problems  Date Reviewed: 7/11/2018          Codes Class Noted POA    Sepsis [facility]) ICD-10-CM: A41.9  ICD-9-CM: 038.9, 995.91  7/18/2018 Unknown              Signed By: Kiana Beckwith NP     July 20, 2018

## 2018-07-20 NOTE — DISCHARGE SUMMARY
[facility]ology [facility] On[facility]: Inpatient Hematology / Oncology Discharge Summary Note Patient ID: Estevan Sung [id number] 
89 y.o. 
1940 Admit Date: 7/18/2018 Discharge Date: 7/21/2018 Admission Diagnoses: neutropenic fever 
multiple myeloma Sepsis ([facility] [address] Discharge Diagnoses: 
Principal Diagnosis: <principal problem not specified> Active Problems: 
  Sepsis ([facility] [address] (7/18/2018) Hospital Course: Mr. Danie Michelle is a 65 y.o. male admitted on 7/18/2018 with a primary diagnosis neutropenic fever. Patient came into infusion center here on 5th floor the day of admission for labs and replacements and was found to have temp of 101 and on labs found to have WBC of 0.5 and ANC of 0.0. Blood and urine cultures, UA, CXR obtained and he was started on cefe and vanc and admitted to [facility]. He is a known patient of Dr. Sendy Pace receiving treatment for MM. MM labs from 6/25 showed progressive disease. Revlimid was discontinued and he was treated with Cytoxan x 1 dose and pulse dose dex x 4 days with plans to start ixazomib and daratumumab in 1-2 weeks. He has remained afebrile during entire hospitalization. Cultures have remained negative. He is feeling well and is ready for discharge home. He will continue antibx therapy at home on Levaquin. Granix was given x 1. ANC up to 6.4 today. He continues to c/o pain. Palliative care saw patient in hospital and made adjustments to his pain regimen to include increasing fentanyl patch to 125mcg. Pt reports better pain control. Script given for patches. I have reviewed the patient's controlled substance prescription history, as maintained in the [place] prescription monitoring program, so that the prescriptions(s) for a controlled substance can be given. He has a previously scheduled f/u appt with Dr. Sendy Pace on 7/23. Advised to call with fever, chills, uncontrollable symptoms, or with any other concerns.   Pt verbalizes understanding. Consults: 
None Pertinent Diagnostic Studies:  
Labs:   
Recent Labs  
   07/20/18 
 0253  07/19/18 
 0506  07/18/18 
 1127  07/17/18 
 4762 WBC  4.2*  0.4*  0.5*  0.4* HGB  9.9*  7.0*  9.4*  9.4* PLT  154  128*  132*  139* ANEU  3.5   --   0.0*  0.0* Recent Labs  
   07/20/18 
 0253  07/19/18 
 0506  07/18/18 
 1127 NA  137  136  133* K  3.8  3.9  4.4  
CL  106  106  101 CO2  21  22  23 GLU  83  75  101* BUN  13  12  15 CREA  1.32  1.10  1.51* CA  7.8*  7.5*  8.3 SGOT  49*  55*  87* AP  99  77  100  
TP  6.5  5.6*  7.2 ALB  2.5*  2.2*  2.9*  
MG  1.5*  1.6*   --   
 
 
Imaging: 
DUPLEX LOWER EXT VENOUS LEFT [[id number]] Collected: 07/19/18 1328   
  Order Status: Completed Updated: 07/19/18 1330  
  Narrative:    
  Left lower extremity duplex venous study, 7/19/2018. CLINICAL HISTORY: Moderate left lower extremity swelling and pain for one day. Technique: Grayscale, and duplex Doppler images of the left lower extremity 
venous vascular system were obtained using an 9 MHz transducer. In addition, 
compression and augmentation were performed at multiple levels. FINDINGS: 
 
No left lower extremity DVT is directly visualized. The right common femoral 
vein is patent.  Normal augmentation is seen at all evaluated levels without 
findings to suggest more proximal occlusion. No abnormal fluid collections, or 
significant soft tissue edematous changes are seen. 
  
  Impression:    
  IMPRESSION: 
1. No evidence for left lower extremity DVT.   XR CHEST PA LAT [[id number]] Collected: 07/18/18 1439  
  Order Status: Completed Updated: 07/18/18 1443  
  Narrative:    
  PA LATERAL CHEST X-7/18/2018 HISTORY: Fever. Arrhythmia. Cardiomyopathy. COMPARISON: July 9, 2018 FINDINGS: A right-sided chest port is present with catheter tip at the 
cavoatrial junction. A cardiac defibrillator device is present.  There is faint 
density in the right mid chest inferior to the port. This may be a change 
associated with the recent port insertion. Heart size is enlarged. Pleural 
spaces are clear. Pulmonary vascularity is within normal limits. Multilevel 
degenerative thoracic spondylosis is present with age indeterminate thoracic 
compression deformities. 
  
  Impression:    
  IMPRESSION: 
 
1. Cardiomegaly. 2. Faint density in the periphery of the right mid chest adjacent to a chest 
port. This may be a change associated with the recent port placement. 3. Thoracic spondylosis and compression deformities. Current Discharge Medication List  
  
START taking these medications Details  
fentaNYL (DURAGESIC) 25 mcg/hr PATCH 1 Patch by TransDERmal route every seventy-two (72) hours. Max Daily Amount: 1 Patch. Qty: 10 Patch, Refills: 0 Associated Diagnoses: Multiple myeloma in relapse ([address] potassium chloride (K-DUR, KLOR-CON) 20 mEq tablet Take 1 Tab by mouth two (2) times a day. Qty: 60 Tab, Refills: 0  
  
levoFLOXacin (LEVAQUIN) 500 mg tablet Take 1 Tab by mouth daily for 3 days. Qty: 3 Tab, Refills: 0 CONTINUE these medications which have CHANGED Details  
!! fentaNYL (DURAGESIC) 100 mcg/hr PATCH 1 Patch by TransDERmal route every seventy-two (72) hours. Max Daily Amount: 1 Patch. Qty: 10 Patch, Refills: 0 Associated Diagnoses: Multiple myeloma in relapse ([address] ! ! oxyCODONE IR (ROXICODONE) 30 mg immediate release tablet Take 1 Tab by mouth every four (4) hours as needed. Max Daily Amount: 180 mg. 
Qty: 90 Tab, Refills: 0 Associated Diagnoses: Multiple myeloma in relapse ([address] ! ! - Potential duplicate medications found. Please discuss with provider. CONTINUE these medications which have NOT CHANGED Details  
amiodarone (CORDARONE) 200 mg tablet Take 1 Tab by mouth daily. Take 200mg tablet twice a day for two weeks, then decrease to 200 mg 1 tablet daily.  
Qty: 45 Tab, Refills: 0  
  
ixazomib 2.3 mg cap Take 1 Cap by mouth every seven (7) days. Qty: 4 Cap, Refills: 0  
  
magnesium oxide (MAG-OX) 400 mg tablet Take 1 Tab by mouth four (4) times daily. Qty: 60 Tab, Refills: 0  
  
senna-docusate (PERICOLACE) 8.6-50 mg per tablet Take 2 Tabs by mouth two (2) times a day. Qty: 60 Tab, Refills: 1  
  
!! fentaNYL (DURAGESIC) 100 mcg/hr PATCH 1 Patch by TransDERmal route every seventy-two (72) hours. Max Daily Amount: 1 Patch. Qty: 10 Patch, Refills: 0 Associated Diagnoses: Intractable pain; Multiple myeloma in relapse (H[facility])  
  
mirtazapine (REMERON) 15 mg tablet Take 1 Tab by mouth nightly. Qty: 30 Tab, Refills: 6 Associated Diagnoses: Decreased appetite  
  
lubiPROStone (AMITIZA) 24 mcg capsule Take 1 Cap by mouth two (2) times daily (with meals) for 30 days. Qty: 24 Cap, Refills: 0 Associated Diagnoses: Constipation, unspecified constipation type  
  
potassium chloride SA (MICRO-K) 10 mEq capsule Take 1 Cap by mouth two (2) times a day. Qty: 180 Cap, Refills: 3  
  
atorvastatin (LIPITOR) 20 mg tablet Take 1 Tab by mouth daily. Qty: 90 Tab, Refills: 3  
  
docosanol (ABREVA) 10 % topical cream Apply  to affected area five (5) times daily. !! oxyCODONE IR (ROXICODONE) 30 mg immediate release tablet Take 1 Tab by mouth every four (4) hours as needed for Pain. Max Daily Amount: 180 mg. 
Qty: 180 Tab, Refills: 0 Associated Diagnoses: Closed compression fracture of L2 lumbar vertebra with routine healing, subsequent encounter; [facility] myel[facility], remission status unspecified [facility] cromolyn sodium (CROMOLYN NA) by Nasal route. apixaban (ELIQUIS) 5 mg tablet Take 5 mg by mouth two (2) times a day. amino ac/whey prot conc, isol (WHEY PROTEIN PO) Take  by mouth.  
  
levothyroxine (SYNTHROID) 137 mcg tablet Take 137 mcg by mouth Daily (before breakfast). Qty: 90 Tab, Refills: 3  Associated Diagnoses: Postsurgical hypothyroidism  
  
carvedilol (COREG) 6.25 mg tablet Take 1 Tab by mouth two (2) times daily (with meals). Qty: 60 Tab, Refills: 0  
  
torsemide (DEMADEX) 20 mg tablet Take 1 Tab by mouth daily. Indications: Edema, Pulmonary Edema due to Chronic Heart Failure Qty: 30 Tab, Refills: 11  
 Associated Diagnoses: Chronic systolic heart failure ([address] Localized edema  
  
chlorhexidine (PERIDEX) 0.12 % solution 15 mL by Swish and Spit route two (2) times a day. omeprazole (PRILOSEC) 20 mg capsule Take 20 mg by mouth daily. cyanocobalamin (VITAMIN B-12) 1,000 mcg sublingual tablet Take 1,000 mcg by mouth daily. cholecalciferol, vitamin D3, (VITAMIN D3) 2,000 unit tab Take  by mouth. !! - Potential duplicate medications found. Please discuss with provider. OBJECTIVE: 
Patient Vitals for the past 8 hrs: 
 BP Temp Pulse Resp SpO2  
18 0835 114/56 - 79 - -  
18 0717 106/55 97.9 °F (36.6 °C) 80 20 93 % 18 0229 124/65 98.8 °F (37.1 °C) 77 20 94 % Temp (24hrs), Av.1 °F (36.7 °C), Min:97.3 °F (36.3 °C), Max:99.2 °F (37.3 °C) 
 
 07 -  1900 In: 360 [P.O.:360] Out: - Physical Exam: 
Constitutional: Well developed, well nourished male in no acute distress, sitting comfortably in the hospital bed. HEENT: Normocephalic and atraumatic. Oropharynx is clear, mucous membranes are moist.  Extraocular muscles are intact. Sclerae anicteric. Neck supple without JVD. No thyromegaly present.   
     
Skin Warm and dry. No bruising and no rash noted. No erythema. No pallor. Respiratory Lungs are clear to auscultation bilaterally without wheezes, rales or rhonchi, normal air exchange without accessory muscle use. CVS Normal rate, regular rhythm and normal S1 and S2. No murmurs, gallops, or rubs. Abdomen Soft, nontender and nondistended, normoactive bowel sounds. No hepatosplenomegaly. Neuro Grossly nonfocal with no obvious sensory or motor deficits. MSK Normal range of motion in general.  No tenderness. 1+ LLE edema. Psych Appropriate mood and affect.   
  
 
 
ASSESSMENT: 
 
Active Problems: 
  Sepsis ([address] (7/18/2018) DISPOSITION: 
Follow-up Appointments Procedures  FOLLOW UP VISIT Appointment in: Other (Specify) Follow up as previously scheduled on 7/23 at 3:15pm  
  Follow up as previously scheduled on 7/23 at 3:15pm  
  Standing Status:   Standing Number of Occurrences:   1 Order Specific Question:   Appointment in Answer: Other (Specify) Over 30 minutes was spent in discharge planning and coordination of care. POLI Castrejon  Hematology & Oncology [address] [place] [address] Office : [phone] Fax : [phone] Attending Addendum: 
I personally evaluated the patient with Trenton Pinto, N.P.,  and agree with the assessment, findings and plan as documented. Appears well, heart regular without murmur, lungs clear, abdomen benign. Long standing abd discomfort better w higher fentanyl patch. Remains afebrile w adequate ANC. Clinically stable for discharge. I spent 32 minutes on evaluation, management, counseling and discharge planning on patient. Marlin Alberto MD 
[address] Office : [phone] Fax : [phone]

## 2018-07-20 NOTE — PROGRESS NOTES
END OF SHIFT NOTE:    Mag replaced this shift. Oxy given 1x and Morphine given 1x this shift for pain. No c/o N/V/D. VSS. No needs at present. Intake/Output  07/20 0701 - 07/20 1900  In: Priya Rodríguez [P.O.:840; I.V.:1031]  Out: 1350 [Urine:1350]   Voiding: YES  Catheter: NO  Drain:              Stool:  2 occurrences. Stool Assessment  Stool Color: Brown (07/20/18 1026)  Stool Appearance: Soft; Formed (07/20/18 1026)  Stool Amount: Medium (07/20/18 1026)  Stool Source/Status: Rectum (07/20/18 1026)    Emesis:  0 occurrences. VITAL SIGNS  Patient Vitals for the past 12 hrs:   Temp Pulse Resp BP SpO2   07/20/18 1735 - 80 - 104/59 -   07/20/18 1734 - 80 - 104/59 -   07/20/18 1440 97.8 °F (36.6 °C) 71 19 98/57 95 %   07/20/18 1116 98.3 °F (36.8 °C) 87 19 121/68 92 %   07/20/18 0835 - 79 - 114/56 -   07/20/18 0717 97.9 °F (36.6 °C) 80 20 106/55 93 %       Pain Assessment  Pain 1  Pain Scale 1: Visual (07/20/18 1608)  Pain Intensity 1: 0 (07/20/18 1608)  Patient Stated Pain Goal: 0 (07/20/18 8934)  Pain Reassessment 1: Patient sleeping (07/20/18 1608)  Pain Onset 1: pta (07/20/18 1514)  Pain Location 1: Abdomen;Back (07/20/18 1514)  Pain Orientation 1: Lower (07/20/18 1514)  Pain Description 1: Aching;Radiating (07/20/18 1514)  Pain Intervention(s) 1: Medication (see MAR) (07/20/18 1514)    Ambulating  Yes- with assessment    Additional Information:     Shift report will be given to Eliz Moura RN at the bedside.     Radhames Patel

## 2018-07-21 VITALS
SYSTOLIC BLOOD PRESSURE: 101 MMHG | DIASTOLIC BLOOD PRESSURE: 58 MMHG | TEMPERATURE: 98.1 F | WEIGHT: 179 LBS | HEART RATE: 71 BPM | BODY MASS INDEX: 22.98 KG/M2 | OXYGEN SATURATION: 95 % | RESPIRATION RATE: 18 BRPM

## 2018-07-21 LAB
ALBUMIN SERPL-MCNC: 2.2 G/DL (ref 3.2–4.6)
ALBUMIN/GLOB SERPL: 0.6 {RATIO} (ref 1.2–3.5)
ALP SERPL-CCNC: 100 U/L (ref 50–136)
ALT SERPL-CCNC: 37 U/L (ref 12–65)
ANION GAP SERPL CALC-SCNC: 10 MMOL/L (ref 7–16)
AST SERPL-CCNC: 38 U/L (ref 15–37)
BACTERIA SPEC CULT: NORMAL
BASOPHILS # BLD: 0 K/UL (ref 0–0.2)
BASOPHILS NFR BLD: 0 % (ref 0–2)
BILIRUB SERPL-MCNC: 0.4 MG/DL (ref 0.2–1.1)
BUN SERPL-MCNC: 10 MG/DL (ref 8–23)
CALCIUM SERPL-MCNC: 7.5 MG/DL (ref 8.3–10.4)
CHLORIDE SERPL-SCNC: 107 MMOL/L (ref 98–107)
CO2 SERPL-SCNC: 23 MMOL/L (ref 21–32)
CREAT SERPL-MCNC: 1.13 MG/DL (ref 0.8–1.5)
DIFFERENTIAL METHOD BLD: ABNORMAL
EOSINOPHIL # BLD: 0 K/UL (ref 0–0.8)
EOSINOPHIL NFR BLD: 0 % (ref 0.5–7.8)
ERYTHROCYTE [DISTWIDTH] IN BLOOD BY AUTOMATED COUNT: 17.5 % (ref 11.9–14.6)
GLOBULIN SER CALC-MCNC: 3.6 G/DL (ref 2.3–3.5)
GLUCOSE SERPL-MCNC: 77 MG/DL (ref 65–100)
HCT VFR BLD AUTO: 25 % (ref 41.1–50.3)
HGB BLD-MCNC: 8.6 G/DL (ref 13.6–17.2)
IMM GRANULOCYTES # BLD: 0.1 K/UL (ref 0–0.5)
IMM GRANULOCYTES NFR BLD AUTO: 1 % (ref 0–5)
LYMPHOCYTES # BLD: 0.3 K/UL (ref 0.5–4.6)
LYMPHOCYTES NFR BLD: 4 % (ref 13–44)
MAGNESIUM SERPL-MCNC: 1.7 MG/DL (ref 1.8–2.4)
MCH RBC QN AUTO: 24.5 PG (ref 26.1–32.9)
MCHC RBC AUTO-ENTMCNC: 34.4 G/DL (ref 31.4–35)
MCV RBC AUTO: 71.2 FL (ref 79.6–97.8)
MONOCYTES # BLD: 0.3 K/UL (ref 0.1–1.3)
MONOCYTES NFR BLD: 4 % (ref 4–12)
NEUTS SEG # BLD: 6.4 K/UL (ref 1.7–8.2)
NEUTS SEG NFR BLD: 91 % (ref 43–78)
PLATELET # BLD AUTO: 173 K/UL (ref 150–450)
PMV BLD AUTO: 9.7 FL (ref 10.8–14.1)
POTASSIUM SERPL-SCNC: 3.3 MMOL/L (ref 3.5–5.1)
PROT SERPL-MCNC: 5.8 G/DL (ref 6.3–8.2)
RBC # BLD AUTO: 3.51 M/UL (ref 4.23–5.67)
SERVICE CMNT-IMP: NORMAL
SODIUM SERPL-SCNC: 140 MMOL/L (ref 136–145)
WBC # BLD AUTO: 7.1 K/UL (ref 4.3–11.1)

## 2018-07-21 PROCEDURE — 74011000258 HC RX REV CODE- 258: Performed by: NURSE PRACTITIONER

## 2018-07-21 PROCEDURE — 36591 DRAW BLOOD OFF VENOUS DEVICE: CPT

## 2018-07-21 PROCEDURE — 74011250636 HC RX REV CODE- 250/636: Performed by: NURSE PRACTITIONER

## 2018-07-21 PROCEDURE — 74011250637 HC RX REV CODE- 250/637: Performed by: INTERNAL MEDICINE

## 2018-07-21 PROCEDURE — 83735 ASSAY OF MAGNESIUM: CPT | Performed by: NURSE PRACTITIONER

## 2018-07-21 PROCEDURE — 99239 HOSP IP/OBS DSCHRG MGMT >30: CPT | Performed by: INTERNAL MEDICINE

## 2018-07-21 PROCEDURE — 85025 COMPLETE CBC W/AUTO DIFF WBC: CPT | Performed by: NURSE PRACTITIONER

## 2018-07-21 PROCEDURE — 80053 COMPREHEN METABOLIC PANEL: CPT | Performed by: NURSE PRACTITIONER

## 2018-07-21 PROCEDURE — 77030020263 HC SOL INJ SOD CL0.9% LFCR 1000ML

## 2018-07-21 PROCEDURE — 74011250637 HC RX REV CODE- 250/637: Performed by: NURSE PRACTITIONER

## 2018-07-21 RX ORDER — LEVOFLOXACIN 500 MG/1
500 TABLET, FILM COATED ORAL DAILY
Qty: 3 TAB | Refills: 0 | Status: SHIPPED | OUTPATIENT
Start: 2018-07-21 | End: 2018-07-24

## 2018-07-21 RX ORDER — HEPARIN 100 UNIT/ML
300 SYRINGE INTRAVENOUS AS NEEDED
Status: DISCONTINUED | OUTPATIENT
Start: 2018-07-21 | End: 2018-07-21 | Stop reason: HOSPADM

## 2018-07-21 RX ORDER — FENTANYL 100 UG/H
1 PATCH TRANSDERMAL
Qty: 10 PATCH | Refills: 0 | Status: SHIPPED | OUTPATIENT
Start: 2018-07-22 | End: 2018-07-31 | Stop reason: SDUPTHER

## 2018-07-21 RX ORDER — POTASSIUM CHLORIDE 20 MEQ/1
20 TABLET, EXTENDED RELEASE ORAL 2 TIMES DAILY
Qty: 60 TAB | Refills: 0 | Status: SHIPPED | OUTPATIENT
Start: 2018-07-21 | End: 2018-08-10 | Stop reason: SDUPTHER

## 2018-07-21 RX ORDER — FENTANYL 25 UG/1
1 PATCH TRANSDERMAL
Qty: 10 PATCH | Refills: 0 | Status: SHIPPED | OUTPATIENT
Start: 2018-07-23 | End: 2018-08-10 | Stop reason: SDUPTHER

## 2018-07-21 RX ORDER — OXYCODONE HYDROCHLORIDE 30 MG/1
30 TABLET ORAL
Qty: 90 TAB | Refills: 0 | Status: SHIPPED | OUTPATIENT
Start: 2018-07-21 | End: 2018-07-31 | Stop reason: ALTCHOICE

## 2018-07-21 RX ORDER — POTASSIUM CHLORIDE 20 MEQ/1
20 TABLET, EXTENDED RELEASE ORAL 2 TIMES DAILY
Status: DISCONTINUED | OUTPATIENT
Start: 2018-07-21 | End: 2018-07-21 | Stop reason: HOSPADM

## 2018-07-21 RX ORDER — MAGNESIUM SULFATE HEPTAHYDRATE 40 MG/ML
2 INJECTION, SOLUTION INTRAVENOUS ONCE
Status: COMPLETED | OUTPATIENT
Start: 2018-07-21 | End: 2018-07-21

## 2018-07-21 RX ADMIN — CEFEPIME HYDROCHLORIDE 2 G: 2 INJECTION, POWDER, FOR SOLUTION INTRAVENOUS at 12:47

## 2018-07-21 RX ADMIN — LEVOTHYROXINE SODIUM 137 MCG: 50 TABLET ORAL at 06:21

## 2018-07-21 RX ADMIN — CARVEDILOL 6.25 MG: 6.25 TABLET, FILM COATED ORAL at 07:44

## 2018-07-21 RX ADMIN — TORSEMIDE 20 MG: 20 TABLET ORAL at 07:44

## 2018-07-21 RX ADMIN — AMIODARONE HYDROCHLORIDE 200 MG: 200 TABLET ORAL at 07:45

## 2018-07-21 RX ADMIN — APIXABAN 5 MG: 5 TABLET, FILM COATED ORAL at 07:44

## 2018-07-21 RX ADMIN — OXYCODONE HYDROCHLORIDE 30 MG: 15 TABLET ORAL at 01:38

## 2018-07-21 RX ADMIN — CEFEPIME HYDROCHLORIDE 2 G: 2 INJECTION, POWDER, FOR SOLUTION INTRAVENOUS at 01:35

## 2018-07-21 RX ADMIN — PANTOPRAZOLE SODIUM 40 MG: 40 TABLET, DELAYED RELEASE ORAL at 06:20

## 2018-07-21 RX ADMIN — VANCOMYCIN HYDROCHLORIDE 1250 MG: 10 INJECTION, POWDER, LYOPHILIZED, FOR SOLUTION INTRAVENOUS at 09:59

## 2018-07-21 RX ADMIN — POTASSIUM CHLORIDE 20 MEQ: 1500 TABLET, EXTENDED RELEASE ORAL at 07:45

## 2018-07-21 RX ADMIN — SODIUM CHLORIDE 50 ML/HR: 900 INJECTION, SOLUTION INTRAVENOUS at 06:25

## 2018-07-21 RX ADMIN — CHLORHEXIDINE GLUCONATE 15 ML: 1.2 RINSE ORAL at 07:43

## 2018-07-21 RX ADMIN — MAGNESIUM OXIDE TAB 400 MG (241.3 MG ELEMENTAL MG) 400 MG: 400 (241.3 MG) TAB at 07:45

## 2018-07-21 RX ADMIN — OXYCODONE HYDROCHLORIDE 30 MG: 15 TABLET ORAL at 06:21

## 2018-07-21 RX ADMIN — ATORVASTATIN CALCIUM 20 MG: 10 TABLET, FILM COATED ORAL at 07:44

## 2018-07-21 RX ADMIN — OXYCODONE HYDROCHLORIDE 30 MG: 15 TABLET ORAL at 09:59

## 2018-07-21 RX ADMIN — STANDARDIZED SENNA CONCENTRATE AND DOCUSATE SODIUM 2 TABLET: 8.6; 5 TABLET, FILM COATED ORAL at 07:44

## 2018-07-21 RX ADMIN — MAGNESIUM OXIDE TAB 400 MG (241.3 MG ELEMENTAL MG) 400 MG: 400 (241.3 MG) TAB at 12:47

## 2018-07-21 RX ADMIN — MAGNESIUM SULFATE HEPTAHYDRATE 2 G: 40 INJECTION, SOLUTION INTRAVENOUS at 07:43

## 2018-07-21 NOTE — DISCHARGE INSTRUCTIONS
PATIENT INSTRUCTIONS:    After general anesthesia or intravenous sedation, for 24 hours or while taking prescription Narcotics:  · Limit your activities  · Do not drive and operate hazardous machinery  · Do not make important personal or business decisions  · Do  not drink alcoholic beverages  · If you have not urinated within 8 hours after discharge, please contact your surgeon on call. Report the following to your surgeon:  · Excessive pain, swelling, redness or odor of or around the surgical area  · Temperature over 100.5  · Nausea and vomiting lasting longer than 4 hours or if unable to take medications  · Any signs of decreased circulation or nerve impairment to extremity: change in color, persistent  numbness, tingling, coldness or increase pain  · Any questions    What to do at Home:  Recommended activity: As tolerated     If you experience any of the following symptoms:    - Fever greater than 100.5  - Uncontrolled Nausea and Vomiting  - Uncontrolled pain  - Changes in respiratory status     *  Please give a list of your current medications to your Primary Care Provider. *  Please update this list whenever your medications are discontinued, doses are      changed, or new medications (including over-the-counter products) are added. *  Please carry medication information at all times in case of emergency situations. These are general instructions for a healthy lifestyle:    No smoking/ No tobacco products/ Avoid exposure to second hand smoke  Surgeon General's Warning:  Quitting smoking now greatly reduces serious risk to your health.     Obesity, smoking, and sedentary lifestyle greatly increases your risk for illness    A healthy diet, regular physical exercise & weight monitoring are important for maintaining a healthy lifestyle    You may be retaining fluid if you have a history of heart failure or if you experience any of the following symptoms:  Weight gain of 3 pounds or more overnight or 5 pounds in a week, increased swelling in our hands or feet or shortness of breath while lying flat in bed. Please call your doctor as soon as you notice any of these symptoms; do not wait until your next office visit. Recognize signs and symptoms of STROKE:    F-face looks uneven    A-arms unable to move or move unevenly    S-speech slurred or non-existent    T-time-call 911 as soon as signs and symptoms begin-DO NOT go       Back to bed or wait to see if you get better-TIME IS BRAIN. Warning Signs of HEART ATTACK     Call 911 if you have these symptoms:   Chest discomfort. Most heart attacks involve discomfort in the center of the chest that lasts more than a few minutes, or that goes away and comes back. It can feel like uncomfortable pressure, squeezing, fullness, or pain.  Discomfort in other areas of the upper body. Symptoms can include pain or discomfort in one or both arms, the back, neck, jaw, or stomach.  Shortness of breath with or without chest discomfort.  Other signs may include breaking out in a cold sweat, nausea, or lightheadedness. Don't wait more than five minutes to call 911 - MINUTES MATTER! Fast action can save your life. Calling 911 is almost always the fastest way to get lifesaving treatment. Emergency Medical Services staff can begin treatment when they arrive -- up to an hour sooner than if someone gets to the hospital by car. The discharge information has been reviewed with the {PATIENT PARENT GUARDIAN:49965}. The {PATIENT PARENT GUARDIAN:86091} verbalized understanding. Discharge medications reviewed with the {Dishcarge meds reviewed LDZB:96958} and appropriate educational materials and side effects teaching were provided.   ___________________________________________________________________________________________________________________________________     DISCHARGE INSTRUCTIONS FOR:  NEUTROPENIC PRECAUTIONS    Occasionally your chemotherapy can lower your white blood cell count, making you more susceptible to infection. There are certain precautions that you should take in order to help avoid illnesses and infections. These include:     1. Practice good hand washing:  * Use soap and water for at least 15 seconds, covering all areas of your hands. * Always wash your hands before eating. * Wash your hands after contact with public surfaces such as door knobs and     handles, shopping carts, telephone and elevator buttons. 2.  Avoid large crowds of people (for example: shopping malls, [place], airplanes, etc.)  * Consider wearing a mask if you must go to densely crowded places. * Avoid sick people and school age children (they often carry more germs with or without symptoms)    3. Avoid fresh flowers (they can carry small insects, fungus and mold). 4.  Thoroughly wash fresh fruits and vegetables:  * Peel outer skins and layers (they may also carry small insects and harbor bacteria on their outer layers); it is often better to eat cooked vegetables instead of raw. * Avoid salad bars    5. Cook all meat thoroughly before eating. Eat nutritiously. 6.  Avoid cleaning litter boxes, bird cages, etc.  Avoid gardening or digging until your white blood count improves. 7.  Do not receive vaccinations, especially live vaccinations (and avoid contact with other adults or children who have received that sort of vaccination). Monitor your temperature daily. Contact your physician immediately if you develop any of the following symptoms:    Temperature of 100.5 Fahrenheit or greater; Increasing or persistent fatigue; Chills;  Burning or painful urination;  A general feeling of being unwell; Shortness of breath, chest pain or productive cough.       Klever Antonio, Signature: ________________________ 7/21/2018  Ariel Pacheco

## 2018-07-21 NOTE — PROGRESS NOTES
END OF SHIFT NOTE:    Intake/Output      Voiding: YES  Catheter: NO  Drain:              Stool:  0 occurrences. Stool Assessment  Stool Color: Brown (07/20/18 1026)  Stool Appearance: Soft; Formed (07/20/18 1026)  Stool Amount: Medium (07/20/18 1026)  Stool Source/Status: Rectum (07/20/18 1026)    Emesis:  0 occurrences. VITAL SIGNS  Patient Vitals for the past 12 hrs:   Temp Pulse Resp BP SpO2   07/21/18 0729 98.4 °F (36.9 °C) 71 18 119/63 95 %   07/21/18 0315 98.4 °F (36.9 °C) 69 18 94/52 92 %   07/20/18 2255 98.5 °F (36.9 °C) 72 18 (!) 86/43 94 %       Pain Assessment  Pain 1  Pain Scale 1: Numeric (0 - 10) (07/21/18 0622)  Pain Intensity 1: 7 (07/21/18 0622)  Patient Stated Pain Goal: 0 (07/21/18 0208)  Pain Reassessment 1: Patient sleeping (07/21/18 0208)  Pain Onset 1: pta (07/20/18 1854)  Pain Location 1: Abdomen (07/21/18 0622)  Pain Orientation 1: Anterior (07/21/18 0622)  Pain Description 1: Aching;Constant (07/21/18 0622)  Pain Intervention(s) 1: Medication (see MAR) (07/21/18 0622)    Ambulating  Yes    Additional Information: Pt slept well throughout the shift. He was agreeable to the PO pain meds in order to go home. Pain stayed between 5-7. Oxycodone given 3x. Shift report given to oncoming nurse at the bedside.     Yanira Masters

## 2018-07-21 NOTE — PROGRESS NOTES
3390- Pt is in a 5/10 pain and is requesting oxy PRN. Pt cannot receive this medication until 1020. RN received orders from Lissett Siegel NP, that it was okay to administer medication early at 1000. Pt [address] home with wife. DC instructions given. No further questions at this time. Needle from port de-accessed and flushed with heparin. VSS upon DC.

## 2018-07-23 ENCOUNTER — PATIENT OUTREACH (OUTPATIENT)
Dept: CASE MANAGEMENT | Age: 78
End: 2018-07-23

## 2018-07-23 ENCOUNTER — HOSPITAL ENCOUNTER (OUTPATIENT)
Dept: LAB | Age: 78
Discharge: HOME | End: 2018-07-23
Payer: MEDICARE

## 2018-07-23 ENCOUNTER — APPOINTMENT (OUTPATIENT)
Dept: INFUSION THERAPY | Age: 78
End: 2018-07-23
Payer: MEDICARE

## 2018-07-23 DIAGNOSIS — C90.00 MULTIPLE MYELOMA NOT HAVING ACHIEVED REMISSION (HCC): ICD-10-CM

## 2018-07-23 LAB
ALBUMIN SERPL-MCNC: 2.9 G/DL (ref 3.2–4.6)
ALBUMIN/GLOB SERPL: 0.7 {RATIO} (ref 1.2–3.5)
ALP SERPL-CCNC: 146 U/L (ref 50–136)
ALT SERPL-CCNC: 42 U/L (ref 12–65)
ANION GAP SERPL CALC-SCNC: 8 MMOL/L (ref 7–16)
AST SERPL-CCNC: 47 U/L (ref 15–37)
BACTERIA SPEC CULT: NORMAL
BACTERIA SPEC CULT: NORMAL
BILIRUB SERPL-MCNC: 0.4 MG/DL (ref 0.2–1.1)
BUN SERPL-MCNC: 14 MG/DL (ref 8–23)
CALCIUM SERPL-MCNC: 8.7 MG/DL (ref 8.3–10.4)
CHLORIDE SERPL-SCNC: 98 MMOL/L (ref 98–107)
CO2 SERPL-SCNC: 25 MMOL/L (ref 21–32)
CREAT SERPL-MCNC: 1.25 MG/DL (ref 0.8–1.5)
DIFFERENTIAL METHOD BLD: ABNORMAL
ERYTHROCYTE [DISTWIDTH] IN BLOOD BY AUTOMATED COUNT: 18.1 % (ref 11.9–14.6)
GLOBULIN SER CALC-MCNC: 4.1 G/DL (ref 2.3–3.5)
GLUCOSE SERPL-MCNC: 97 MG/DL (ref 65–100)
HCT VFR BLD AUTO: 29.2 % (ref 41.1–50.3)
HGB BLD-MCNC: 10 G/DL (ref 13.6–17.2)
LYMPHOCYTES # BLD: 0.8 K/UL (ref 0.5–4.6)
LYMPHOCYTES NFR BLD MANUAL: 22 % (ref 16–44)
MAGNESIUM SERPL-MCNC: 1.7 MG/DL (ref 1.8–2.4)
MCH RBC QN AUTO: 24.6 PG (ref 26.1–32.9)
MCHC RBC AUTO-ENTMCNC: 34.2 G/DL (ref 31.4–35)
MCV RBC AUTO: 71.9 FL (ref 79.6–97.8)
MONOCYTES # BLD: 0.5 K/UL (ref 0.1–1.3)
MONOCYTES NFR BLD MANUAL: 14 % (ref 3–9)
NEUTS BAND NFR BLD MANUAL: 2 % (ref 0–10)
NEUTS SEG # BLD: 2.2 K/UL (ref 1.7–8.2)
NEUTS SEG NFR BLD MANUAL: 62 % (ref 47–75)
NRBC # BLD: 0 K/UL (ref 0–0.2)
PLATELET # BLD AUTO: 186 K/UL (ref 150–450)
PLATELET COMMENTS,PCOM: ADEQUATE
PMV BLD AUTO: 9.8 FL (ref 10.8–14.1)
POTASSIUM SERPL-SCNC: 3.9 MMOL/L (ref 3.5–5.1)
PROT SERPL-MCNC: 7 G/DL (ref 6.3–8.2)
RBC # BLD AUTO: 4.06 M/UL (ref 4.23–5.67)
RBC MORPH BLD: ABNORMAL
SERVICE CMNT-IMP: NORMAL
SERVICE CMNT-IMP: NORMAL
SODIUM SERPL-SCNC: 131 MMOL/L (ref 136–145)
WBC # BLD AUTO: 3.5 K/UL (ref 4.3–11.1)
WBC MORPH BLD: ABNORMAL

## 2018-07-23 PROCEDURE — 84165 PROTEIN E-PHORESIS SERUM: CPT | Performed by: INTERNAL MEDICINE

## 2018-07-23 PROCEDURE — 85025 COMPLETE CBC W/AUTO DIFF WBC: CPT | Performed by: NURSE PRACTITIONER

## 2018-07-23 PROCEDURE — 80053 COMPREHEN METABOLIC PANEL: CPT | Performed by: NURSE PRACTITIONER

## 2018-07-23 PROCEDURE — 83735 ASSAY OF MAGNESIUM: CPT | Performed by: NURSE PRACTITIONER

## 2018-07-23 PROCEDURE — 86334 IMMUNOFIX E-PHORESIS SERUM: CPT | Performed by: INTERNAL MEDICINE

## 2018-07-23 PROCEDURE — 83883 ASSAY NEPHELOMETRY NOT SPEC: CPT | Performed by: INTERNAL MEDICINE

## 2018-07-23 NOTE — PROGRESS NOTES
7/23/18:  Patient in for hospital follow-up and pre-chemo with Dr. Patricia Yen. Patient met with palliative care prior to ov to discuss pain control. Patient has not added additional 25mcg patch to his pain regimen but will today as he feels pain is still not adequately controlled. Dr. Patricia Yen reviewed ninlaro and daratumamab side effects and dosing schedule with the patient and his wife. Patient signed chemotherapy consent. Copy given to infusion  to scan. Patient to return in one week to follow-up with NP prior to week 2 treatment. Patient encouraged to call with any new changes. Patient prefers treatments downtown if possible as it is easier for transportation.

## 2018-07-23 NOTE — Clinical Note
THis patient is current with you and was readmitted to [facility]. Discharged 7/21/18. Dx Neutropenic fever and sepsis, Hx MM. Please see TENNILLE note.  Thanks

## 2018-07-23 NOTE — PROGRESS NOTES
This note will not be viewable in [address] Transition of Care Discharge Follow-up Questionnaire   Date/Time of Call:   7/23/18 12:32   What was the patient hospitalized for? Neutropenic fever/ Sepsis  HX: Multiple Myeloma   Does the patient understand his/her diagnosis and/or treatment and what happened during the hospitalization? Spoke with patient, who was agreeable to SHANTANU VILLA call. Patient states understanding of diagnosis and treatment. Reports he is doing ok, reports continued pain that is somewhat controlled with medication, denies any new concerns or symptoms. Did the patient receive discharge instructions? Yes, patient received discharge instructions. CM Assessed Risk for Readmission:       Patient stated Risk for Readmission:      High risk for readmission due to complications for MM and chemo. Per patient, problems from MM. Review any discharge instructions (see discharge instructions/AVS in [facility]). Ask patient if they understand these. Do they have any questions? Discharge instructions reviewed with patient, who verbalized understanding of instructions and denied any questions. Were home services ordered (nursing, PT, OT, ST, etc.)? Patient is current with [facility]. If so, has the first visit occurred? If not, why? (Assist with coordination of services if necessary.)   N/A   Was any DME ordered? No   If so, has it been received? If not, why?  (Assist patient in obtaining DME orders &/or equipment if necessary.) N/A   Complete a review of all medications (new, continued and discontinued meds per the D/C instructions and medication tab in [facility]). Medication review completed with patient, who stated understanding. Were all new prescriptions filled? If not, why?  (Assist patient in obtaining medications if necessary  escalate for CCM &/or SW if ongoing issues are verbalized by pt or anticipated)   New prescriptions were filled.     Patient denies any problems obtaining medications. Does the patient understand the purpose and dosing instructions for all medications? (If patient has questions, provide explanation and education.)   Patient states he knows purpose and dosing for medications. Does the patient have any problems in performing ADLs? (If patient is unable to perform ADLs  what is the limiting factor(s)? Do they have a support system that can assist? If no support system is present, discuss possible assistance that they may be able to obtain. Escalate for CCM/SW if ongoing issues are verbalized by pt or anticipated)   Patient is normally independent with ADLs, lives with spouse, who assists as needed. Does the patient have all follow-up appointments scheduled? 7 day f/up with PCP?   (f/up with PCP may be w/in 14 days if patient has a f/up with their specialist w/in 7 days)    7-14 day f/up with specialist?   (or per discharge instructions)    If f/up has not been made  what actions has the care coordinator made to accomplish this? Has transportation been arranged? Patient has an appointment with Dr. Oral Richardson today, 7/23/18 also scheduled for labs and palliative care visit today    Patient is scheduled to see PCP, Dr. Sabrina Small on 8/10/18. Per previous conversation during [facility] call after last admission patient reported concern that he would need assistance with transportation soon due to his and his spouse declining health. Patient was referred to RN and MSW, after last [facility] call and is being followed by YANIQUE Mcduffie for transportation concerns. Patient reports MSW gave him a number to call but he states he doesnt remember if  has talked to anyone. [facility] information will be forwarded to MSW for follow up with patient. .   Any other questions or concerns expressed by the patient? Patient had no other questions or concerns. Care Coordinator will follow up with MSW on transportation concerns.    Schedule next appointment with [facility] Coordinator or refer to RN Case Manager/ per the workflow guidelines. When is care coordinators next follow-up call scheduled? If referred for CCM  what RN care manager was the referral assigned? Patient is current with [facility]ILLA information forwarded. No follow up is indicated by Care Coordinator. Patient has CC contact information and advised to call for new concerns.    [facility] Call Completed By: Perico Arredondo, [address] [facility] Coordinator

## 2018-07-24 ENCOUNTER — PATIENT OUTREACH (OUTPATIENT)
Dept: CASE MANAGEMENT | Age: 78
End: 2018-07-24

## 2018-07-24 ENCOUNTER — HOSPITAL ENCOUNTER (OUTPATIENT)
Dept: INFUSION THERAPY | Age: 78
Discharge: HOME | End: 2018-07-24
Payer: MEDICARE

## 2018-07-24 VITALS
WEIGHT: 178.5 LBS | BODY MASS INDEX: 22.92 KG/M2 | RESPIRATION RATE: 18 BRPM | OXYGEN SATURATION: 95 % | TEMPERATURE: 97.6 F | DIASTOLIC BLOOD PRESSURE: 76 MMHG | HEART RATE: 72 BPM | SYSTOLIC BLOOD PRESSURE: 108 MMHG

## 2018-07-24 DIAGNOSIS — C90.00 MULTIPLE MYELOMA, REMISSION STATUS UNSPECIFIED (HCC): ICD-10-CM

## 2018-07-24 DIAGNOSIS — C90.02 MULTIPLE MYELOMA IN RELAPSE (HCC): ICD-10-CM

## 2018-07-24 LAB
COLLECTION COMMENT, COLCM: NORMAL
KAPPA LC FREE SER-MCNC: 11.54 MG/L (ref 3.3–19.4)
KAPPA LC FREE/LAMBDA FREE SER: 0.33 {RATIO} (ref 0.26–1.65)
LAMBDA LC FREE SERPL-MCNC: 34.65 MG/L (ref 5.71–26.3)

## 2018-07-24 PROCEDURE — 74011000250 HC RX REV CODE- 250: Performed by: INTERNAL MEDICINE

## 2018-07-24 PROCEDURE — 74011000258 HC RX REV CODE- 258: Performed by: INTERNAL MEDICINE

## 2018-07-24 PROCEDURE — 74011250636 HC RX REV CODE- 250/636: Performed by: INTERNAL MEDICINE

## 2018-07-24 PROCEDURE — 74011250637 HC RX REV CODE- 250/637: Performed by: NURSE PRACTITIONER

## 2018-07-24 PROCEDURE — 96413 CHEMO IV INFUSION 1 HR: CPT

## 2018-07-24 PROCEDURE — 77030003560 HC NDL HUBR BARD -A

## 2018-07-24 PROCEDURE — 74011250637 HC RX REV CODE- 250/637: Performed by: INTERNAL MEDICINE

## 2018-07-24 PROCEDURE — 96415 CHEMO IV INFUSION ADDL HR: CPT

## 2018-07-24 PROCEDURE — 74011250636 HC RX REV CODE- 250/636

## 2018-07-24 PROCEDURE — 96375 TX/PRO/DX INJ NEW DRUG ADDON: CPT

## 2018-07-24 RX ORDER — SODIUM CHLORIDE 0.9 % (FLUSH) 0.9 %
10 SYRINGE (ML) INJECTION AS NEEDED
Status: ACTIVE | OUTPATIENT
Start: 2018-07-24 | End: 2018-07-24

## 2018-07-24 RX ORDER — MONTELUKAST SODIUM 10 MG/1
10 TABLET ORAL ONCE
Status: COMPLETED | OUTPATIENT
Start: 2018-07-24 | End: 2018-07-24

## 2018-07-24 RX ORDER — ACETAMINOPHEN 325 MG/1
650 TABLET ORAL ONCE
Status: COMPLETED | OUTPATIENT
Start: 2018-07-24 | End: 2018-07-24

## 2018-07-24 RX ORDER — DIPHENHYDRAMINE HYDROCHLORIDE 50 MG/ML
50 INJECTION, SOLUTION INTRAMUSCULAR; INTRAVENOUS ONCE
Status: COMPLETED | OUTPATIENT
Start: 2018-07-24 | End: 2018-07-24

## 2018-07-24 RX ORDER — OXYCODONE HYDROCHLORIDE 15 MG/1
30 TABLET ORAL ONCE
Status: COMPLETED | OUTPATIENT
Start: 2018-07-24 | End: 2018-07-24

## 2018-07-24 RX ORDER — HEPARIN 100 UNIT/ML
300-500 SYRINGE INTRAVENOUS AS NEEDED
Status: DISPENSED | OUTPATIENT
Start: 2018-07-24 | End: 2018-07-24

## 2018-07-24 RX ORDER — HYDROCORTISONE SODIUM SUCCINATE 100 MG/2ML
100 INJECTION, POWDER, FOR SOLUTION INTRAMUSCULAR; INTRAVENOUS AS NEEDED
Status: DISPENSED | OUTPATIENT
Start: 2018-07-24 | End: 2018-07-24

## 2018-07-24 RX ORDER — DIPHENHYDRAMINE HYDROCHLORIDE 50 MG/ML
50 INJECTION, SOLUTION INTRAMUSCULAR; INTRAVENOUS AS NEEDED
Status: DISPENSED | OUTPATIENT
Start: 2018-07-24 | End: 2018-07-24

## 2018-07-24 RX ORDER — SODIUM CHLORIDE 9 MG/ML
500 INJECTION, SOLUTION INTRAVENOUS CONTINUOUS
Status: DISPENSED | OUTPATIENT
Start: 2018-07-24 | End: 2018-07-24

## 2018-07-24 RX ADMIN — FAMOTIDINE 20 MG: 10 INJECTION, SOLUTION INTRAVENOUS at 09:45

## 2018-07-24 RX ADMIN — DIPHENHYDRAMINE HYDROCHLORIDE 50 MG: 50 INJECTION, SOLUTION INTRAMUSCULAR; INTRAVENOUS at 09:52

## 2018-07-24 RX ADMIN — Medication 10 ML: at 17:16

## 2018-07-24 RX ADMIN — OXYCODONE HYDROCHLORIDE 30 MG: 15 TABLET ORAL at 13:01

## 2018-07-24 RX ADMIN — DARATUMUMAB 1306 MG: 100 INJECTION, SOLUTION, CONCENTRATE INTRAVENOUS at 11:03

## 2018-07-24 RX ADMIN — ACETAMINOPHEN 650 MG: 325 TABLET ORAL at 09:44

## 2018-07-24 RX ADMIN — SODIUM CHLORIDE 500 ML: 900 INJECTION, SOLUTION INTRAVENOUS at 09:30

## 2018-07-24 RX ADMIN — DEXAMETHASONE SODIUM PHOSPHATE 20 MG: 4 INJECTION, SOLUTION INTRAMUSCULAR; INTRAVENOUS at 09:59

## 2018-07-24 RX ADMIN — MONTELUKAST SODIUM 10 MG: 10 TABLET, FILM COATED ORAL at 09:44

## 2018-07-24 RX ADMIN — SODIUM CHLORIDE, PRESERVATIVE FREE 500 UNITS: 5 INJECTION INTRAVENOUS at 17:16

## 2018-07-24 RX ADMIN — Medication 10 ML: at 09:30

## 2018-07-24 RX ADMIN — ZOLEDRONIC ACID 4 MG: 0.8 INJECTION, SOLUTION, CONCENTRATE INTRAVENOUS at 10:24

## 2018-07-24 NOTE — PROGRESS NOTES
[facility] D1C1 Daratumumab and Zometa completed. Patient tolerated well. Any issues or concerns during appointment: Per Hesham Mcbride, RN, patient to go to primary pharmacy and  oral prednisone and begin taking this evening. Patient verbalized understanding. Patient aware of next infusion appointment on 7/31 (date) at 10:10 AM (time). Discharged ambulatory with walker.

## 2018-07-24 NOTE — PROGRESS NOTES
LISA BARNHART called to follow up with concerns voice by pt to SHANTANU VILLA RN yesterday. However, pt is currently getting infusions at the hospital per his wife Federico. LISA BARNHART let Federico know that this LISA BARNHART will not be continuing to follow pt but that MEENAKSHI Correa will be taking over. Federico was able to voice understanding and was thankful for the follow up. PLAN:  LISA BARNHART will remove herself from pt's care at this time. This note will not be viewable in [address]

## 2018-07-25 LAB
ALBUMIN SERPL ELPH-MCNC: 3.18 G/DL (ref 3.2–5.6)
ALBUMIN/GLOB SERPL: 1 {RATIO}
ALPHA1 GLOB SERPL ELPH-MCNC: 0.35 G/DL (ref 0.1–0.4)
ALPHA2 GLOB SERPL ELPH-MCNC: 0.8 G/DL (ref 0.4–1.2)
B-GLOBULIN SERPL QL ELPH: 0.9 G/DL (ref 0.6–1.3)
GAMMA GLOB MFR SERPL ELPH: 1.05 G/DL (ref 0.5–1.6)
IGA SERPL-MCNC: 10 MG/DL (ref 85–499)
IGG SERPL-MCNC: 1184 MG/DL (ref 610–1616)
IGM SERPL-MCNC: 49 MG/DL (ref 35–242)
M PROTEIN SERPL ELPH-MCNC: 0.8 G/DL
PROT PATTERN SERPL ELPH-IMP: ABNORMAL
PROT PATTERN SPEC IFE-IMP: ABNORMAL
PROT SERPL-MCNC: 6.3 G/DL (ref 6.3–8.2)

## 2018-07-31 ENCOUNTER — PATIENT OUTREACH (OUTPATIENT)
Dept: CASE MANAGEMENT | Age: 78
End: 2018-07-31

## 2018-07-31 ENCOUNTER — HOSPITAL ENCOUNTER (OUTPATIENT)
Dept: LAB | Age: 78
Discharge: HOME | End: 2018-07-31
Payer: MEDICARE

## 2018-07-31 ENCOUNTER — HOSPITAL ENCOUNTER (OUTPATIENT)
Dept: INFUSION THERAPY | Age: 78
Discharge: HOME | End: 2018-07-31
Payer: MEDICARE

## 2018-07-31 VITALS
SYSTOLIC BLOOD PRESSURE: 127 MMHG | RESPIRATION RATE: 16 BRPM | DIASTOLIC BLOOD PRESSURE: 70 MMHG | TEMPERATURE: 97.3 F | HEART RATE: 70 BPM | OXYGEN SATURATION: 96 %

## 2018-07-31 DIAGNOSIS — C73 HURTHLE CELL CARCINOMA OF THYROID (HCC): ICD-10-CM

## 2018-07-31 DIAGNOSIS — C90.02 MULTIPLE MYELOMA IN RELAPSE (HCC): ICD-10-CM

## 2018-07-31 LAB
ALBUMIN SERPL-MCNC: 3.3 G/DL (ref 3.2–4.6)
ALBUMIN/GLOB SERPL: 0.9 {RATIO} (ref 1.2–3.5)
ALP SERPL-CCNC: 255 U/L (ref 50–136)
ALT SERPL-CCNC: 31 U/L (ref 12–65)
ANION GAP SERPL CALC-SCNC: 11 MMOL/L (ref 7–16)
AST SERPL-CCNC: 42 U/L (ref 15–37)
BASOPHILS # BLD: 0 K/UL (ref 0–0.2)
BASOPHILS NFR BLD: 1 % (ref 0–2)
BILIRUB SERPL-MCNC: 0.4 MG/DL (ref 0.2–1.1)
BUN SERPL-MCNC: 29 MG/DL (ref 8–23)
CALCIUM SERPL-MCNC: 8.6 MG/DL (ref 8.3–10.4)
CHLORIDE SERPL-SCNC: 101 MMOL/L (ref 98–107)
CO2 SERPL-SCNC: 23 MMOL/L (ref 21–32)
CREAT SERPL-MCNC: 1.82 MG/DL (ref 0.8–1.5)
DIFFERENTIAL METHOD BLD: ABNORMAL
EOSINOPHIL # BLD: 0 K/UL (ref 0–0.8)
EOSINOPHIL NFR BLD: 1 % (ref 0.5–7.8)
ERYTHROCYTE [DISTWIDTH] IN BLOOD BY AUTOMATED COUNT: 19.2 % (ref 11.9–14.6)
GLOBULIN SER CALC-MCNC: 3.7 G/DL (ref 2.3–3.5)
GLUCOSE SERPL-MCNC: 125 MG/DL (ref 65–100)
HCT VFR BLD AUTO: 30.4 % (ref 41.1–50.3)
HGB BLD-MCNC: 10.2 G/DL (ref 13.6–17.2)
LYMPHOCYTES # BLD: 0.7 K/UL (ref 0.5–4.6)
LYMPHOCYTES NFR BLD: 22 % (ref 13–44)
MAGNESIUM SERPL-MCNC: 1.9 MG/DL (ref 1.8–2.4)
MCH RBC QN AUTO: 24.4 PG (ref 26.1–32.9)
MCHC RBC AUTO-ENTMCNC: 33.6 G/DL (ref 31.4–35)
MCV RBC AUTO: 72.7 FL (ref 79.6–97.8)
MONOCYTES # BLD: 0.3 K/UL (ref 0.1–1.3)
MONOCYTES NFR BLD: 11 % (ref 4–12)
NEUTS SEG # BLD: 1.9 K/UL (ref 1.7–8.2)
NEUTS SEG NFR BLD: 66 % (ref 43–78)
NRBC # BLD: 0 K/UL (ref 0–0.2)
PLATELET # BLD AUTO: 212 K/UL (ref 150–450)
PMV BLD AUTO: 10.1 FL (ref 10.8–14.1)
POTASSIUM SERPL-SCNC: 4.1 MMOL/L (ref 3.5–5.1)
PROT SERPL-MCNC: 7 G/DL (ref 6.3–8.2)
RBC # BLD AUTO: 4.18 M/UL (ref 4.23–5.67)
SODIUM SERPL-SCNC: 135 MMOL/L (ref 136–145)
WBC # BLD AUTO: 2.9 K/UL (ref 4.3–11.1)

## 2018-07-31 PROCEDURE — 96413 CHEMO IV INFUSION 1 HR: CPT

## 2018-07-31 PROCEDURE — 74011250636 HC RX REV CODE- 250/636: Performed by: INTERNAL MEDICINE

## 2018-07-31 PROCEDURE — 74011250637 HC RX REV CODE- 250/637: Performed by: INTERNAL MEDICINE

## 2018-07-31 PROCEDURE — 80053 COMPREHEN METABOLIC PANEL: CPT | Performed by: INTERNAL MEDICINE

## 2018-07-31 PROCEDURE — 74011250636 HC RX REV CODE- 250/636

## 2018-07-31 PROCEDURE — 96375 TX/PRO/DX INJ NEW DRUG ADDON: CPT

## 2018-07-31 PROCEDURE — 74011000258 HC RX REV CODE- 258: Performed by: INTERNAL MEDICINE

## 2018-07-31 PROCEDURE — 83735 ASSAY OF MAGNESIUM: CPT | Performed by: INTERNAL MEDICINE

## 2018-07-31 PROCEDURE — 96415 CHEMO IV INFUSION ADDL HR: CPT

## 2018-07-31 PROCEDURE — 85025 COMPLETE CBC W/AUTO DIFF WBC: CPT | Performed by: INTERNAL MEDICINE

## 2018-07-31 RX ORDER — DIPHENHYDRAMINE HYDROCHLORIDE 50 MG/ML
50 INJECTION, SOLUTION INTRAMUSCULAR; INTRAVENOUS ONCE
Status: COMPLETED | OUTPATIENT
Start: 2018-07-31 | End: 2018-07-31

## 2018-07-31 RX ORDER — ACETAMINOPHEN 325 MG/1
650 TABLET ORAL ONCE
Status: COMPLETED | OUTPATIENT
Start: 2018-07-31 | End: 2018-07-31

## 2018-07-31 RX ORDER — SODIUM CHLORIDE 0.9 % (FLUSH) 0.9 %
10 SYRINGE (ML) INJECTION AS NEEDED
Status: ACTIVE | OUTPATIENT
Start: 2018-07-31 | End: 2018-07-31

## 2018-07-31 RX ORDER — SODIUM CHLORIDE 9 MG/ML
500 INJECTION, SOLUTION INTRAVENOUS CONTINUOUS
Status: ACTIVE | OUTPATIENT
Start: 2018-07-31 | End: 2018-07-31

## 2018-07-31 RX ORDER — HEPARIN 100 UNIT/ML
300-500 SYRINGE INTRAVENOUS AS NEEDED
Status: DISPENSED | OUTPATIENT
Start: 2018-07-31 | End: 2018-07-31

## 2018-07-31 RX ADMIN — Medication 10 ML: at 19:01

## 2018-07-31 RX ADMIN — DEXAMETHASONE SODIUM PHOSPHATE 20 MG: 10 INJECTION INTRAMUSCULAR; INTRAVENOUS at 12:42

## 2018-07-31 RX ADMIN — SODIUM CHLORIDE 500 ML: 900 INJECTION, SOLUTION INTRAVENOUS at 12:20

## 2018-07-31 RX ADMIN — Medication 10 ML: at 12:20

## 2018-07-31 RX ADMIN — Medication 500 UNITS: at 19:02

## 2018-07-31 RX ADMIN — DARATUMUMAB 1306 MG: 100 INJECTION, SOLUTION, CONCENTRATE INTRAVENOUS at 13:58

## 2018-07-31 RX ADMIN — DIPHENHYDRAMINE HYDROCHLORIDE 50 MG: 50 INJECTION, SOLUTION INTRAMUSCULAR; INTRAVENOUS at 12:31

## 2018-07-31 RX ADMIN — ACETAMINOPHEN 650 MG: 325 TABLET ORAL at 12:30

## 2018-07-31 NOTE — PROGRESS NOTES
7/31/18:  Patient in for follow-up with NP. Patient to start week #2 Ninlaro and daratumamab today. Patient admits he is confused about his current medications and would like clarification. He notes pain as 8/10 today in upper back. He notes no changes in pain control with addition of duragesic patch 25mcg for total of 125mcg. Patient also notes no change in pain control with oxycodone. Albertina Spring NP with palliative care met with patient and will change pain medication to dilaudid. Patient instructed to stop oxycodone. He will add miralax bid and continue pericolace bid. He will also stop amitiza. Patient to only take flexeril as needed. Patient to have CT scan of thoracic spine before visit next week. Patient given updated medication calendar. NP to follow-up with the patient in one week.

## 2018-07-31 NOTE — PROGRESS NOTES
Pt arrive ambulatory with walker to [facility] with port previously accessed with dressing dry and intact and with good blood return. NS infusing. Pre meds given as ordered. Daratumamab infusing. Pt tolerated well. Port flushed and packed with heparin and de accessed. Pt aware of next appt on 8/7/18 at 1315. Pt discharged ambulatory with walker.

## 2018-08-02 ENCOUNTER — PATIENT OUTREACH (OUTPATIENT)
Dept: CASE MANAGEMENT | Age: 78
End: 2018-08-02

## 2018-08-02 PROBLEM — N17.9 ACUTE KIDNEY INJURY (HCC): Status: RESOLVED | Noted: 2017-01-11 | Resolved: 2018-08-02

## 2018-08-02 PROBLEM — D61.818 PANCYTOPENIA (HCC): Status: RESOLVED | Noted: 2017-01-11 | Resolved: 2018-08-02

## 2018-08-02 PROBLEM — N17.9 ACUTE RENAL FAILURE (ARF) (HCC): Status: RESOLVED | Noted: 2017-11-18 | Resolved: 2018-08-02

## 2018-08-02 PROBLEM — N17.9 AKI (ACUTE KIDNEY INJURY) (HCC): Status: RESOLVED | Noted: 2017-11-20 | Resolved: 2018-08-02

## 2018-08-02 PROBLEM — A41.9 SEPSIS (HCC): Status: RESOLVED | Noted: 2018-07-18 | Resolved: 2018-08-02

## 2018-08-02 NOTE — PROGRESS NOTES
TC to pt for CCM outreach. Pt stated he has been doing good. He stated that he had spoken with someone about transportation and had the information written down. What he described appears to be transportation provided by Medicaid through [employer]. CM offered to send the pt written information about the transportation program through the [employer]. He was agreeable to receive this information. Brochure mailed to pt today. Pt agreeable to CM follow up within 2 weeks. Plan:  CM will contact pt/spouse within the next 2 weeks to further discuss transportation needs and resources. This note will not be viewable in [address]

## 2018-08-06 ENCOUNTER — HOSPITAL ENCOUNTER (OUTPATIENT)
Dept: CT IMAGING | Age: 78
Discharge: HOME | End: 2018-08-06
Attending: NURSE PRACTITIONER
Payer: MEDICARE

## 2018-08-06 DIAGNOSIS — C90.02 MULTIPLE MYELOMA IN RELAPSE (HCC): ICD-10-CM

## 2018-08-06 PROCEDURE — 72128 CT CHEST SPINE W/O DYE: CPT

## 2018-08-07 ENCOUNTER — HOSPITAL ENCOUNTER (OUTPATIENT)
Dept: LAB | Age: 78
Discharge: HOME | End: 2018-08-07
Payer: MEDICARE

## 2018-08-07 ENCOUNTER — PATIENT OUTREACH (OUTPATIENT)
Dept: CASE MANAGEMENT | Age: 78
End: 2018-08-07

## 2018-08-07 ENCOUNTER — HOSPITAL ENCOUNTER (OUTPATIENT)
Dept: INFUSION THERAPY | Age: 78
Discharge: HOME | End: 2018-08-07
Payer: MEDICARE

## 2018-08-07 ENCOUNTER — HOSPITAL ENCOUNTER (OUTPATIENT)
Dept: INFUSION THERAPY | Age: 78
End: 2018-08-07
Payer: MEDICARE

## 2018-08-07 VITALS
BODY MASS INDEX: 22.47 KG/M2 | HEART RATE: 70 BPM | OXYGEN SATURATION: 99 % | TEMPERATURE: 97.8 F | WEIGHT: 175 LBS | DIASTOLIC BLOOD PRESSURE: 63 MMHG | RESPIRATION RATE: 18 BRPM | SYSTOLIC BLOOD PRESSURE: 108 MMHG

## 2018-08-07 DIAGNOSIS — C90.02 MULTIPLE MYELOMA IN RELAPSE (HCC): ICD-10-CM

## 2018-08-07 LAB
ALBUMIN SERPL-MCNC: 3.4 G/DL (ref 3.2–4.6)
ALBUMIN/GLOB SERPL: 1 {RATIO} (ref 1.2–3.5)
ALP SERPL-CCNC: 404 U/L (ref 50–136)
ALT SERPL-CCNC: 23 U/L (ref 12–65)
ANION GAP SERPL CALC-SCNC: 8 MMOL/L (ref 7–16)
AST SERPL-CCNC: 34 U/L (ref 15–37)
BASOPHILS # BLD: 0 K/UL (ref 0–0.2)
BASOPHILS NFR BLD: 0 % (ref 0–2)
BILIRUB SERPL-MCNC: 0.4 MG/DL (ref 0.2–1.1)
BUN SERPL-MCNC: 22 MG/DL (ref 8–23)
CALCIUM SERPL-MCNC: 8.8 MG/DL (ref 8.3–10.4)
CHLORIDE SERPL-SCNC: 99 MMOL/L (ref 98–107)
CO2 SERPL-SCNC: 23 MMOL/L (ref 21–32)
CREAT SERPL-MCNC: 1.43 MG/DL (ref 0.8–1.5)
DIFFERENTIAL METHOD BLD: ABNORMAL
EOSINOPHIL # BLD: 0 K/UL (ref 0–0.8)
EOSINOPHIL NFR BLD: 1 % (ref 0.5–7.8)
ERYTHROCYTE [DISTWIDTH] IN BLOOD BY AUTOMATED COUNT: 18.8 % (ref 11.9–14.6)
GLOBULIN SER CALC-MCNC: 3.3 G/DL (ref 2.3–3.5)
GLUCOSE SERPL-MCNC: 126 MG/DL (ref 65–100)
HCT VFR BLD AUTO: 30 % (ref 41.1–50.3)
HGB BLD-MCNC: 10.3 G/DL (ref 13.6–17.2)
IMM GRANULOCYTES # BLD: 0 K/UL (ref 0–0.5)
IMM GRANULOCYTES NFR BLD AUTO: 0 % (ref 0–5)
LYMPHOCYTES # BLD: 0.7 K/UL (ref 0.5–4.6)
LYMPHOCYTES NFR BLD: 26 % (ref 13–44)
MAGNESIUM SERPL-MCNC: 2 MG/DL (ref 1.8–2.4)
MCH RBC QN AUTO: 24.4 PG (ref 26.1–32.9)
MCHC RBC AUTO-ENTMCNC: 34.3 G/DL (ref 31.4–35)
MCV RBC AUTO: 71.1 FL (ref 79.6–97.8)
MONOCYTES # BLD: 0.3 K/UL (ref 0.1–1.3)
MONOCYTES NFR BLD: 10 % (ref 4–12)
NEUTS SEG # BLD: 1.7 K/UL (ref 1.7–8.2)
NEUTS SEG NFR BLD: 62 % (ref 43–78)
NRBC # BLD: 0 K/UL (ref 0–0.2)
PLATELET # BLD AUTO: 196 K/UL (ref 150–450)
PMV BLD AUTO: 9.7 FL (ref 9.4–12.3)
POTASSIUM SERPL-SCNC: 4.1 MMOL/L (ref 3.5–5.1)
PROT SERPL-MCNC: 6.7 G/DL (ref 6.3–8.2)
RBC # BLD AUTO: 4.22 M/UL (ref 4.23–5.67)
SODIUM SERPL-SCNC: 130 MMOL/L (ref 136–145)
WBC # BLD AUTO: 2.8 K/UL (ref 4.3–11.1)

## 2018-08-07 PROCEDURE — 74011000258 HC RX REV CODE- 258: Performed by: INTERNAL MEDICINE

## 2018-08-07 PROCEDURE — 74011250636 HC RX REV CODE- 250/636: Performed by: INTERNAL MEDICINE

## 2018-08-07 PROCEDURE — 83735 ASSAY OF MAGNESIUM: CPT

## 2018-08-07 PROCEDURE — 96367 TX/PROPH/DG ADDL SEQ IV INF: CPT

## 2018-08-07 PROCEDURE — 96413 CHEMO IV INFUSION 1 HR: CPT

## 2018-08-07 PROCEDURE — 96375 TX/PRO/DX INJ NEW DRUG ADDON: CPT

## 2018-08-07 PROCEDURE — 80053 COMPREHEN METABOLIC PANEL: CPT

## 2018-08-07 PROCEDURE — 96415 CHEMO IV INFUSION ADDL HR: CPT

## 2018-08-07 PROCEDURE — 74011250637 HC RX REV CODE- 250/637: Performed by: INTERNAL MEDICINE

## 2018-08-07 PROCEDURE — 85025 COMPLETE CBC W/AUTO DIFF WBC: CPT

## 2018-08-07 PROCEDURE — 74011250636 HC RX REV CODE- 250/636

## 2018-08-07 RX ORDER — SODIUM CHLORIDE 0.9 % (FLUSH) 0.9 %
10 SYRINGE (ML) INJECTION AS NEEDED
Status: ACTIVE | OUTPATIENT
Start: 2018-08-07 | End: 2018-08-08

## 2018-08-07 RX ORDER — DIPHENHYDRAMINE HYDROCHLORIDE 50 MG/ML
50 INJECTION, SOLUTION INTRAMUSCULAR; INTRAVENOUS ONCE
Status: COMPLETED | OUTPATIENT
Start: 2018-08-07 | End: 2018-08-07

## 2018-08-07 RX ORDER — HEPARIN 100 UNIT/ML
300-500 SYRINGE INTRAVENOUS AS NEEDED
Status: DISPENSED | OUTPATIENT
Start: 2018-08-07 | End: 2018-08-08

## 2018-08-07 RX ORDER — ACETAMINOPHEN 325 MG/1
650 TABLET ORAL ONCE
Status: COMPLETED | OUTPATIENT
Start: 2018-08-07 | End: 2018-08-07

## 2018-08-07 RX ORDER — SODIUM CHLORIDE 9 MG/ML
500 INJECTION, SOLUTION INTRAVENOUS CONTINUOUS
Status: ACTIVE | OUTPATIENT
Start: 2018-08-07 | End: 2018-08-08

## 2018-08-07 RX ADMIN — DARATUMUMAB 1306 MG: 100 INJECTION, SOLUTION, CONCENTRATE INTRAVENOUS at 14:40

## 2018-08-07 RX ADMIN — SODIUM CHLORIDE, PRESERVATIVE FREE 500 UNITS: 5 INJECTION INTRAVENOUS at 18:10

## 2018-08-07 RX ADMIN — Medication 10 ML: at 13:15

## 2018-08-07 RX ADMIN — SODIUM CHLORIDE 500 ML: 900 INJECTION, SOLUTION INTRAVENOUS at 13:15

## 2018-08-07 RX ADMIN — ACETAMINOPHEN 650 MG: 325 TABLET ORAL at 13:33

## 2018-08-07 RX ADMIN — DIPHENHYDRAMINE HYDROCHLORIDE 50 MG: 50 INJECTION, SOLUTION INTRAMUSCULAR; INTRAVENOUS at 13:30

## 2018-08-07 RX ADMIN — Medication 10 ML: at 13:33

## 2018-08-07 RX ADMIN — DEXAMETHASONE SODIUM PHOSPHATE 20 MG: 10 INJECTION INTRAMUSCULAR; INTRAVENOUS at 13:34

## 2018-08-07 RX ADMIN — Medication 10 ML: at 18:10

## 2018-08-07 NOTE — PROGRESS NOTES
Problem: Chemotherapy Treatment  Goal: *Chemotherapy regimen followed  Outcome: Progressing Towards Goal  Verbalizes/demonstrates understanding of purpose/procedure/potential side effects of daratumumab.

## 2018-08-07 NOTE — PROGRESS NOTES
8/7/18:  Patient in for follow-up and pre-daratumamab appointment with NP. Palliative care NP and pharmacist present during visit as well. Patient reports pain is 3/10 today and really doesn't note a difference in dilaudid and oxycodone. NP reviewed results of recent CT scan and plan to refer patient for kyphoplasty. Medication list and calendar reviewed with the patient. Patient to return to see Dr. Felisha Ferrell in 2 weeks prior to cycle #2 Ninlaro. Called IR to arrange Kyphoplasty and scheduling notified this navigator of need for referral to vascular center for consultation. Patient aware that he will be receiving call for appointment.

## 2018-08-07 NOTE — PROGRESS NOTES
Pt arrived ambulatory today at 1300, to receive IV chemotherapy. Pt tolerated without difficulty. Patient discharged via ambulatory accompanied by self. Instructed to notify physician of any problems, questions or concerns. Allowed opportunity for patient/family to ask questions. Verbalized understanding. Next appointment is August 14 at 0900 with Mayra Awan.

## 2018-08-09 ENCOUNTER — HOSPITAL ENCOUNTER (OUTPATIENT)
Dept: LAB | Age: 78
Discharge: HOME | End: 2018-08-09
Payer: MEDICARE

## 2018-08-09 DIAGNOSIS — C73 HURTHLE CELL CARCINOMA OF THYROID (HCC): ICD-10-CM

## 2018-08-09 DIAGNOSIS — E89.0 POSTSURGICAL HYPOTHYROIDISM: ICD-10-CM

## 2018-08-09 LAB
T4 FREE SERPL-MCNC: 1.4 NG/DL (ref 0.78–1.46)
TSH W FREE THYROID I,TSHELE: 5.74 UIU/ML (ref 0.36–3.74)

## 2018-08-09 PROCEDURE — 86800 THYROGLOBULIN ANTIBODY: CPT

## 2018-08-09 PROCEDURE — 84443 ASSAY THYROID STIM HORMONE: CPT

## 2018-08-09 PROCEDURE — 36415 COLL VENOUS BLD VENIPUNCTURE: CPT

## 2018-08-09 PROCEDURE — 84439 ASSAY OF FREE THYROXINE: CPT

## 2018-08-09 PROCEDURE — 84432 ASSAY OF THYROGLOBULIN: CPT

## 2018-08-10 LAB
THYROGLOB AB SERPL-ACNC: <1 IU/ML (ref 0–0.9)
THYROGLOBULIN, SERUM, 006694: 0.9 NG/ML (ref 1.4–29.2)

## 2018-08-14 ENCOUNTER — HOSPITAL ENCOUNTER (OUTPATIENT)
Dept: INFUSION THERAPY | Age: 78
Discharge: HOME | End: 2018-08-14
Payer: MEDICARE

## 2018-08-14 VITALS
HEART RATE: 75 BPM | BODY MASS INDEX: 24.25 KG/M2 | OXYGEN SATURATION: 96 % | TEMPERATURE: 98.1 F | WEIGHT: 178.8 LBS | SYSTOLIC BLOOD PRESSURE: 102 MMHG | RESPIRATION RATE: 18 BRPM | DIASTOLIC BLOOD PRESSURE: 66 MMHG

## 2018-08-14 DIAGNOSIS — C90.02 MULTIPLE MYELOMA IN RELAPSE (HCC): ICD-10-CM

## 2018-08-14 LAB
ALBUMIN SERPL-MCNC: 3.3 G/DL (ref 3.2–4.6)
ALBUMIN/GLOB SERPL: 1 {RATIO} (ref 1.2–3.5)
ALP SERPL-CCNC: 459 U/L (ref 50–136)
ALT SERPL-CCNC: 21 U/L (ref 12–65)
ANION GAP SERPL CALC-SCNC: 10 MMOL/L (ref 7–16)
AST SERPL-CCNC: 29 U/L (ref 15–37)
BASOPHILS # BLD: 0 K/UL
BASOPHILS NFR BLD: 0 % (ref 0–2)
BILIRUB SERPL-MCNC: 0.3 MG/DL (ref 0.2–1.1)
BUN SERPL-MCNC: 20 MG/DL (ref 8–23)
CALCIUM SERPL-MCNC: 7.8 MG/DL (ref 8.3–10.4)
CHLORIDE SERPL-SCNC: 100 MMOL/L (ref 98–107)
CO2 SERPL-SCNC: 25 MMOL/L (ref 21–32)
CREAT SERPL-MCNC: 1.26 MG/DL (ref 0.8–1.5)
DIFFERENTIAL METHOD BLD: ABNORMAL
EOSINOPHIL # BLD: 0.1 K/UL
EOSINOPHIL NFR BLD: 2 % (ref 0.5–7.8)
ERYTHROCYTE [DISTWIDTH] IN BLOOD BY AUTOMATED COUNT: 19.1 %
GLOBULIN SER CALC-MCNC: 3.3 G/DL (ref 2.3–3.5)
GLUCOSE SERPL-MCNC: 158 MG/DL (ref 65–100)
HCT VFR BLD AUTO: 29.5 % (ref 41.1–50.3)
HGB BLD-MCNC: 9.9 G/DL (ref 13.6–17.2)
IMM GRANULOCYTES # BLD: 0 K/UL
IMM GRANULOCYTES NFR BLD AUTO: 0 % (ref 0–5)
LYMPHOCYTES # BLD: 0.8 K/UL
LYMPHOCYTES NFR BLD: 26 % (ref 13–44)
MCH RBC QN AUTO: 24.5 PG (ref 26.1–32.9)
MCHC RBC AUTO-ENTMCNC: 33.6 G/DL (ref 31.4–35)
MCV RBC AUTO: 73 FL (ref 79.6–97.8)
MONOCYTES # BLD: 0.2 K/UL
MONOCYTES NFR BLD: 7 % (ref 4–12)
NEUTS SEG # BLD: 2 K/UL
NEUTS SEG NFR BLD: 64 % (ref 43–78)
NRBC # BLD: 0 K/UL (ref 0–0.2)
PLATELET # BLD AUTO: 186 K/UL (ref 150–450)
PMV BLD AUTO: 10.6 FL (ref 9.4–12.3)
POTASSIUM SERPL-SCNC: 3.7 MMOL/L (ref 3.5–5.1)
PROT SERPL-MCNC: 6.6 G/DL (ref 6.3–8.2)
RBC # BLD AUTO: 4.04 M/UL (ref 4.23–5.6)
SODIUM SERPL-SCNC: 135 MMOL/L (ref 136–145)
URATE SERPL-MCNC: 4.5 MG/DL (ref 2.6–6)
WBC # BLD AUTO: 3.1 K/UL (ref 4.3–11.1)

## 2018-08-14 PROCEDURE — 74011000258 HC RX REV CODE- 258: Performed by: INTERNAL MEDICINE

## 2018-08-14 PROCEDURE — 85025 COMPLETE CBC W/AUTO DIFF WBC: CPT

## 2018-08-14 PROCEDURE — 96375 TX/PRO/DX INJ NEW DRUG ADDON: CPT

## 2018-08-14 PROCEDURE — 96415 CHEMO IV INFUSION ADDL HR: CPT

## 2018-08-14 PROCEDURE — 80053 COMPREHEN METABOLIC PANEL: CPT

## 2018-08-14 PROCEDURE — 74011250637 HC RX REV CODE- 250/637: Performed by: INTERNAL MEDICINE

## 2018-08-14 PROCEDURE — 74011250636 HC RX REV CODE- 250/636: Performed by: INTERNAL MEDICINE

## 2018-08-14 PROCEDURE — 96413 CHEMO IV INFUSION 1 HR: CPT

## 2018-08-14 PROCEDURE — 74011250636 HC RX REV CODE- 250/636

## 2018-08-14 PROCEDURE — 84550 ASSAY OF BLOOD/URIC ACID: CPT

## 2018-08-14 RX ORDER — SODIUM CHLORIDE 9 MG/ML
500 INJECTION, SOLUTION INTRAVENOUS CONTINUOUS
Status: DISPENSED | OUTPATIENT
Start: 2018-08-14 | End: 2018-08-14

## 2018-08-14 RX ORDER — ACETAMINOPHEN 325 MG/1
650 TABLET ORAL ONCE
Status: COMPLETED | OUTPATIENT
Start: 2018-08-14 | End: 2018-08-14

## 2018-08-14 RX ORDER — SODIUM CHLORIDE 0.9 % (FLUSH) 0.9 %
10 SYRINGE (ML) INJECTION AS NEEDED
Status: ACTIVE | OUTPATIENT
Start: 2018-08-14 | End: 2018-08-14

## 2018-08-14 RX ORDER — HEPARIN 100 UNIT/ML
300-500 SYRINGE INTRAVENOUS AS NEEDED
Status: ACTIVE | OUTPATIENT
Start: 2018-08-14 | End: 2018-08-14

## 2018-08-14 RX ORDER — HEPARIN 100 UNIT/ML
500 SYRINGE INTRAVENOUS AS NEEDED
Status: DISPENSED | OUTPATIENT
Start: 2018-08-14 | End: 2018-08-15

## 2018-08-14 RX ORDER — DIPHENHYDRAMINE HYDROCHLORIDE 50 MG/ML
50 INJECTION, SOLUTION INTRAMUSCULAR; INTRAVENOUS ONCE
Status: COMPLETED | OUTPATIENT
Start: 2018-08-14 | End: 2018-08-14

## 2018-08-14 RX ADMIN — DEXAMETHASONE SODIUM PHOSPHATE 20 MG: 4 INJECTION, SOLUTION INTRAMUSCULAR; INTRAVENOUS at 11:00

## 2018-08-14 RX ADMIN — DIPHENHYDRAMINE HYDROCHLORIDE 50 MG: 50 INJECTION, SOLUTION INTRAMUSCULAR; INTRAVENOUS at 10:36

## 2018-08-14 RX ADMIN — DARATUMUMAB 1306 MG: 100 INJECTION, SOLUTION, CONCENTRATE INTRAVENOUS at 12:00

## 2018-08-14 RX ADMIN — ACETAMINOPHEN 650 MG: 325 TABLET ORAL at 10:36

## 2018-08-14 RX ADMIN — SODIUM CHLORIDE, PRESERVATIVE FREE 500 UNITS: 5 INJECTION INTRAVENOUS at 15:55

## 2018-08-14 RX ADMIN — SODIUM CHLORIDE 500 ML: 900 INJECTION, SOLUTION INTRAVENOUS at 09:30

## 2018-08-14 NOTE — PROGRESS NOTES
Tolerated Daratumumab infusion without difficulty. Patient discharged via w/c accompanied by staff. Instructed to notify physician of any problems, questions or concerns. Allowed opportunity for patient/family to ask questions. Verbalized understanding. Next appointment is 08/21/18 at  0900 am with [facility] 5th floor.

## 2018-08-20 ENCOUNTER — HOSPITAL ENCOUNTER (OUTPATIENT)
Dept: LAB | Age: 78
Discharge: HOME | End: 2018-08-20
Payer: MEDICARE

## 2018-08-20 ENCOUNTER — PATIENT OUTREACH (OUTPATIENT)
Dept: CASE MANAGEMENT | Age: 78
End: 2018-08-20

## 2018-08-20 DIAGNOSIS — C90.02 MULTIPLE MYELOMA IN RELAPSE (HCC): ICD-10-CM

## 2018-08-20 LAB
ALBUMIN SERPL-MCNC: 3.5 G/DL (ref 3.2–4.6)
ALBUMIN/GLOB SERPL: 1.1 {RATIO} (ref 1.2–3.5)
ALP SERPL-CCNC: 375 U/L (ref 50–136)
ALT SERPL-CCNC: 29 U/L (ref 12–65)
ANION GAP SERPL CALC-SCNC: 7 MMOL/L (ref 7–16)
AST SERPL-CCNC: 30 U/L (ref 15–37)
BASOPHILS # BLD: 0 K/UL (ref 0–0.2)
BASOPHILS NFR BLD: 0 % (ref 0–2)
BILIRUB SERPL-MCNC: 0.3 MG/DL (ref 0.2–1.1)
BUN SERPL-MCNC: 16 MG/DL (ref 8–23)
CALCIUM SERPL-MCNC: 7.6 MG/DL (ref 8.3–10.4)
CHLORIDE SERPL-SCNC: 103 MMOL/L (ref 98–107)
CO2 SERPL-SCNC: 24 MMOL/L (ref 21–32)
CREAT SERPL-MCNC: 1.13 MG/DL (ref 0.8–1.5)
DIFFERENTIAL METHOD BLD: ABNORMAL
EOSINOPHIL # BLD: 0.1 K/UL (ref 0–0.8)
EOSINOPHIL NFR BLD: 2 % (ref 0.5–7.8)
ERYTHROCYTE [DISTWIDTH] IN BLOOD BY AUTOMATED COUNT: 19.2 % (ref 11.9–14.6)
GLOBULIN SER CALC-MCNC: 3.3 G/DL (ref 2.3–3.5)
GLUCOSE SERPL-MCNC: 121 MG/DL (ref 65–100)
HCT VFR BLD AUTO: 31.3 % (ref 41.1–50.3)
HGB BLD-MCNC: 10.5 G/DL (ref 13.6–17.2)
IGA SERPL-MCNC: 19 MG/DL (ref 70–400)
IGG SERPL-MCNC: 703 MG/DL (ref 700–1600)
IGM SERPL-MCNC: 24 MG/DL (ref 40–230)
IMM GRANULOCYTES # BLD: 0 K/UL (ref 0–0.5)
IMM GRANULOCYTES NFR BLD AUTO: 1 % (ref 0–5)
LYMPHOCYTES # BLD: 1.5 K/UL (ref 0.5–4.6)
LYMPHOCYTES NFR BLD: 40 % (ref 13–44)
MAGNESIUM SERPL-MCNC: 1.7 MG/DL (ref 1.8–2.4)
MCH RBC QN AUTO: 24.4 PG (ref 26.1–32.9)
MCHC RBC AUTO-ENTMCNC: 33.5 G/DL (ref 31.4–35)
MCV RBC AUTO: 72.6 FL (ref 79.6–97.8)
MONOCYTES # BLD: 0.4 K/UL (ref 0.1–1.3)
MONOCYTES NFR BLD: 10 % (ref 4–12)
NEUTS SEG # BLD: 1.8 K/UL (ref 1.7–8.2)
NEUTS SEG NFR BLD: 47 % (ref 43–78)
NRBC # BLD: 0 K/UL (ref 0–0.2)
PLATELET # BLD AUTO: 201 K/UL (ref 150–450)
PMV BLD AUTO: 10.5 FL (ref 9.4–12.3)
POTASSIUM SERPL-SCNC: 3.9 MMOL/L (ref 3.5–5.1)
PROT SERPL-MCNC: 6.8 G/DL (ref 6.3–8.2)
RBC # BLD AUTO: 4.31 M/UL (ref 4.23–5.67)
SODIUM SERPL-SCNC: 134 MMOL/L (ref 136–145)
WBC # BLD AUTO: 3.9 K/UL (ref 4.3–11.1)

## 2018-08-20 PROCEDURE — 85025 COMPLETE CBC W/AUTO DIFF WBC: CPT

## 2018-08-20 PROCEDURE — 86334 IMMUNOFIX E-PHORESIS SERUM: CPT

## 2018-08-20 PROCEDURE — 82784 ASSAY IGA/IGD/IGG/IGM EACH: CPT

## 2018-08-20 PROCEDURE — 83735 ASSAY OF MAGNESIUM: CPT

## 2018-08-20 PROCEDURE — 83883 ASSAY NEPHELOMETRY NOT SPEC: CPT

## 2018-08-20 PROCEDURE — 80053 COMPREHEN METABOLIC PANEL: CPT

## 2018-08-21 ENCOUNTER — HOSPITAL ENCOUNTER (OUTPATIENT)
Dept: INFUSION THERAPY | Age: 78
Discharge: HOME | End: 2018-08-21
Payer: MEDICARE

## 2018-08-21 VITALS
HEART RATE: 80 BPM | WEIGHT: 178.8 LBS | OXYGEN SATURATION: 98 % | DIASTOLIC BLOOD PRESSURE: 85 MMHG | SYSTOLIC BLOOD PRESSURE: 139 MMHG | RESPIRATION RATE: 18 BRPM | TEMPERATURE: 98 F | BODY MASS INDEX: 24.25 KG/M2

## 2018-08-21 DIAGNOSIS — C90.02 MULTIPLE MYELOMA IN RELAPSE (HCC): ICD-10-CM

## 2018-08-21 DIAGNOSIS — C90.00 MULTIPLE MYELOMA NOT HAVING ACHIEVED REMISSION (HCC): ICD-10-CM

## 2018-08-21 DIAGNOSIS — C90.00 MULTIPLE MYELOMA, REMISSION STATUS UNSPECIFIED (HCC): ICD-10-CM

## 2018-08-21 LAB
ALBUMIN SERPL ELPH-MCNC: 3.51 G/DL (ref 3.2–5.6)
ALBUMIN/GLOB SERPL: 1.4 {RATIO}
ALPHA1 GLOB SERPL ELPH-MCNC: 0.28 G/DL (ref 0.1–0.4)
ALPHA2 GLOB SERPL ELPH-MCNC: 0.8 G/DL (ref 0.4–1.2)
B-GLOBULIN SERPL QL ELPH: 0.87 G/DL (ref 0.6–1.3)
GAMMA GLOB MFR SERPL ELPH: 0.64 G/DL (ref 0.5–1.6)
IGA SERPL-MCNC: ABNORMAL MG/DL (ref 85–499)
IGG SERPL-MCNC: ABNORMAL MG/DL (ref 610–1616)
IGM SERPL-MCNC: ABNORMAL MG/DL (ref 35–242)
KAPPA LC FREE SER-MCNC: 5.83 MG/L (ref 3.3–19.4)
KAPPA LC FREE/LAMBDA FREE SER: 0.66 {RATIO} (ref 0.26–1.65)
LAMBDA LC FREE SERPL-MCNC: 8.89 MG/L (ref 5.71–26.3)
M PROTEIN SERPL ELPH-MCNC: 0.26 G/DL
PROT PATTERN SERPL ELPH-IMP: ABNORMAL
PROT PATTERN SPEC IFE-IMP: ABNORMAL
PROT SERPL-MCNC: 6.1 G/DL (ref 6.3–8.2)

## 2018-08-21 PROCEDURE — 74011250636 HC RX REV CODE- 250/636

## 2018-08-21 PROCEDURE — 96415 CHEMO IV INFUSION ADDL HR: CPT

## 2018-08-21 PROCEDURE — 74011000258 HC RX REV CODE- 258: Performed by: INTERNAL MEDICINE

## 2018-08-21 PROCEDURE — 96413 CHEMO IV INFUSION 1 HR: CPT

## 2018-08-21 PROCEDURE — 77030003560 HC NDL HUBR BARD -A

## 2018-08-21 PROCEDURE — 96375 TX/PRO/DX INJ NEW DRUG ADDON: CPT

## 2018-08-21 PROCEDURE — 74011250637 HC RX REV CODE- 250/637: Performed by: INTERNAL MEDICINE

## 2018-08-21 PROCEDURE — 74011250636 HC RX REV CODE- 250/636: Performed by: INTERNAL MEDICINE

## 2018-08-21 RX ORDER — SODIUM CHLORIDE 0.9 % (FLUSH) 0.9 %
10 SYRINGE (ML) INJECTION AS NEEDED
Status: ACTIVE | OUTPATIENT
Start: 2018-08-21 | End: 2018-08-21

## 2018-08-21 RX ORDER — SODIUM CHLORIDE 9 MG/ML
500 INJECTION, SOLUTION INTRAVENOUS CONTINUOUS
Status: DISPENSED | OUTPATIENT
Start: 2018-08-21 | End: 2018-08-21

## 2018-08-21 RX ORDER — ACETAMINOPHEN 325 MG/1
650 TABLET ORAL ONCE
Status: COMPLETED | OUTPATIENT
Start: 2018-08-21 | End: 2018-08-21

## 2018-08-21 RX ORDER — HEPARIN 100 UNIT/ML
300-500 SYRINGE INTRAVENOUS AS NEEDED
Status: ACTIVE | OUTPATIENT
Start: 2018-08-21 | End: 2018-08-21

## 2018-08-21 RX ORDER — DIPHENHYDRAMINE HYDROCHLORIDE 50 MG/ML
50 INJECTION, SOLUTION INTRAMUSCULAR; INTRAVENOUS ONCE
Status: COMPLETED | OUTPATIENT
Start: 2018-08-21 | End: 2018-08-21

## 2018-08-21 RX ADMIN — DARATUMUMAB 1306 MG: 100 INJECTION, SOLUTION, CONCENTRATE INTRAVENOUS at 12:00

## 2018-08-21 RX ADMIN — DEXAMETHASONE SODIUM PHOSPHATE 20 MG: 10 INJECTION, SOLUTION INTRAMUSCULAR; INTRAVENOUS at 09:50

## 2018-08-21 RX ADMIN — SODIUM CHLORIDE, PRESERVATIVE FREE 500 UNITS: 5 INJECTION INTRAVENOUS at 15:20

## 2018-08-21 RX ADMIN — ACETAMINOPHEN 650 MG: 325 TABLET, FILM COATED ORAL at 09:20

## 2018-08-21 RX ADMIN — Medication 10 ML: at 09:10

## 2018-08-21 RX ADMIN — ZOLEDRONIC ACID 4 MG: 0.8 INJECTION, SOLUTION, CONCENTRATE INTRAVENOUS at 10:10

## 2018-08-21 RX ADMIN — SODIUM CHLORIDE 500 ML: 900 INJECTION, SOLUTION INTRAVENOUS at 09:10

## 2018-08-21 RX ADMIN — Medication 10 ML: at 15:20

## 2018-08-21 RX ADMIN — DIPHENHYDRAMINE HYDROCHLORIDE 50 MG: 50 INJECTION, SOLUTION INTRAMUSCULAR; INTRAVENOUS at 09:20

## 2018-08-21 NOTE — PROGRESS NOTES
Pt ambulatory to area without complaints. Received premeds/hydration/Zometa/chemo treatment per order, tolerated well. Aware of next appt on [email or web address]. Advised to call dr with any issues/concerns. Discharged home without complaints.

## 2018-08-28 ENCOUNTER — HOSPITAL ENCOUNTER (OUTPATIENT)
Dept: INFUSION THERAPY | Age: 78
Discharge: HOME | End: 2018-08-28
Payer: MEDICARE

## 2018-08-28 VITALS
BODY MASS INDEX: 24.55 KG/M2 | RESPIRATION RATE: 16 BRPM | TEMPERATURE: 98.1 F | WEIGHT: 181 LBS | DIASTOLIC BLOOD PRESSURE: 72 MMHG | SYSTOLIC BLOOD PRESSURE: 109 MMHG | HEART RATE: 70 BPM | OXYGEN SATURATION: 99 %

## 2018-08-28 DIAGNOSIS — C90.02 MULTIPLE MYELOMA IN RELAPSE (HCC): ICD-10-CM

## 2018-08-28 LAB
ALBUMIN SERPL-MCNC: 3.3 G/DL (ref 3.2–4.6)
ALBUMIN/GLOB SERPL: 1 {RATIO} (ref 1.2–3.5)
ALP SERPL-CCNC: 290 U/L (ref 50–136)
ALT SERPL-CCNC: 23 U/L (ref 12–65)
ANION GAP SERPL CALC-SCNC: 8 MMOL/L (ref 7–16)
AST SERPL-CCNC: 30 U/L (ref 15–37)
BASOPHILS # BLD: 0 K/UL (ref 0–0.2)
BASOPHILS NFR BLD: 1 % (ref 0–2)
BILIRUB SERPL-MCNC: 0.4 MG/DL (ref 0.2–1.1)
BUN SERPL-MCNC: 15 MG/DL (ref 8–23)
CALCIUM SERPL-MCNC: 8.2 MG/DL (ref 8.3–10.4)
CHLORIDE SERPL-SCNC: 102 MMOL/L (ref 98–107)
CO2 SERPL-SCNC: 25 MMOL/L (ref 21–32)
CREAT SERPL-MCNC: 1.16 MG/DL (ref 0.8–1.5)
DIFFERENTIAL METHOD BLD: ABNORMAL
EOSINOPHIL # BLD: 0.1 K/UL (ref 0–0.8)
EOSINOPHIL NFR BLD: 2 % (ref 0.5–7.8)
ERYTHROCYTE [DISTWIDTH] IN BLOOD BY AUTOMATED COUNT: 19.1 %
GLOBULIN SER CALC-MCNC: 3.4 G/DL (ref 2.3–3.5)
GLUCOSE SERPL-MCNC: 124 MG/DL (ref 65–100)
HCT VFR BLD AUTO: 31.9 % (ref 41.1–50.3)
HGB BLD-MCNC: 10.5 G/DL (ref 13.6–17.2)
IMM GRANULOCYTES # BLD: 0 K/UL (ref 0–0.5)
IMM GRANULOCYTES NFR BLD AUTO: 0 % (ref 0–5)
LYMPHOCYTES # BLD: 1.8 K/UL (ref 0.5–4.6)
LYMPHOCYTES NFR BLD: 42 % (ref 13–44)
MCH RBC QN AUTO: 24.1 PG (ref 26.1–32.9)
MCHC RBC AUTO-ENTMCNC: 32.9 G/DL (ref 31.4–35)
MCV RBC AUTO: 73.2 FL (ref 79.6–97.8)
MONOCYTES # BLD: 0.4 K/UL (ref 0.1–1.3)
MONOCYTES NFR BLD: 10 % (ref 4–12)
NEUTS SEG # BLD: 1.9 K/UL (ref 1.7–8.2)
NEUTS SEG NFR BLD: 46 % (ref 43–78)
NRBC # BLD: 0 K/UL (ref 0–0.2)
PLATELET # BLD AUTO: 177 K/UL (ref 150–450)
PMV BLD AUTO: ABNORMAL FL (ref 9.4–12.3)
POTASSIUM SERPL-SCNC: 4.4 MMOL/L (ref 3.5–5.1)
PROT SERPL-MCNC: 6.7 G/DL (ref 6.3–8.2)
RBC # BLD AUTO: 4.36 M/UL (ref 4.23–5.6)
SODIUM SERPL-SCNC: 135 MMOL/L (ref 136–145)
WBC # BLD AUTO: 4.3 K/UL (ref 4.3–11.1)

## 2018-08-28 PROCEDURE — 96415 CHEMO IV INFUSION ADDL HR: CPT

## 2018-08-28 PROCEDURE — 74011250636 HC RX REV CODE- 250/636: Performed by: NURSE PRACTITIONER

## 2018-08-28 PROCEDURE — 96375 TX/PRO/DX INJ NEW DRUG ADDON: CPT

## 2018-08-28 PROCEDURE — 80053 COMPREHEN METABOLIC PANEL: CPT

## 2018-08-28 PROCEDURE — 74011000258 HC RX REV CODE- 258: Performed by: INTERNAL MEDICINE

## 2018-08-28 PROCEDURE — 74011250637 HC RX REV CODE- 250/637: Performed by: INTERNAL MEDICINE

## 2018-08-28 PROCEDURE — 74011250636 HC RX REV CODE- 250/636: Performed by: INTERNAL MEDICINE

## 2018-08-28 PROCEDURE — 85025 COMPLETE CBC W/AUTO DIFF WBC: CPT

## 2018-08-28 PROCEDURE — 77030003560 HC NDL HUBR BARD -A

## 2018-08-28 PROCEDURE — 74011250636 HC RX REV CODE- 250/636

## 2018-08-28 PROCEDURE — 96413 CHEMO IV INFUSION 1 HR: CPT

## 2018-08-28 RX ORDER — HEPARIN 100 UNIT/ML
300-500 SYRINGE INTRAVENOUS AS NEEDED
Status: DISPENSED | OUTPATIENT
Start: 2018-08-28 | End: 2018-08-28

## 2018-08-28 RX ORDER — ACETAMINOPHEN 325 MG/1
650 TABLET ORAL ONCE
Status: COMPLETED | OUTPATIENT
Start: 2018-08-28 | End: 2018-08-28

## 2018-08-28 RX ORDER — SODIUM CHLORIDE 9 MG/ML
500 INJECTION, SOLUTION INTRAVENOUS CONTINUOUS
Status: DISPENSED | OUTPATIENT
Start: 2018-08-28 | End: 2018-08-28

## 2018-08-28 RX ORDER — DIPHENHYDRAMINE HYDROCHLORIDE 50 MG/ML
50 INJECTION, SOLUTION INTRAMUSCULAR; INTRAVENOUS ONCE
Status: COMPLETED | OUTPATIENT
Start: 2018-08-28 | End: 2018-08-28

## 2018-08-28 RX ORDER — SODIUM CHLORIDE 0.9 % (FLUSH) 0.9 %
10 SYRINGE (ML) INJECTION AS NEEDED
Status: ACTIVE | OUTPATIENT
Start: 2018-08-28 | End: 2018-08-28

## 2018-08-28 RX ADMIN — DEXAMETHASONE SODIUM PHOSPHATE 20 MG: 4 INJECTION, SOLUTION INTRAMUSCULAR; INTRAVENOUS at 10:10

## 2018-08-28 RX ADMIN — DIPHENHYDRAMINE HYDROCHLORIDE 50 MG: 50 INJECTION, SOLUTION INTRAMUSCULAR; INTRAVENOUS at 09:41

## 2018-08-28 RX ADMIN — ACETAMINOPHEN 650 MG: 325 TABLET, FILM COATED ORAL at 09:40

## 2018-08-28 RX ADMIN — SODIUM CHLORIDE 500 ML: 900 INJECTION, SOLUTION INTRAVENOUS at 09:40

## 2018-08-28 RX ADMIN — Medication 10 ML: at 14:13

## 2018-08-28 RX ADMIN — Medication 10 ML: at 09:05

## 2018-08-28 RX ADMIN — SODIUM CHLORIDE, PRESERVATIVE FREE 500 UNITS: 5 INJECTION INTRAVENOUS at 14:13

## 2018-08-28 RX ADMIN — DARATUMUMAB 1306 MG: 100 INJECTION, SOLUTION, CONCENTRATE INTRAVENOUS at 11:00

## 2018-08-28 NOTE — PROGRESS NOTES
Pt ambulatory to area c/o diarrhea. Labs drawn and pt received premeds/hydration/chemo per order, tolerated well. Aware of next appt on [email or web address]. Advised to call dr with any issues/concerns. Discharged home without complaints.

## 2018-08-30 ENCOUNTER — HOSPITAL ENCOUNTER (OUTPATIENT)
Dept: CT IMAGING | Age: 78
Discharge: HOME | End: 2018-08-30
Attending: RADIOLOGY
Payer: MEDICARE

## 2018-08-30 DIAGNOSIS — S32.009A LUMBAR VERTEBRAL FRACTURE (HCC): ICD-10-CM

## 2018-08-30 DIAGNOSIS — M54.50 LOWER BACK PAIN: ICD-10-CM

## 2018-08-30 DIAGNOSIS — C90.00 MULTIPLE MYELOMA (HCC): ICD-10-CM

## 2018-08-30 PROCEDURE — 72131 CT LUMBAR SPINE W/O DYE: CPT

## 2018-09-04 ENCOUNTER — APPOINTMENT (OUTPATIENT)
Dept: INFUSION THERAPY | Age: 78
End: 2018-09-04
Payer: MEDICARE

## 2018-09-05 ENCOUNTER — HOSPITAL ENCOUNTER (OUTPATIENT)
Dept: INFUSION THERAPY | Age: 78
Discharge: HOME | End: 2018-09-05
Payer: MEDICARE

## 2018-09-05 ENCOUNTER — HOSPITAL ENCOUNTER (OUTPATIENT)
Dept: LAB | Age: 78
Discharge: HOME | End: 2018-09-05
Payer: MEDICARE

## 2018-09-05 VITALS — SYSTOLIC BLOOD PRESSURE: 118 MMHG | HEART RATE: 72 BPM | RESPIRATION RATE: 18 BRPM | DIASTOLIC BLOOD PRESSURE: 69 MMHG

## 2018-09-05 DIAGNOSIS — C90.02 MULTIPLE MYELOMA IN RELAPSE (HCC): ICD-10-CM

## 2018-09-05 DIAGNOSIS — C90.00 MULTIPLE MYELOMA NOT HAVING ACHIEVED REMISSION (HCC): ICD-10-CM

## 2018-09-05 LAB
ALBUMIN SERPL-MCNC: 3.8 G/DL (ref 3.2–4.6)
ALBUMIN/GLOB SERPL: 1.2 {RATIO} (ref 1.2–3.5)
ALP SERPL-CCNC: 217 U/L (ref 50–136)
ALT SERPL-CCNC: 22 U/L (ref 12–65)
ANION GAP SERPL CALC-SCNC: 6 MMOL/L (ref 7–16)
AST SERPL-CCNC: 24 U/L (ref 15–37)
BASOPHILS # BLD: 0 K/UL (ref 0–0.2)
BASOPHILS NFR BLD: 0 % (ref 0–2)
BILIRUB SERPL-MCNC: 0.4 MG/DL (ref 0.2–1.1)
BUN SERPL-MCNC: 27 MG/DL (ref 8–23)
CALCIUM SERPL-MCNC: 8.9 MG/DL (ref 8.3–10.4)
CHLORIDE SERPL-SCNC: 107 MMOL/L (ref 98–107)
CO2 SERPL-SCNC: 25 MMOL/L (ref 21–32)
CREAT SERPL-MCNC: 1.28 MG/DL (ref 0.8–1.5)
DIFFERENTIAL METHOD BLD: ABNORMAL
EOSINOPHIL # BLD: 0 K/UL (ref 0–0.8)
EOSINOPHIL NFR BLD: 0 % (ref 0.5–7.8)
ERYTHROCYTE [DISTWIDTH] IN BLOOD BY AUTOMATED COUNT: 18.5 % (ref 11.9–14.6)
GLOBULIN SER CALC-MCNC: 3.2 G/DL (ref 2.3–3.5)
GLUCOSE SERPL-MCNC: 134 MG/DL (ref 65–100)
HCT VFR BLD AUTO: 31.7 % (ref 41.1–50.3)
HGB BLD-MCNC: 10.7 G/DL (ref 13.6–17.2)
IMM GRANULOCYTES # BLD: 0 K/UL (ref 0–0.5)
IMM GRANULOCYTES NFR BLD AUTO: 1 % (ref 0–5)
LYMPHOCYTES # BLD: 1 K/UL (ref 0.5–4.6)
LYMPHOCYTES NFR BLD: 17 % (ref 13–44)
MAGNESIUM SERPL-MCNC: 1.8 MG/DL (ref 1.8–2.4)
MCH RBC QN AUTO: 24.4 PG (ref 26.1–32.9)
MCHC RBC AUTO-ENTMCNC: 33.8 G/DL (ref 31.4–35)
MCV RBC AUTO: 72.4 FL (ref 79.6–97.8)
MONOCYTES # BLD: 0.1 K/UL (ref 0.1–1.3)
MONOCYTES NFR BLD: 2 % (ref 4–12)
NEUTS SEG # BLD: 4.5 K/UL (ref 1.7–8.2)
NEUTS SEG NFR BLD: 79 % (ref 43–78)
NRBC # BLD: 0 K/UL (ref 0–0.2)
PLATELET # BLD AUTO: 169 K/UL (ref 150–450)
PMV BLD AUTO: ABNORMAL FL (ref 9.4–12.3)
POTASSIUM SERPL-SCNC: 4.1 MMOL/L (ref 3.5–5.1)
PROT SERPL-MCNC: 7 G/DL (ref 6.3–8.2)
RBC # BLD AUTO: 4.38 M/UL (ref 4.23–5.67)
SODIUM SERPL-SCNC: 138 MMOL/L (ref 136–145)
WBC # BLD AUTO: 5.6 K/UL (ref 4.3–11.1)

## 2018-09-05 PROCEDURE — 74011250637 HC RX REV CODE- 250/637: Performed by: INTERNAL MEDICINE

## 2018-09-05 PROCEDURE — 80053 COMPREHEN METABOLIC PANEL: CPT

## 2018-09-05 PROCEDURE — 85025 COMPLETE CBC W/AUTO DIFF WBC: CPT

## 2018-09-05 PROCEDURE — 74011000258 HC RX REV CODE- 258: Performed by: INTERNAL MEDICINE

## 2018-09-05 PROCEDURE — 96413 CHEMO IV INFUSION 1 HR: CPT

## 2018-09-05 PROCEDURE — 83735 ASSAY OF MAGNESIUM: CPT

## 2018-09-05 PROCEDURE — 96375 TX/PRO/DX INJ NEW DRUG ADDON: CPT

## 2018-09-05 PROCEDURE — 74011250636 HC RX REV CODE- 250/636: Performed by: INTERNAL MEDICINE

## 2018-09-05 PROCEDURE — 96415 CHEMO IV INFUSION ADDL HR: CPT

## 2018-09-05 RX ORDER — SODIUM CHLORIDE 0.9 % (FLUSH) 0.9 %
10 SYRINGE (ML) INJECTION AS NEEDED
Status: ACTIVE | OUTPATIENT
Start: 2018-09-05 | End: 2018-09-06

## 2018-09-05 RX ORDER — DIPHENHYDRAMINE HYDROCHLORIDE 50 MG/ML
50 INJECTION, SOLUTION INTRAMUSCULAR; INTRAVENOUS ONCE
Status: COMPLETED | OUTPATIENT
Start: 2018-09-05 | End: 2018-09-05

## 2018-09-05 RX ORDER — ACETAMINOPHEN 325 MG/1
650 TABLET ORAL ONCE
Status: COMPLETED | OUTPATIENT
Start: 2018-09-05 | End: 2018-09-05

## 2018-09-05 RX ORDER — HEPARIN 100 UNIT/ML
300-500 SYRINGE INTRAVENOUS AS NEEDED
Status: DISPENSED | OUTPATIENT
Start: 2018-09-05 | End: 2018-09-06

## 2018-09-05 RX ORDER — SODIUM CHLORIDE 9 MG/ML
500 INJECTION, SOLUTION INTRAVENOUS CONTINUOUS
Status: ACTIVE | OUTPATIENT
Start: 2018-09-05 | End: 2018-09-06

## 2018-09-05 RX ADMIN — ACETAMINOPHEN 650 MG: 325 TABLET, FILM COATED ORAL at 14:13

## 2018-09-05 RX ADMIN — DEXAMETHASONE SODIUM PHOSPHATE 20 MG: 10 INJECTION INTRAMUSCULAR; INTRAVENOUS at 14:21

## 2018-09-05 RX ADMIN — Medication 10 ML: at 14:12

## 2018-09-05 RX ADMIN — DIPHENHYDRAMINE HYDROCHLORIDE 50 MG: 50 INJECTION, SOLUTION INTRAMUSCULAR; INTRAVENOUS at 14:16

## 2018-09-05 RX ADMIN — DARATUMUMAB 1300 MG: 100 INJECTION, SOLUTION, CONCENTRATE INTRAVENOUS at 14:55

## 2018-09-05 RX ADMIN — Medication 10 ML: at 18:17

## 2018-09-05 RX ADMIN — SODIUM CHLORIDE 250 ML: 900 INJECTION, SOLUTION INTRAVENOUS at 14:32

## 2018-09-05 RX ADMIN — SODIUM CHLORIDE, PRESERVATIVE FREE 500 UNITS: 5 INJECTION INTRAVENOUS at 18:17

## 2018-09-05 NOTE — PROGRESS NOTES
Pt. Discharged ambulatory. Tolerated chemotherapy well. No distress noted. To call physician with any problems or concerns. Understanding voiced. To return to Infusions on 9/11/18.

## 2018-09-10 ENCOUNTER — HOSPITAL ENCOUNTER (OUTPATIENT)
Dept: INTERVENTIONAL RADIOLOGY/VASCULAR | Age: 78
Discharge: HOME | End: 2018-09-10
Attending: RADIOLOGY
Payer: MEDICARE

## 2018-09-10 VITALS
SYSTOLIC BLOOD PRESSURE: 138 MMHG | TEMPERATURE: 98 F | RESPIRATION RATE: 16 BRPM | OXYGEN SATURATION: 98 % | HEART RATE: 71 BPM | DIASTOLIC BLOOD PRESSURE: 78 MMHG

## 2018-09-10 DIAGNOSIS — M54.50 LUMBAR BACK PAIN: ICD-10-CM

## 2018-09-10 PROCEDURE — 74011250636 HC RX REV CODE- 250/636: Performed by: RADIOLOGY

## 2018-09-10 PROCEDURE — 74011636320 HC RX REV CODE- 636/320: Performed by: RADIOLOGY

## 2018-09-10 PROCEDURE — 74011000250 HC RX REV CODE- 250: Performed by: RADIOLOGY

## 2018-09-10 PROCEDURE — 62323 NJX INTERLAMINAR LMBR/SAC: CPT

## 2018-09-10 PROCEDURE — 77030003666 HC NDL SPINAL BD -A

## 2018-09-10 RX ORDER — LIDOCAINE HYDROCHLORIDE 10 MG/ML
1-20 INJECTION INFILTRATION; PERINEURAL
Status: DISCONTINUED | OUTPATIENT
Start: 2018-09-10 | End: 2018-09-14 | Stop reason: HOSPADM

## 2018-09-10 RX ORDER — METHYLPREDNISOLONE ACETATE 40 MG/ML
80 INJECTION, SUSPENSION INTRA-ARTICULAR; INTRALESIONAL; INTRAMUSCULAR; SOFT TISSUE ONCE
Status: COMPLETED | OUTPATIENT
Start: 2018-09-10 | End: 2018-09-10

## 2018-09-10 RX ADMIN — LIDOCAINE HYDROCHLORIDE 20 ML: 10 INJECTION, SOLUTION INFILTRATION; PERINEURAL at 15:19

## 2018-09-10 RX ADMIN — METHYLPREDNISOLONE ACETATE 80 MG: 40 INJECTION, SUSPENSION INTRA-ARTICULAR; INTRALESIONAL; INTRAMUSCULAR; SOFT TISSUE at 15:19

## 2018-09-10 RX ADMIN — IOHEXOL 1 ML: 300 INJECTION, SOLUTION INTRAVENOUS at 16:00

## 2018-09-10 RX ADMIN — SODIUM BICARBONATE 2 ML: 0.2 INJECTION, SOLUTION INTRAVENOUS at 15:20

## 2018-09-10 NOTE — IP AVS SNAPSHOT
303 [facility] 
 
 
 [address] 
[phone] Patient: Ernie Souza MRN: [id number] :1940 About your hospitalization You were admitted on:  September 10, 2018 You last received care in the:  [facility] Radiology Specials You were discharged on:  September 10, 2018 Why you were hospitalized Your primary diagnosis was:  Not on File Follow-up Information None Your Scheduled Appointments 2018  9:00 AM EDT Infusion with FLR5 INF2 [facility] ([address] [place] [address]) 5th Floor Infusion [address] [facility] [address] Anil Mckeon [phone]  [address] [address]  
[phone] For [facility] SURGICAL [facility] Floor [phone] ext. [phone] [facility] 2018 12:50 PM EDT  
LAB with [address]  
1808 [facility] OUTREACH INSURANCE [facility]) [facility] [address] [place] [address]  
[phone] 2018  1:20 PM EDT Follow Up with Consuelo Mao NP [facility] Hematology and Oncology [facility]) C/ Gilberto Abre[address] [address]  
[phone] 2018  1:30 PM EDT Follow Up with CONCEPCION Bowman [facility] Hematology and Oncology [facility]) C/ Gil[address]to [address] [address]  
[phone] 2018  2:00 PM EDT Infusion with [facility]  
ST. 3979 [facility] ([facility]) [address] Anil Mckeon [phone] [address] [address]  
[phone] [address] 2018  2:00 PM EDT Infusion with [facility]) [address] Anil Mckeon [phone] [address]ons[address] 14 [address]  
[phone] [address] Tuesday October 16, 2018  9:30 AM EDT  
LAB with [address]  
1808 [facility] OUTREACH INSURANCE [facility]) Nik Esqueda 426 187 [facility]  
[phone] Tuesday October 16, 2018 10:00 AM EDT Follow Up with Orson Primrose, MD  
[facility] Hematology and Oncology [facility]) C/ Gilberto Faulkneresias 33 [facility] [address]  
[phone] Tuesday October 16, 2018 11:15 AM EDT Infusion with NUR9  
ST. [address] ([facility]) [address] Romayne Grave [phone] [facility] [address]  
[phone] [address] Monday November 05, 2018 10:00 AM EST  
REMOTE DEVICE CHECK ORDERS ONLY ENCOUNTER with JRLLE REMOTE AICD 62 [facility] CARDIOLOGY [place] OFFICE (800 [facility]) [address] [place]  
[address] [place] [address]  
[phone] Please remember THIS REMOTE CHECK IS COMPLETED FROM HOME - YOU WILL NOT COME TO THE OFFICE FOR THIS APPOINTMENT. In preparation for this check, please ensure your monitor box is working appropriately. If your monitor requires you to send a transmission, please make sure it is sent by 11:00AM on this day so we can have it processed and resulted to your doctor without delay. If you have a question or problem with the monitor box, please contact your respective company:   [address] [place] [address]/[employer] - [phone]  [employer] - [phone]  [employer] - [phone]  [employer] [facility] [phone]  If you have any further questions or need to move this appointment, we are happy to help and can be reached at [phone]. Discharge Orders None A check sandip indicates which time of day the medication should be taken. My Medications ASK your doctor about these medications  Instructions Each Dose to Equal  
 Morning Noon Evening Bedtime  
 amiodarone 200 mg tablet Commonly known as:  CORDARONE Your last dose was: Your next dose is: Take 1 Tab by mouth daily. 200 mg  
    
   
   
   
  
 apixaban 5 mg tablet Commonly known as:  Juan Nieto Your last dose was: Your next dose is: Take 1 Tab by mouth two (2) times a day. 5 mg  
    
   
   
   
  
 carvedilol 6.25 mg tablet Commonly known as:  Mckay Peat Your last dose was: Your next dose is: Take 1 Tab by mouth two (2) times daily (with meals). 6.25 mg  
    
   
   
   
  
 chlorhexidine 0.12 % solution Commonly known as:  PERIDEX Your last dose was: Your next dose is:    
   
   
 15 mL by Swish and Spit route two (2) times a day. 15 mL CROMOLYN NA Your last dose was: Your next dose is:    
   
   
 by Nasal route. cyclobenzaprine 10 mg tablet Commonly known as:  FLEXERIL Your last dose was: Your next dose is: Take 1 Tab by mouth three (3) times daily as needed for Muscle Spasm(s). 10 mg  
    
   
   
   
  
 * fentaNYL 25 mcg/hr PATCH Commonly known as:  Rafael Oriana Your last dose was: Your next dose is:    
   
   
 APPLY ONE PATCH TO THE SKIN EVERY 72 HOURS. REMOVE OLD PATCH BEFORE APPLYING NEW PATCH  
     
   
   
   
  
 * fentaNYL 100 mcg/hr PATCH Commonly known as:  Rafael Oriana Your last dose was: Your next dose is:    
   
   
 1 Patch by TransDERmal route every seventy-two (72) hours. Max Daily Amount: 1 Patch. 1 Patch HYDROmorphone 4 mg tablet Commonly known as:  DILAUDID Your last dose was: Your next dose is: Take 1-2 Tabs by mouth every four (4) hours as needed for Pain. Max Daily Amount: 48 mg.  
 4-8 mg  
    
   
   
   
  
 ixazomib 2.3 mg Cap Your last dose was: Your next dose is: Take 1 Cap by mouth every seven (7) days. 2.3 mg  
    
   
   
   
  
 levothyroxine 150 mcg tablet Commonly known as:  SYNTHROID Your last dose was: Your next dose is: Take 1 Tab by mouth Daily (before breakfast). 150 mcg  
    
   
   
   
  
 magnesium oxide 400 mg tablet Commonly known as:  MAG-OX Your last dose was: Your next dose is: Take 1 Tab by mouth four (4) times daily. 400 mg MIRALAX 17 gram packet Generic drug:  polyethylene glycol Your last dose was: Your next dose is: Take 17 g by mouth daily. 17 g  
    
   
   
   
  
 mirtazapine 15 mg tablet Commonly known as:  Ardath Krish Your last dose was: Your next dose is: Take 1 Tab by mouth nightly. 15 mg  
    
   
   
   
  
 omeprazole 20 mg capsule Commonly known as:  PRILOSEC Your last dose was: Your next dose is: Take 1 Cap by mouth daily. 20 mg  
    
   
   
   
  
 potassium chloride 20 mEq tablet Commonly known as:  K-DUR, KLOR-CON Your last dose was: Your next dose is: Take 1 Tab by mouth two (2) times a day. 20 mEq  
    
   
   
   
  
 predniSONE 20 mg tablet Commonly known as:  Jose Lever Your last dose was: Your next dose is:    
   
   
 Day of and day after chemotherapy  
     
   
   
   
  
 rosuvastatin 10 mg tablet Commonly known as:  CRESTOR Your last dose was: Your next dose is: Take 1 Tab by mouth nightly. 10 mg  
    
   
   
   
  
 senna-docusate 8.6-50 mg per tablet Commonly known as:  Ambrocio Schuler Your last dose was: Your next dose is: Take 2 Tabs by mouth two (2) times a day. 2 Tab  
    
   
   
   
  
 torsemide 20 mg tablet Commonly known as:  DEMADEX Your last dose was: Your next dose is: Take 1 Tab by mouth daily. Indications: Edema, Pulmonary Edema due to Chronic Heart Failure 20 mg  
    
   
   
   
  
 VITAMIN B-12 1,000 mcg sublingual tablet Generic drug:  cyanocobalamin Your last dose was: Your next dose is: Take 1,000 mcg by mouth daily. 1000 mcg VITAMIN D3 2,000 unit Tab Generic drug:  cholecalciferol (vitamin D3) Your last dose was: Your next dose is: Take  by mouth. WHEY PROTEIN PO Your last dose was: Your next dose is: Take  by mouth. * Notice: This list has 2 medication(s) that are the same as other medications prescribed for you. Read the directions carefully, and ask your doctor or other care provider to review them with you. Where to Get Your Medications Information on where to get these meds will be given to you by the nurse or doctor. ! Ask your nurse or doctor about these medications  
  ixazomib 2.3 mg Cap  
 predniSONE 20 mg tablet Opioid Education Prescription Opioids: What You Need to Know: 
 
Prescription opioids can be used to help relieve moderate-to-severe pain and are often prescribed following a surgery or injury, or for certain health conditions. These medications can be an important part of treatment but also come with serious risks. Opioids are strong pain medicines. Examples include hydrocodone, oxycodone, fentanyl, and morphine. Heroin is an example of an illegal opioid. It is important to work with your health care provider to make sure you are getting the safest, most effective care. WHAT ARE THE RISKS AND SIDE EFFECTS OF OPIOID USE? Prescription opioids carry serious risks of addiction and overdose, especially with prolonged use.   An opioid overdose, often marked by slow breathing, can cause sudden death. The use of prescription opioids can have a number of side effects as well, even when taken as directed. · Tolerance-meaning you might need to take more of a medication for the same pain relief · Physical dependence-meaning you have symptoms of withdrawal when the medication is stopped. Withdrawal symptoms can include nausea, sweating, chills, diarrhea, stomach cramps, and muscle aches. Withdrawal can last up to several weeks, depending on which drug you took and how long you took it. · Increased sensitivity to pain · Constipation · Nausea, vomiting, and dry mouth · Sleepiness and dizziness · Confusion · Depression · Low levels of testosterone that can result in lower sex drive, energy, and strength · Itching and sweating RISKS ARE GREATER WITH:      
· History of drug misuse, substance use disorder, or overdose · Mental health conditions (such as depression or anxiety) · Sleep apnea · Older age (72 years or older) · Pregnancy Avoid alcohol while taking prescription opioids. Also, unless specifically advised by your health care provider, medications to avoid include: · Benzodiazepines (such as Xanax or Valium) · Muscle relaxants (such as Soma or Flexeril) · Hypnotics (such as Ambien or Lunesta) · Other prescription opioids KNOW YOUR OPTIONS Talk to your health care provider about ways to manage your pain that don't involve prescription opioids. Some of these options may actually work better and have fewer risks and side effects. Options may include: 
· Pain relievers such as acetaminophen, ibuprofen, and naproxen · Some medications that are also used for depression or seizures · Physical therapy and exercise · Counseling to help patients learn how to cope better with triggers of pain and stress. · Application of heat or cold compress · Massage therapy · Relaxation techniques Be Informed Make sure you know the name of your medication, how much and how often to take it, and its potential risks & side effects. IF YOU ARE PRESCRIBED OPIOIDS FOR PAIN: 
· Never take opioids in greater amounts or more often than prescribed. Remember the goal is not to be pain-free but to manage your pain at a tolerable level. · Follow up with your primary care provider to: · Work together to create a plan on how to manage your pain. · Talk about ways to help manage your pain that don't involve prescription opioids. · Talk about any and all concerns and side effects. · Help prevent misuse and abuse. · Never sell or share prescription opioids · Help prevent misuse and abuse. · Store prescription opioids in a secure place and out of reach of others (this may include visitors, children, friends, and family). · Safely dispose of unused/unwanted prescription opioids: Find your community drug take-back program or your pharmacy mail-back program, or flush them down the toilet, following guidance from the Food and Drug Administration (www.fda.gov/Drugs/ResourcesForYou). · Visit www.cdc.gov/drugoverdose to learn about the risks of opioid abuse and overdose. · If you believe you may be struggling with addiction, tell your health care provider and ask for guidance or call [address] [place] [address] at 1-199-819-CRVC. Discharge Instructions [place] [address] Department of Interventional Radiology [address] [place] [address] [address] [facility] 
[phone] Office 
[phone] Fax Steroid Injection Discharge Instructions General Information: A steroid injection was performed today, placing a combination of a steroid and an anesthetic (numbing medicine) into the space around the nerves of your spine. This is done to treat back pain.   It may take 7-10 days for the injection to reach its full potential.  This procedure can be done at any level of the spinal column, depending on where your pain is. Your doctor will have ordered the appropriate level to be treated prior to your coming in for the procedure. Home Care Instructions: You can resume your regular diet. Do not drink alcohol. You may notice that you have to use your pain medications less after your injection. Some people do not notice much of a change in their pain after the first injection. If that is the case, it is worth your time to have a second one done. This is why these injections are sometimes ordered in a series of three. Keep the puncture site clean and dry for 24 hours, and then you may remove the dressing. Showering is acceptable after the bandage is removed. Call If: 
 You should call your Physician and/or the Radiology Nurse if you have any bleeding other than a small spot on your bandage. Call if you have any signs of infection, fever, increased pain at the puncture site, confusion, or a headache that worsens when you stand and eases when lying flat. Follow-Up Instructions:  Please see your ordering doctor as he/she has requested. Let your doctor know if you have relief from your pain so they may schedule another injection for you if it is indicated. To Reach Us: If you have any questions about your procedure, please call the Interventional Radiology department at [phone]. After business hours (5pm) and weekends, call the answering service at [phone] and ask for the Radiologist on call to be paged. Interventional Radiology General Nurse Discharge * Please give a list of your current medications to your Primary Care Provider. * Please update this list whenever your medications are discontinued, doses are 
   changed, or new medications (including over-the-counter products) are added. * Please carry medication information at all times in case of emergency situations. These are general instructions for a healthy lifestyle: No smoking/ No tobacco products/ Avoid exposure to second hand smoke Surgeon General's Warning:  Quitting smoking now greatly reduces serious risk to your health. Obesity, smoking, and sedentary lifestyle greatly increases your risk for illness A healthy diet, regular physical exercise & weight monitoring are important for maintaining a healthy lifestyle You may be retaining fluid if you have a history of heart failure or if you experience any of the following symptoms:  Weight gain of 3 pounds or more overnight or 5 pounds in a week, increased swelling in our hands or feet or shortness of breath while lying flat in bed. Please call your doctor as soon as you notice any of these symptoms; do not wait until your next office visit. Recognize signs and symptoms of STROKE: 
F-face looks uneven A-arms unable to move or move unevenly S-speech slurred or non-existent T-time-call 911 as soon as signs and symptoms begin-DO NOT go Back to bed or wait to see if you get better-TIME IS BRAIN. Patient Signature: 
Date: 9/10/2018 Discharging Nurse: Lalita Leblanc Introducing [facility]! Dear Sandhya Cha: Thank you for requesting a ATEME account. Our records indicate that you already have an active ATEME account. You can access your account anytime at [email or web address] Did you know that you can access your hospital and ER discharge instructions at any time in ATEME? You can also review all of your test results from your hospital stay or ER visit. Additional Information If you have questions, please visit the Frequently Asked Questions section of the ATEME website at [email or web address] Remember, ATEME is NOT to be used for urgent needs. For medical emergencies, dial 911. Now available from your iPhone and Android! Introducing Nnamdi Cantu As a Neha Martinez patient, I wanted to make you aware of our electronic visit tool called Nnamdi Cantu. [employer]/7 allows you to connect within minutes with a medical provider 24 hours a day, seven days a week via a mobile device or tablet or logging into a secure website from your computer. You can access Nnamdi Cantu from anywhere in the [place]. A virtual visit might be right for you when you have a simple condition and feel like you just dont want to get out of bed, or cant get away from work for an appointment, when [facility] is not available (evenings, weekends or holidays), or when youre out of town and need minor care. Electronic visits cost only $49 and if the [employer]/7 provider determines a prescription is needed to treat your condition, one can be electronically transmitted to a nearby pharmacy*. Please take a moment to enroll today if you have not already done so. The enrollment process is free and takes just a few minutes. To enroll, please download the [employer] Technology/[employer] vadim to your tablet or phone, or visit [email or web address] to enroll on your computer. And, as an [address] [place] [address] patient with a [employer] account, the results of your visits will be scanned into your electronic medical record and your primary care provider will be able to view the scanned results. We urge you to continue to see your [facility] for your ongoing medical care. And while your primary care provider may not be the one available when you seek a Nnamdi Cantu virtual visit, the peace of mind you get from getting a real diagnosis real time can be priceless. For more information on Nnamdi Cantu, view our Frequently Asked Questions (FAQs) at [email or web address]. Sincerely, 
 
Roverto Tobar MD 
Chief Medical Officer [address] [place] [address] [place] *:  certain medications cannot be prescribed via Nnamdi Cantu Providers Seen During Your Hospitalization Provider Specialty Primary office phone Jess Joshi MD Radiology [phone] Your Primary Care Physician (PCP) Primary Care Physician Office Phone Office Fax Anish Obregon [phone] You are allergic to the following Allergen Reactions Ace Inhibitors Other (comments) Lisinopril Cough Pcn (Penicillins) Swelling Recent Documentation Smoking Status Never Smoker Emergency Contacts Name Discharge Info Relation Home Work Mobile [address] [place] [address] CAREGIVER [3] Spouse [3] [phone] Patient Belongings The following personal items are in your possession at time of discharge: 
     Visual Aid: None Please provide this summary of care documentation to your next provider. Signatures-by signing, you are acknowledging that this After Visit Summary has been reviewed with you and you have received a copy. Patient Signature:  ____________________________________________________________ Date:  ____________________________________________________________  
  
Ajay Bruno Provider Signature:  ____________________________________________________________ Date:  ____________________________________________________________

## 2018-09-10 NOTE — PROCEDURES
Department of Interventional Radiology  [phone]        Interventional Radiology Brief Procedure Note    Patient: Madeline Brown MRN: [id number]  SSN: [id number]    YOB: 1940  Age: 65 y.o. Sex: male      Date of Procedure: 9/10/2018    Pre-Procedure Diagnosis: Back pain. Multiple myeloma. Colon cancer. Prostate cancer. Thyroid cancer. Recent CT scan shows no new VCF. Post-Procedure Diagnosis: SAME    Procedure(s): Epidural Steroid Injection    Brief Description of Procedure: L5S1. Performed By: Janice So MD     Assistants: None    Anesthesia:Lidocaine    Estimated Blood Loss: None    Specimens: None    Implants: None    Findings: DJD. Complications: None    Recommendations: Discharge home. Follow Up: Office to call.       Signed By: Janice So MD     September 10, 2018

## 2018-09-10 NOTE — DISCHARGE INSTRUCTIONS
Hectori Lenore Epperson Almo  Department of Interventional Radiology  [facility]  [phone] Office  [phone] Fax    Steroid Injection Discharge Instructions    General Information:   A steroid injection was performed today, placing a combination of a steroid and an anesthetic (numbing medicine) into the space around the nerves of your spine. This is done to treat back pain. It may take 7-10 days for the injection to reach its full potential.  This procedure can be done at any level of the spinal column, depending on where your pain is. Your doctor will have ordered the appropriate level to be treated prior to your coming in for the procedure. Home Care Instructions: You can resume your regular diet. Do not drink alcohol. You may notice that you have to use your pain medications less after your injection. Some people do not notice much of a change in their pain after the first injection. If that is the case, it is worth your time to have a second one done. This is why these injections are sometimes ordered in a series of three. Keep the puncture site clean and dry for 24 hours, and then you may remove the dressing. Showering is acceptable after the bandage is removed. Call If:   You should call your Physician and/or the Radiology Nurse if you have any bleeding other than a small spot on your bandage. Call if you have any signs of infection, fever, increased pain at the puncture site, confusion, or a headache that worsens when you stand and eases when lying flat. Follow-Up Instructions:  Please see your ordering doctor as he/she has requested. Let your doctor know if you have relief from your pain so they may schedule another injection for you if it is indicated. To Reach Us: If you have any questions about your procedure, please call the Interventional Radiology department at [phone].  After business hours (5pm) and weekends, call the answering service at [phone] and ask for the Radiologist on call to be paged. Interventional Radiology General Nurse Discharge    * Please give a list of your current medications to your Primary Care Provider. * Please update this list whenever your medications are discontinued, doses are     changed, or new medications (including over-the-counter products) are added. * Please carry medication information at all times in case of emergency situations. These are general instructions for a healthy lifestyle:    No smoking/ No tobacco products/ Avoid exposure to second hand smoke  Surgeon General's Warning:  Quitting smoking now greatly reduces serious risk to your health. Obesity, smoking, and sedentary lifestyle greatly increases your risk for illness  A healthy diet, regular physical exercise & weight monitoring are important for maintaining a healthy lifestyle    You may be retaining fluid if you have a history of heart failure or if you experience any of the following symptoms:  Weight gain of 3 pounds or more overnight or 5 pounds in a week, increased swelling in our hands or feet or shortness of breath while lying flat in bed. Please call your doctor as soon as you notice any of these symptoms; do not wait until your next office visit. Recognize signs and symptoms of STROKE:  F-face looks uneven    A-arms unable to move or move unevenly    S-speech slurred or non-existent    T-time-call 911 as soon as signs and symptoms begin-DO NOT go       Back to bed or wait to see if you get better-TIME IS BRAIN.           Patient Signature:  Date: 9/10/2018  Discharging Nurse: Ange Colbert

## 2018-09-11 ENCOUNTER — HOSPITAL ENCOUNTER (OUTPATIENT)
Dept: INFUSION THERAPY | Age: 78
Discharge: HOME | End: 2018-09-11
Payer: MEDICARE

## 2018-09-11 VITALS
WEIGHT: 186.1 LBS | OXYGEN SATURATION: 96 % | DIASTOLIC BLOOD PRESSURE: 78 MMHG | SYSTOLIC BLOOD PRESSURE: 149 MMHG | BODY MASS INDEX: 25.24 KG/M2 | HEART RATE: 70 BPM | TEMPERATURE: 97.8 F | RESPIRATION RATE: 18 BRPM

## 2018-09-11 DIAGNOSIS — C90.02 MULTIPLE MYELOMA IN RELAPSE (HCC): ICD-10-CM

## 2018-09-11 DIAGNOSIS — C90.00 MULTIPLE MYELOMA, REMISSION STATUS UNSPECIFIED (HCC): ICD-10-CM

## 2018-09-11 LAB
ALBUMIN SERPL-MCNC: 3.3 G/DL (ref 3.2–4.6)
ALBUMIN/GLOB SERPL: 1 {RATIO} (ref 1.2–3.5)
ALP SERPL-CCNC: 184 U/L (ref 50–136)
ALT SERPL-CCNC: 23 U/L (ref 12–65)
ANION GAP SERPL CALC-SCNC: 8 MMOL/L (ref 7–16)
AST SERPL-CCNC: 28 U/L (ref 15–37)
BASOPHILS # BLD: 0 K/UL (ref 0–0.2)
BASOPHILS NFR BLD: 0 % (ref 0–2)
BILIRUB SERPL-MCNC: 0.3 MG/DL (ref 0.2–1.1)
BUN SERPL-MCNC: 22 MG/DL (ref 8–23)
CALCIUM SERPL-MCNC: 8.3 MG/DL (ref 8.3–10.4)
CHLORIDE SERPL-SCNC: 109 MMOL/L (ref 98–107)
CO2 SERPL-SCNC: 24 MMOL/L (ref 21–32)
CREAT SERPL-MCNC: 1.05 MG/DL (ref 0.8–1.5)
DIFFERENTIAL METHOD BLD: ABNORMAL
EOSINOPHIL # BLD: 0 K/UL (ref 0–0.8)
EOSINOPHIL NFR BLD: 0 % (ref 0.5–7.8)
ERYTHROCYTE [DISTWIDTH] IN BLOOD BY AUTOMATED COUNT: 18.6 %
GLOBULIN SER CALC-MCNC: 3.2 G/DL (ref 2.3–3.5)
GLUCOSE SERPL-MCNC: 109 MG/DL (ref 65–100)
HCT VFR BLD AUTO: 31.7 % (ref 41.1–50.3)
HGB BLD-MCNC: 10.5 G/DL (ref 13.6–17.2)
IMM GRANULOCYTES # BLD: 0 K/UL (ref 0–0.5)
IMM GRANULOCYTES NFR BLD AUTO: 0 % (ref 0–5)
LYMPHOCYTES # BLD: 2.5 K/UL (ref 0.5–4.6)
LYMPHOCYTES NFR BLD: 48 % (ref 13–44)
MCH RBC QN AUTO: 24.5 PG (ref 26.1–32.9)
MCHC RBC AUTO-ENTMCNC: 33.1 G/DL (ref 31.4–35)
MCV RBC AUTO: 74.1 FL (ref 79.6–97.8)
MONOCYTES # BLD: 0.5 K/UL (ref 0.1–1.3)
MONOCYTES NFR BLD: 9 % (ref 4–12)
NEUTS SEG # BLD: 2.2 K/UL (ref 1.7–8.2)
NEUTS SEG NFR BLD: 42 % (ref 43–78)
NRBC # BLD: 0 K/UL (ref 0–0.2)
PLATELET # BLD AUTO: 200 K/UL (ref 150–450)
PMV BLD AUTO: ABNORMAL FL (ref 9.4–12.3)
POTASSIUM SERPL-SCNC: 4.4 MMOL/L (ref 3.5–5.1)
PROT SERPL-MCNC: 6.5 G/DL (ref 6.3–8.2)
RBC # BLD AUTO: 4.28 M/UL (ref 4.23–5.6)
SODIUM SERPL-SCNC: 141 MMOL/L (ref 136–145)
WBC # BLD AUTO: 5.2 K/UL (ref 4.3–11.1)

## 2018-09-11 PROCEDURE — 96413 CHEMO IV INFUSION 1 HR: CPT

## 2018-09-11 PROCEDURE — 74011250636 HC RX REV CODE- 250/636

## 2018-09-11 PROCEDURE — 96375 TX/PRO/DX INJ NEW DRUG ADDON: CPT

## 2018-09-11 PROCEDURE — 74011250637 HC RX REV CODE- 250/637: Performed by: INTERNAL MEDICINE

## 2018-09-11 PROCEDURE — 74011250636 HC RX REV CODE- 250/636: Performed by: INTERNAL MEDICINE

## 2018-09-11 PROCEDURE — 77030003560 HC NDL HUBR BARD -A

## 2018-09-11 PROCEDURE — 80053 COMPREHEN METABOLIC PANEL: CPT

## 2018-09-11 PROCEDURE — 96415 CHEMO IV INFUSION ADDL HR: CPT

## 2018-09-11 PROCEDURE — 74011000258 HC RX REV CODE- 258: Performed by: INTERNAL MEDICINE

## 2018-09-11 PROCEDURE — 85025 COMPLETE CBC W/AUTO DIFF WBC: CPT

## 2018-09-11 RX ORDER — HEPARIN 100 UNIT/ML
300-500 SYRINGE INTRAVENOUS AS NEEDED
Status: ACTIVE | OUTPATIENT
Start: 2018-09-11 | End: 2018-09-11

## 2018-09-11 RX ORDER — DIPHENHYDRAMINE HYDROCHLORIDE 50 MG/ML
50 INJECTION, SOLUTION INTRAMUSCULAR; INTRAVENOUS ONCE
Status: COMPLETED | OUTPATIENT
Start: 2018-09-11 | End: 2018-09-11

## 2018-09-11 RX ORDER — SODIUM CHLORIDE 0.9 % (FLUSH) 0.9 %
10 SYRINGE (ML) INJECTION AS NEEDED
Status: ACTIVE | OUTPATIENT
Start: 2018-09-11 | End: 2018-09-11

## 2018-09-11 RX ORDER — SODIUM CHLORIDE 9 MG/ML
500 INJECTION, SOLUTION INTRAVENOUS CONTINUOUS
Status: DISPENSED | OUTPATIENT
Start: 2018-09-11 | End: 2018-09-11

## 2018-09-11 RX ORDER — ACETAMINOPHEN 325 MG/1
650 TABLET ORAL ONCE
Status: COMPLETED | OUTPATIENT
Start: 2018-09-11 | End: 2018-09-11

## 2018-09-11 RX ADMIN — Medication 500 UNITS: at 15:16

## 2018-09-11 RX ADMIN — DIPHENHYDRAMINE HYDROCHLORIDE 50 MG: 50 INJECTION, SOLUTION INTRAMUSCULAR; INTRAVENOUS at 10:04

## 2018-09-11 RX ADMIN — DEXAMETHASONE SODIUM PHOSPHATE 20 MG: 10 INJECTION INTRAMUSCULAR; INTRAVENOUS at 10:32

## 2018-09-11 RX ADMIN — SODIUM CHLORIDE 500 ML: 900 INJECTION, SOLUTION INTRAVENOUS at 10:45

## 2018-09-11 RX ADMIN — ACETAMINOPHEN 650 MG: 325 TABLET, FILM COATED ORAL at 10:04

## 2018-09-11 RX ADMIN — Medication 10 ML: at 15:16

## 2018-09-11 RX ADMIN — DARATUMUMAB 1299 MG: 100 INJECTION, SOLUTION, CONCENTRATE INTRAVENOUS at 11:44

## 2018-09-11 NOTE — PROGRESS NOTES
Arrived to the [facility]. Daratumumab completed. Patient tolerated without problems Any issues or concerns during appointment: no 
Patient aware of next infusion appointment on 9/18/18 at 1600 Discharged ambulatory

## 2018-09-18 ENCOUNTER — HOSPITAL ENCOUNTER (OUTPATIENT)
Dept: INFUSION THERAPY | Age: 78
Discharge: HOME | End: 2018-09-18
Payer: MEDICARE

## 2018-09-18 ENCOUNTER — HOSPITAL ENCOUNTER (OUTPATIENT)
Dept: LAB | Age: 78
Discharge: HOME | End: 2018-09-18
Payer: MEDICARE

## 2018-09-18 ENCOUNTER — PATIENT OUTREACH (OUTPATIENT)
Dept: CASE MANAGEMENT | Age: 78
End: 2018-09-18

## 2018-09-18 VITALS
HEART RATE: 70 BPM | SYSTOLIC BLOOD PRESSURE: 125 MMHG | RESPIRATION RATE: 18 BRPM | TEMPERATURE: 97.8 F | DIASTOLIC BLOOD PRESSURE: 68 MMHG

## 2018-09-18 DIAGNOSIS — C90.02 MULTIPLE MYELOMA IN RELAPSE (HCC): ICD-10-CM

## 2018-09-18 DIAGNOSIS — C90.00 MULTIPLE MYELOMA, REMISSION STATUS UNSPECIFIED (HCC): ICD-10-CM

## 2018-09-18 LAB
ALBUMIN SERPL-MCNC: 3.9 G/DL (ref 3.2–4.6)
ALBUMIN/GLOB SERPL: 1.3 {RATIO} (ref 1.2–3.5)
ALP SERPL-CCNC: 171 U/L (ref 50–136)
ALT SERPL-CCNC: 22 U/L (ref 12–65)
ANION GAP SERPL CALC-SCNC: 6 MMOL/L (ref 7–16)
AST SERPL-CCNC: 24 U/L (ref 15–37)
BASOPHILS # BLD: 0 K/UL (ref 0–0.2)
BASOPHILS NFR BLD: 0 % (ref 0–2)
BILIRUB SERPL-MCNC: 0.4 MG/DL (ref 0.2–1.1)
BUN SERPL-MCNC: 21 MG/DL (ref 8–23)
CALCIUM SERPL-MCNC: 8.7 MG/DL (ref 8.3–10.4)
CHLORIDE SERPL-SCNC: 108 MMOL/L (ref 98–107)
CO2 SERPL-SCNC: 26 MMOL/L (ref 21–32)
CREAT SERPL-MCNC: 1.17 MG/DL (ref 0.8–1.5)
DIFFERENTIAL METHOD BLD: ABNORMAL
EOSINOPHIL # BLD: 0 K/UL (ref 0–0.8)
EOSINOPHIL NFR BLD: 0 % (ref 0.5–7.8)
ERYTHROCYTE [DISTWIDTH] IN BLOOD BY AUTOMATED COUNT: 18.4 % (ref 11.9–14.6)
GLOBULIN SER CALC-MCNC: 3.1 G/DL (ref 2.3–3.5)
GLUCOSE SERPL-MCNC: 105 MG/DL (ref 65–100)
HCT VFR BLD AUTO: 33.9 % (ref 41.1–50.3)
HGB BLD-MCNC: 11.2 G/DL (ref 13.6–17.2)
IGA SERPL-MCNC: 22 MG/DL (ref 70–400)
IGG SERPL-MCNC: 632 MG/DL (ref 700–1600)
IGM SERPL-MCNC: 22 MG/DL (ref 40–230)
IMM GRANULOCYTES # BLD: 0 K/UL (ref 0–0.5)
IMM GRANULOCYTES NFR BLD AUTO: 1 % (ref 0–5)
LYMPHOCYTES # BLD: 1.5 K/UL (ref 0.5–4.6)
LYMPHOCYTES NFR BLD: 32 % (ref 13–44)
MAGNESIUM SERPL-MCNC: 2 MG/DL (ref 1.8–2.4)
MCH RBC QN AUTO: 24.2 PG (ref 26.1–32.9)
MCHC RBC AUTO-ENTMCNC: 33 G/DL (ref 31.4–35)
MCV RBC AUTO: 73.2 FL (ref 79.6–97.8)
MONOCYTES # BLD: 0.3 K/UL (ref 0.1–1.3)
MONOCYTES NFR BLD: 6 % (ref 4–12)
NEUTS SEG # BLD: 2.8 K/UL (ref 1.7–8.2)
NEUTS SEG NFR BLD: 61 % (ref 43–78)
NRBC # BLD: 0.01 K/UL (ref 0–0.2)
PHOSPHATE SERPL-MCNC: 2.1 MG/DL (ref 2.3–3.7)
PLATELET # BLD AUTO: 188 K/UL (ref 150–450)
PMV BLD AUTO: 9.1 FL (ref 9.4–12.3)
POTASSIUM SERPL-SCNC: 4.4 MMOL/L (ref 3.5–5.1)
PROT SERPL-MCNC: 7 G/DL (ref 6.3–8.2)
RBC # BLD AUTO: 4.63 M/UL (ref 4.23–5.67)
SODIUM SERPL-SCNC: 140 MMOL/L (ref 136–145)
WBC # BLD AUTO: 4.6 K/UL (ref 4.3–11.1)

## 2018-09-18 PROCEDURE — 85025 COMPLETE CBC W/AUTO DIFF WBC: CPT

## 2018-09-18 PROCEDURE — 96415 CHEMO IV INFUSION ADDL HR: CPT

## 2018-09-18 PROCEDURE — 82784 ASSAY IGA/IGD/IGG/IGM EACH: CPT

## 2018-09-18 PROCEDURE — 96413 CHEMO IV INFUSION 1 HR: CPT

## 2018-09-18 PROCEDURE — 74011250636 HC RX REV CODE- 250/636: Performed by: NURSE PRACTITIONER

## 2018-09-18 PROCEDURE — 86334 IMMUNOFIX E-PHORESIS SERUM: CPT

## 2018-09-18 PROCEDURE — 83883 ASSAY NEPHELOMETRY NOT SPEC: CPT

## 2018-09-18 PROCEDURE — 80053 COMPREHEN METABOLIC PANEL: CPT

## 2018-09-18 PROCEDURE — 74011250637 HC RX REV CODE- 250/637: Performed by: NURSE PRACTITIONER

## 2018-09-18 PROCEDURE — 83735 ASSAY OF MAGNESIUM: CPT

## 2018-09-18 PROCEDURE — 96375 TX/PRO/DX INJ NEW DRUG ADDON: CPT

## 2018-09-18 PROCEDURE — 84100 ASSAY OF PHOSPHORUS: CPT

## 2018-09-18 PROCEDURE — 74011000258 HC RX REV CODE- 258: Performed by: NURSE PRACTITIONER

## 2018-09-18 RX ORDER — HEPARIN 100 UNIT/ML
300-500 SYRINGE INTRAVENOUS AS NEEDED
Status: CANCELLED
Start: 2018-09-18

## 2018-09-18 RX ORDER — ACETAMINOPHEN 325 MG/1
650 TABLET ORAL ONCE
Status: COMPLETED | OUTPATIENT
Start: 2018-09-18 | End: 2018-09-18

## 2018-09-18 RX ORDER — ALBUTEROL SULFATE 0.83 MG/ML
2.5 SOLUTION RESPIRATORY (INHALATION) AS NEEDED
Status: CANCELLED
Start: 2018-09-18

## 2018-09-18 RX ORDER — SODIUM CHLORIDE 0.9 % (FLUSH) 0.9 %
10 SYRINGE (ML) INJECTION AS NEEDED
Status: ACTIVE | OUTPATIENT
Start: 2018-09-18 | End: 2018-09-19

## 2018-09-18 RX ORDER — HYDROCORTISONE SODIUM SUCCINATE 100 MG/2ML
100 INJECTION, POWDER, FOR SOLUTION INTRAMUSCULAR; INTRAVENOUS AS NEEDED
Status: CANCELLED | OUTPATIENT
Start: 2018-09-18

## 2018-09-18 RX ORDER — ONDANSETRON 2 MG/ML
8 INJECTION INTRAMUSCULAR; INTRAVENOUS AS NEEDED
Status: CANCELLED | OUTPATIENT
Start: 2018-09-18

## 2018-09-18 RX ORDER — EPINEPHRINE 1 MG/ML
0.3 INJECTION, SOLUTION, CONCENTRATE INTRAVENOUS AS NEEDED
Status: CANCELLED | OUTPATIENT
Start: 2018-09-18

## 2018-09-18 RX ORDER — DIPHENHYDRAMINE HYDROCHLORIDE 50 MG/ML
50 INJECTION, SOLUTION INTRAMUSCULAR; INTRAVENOUS ONCE
Status: COMPLETED | OUTPATIENT
Start: 2018-09-18 | End: 2018-09-18

## 2018-09-18 RX ORDER — ACETAMINOPHEN 325 MG/1
650 TABLET ORAL AS NEEDED
Status: CANCELLED
Start: 2018-09-18

## 2018-09-18 RX ORDER — DIPHENHYDRAMINE HYDROCHLORIDE 50 MG/ML
50 INJECTION, SOLUTION INTRAMUSCULAR; INTRAVENOUS AS NEEDED
Status: CANCELLED
Start: 2018-09-18

## 2018-09-18 RX ORDER — SODIUM CHLORIDE 0.9 % (FLUSH) 0.9 %
10 SYRINGE (ML) INJECTION AS NEEDED
Status: CANCELLED
Start: 2018-09-18

## 2018-09-18 RX ORDER — HEPARIN 100 UNIT/ML
300-500 SYRINGE INTRAVENOUS AS NEEDED
Status: DISPENSED | OUTPATIENT
Start: 2018-09-18 | End: 2018-09-19

## 2018-09-18 RX ORDER — SODIUM CHLORIDE 9 MG/ML
500 INJECTION, SOLUTION INTRAVENOUS CONTINUOUS
Status: ACTIVE | OUTPATIENT
Start: 2018-09-18 | End: 2018-09-19

## 2018-09-18 RX ADMIN — DARATUMUMAB 1299 MG: 100 INJECTION, SOLUTION, CONCENTRATE INTRAVENOUS at 16:36

## 2018-09-18 RX ADMIN — SODIUM CHLORIDE 500 ML: 900 INJECTION, SOLUTION INTRAVENOUS at 15:14

## 2018-09-18 RX ADMIN — Medication 10 ML: at 15:14

## 2018-09-18 RX ADMIN — HEPARIN 500 UNITS: 100 SYRINGE at 19:46

## 2018-09-18 RX ADMIN — ACETAMINOPHEN 650 MG: 325 TABLET, FILM COATED ORAL at 15:13

## 2018-09-18 RX ADMIN — DIPHENHYDRAMINE HYDROCHLORIDE 50 MG: 50 INJECTION, SOLUTION INTRAMUSCULAR; INTRAVENOUS at 15:16

## 2018-09-18 RX ADMIN — DEXAMETHASONE SODIUM PHOSPHATE 20 MG: 10 INJECTION INTRAMUSCULAR; INTRAVENOUS at 15:26

## 2018-09-18 RX ADMIN — SODIUM CHLORIDE 3.5 MG: 900 INJECTION, SOLUTION INTRAVENOUS at 15:45

## 2018-09-18 RX ADMIN — Medication 10 ML: at 19:46

## 2018-09-18 NOTE — PROGRESS NOTES
Arrived to the [facility]. Chemo and Zometa completed. Patient tolerated well. Port flushed and de-accessed. Any issues or concerns during appointment: None. Patient aware of next infusion appointment on 10/2 (date) at 1400 (time). Discharged ambulatory in stable condition.

## 2018-09-18 NOTE — PROGRESS NOTES
9/18/18:  Patient in to see NP prior to next daratumamab today. Patient reports pain is much better today and has been since the epidural.  Rose Faith, NP with palliative care in to see patient as well and discussed reducing duragesic patch dosing to 100mcg. Patient with several questions about billing and concerns in regards to bills from [facility]. Patient encouraged to call Yumiko Foster first and then to bring in hospital bills with him to his next visit. Patient also noted that Dr. Simon Postal changed him to lasix 40mg daily. Updated medication calendar. He will return in 2 weeks to for labs, ov and daratumamab.

## 2018-09-19 LAB
ALBUMIN SERPL ELPH-MCNC: 4.1 G/DL (ref 3.2–5.6)
ALBUMIN/GLOB SERPL: 1.6 {RATIO}
ALPHA1 GLOB SERPL ELPH-MCNC: 0.23 G/DL (ref 0.1–0.4)
ALPHA2 GLOB SERPL ELPH-MCNC: 0.75 G/DL (ref 0.4–1.2)
B-GLOBULIN SERPL QL ELPH: 0.92 G/DL (ref 0.6–1.3)
GAMMA GLOB MFR SERPL ELPH: 0.6 G/DL (ref 0.5–1.6)
IGA SERPL-MCNC: ABNORMAL MG/DL (ref 85–499)
IGG SERPL-MCNC: ABNORMAL MG/DL (ref 610–1616)
IGM SERPL-MCNC: ABNORMAL MG/DL (ref 35–242)
KAPPA LC FREE SER-MCNC: 5.77 MG/L (ref 3.3–19.4)
KAPPA LC FREE/LAMBDA FREE SER: 0.7 {RATIO} (ref 0.26–1.65)
LAMBDA LC FREE SERPL-MCNC: 8.27 MG/L (ref 5.71–26.3)
M PROTEIN SERPL ELPH-MCNC: 0.26 G/DL
PROT PATTERN SERPL ELPH-IMP: ABNORMAL
PROT PATTERN SPEC IFE-IMP: ABNORMAL
PROT SERPL-MCNC: 6.6 G/DL (ref 6.3–8.2)

## 2018-09-20 ENCOUNTER — PATIENT OUTREACH (OUTPATIENT)
Dept: CASE MANAGEMENT | Age: 78
End: 2018-09-20

## 2018-09-20 NOTE — PROGRESS NOTES
Return call received from pt. He verified that representatives from the [employer] on [address] has visited them and completed their assessment and will follow up with them to provide the information about setting up transportation through [employer]. CCM goal has been met. Pt verbalized no further CCM needs at this time so CCM episode will be resolved and pt's case closed. CM will be removed from pt's treatment team.  MD notified via in-basket message. CM encouraged pt to call if needs arise. He verbalized understanding and confirmed that he had received CM's business card in the mail. CM remains available to assist if needs arise in the future. This note will not be viewable in [address]

## 2018-09-20 NOTE — Clinical Note
Thank you for the opportunity to participate in your patient's care. He has been graduated from [place] [id number] but please feel free to re-refer him if needed in the future.

## 2018-10-02 ENCOUNTER — HOSPITAL ENCOUNTER (OUTPATIENT)
Dept: INFUSION THERAPY | Age: 78
Discharge: HOME | End: 2018-10-02
Payer: MEDICARE

## 2018-10-02 ENCOUNTER — HOSPITAL ENCOUNTER (OUTPATIENT)
Dept: LAB | Age: 78
Discharge: HOME | End: 2018-10-02
Payer: MEDICARE

## 2018-10-02 VITALS
TEMPERATURE: 97.7 F | HEART RATE: 71 BPM | RESPIRATION RATE: 18 BRPM | SYSTOLIC BLOOD PRESSURE: 113 MMHG | DIASTOLIC BLOOD PRESSURE: 67 MMHG | OXYGEN SATURATION: 99 %

## 2018-10-02 DIAGNOSIS — C90.00 MULTIPLE MYELOMA, REMISSION STATUS UNSPECIFIED (HCC): ICD-10-CM

## 2018-10-02 DIAGNOSIS — C90.02 MULTIPLE MYELOMA IN RELAPSE (HCC): ICD-10-CM

## 2018-10-02 LAB
ALBUMIN SERPL-MCNC: 3.4 G/DL (ref 3.2–4.6)
ALBUMIN/GLOB SERPL: 1 {RATIO} (ref 1.2–3.5)
ALP SERPL-CCNC: 111 U/L (ref 50–136)
ALT SERPL-CCNC: 25 U/L (ref 12–65)
ANION GAP SERPL CALC-SCNC: 6 MMOL/L (ref 7–16)
AST SERPL-CCNC: 27 U/L (ref 15–37)
BASOPHILS # BLD: 0 K/UL (ref 0–0.2)
BASOPHILS NFR BLD: 0 % (ref 0–2)
BILIRUB SERPL-MCNC: 0.4 MG/DL (ref 0.2–1.1)
BUN SERPL-MCNC: 18 MG/DL (ref 8–23)
CALCIUM SERPL-MCNC: 8.5 MG/DL (ref 8.3–10.4)
CHLORIDE SERPL-SCNC: 105 MMOL/L (ref 98–107)
CO2 SERPL-SCNC: 25 MMOL/L (ref 21–32)
CREAT SERPL-MCNC: 1.32 MG/DL (ref 0.8–1.5)
DIFFERENTIAL METHOD BLD: ABNORMAL
EOSINOPHIL # BLD: 0.1 K/UL (ref 0–0.8)
EOSINOPHIL NFR BLD: 1 % (ref 0.5–7.8)
ERYTHROCYTE [DISTWIDTH] IN BLOOD BY AUTOMATED COUNT: 16.4 % (ref 11.9–14.6)
GLOBULIN SER CALC-MCNC: 3.3 G/DL (ref 2.3–3.5)
GLUCOSE SERPL-MCNC: 139 MG/DL (ref 65–100)
HCT VFR BLD AUTO: 32.5 % (ref 41.1–50.3)
HGB BLD-MCNC: 10.9 G/DL (ref 13.6–17.2)
IMM GRANULOCYTES # BLD: 0.1 K/UL (ref 0–0.5)
IMM GRANULOCYTES NFR BLD AUTO: 1 % (ref 0–5)
LYMPHOCYTES # BLD: 1.9 K/UL (ref 0.5–4.6)
LYMPHOCYTES NFR BLD: 32 % (ref 13–44)
MAGNESIUM SERPL-MCNC: 2 MG/DL (ref 1.8–2.4)
MCH RBC QN AUTO: 24.2 PG (ref 26.1–32.9)
MCHC RBC AUTO-ENTMCNC: 33.5 G/DL (ref 31.4–35)
MCV RBC AUTO: 72.1 FL (ref 79.6–97.8)
MONOCYTES # BLD: 0.7 K/UL (ref 0.1–1.3)
MONOCYTES NFR BLD: 11 % (ref 4–12)
NEUTS SEG # BLD: 3.2 K/UL (ref 1.7–8.2)
NEUTS SEG NFR BLD: 55 % (ref 43–78)
NRBC # BLD: 0 K/UL (ref 0–0.2)
PLATELET # BLD AUTO: 146 K/UL (ref 150–450)
PMV BLD AUTO: ABNORMAL FL (ref 9.4–12.3)
POTASSIUM SERPL-SCNC: 3.8 MMOL/L (ref 3.5–5.1)
PROT SERPL-MCNC: 6.7 G/DL (ref 6.3–8.2)
RBC # BLD AUTO: 4.51 M/UL (ref 4.23–5.67)
SODIUM SERPL-SCNC: 136 MMOL/L (ref 136–145)
WBC # BLD AUTO: 5.9 K/UL (ref 4.3–11.1)

## 2018-10-02 PROCEDURE — 96375 TX/PRO/DX INJ NEW DRUG ADDON: CPT

## 2018-10-02 PROCEDURE — 83735 ASSAY OF MAGNESIUM: CPT

## 2018-10-02 PROCEDURE — 74011250636 HC RX REV CODE- 250/636: Performed by: NURSE PRACTITIONER

## 2018-10-02 PROCEDURE — 74011000258 HC RX REV CODE- 258: Performed by: NURSE PRACTITIONER

## 2018-10-02 PROCEDURE — 80053 COMPREHEN METABOLIC PANEL: CPT

## 2018-10-02 PROCEDURE — 96415 CHEMO IV INFUSION ADDL HR: CPT

## 2018-10-02 PROCEDURE — 74011250637 HC RX REV CODE- 250/637: Performed by: NURSE PRACTITIONER

## 2018-10-02 PROCEDURE — 96413 CHEMO IV INFUSION 1 HR: CPT

## 2018-10-02 PROCEDURE — 85025 COMPLETE CBC W/AUTO DIFF WBC: CPT

## 2018-10-02 RX ORDER — SODIUM CHLORIDE 9 MG/ML
500 INJECTION, SOLUTION INTRAVENOUS CONTINUOUS
Status: ACTIVE | OUTPATIENT
Start: 2018-10-02 | End: 2018-10-02

## 2018-10-02 RX ORDER — ACETAMINOPHEN 325 MG/1
650 TABLET ORAL ONCE
Status: COMPLETED | OUTPATIENT
Start: 2018-10-02 | End: 2018-10-02

## 2018-10-02 RX ORDER — DIPHENHYDRAMINE HYDROCHLORIDE 50 MG/ML
50 INJECTION, SOLUTION INTRAMUSCULAR; INTRAVENOUS ONCE
Status: COMPLETED | OUTPATIENT
Start: 2018-10-02 | End: 2018-10-02

## 2018-10-02 RX ORDER — HEPARIN 100 UNIT/ML
300-500 SYRINGE INTRAVENOUS AS NEEDED
Status: DISPENSED | OUTPATIENT
Start: 2018-10-02 | End: 2018-10-02

## 2018-10-02 RX ORDER — SODIUM CHLORIDE 0.9 % (FLUSH) 0.9 %
10 SYRINGE (ML) INJECTION AS NEEDED
Status: ACTIVE | OUTPATIENT
Start: 2018-10-02 | End: 2018-10-02

## 2018-10-02 RX ADMIN — HEPARIN 500 UNITS: 100 SYRINGE at 14:55

## 2018-10-02 RX ADMIN — DIPHENHYDRAMINE HYDROCHLORIDE 50 MG: 50 INJECTION, SOLUTION INTRAMUSCULAR; INTRAVENOUS at 10:43

## 2018-10-02 RX ADMIN — DARATUMUMAB 1299 MG: 100 INJECTION, SOLUTION, CONCENTRATE INTRAVENOUS at 11:27

## 2018-10-02 RX ADMIN — DEXAMETHASONE SODIUM PHOSPHATE 20 MG: 4 INJECTION, SOLUTION INTRAMUSCULAR; INTRAVENOUS at 10:58

## 2018-10-02 RX ADMIN — ACETAMINOPHEN 650 MG: 325 TABLET, FILM COATED ORAL at 10:43

## 2018-10-02 RX ADMIN — SODIUM CHLORIDE 500 ML: 900 INJECTION, SOLUTION INTRAVENOUS at 10:41

## 2018-10-02 NOTE — PROGRESS NOTES
Arrived to the [facility]. chemo completed. Patient tolerated well. Any issues or concerns during appointment: no.  Patient aware of next infusion appointment on 10/16 (date) at [phone] (time). Discharged home.

## 2018-10-16 ENCOUNTER — HOSPITAL ENCOUNTER (OUTPATIENT)
Dept: INFUSION THERAPY | Age: 78
Discharge: HOME | End: 2018-10-16
Payer: MEDICARE

## 2018-10-16 ENCOUNTER — PATIENT OUTREACH (OUTPATIENT)
Dept: CASE MANAGEMENT | Age: 78
End: 2018-10-16

## 2018-10-16 ENCOUNTER — HOSPITAL ENCOUNTER (OUTPATIENT)
Dept: LAB | Age: 78
Discharge: HOME | End: 2018-10-16
Payer: MEDICARE

## 2018-10-16 VITALS
TEMPERATURE: 97.9 F | HEART RATE: 69 BPM | RESPIRATION RATE: 18 BRPM | OXYGEN SATURATION: 95 % | DIASTOLIC BLOOD PRESSURE: 68 MMHG | SYSTOLIC BLOOD PRESSURE: 119 MMHG

## 2018-10-16 DIAGNOSIS — C90.00 MULTIPLE MYELOMA, REMISSION STATUS UNSPECIFIED (HCC): ICD-10-CM

## 2018-10-16 DIAGNOSIS — C90.02 MULTIPLE MYELOMA IN RELAPSE (HCC): ICD-10-CM

## 2018-10-16 LAB
ALBUMIN SERPL-MCNC: 3.7 G/DL (ref 3.2–4.6)
ALBUMIN/GLOB SERPL: 1.2 {RATIO} (ref 1.2–3.5)
ALP SERPL-CCNC: 94 U/L (ref 50–136)
ALT SERPL-CCNC: 20 U/L (ref 12–65)
ANION GAP SERPL CALC-SCNC: 6 MMOL/L (ref 7–16)
AST SERPL-CCNC: 21 U/L (ref 15–37)
BASOPHILS # BLD: 0 K/UL (ref 0–0.2)
BASOPHILS NFR BLD: 0 % (ref 0–2)
BILIRUB SERPL-MCNC: 0.5 MG/DL (ref 0.2–1.1)
BUN SERPL-MCNC: 23 MG/DL (ref 8–23)
CALCIUM SERPL-MCNC: 8.9 MG/DL (ref 8.3–10.4)
CHLORIDE SERPL-SCNC: 103 MMOL/L (ref 98–107)
CO2 SERPL-SCNC: 26 MMOL/L (ref 21–32)
CREAT SERPL-MCNC: 1.31 MG/DL (ref 0.8–1.5)
DIFFERENTIAL METHOD BLD: ABNORMAL
EOSINOPHIL # BLD: 0 K/UL (ref 0–0.8)
EOSINOPHIL NFR BLD: 0 % (ref 0.5–7.8)
ERYTHROCYTE [DISTWIDTH] IN BLOOD BY AUTOMATED COUNT: 15.7 % (ref 11.9–14.6)
GLOBULIN SER CALC-MCNC: 3.1 G/DL (ref 2.3–3.5)
GLUCOSE SERPL-MCNC: 159 MG/DL (ref 65–100)
HCT VFR BLD AUTO: 33.7 % (ref 41.1–50.3)
HGB BLD-MCNC: 11.1 G/DL (ref 13.6–17.2)
IMM GRANULOCYTES # BLD: 0 K/UL (ref 0–0.5)
IMM GRANULOCYTES NFR BLD AUTO: 1 % (ref 0–5)
KAPPA LC FREE SER-MCNC: 7.13 MG/L (ref 3.3–19.4)
KAPPA LC FREE/LAMBDA FREE SER: 0.6 {RATIO} (ref 0.26–1.65)
LAMBDA LC FREE SERPL-MCNC: 11.95 MG/L (ref 5.71–26.3)
LYMPHOCYTES # BLD: 2.7 K/UL (ref 0.5–4.6)
LYMPHOCYTES NFR BLD: 48 % (ref 13–44)
MAGNESIUM SERPL-MCNC: 2.1 MG/DL (ref 1.8–2.4)
MCH RBC QN AUTO: 24 PG (ref 26.1–32.9)
MCHC RBC AUTO-ENTMCNC: 32.9 G/DL (ref 31.4–35)
MCV RBC AUTO: 72.9 FL (ref 79.6–97.8)
MONOCYTES # BLD: 0.5 K/UL (ref 0.1–1.3)
MONOCYTES NFR BLD: 8 % (ref 4–12)
NEUTS SEG # BLD: 2.4 K/UL (ref 1.7–8.2)
NEUTS SEG NFR BLD: 43 % (ref 43–78)
NRBC # BLD: 0 K/UL (ref 0–0.2)
PLATELET # BLD AUTO: 220 K/UL (ref 150–450)
PMV BLD AUTO: 9.2 FL (ref 9.4–12.3)
POTASSIUM SERPL-SCNC: 4.2 MMOL/L (ref 3.5–5.1)
PROT SERPL-MCNC: 6.8 G/DL (ref 6.3–8.2)
RBC # BLD AUTO: 4.62 M/UL (ref 4.23–5.67)
SODIUM SERPL-SCNC: 135 MMOL/L (ref 136–145)
WBC # BLD AUTO: 5.5 K/UL (ref 4.3–11.1)

## 2018-10-16 PROCEDURE — 82784 ASSAY IGA/IGD/IGG/IGM EACH: CPT

## 2018-10-16 PROCEDURE — 85025 COMPLETE CBC W/AUTO DIFF WBC: CPT

## 2018-10-16 PROCEDURE — 80053 COMPREHEN METABOLIC PANEL: CPT

## 2018-10-16 PROCEDURE — 96375 TX/PRO/DX INJ NEW DRUG ADDON: CPT

## 2018-10-16 PROCEDURE — 83883 ASSAY NEPHELOMETRY NOT SPEC: CPT

## 2018-10-16 PROCEDURE — 96415 CHEMO IV INFUSION ADDL HR: CPT

## 2018-10-16 PROCEDURE — 96413 CHEMO IV INFUSION 1 HR: CPT

## 2018-10-16 PROCEDURE — 83735 ASSAY OF MAGNESIUM: CPT

## 2018-10-16 PROCEDURE — 74011250637 HC RX REV CODE- 250/637: Performed by: INTERNAL MEDICINE

## 2018-10-16 PROCEDURE — 74011250636 HC RX REV CODE- 250/636: Performed by: INTERNAL MEDICINE

## 2018-10-16 PROCEDURE — 74011000258 HC RX REV CODE- 258: Performed by: INTERNAL MEDICINE

## 2018-10-16 PROCEDURE — 86334 IMMUNOFIX E-PHORESIS SERUM: CPT

## 2018-10-16 RX ORDER — SODIUM CHLORIDE 0.9 % (FLUSH) 0.9 %
10 SYRINGE (ML) INJECTION AS NEEDED
Status: ACTIVE | OUTPATIENT
Start: 2018-10-16 | End: 2018-10-16

## 2018-10-16 RX ORDER — DIPHENHYDRAMINE HYDROCHLORIDE 50 MG/ML
50 INJECTION, SOLUTION INTRAMUSCULAR; INTRAVENOUS ONCE
Status: DISCONTINUED | OUTPATIENT
Start: 2018-10-16 | End: 2018-10-16 | Stop reason: SDUPTHER

## 2018-10-16 RX ORDER — SODIUM CHLORIDE 9 MG/ML
500 INJECTION, SOLUTION INTRAVENOUS CONTINUOUS
Status: ACTIVE | OUTPATIENT
Start: 2018-10-16 | End: 2018-10-16

## 2018-10-16 RX ORDER — ACETAMINOPHEN 325 MG/1
650 TABLET ORAL ONCE
Status: COMPLETED | OUTPATIENT
Start: 2018-10-16 | End: 2018-10-16

## 2018-10-16 RX ORDER — HEPARIN 100 UNIT/ML
300-500 SYRINGE INTRAVENOUS AS NEEDED
Status: DISPENSED | OUTPATIENT
Start: 2018-10-16 | End: 2018-10-16

## 2018-10-16 RX ORDER — DIPHENHYDRAMINE HCL 50 MG
50 CAPSULE ORAL ONCE
Status: COMPLETED | OUTPATIENT
Start: 2018-10-16 | End: 2018-10-16

## 2018-10-16 RX ADMIN — ACETAMINOPHEN 650 MG: 325 TABLET, FILM COATED ORAL at 11:36

## 2018-10-16 RX ADMIN — DEXAMETHASONE SODIUM PHOSPHATE 20 MG: 10 INJECTION INTRAMUSCULAR; INTRAVENOUS at 11:45

## 2018-10-16 RX ADMIN — DARATUMUMAB 1200 MG: 100 INJECTION, SOLUTION, CONCENTRATE INTRAVENOUS at 13:00

## 2018-10-16 RX ADMIN — Medication 10 ML: at 16:21

## 2018-10-16 RX ADMIN — SODIUM CHLORIDE, PRESERVATIVE FREE 500 UNITS: 5 INJECTION INTRAVENOUS at 16:23

## 2018-10-16 RX ADMIN — SODIUM CHLORIDE 500 ML: 900 INJECTION, SOLUTION INTRAVENOUS at 11:30

## 2018-10-16 RX ADMIN — DIPHENHYDRAMINE HYDROCHLORIDE 50 MG: 50 CAPSULE ORAL at 11:36

## 2018-10-16 NOTE — PROGRESS NOTES
Right Port accessed per protocol with a 0.75 ray needle. Flushed with normal saline 10cc. Positive blood return. Labs drawn per order. Flushed with 10cc of normal saline and  
 
 
hep locked- no.  
Still accessed-yes Dressing applied- yes [facility]

## 2018-10-16 NOTE — PROGRESS NOTES
10/16/18:  Patient in for pre-chemo appointment with Dr. Sandra Deleon. Patient reports he switched back to torsemide from furosemide. He said the furosemide made him too weak and he was unable to make it to the bathroom and start a stream effectively. Patient to follow-up with cardiology soon per patient. Dr. Sandra Deleon reviewed labs and would like patient to continue with treatment today. Patient to return in 2 weeks for next daratumamab. He continues with ninlaro weekly x 3 weeks with 1 week off. Patient interested in getting hernia repaired as well.

## 2018-10-16 NOTE — PROGRESS NOTES
Arrived to the [facility]. Daratumumab completed. Patient tolerated well. Any issues or concerns during appointment: none voiced  Patient aware of next infusion appointment on 10/30/18 at 9:15am  Discharged ambulatory.

## 2018-10-17 LAB
ALBUMIN SERPL ELPH-MCNC: 3.9 G/DL (ref 3.2–5.6)
ALBUMIN/GLOB SERPL: 1.6 {RATIO}
ALPHA1 GLOB SERPL ELPH-MCNC: 0.24 G/DL (ref 0.1–0.4)
ALPHA2 GLOB SERPL ELPH-MCNC: 0.83 G/DL (ref 0.4–1.2)
B-GLOBULIN SERPL QL ELPH: 0.93 G/DL (ref 0.6–1.3)
GAMMA GLOB MFR SERPL ELPH: 0.49 G/DL (ref 0.5–1.6)
IGA SERPL-MCNC: 19 MG/DL (ref 85–499)
IGG SERPL-MCNC: 532 MG/DL (ref 610–1616)
IGM SERPL-MCNC: 22 MG/DL (ref 35–242)
M PROTEIN SERPL ELPH-MCNC: 0.09 G/DL
PROT PATTERN SERPL ELPH-IMP: ABNORMAL
PROT PATTERN SPEC IFE-IMP: ABNORMAL
PROT SERPL-MCNC: 6.4 G/DL (ref 6.3–8.2)

## 2018-10-30 ENCOUNTER — HOSPITAL ENCOUNTER (OUTPATIENT)
Dept: INFUSION THERAPY | Age: 78
Discharge: HOME | End: 2018-10-30
Payer: MEDICARE

## 2018-10-30 ENCOUNTER — HOSPITAL ENCOUNTER (OUTPATIENT)
Dept: LAB | Age: 78
Discharge: HOME | End: 2018-10-30
Payer: MEDICARE

## 2018-10-30 ENCOUNTER — PATIENT OUTREACH (OUTPATIENT)
Dept: CASE MANAGEMENT | Age: 78
End: 2018-10-30

## 2018-10-30 VITALS
DIASTOLIC BLOOD PRESSURE: 64 MMHG | HEART RATE: 70 BPM | SYSTOLIC BLOOD PRESSURE: 110 MMHG | OXYGEN SATURATION: 97 % | RESPIRATION RATE: 16 BRPM | TEMPERATURE: 97.8 F

## 2018-10-30 DIAGNOSIS — C90.02 MULTIPLE MYELOMA IN RELAPSE (HCC): Primary | ICD-10-CM

## 2018-10-30 DIAGNOSIS — C90.00 MULTIPLE MYELOMA, REMISSION STATUS UNSPECIFIED (HCC): ICD-10-CM

## 2018-10-30 LAB
ALBUMIN SERPL-MCNC: 3.7 G/DL (ref 3.2–4.6)
ALBUMIN/GLOB SERPL: 1.3 {RATIO} (ref 1.2–3.5)
ALP SERPL-CCNC: 85 U/L (ref 50–136)
ALT SERPL-CCNC: 25 U/L (ref 12–65)
ANION GAP SERPL CALC-SCNC: 8 MMOL/L (ref 7–16)
AST SERPL-CCNC: 26 U/L (ref 15–37)
BASOPHILS # BLD: 0 K/UL (ref 0–0.2)
BASOPHILS NFR BLD: 0 % (ref 0–2)
BILIRUB SERPL-MCNC: 0.5 MG/DL (ref 0.2–1.1)
BUN SERPL-MCNC: 21 MG/DL (ref 8–23)
CALCIUM SERPL-MCNC: 8.8 MG/DL (ref 8.3–10.4)
CHLORIDE SERPL-SCNC: 107 MMOL/L (ref 98–107)
CO2 SERPL-SCNC: 26 MMOL/L (ref 21–32)
CREAT SERPL-MCNC: 1.2 MG/DL (ref 0.8–1.5)
DIFFERENTIAL METHOD BLD: ABNORMAL
EOSINOPHIL # BLD: 0 K/UL (ref 0–0.8)
EOSINOPHIL NFR BLD: 1 % (ref 0.5–7.8)
ERYTHROCYTE [DISTWIDTH] IN BLOOD BY AUTOMATED COUNT: 15.2 % (ref 11.9–14.6)
GLOBULIN SER CALC-MCNC: 2.9 G/DL (ref 2.3–3.5)
GLUCOSE SERPL-MCNC: 118 MG/DL (ref 65–100)
HCT VFR BLD AUTO: 36 % (ref 41.1–50.3)
HGB BLD-MCNC: 11.7 G/DL (ref 13.6–17.2)
IMM GRANULOCYTES # BLD: 0 K/UL (ref 0–0.5)
IMM GRANULOCYTES NFR BLD AUTO: 0 % (ref 0–5)
LYMPHOCYTES # BLD: 2.4 K/UL (ref 0.5–4.6)
LYMPHOCYTES NFR BLD: 52 % (ref 13–44)
MAGNESIUM SERPL-MCNC: 2.1 MG/DL (ref 1.8–2.4)
MCH RBC QN AUTO: 23.9 PG (ref 26.1–32.9)
MCHC RBC AUTO-ENTMCNC: 32.5 G/DL (ref 31.4–35)
MCV RBC AUTO: 73.5 FL (ref 79.6–97.8)
MONOCYTES # BLD: 0.5 K/UL (ref 0.1–1.3)
MONOCYTES NFR BLD: 10 % (ref 4–12)
NEUTS SEG # BLD: 1.7 K/UL (ref 1.7–8.2)
NEUTS SEG NFR BLD: 37 % (ref 43–78)
NRBC # BLD: 0 K/UL (ref 0–0.2)
PLATELET # BLD AUTO: 150 K/UL (ref 150–450)
PMV BLD AUTO: 10.3 FL (ref 9.4–12.3)
POTASSIUM SERPL-SCNC: 4 MMOL/L (ref 3.5–5.1)
PROT SERPL-MCNC: 6.6 G/DL (ref 6.3–8.2)
RBC # BLD AUTO: 4.9 M/UL (ref 4.23–5.67)
SODIUM SERPL-SCNC: 141 MMOL/L (ref 136–145)
WBC # BLD AUTO: 4.6 K/UL (ref 4.3–11.1)

## 2018-10-30 PROCEDURE — 96413 CHEMO IV INFUSION 1 HR: CPT

## 2018-10-30 PROCEDURE — 85025 COMPLETE CBC W/AUTO DIFF WBC: CPT

## 2018-10-30 PROCEDURE — 74011000258 HC RX REV CODE- 258: Performed by: NURSE PRACTITIONER

## 2018-10-30 PROCEDURE — 96375 TX/PRO/DX INJ NEW DRUG ADDON: CPT

## 2018-10-30 PROCEDURE — 74011250636 HC RX REV CODE- 250/636: Performed by: NURSE PRACTITIONER

## 2018-10-30 PROCEDURE — 96415 CHEMO IV INFUSION ADDL HR: CPT

## 2018-10-30 PROCEDURE — 80053 COMPREHEN METABOLIC PANEL: CPT

## 2018-10-30 PROCEDURE — 74011250637 HC RX REV CODE- 250/637: Performed by: NURSE PRACTITIONER

## 2018-10-30 PROCEDURE — 83735 ASSAY OF MAGNESIUM: CPT

## 2018-10-30 RX ORDER — SODIUM CHLORIDE 9 MG/ML
500 INJECTION, SOLUTION INTRAVENOUS CONTINUOUS
Status: ACTIVE | OUTPATIENT
Start: 2018-10-30 | End: 2018-10-30

## 2018-10-30 RX ORDER — DIPHENHYDRAMINE HCL 50 MG
50 CAPSULE ORAL ONCE
Status: COMPLETED | OUTPATIENT
Start: 2018-10-30 | End: 2018-10-30

## 2018-10-30 RX ORDER — ACETAMINOPHEN 325 MG/1
650 TABLET ORAL ONCE
Status: COMPLETED | OUTPATIENT
Start: 2018-10-30 | End: 2018-10-30

## 2018-10-30 RX ORDER — SODIUM CHLORIDE 0.9 % (FLUSH) 0.9 %
10 SYRINGE (ML) INJECTION AS NEEDED
Status: ACTIVE | OUTPATIENT
Start: 2018-10-30 | End: 2018-10-30

## 2018-10-30 RX ORDER — HEPARIN 100 UNIT/ML
300-500 SYRINGE INTRAVENOUS AS NEEDED
Status: DISPENSED | OUTPATIENT
Start: 2018-10-30 | End: 2018-10-30

## 2018-10-30 RX ADMIN — DARATUMUMAB 1200 MG: 100 INJECTION, SOLUTION, CONCENTRATE INTRAVENOUS at 12:04

## 2018-10-30 RX ADMIN — ACETAMINOPHEN 650 MG: 325 TABLET, FILM COATED ORAL at 10:30

## 2018-10-30 RX ADMIN — HEPARIN 500 UNITS: 100 SYRINGE at 15:22

## 2018-10-30 RX ADMIN — DIPHENHYDRAMINE HYDROCHLORIDE 50 MG: 50 CAPSULE ORAL at 10:30

## 2018-10-30 RX ADMIN — Medication 10 ML: at 15:22

## 2018-10-30 RX ADMIN — DEXAMETHASONE SODIUM PHOSPHATE 20 MG: 4 INJECTION, SOLUTION INTRAMUSCULAR; INTRAVENOUS at 10:46

## 2018-10-30 RX ADMIN — SODIUM CHLORIDE 500 ML: 900 INJECTION, SOLUTION INTRAVENOUS at 10:56

## 2018-10-30 NOTE — PROGRESS NOTES
Pt was seen and labs reviewed by Cony Hatfield NP. Pt says he is doing ok besides feeling fatigued most of the time. He has had an increase in pain to his lower back and accross his chest (rib area) that has worsened since his fentanyl patch was decreased to 100 mcg. Shsahi Kern NP in visit and increased patch back to 125 mcg. Pt has a scheduled appt with Dr. Shamar Johnson tomorrow and he states he will discuss the pain across his chest to ensure it is nothing cardiac related. Pt will receive Daratumumab today in infusion, and he will return to clinic in 2 weeks to see Dr. Froilan Hollis prior to next infusion. New script for Ninlaro given to [facility] mayo DELANEY to resume current dose.

## 2018-10-30 NOTE — PROGRESS NOTES
Arrived to the [facility]. Daratumumab completed. Patient tolerated well  Any issues or concerns during appointment: no.  Patient aware of next infusion appointment on 11/13 11:15  Discharged ambulatory.

## 2018-11-13 ENCOUNTER — PATIENT OUTREACH (OUTPATIENT)
Dept: CASE MANAGEMENT | Age: 78
End: 2018-11-13

## 2018-11-13 ENCOUNTER — HOSPITAL ENCOUNTER (OUTPATIENT)
Dept: LAB | Age: 78
Discharge: HOME | End: 2018-11-13
Payer: MEDICARE

## 2018-11-13 ENCOUNTER — HOSPITAL ENCOUNTER (OUTPATIENT)
Dept: INFUSION THERAPY | Age: 78
Discharge: HOME | End: 2018-11-13
Payer: MEDICARE

## 2018-11-13 VITALS
DIASTOLIC BLOOD PRESSURE: 68 MMHG | SYSTOLIC BLOOD PRESSURE: 112 MMHG | TEMPERATURE: 97.7 F | OXYGEN SATURATION: 95 % | HEART RATE: 70 BPM | RESPIRATION RATE: 18 BRPM

## 2018-11-13 DIAGNOSIS — C90.00 MULTIPLE MYELOMA NOT HAVING ACHIEVED REMISSION (HCC): Primary | ICD-10-CM

## 2018-11-13 DIAGNOSIS — C90.02 MULTIPLE MYELOMA IN RELAPSE (HCC): ICD-10-CM

## 2018-11-13 DIAGNOSIS — C90.00 MULTIPLE MYELOMA, REMISSION STATUS UNSPECIFIED (HCC): ICD-10-CM

## 2018-11-13 DIAGNOSIS — C90.00 MULTIPLE MYELOMA, REMISSION STATUS UNSPECIFIED (HCC): Primary | ICD-10-CM

## 2018-11-13 LAB
ALBUMIN SERPL-MCNC: 3.8 G/DL (ref 3.2–4.6)
ALBUMIN/GLOB SERPL: 1.4 {RATIO} (ref 1.2–3.5)
ALP SERPL-CCNC: 82 U/L (ref 50–136)
ALT SERPL-CCNC: 20 U/L (ref 12–65)
ANION GAP SERPL CALC-SCNC: 5 MMOL/L (ref 7–16)
AST SERPL-CCNC: 23 U/L (ref 15–37)
BASOPHILS # BLD: 0 K/UL (ref 0–0.2)
BASOPHILS NFR BLD: 0 % (ref 0–2)
BILIRUB SERPL-MCNC: 0.4 MG/DL (ref 0.2–1.1)
BUN SERPL-MCNC: 27 MG/DL (ref 8–23)
CALCIUM SERPL-MCNC: 8.9 MG/DL (ref 8.3–10.4)
CHLORIDE SERPL-SCNC: 104 MMOL/L (ref 98–107)
CO2 SERPL-SCNC: 28 MMOL/L (ref 21–32)
CREAT SERPL-MCNC: 1.34 MG/DL (ref 0.8–1.5)
DIFFERENTIAL METHOD BLD: ABNORMAL
EOSINOPHIL # BLD: 0 K/UL (ref 0–0.8)
EOSINOPHIL NFR BLD: 1 % (ref 0.5–7.8)
ERYTHROCYTE [DISTWIDTH] IN BLOOD BY AUTOMATED COUNT: 14.6 % (ref 11.9–14.6)
GLOBULIN SER CALC-MCNC: 2.8 G/DL (ref 2.3–3.5)
GLUCOSE SERPL-MCNC: 191 MG/DL (ref 65–100)
HCT VFR BLD AUTO: 37.8 % (ref 41.1–50.3)
HGB BLD-MCNC: 12.4 G/DL (ref 13.6–17.2)
IMM GRANULOCYTES # BLD: 0 K/UL (ref 0–0.5)
IMM GRANULOCYTES NFR BLD AUTO: 0 % (ref 0–5)
KAPPA LC FREE SER-MCNC: 5.72 MG/L (ref 3.3–19.4)
KAPPA LC FREE/LAMBDA FREE SER: 0.63 {RATIO} (ref 0.26–1.65)
LAMBDA LC FREE SERPL-MCNC: 9.14 MG/L (ref 5.71–26.3)
LYMPHOCYTES # BLD: 2.5 K/UL (ref 0.5–4.6)
LYMPHOCYTES NFR BLD: 50 % (ref 13–44)
MAGNESIUM SERPL-MCNC: 2 MG/DL (ref 1.8–2.4)
MCH RBC QN AUTO: 24 PG (ref 26.1–32.9)
MCHC RBC AUTO-ENTMCNC: 32.8 G/DL (ref 31.4–35)
MCV RBC AUTO: 73.1 FL (ref 79.6–97.8)
MONOCYTES # BLD: 0.5 K/UL (ref 0.1–1.3)
MONOCYTES NFR BLD: 9 % (ref 4–12)
NEUTS SEG # BLD: 2 K/UL (ref 1.7–8.2)
NEUTS SEG NFR BLD: 40 % (ref 43–78)
NRBC # BLD: 0 K/UL (ref 0–0.2)
PLATELET # BLD AUTO: 161 K/UL (ref 150–450)
PMV BLD AUTO: 10.2 FL (ref 9.4–12.3)
POTASSIUM SERPL-SCNC: 3.8 MMOL/L (ref 3.5–5.1)
PROT SERPL-MCNC: 6.6 G/DL (ref 6.3–8.2)
RBC # BLD AUTO: 5.17 M/UL (ref 4.23–5.67)
SODIUM SERPL-SCNC: 137 MMOL/L (ref 136–145)
WBC # BLD AUTO: 5 K/UL (ref 4.3–11.1)

## 2018-11-13 PROCEDURE — 96415 CHEMO IV INFUSION ADDL HR: CPT

## 2018-11-13 PROCEDURE — 85025 COMPLETE CBC W/AUTO DIFF WBC: CPT

## 2018-11-13 PROCEDURE — 86334 IMMUNOFIX E-PHORESIS SERUM: CPT

## 2018-11-13 PROCEDURE — 74011000258 HC RX REV CODE- 258: Performed by: INTERNAL MEDICINE

## 2018-11-13 PROCEDURE — 74011250636 HC RX REV CODE- 250/636: Performed by: INTERNAL MEDICINE

## 2018-11-13 PROCEDURE — 74011250637 HC RX REV CODE- 250/637: Performed by: INTERNAL MEDICINE

## 2018-11-13 PROCEDURE — 80053 COMPREHEN METABOLIC PANEL: CPT

## 2018-11-13 PROCEDURE — 96413 CHEMO IV INFUSION 1 HR: CPT

## 2018-11-13 PROCEDURE — 82784 ASSAY IGA/IGD/IGG/IGM EACH: CPT

## 2018-11-13 PROCEDURE — 96375 TX/PRO/DX INJ NEW DRUG ADDON: CPT

## 2018-11-13 PROCEDURE — 83883 ASSAY NEPHELOMETRY NOT SPEC: CPT

## 2018-11-13 PROCEDURE — 83735 ASSAY OF MAGNESIUM: CPT

## 2018-11-13 RX ORDER — HEPARIN 100 UNIT/ML
300-500 SYRINGE INTRAVENOUS AS NEEDED
Status: DISPENSED | OUTPATIENT
Start: 2018-11-13 | End: 2018-11-13

## 2018-11-13 RX ORDER — SODIUM CHLORIDE 0.9 % (FLUSH) 0.9 %
10 SYRINGE (ML) INJECTION AS NEEDED
Status: ACTIVE | OUTPATIENT
Start: 2018-11-13 | End: 2018-11-13

## 2018-11-13 RX ORDER — DIPHENHYDRAMINE HCL 50 MG
50 CAPSULE ORAL ONCE
Status: COMPLETED | OUTPATIENT
Start: 2018-11-13 | End: 2018-11-13

## 2018-11-13 RX ORDER — SODIUM CHLORIDE 9 MG/ML
500 INJECTION, SOLUTION INTRAVENOUS CONTINUOUS
Status: ACTIVE | OUTPATIENT
Start: 2018-11-13 | End: 2018-11-13

## 2018-11-13 RX ORDER — ACETAMINOPHEN 325 MG/1
650 TABLET ORAL ONCE
Status: COMPLETED | OUTPATIENT
Start: 2018-11-13 | End: 2018-11-13

## 2018-11-13 RX ADMIN — Medication 10 ML: at 16:20

## 2018-11-13 RX ADMIN — SODIUM CHLORIDE 500 ML: 900 INJECTION, SOLUTION INTRAVENOUS at 12:08

## 2018-11-13 RX ADMIN — ACETAMINOPHEN 650 MG: 325 TABLET, FILM COATED ORAL at 11:43

## 2018-11-13 RX ADMIN — DARATUMUMAB 1200 MG: 100 INJECTION, SOLUTION, CONCENTRATE INTRAVENOUS at 12:59

## 2018-11-13 RX ADMIN — Medication 500 UNITS: at 16:20

## 2018-11-13 RX ADMIN — DEXAMETHASONE SODIUM PHOSPHATE 20 MG: 10 INJECTION INTRAMUSCULAR; INTRAVENOUS at 11:51

## 2018-11-13 RX ADMIN — DIPHENHYDRAMINE HYDROCHLORIDE 50 MG: 50 CAPSULE ORAL at 11:43

## 2018-11-13 NOTE — PROGRESS NOTES
11/13/18:  Patient in for pre-daratumamab visit with Dr. Luis E Tan. Patient's biggest concern is fatigue and enlarging hernia. Patient met with Dr. Bebo Hernandez yesterday to discuss hernia repair. Patient to also follow-up with Dr. Nicolas Woodruff to continue to discuss thyroid treatment. Patient started next cycle of ninlaro today. Dr. Luis E Tan reviewed labs and patient to follow-up in 2 weeks with NP.  CEA to be done with next labs.

## 2018-11-13 NOTE — PROGRESS NOTES
Arrived to the [facility]. Daratumumab completed.  Patient tolerated without problems  Any issues or concerns during appointment: confirmed with Abiel Cunha that patient does not need Acyclovir at home  Patient aware of next infusion appointment on 11/27/18 at 1115  Discharged ambulatory

## 2018-11-14 LAB
ALBUMIN SERPL ELPH-MCNC: 3.89 G/DL (ref 3.2–5.6)
ALBUMIN/GLOB SERPL: 1.7 {RATIO}
ALPHA1 GLOB SERPL ELPH-MCNC: 0.19 G/DL (ref 0.1–0.4)
ALPHA2 GLOB SERPL ELPH-MCNC: 0.64 G/DL (ref 0.4–1.2)
B-GLOBULIN SERPL QL ELPH: 0.94 G/DL (ref 0.6–1.3)
GAMMA GLOB MFR SERPL ELPH: 0.54 G/DL (ref 0.5–1.6)
IGA SERPL-MCNC: 16 MG/DL (ref 85–499)
IGG SERPL-MCNC: 487 MG/DL (ref 610–1616)
IGM SERPL-MCNC: 29 MG/DL (ref 35–242)
M PROTEIN SERPL ELPH-MCNC: 0.07 G/DL
PROT PATTERN SERPL ELPH-IMP: ABNORMAL
PROT PATTERN SPEC IFE-IMP: ABNORMAL
PROT SERPL-MCNC: 6.2 G/DL (ref 6.3–8.2)

## 2018-11-17 PROBLEM — K43.2 INCISIONAL HERNIA, WITHOUT OBSTRUCTION OR GANGRENE: Status: ACTIVE | Noted: 2018-11-17

## 2018-11-27 ENCOUNTER — HOSPITAL ENCOUNTER (OUTPATIENT)
Dept: LAB | Age: 78
Discharge: HOME | End: 2018-11-27
Payer: MEDICARE

## 2018-11-27 ENCOUNTER — HOSPITAL ENCOUNTER (OUTPATIENT)
Dept: INFUSION THERAPY | Age: 78
Discharge: HOME | End: 2018-11-27
Payer: MEDICARE

## 2018-11-27 ENCOUNTER — PATIENT OUTREACH (OUTPATIENT)
Dept: CASE MANAGEMENT | Age: 78
End: 2018-11-27

## 2018-11-27 VITALS — HEART RATE: 74 BPM | SYSTOLIC BLOOD PRESSURE: 109 MMHG | DIASTOLIC BLOOD PRESSURE: 62 MMHG | RESPIRATION RATE: 18 BRPM

## 2018-11-27 DIAGNOSIS — C90.00 MULTIPLE MYELOMA, REMISSION STATUS UNSPECIFIED (HCC): ICD-10-CM

## 2018-11-27 DIAGNOSIS — C90.02 MULTIPLE MYELOMA IN RELAPSE (HCC): Primary | ICD-10-CM

## 2018-11-27 LAB
ALBUMIN SERPL-MCNC: 3.8 G/DL (ref 3.2–4.6)
ALBUMIN/GLOB SERPL: 1.4 {RATIO} (ref 1.2–3.5)
ALP SERPL-CCNC: 80 U/L (ref 50–136)
ALT SERPL-CCNC: 21 U/L (ref 12–65)
ANION GAP SERPL CALC-SCNC: 5 MMOL/L (ref 7–16)
AST SERPL-CCNC: 26 U/L (ref 15–37)
BASOPHILS # BLD: 0 K/UL (ref 0–0.2)
BASOPHILS NFR BLD: 0 % (ref 0–2)
BILIRUB SERPL-MCNC: 0.4 MG/DL (ref 0.2–1.1)
BUN SERPL-MCNC: 19 MG/DL (ref 8–23)
CALCIUM SERPL-MCNC: 9.1 MG/DL (ref 8.3–10.4)
CEA SERPL-MCNC: 3.8 NG/ML (ref 0–3)
CHLORIDE SERPL-SCNC: 104 MMOL/L (ref 98–107)
CO2 SERPL-SCNC: 26 MMOL/L (ref 21–32)
CREAT SERPL-MCNC: 1.15 MG/DL (ref 0.8–1.5)
DIFFERENTIAL METHOD BLD: ABNORMAL
EOSINOPHIL # BLD: 0 K/UL (ref 0–0.8)
EOSINOPHIL NFR BLD: 1 % (ref 0.5–7.8)
ERYTHROCYTE [DISTWIDTH] IN BLOOD BY AUTOMATED COUNT: 14.4 % (ref 11.9–14.6)
GLOBULIN SER CALC-MCNC: 2.8 G/DL (ref 2.3–3.5)
GLUCOSE SERPL-MCNC: 111 MG/DL (ref 65–100)
HCT VFR BLD AUTO: 36.4 % (ref 41.1–50.3)
HGB BLD-MCNC: 12 G/DL (ref 13.6–17.2)
IMM GRANULOCYTES # BLD: 0 K/UL (ref 0–0.5)
IMM GRANULOCYTES NFR BLD AUTO: 1 % (ref 0–5)
LYMPHOCYTES # BLD: 1.2 K/UL (ref 0.5–4.6)
LYMPHOCYTES NFR BLD: 29 % (ref 13–44)
MAGNESIUM SERPL-MCNC: 2.4 MG/DL (ref 1.8–2.4)
MCH RBC QN AUTO: 23.8 PG (ref 26.1–32.9)
MCHC RBC AUTO-ENTMCNC: 33 G/DL (ref 31.4–35)
MCV RBC AUTO: 72.2 FL (ref 79.6–97.8)
MONOCYTES # BLD: 0.3 K/UL (ref 0.1–1.3)
MONOCYTES NFR BLD: 6 % (ref 4–12)
NEUTS SEG # BLD: 2.7 K/UL (ref 1.7–8.2)
NEUTS SEG NFR BLD: 64 % (ref 43–78)
NRBC # BLD: 0.01 K/UL (ref 0–0.2)
PLATELET # BLD AUTO: 131 K/UL (ref 150–450)
PMV BLD AUTO: 9.6 FL (ref 9.4–12.3)
POTASSIUM SERPL-SCNC: 4.3 MMOL/L (ref 3.5–5.1)
PROT SERPL-MCNC: 6.6 G/DL (ref 6.3–8.2)
RBC # BLD AUTO: 5.04 M/UL (ref 4.23–5.67)
SODIUM SERPL-SCNC: 135 MMOL/L (ref 136–145)
WBC # BLD AUTO: 4.3 K/UL (ref 4.3–11.1)

## 2018-11-27 PROCEDURE — 85025 COMPLETE CBC W/AUTO DIFF WBC: CPT

## 2018-11-27 PROCEDURE — 96415 CHEMO IV INFUSION ADDL HR: CPT

## 2018-11-27 PROCEDURE — 96375 TX/PRO/DX INJ NEW DRUG ADDON: CPT

## 2018-11-27 PROCEDURE — 74011000258 HC RX REV CODE- 258: Performed by: INTERNAL MEDICINE

## 2018-11-27 PROCEDURE — 96413 CHEMO IV INFUSION 1 HR: CPT

## 2018-11-27 PROCEDURE — 82378 CARCINOEMBRYONIC ANTIGEN: CPT

## 2018-11-27 PROCEDURE — 74011250637 HC RX REV CODE- 250/637: Performed by: INTERNAL MEDICINE

## 2018-11-27 PROCEDURE — 74011250636 HC RX REV CODE- 250/636: Performed by: INTERNAL MEDICINE

## 2018-11-27 PROCEDURE — 83735 ASSAY OF MAGNESIUM: CPT

## 2018-11-27 PROCEDURE — 80053 COMPREHEN METABOLIC PANEL: CPT

## 2018-11-27 RX ORDER — DIPHENHYDRAMINE HYDROCHLORIDE 50 MG/ML
50 INJECTION, SOLUTION INTRAMUSCULAR; INTRAVENOUS ONCE
Status: DISCONTINUED | OUTPATIENT
Start: 2018-11-27 | End: 2018-11-27

## 2018-11-27 RX ORDER — DIPHENHYDRAMINE HCL 50 MG
50 CAPSULE ORAL ONCE
Status: COMPLETED | OUTPATIENT
Start: 2018-11-27 | End: 2018-11-27

## 2018-11-27 RX ORDER — SODIUM CHLORIDE 0.9 % (FLUSH) 0.9 %
10 SYRINGE (ML) INJECTION AS NEEDED
Status: ACTIVE | OUTPATIENT
Start: 2018-11-27 | End: 2018-11-28

## 2018-11-27 RX ORDER — ACETAMINOPHEN 325 MG/1
650 TABLET ORAL ONCE
Status: COMPLETED | OUTPATIENT
Start: 2018-11-27 | End: 2018-11-27

## 2018-11-27 RX ORDER — SODIUM CHLORIDE 9 MG/ML
500 INJECTION, SOLUTION INTRAVENOUS CONTINUOUS
Status: ACTIVE | OUTPATIENT
Start: 2018-11-27 | End: 2018-11-28

## 2018-11-27 RX ORDER — HEPARIN 100 UNIT/ML
300-500 SYRINGE INTRAVENOUS AS NEEDED
Status: DISPENSED | OUTPATIENT
Start: 2018-11-27 | End: 2018-11-28

## 2018-11-27 RX ADMIN — SODIUM CHLORIDE 500 ML: 900 INJECTION, SOLUTION INTRAVENOUS at 12:52

## 2018-11-27 RX ADMIN — Medication 10 ML: at 17:11

## 2018-11-27 RX ADMIN — HEPARIN 500 UNITS: 100 SYRINGE at 17:11

## 2018-11-27 RX ADMIN — DIPHENHYDRAMINE HYDROCHLORIDE 50 MG: 50 CAPSULE ORAL at 12:26

## 2018-11-27 RX ADMIN — DEXAMETHASONE SODIUM PHOSPHATE 20 MG: 10 INJECTION INTRAMUSCULAR; INTRAVENOUS at 12:40

## 2018-11-27 RX ADMIN — SODIUM CHLORIDE 3.5 MG: 900 INJECTION, SOLUTION INTRAVENOUS at 16:50

## 2018-11-27 RX ADMIN — DARATUMUMAB 1200 MG: 100 INJECTION, SOLUTION, CONCENTRATE INTRAVENOUS at 13:33

## 2018-11-27 RX ADMIN — ACETAMINOPHEN 650 MG: 325 TABLET, FILM COATED ORAL at 12:26

## 2018-11-27 NOTE — PROGRESS NOTES
11/27/18:  Cycle #5 Day 15 daratumamab visit. Patient reports fatigue as biggest complaint. He is continuing to do well with increase in duragesic patch to 125mcg. Patient completed this cycle of ninlara today and will be off for one week. Patient to see Dr. Magali Aldana for thyroid follow-up on 12/7. Dr. Tamra Woods to see the patient in 2 weeks prior to next cycle.

## 2018-11-27 NOTE — PROGRESS NOTES
Pt. Discharged ambulatory. Tolerated infusion well. No distress noted. To call physician with any problems or concerns. Understanding voiced. To return to Infusions on 12/11/18.

## 2018-11-30 ENCOUNTER — HOSPITAL ENCOUNTER (OUTPATIENT)
Dept: LAB | Age: 78
Discharge: HOME | End: 2018-11-30
Payer: MEDICARE

## 2018-11-30 LAB — TSH W FREE THYROID I,TSHELE: 2.12 UIU/ML (ref 0.36–3.74)

## 2018-11-30 PROCEDURE — 84443 ASSAY THYROID STIM HORMONE: CPT

## 2018-11-30 PROCEDURE — 86800 THYROGLOBULIN ANTIBODY: CPT

## 2018-11-30 PROCEDURE — 36415 COLL VENOUS BLD VENIPUNCTURE: CPT

## 2018-11-30 PROCEDURE — 84432 ASSAY OF THYROGLOBULIN: CPT

## 2018-12-02 LAB
THYROGLOB AB SERPL-ACNC: <1 IU/ML (ref 0–0.9)
THYROGLOBULIN, SERUM, 006694: 0.6 NG/ML (ref 1.4–29.2)

## 2018-12-11 ENCOUNTER — HOSPITAL ENCOUNTER (OUTPATIENT)
Dept: INFUSION THERAPY | Age: 78
End: 2018-12-11
Payer: MEDICARE

## 2018-12-12 ENCOUNTER — PATIENT OUTREACH (OUTPATIENT)
Dept: CASE MANAGEMENT | Age: 78
End: 2018-12-12

## 2018-12-12 ENCOUNTER — HOSPITAL ENCOUNTER (OUTPATIENT)
Dept: LAB | Age: 78
Discharge: HOME | End: 2018-12-12
Payer: MEDICARE

## 2018-12-12 ENCOUNTER — HOSPITAL ENCOUNTER (OUTPATIENT)
Dept: INFUSION THERAPY | Age: 78
Discharge: HOME | End: 2018-12-12
Payer: MEDICARE

## 2018-12-12 VITALS — SYSTOLIC BLOOD PRESSURE: 126 MMHG | RESPIRATION RATE: 18 BRPM | DIASTOLIC BLOOD PRESSURE: 87 MMHG | HEART RATE: 83 BPM

## 2018-12-12 DIAGNOSIS — C90.02 MULTIPLE MYELOMA IN RELAPSE (HCC): Primary | ICD-10-CM

## 2018-12-12 DIAGNOSIS — C90.00 MULTIPLE MYELOMA, REMISSION STATUS UNSPECIFIED (HCC): ICD-10-CM

## 2018-12-12 DIAGNOSIS — C90.00 MULTIPLE MYELOMA, REMISSION STATUS UNSPECIFIED (HCC): Primary | ICD-10-CM

## 2018-12-12 LAB
ALBUMIN SERPL-MCNC: 3.8 G/DL (ref 3.2–4.6)
ALBUMIN/GLOB SERPL: 1.4 {RATIO} (ref 1.2–3.5)
ALP SERPL-CCNC: 73 U/L (ref 50–136)
ALT SERPL-CCNC: 20 U/L (ref 12–65)
ANION GAP SERPL CALC-SCNC: 5 MMOL/L (ref 7–16)
AST SERPL-CCNC: 23 U/L (ref 15–37)
BASOPHILS # BLD: 0 K/UL (ref 0–0.2)
BASOPHILS NFR BLD: 0 % (ref 0–2)
BILIRUB SERPL-MCNC: 0.5 MG/DL (ref 0.2–1.1)
BUN SERPL-MCNC: 19 MG/DL (ref 8–23)
CALCIUM SERPL-MCNC: 8.8 MG/DL (ref 8.3–10.4)
CHLORIDE SERPL-SCNC: 106 MMOL/L (ref 98–107)
CO2 SERPL-SCNC: 27 MMOL/L (ref 21–32)
CREAT SERPL-MCNC: 1.2 MG/DL (ref 0.8–1.5)
DIFFERENTIAL METHOD BLD: ABNORMAL
EOSINOPHIL # BLD: 0 K/UL (ref 0–0.8)
EOSINOPHIL NFR BLD: 1 % (ref 0.5–7.8)
ERYTHROCYTE [DISTWIDTH] IN BLOOD BY AUTOMATED COUNT: 13.7 % (ref 11.9–14.6)
GLOBULIN SER CALC-MCNC: 2.8 G/DL (ref 2.3–3.5)
GLUCOSE SERPL-MCNC: 111 MG/DL (ref 65–100)
HCT VFR BLD AUTO: 36.4 % (ref 41.1–50.3)
HGB BLD-MCNC: 12.1 G/DL (ref 13.6–17.2)
IMM GRANULOCYTES # BLD: 0 K/UL (ref 0–0.5)
IMM GRANULOCYTES NFR BLD AUTO: 0 % (ref 0–5)
KAPPA LC FREE SER-MCNC: 5.55 MG/L (ref 3.3–19.4)
KAPPA LC FREE/LAMBDA FREE SER: 0.79 {RATIO} (ref 0.26–1.65)
LAMBDA LC FREE SERPL-MCNC: 7.06 MG/L (ref 5.71–26.3)
LYMPHOCYTES # BLD: 1.7 K/UL (ref 0.5–4.6)
LYMPHOCYTES NFR BLD: 37 % (ref 13–44)
MAGNESIUM SERPL-MCNC: 2.3 MG/DL (ref 1.8–2.4)
MCH RBC QN AUTO: 23.8 PG (ref 26.1–32.9)
MCHC RBC AUTO-ENTMCNC: 33.2 G/DL (ref 31.4–35)
MCV RBC AUTO: 71.5 FL (ref 79.6–97.8)
MONOCYTES # BLD: 0.3 K/UL (ref 0.1–1.3)
MONOCYTES NFR BLD: 6 % (ref 4–12)
NEUTS SEG # BLD: 2.6 K/UL (ref 1.7–8.2)
NEUTS SEG NFR BLD: 56 % (ref 43–78)
NRBC # BLD: 0 K/UL (ref 0–0.2)
PLATELET # BLD AUTO: 185 K/UL (ref 150–450)
PMV BLD AUTO: 9.7 FL (ref 9.4–12.3)
POTASSIUM SERPL-SCNC: 4 MMOL/L (ref 3.5–5.1)
PROT SERPL-MCNC: 6.6 G/DL (ref 6.3–8.2)
RBC # BLD AUTO: 5.09 M/UL (ref 4.23–5.67)
SODIUM SERPL-SCNC: 138 MMOL/L (ref 136–145)
WBC # BLD AUTO: 4.7 K/UL (ref 4.3–11.1)

## 2018-12-12 PROCEDURE — 82784 ASSAY IGA/IGD/IGG/IGM EACH: CPT

## 2018-12-12 PROCEDURE — 80053 COMPREHEN METABOLIC PANEL: CPT

## 2018-12-12 PROCEDURE — 83883 ASSAY NEPHELOMETRY NOT SPEC: CPT

## 2018-12-12 PROCEDURE — 74011250637 HC RX REV CODE- 250/637: Performed by: NURSE PRACTITIONER

## 2018-12-12 PROCEDURE — 96413 CHEMO IV INFUSION 1 HR: CPT

## 2018-12-12 PROCEDURE — 74011250636 HC RX REV CODE- 250/636: Performed by: NURSE PRACTITIONER

## 2018-12-12 PROCEDURE — 74011000258 HC RX REV CODE- 258: Performed by: NURSE PRACTITIONER

## 2018-12-12 PROCEDURE — 96415 CHEMO IV INFUSION ADDL HR: CPT

## 2018-12-12 PROCEDURE — 83735 ASSAY OF MAGNESIUM: CPT

## 2018-12-12 PROCEDURE — 96375 TX/PRO/DX INJ NEW DRUG ADDON: CPT

## 2018-12-12 PROCEDURE — 86334 IMMUNOFIX E-PHORESIS SERUM: CPT

## 2018-12-12 PROCEDURE — 85025 COMPLETE CBC W/AUTO DIFF WBC: CPT

## 2018-12-12 RX ORDER — HEPARIN 100 UNIT/ML
300-500 SYRINGE INTRAVENOUS AS NEEDED
Status: DISPENSED | OUTPATIENT
Start: 2018-12-12 | End: 2018-12-12

## 2018-12-12 RX ORDER — SODIUM CHLORIDE 9 MG/ML
10 INJECTION INTRAMUSCULAR; INTRAVENOUS; SUBCUTANEOUS AS NEEDED
Status: ACTIVE | OUTPATIENT
Start: 2018-12-12 | End: 2018-12-12

## 2018-12-12 RX ORDER — SODIUM CHLORIDE 9 MG/ML
500 INJECTION, SOLUTION INTRAVENOUS CONTINUOUS
Status: ACTIVE | OUTPATIENT
Start: 2018-12-12 | End: 2018-12-12

## 2018-12-12 RX ORDER — ACETAMINOPHEN 325 MG/1
650 TABLET ORAL ONCE
Status: COMPLETED | OUTPATIENT
Start: 2018-12-12 | End: 2018-12-12

## 2018-12-12 RX ORDER — DIPHENHYDRAMINE HCL 50 MG
50 CAPSULE ORAL ONCE
Status: COMPLETED | OUTPATIENT
Start: 2018-12-12 | End: 2018-12-12

## 2018-12-12 RX ADMIN — SODIUM CHLORIDE 10 ML: 9 INJECTION INTRAMUSCULAR; INTRAVENOUS; SUBCUTANEOUS at 10:20

## 2018-12-12 RX ADMIN — Medication 500 UNITS: at 15:10

## 2018-12-12 RX ADMIN — SODIUM CHLORIDE 500 ML: 900 INJECTION, SOLUTION INTRAVENOUS at 11:00

## 2018-12-12 RX ADMIN — DIPHENHYDRAMINE HYDROCHLORIDE 50 MG: 50 CAPSULE ORAL at 10:35

## 2018-12-12 RX ADMIN — ACETAMINOPHEN 650 MG: 325 TABLET, FILM COATED ORAL at 10:35

## 2018-12-12 RX ADMIN — DEXAMETHASONE SODIUM PHOSPHATE 20 MG: 10 INJECTION INTRAMUSCULAR; INTRAVENOUS at 10:37

## 2018-12-12 RX ADMIN — SODIUM CHLORIDE 10 ML: 9 INJECTION INTRAMUSCULAR; INTRAVENOUS; SUBCUTANEOUS at 15:10

## 2018-12-12 RX ADMIN — DARATUMUMAB 1200 MG: 100 INJECTION, SOLUTION, CONCENTRATE INTRAVENOUS at 11:55

## 2018-12-12 NOTE — PROGRESS NOTES
12/12/18:  Patient in for follow-up and pre-daratumamab appt. Patient doing well but experiencing some nausea. Patient instructed on use of zofran and prescription sent to [facility]. Patient started ninlaro today as planned. Patient has 2 additional weekly doses left for this cycle. Patient to return in 2 weeks for next daratumamab. Myeloma labs pending.

## 2018-12-12 NOTE — PROGRESS NOTES
Arrived to infusion after [facility] visit  No concerns today  daratumumab infused,tolerated well  Next appt 12/27

## 2018-12-13 LAB
ALBUMIN SERPL ELPH-MCNC: 3.98 G/DL (ref 3.2–5.6)
ALBUMIN/GLOB SERPL: 1.7 {RATIO}
ALPHA1 GLOB SERPL ELPH-MCNC: 0.19 G/DL (ref 0.1–0.4)
ALPHA2 GLOB SERPL ELPH-MCNC: 0.72 G/DL (ref 0.4–1.2)
B-GLOBULIN SERPL QL ELPH: 0.91 G/DL (ref 0.6–1.3)
GAMMA GLOB MFR SERPL ELPH: 0.5 G/DL (ref 0.5–1.6)
IGA SERPL-MCNC: 10 MG/DL (ref 85–499)
IGG SERPL-MCNC: 485 MG/DL (ref 610–1616)
IGM SERPL-MCNC: 19 MG/DL (ref 35–242)
M PROTEIN SERPL ELPH-MCNC: 0.09 G/DL
PROT PATTERN SERPL ELPH-IMP: ABNORMAL
PROT PATTERN SPEC IFE-IMP: ABNORMAL
PROT SERPL-MCNC: 6.3 G/DL (ref 6.3–8.2)

## 2018-12-17 RX ORDER — SODIUM CHLORIDE 0.9 % (FLUSH) 0.9 %
10 SYRINGE (ML) INJECTION AS NEEDED
Status: CANCELLED
Start: 2019-01-09

## 2018-12-17 RX ORDER — EPINEPHRINE 1 MG/ML
0.3 INJECTION, SOLUTION, CONCENTRATE INTRAVENOUS AS NEEDED
Status: CANCELLED | OUTPATIENT
Start: 2019-01-09

## 2018-12-17 RX ORDER — ONDANSETRON 2 MG/ML
8 INJECTION INTRAMUSCULAR; INTRAVENOUS AS NEEDED
Status: CANCELLED | OUTPATIENT
Start: 2019-01-09

## 2018-12-17 RX ORDER — HYDROCORTISONE SODIUM SUCCINATE 100 MG/2ML
100 INJECTION, POWDER, FOR SOLUTION INTRAMUSCULAR; INTRAVENOUS AS NEEDED
Status: CANCELLED | OUTPATIENT
Start: 2019-01-09

## 2018-12-17 RX ORDER — HEPARIN 100 UNIT/ML
300-500 SYRINGE INTRAVENOUS AS NEEDED
Status: CANCELLED
Start: 2019-01-09

## 2018-12-17 RX ORDER — ALBUTEROL SULFATE 0.83 MG/ML
2.5 SOLUTION RESPIRATORY (INHALATION) AS NEEDED
Status: CANCELLED
Start: 2019-01-09

## 2018-12-17 RX ORDER — DIPHENHYDRAMINE HYDROCHLORIDE 50 MG/ML
50 INJECTION, SOLUTION INTRAMUSCULAR; INTRAVENOUS AS NEEDED
Status: CANCELLED
Start: 2019-01-09

## 2018-12-17 RX ORDER — ACETAMINOPHEN 325 MG/1
650 TABLET ORAL AS NEEDED
Status: CANCELLED
Start: 2019-01-09

## 2018-12-26 ENCOUNTER — APPOINTMENT (OUTPATIENT)
Dept: INFUSION THERAPY | Age: 78
End: 2018-12-26
Payer: MEDICARE

## 2018-12-27 ENCOUNTER — PATIENT OUTREACH (OUTPATIENT)
Dept: CASE MANAGEMENT | Age: 78
End: 2018-12-27

## 2018-12-27 ENCOUNTER — HOSPITAL ENCOUNTER (OUTPATIENT)
Dept: INFUSION THERAPY | Age: 78
Discharge: HOME | End: 2018-12-27
Payer: MEDICARE

## 2018-12-27 ENCOUNTER — HOSPITAL ENCOUNTER (OUTPATIENT)
Dept: LAB | Age: 78
Discharge: HOME | End: 2018-12-27
Payer: MEDICARE

## 2018-12-27 VITALS — HEART RATE: 85 BPM | DIASTOLIC BLOOD PRESSURE: 86 MMHG | TEMPERATURE: 98 F | SYSTOLIC BLOOD PRESSURE: 142 MMHG

## 2018-12-27 DIAGNOSIS — C90.00 MULTIPLE MYELOMA, REMISSION STATUS UNSPECIFIED (HCC): ICD-10-CM

## 2018-12-27 DIAGNOSIS — C90.00 MULTIPLE MYELOMA NOT HAVING ACHIEVED REMISSION (HCC): Primary | ICD-10-CM

## 2018-12-27 DIAGNOSIS — C90.02 MULTIPLE MYELOMA IN RELAPSE (HCC): ICD-10-CM

## 2018-12-27 LAB
ALBUMIN SERPL-MCNC: 3.7 G/DL (ref 3.2–4.6)
ALBUMIN/GLOB SERPL: 1.4 {RATIO} (ref 1.2–3.5)
ALP SERPL-CCNC: 71 U/L (ref 50–136)
ALT SERPL-CCNC: 17 U/L (ref 12–65)
ANION GAP SERPL CALC-SCNC: 8 MMOL/L (ref 7–16)
AST SERPL-CCNC: 20 U/L (ref 15–37)
BASOPHILS # BLD: 0 K/UL (ref 0–0.2)
BASOPHILS NFR BLD: 1 % (ref 0–2)
BILIRUB SERPL-MCNC: 0.4 MG/DL (ref 0.2–1.1)
BUN SERPL-MCNC: 19 MG/DL (ref 8–23)
CALCIUM SERPL-MCNC: 8.6 MG/DL (ref 8.3–10.4)
CHLORIDE SERPL-SCNC: 107 MMOL/L (ref 98–107)
CO2 SERPL-SCNC: 26 MMOL/L (ref 21–32)
CREAT SERPL-MCNC: 1.15 MG/DL (ref 0.8–1.5)
DIFFERENTIAL METHOD BLD: ABNORMAL
EOSINOPHIL # BLD: 0 K/UL (ref 0–0.8)
EOSINOPHIL NFR BLD: 1 % (ref 0.5–7.8)
ERYTHROCYTE [DISTWIDTH] IN BLOOD BY AUTOMATED COUNT: 14.1 % (ref 11.9–14.6)
GLOBULIN SER CALC-MCNC: 2.7 G/DL (ref 2.3–3.5)
GLUCOSE SERPL-MCNC: 124 MG/DL (ref 65–100)
HCT VFR BLD AUTO: 36.3 % (ref 41.1–50.3)
HGB BLD-MCNC: 11.9 G/DL (ref 13.6–17.2)
IMM GRANULOCYTES # BLD: 0 K/UL (ref 0–0.5)
IMM GRANULOCYTES NFR BLD AUTO: 0 % (ref 0–5)
LYMPHOCYTES # BLD: 1.8 K/UL (ref 0.5–4.6)
LYMPHOCYTES NFR BLD: 43 % (ref 13–44)
MAGNESIUM SERPL-MCNC: 2.1 MG/DL (ref 1.8–2.4)
MCH RBC QN AUTO: 23.8 PG (ref 26.1–32.9)
MCHC RBC AUTO-ENTMCNC: 32.8 G/DL (ref 31.4–35)
MCV RBC AUTO: 72.6 FL (ref 79.6–97.8)
MONOCYTES # BLD: 0.4 K/UL (ref 0.1–1.3)
MONOCYTES NFR BLD: 10 % (ref 4–12)
NEUTS SEG # BLD: 1.9 K/UL (ref 1.7–8.2)
NEUTS SEG NFR BLD: 46 % (ref 43–78)
NRBC # BLD: 0 K/UL (ref 0–0.2)
PLATELET # BLD AUTO: 165 K/UL (ref 150–450)
PMV BLD AUTO: 10.8 FL (ref 9.4–12.3)
POTASSIUM SERPL-SCNC: 3.8 MMOL/L (ref 3.5–5.1)
PROT SERPL-MCNC: 6.4 G/DL (ref 6.3–8.2)
RBC # BLD AUTO: 5 M/UL (ref 4.23–5.67)
SODIUM SERPL-SCNC: 141 MMOL/L (ref 136–145)
WBC # BLD AUTO: 4.1 K/UL (ref 4.3–11.1)

## 2018-12-27 PROCEDURE — 80053 COMPREHEN METABOLIC PANEL: CPT

## 2018-12-27 PROCEDURE — 96415 CHEMO IV INFUSION ADDL HR: CPT

## 2018-12-27 PROCEDURE — 74011250637 HC RX REV CODE- 250/637: Performed by: NURSE PRACTITIONER

## 2018-12-27 PROCEDURE — 74011000258 HC RX REV CODE- 258: Performed by: NURSE PRACTITIONER

## 2018-12-27 PROCEDURE — 74011250636 HC RX REV CODE- 250/636: Performed by: NURSE PRACTITIONER

## 2018-12-27 PROCEDURE — 96413 CHEMO IV INFUSION 1 HR: CPT

## 2018-12-27 PROCEDURE — 96375 TX/PRO/DX INJ NEW DRUG ADDON: CPT

## 2018-12-27 PROCEDURE — 85025 COMPLETE CBC W/AUTO DIFF WBC: CPT

## 2018-12-27 PROCEDURE — 83735 ASSAY OF MAGNESIUM: CPT

## 2018-12-27 RX ORDER — ACETAMINOPHEN 325 MG/1
650 TABLET ORAL ONCE
Status: COMPLETED | OUTPATIENT
Start: 2018-12-27 | End: 2018-12-27

## 2018-12-27 RX ORDER — SODIUM CHLORIDE 0.9 % (FLUSH) 0.9 %
10 SYRINGE (ML) INJECTION AS NEEDED
Status: ACTIVE | OUTPATIENT
Start: 2018-12-27 | End: 2018-12-28

## 2018-12-27 RX ORDER — SODIUM CHLORIDE 9 MG/ML
500 INJECTION, SOLUTION INTRAVENOUS CONTINUOUS
Status: ACTIVE | OUTPATIENT
Start: 2018-12-27 | End: 2018-12-28

## 2018-12-27 RX ORDER — DIPHENHYDRAMINE HYDROCHLORIDE 50 MG/ML
50 INJECTION, SOLUTION INTRAMUSCULAR; INTRAVENOUS ONCE
Status: COMPLETED | OUTPATIENT
Start: 2018-12-27 | End: 2018-12-27

## 2018-12-27 RX ORDER — HEPARIN 100 UNIT/ML
300-500 SYRINGE INTRAVENOUS AS NEEDED
Status: DISPENSED | OUTPATIENT
Start: 2018-12-27 | End: 2018-12-28

## 2018-12-27 RX ADMIN — SODIUM CHLORIDE 500 ML: 900 INJECTION, SOLUTION INTRAVENOUS at 13:35

## 2018-12-27 RX ADMIN — ACETAMINOPHEN 650 MG: 325 TABLET, FILM COATED ORAL at 13:14

## 2018-12-27 RX ADMIN — Medication 10 ML: at 17:44

## 2018-12-27 RX ADMIN — Medication 500 UNITS: at 17:44

## 2018-12-27 RX ADMIN — DARATUMUMAB 1400 MG: 100 INJECTION, SOLUTION, CONCENTRATE INTRAVENOUS at 14:33

## 2018-12-27 RX ADMIN — DIPHENHYDRAMINE HYDROCHLORIDE 50 MG: 50 INJECTION, SOLUTION INTRAMUSCULAR; INTRAVENOUS at 13:14

## 2018-12-27 RX ADMIN — DEXAMETHASONE SODIUM PHOSPHATE 20 MG: 10 INJECTION INTRAMUSCULAR; INTRAVENOUS at 13:25

## 2018-12-27 NOTE — PROGRESS NOTES
Arrived to the [facility]. Daratumumab completed.  Patient tolerated without problems  Any issues or concerns during appointment: no  Patient aware of next infusion appointment on 1/9/19 at 0930  Discharged ambulatory

## 2018-12-29 PROBLEM — Z85.038 PERSONAL HISTORY OF COLON CANCER: Status: ACTIVE | Noted: 2018-12-27

## 2019-01-01 ENCOUNTER — HOSPITAL ENCOUNTER (OUTPATIENT)
Dept: INFUSION THERAPY | Age: 79
Discharge: HOME | End: 2019-11-12
Payer: MEDICARE

## 2019-01-01 ENCOUNTER — PATIENT OUTREACH (OUTPATIENT)
Dept: CASE MANAGEMENT | Age: 79
End: 2019-01-01

## 2019-01-01 ENCOUNTER — HOSPITAL ENCOUNTER (OUTPATIENT)
Dept: ULTRASOUND IMAGING | Age: 79
Discharge: HOME | End: 2019-08-09
Attending: NURSE PRACTITIONER

## 2019-01-01 ENCOUNTER — HOSPITAL ENCOUNTER (OUTPATIENT)
Dept: CT IMAGING | Age: 79
Discharge: HOME | End: 2019-12-17
Attending: INTERNAL MEDICINE
Payer: MEDICARE

## 2019-01-01 ENCOUNTER — HOSPITAL ENCOUNTER (OUTPATIENT)
Dept: INFUSION THERAPY | Age: 79
Discharge: HOME | End: 2019-08-13
Payer: MEDICARE

## 2019-01-01 ENCOUNTER — HOSPITAL ENCOUNTER (OUTPATIENT)
Dept: INFUSION THERAPY | Age: 79
Discharge: HOME | End: 2019-09-10
Payer: MEDICARE

## 2019-01-01 ENCOUNTER — HOSPITAL ENCOUNTER (OUTPATIENT)
Dept: LAB | Age: 79
Discharge: HOME | End: 2019-12-23
Payer: MEDICARE

## 2019-01-01 ENCOUNTER — HOSPITAL ENCOUNTER (OUTPATIENT)
Dept: INFUSION THERAPY | Age: 79
Discharge: HOME | End: 2019-09-17
Payer: MEDICARE

## 2019-01-01 ENCOUNTER — HOSPITAL ENCOUNTER (OUTPATIENT)
Dept: INFUSION THERAPY | Age: 79
Discharge: HOME | End: 2019-11-26
Payer: MEDICARE

## 2019-01-01 ENCOUNTER — HOSPITAL ENCOUNTER (OUTPATIENT)
Dept: NUCLEAR MEDICINE | Age: 79
Discharge: HOME | End: 2019-11-21
Attending: INTERNAL MEDICINE
Payer: MEDICARE

## 2019-01-01 ENCOUNTER — HOSPITAL ENCOUNTER (OUTPATIENT)
Dept: LAB | Age: 79
Discharge: HOME | End: 2019-11-22
Payer: MEDICARE

## 2019-01-01 ENCOUNTER — HOSPITAL ENCOUNTER (OUTPATIENT)
Dept: INFUSION THERAPY | Age: 79
Discharge: HOME | End: 2019-08-20
Payer: MEDICARE

## 2019-01-01 ENCOUNTER — HOSPITAL ENCOUNTER (OUTPATIENT)
Dept: LAB | Age: 79
Discharge: HOME | End: 2019-11-26
Payer: MEDICARE

## 2019-01-01 ENCOUNTER — HOSPITAL ENCOUNTER (OUTPATIENT)
Dept: LAB | Age: 79
Discharge: HOME | End: 2019-09-03
Payer: MEDICARE

## 2019-01-01 ENCOUNTER — HOSPITAL ENCOUNTER (OUTPATIENT)
Dept: ULTRASOUND IMAGING | Age: 79
Discharge: HOME | End: 2019-08-28
Attending: INTERNAL MEDICINE
Payer: MEDICARE

## 2019-01-01 ENCOUNTER — HOSPITAL ENCOUNTER (OUTPATIENT)
Dept: INFUSION THERAPY | Age: 79
Discharge: HOME | End: 2019-11-18
Payer: MEDICARE

## 2019-01-01 ENCOUNTER — HOSPITAL ENCOUNTER (OUTPATIENT)
Dept: INFUSION THERAPY | Age: 79
Discharge: HOME | End: 2019-12-31
Payer: MEDICARE

## 2019-01-01 ENCOUNTER — HOSPITAL ENCOUNTER (OUTPATIENT)
Dept: INFUSION THERAPY | Age: 79
Discharge: HOME | End: 2019-12-10
Payer: MEDICARE

## 2019-01-01 ENCOUNTER — HOSPITAL ENCOUNTER (OUTPATIENT)
Dept: NUCLEAR MEDICINE | Age: 79
Discharge: HOME | End: 2019-11-22
Attending: INTERNAL MEDICINE
Payer: MEDICARE

## 2019-01-01 ENCOUNTER — HOSPITAL ENCOUNTER (OUTPATIENT)
Dept: INFUSION THERAPY | Age: 79
Discharge: HOME | End: 2019-10-29
Payer: MEDICARE

## 2019-01-01 ENCOUNTER — HOSPITAL ENCOUNTER (OUTPATIENT)
Dept: LAB | Age: 79
Discharge: HOME | End: 2019-08-06
Payer: MEDICARE

## 2019-01-01 ENCOUNTER — HOSPITAL ENCOUNTER (OUTPATIENT)
Dept: LAB | Age: 79
Discharge: HOME | End: 2019-09-17
Payer: MEDICARE

## 2019-01-01 ENCOUNTER — HOSPITAL ENCOUNTER (OUTPATIENT)
Dept: INFUSION THERAPY | Age: 79
Discharge: HOME | End: 2019-09-03
Payer: MEDICARE

## 2019-01-01 ENCOUNTER — HOSPITAL ENCOUNTER (OUTPATIENT)
Dept: LAB | Age: 79
Discharge: HOME | End: 2019-11-12
Payer: MEDICARE

## 2019-01-01 ENCOUNTER — HOSPITAL ENCOUNTER (OUTPATIENT)
Dept: INFUSION THERAPY | Age: 79
Discharge: HOME | End: 2019-12-03
Payer: MEDICARE

## 2019-01-01 ENCOUNTER — HOSPITAL ENCOUNTER (OUTPATIENT)
Dept: INFUSION THERAPY | Age: 79
Discharge: HOME | End: 2019-10-15
Payer: MEDICARE

## 2019-01-01 ENCOUNTER — HOSPITAL ENCOUNTER (OUTPATIENT)
Dept: LAB | Age: 79
Discharge: HOME | End: 2019-10-15
Payer: MEDICARE

## 2019-01-01 ENCOUNTER — HOSPITAL ENCOUNTER (OUTPATIENT)
Dept: LAB | Age: 79
Discharge: HOME | End: 2019-12-10
Payer: MEDICARE

## 2019-01-01 ENCOUNTER — HOSPITAL ENCOUNTER (OUTPATIENT)
Dept: INFUSION THERAPY | Age: 79
Discharge: HOME | End: 2019-12-23
Payer: MEDICARE

## 2019-01-01 ENCOUNTER — HOSPITAL ENCOUNTER (OUTPATIENT)
Dept: INFUSION THERAPY | Age: 79
Discharge: HOME | End: 2019-10-08
Payer: MEDICARE

## 2019-01-01 ENCOUNTER — HOSPITAL ENCOUNTER (OUTPATIENT)
Dept: NUCLEAR MEDICINE | Age: 79
Discharge: HOME | End: 2019-11-20
Attending: INTERNAL MEDICINE
Payer: MEDICARE

## 2019-01-01 ENCOUNTER — HOSPITAL ENCOUNTER (OUTPATIENT)
Dept: INFUSION THERAPY | Age: 79
Discharge: HOME | End: 2019-11-05
Payer: MEDICARE

## 2019-01-01 ENCOUNTER — HOSPITAL ENCOUNTER (OUTPATIENT)
Dept: INFUSION THERAPY | Age: 79
Discharge: HOME | End: 2019-08-23
Payer: MEDICARE

## 2019-01-01 ENCOUNTER — HOSPITAL ENCOUNTER (OUTPATIENT)
Dept: INFUSION THERAPY | Age: 79
Discharge: HOME | End: 2019-08-08
Payer: MEDICARE

## 2019-01-01 ENCOUNTER — HOSPITAL ENCOUNTER (OUTPATIENT)
Dept: LAB | Age: 79
Discharge: HOME | End: 2019-10-29
Payer: MEDICARE

## 2019-01-01 ENCOUNTER — HOSPITAL ENCOUNTER (OUTPATIENT)
Dept: INFUSION THERAPY | Age: 79
Discharge: HOME | End: 2019-08-06
Payer: MEDICARE

## 2019-01-01 ENCOUNTER — HOSPITAL ENCOUNTER (OUTPATIENT)
Dept: LAB | Age: 79
Discharge: HOME | End: 2019-08-20
Payer: MEDICARE

## 2019-01-01 ENCOUNTER — HOSPITAL ENCOUNTER (OUTPATIENT)
Dept: INFUSION THERAPY | Age: 79
Discharge: HOME | End: 2019-11-19
Payer: MEDICARE

## 2019-01-01 ENCOUNTER — HOSPITAL ENCOUNTER (OUTPATIENT)
Dept: INFUSION THERAPY | Age: 79
Discharge: HOME | End: 2019-10-01
Payer: MEDICARE

## 2019-01-01 ENCOUNTER — HOSPITAL ENCOUNTER (OUTPATIENT)
Dept: LAB | Age: 79
Discharge: HOME | End: 2019-08-28
Attending: INTERNAL MEDICINE
Payer: MEDICARE

## 2019-01-01 VITALS — BODY MASS INDEX: 23.16 KG/M2 | HEIGHT: 70 IN

## 2019-01-01 VITALS
TEMPERATURE: 97.5 F | RESPIRATION RATE: 18 BRPM | HEART RATE: 73 BPM | WEIGHT: 165 LBS | SYSTOLIC BLOOD PRESSURE: 130 MMHG | OXYGEN SATURATION: 100 % | BODY MASS INDEX: 23.68 KG/M2 | DIASTOLIC BLOOD PRESSURE: 71 MMHG

## 2019-01-01 VITALS
DIASTOLIC BLOOD PRESSURE: 59 MMHG | HEART RATE: 76 BPM | OXYGEN SATURATION: 99 % | TEMPERATURE: 97.9 F | SYSTOLIC BLOOD PRESSURE: 100 MMHG | RESPIRATION RATE: 18 BRPM

## 2019-01-01 VITALS
WEIGHT: 161 LBS | HEART RATE: 81 BPM | RESPIRATION RATE: 16 BRPM | SYSTOLIC BLOOD PRESSURE: 107 MMHG | BODY MASS INDEX: 23.1 KG/M2 | TEMPERATURE: 97.8 F | OXYGEN SATURATION: 98 % | DIASTOLIC BLOOD PRESSURE: 53 MMHG

## 2019-01-01 VITALS
RESPIRATION RATE: 16 BRPM | HEART RATE: 97 BPM | OXYGEN SATURATION: 100 % | WEIGHT: 165.2 LBS | BODY MASS INDEX: 23.65 KG/M2 | TEMPERATURE: 97.5 F | DIASTOLIC BLOOD PRESSURE: 59 MMHG | SYSTOLIC BLOOD PRESSURE: 129 MMHG | HEIGHT: 70 IN

## 2019-01-01 VITALS
OXYGEN SATURATION: 99 % | SYSTOLIC BLOOD PRESSURE: 120 MMHG | HEART RATE: 71 BPM | RESPIRATION RATE: 18 BRPM | TEMPERATURE: 97.9 F | DIASTOLIC BLOOD PRESSURE: 72 MMHG

## 2019-01-01 VITALS
WEIGHT: 161.2 LBS | TEMPERATURE: 98.2 F | HEART RATE: 91 BPM | DIASTOLIC BLOOD PRESSURE: 72 MMHG | OXYGEN SATURATION: 99 % | RESPIRATION RATE: 18 BRPM | SYSTOLIC BLOOD PRESSURE: 126 MMHG | BODY MASS INDEX: 23.13 KG/M2

## 2019-01-01 VITALS
SYSTOLIC BLOOD PRESSURE: 119 MMHG | BODY MASS INDEX: 23.36 KG/M2 | WEIGHT: 162.8 LBS | DIASTOLIC BLOOD PRESSURE: 63 MMHG | TEMPERATURE: 97.9 F | OXYGEN SATURATION: 96 % | HEART RATE: 73 BPM | RESPIRATION RATE: 18 BRPM

## 2019-01-01 VITALS
RESPIRATION RATE: 18 BRPM | TEMPERATURE: 98.1 F | OXYGEN SATURATION: 99 % | HEART RATE: 73 BPM | DIASTOLIC BLOOD PRESSURE: 57 MMHG | SYSTOLIC BLOOD PRESSURE: 105 MMHG

## 2019-01-01 VITALS
SYSTOLIC BLOOD PRESSURE: 124 MMHG | DIASTOLIC BLOOD PRESSURE: 68 MMHG | WEIGHT: 164.4 LBS | HEART RATE: 76 BPM | HEIGHT: 70 IN | TEMPERATURE: 98.1 F | OXYGEN SATURATION: 99 % | BODY MASS INDEX: 23.54 KG/M2 | RESPIRATION RATE: 18 BRPM

## 2019-01-01 VITALS
SYSTOLIC BLOOD PRESSURE: 113 MMHG | OXYGEN SATURATION: 98 % | WEIGHT: 164.4 LBS | DIASTOLIC BLOOD PRESSURE: 56 MMHG | HEART RATE: 74 BPM | RESPIRATION RATE: 18 BRPM | BODY MASS INDEX: 23.59 KG/M2 | TEMPERATURE: 98.4 F

## 2019-01-01 VITALS — HEIGHT: 70 IN | BODY MASS INDEX: 23.34 KG/M2 | WEIGHT: 163 LBS

## 2019-01-01 VITALS — WEIGHT: 158.56 LBS | BODY MASS INDEX: 22.75 KG/M2

## 2019-01-01 VITALS
SYSTOLIC BLOOD PRESSURE: 119 MMHG | TEMPERATURE: 97.7 F | HEART RATE: 74 BPM | RESPIRATION RATE: 18 BRPM | OXYGEN SATURATION: 96 % | BODY MASS INDEX: 23.73 KG/M2 | DIASTOLIC BLOOD PRESSURE: 55 MMHG | WEIGHT: 165.4 LBS

## 2019-01-01 DIAGNOSIS — C90.00 MULTIPLE MYELOMA NOT HAVING ACHIEVED REMISSION (HCC): Primary | ICD-10-CM

## 2019-01-01 DIAGNOSIS — C90.00 MULTIPLE MYELOMA NOT HAVING ACHIEVED REMISSION (HCC): ICD-10-CM

## 2019-01-01 DIAGNOSIS — C90.01 MULTIPLE MYELOMA IN REMISSION (HCC): ICD-10-CM

## 2019-01-01 DIAGNOSIS — C90.02 MULTIPLE MYELOMA IN RELAPSE (HCC): ICD-10-CM

## 2019-01-01 DIAGNOSIS — C90.00 MULTIPLE MYELOMA, REMISSION STATUS UNSPECIFIED (HCC): ICD-10-CM

## 2019-01-01 DIAGNOSIS — G89.3 CANCER RELATED PAIN: ICD-10-CM

## 2019-01-01 DIAGNOSIS — C73 HURTHLE CELL CARCINOMA OF THYROID (HCC): ICD-10-CM

## 2019-01-01 DIAGNOSIS — E89.0 POSTSURGICAL HYPOTHYROIDISM: ICD-10-CM

## 2019-01-01 LAB
ABO + RH BLD: NORMAL
ABO + RH BLD: NORMAL
ALBUMIN SERPL ELPH-MCNC: 3.35 G/DL (ref 3.2–5.6)
ALBUMIN SERPL ELPH-MCNC: 3.54 G/DL (ref 3.2–5.6)
ALBUMIN SERPL ELPH-MCNC: 3.54 G/DL (ref 3.2–5.6)
ALBUMIN SERPL ELPH-MCNC: 3.65 G/DL (ref 3.2–5.6)
ALBUMIN SERPL ELPH-MCNC: 4.01 G/DL (ref 3.2–5.6)
ALBUMIN SERPL-MCNC: 3.3 G/DL (ref 3.2–4.6)
ALBUMIN SERPL-MCNC: 3.4 G/DL (ref 3.2–4.6)
ALBUMIN SERPL-MCNC: 3.5 G/DL (ref 3.2–4.6)
ALBUMIN SERPL-MCNC: 3.6 G/DL (ref 3.2–4.6)
ALBUMIN SERPL-MCNC: 3.7 G/DL (ref 3.2–4.6)
ALBUMIN SERPL-MCNC: 3.8 G/DL (ref 3.2–4.6)
ALBUMIN SERPL-MCNC: 3.9 G/DL (ref 3.2–4.6)
ALBUMIN/GLOB SERPL: 1.2 {RATIO} (ref 1.2–3.5)
ALBUMIN/GLOB SERPL: 1.3 {RATIO} (ref 1.2–3.5)
ALBUMIN/GLOB SERPL: 1.4 {RATIO} (ref 1.2–3.5)
ALBUMIN/GLOB SERPL: 1.5 {RATIO}
ALBUMIN/GLOB SERPL: 1.5 {RATIO} (ref 1.2–3.5)
ALBUMIN/GLOB SERPL: 1.6 {RATIO}
ALBUMIN/GLOB SERPL: 1.6 {RATIO} (ref 1.2–3.5)
ALBUMIN/GLOB SERPL: 1.7 {RATIO}
ALBUMIN/GLOB SERPL: 1.7 {RATIO} (ref 1.2–3.5)
ALBUMIN/GLOB SERPL: 1.9 {RATIO}
ALP SERPL-CCNC: 121 U/L (ref 50–136)
ALP SERPL-CCNC: 176 U/L (ref 50–136)
ALP SERPL-CCNC: 56 U/L (ref 50–136)
ALP SERPL-CCNC: 58 U/L (ref 50–136)
ALP SERPL-CCNC: 58 U/L (ref 50–136)
ALP SERPL-CCNC: 62 U/L (ref 50–136)
ALP SERPL-CCNC: 62 U/L (ref 50–136)
ALP SERPL-CCNC: 64 U/L (ref 50–136)
ALP SERPL-CCNC: 70 U/L (ref 50–136)
ALP SERPL-CCNC: 71 U/L (ref 50–136)
ALP SERPL-CCNC: 75 U/L (ref 50–136)
ALP SERPL-CCNC: 78 U/L (ref 50–136)
ALP SERPL-CCNC: 79 U/L (ref 50–136)
ALP SERPL-CCNC: 90 U/L (ref 50–136)
ALP SERPL-CCNC: 92 U/L (ref 50–136)
ALP SERPL-CCNC: 98 U/L (ref 50–136)
ALP SERPL-CCNC: 99 U/L (ref 50–136)
ALPHA1 GLOB SERPL ELPH-MCNC: 0.23 G/DL (ref 0.1–0.4)
ALPHA1 GLOB SERPL ELPH-MCNC: 0.24 G/DL (ref 0.1–0.4)
ALPHA1 GLOB SERPL ELPH-MCNC: 0.24 G/DL (ref 0.1–0.4)
ALPHA1 GLOB SERPL ELPH-MCNC: 0.26 G/DL (ref 0.1–0.4)
ALPHA1 GLOB SERPL ELPH-MCNC: 0.27 G/DL (ref 0.1–0.4)
ALPHA2 GLOB SERPL ELPH-MCNC: 0.66 G/DL (ref 0.4–1.2)
ALPHA2 GLOB SERPL ELPH-MCNC: 0.7 G/DL (ref 0.4–1.2)
ALPHA2 GLOB SERPL ELPH-MCNC: 0.71 G/DL (ref 0.4–1.2)
ALPHA2 GLOB SERPL ELPH-MCNC: 0.73 G/DL (ref 0.4–1.2)
ALPHA2 GLOB SERPL ELPH-MCNC: 0.75 G/DL (ref 0.4–1.2)
ALT SERPL-CCNC: 17 U/L (ref 12–65)
ALT SERPL-CCNC: 18 U/L (ref 12–65)
ALT SERPL-CCNC: 18 U/L (ref 12–65)
ALT SERPL-CCNC: 20 U/L (ref 12–65)
ALT SERPL-CCNC: 22 U/L (ref 12–65)
ALT SERPL-CCNC: 23 U/L (ref 12–65)
ALT SERPL-CCNC: 23 U/L (ref 12–65)
ALT SERPL-CCNC: 24 U/L (ref 12–65)
ALT SERPL-CCNC: 25 U/L (ref 12–65)
ALT SERPL-CCNC: 27 U/L (ref 12–65)
ALT SERPL-CCNC: 27 U/L (ref 12–65)
ALT SERPL-CCNC: 28 U/L (ref 12–65)
ALT SERPL-CCNC: 29 U/L (ref 12–65)
ALT SERPL-CCNC: 32 U/L (ref 12–65)
ALT SERPL-CCNC: 33 U/L (ref 12–65)
ALT SERPL-CCNC: 34 U/L (ref 12–65)
ALT SERPL-CCNC: 51 U/L (ref 12–65)
ANION GAP SERPL CALC-SCNC: 10 MMOL/L (ref 7–16)
ANION GAP SERPL CALC-SCNC: 4 MMOL/L (ref 7–16)
ANION GAP SERPL CALC-SCNC: 5 MMOL/L (ref 7–16)
ANION GAP SERPL CALC-SCNC: 5 MMOL/L (ref 7–16)
ANION GAP SERPL CALC-SCNC: 6 MMOL/L (ref 7–16)
ANION GAP SERPL CALC-SCNC: 6 MMOL/L (ref 7–16)
ANION GAP SERPL CALC-SCNC: 7 MMOL/L (ref 7–16)
ANION GAP SERPL CALC-SCNC: 8 MMOL/L (ref 7–16)
ANION GAP SERPL CALC-SCNC: 8 MMOL/L (ref 7–16)
ANION GAP SERPL CALC-SCNC: 9 MMOL/L (ref 7–16)
APPEARANCE UR: CLEAR
AST SERPL-CCNC: 13 U/L (ref 15–37)
AST SERPL-CCNC: 16 U/L (ref 15–37)
AST SERPL-CCNC: 17 U/L (ref 15–37)
AST SERPL-CCNC: 18 U/L (ref 15–37)
AST SERPL-CCNC: 19 U/L (ref 15–37)
AST SERPL-CCNC: 19 U/L (ref 15–37)
AST SERPL-CCNC: 20 U/L (ref 15–37)
AST SERPL-CCNC: 20 U/L (ref 15–37)
AST SERPL-CCNC: 21 U/L (ref 15–37)
AST SERPL-CCNC: 21 U/L (ref 15–37)
AST SERPL-CCNC: 22 U/L (ref 15–37)
AST SERPL-CCNC: 23 U/L (ref 15–37)
AST SERPL-CCNC: 23 U/L (ref 15–37)
B-GLOBULIN SERPL QL ELPH: 0.73 G/DL (ref 0.6–1.3)
B-GLOBULIN SERPL QL ELPH: 0.75 G/DL (ref 0.6–1.3)
B-GLOBULIN SERPL QL ELPH: 0.81 G/DL (ref 0.6–1.3)
B-GLOBULIN SERPL QL ELPH: 0.82 G/DL (ref 0.6–1.3)
B-GLOBULIN SERPL QL ELPH: 0.94 G/DL (ref 0.6–1.3)
BASOPHILS # BLD: 0 K/UL (ref 0–0.2)
BASOPHILS NFR BLD: 0 % (ref 0–2)
BASOPHILS NFR BLD: 1 % (ref 0–2)
BILIRUB SERPL-MCNC: 0.3 MG/DL (ref 0.2–1.1)
BILIRUB SERPL-MCNC: 0.4 MG/DL (ref 0.2–1.1)
BILIRUB SERPL-MCNC: 0.5 MG/DL (ref 0.2–1.1)
BILIRUB SERPL-MCNC: 0.6 MG/DL (ref 0.2–1.1)
BILIRUB SERPL-MCNC: 0.6 MG/DL (ref 0.2–1.1)
BILIRUB SERPL-MCNC: 0.7 MG/DL (ref 0.2–1.1)
BILIRUB SERPL-MCNC: 0.8 MG/DL (ref 0.2–1.1)
BILIRUB UR QL: NEGATIVE
BLD PROD TYP BPU: NORMAL
BLOOD GROUP ANTIBODIES SERPL: NORMAL
BLOOD GROUP ANTIBODIES SERPL: NORMAL
BPU ID: NORMAL
BUN SERPL-MCNC: 13 MG/DL (ref 8–23)
BUN SERPL-MCNC: 14 MG/DL (ref 8–23)
BUN SERPL-MCNC: 15 MG/DL (ref 8–23)
BUN SERPL-MCNC: 17 MG/DL (ref 8–23)
BUN SERPL-MCNC: 17 MG/DL (ref 8–23)
BUN SERPL-MCNC: 18 MG/DL (ref 8–23)
BUN SERPL-MCNC: 18 MG/DL (ref 8–23)
BUN SERPL-MCNC: 19 MG/DL (ref 8–23)
BUN SERPL-MCNC: 20 MG/DL (ref 8–23)
BUN SERPL-MCNC: 20 MG/DL (ref 8–23)
BUN SERPL-MCNC: 21 MG/DL (ref 8–23)
BUN SERPL-MCNC: 22 MG/DL (ref 8–23)
BUN SERPL-MCNC: 22 MG/DL (ref 8–23)
BUN SERPL-MCNC: 23 MG/DL (ref 8–23)
BUN SERPL-MCNC: 25 MG/DL (ref 8–23)
CALCIUM SERPL-MCNC: 8 MG/DL (ref 8.3–10.4)
CALCIUM SERPL-MCNC: 8.1 MG/DL (ref 8.3–10.4)
CALCIUM SERPL-MCNC: 8.2 MG/DL (ref 8.3–10.4)
CALCIUM SERPL-MCNC: 8.3 MG/DL (ref 8.3–10.4)
CALCIUM SERPL-MCNC: 8.3 MG/DL (ref 8.3–10.4)
CALCIUM SERPL-MCNC: 8.4 MG/DL (ref 8.3–10.4)
CALCIUM SERPL-MCNC: 8.6 MG/DL (ref 8.3–10.4)
CALCIUM SERPL-MCNC: 8.7 MG/DL (ref 8.3–10.4)
CALCIUM SERPL-MCNC: 8.8 MG/DL (ref 8.3–10.4)
CALCIUM SERPL-MCNC: 8.9 MG/DL (ref 8.3–10.4)
CALCIUM SERPL-MCNC: 8.9 MG/DL (ref 8.3–10.4)
CHLORIDE SERPL-SCNC: 105 MMOL/L (ref 98–107)
CHLORIDE SERPL-SCNC: 105 MMOL/L (ref 98–107)
CHLORIDE SERPL-SCNC: 106 MMOL/L (ref 98–107)
CHLORIDE SERPL-SCNC: 106 MMOL/L (ref 98–107)
CHLORIDE SERPL-SCNC: 107 MMOL/L (ref 98–107)
CHLORIDE SERPL-SCNC: 108 MMOL/L (ref 98–107)
CHLORIDE SERPL-SCNC: 109 MMOL/L (ref 98–107)
CHLORIDE SERPL-SCNC: 110 MMOL/L (ref 98–107)
CHLORIDE SERPL-SCNC: 110 MMOL/L (ref 98–107)
CHLORIDE SERPL-SCNC: 111 MMOL/L (ref 98–107)
CHLORIDE SERPL-SCNC: 112 MMOL/L (ref 98–107)
CO2 SERPL-SCNC: 20 MMOL/L (ref 21–32)
CO2 SERPL-SCNC: 21 MMOL/L (ref 21–32)
CO2 SERPL-SCNC: 22 MMOL/L (ref 21–32)
CO2 SERPL-SCNC: 23 MMOL/L (ref 21–32)
CO2 SERPL-SCNC: 24 MMOL/L (ref 21–32)
CO2 SERPL-SCNC: 25 MMOL/L (ref 21–32)
CO2 SERPL-SCNC: 27 MMOL/L (ref 21–32)
CO2 SERPL-SCNC: 27 MMOL/L (ref 21–32)
COLOR UR: YELLOW
CREAT SERPL-MCNC: 0.96 MG/DL (ref 0.8–1.5)
CREAT SERPL-MCNC: 1 MG/DL (ref 0.8–1.5)
CREAT SERPL-MCNC: 1.08 MG/DL (ref 0.8–1.5)
CREAT SERPL-MCNC: 1.09 MG/DL (ref 0.8–1.5)
CREAT SERPL-MCNC: 1.12 MG/DL (ref 0.8–1.5)
CREAT SERPL-MCNC: 1.12 MG/DL (ref 0.8–1.5)
CREAT SERPL-MCNC: 1.14 MG/DL (ref 0.8–1.5)
CREAT SERPL-MCNC: 1.17 MG/DL (ref 0.8–1.5)
CREAT SERPL-MCNC: 1.17 MG/DL (ref 0.8–1.5)
CREAT SERPL-MCNC: 1.18 MG/DL (ref 0.8–1.5)
CREAT SERPL-MCNC: 1.22 MG/DL (ref 0.8–1.5)
CREAT SERPL-MCNC: 1.24 MG/DL (ref 0.8–1.5)
CREAT SERPL-MCNC: 1.28 MG/DL (ref 0.8–1.5)
CROSSMATCH RESULT,%XM: NORMAL
DIFFERENTIAL METHOD BLD: ABNORMAL
EOSINOPHIL # BLD: 0 K/UL (ref 0–0.8)
EOSINOPHIL # BLD: 0.1 K/UL (ref 0–0.8)
EOSINOPHIL # BLD: 0.1 K/UL (ref 0–0.8)
EOSINOPHIL NFR BLD: 0 % (ref 0.5–7.8)
EOSINOPHIL NFR BLD: 1 % (ref 0.5–7.8)
EOSINOPHIL NFR BLD: 2 % (ref 0.5–7.8)
ERYTHROCYTE [DISTWIDTH] IN BLOOD BY AUTOMATED COUNT: 14 % (ref 11.9–14.6)
ERYTHROCYTE [DISTWIDTH] IN BLOOD BY AUTOMATED COUNT: 14 % (ref 11.9–14.6)
ERYTHROCYTE [DISTWIDTH] IN BLOOD BY AUTOMATED COUNT: 14.3 % (ref 11.9–14.6)
ERYTHROCYTE [DISTWIDTH] IN BLOOD BY AUTOMATED COUNT: 14.4 % (ref 11.9–14.6)
ERYTHROCYTE [DISTWIDTH] IN BLOOD BY AUTOMATED COUNT: 14.5 % (ref 11.9–14.6)
ERYTHROCYTE [DISTWIDTH] IN BLOOD BY AUTOMATED COUNT: 14.5 % (ref 11.9–14.6)
ERYTHROCYTE [DISTWIDTH] IN BLOOD BY AUTOMATED COUNT: 14.6 % (ref 11.9–14.6)
ERYTHROCYTE [DISTWIDTH] IN BLOOD BY AUTOMATED COUNT: 15.3 % (ref 11.9–14.6)
ERYTHROCYTE [DISTWIDTH] IN BLOOD BY AUTOMATED COUNT: 15.8 % (ref 11.9–14.6)
ERYTHROCYTE [DISTWIDTH] IN BLOOD BY AUTOMATED COUNT: 16.4 % (ref 11.9–14.6)
ERYTHROCYTE [DISTWIDTH] IN BLOOD BY AUTOMATED COUNT: 20.2 % (ref 11.9–14.6)
ERYTHROCYTE [DISTWIDTH] IN BLOOD BY AUTOMATED COUNT: 20.5 % (ref 11.9–14.6)
ERYTHROCYTE [DISTWIDTH] IN BLOOD BY AUTOMATED COUNT: 20.6 % (ref 11.9–14.6)
FERRITIN SERPL-MCNC: 775 NG/ML (ref 8–388)
GAMMA GLOB MFR SERPL ELPH: 0.34 G/DL (ref 0.5–1.6)
GAMMA GLOB MFR SERPL ELPH: 0.34 G/DL (ref 0.5–1.6)
GAMMA GLOB MFR SERPL ELPH: 0.35 G/DL (ref 0.5–1.6)
GAMMA GLOB MFR SERPL ELPH: 0.41 G/DL (ref 0.5–1.6)
GAMMA GLOB MFR SERPL ELPH: 0.52 G/DL (ref 0.5–1.6)
GLOBULIN SER CALC-MCNC: 2.3 G/DL (ref 2.3–3.5)
GLOBULIN SER CALC-MCNC: 2.5 G/DL (ref 2.3–3.5)
GLOBULIN SER CALC-MCNC: 2.6 G/DL (ref 2.3–3.5)
GLOBULIN SER CALC-MCNC: 2.7 G/DL (ref 2.3–3.5)
GLOBULIN SER CALC-MCNC: 2.8 G/DL (ref 2.3–3.5)
GLOBULIN SER CALC-MCNC: 2.9 G/DL (ref 2.3–3.5)
GLUCOSE SERPL-MCNC: 101 MG/DL (ref 65–100)
GLUCOSE SERPL-MCNC: 101 MG/DL (ref 65–100)
GLUCOSE SERPL-MCNC: 103 MG/DL (ref 65–100)
GLUCOSE SERPL-MCNC: 109 MG/DL (ref 65–100)
GLUCOSE SERPL-MCNC: 114 MG/DL (ref 65–100)
GLUCOSE SERPL-MCNC: 116 MG/DL (ref 65–100)
GLUCOSE SERPL-MCNC: 132 MG/DL (ref 65–100)
GLUCOSE SERPL-MCNC: 142 MG/DL (ref 65–100)
GLUCOSE SERPL-MCNC: 145 MG/DL (ref 65–100)
GLUCOSE SERPL-MCNC: 145 MG/DL (ref 65–100)
GLUCOSE SERPL-MCNC: 152 MG/DL (ref 65–100)
GLUCOSE SERPL-MCNC: 158 MG/DL (ref 65–100)
GLUCOSE SERPL-MCNC: 164 MG/DL (ref 65–100)
GLUCOSE SERPL-MCNC: 170 MG/DL (ref 65–100)
GLUCOSE SERPL-MCNC: 172 MG/DL (ref 65–100)
GLUCOSE SERPL-MCNC: 204 MG/DL (ref 65–100)
GLUCOSE SERPL-MCNC: 85 MG/DL (ref 65–100)
GLUCOSE UR STRIP.AUTO-MCNC: NEGATIVE MG/DL
HCT VFR BLD AUTO: 22.5 % (ref 41.1–50.3)
HCT VFR BLD AUTO: 22.7 % (ref 41.1–50.3)
HCT VFR BLD AUTO: 23.9 % (ref 41.1–50.3)
HCT VFR BLD AUTO: 24.8 % (ref 41.1–50.3)
HCT VFR BLD AUTO: 26.7 % (ref 41.1–50.3)
HCT VFR BLD AUTO: 26.8 % (ref 41.1–50.3)
HCT VFR BLD AUTO: 26.8 % (ref 41.1–50.3)
HCT VFR BLD AUTO: 28.5 % (ref 41.1–50.3)
HCT VFR BLD AUTO: 28.9 % (ref 41.1–50.3)
HCT VFR BLD AUTO: 29.2 % (ref 41.1–50.3)
HCT VFR BLD AUTO: 29.2 % (ref 41.1–50.3)
HCT VFR BLD AUTO: 29.5 % (ref 41.1–50.3)
HCT VFR BLD AUTO: 29.9 % (ref 41.1–50.3)
HCT VFR BLD AUTO: 30.6 % (ref 41.1–50.3)
HCT VFR BLD AUTO: 30.7 % (ref 41.1–50.3)
HCT VFR BLD AUTO: 30.9 % (ref 41.1–50.3)
HCT VFR BLD AUTO: 31.4 % (ref 41.1–50.3)
HGB BLD-MCNC: 10.1 G/DL (ref 13.6–17.2)
HGB BLD-MCNC: 7.2 G/DL (ref 13.6–17.2)
HGB BLD-MCNC: 7.3 G/DL (ref 13.6–17.2)
HGB BLD-MCNC: 7.8 G/DL (ref 13.6–17.2)
HGB BLD-MCNC: 8 G/DL (ref 13.6–17.2)
HGB BLD-MCNC: 8.5 G/DL (ref 13.6–17.2)
HGB BLD-MCNC: 8.6 G/DL (ref 13.6–17.2)
HGB BLD-MCNC: 8.8 G/DL (ref 13.6–17.2)
HGB BLD-MCNC: 9.1 G/DL (ref 13.6–17.2)
HGB BLD-MCNC: 9.2 G/DL (ref 13.6–17.2)
HGB BLD-MCNC: 9.3 G/DL (ref 13.6–17.2)
HGB BLD-MCNC: 9.4 G/DL (ref 13.6–17.2)
HGB BLD-MCNC: 9.6 G/DL (ref 13.6–17.2)
HGB BLD-MCNC: 9.7 G/DL (ref 13.6–17.2)
HGB BLD-MCNC: 9.7 G/DL (ref 13.6–17.2)
HGB BLD-MCNC: 9.8 G/DL (ref 13.6–17.2)
HGB BLD-MCNC: 9.9 G/DL (ref 13.6–17.2)
HGB RETIC QN AUTO: 27 PG (ref 29–35)
HGB UR QL STRIP: NEGATIVE
IGA SERPL-MCNC: 23 MG/DL (ref 85–499)
IGA SERPL-MCNC: 25 MG/DL (ref 85–499)
IGA SERPL-MCNC: 3 MG/DL (ref 85–499)
IGA SERPL-MCNC: 4 MG/DL (ref 85–499)
IGA SERPL-MCNC: 5 MG/DL (ref 85–499)
IGG SERPL-MCNC: 289 MG/DL (ref 610–1616)
IGG SERPL-MCNC: 348 MG/DL (ref 610–1616)
IGG SERPL-MCNC: 359 MG/DL (ref 610–1616)
IGG SERPL-MCNC: 382 MG/DL (ref 610–1616)
IGG SERPL-MCNC: 477 MG/DL (ref 610–1616)
IGM SERPL-MCNC: 11 MG/DL (ref 35–242)
IGM SERPL-MCNC: 16 MG/DL (ref 35–242)
IGM SERPL-MCNC: 26 MG/DL (ref 35–242)
IGM SERPL-MCNC: 51 MG/DL (ref 35–242)
IGM SERPL-MCNC: <10 MG/DL (ref 35–242)
IMM GRANULOCYTES # BLD AUTO: 0 K/UL (ref 0–0.5)
IMM GRANULOCYTES # BLD AUTO: 0.1 K/UL (ref 0–0.5)
IMM GRANULOCYTES NFR BLD AUTO: 0 % (ref 0–5)
IMM GRANULOCYTES NFR BLD AUTO: 0 % (ref 0–5)
IMM GRANULOCYTES NFR BLD AUTO: 1 % (ref 0–5)
IMM GRANULOCYTES NFR BLD AUTO: 2 % (ref 0–5)
IMM RETICS NFR: 19.8 % (ref 2.3–13.4)
IRON SATN MFR SERPL: 21 %
IRON SERPL-MCNC: 54 UG/DL (ref 35–150)
KAPPA LC FREE SER-MCNC: 1.78 MG/L (ref 3.3–19.4)
KAPPA LC FREE SER-MCNC: 1.82 MG/L (ref 3.3–19.4)
KAPPA LC FREE SER-MCNC: 2.02 MG/L (ref 3.3–19.4)
KAPPA LC FREE SER-MCNC: 2.17 MG/L (ref 3.3–19.4)
KAPPA LC FREE SER-MCNC: 3.34 MG/L (ref 3.3–19.4)
KAPPA LC FREE/LAMBDA FREE SER: 0.46 {RATIO} (ref 0.26–1.65)
KAPPA LC FREE/LAMBDA FREE SER: 0.79 {RATIO} (ref 0.26–1.65)
KAPPA LC FREE/LAMBDA FREE SER: 0.89 {RATIO} (ref 0.26–1.65)
KAPPA LC FREE/LAMBDA FREE SER: 0.98 {RATIO} (ref 0.26–1.65)
KAPPA LC FREE/LAMBDA FREE SER: 1.04 {RATIO} (ref 0.26–1.65)
KETONES UR QL STRIP.AUTO: NEGATIVE MG/DL
LAMBDA LC FREE SERPL-MCNC: 2.05 MG/L (ref 5.71–26.3)
LAMBDA LC FREE SERPL-MCNC: 2.07 MG/L (ref 5.71–26.3)
LAMBDA LC FREE SERPL-MCNC: 2.08 MG/L (ref 5.71–26.3)
LAMBDA LC FREE SERPL-MCNC: 2.26 MG/L (ref 5.71–26.3)
LAMBDA LC FREE SERPL-MCNC: 7.2 MG/L (ref 5.71–26.3)
LDH SERPL L TO P-CCNC: 187 U/L (ref 110–210)
LEUKOCYTE ESTERASE UR QL STRIP.AUTO: NEGATIVE
LYMPHOCYTES # BLD: 0.3 K/UL (ref 0.5–4.6)
LYMPHOCYTES # BLD: 0.3 K/UL (ref 0.5–4.6)
LYMPHOCYTES # BLD: 0.4 K/UL (ref 0.5–4.6)
LYMPHOCYTES # BLD: 0.5 K/UL (ref 0.5–4.6)
LYMPHOCYTES # BLD: 0.6 K/UL (ref 0.5–4.6)
LYMPHOCYTES # BLD: 0.7 K/UL (ref 0.5–4.6)
LYMPHOCYTES # BLD: 0.7 K/UL (ref 0.5–4.6)
LYMPHOCYTES # BLD: 0.8 K/UL (ref 0.5–4.6)
LYMPHOCYTES # BLD: 0.8 K/UL (ref 0.5–4.6)
LYMPHOCYTES # BLD: 0.9 K/UL (ref 0.5–4.6)
LYMPHOCYTES # BLD: 1 K/UL (ref 0.5–4.6)
LYMPHOCYTES NFR BLD: 11 % (ref 13–44)
LYMPHOCYTES NFR BLD: 13 % (ref 13–44)
LYMPHOCYTES NFR BLD: 13 % (ref 13–44)
LYMPHOCYTES NFR BLD: 14 % (ref 13–44)
LYMPHOCYTES NFR BLD: 18 % (ref 13–44)
LYMPHOCYTES NFR BLD: 19 % (ref 13–44)
LYMPHOCYTES NFR BLD: 21 % (ref 13–44)
LYMPHOCYTES NFR BLD: 26 % (ref 13–44)
LYMPHOCYTES NFR BLD: 30 % (ref 13–44)
LYMPHOCYTES NFR BLD: 9 % (ref 13–44)
M PROTEIN SERPL ELPH-MCNC: 0.09 G/DL
M PROTEIN SERPL ELPH-MCNC: 0.09 G/DL
M PROTEIN SERPL ELPH-MCNC: 0.1 G/DL
M PROTEIN SERPL ELPH-MCNC: 0.13 G/DL
M PROTEIN SERPL ELPH-MCNC: 0.17 G/DL
MAGNESIUM SERPL-MCNC: 1.8 MG/DL (ref 1.8–2.4)
MAGNESIUM SERPL-MCNC: 1.9 MG/DL (ref 1.8–2.4)
MAGNESIUM SERPL-MCNC: 2 MG/DL (ref 1.8–2.4)
MAGNESIUM SERPL-MCNC: 2.1 MG/DL (ref 1.8–2.4)
MAGNESIUM SERPL-MCNC: 2.1 MG/DL (ref 1.8–2.4)
MAGNESIUM SERPL-MCNC: 2.2 MG/DL (ref 1.8–2.4)
MAGNESIUM SERPL-MCNC: 2.3 MG/DL (ref 1.8–2.4)
MCH RBC QN AUTO: 23.3 PG (ref 26.1–32.9)
MCH RBC QN AUTO: 24.4 PG (ref 26.1–32.9)
MCH RBC QN AUTO: 24.7 PG (ref 26.1–32.9)
MCH RBC QN AUTO: 24.8 PG (ref 26.1–32.9)
MCH RBC QN AUTO: 24.9 PG (ref 26.1–32.9)
MCH RBC QN AUTO: 25 PG (ref 26.1–32.9)
MCH RBC QN AUTO: 25.1 PG (ref 26.1–32.9)
MCH RBC QN AUTO: 25.2 PG (ref 26.1–32.9)
MCH RBC QN AUTO: 25.4 PG (ref 26.1–32.9)
MCH RBC QN AUTO: 25.5 PG (ref 26.1–32.9)
MCH RBC QN AUTO: 25.5 PG (ref 26.1–32.9)
MCH RBC QN AUTO: 25.6 PG (ref 26.1–32.9)
MCHC RBC AUTO-ENTMCNC: 31.5 G/DL (ref 31.4–35)
MCHC RBC AUTO-ENTMCNC: 31.6 G/DL (ref 31.4–35)
MCHC RBC AUTO-ENTMCNC: 31.8 G/DL (ref 31.4–35)
MCHC RBC AUTO-ENTMCNC: 31.9 G/DL (ref 31.4–35)
MCHC RBC AUTO-ENTMCNC: 32 G/DL (ref 31.4–35)
MCHC RBC AUTO-ENTMCNC: 32 G/DL (ref 31.4–35)
MCHC RBC AUTO-ENTMCNC: 32.1 G/DL (ref 31.4–35)
MCHC RBC AUTO-ENTMCNC: 32.2 G/DL (ref 31.4–35)
MCHC RBC AUTO-ENTMCNC: 32.2 G/DL (ref 31.4–35)
MCHC RBC AUTO-ENTMCNC: 32.3 G/DL (ref 31.4–35)
MCHC RBC AUTO-ENTMCNC: 32.4 G/DL (ref 31.4–35)
MCHC RBC AUTO-ENTMCNC: 32.5 G/DL (ref 31.4–35)
MCHC RBC AUTO-ENTMCNC: 32.6 G/DL (ref 31.4–35)
MCHC RBC AUTO-ENTMCNC: 32.7 G/DL (ref 31.4–35)
MCHC RBC AUTO-ENTMCNC: 32.8 G/DL (ref 31.4–35)
MCV RBC AUTO: 72.5 FL (ref 79.6–97.8)
MCV RBC AUTO: 74.7 FL (ref 79.6–97.8)
MCV RBC AUTO: 76.4 FL (ref 79.6–97.8)
MCV RBC AUTO: 76.8 FL (ref 79.6–97.8)
MCV RBC AUTO: 76.8 FL (ref 79.6–97.8)
MCV RBC AUTO: 77 FL (ref 79.6–97.8)
MCV RBC AUTO: 77.1 FL (ref 79.6–97.8)
MCV RBC AUTO: 77.5 FL (ref 79.6–97.8)
MCV RBC AUTO: 77.6 FL (ref 79.6–97.8)
MCV RBC AUTO: 77.6 FL (ref 79.6–97.8)
MCV RBC AUTO: 77.7 FL (ref 79.6–97.8)
MCV RBC AUTO: 77.8 FL (ref 79.6–97.8)
MCV RBC AUTO: 78.1 FL (ref 79.6–97.8)
MCV RBC AUTO: 78.9 FL (ref 79.6–97.8)
MCV RBC AUTO: 79.1 FL (ref 79.6–97.8)
MCV RBC AUTO: 79.8 FL (ref 79.6–97.8)
MCV RBC AUTO: 81.1 FL (ref 79.6–97.8)
MONOCYTES # BLD: 0.1 K/UL (ref 0.1–1.3)
MONOCYTES # BLD: 0.2 K/UL (ref 0.1–1.3)
MONOCYTES # BLD: 0.3 K/UL (ref 0.1–1.3)
MONOCYTES # BLD: 0.5 K/UL (ref 0.1–1.3)
MONOCYTES # BLD: 0.7 K/UL (ref 0.1–1.3)
MONOCYTES NFR BLD: 10 % (ref 4–12)
MONOCYTES NFR BLD: 12 % (ref 4–12)
MONOCYTES NFR BLD: 19 % (ref 4–12)
MONOCYTES NFR BLD: 2 % (ref 4–12)
MONOCYTES NFR BLD: 3 % (ref 4–12)
MONOCYTES NFR BLD: 4 % (ref 4–12)
MONOCYTES NFR BLD: 4 % (ref 4–12)
MONOCYTES NFR BLD: 5 % (ref 4–12)
MONOCYTES NFR BLD: 6 % (ref 4–12)
MONOCYTES NFR BLD: 7 % (ref 4–12)
MONOCYTES NFR BLD: 9 % (ref 4–12)
NEUTS SEG # BLD: 0.8 K/UL (ref 1.7–8.2)
NEUTS SEG # BLD: 1.9 K/UL (ref 1.7–8.2)
NEUTS SEG # BLD: 2 K/UL (ref 1.7–8.2)
NEUTS SEG # BLD: 2.1 K/UL (ref 1.7–8.2)
NEUTS SEG # BLD: 2.2 K/UL (ref 1.7–8.2)
NEUTS SEG # BLD: 2.4 K/UL (ref 1.7–8.2)
NEUTS SEG # BLD: 2.5 K/UL (ref 1.7–8.2)
NEUTS SEG # BLD: 2.5 K/UL (ref 1.7–8.2)
NEUTS SEG # BLD: 2.7 K/UL (ref 1.7–8.2)
NEUTS SEG # BLD: 2.7 K/UL (ref 1.7–8.2)
NEUTS SEG # BLD: 2.8 K/UL (ref 1.7–8.2)
NEUTS SEG # BLD: 3.1 K/UL (ref 1.7–8.2)
NEUTS SEG # BLD: 3.4 K/UL (ref 1.7–8.2)
NEUTS SEG # BLD: 3.5 K/UL (ref 1.7–8.2)
NEUTS SEG # BLD: 3.6 K/UL (ref 1.7–8.2)
NEUTS SEG # BLD: 3.8 K/UL (ref 1.7–8.2)
NEUTS SEG # BLD: 4.2 K/UL (ref 1.7–8.2)
NEUTS SEG NFR BLD: 56 % (ref 43–78)
NEUTS SEG NFR BLD: 57 % (ref 43–78)
NEUTS SEG NFR BLD: 66 % (ref 43–78)
NEUTS SEG NFR BLD: 68 % (ref 43–78)
NEUTS SEG NFR BLD: 69 % (ref 43–78)
NEUTS SEG NFR BLD: 72 % (ref 43–78)
NEUTS SEG NFR BLD: 77 % (ref 43–78)
NEUTS SEG NFR BLD: 78 % (ref 43–78)
NEUTS SEG NFR BLD: 78 % (ref 43–78)
NEUTS SEG NFR BLD: 79 % (ref 43–78)
NEUTS SEG NFR BLD: 79 % (ref 43–78)
NEUTS SEG NFR BLD: 80 % (ref 43–78)
NEUTS SEG NFR BLD: 81 % (ref 43–78)
NEUTS SEG NFR BLD: 86 % (ref 43–78)
NEUTS SEG NFR BLD: 89 % (ref 43–78)
NITRITE UR QL STRIP.AUTO: NEGATIVE
NRBC # BLD: 0 K/UL (ref 0–0.2)
PH UR STRIP: 5 [PH] (ref 5–9)
PLATELET # BLD AUTO: 103 K/UL (ref 150–450)
PLATELET # BLD AUTO: 109 K/UL (ref 150–450)
PLATELET # BLD AUTO: 110 K/UL (ref 150–450)
PLATELET # BLD AUTO: 117 K/UL (ref 150–450)
PLATELET # BLD AUTO: 120 K/UL (ref 150–450)
PLATELET # BLD AUTO: 125 K/UL (ref 150–450)
PLATELET # BLD AUTO: 126 K/UL (ref 150–450)
PLATELET # BLD AUTO: 132 K/UL (ref 150–450)
PLATELET # BLD AUTO: 134 K/UL (ref 150–450)
PLATELET # BLD AUTO: 145 K/UL (ref 150–450)
PLATELET # BLD AUTO: 163 K/UL (ref 150–450)
PLATELET # BLD AUTO: 164 K/UL (ref 150–450)
PLATELET # BLD AUTO: 166 K/UL (ref 150–450)
PLATELET # BLD AUTO: 177 K/UL (ref 150–450)
PLATELET # BLD AUTO: 187 K/UL (ref 150–450)
PLATELET # BLD AUTO: 228 K/UL (ref 150–450)
PLATELET # BLD AUTO: 99 K/UL (ref 150–450)
PLATELET COMMENTS,PCOM: ADEQUATE
PMV BLD AUTO: 10 FL (ref 9.4–12.3)
PMV BLD AUTO: 11.1 FL (ref 9.4–12.3)
PMV BLD AUTO: 11.2 FL (ref 9.4–12.3)
PMV BLD AUTO: 8.8 FL (ref 9.4–12.3)
PMV BLD AUTO: 8.9 FL (ref 9.4–12.3)
PMV BLD AUTO: 9.1 FL (ref 9.4–12.3)
PMV BLD AUTO: 9.2 FL (ref 9.4–12.3)
PMV BLD AUTO: 9.2 FL (ref 9.4–12.3)
PMV BLD AUTO: 9.4 FL (ref 9.4–12.3)
PMV BLD AUTO: 9.4 FL (ref 9.4–12.3)
PMV BLD AUTO: 9.5 FL (ref 9.4–12.3)
PMV BLD AUTO: 9.8 FL (ref 9.4–12.3)
PMV BLD AUTO: ABNORMAL FL (ref 9.4–12.3)
POTASSIUM SERPL-SCNC: 3.6 MMOL/L (ref 3.5–5.1)
POTASSIUM SERPL-SCNC: 3.8 MMOL/L (ref 3.5–5.1)
POTASSIUM SERPL-SCNC: 3.9 MMOL/L (ref 3.5–5.1)
POTASSIUM SERPL-SCNC: 4 MMOL/L (ref 3.5–5.1)
POTASSIUM SERPL-SCNC: 4.1 MMOL/L (ref 3.5–5.1)
POTASSIUM SERPL-SCNC: 4.2 MMOL/L (ref 3.5–5.1)
POTASSIUM SERPL-SCNC: 4.3 MMOL/L (ref 3.5–5.1)
POTASSIUM SERPL-SCNC: 4.3 MMOL/L (ref 3.5–5.1)
POTASSIUM SERPL-SCNC: 4.4 MMOL/L (ref 3.5–5.1)
POTASSIUM SERPL-SCNC: 4.4 MMOL/L (ref 3.5–5.1)
POTASSIUM SERPL-SCNC: 4.5 MMOL/L (ref 3.5–5.1)
POTASSIUM SERPL-SCNC: 4.5 MMOL/L (ref 3.5–5.1)
PROT PATTERN SERPL ELPH-IMP: ABNORMAL
PROT PATTERN SPEC IFE-IMP: ABNORMAL
PROT SERPL-MCNC: 5.4 G/DL (ref 6.3–8.2)
PROT SERPL-MCNC: 5.6 G/DL (ref 6.3–8.2)
PROT SERPL-MCNC: 5.7 G/DL (ref 6.3–8.2)
PROT SERPL-MCNC: 6 G/DL (ref 6.3–8.2)
PROT SERPL-MCNC: 6 G/DL (ref 6.3–8.2)
PROT SERPL-MCNC: 6.1 G/DL (ref 6.3–8.2)
PROT SERPL-MCNC: 6.2 G/DL (ref 6.3–8.2)
PROT SERPL-MCNC: 6.3 G/DL (ref 6.3–8.2)
PROT SERPL-MCNC: 6.5 G/DL (ref 6.3–8.2)
PROT SERPL-MCNC: 6.6 G/DL (ref 6.3–8.2)
PROT SERPL-MCNC: 6.7 G/DL (ref 6.3–8.2)
PROT UR STRIP-MCNC: NEGATIVE MG/DL
RBC # BLD AUTO: 2.9 M/UL (ref 4.23–5.67)
RBC # BLD AUTO: 3.13 M/UL (ref 4.23–5.67)
RBC # BLD AUTO: 3.2 M/UL (ref 4.23–5.67)
RBC # BLD AUTO: 3.23 M/UL (ref 4.23–5.67)
RBC # BLD AUTO: 3.44 M/UL (ref 4.23–5.67)
RBC # BLD AUTO: 3.48 M/UL (ref 4.23–5.67)
RBC # BLD AUTO: 3.49 M/UL (ref 4.23–5.67)
RBC # BLD AUTO: 3.57 M/UL (ref 4.23–5.67)
RBC # BLD AUTO: 3.69 M/UL (ref 4.23–5.67)
RBC # BLD AUTO: 3.7 M/UL (ref 4.23–5.67)
RBC # BLD AUTO: 3.72 M/UL (ref 4.23–5.67)
RBC # BLD AUTO: 3.86 M/UL (ref 4.23–5.67)
RBC # BLD AUTO: 3.87 M/UL (ref 4.23–5.67)
RBC # BLD AUTO: 3.88 M/UL (ref 4.23–5.67)
RBC # BLD AUTO: 3.93 M/UL (ref 4.23–5.67)
RBC # BLD AUTO: 3.95 M/UL (ref 4.23–5.67)
RBC # BLD AUTO: 3.97 M/UL (ref 4.23–5.67)
RBC MORPH BLD: ABNORMAL
RETICS # AUTO: 0.11 M/UL (ref 0.03–0.1)
RETICS/RBC NFR AUTO: 3.2 % (ref 0.3–2)
SODIUM SERPL-SCNC: 137 MMOL/L (ref 136–145)
SODIUM SERPL-SCNC: 138 MMOL/L (ref 136–145)
SODIUM SERPL-SCNC: 139 MMOL/L (ref 136–145)
SODIUM SERPL-SCNC: 140 MMOL/L (ref 136–145)
SODIUM SERPL-SCNC: 140 MMOL/L (ref 136–145)
SODIUM SERPL-SCNC: 142 MMOL/L (ref 136–145)
SP GR UR REFRACTOMETRY: 1.01 (ref 1–1.02)
SPECIMEN EXP DATE BLD: NORMAL
SPECIMEN EXP DATE BLD: NORMAL
STATUS OF UNIT,%ST: NORMAL
THYROGLOB AB SERPL-ACNC: <1 IU/ML (ref 0–0.9)
THYROGLOBULIN, SERUM, 006694: 0.1 NG/ML (ref 1.4–29.2)
THYROGLOBULIN, SERUM, 006694: 0.3 NG/ML (ref 1.4–29.2)
THYROGLOBULIN, SERUM, 006694: <0.1 NG/ML (ref 1.4–29.2)
TIBC SERPL-MCNC: 257 UG/DL (ref 250–450)
TSH W FREE THYROID I,TSHELE: 0.72 UIU/ML (ref 0.36–3.74)
TSH W FREE THYROID I,TSHELE: 1.11 UIU/ML (ref 0.36–3.74)
UNIT DIVISION, %UDIV: 0
UROBILINOGEN UR QL STRIP.AUTO: 0.2 EU/DL (ref 0.2–1)
WBC # BLD AUTO: 1.5 K/UL (ref 4.3–11.1)
WBC # BLD AUTO: 2.6 K/UL (ref 4.3–11.1)
WBC # BLD AUTO: 2.7 K/UL (ref 4.3–11.1)
WBC # BLD AUTO: 3.1 K/UL (ref 4.3–11.1)
WBC # BLD AUTO: 3.2 K/UL (ref 4.3–11.1)
WBC # BLD AUTO: 3.2 K/UL (ref 4.3–11.1)
WBC # BLD AUTO: 3.3 K/UL (ref 4.3–11.1)
WBC # BLD AUTO: 3.4 K/UL (ref 4.3–11.1)
WBC # BLD AUTO: 3.5 K/UL (ref 4.3–11.1)
WBC # BLD AUTO: 3.5 K/UL (ref 4.3–11.1)
WBC # BLD AUTO: 3.6 K/UL (ref 4.3–11.1)
WBC # BLD AUTO: 3.9 K/UL (ref 4.3–11.1)
WBC # BLD AUTO: 4 K/UL (ref 4.3–11.1)
WBC # BLD AUTO: 4.4 K/UL (ref 4.3–11.1)
WBC # BLD AUTO: 4.6 K/UL (ref 4.3–11.1)
WBC # BLD AUTO: 5.3 K/UL (ref 4.3–11.1)
WBC # BLD AUTO: 5.4 K/UL (ref 4.3–11.1)
WBC MORPH BLD: ABNORMAL

## 2019-01-01 PROCEDURE — 74011636320 HC RX REV CODE- 636/320: Performed by: INTERNAL MEDICINE

## 2019-01-01 PROCEDURE — 81003 URINALYSIS AUTO W/O SCOPE: CPT

## 2019-01-01 PROCEDURE — 82728 ASSAY OF FERRITIN: CPT

## 2019-01-01 PROCEDURE — 96375 TX/PRO/DX INJ NEW DRUG ADDON: CPT

## 2019-01-01 PROCEDURE — 74011250636 HC RX REV CODE- 250/636: Performed by: NURSE PRACTITIONER

## 2019-01-01 PROCEDURE — 83735 ASSAY OF MAGNESIUM: CPT

## 2019-01-01 PROCEDURE — 36430 TRANSFUSION BLD/BLD COMPNT: CPT

## 2019-01-01 PROCEDURE — 85025 COMPLETE CBC W/AUTO DIFF WBC: CPT

## 2019-01-01 PROCEDURE — 80053 COMPREHEN METABOLIC PANEL: CPT

## 2019-01-01 PROCEDURE — 74011000258 HC RX REV CODE- 258: Performed by: NURSE PRACTITIONER

## 2019-01-01 PROCEDURE — 96413 CHEMO IV INFUSION 1 HR: CPT

## 2019-01-01 PROCEDURE — 86644 CMV ANTIBODY: CPT

## 2019-01-01 PROCEDURE — 84432 ASSAY OF THYROGLOBULIN: CPT

## 2019-01-01 PROCEDURE — 96361 HYDRATE IV INFUSION ADD-ON: CPT

## 2019-01-01 PROCEDURE — P9040 RBC LEUKOREDUCED IRRADIATED: HCPCS

## 2019-01-01 PROCEDURE — 74011250636 HC RX REV CODE- 250/636

## 2019-01-01 PROCEDURE — 86923 COMPATIBILITY TEST ELECTRIC: CPT

## 2019-01-01 PROCEDURE — 83883 ASSAY NEPHELOMETRY NOT SPEC: CPT

## 2019-01-01 PROCEDURE — 86800 THYROGLOBULIN ANTIBODY: CPT

## 2019-01-01 PROCEDURE — 77030018667 ADMN ST IV BLD FENW -A

## 2019-01-01 PROCEDURE — 74011000258 HC RX REV CODE- 258: Performed by: INTERNAL MEDICINE

## 2019-01-01 PROCEDURE — 96417 CHEMO IV INFUS EACH ADDL SEQ: CPT

## 2019-01-01 PROCEDURE — 96372 THER/PROPH/DIAG INJ SC/IM: CPT

## 2019-01-01 PROCEDURE — 96367 TX/PROPH/DG ADDL SEQ IV INF: CPT

## 2019-01-01 PROCEDURE — 84443 ASSAY THYROID STIM HORMONE: CPT

## 2019-01-01 PROCEDURE — 86334 IMMUNOFIX E-PHORESIS SERUM: CPT

## 2019-01-01 PROCEDURE — 84165 PROTEIN E-PHORESIS SERUM: CPT

## 2019-01-01 PROCEDURE — 74011250636 HC RX REV CODE- 250/636: Performed by: INTERNAL MEDICINE

## 2019-01-01 PROCEDURE — 83540 ASSAY OF IRON: CPT

## 2019-01-01 PROCEDURE — 74011250637 HC RX REV CODE- 250/637: Performed by: INTERNAL MEDICINE

## 2019-01-01 PROCEDURE — 74011000250 HC RX REV CODE- 250: Performed by: INTERNAL MEDICINE

## 2019-01-01 PROCEDURE — 36415 COLL VENOUS BLD VENIPUNCTURE: CPT

## 2019-01-01 PROCEDURE — 86900 BLOOD TYPING SEROLOGIC ABO: CPT

## 2019-01-01 PROCEDURE — 85046 RETICYTE/HGB CONCENTRATE: CPT

## 2019-01-01 PROCEDURE — A9528 IODINE I-131 IODIDE CAP, DX: HCPCS

## 2019-01-01 PROCEDURE — 71260 CT THORAX DX C+: CPT

## 2019-01-01 PROCEDURE — 96411 CHEMO IV PUSH ADDL DRUG: CPT

## 2019-01-01 PROCEDURE — 83615 LACTATE (LD) (LDH) ENZYME: CPT

## 2019-01-01 PROCEDURE — 76536 US EXAM OF HEAD AND NECK: CPT

## 2019-01-01 RX ORDER — ONDANSETRON 2 MG/ML
8 INJECTION INTRAMUSCULAR; INTRAVENOUS ONCE
Status: COMPLETED | OUTPATIENT
Start: 2019-01-01 | End: 2019-01-01

## 2019-01-01 RX ORDER — SODIUM CHLORIDE 9 MG/ML
10 INJECTION INTRAMUSCULAR; INTRAVENOUS; SUBCUTANEOUS AS NEEDED
Status: CANCELLED | OUTPATIENT
Start: 2019-01-01

## 2019-01-01 RX ORDER — SODIUM CHLORIDE 0.9 % (FLUSH) 0.9 %
10 SYRINGE (ML) INJECTION AS NEEDED
Status: ACTIVE | OUTPATIENT
Start: 2019-01-01 | End: 2019-01-01

## 2019-01-01 RX ORDER — EPINEPHRINE 1 MG/ML
0.3 INJECTION, SOLUTION, CONCENTRATE INTRAVENOUS AS NEEDED
Status: CANCELLED | OUTPATIENT
Start: 2019-01-01

## 2019-01-01 RX ORDER — ONDANSETRON 2 MG/ML
8 INJECTION INTRAMUSCULAR; INTRAVENOUS AS NEEDED
Status: CANCELLED | OUTPATIENT
Start: 2019-01-01

## 2019-01-01 RX ORDER — SODIUM CHLORIDE 9 MG/ML
25 INJECTION, SOLUTION INTRAVENOUS CONTINUOUS
Status: ACTIVE | OUTPATIENT
Start: 2019-01-01 | End: 2019-01-01

## 2019-01-01 RX ORDER — SODIUM CHLORIDE 9 MG/ML
25 INJECTION, SOLUTION INTRAVENOUS CONTINUOUS
Status: CANCELLED | OUTPATIENT
Start: 2019-01-01

## 2019-01-01 RX ORDER — DIPHENHYDRAMINE HYDROCHLORIDE 50 MG/ML
50 INJECTION, SOLUTION INTRAMUSCULAR; INTRAVENOUS AS NEEDED
Status: CANCELLED
Start: 2019-01-01

## 2019-01-01 RX ORDER — SODIUM CHLORIDE 0.9 % (FLUSH) 0.9 %
10 SYRINGE (ML) INJECTION AS NEEDED
Status: CANCELLED
Start: 2019-01-01

## 2019-01-01 RX ORDER — ONDANSETRON 2 MG/ML
8 INJECTION INTRAMUSCULAR; INTRAVENOUS ONCE
Status: CANCELLED | OUTPATIENT
Start: 2019-01-01

## 2019-01-01 RX ORDER — HEPARIN 100 UNIT/ML
300-500 SYRINGE INTRAVENOUS AS NEEDED
Status: CANCELLED
Start: 2019-01-01

## 2019-01-01 RX ORDER — ALBUTEROL SULFATE 0.83 MG/ML
2.5 SOLUTION RESPIRATORY (INHALATION) AS NEEDED
Status: CANCELLED
Start: 2019-01-01

## 2019-01-01 RX ORDER — ACETAMINOPHEN 325 MG/1
650 TABLET ORAL AS NEEDED
Status: CANCELLED
Start: 2019-01-01

## 2019-01-01 RX ORDER — SODIUM CHLORIDE 9 MG/ML
250 INJECTION, SOLUTION INTRAVENOUS AS NEEDED
Status: DISCONTINUED | OUTPATIENT
Start: 2019-01-01 | End: 2019-01-01 | Stop reason: HOSPADM

## 2019-01-01 RX ORDER — ACETAMINOPHEN 325 MG/1
650 TABLET ORAL ONCE
Status: COMPLETED | OUTPATIENT
Start: 2019-01-01 | End: 2019-01-01

## 2019-01-01 RX ORDER — HYDROCORTISONE SODIUM SUCCINATE 100 MG/2ML
100 INJECTION, POWDER, FOR SOLUTION INTRAMUSCULAR; INTRAVENOUS AS NEEDED
Status: CANCELLED | OUTPATIENT
Start: 2019-01-01

## 2019-01-01 RX ORDER — DIPHENHYDRAMINE HCL 25 MG
25 CAPSULE ORAL ONCE
Status: COMPLETED | OUTPATIENT
Start: 2019-01-01 | End: 2019-01-01

## 2019-01-01 RX ORDER — ACETAMINOPHEN 325 MG/1
650 TABLET ORAL ONCE
Status: DISCONTINUED | OUTPATIENT
Start: 2019-01-01 | End: 2019-01-01

## 2019-01-01 RX ORDER — SODIUM CHLORIDE 0.9 % (FLUSH) 0.9 %
10 SYRINGE (ML) INJECTION AS NEEDED
Status: DISCONTINUED | OUTPATIENT
Start: 2019-01-01 | End: 2020-01-01 | Stop reason: HOSPADM

## 2019-01-01 RX ORDER — SODIUM CHLORIDE 9 MG/ML
25 INJECTION, SOLUTION INTRAVENOUS CONTINUOUS
Status: DISCONTINUED | OUTPATIENT
Start: 2019-01-01 | End: 2019-01-01 | Stop reason: HOSPADM

## 2019-01-01 RX ORDER — SODIUM CHLORIDE 0.9 % (FLUSH) 0.9 %
10-80 SYRINGE (ML) INJECTION AS NEEDED
Status: DISCONTINUED | OUTPATIENT
Start: 2019-01-01 | End: 2019-01-01 | Stop reason: HOSPADM

## 2019-01-01 RX ORDER — SODIUM CHLORIDE 0.9 % (FLUSH) 0.9 %
10 SYRINGE (ML) INJECTION AS NEEDED
Status: DISCONTINUED | OUTPATIENT
Start: 2019-01-01 | End: 2019-01-01 | Stop reason: HOSPADM

## 2019-01-01 RX ORDER — SODIUM CHLORIDE 0.9 % (FLUSH) 0.9 %
10 SYRINGE (ML) INJECTION
Status: COMPLETED | OUTPATIENT
Start: 2019-01-01 | End: 2019-01-01

## 2019-01-01 RX ORDER — HYDROCORTISONE SODIUM SUCCINATE 100 MG/2ML
100 INJECTION, POWDER, FOR SOLUTION INTRAMUSCULAR; INTRAVENOUS AS NEEDED
Status: ACTIVE | OUTPATIENT
Start: 2019-01-01 | End: 2019-01-01

## 2019-01-01 RX ORDER — DIPHENHYDRAMINE HYDROCHLORIDE 50 MG/ML
50 INJECTION, SOLUTION INTRAMUSCULAR; INTRAVENOUS AS NEEDED
Status: ACTIVE | OUTPATIENT
Start: 2019-01-01 | End: 2019-01-01

## 2019-01-01 RX ORDER — SODIUM CHLORIDE 0.9 % (FLUSH) 0.9 %
5-10 SYRINGE (ML) INJECTION EVERY 8 HOURS
Status: DISCONTINUED | OUTPATIENT
Start: 2019-01-01 | End: 2019-01-01 | Stop reason: HOSPADM

## 2019-01-01 RX ORDER — DIPHENHYDRAMINE HCL 25 MG
25 CAPSULE ORAL
Status: DISCONTINUED | OUTPATIENT
Start: 2019-01-01 | End: 2019-01-01 | Stop reason: HOSPADM

## 2019-01-01 RX ORDER — SODIUM CHLORIDE 9 MG/ML
25 INJECTION, SOLUTION INTRAVENOUS CONTINUOUS
Status: DISCONTINUED | OUTPATIENT
Start: 2019-01-01 | End: 2020-01-01 | Stop reason: HOSPADM

## 2019-01-01 RX ADMIN — CARFILZOMIB 130 MG: 10 INJECTION, POWDER, LYOPHILIZED, FOR SOLUTION INTRAVENOUS at 14:02

## 2019-01-01 RX ADMIN — SODIUM CHLORIDE 250 ML: 900 INJECTION, SOLUTION INTRAVENOUS at 10:55

## 2019-01-01 RX ADMIN — Medication 10 ML: at 12:37

## 2019-01-01 RX ADMIN — SODIUM CHLORIDE 25 ML/HR: 9 INJECTION, SOLUTION INTRAVENOUS at 10:57

## 2019-01-01 RX ADMIN — Medication 10 ML: at 16:15

## 2019-01-01 RX ADMIN — CYCLOPHOSPHAMIDE 570 MG: 1 INJECTION, POWDER, FOR SOLUTION INTRAVENOUS; ORAL at 11:58

## 2019-01-01 RX ADMIN — SODIUM CHLORIDE 3.5 MG: 900 INJECTION, SOLUTION INTRAVENOUS at 11:35

## 2019-01-01 RX ADMIN — Medication 10 ML: at 10:48

## 2019-01-01 RX ADMIN — ONDANSETRON 8 MG: 2 INJECTION INTRAMUSCULAR; INTRAVENOUS at 10:58

## 2019-01-01 RX ADMIN — SODIUM CHLORIDE 250 ML: 900 INJECTION, SOLUTION INTRAVENOUS at 16:17

## 2019-01-01 RX ADMIN — CYCLOPHOSPHAMIDE 570 MG: 1 INJECTION, POWDER, FOR SOLUTION INTRAVENOUS; ORAL at 11:31

## 2019-01-01 RX ADMIN — Medication 10 ML: at 09:47

## 2019-01-01 RX ADMIN — ONDANSETRON 8 MG: 2 INJECTION INTRAMUSCULAR; INTRAVENOUS at 10:26

## 2019-01-01 RX ADMIN — SODIUM CHLORIDE 250 ML: 900 INJECTION, SOLUTION INTRAVENOUS at 11:27

## 2019-01-01 RX ADMIN — CARFILZOMIB 130 MG: 10 INJECTION, POWDER, LYOPHILIZED, FOR SOLUTION INTRAVENOUS at 16:48

## 2019-01-01 RX ADMIN — Medication 10 ML: at 18:10

## 2019-01-01 RX ADMIN — CYCLOPHOSPHAMIDE 576 MG: 1 INJECTION, POWDER, FOR SOLUTION INTRAVENOUS; ORAL at 16:58

## 2019-01-01 RX ADMIN — ONDANSETRON 8 MG: 2 INJECTION INTRAMUSCULAR; INTRAVENOUS at 09:45

## 2019-01-01 RX ADMIN — Medication 10 ML: at 15:30

## 2019-01-01 RX ADMIN — Medication 10 ML: at 11:20

## 2019-01-01 RX ADMIN — CARFILZOMIB 130 MG: 10 INJECTION, POWDER, LYOPHILIZED, FOR SOLUTION INTRAVENOUS at 11:40

## 2019-01-01 RX ADMIN — ONDANSETRON 8 MG: 2 INJECTION INTRAMUSCULAR; INTRAVENOUS at 11:22

## 2019-01-01 RX ADMIN — CYCLOPHOSPHAMIDE 576 MG: 1 INJECTION, POWDER, FOR SOLUTION INTRAVENOUS; ORAL at 11:47

## 2019-01-01 RX ADMIN — SODIUM CHLORIDE 100 ML: 900 INJECTION, SOLUTION INTRAVENOUS at 16:25

## 2019-01-01 RX ADMIN — SODIUM CHLORIDE 250 ML: 900 INJECTION, SOLUTION INTRAVENOUS at 10:48

## 2019-01-01 RX ADMIN — Medication 10 ML: at 09:45

## 2019-01-01 RX ADMIN — CYCLOPHOSPHAMIDE 567 MG: 1 INJECTION, POWDER, FOR SOLUTION INTRAVENOUS; ORAL at 12:17

## 2019-01-01 RX ADMIN — Medication 10 ML: at 12:29

## 2019-01-01 RX ADMIN — CARFILZOMIB 130 MG: 10 INJECTION, POWDER, LYOPHILIZED, FOR SOLUTION INTRAVENOUS at 11:38

## 2019-01-01 RX ADMIN — Medication 10 ML: at 12:55

## 2019-01-01 RX ADMIN — Medication 10 ML: at 18:00

## 2019-01-01 RX ADMIN — SODIUM CHLORIDE 25 ML/HR: 900 INJECTION, SOLUTION INTRAVENOUS at 12:42

## 2019-01-01 RX ADMIN — SODIUM CHLORIDE 3.5 MG: 900 INJECTION, SOLUTION INTRAVENOUS at 17:32

## 2019-01-01 RX ADMIN — CARFILZOMIB 130 MG: 10 INJECTION, POWDER, LYOPHILIZED, FOR SOLUTION INTRAVENOUS at 10:57

## 2019-01-01 RX ADMIN — Medication 10 ML: at 13:14

## 2019-01-01 RX ADMIN — SODIUM CHLORIDE 250 ML: 900 INJECTION, SOLUTION INTRAVENOUS at 11:20

## 2019-01-01 RX ADMIN — CYCLOPHOSPHAMIDE 573 MG: 1 INJECTION, POWDER, FOR SOLUTION INTRAVENOUS; ORAL at 17:20

## 2019-01-01 RX ADMIN — Medication 10 ML: at 17:31

## 2019-01-01 RX ADMIN — ONDANSETRON 8 MG: 2 INJECTION INTRAMUSCULAR; INTRAVENOUS at 11:05

## 2019-01-01 RX ADMIN — SODIUM CHLORIDE 250 ML: 900 INJECTION, SOLUTION INTRAVENOUS at 11:00

## 2019-01-01 RX ADMIN — Medication 10 ML: at 08:40

## 2019-01-01 RX ADMIN — ONDANSETRON 8 MG: 2 INJECTION INTRAMUSCULAR; INTRAVENOUS at 14:30

## 2019-01-01 RX ADMIN — SODIUM CHLORIDE 500 ML: 900 INJECTION, SOLUTION INTRAVENOUS at 08:51

## 2019-01-01 RX ADMIN — ONDANSETRON 8 MG: 2 INJECTION INTRAMUSCULAR; INTRAVENOUS at 11:42

## 2019-01-01 RX ADMIN — CARFILZOMIB 130 MG: 10 INJECTION, POWDER, LYOPHILIZED, FOR SOLUTION INTRAVENOUS at 16:25

## 2019-01-01 RX ADMIN — SODIUM CHLORIDE 25 ML/HR: 900 INJECTION, SOLUTION INTRAVENOUS at 14:46

## 2019-01-01 RX ADMIN — THYROTROPIN ALFA 0.9 MG: 0.9 INJECTION, POWDER, FOR SOLUTION INTRAMUSCULAR at 08:24

## 2019-01-01 RX ADMIN — SODIUM CHLORIDE 250 ML: 900 INJECTION, SOLUTION INTRAVENOUS at 17:31

## 2019-01-01 RX ADMIN — SODIUM CHLORIDE 250 ML: 900 INJECTION, SOLUTION INTRAVENOUS at 11:13

## 2019-01-01 RX ADMIN — CYCLOPHOSPHAMIDE 564 MG: 1 INJECTION, POWDER, FOR SOLUTION INTRAVENOUS; ORAL at 17:00

## 2019-01-01 RX ADMIN — SODIUM CHLORIDE 500 ML: 900 INJECTION, SOLUTION INTRAVENOUS at 13:15

## 2019-01-01 RX ADMIN — CARFILZOMIB 130 MG: 10 INJECTION, POWDER, LYOPHILIZED, FOR SOLUTION INTRAVENOUS at 12:10

## 2019-01-01 RX ADMIN — Medication 10 ML: at 11:55

## 2019-01-01 RX ADMIN — CYCLOPHOSPHAMIDE 573 MG: 1 INJECTION, POWDER, FOR SOLUTION INTRAVENOUS; ORAL at 12:34

## 2019-01-01 RX ADMIN — THYROTROPIN ALFA 0.9 MG: 0.9 INJECTION, POWDER, FOR SOLUTION INTRAMUSCULAR at 08:52

## 2019-01-01 RX ADMIN — SODIUM CHLORIDE 3.5 MG: 900 INJECTION, SOLUTION INTRAVENOUS at 09:49

## 2019-01-01 RX ADMIN — Medication 10 ML: at 12:04

## 2019-01-01 RX ADMIN — Medication 10 ML: at 14:15

## 2019-01-01 RX ADMIN — SODIUM CHLORIDE 250 ML: 900 INJECTION, SOLUTION INTRAVENOUS at 14:15

## 2019-01-01 RX ADMIN — SODIUM CHLORIDE 250 ML: 900 INJECTION, SOLUTION INTRAVENOUS at 09:50

## 2019-01-01 RX ADMIN — SODIUM CHLORIDE 500 ML: 900 INJECTION, SOLUTION INTRAVENOUS at 09:40

## 2019-01-01 RX ADMIN — IOPAMIDOL 80 ML: 755 INJECTION, SOLUTION INTRAVENOUS at 16:24

## 2019-01-01 RX ADMIN — CARFILZOMIB 130 MG: 10 INJECTION, POWDER, LYOPHILIZED, FOR SOLUTION INTRAVENOUS at 11:28

## 2019-01-01 RX ADMIN — SODIUM CHLORIDE 100 ML/HR: 900 INJECTION, SOLUTION INTRAVENOUS at 15:30

## 2019-01-01 RX ADMIN — Medication 10 ML: at 16:57

## 2019-01-01 RX ADMIN — ACETAMINOPHEN 650 MG: 325 TABLET, FILM COATED ORAL at 09:41

## 2019-01-01 RX ADMIN — CARFILZOMIB 130 MG: 10 INJECTION, POWDER, LYOPHILIZED, FOR SOLUTION INTRAVENOUS at 14:50

## 2019-01-01 RX ADMIN — CYCLOPHOSPHAMIDE 573 MG: 1 INJECTION, POWDER, FOR SOLUTION INTRAVENOUS; ORAL at 14:38

## 2019-01-01 RX ADMIN — SODIUM CHLORIDE 250 ML: 900 INJECTION, SOLUTION INTRAVENOUS at 10:50

## 2019-01-01 RX ADMIN — CARFILZOMIB 130 MG: 10 INJECTION, POWDER, LYOPHILIZED, FOR SOLUTION INTRAVENOUS at 10:34

## 2019-01-01 RX ADMIN — ONDANSETRON 8 MG: 2 INJECTION INTRAMUSCULAR; INTRAVENOUS at 10:25

## 2019-01-01 RX ADMIN — Medication 10 ML: at 11:53

## 2019-01-01 RX ADMIN — SODIUM CHLORIDE 25 ML/HR: 900 INJECTION, SOLUTION INTRAVENOUS at 16:48

## 2019-01-01 RX ADMIN — SODIUM CHLORIDE 250 ML: 900 INJECTION, SOLUTION INTRAVENOUS at 11:44

## 2019-01-01 RX ADMIN — DIPHENHYDRAMINE HYDROCHLORIDE 25 MG: 25 CAPSULE ORAL at 11:37

## 2019-01-01 RX ADMIN — CARFILZOMIB 130 MG: 10 INJECTION, POWDER, LYOPHILIZED, FOR SOLUTION INTRAVENOUS at 10:37

## 2019-01-01 RX ADMIN — CARFILZOMIB 130 MG: 10 INJECTION, POWDER, LYOPHILIZED, FOR SOLUTION INTRAVENOUS at 11:57

## 2019-01-01 RX ADMIN — Medication 10 ML: at 15:49

## 2019-01-01 RX ADMIN — Medication 10 ML: at 13:07

## 2019-01-01 RX ADMIN — Medication 10 ML: at 16:25

## 2019-01-01 RX ADMIN — CARFILZOMIB 130 MG: 10 INJECTION, POWDER, LYOPHILIZED, FOR SOLUTION INTRAVENOUS at 12:05

## 2019-01-01 RX ADMIN — CYCLOPHOSPHAMIDE 576 MG: 1 INJECTION, POWDER, FOR SOLUTION INTRAVENOUS; ORAL at 12:36

## 2019-01-01 RX ADMIN — CARFILZOMIB 130 MG: 10 INJECTION, POWDER, LYOPHILIZED, FOR SOLUTION INTRAVENOUS at 12:00

## 2019-01-01 RX ADMIN — Medication 60 ML: at 09:10

## 2019-01-01 RX ADMIN — DIPHENHYDRAMINE HYDROCHLORIDE 25 MG: 25 CAPSULE ORAL at 09:41

## 2019-01-01 RX ADMIN — CARFILZOMIB 130 MG: 10 INJECTION, POWDER, LYOPHILIZED, FOR SOLUTION INTRAVENOUS at 11:00

## 2019-01-01 RX ADMIN — ONDANSETRON 8 MG: 2 INJECTION INTRAMUSCULAR; INTRAVENOUS at 11:20

## 2019-01-01 RX ADMIN — CYCLOPHOSPHAMIDE 573 MG: 1 INJECTION, POWDER, FOR SOLUTION INTRAVENOUS; ORAL at 15:40

## 2019-01-01 RX ADMIN — SODIUM CHLORIDE 3.5 MG: 900 INJECTION, SOLUTION INTRAVENOUS at 10:30

## 2019-01-01 RX ADMIN — CYCLOPHOSPHAMIDE 576 MG: 1 INJECTION, POWDER, FOR SOLUTION INTRAVENOUS; ORAL at 12:46

## 2019-01-01 RX ADMIN — ACETAMINOPHEN 650 MG: 325 TABLET, FILM COATED ORAL at 11:37

## 2019-01-01 RX ADMIN — SODIUM CHLORIDE 25 ML/HR: 900 INJECTION, SOLUTION INTRAVENOUS at 12:00

## 2019-01-01 RX ADMIN — ONDANSETRON 8 MG: 2 INJECTION INTRAMUSCULAR; INTRAVENOUS at 16:11

## 2019-01-01 RX ADMIN — ONDANSETRON 8 MG: 2 INJECTION INTRAMUSCULAR; INTRAVENOUS at 10:20

## 2019-01-01 RX ADMIN — CYCLOPHOSPHAMIDE 573 MG: 1 INJECTION, POWDER, FOR SOLUTION INTRAVENOUS; ORAL at 11:13

## 2019-01-01 RX ADMIN — SODIUM CHLORIDE 250 ML: 900 INJECTION, SOLUTION INTRAVENOUS at 11:23

## 2019-01-01 RX ADMIN — ONDANSETRON 8 MG: 2 INJECTION INTRAMUSCULAR; INTRAVENOUS at 11:24

## 2019-01-01 RX ADMIN — CARFILZOMIB 130 MG: 10 INJECTION, POWDER, LYOPHILIZED, FOR SOLUTION INTRAVENOUS at 16:24

## 2019-01-01 RX ADMIN — SODIUM CHLORIDE 25 ML/HR: 900 INJECTION, SOLUTION INTRAVENOUS at 10:14

## 2019-01-01 RX ADMIN — ONDANSETRON 8 MG: 2 INJECTION INTRAMUSCULAR; INTRAVENOUS at 10:15

## 2019-01-01 RX ADMIN — SODIUM CHLORIDE 250 ML: 900 INJECTION, SOLUTION INTRAVENOUS at 10:27

## 2019-01-01 RX ADMIN — SODIUM CHLORIDE 3.5 MG: 900 INJECTION, SOLUTION INTRAVENOUS at 15:25

## 2019-01-01 RX ADMIN — SODIUM CHLORIDE 25 ML/HR: 900 INJECTION, SOLUTION INTRAVENOUS at 12:41

## 2019-01-01 RX ADMIN — ONDANSETRON 8 MG: 2 INJECTION INTRAMUSCULAR; INTRAVENOUS at 16:00

## 2019-01-01 RX ADMIN — CYCLOPHOSPHAMIDE 576 MG: 1 INJECTION, POWDER, FOR SOLUTION INTRAVENOUS; ORAL at 11:35

## 2019-01-01 RX ADMIN — CYCLOPHOSPHAMIDE 570 MG: 1 INJECTION, POWDER, FOR SOLUTION INTRAVENOUS; ORAL at 11:16

## 2019-01-01 RX ADMIN — SODIUM CHLORIDE 250 ML: 900 INJECTION, SOLUTION INTRAVENOUS at 15:41

## 2019-01-01 RX ADMIN — SODIUM CHLORIDE 25 ML/HR: 900 INJECTION, SOLUTION INTRAVENOUS at 15:57

## 2019-01-01 RX ADMIN — CYCLOPHOSPHAMIDE 576 MG: 1 INJECTION, POWDER, FOR SOLUTION INTRAVENOUS; ORAL at 12:12

## 2019-01-01 RX ADMIN — SODIUM CHLORIDE 25 ML/HR: 900 INJECTION, SOLUTION INTRAVENOUS at 11:35

## 2019-01-01 RX ADMIN — SODIUM CHLORIDE: 900 INJECTION, SOLUTION INTRAVENOUS at 15:10

## 2019-01-01 RX ADMIN — SODIUM CHLORIDE 25 ML/HR: 900 INJECTION, SOLUTION INTRAVENOUS at 09:47

## 2019-01-01 RX ADMIN — SODIUM CHLORIDE 250 ML: 900 INJECTION, SOLUTION INTRAVENOUS at 10:20

## 2019-01-01 RX ADMIN — ONDANSETRON 8 MG: 2 INJECTION INTRAMUSCULAR; INTRAVENOUS at 13:14

## 2019-01-01 RX ADMIN — SODIUM CHLORIDE 25 ML/HR: 900 INJECTION, SOLUTION INTRAVENOUS at 10:07

## 2019-01-01 RX ADMIN — SODIUM CHLORIDE 25 ML/HR: 900 INJECTION, SOLUTION INTRAVENOUS at 10:55

## 2019-01-01 RX ADMIN — SODIUM CHLORIDE 25 ML/HR: 900 INJECTION, SOLUTION INTRAVENOUS at 10:50

## 2019-01-01 RX ADMIN — Medication 10 ML: at 17:53

## 2019-01-01 RX ADMIN — Medication 10 ML: at 11:11

## 2019-01-01 RX ADMIN — SODIUM CHLORIDE 250 ML: 900 INJECTION, SOLUTION INTRAVENOUS at 09:47

## 2019-01-01 RX ADMIN — ONDANSETRON 8 MG: 2 INJECTION INTRAMUSCULAR; INTRAVENOUS at 16:16

## 2019-01-09 ENCOUNTER — PATIENT OUTREACH (OUTPATIENT)
Dept: CASE MANAGEMENT | Age: 79
End: 2019-01-09

## 2019-01-09 ENCOUNTER — HOSPITAL ENCOUNTER (OUTPATIENT)
Dept: INFUSION THERAPY | Age: 79
Discharge: HOME | End: 2019-01-09
Payer: MEDICARE

## 2019-01-09 ENCOUNTER — HOSPITAL ENCOUNTER (OUTPATIENT)
Dept: LAB | Age: 79
Discharge: HOME | End: 2019-01-09
Payer: MEDICARE

## 2019-01-09 VITALS
HEART RATE: 71 BPM | SYSTOLIC BLOOD PRESSURE: 114 MMHG | RESPIRATION RATE: 16 BRPM | DIASTOLIC BLOOD PRESSURE: 57 MMHG | OXYGEN SATURATION: 95 % | TEMPERATURE: 97.5 F

## 2019-01-09 DIAGNOSIS — C90.00 MULTIPLE MYELOMA, REMISSION STATUS UNSPECIFIED (HCC): ICD-10-CM

## 2019-01-09 DIAGNOSIS — C90.02 MULTIPLE MYELOMA IN RELAPSE (HCC): ICD-10-CM

## 2019-01-09 DIAGNOSIS — C90.00 MULTIPLE MYELOMA NOT HAVING ACHIEVED REMISSION (HCC): ICD-10-CM

## 2019-01-09 DIAGNOSIS — C90.00 MULTIPLE MYELOMA, REMISSION STATUS UNSPECIFIED (HCC): Primary | ICD-10-CM

## 2019-01-09 DIAGNOSIS — C90.00 MULTIPLE MYELOMA NOT HAVING ACHIEVED REMISSION (HCC): Primary | ICD-10-CM

## 2019-01-09 LAB
ALBUMIN SERPL-MCNC: 3.8 G/DL (ref 3.2–4.6)
ALBUMIN/GLOB SERPL: 1.4 {RATIO} (ref 1.2–3.5)
ALP SERPL-CCNC: 69 U/L (ref 50–136)
ALT SERPL-CCNC: 20 U/L (ref 12–65)
ANION GAP SERPL CALC-SCNC: 8 MMOL/L (ref 7–16)
AST SERPL-CCNC: 37 U/L (ref 15–37)
BASOPHILS # BLD: 0 K/UL (ref 0–0.2)
BASOPHILS NFR BLD: 0 % (ref 0–2)
BILIRUB SERPL-MCNC: 0.6 MG/DL (ref 0.2–1.1)
BUN SERPL-MCNC: 19 MG/DL (ref 8–23)
CALCIUM SERPL-MCNC: 7.9 MG/DL (ref 8.3–10.4)
CHLORIDE SERPL-SCNC: 101 MMOL/L (ref 98–107)
CO2 SERPL-SCNC: 26 MMOL/L (ref 21–32)
CREAT SERPL-MCNC: 1.25 MG/DL (ref 0.8–1.5)
DIFFERENTIAL METHOD BLD: ABNORMAL
EOSINOPHIL # BLD: 0 K/UL (ref 0–0.8)
EOSINOPHIL NFR BLD: 1 % (ref 0.5–7.8)
ERYTHROCYTE [DISTWIDTH] IN BLOOD BY AUTOMATED COUNT: 13.5 % (ref 11.9–14.6)
GLOBULIN SER CALC-MCNC: 2.8 G/DL (ref 2.3–3.5)
GLUCOSE SERPL-MCNC: 176 MG/DL (ref 65–100)
HCT VFR BLD AUTO: 35.9 % (ref 41.1–50.3)
HGB BLD-MCNC: 11.9 G/DL (ref 13.6–17.2)
IMM GRANULOCYTES # BLD: 0 K/UL (ref 0–0.5)
IMM GRANULOCYTES NFR BLD AUTO: 0 % (ref 0–5)
KAPPA LC FREE SER-MCNC: 4.63 MG/L (ref 3.3–19.4)
KAPPA LC FREE/LAMBDA FREE SER: 0.54 {RATIO} (ref 0.26–1.65)
LAMBDA LC FREE SERPL-MCNC: 8.65 MG/L (ref 5.71–26.3)
LYMPHOCYTES # BLD: 1.2 K/UL (ref 0.5–4.6)
LYMPHOCYTES NFR BLD: 29 % (ref 13–44)
MAGNESIUM SERPL-MCNC: 2.4 MG/DL (ref 1.8–2.4)
MCH RBC QN AUTO: 23.9 PG (ref 26.1–32.9)
MCHC RBC AUTO-ENTMCNC: 33.1 G/DL (ref 31.4–35)
MCV RBC AUTO: 72.1 FL (ref 79.6–97.8)
MONOCYTES # BLD: 0.3 K/UL (ref 0.1–1.3)
MONOCYTES NFR BLD: 6 % (ref 4–12)
NEUTS SEG # BLD: 2.7 K/UL (ref 1.7–8.2)
NEUTS SEG NFR BLD: 64 % (ref 43–78)
NRBC # BLD: 0 K/UL (ref 0–0.2)
PLATELET # BLD AUTO: 162 K/UL (ref 150–450)
PMV BLD AUTO: 10.4 FL (ref 9.4–12.3)
POTASSIUM SERPL-SCNC: 3.9 MMOL/L (ref 3.5–5.1)
PROT SERPL-MCNC: 6.6 G/DL (ref 6.3–8.2)
RBC # BLD AUTO: 4.98 M/UL (ref 4.23–5.67)
SODIUM SERPL-SCNC: 135 MMOL/L (ref 136–145)
WBC # BLD AUTO: 4.2 K/UL (ref 4.3–11.1)

## 2019-01-09 PROCEDURE — 74011250637 HC RX REV CODE- 250/637: Performed by: INTERNAL MEDICINE

## 2019-01-09 PROCEDURE — 85025 COMPLETE CBC W/AUTO DIFF WBC: CPT

## 2019-01-09 PROCEDURE — 84165 PROTEIN E-PHORESIS SERUM: CPT

## 2019-01-09 PROCEDURE — 80053 COMPREHEN METABOLIC PANEL: CPT

## 2019-01-09 PROCEDURE — 74011250636 HC RX REV CODE- 250/636: Performed by: NURSE PRACTITIONER

## 2019-01-09 PROCEDURE — 96361 HYDRATE IV INFUSION ADD-ON: CPT

## 2019-01-09 PROCEDURE — 83735 ASSAY OF MAGNESIUM: CPT

## 2019-01-09 PROCEDURE — 96415 CHEMO IV INFUSION ADDL HR: CPT

## 2019-01-09 PROCEDURE — 96413 CHEMO IV INFUSION 1 HR: CPT

## 2019-01-09 PROCEDURE — 86334 IMMUNOFIX E-PHORESIS SERUM: CPT

## 2019-01-09 PROCEDURE — 83883 ASSAY NEPHELOMETRY NOT SPEC: CPT

## 2019-01-09 PROCEDURE — 96375 TX/PRO/DX INJ NEW DRUG ADDON: CPT

## 2019-01-09 PROCEDURE — 74011250636 HC RX REV CODE- 250/636: Performed by: INTERNAL MEDICINE

## 2019-01-09 PROCEDURE — 82784 ASSAY IGA/IGD/IGG/IGM EACH: CPT

## 2019-01-09 PROCEDURE — 74011000258 HC RX REV CODE- 258: Performed by: NURSE PRACTITIONER

## 2019-01-09 PROCEDURE — 74011000258 HC RX REV CODE- 258: Performed by: INTERNAL MEDICINE

## 2019-01-09 RX ORDER — SODIUM CHLORIDE 9 MG/ML
500 INJECTION, SOLUTION INTRAVENOUS CONTINUOUS
Status: ACTIVE | OUTPATIENT
Start: 2019-01-09 | End: 2019-01-09

## 2019-01-09 RX ORDER — ACETAMINOPHEN 325 MG/1
650 TABLET ORAL ONCE
Status: COMPLETED | OUTPATIENT
Start: 2019-01-09 | End: 2019-01-09

## 2019-01-09 RX ORDER — DIPHENHYDRAMINE HYDROCHLORIDE 50 MG/ML
50 INJECTION, SOLUTION INTRAMUSCULAR; INTRAVENOUS ONCE
Status: COMPLETED | OUTPATIENT
Start: 2019-01-09 | End: 2019-01-09

## 2019-01-09 RX ORDER — SODIUM CHLORIDE 0.9 % (FLUSH) 0.9 %
10 SYRINGE (ML) INJECTION AS NEEDED
Status: ACTIVE | OUTPATIENT
Start: 2019-01-09 | End: 2019-01-09

## 2019-01-09 RX ORDER — HEPARIN 100 UNIT/ML
300-500 SYRINGE INTRAVENOUS AS NEEDED
Status: ACTIVE | OUTPATIENT
Start: 2019-01-09 | End: 2019-01-09

## 2019-01-09 RX ORDER — HEPARIN 100 UNIT/ML
300-500 SYRINGE INTRAVENOUS AS NEEDED
Status: DISPENSED | OUTPATIENT
Start: 2019-01-09 | End: 2019-01-09

## 2019-01-09 RX ADMIN — DEXAMETHASONE SODIUM PHOSPHATE 20 MG: 10 INJECTION INTRAMUSCULAR; INTRAVENOUS at 09:43

## 2019-01-09 RX ADMIN — DARATUMUMAB 1400 MG: 100 INJECTION, SOLUTION, CONCENTRATE INTRAVENOUS at 11:00

## 2019-01-09 RX ADMIN — SODIUM CHLORIDE, PRESERVATIVE FREE 300 UNITS: 5 INJECTION INTRAVENOUS at 15:05

## 2019-01-09 RX ADMIN — SODIUM CHLORIDE 3.5 MG: 900 INJECTION, SOLUTION INTRAVENOUS at 10:25

## 2019-01-09 RX ADMIN — SODIUM CHLORIDE 500 ML: 900 INJECTION, SOLUTION INTRAVENOUS at 09:53

## 2019-01-09 RX ADMIN — Medication 10 ML: at 15:05

## 2019-01-09 RX ADMIN — DIPHENHYDRAMINE HYDROCHLORIDE 50 MG: 50 INJECTION, SOLUTION INTRAMUSCULAR; INTRAVENOUS at 09:35

## 2019-01-09 RX ADMIN — SODIUM CHLORIDE 500 ML: 900 INJECTION, SOLUTION INTRAVENOUS at 09:15

## 2019-01-09 RX ADMIN — ACETAMINOPHEN 650 MG: 325 TABLET, FILM COATED ORAL at 09:34

## 2019-01-09 NOTE — PROGRESS NOTES
1/9/19:  Patient in for pre-daratumamab visit with Dr. Lizbeth Lagos. Patient with some fatigue. Palliative care NP, Virginia Stone, present during visit and discussed going down on duragesic patch to 100mcg again to see if fatigue is any better. Patient willing to try but aware that he can always increase back to 125mcg patch if pain worsens. Dr. Lizbeth Lagos reviewed labs and assessed the patient. Patient aware to hold ninlaro during week of thyroid procedures. Patient to return on 2/5 with NP before next daratumamab.

## 2019-01-09 NOTE — PROGRESS NOTES
Arrived to the [facility]. daratumumab completed. Patient tolerated well.    Any issues or concerns during appointment: no  Patient aware of next infusion appointment on 01/21 8 am  Discharged ambulatory

## 2019-01-10 LAB
ALBUMIN SERPL ELPH-MCNC: 3.67 G/DL (ref 3.2–5.6)
ALBUMIN/GLOB SERPL: 1.6 {RATIO}
ALPHA1 GLOB SERPL ELPH-MCNC: 0.23 G/DL (ref 0.1–0.4)
ALPHA2 GLOB SERPL ELPH-MCNC: 0.72 G/DL (ref 0.4–1.2)
B-GLOBULIN SERPL QL ELPH: 0.88 G/DL (ref 0.6–1.3)
GAMMA GLOB MFR SERPL ELPH: 0.5 G/DL (ref 0.5–1.6)
IGA SERPL-MCNC: 8 MG/DL (ref 85–499)
IGG SERPL-MCNC: 438 MG/DL (ref 610–1616)
IGM SERPL-MCNC: 18 MG/DL (ref 15–143)
IGM SERPL-MCNC: ABNORMAL MG/DL (ref 35–242)
M PROTEIN SERPL ELPH-MCNC: 0.11 G/DL
PROT PATTERN SERPL ELPH-IMP: ABNORMAL
PROT PATTERN SPEC IFE-IMP: ABNORMAL
PROT SERPL-MCNC: 6 G/DL (ref 6.3–8.2)

## 2019-01-21 ENCOUNTER — HOSPITAL ENCOUNTER (OUTPATIENT)
Dept: INFUSION THERAPY | Age: 79
Discharge: HOME | End: 2019-01-21
Payer: MEDICARE

## 2019-01-21 VITALS
OXYGEN SATURATION: 95 % | SYSTOLIC BLOOD PRESSURE: 138 MMHG | DIASTOLIC BLOOD PRESSURE: 76 MMHG | TEMPERATURE: 97.6 F | RESPIRATION RATE: 18 BRPM | HEART RATE: 66 BPM

## 2019-01-21 DIAGNOSIS — C90.00 MULTIPLE MYELOMA, REMISSION STATUS UNSPECIFIED (HCC): ICD-10-CM

## 2019-01-21 PROCEDURE — 74011000250 HC RX REV CODE- 250: Performed by: INTERNAL MEDICINE

## 2019-01-21 PROCEDURE — 96372 THER/PROPH/DIAG INJ SC/IM: CPT

## 2019-01-21 PROCEDURE — 74011250636 HC RX REV CODE- 250/636: Performed by: INTERNAL MEDICINE

## 2019-01-21 RX ADMIN — WATER 0.9 MG: 1 INJECTION INTRAMUSCULAR; INTRAVENOUS; SUBCUTANEOUS at 08:19

## 2019-01-21 NOTE — PROGRESS NOTES
Arrived to the [facility]. Assessment and thyrogen injection completed. Patient tolerated well. Any issues or concerns during appointment: patient given education handout on thyrogen injection and observed for 30 mins following injection. Verified with patient that he is on his necessary diet for injection and nuclear medicine. Patient stated that he had been on it for 2 weeks. Patient aware of next infusion appointment on 01/22/2019 (date) at 0800 (time) with infusion center. Discharged ambulatory, with self. Patient advised to call Dr. Kushal Sofia office if he has any problems or concerns. Patient verbalized understanding.

## 2019-01-22 ENCOUNTER — HOSPITAL ENCOUNTER (OUTPATIENT)
Dept: INFUSION THERAPY | Age: 79
Discharge: HOME | End: 2019-01-22
Payer: MEDICARE

## 2019-01-22 VITALS
RESPIRATION RATE: 18 BRPM | SYSTOLIC BLOOD PRESSURE: 133 MMHG | DIASTOLIC BLOOD PRESSURE: 50 MMHG | OXYGEN SATURATION: 98 % | TEMPERATURE: 98.2 F | HEART RATE: 96 BPM

## 2019-01-22 PROCEDURE — 74011250636 HC RX REV CODE- 250/636: Performed by: INTERNAL MEDICINE

## 2019-01-22 PROCEDURE — 74011000250 HC RX REV CODE- 250: Performed by: INTERNAL MEDICINE

## 2019-01-22 PROCEDURE — 96372 THER/PROPH/DIAG INJ SC/IM: CPT

## 2019-01-22 RX ADMIN — WATER 0.9 MG: 1 INJECTION INTRAMUSCULAR; INTRAVENOUS; SUBCUTANEOUS at 08:13

## 2019-01-22 NOTE — PROGRESS NOTES
Arrived to the [facility]. Thyrogen completed. Patient tolerated well. Any issues or concerns during appointment: none. Patient aware of next infusion appointment on 2/5/19 at 9:30am. 
Discharged ambulatory with cane.

## 2019-01-23 ENCOUNTER — HOSPITAL ENCOUNTER (OUTPATIENT)
Dept: NUCLEAR MEDICINE | Age: 79
Discharge: HOME | End: 2019-01-23
Attending: INTERNAL MEDICINE
Payer: MEDICARE

## 2019-01-23 DIAGNOSIS — C73 HURTHLE CELL CARCINOMA (HCC): ICD-10-CM

## 2019-01-23 PROCEDURE — 79005 NUCLEAR RX ORAL ADMIN: CPT

## 2019-01-30 ENCOUNTER — HOSPITAL ENCOUNTER (OUTPATIENT)
Dept: NUCLEAR MEDICINE | Age: 79
Discharge: HOME | End: 2019-01-30
Attending: INTERNAL MEDICINE
Payer: MEDICARE

## 2019-02-05 ENCOUNTER — PATIENT OUTREACH (OUTPATIENT)
Dept: CASE MANAGEMENT | Age: 79
End: 2019-02-05

## 2019-02-05 ENCOUNTER — HOSPITAL ENCOUNTER (OUTPATIENT)
Dept: LAB | Age: 79
Discharge: HOME | End: 2019-02-05
Payer: MEDICARE

## 2019-02-05 ENCOUNTER — HOSPITAL ENCOUNTER (OUTPATIENT)
Dept: INFUSION THERAPY | Age: 79
Discharge: HOME | End: 2019-02-05
Payer: MEDICARE

## 2019-02-05 DIAGNOSIS — C90.02 MULTIPLE MYELOMA IN RELAPSE (HCC): Primary | ICD-10-CM

## 2019-02-05 DIAGNOSIS — C90.00 MULTIPLE MYELOMA, REMISSION STATUS UNSPECIFIED (HCC): ICD-10-CM

## 2019-02-05 LAB
ALBUMIN SERPL-MCNC: 4 G/DL (ref 3.2–4.6)
ALBUMIN/GLOB SERPL: 1.4 {RATIO} (ref 1.2–3.5)
ALP SERPL-CCNC: 66 U/L (ref 50–136)
ALT SERPL-CCNC: 20 U/L (ref 12–65)
ANION GAP SERPL CALC-SCNC: 6 MMOL/L (ref 7–16)
AST SERPL-CCNC: 26 U/L (ref 15–37)
BASOPHILS # BLD: 0 K/UL (ref 0–0.2)
BASOPHILS NFR BLD: 0 % (ref 0–2)
BILIRUB SERPL-MCNC: 0.8 MG/DL (ref 0.2–1.1)
BUN SERPL-MCNC: 26 MG/DL (ref 8–23)
CALCIUM SERPL-MCNC: 8.9 MG/DL (ref 8.3–10.4)
CHLORIDE SERPL-SCNC: 104 MMOL/L (ref 98–107)
CO2 SERPL-SCNC: 27 MMOL/L (ref 21–32)
CREAT SERPL-MCNC: 1.28 MG/DL (ref 0.8–1.5)
DIFFERENTIAL METHOD BLD: ABNORMAL
EOSINOPHIL # BLD: 0 K/UL (ref 0–0.8)
EOSINOPHIL NFR BLD: 0 % (ref 0.5–7.8)
ERYTHROCYTE [DISTWIDTH] IN BLOOD BY AUTOMATED COUNT: 13 % (ref 11.9–14.6)
GLOBULIN SER CALC-MCNC: 2.9 G/DL (ref 2.3–3.5)
GLUCOSE SERPL-MCNC: 118 MG/DL (ref 65–100)
HCT VFR BLD AUTO: 35 % (ref 41.1–50.3)
HGB BLD-MCNC: 11.6 G/DL (ref 13.6–17.2)
IMM GRANULOCYTES # BLD AUTO: 0 K/UL (ref 0–0.5)
IMM GRANULOCYTES NFR BLD AUTO: 0 % (ref 0–5)
KAPPA LC FREE SER-MCNC: 3.98 MG/L (ref 3.3–19.4)
KAPPA LC FREE/LAMBDA FREE SER: 0.49 {RATIO} (ref 0.26–1.65)
LAMBDA LC FREE SERPL-MCNC: 8.1 MG/L (ref 5.71–26.3)
LYMPHOCYTES # BLD: 1.2 K/UL (ref 0.5–4.6)
LYMPHOCYTES NFR BLD: 17 % (ref 13–44)
MAGNESIUM SERPL-MCNC: 1.9 MG/DL (ref 1.8–2.4)
MCH RBC QN AUTO: 23.4 PG (ref 26.1–32.9)
MCHC RBC AUTO-ENTMCNC: 33.1 G/DL (ref 31.4–35)
MCV RBC AUTO: 70.7 FL (ref 79.6–97.8)
MONOCYTES # BLD: 0.7 K/UL (ref 0.1–1.3)
MONOCYTES NFR BLD: 11 % (ref 4–12)
NEUTS SEG # BLD: 4.8 K/UL (ref 1.7–8.2)
NEUTS SEG NFR BLD: 71 % (ref 43–78)
NRBC # BLD: 0 K/UL (ref 0–0.2)
PHOSPHATE SERPL-MCNC: 2.1 MG/DL (ref 2.3–3.7)
PLATELET # BLD AUTO: 178 K/UL (ref 150–450)
PMV BLD AUTO: 9.8 FL (ref 9.4–12.3)
POTASSIUM SERPL-SCNC: 4 MMOL/L (ref 3.5–5.1)
PROT SERPL-MCNC: 6.9 G/DL (ref 6.3–8.2)
RBC # BLD AUTO: 4.95 M/UL (ref 4.23–5.67)
SODIUM SERPL-SCNC: 137 MMOL/L (ref 136–145)
WBC # BLD AUTO: 6.8 K/UL (ref 4.3–11.1)

## 2019-02-05 PROCEDURE — 74011250636 HC RX REV CODE- 250/636: Performed by: NURSE PRACTITIONER

## 2019-02-05 PROCEDURE — 84100 ASSAY OF PHOSPHORUS: CPT

## 2019-02-05 PROCEDURE — 74011000258 HC RX REV CODE- 258: Performed by: NURSE PRACTITIONER

## 2019-02-05 PROCEDURE — 83735 ASSAY OF MAGNESIUM: CPT

## 2019-02-05 PROCEDURE — 86334 IMMUNOFIX E-PHORESIS SERUM: CPT

## 2019-02-05 PROCEDURE — 83883 ASSAY NEPHELOMETRY NOT SPEC: CPT

## 2019-02-05 PROCEDURE — 80053 COMPREHEN METABOLIC PANEL: CPT

## 2019-02-05 PROCEDURE — 74011250637 HC RX REV CODE- 250/637: Performed by: NURSE PRACTITIONER

## 2019-02-05 PROCEDURE — 85025 COMPLETE CBC W/AUTO DIFF WBC: CPT

## 2019-02-05 PROCEDURE — 84165 PROTEIN E-PHORESIS SERUM: CPT

## 2019-02-05 PROCEDURE — 96375 TX/PRO/DX INJ NEW DRUG ADDON: CPT

## 2019-02-05 PROCEDURE — 96413 CHEMO IV INFUSION 1 HR: CPT

## 2019-02-05 PROCEDURE — 96415 CHEMO IV INFUSION ADDL HR: CPT

## 2019-02-05 RX ORDER — ACETAMINOPHEN 325 MG/1
650 TABLET ORAL ONCE
Status: COMPLETED | OUTPATIENT
Start: 2019-02-05 | End: 2019-02-05

## 2019-02-05 RX ORDER — HEPARIN 100 UNIT/ML
300-500 SYRINGE INTRAVENOUS AS NEEDED
Status: DISPENSED | OUTPATIENT
Start: 2019-02-05 | End: 2019-02-05

## 2019-02-05 RX ORDER — DIPHENHYDRAMINE HYDROCHLORIDE 50 MG/ML
50 INJECTION, SOLUTION INTRAMUSCULAR; INTRAVENOUS ONCE
Status: COMPLETED | OUTPATIENT
Start: 2019-02-05 | End: 2019-02-05

## 2019-02-05 RX ORDER — ONDANSETRON 2 MG/ML
8 INJECTION INTRAMUSCULAR; INTRAVENOUS AS NEEDED
Status: ACTIVE | OUTPATIENT
Start: 2019-02-05 | End: 2019-02-05

## 2019-02-05 RX ORDER — SODIUM CHLORIDE 0.9 % (FLUSH) 0.9 %
10 SYRINGE (ML) INJECTION AS NEEDED
Status: ACTIVE | OUTPATIENT
Start: 2019-02-05 | End: 2019-02-05

## 2019-02-05 RX ORDER — DIPHENHYDRAMINE HYDROCHLORIDE 50 MG/ML
50 INJECTION, SOLUTION INTRAMUSCULAR; INTRAVENOUS AS NEEDED
Status: ACTIVE | OUTPATIENT
Start: 2019-02-05 | End: 2019-02-05

## 2019-02-05 RX ORDER — SODIUM CHLORIDE 9 MG/ML
500 INJECTION, SOLUTION INTRAVENOUS CONTINUOUS
Status: ACTIVE | OUTPATIENT
Start: 2019-02-05 | End: 2019-02-05

## 2019-02-05 RX ADMIN — DARATUMUMAB 1400 MG: 100 INJECTION, SOLUTION, CONCENTRATE INTRAVENOUS at 11:35

## 2019-02-05 RX ADMIN — DEXAMETHASONE SODIUM PHOSPHATE 20 MG: 10 INJECTION INTRAMUSCULAR; INTRAVENOUS at 10:27

## 2019-02-05 RX ADMIN — SODIUM CHLORIDE 500 ML: 900 INJECTION, SOLUTION INTRAVENOUS at 09:55

## 2019-02-05 RX ADMIN — ACETAMINOPHEN 650 MG: 325 TABLET, FILM COATED ORAL at 10:08

## 2019-02-05 RX ADMIN — DIPHENHYDRAMINE HYDROCHLORIDE 50 MG: 50 INJECTION, SOLUTION INTRAMUSCULAR; INTRAVENOUS at 10:12

## 2019-02-05 RX ADMIN — SODIUM CHLORIDE, PRESERVATIVE FREE 500 UNITS: 5 INJECTION INTRAVENOUS at 15:15

## 2019-02-05 NOTE — PROGRESS NOTES
Tolerated Daratumumab infusion tolerated without difficulty. Patient discharged via ambulation with cane accompanied by self. Instructed to notify physician of any problems, questions or concerns after discharge. Next appointment is 03/05/19 at 0900 with Sendy.

## 2019-02-05 NOTE — PROGRESS NOTES
Problem: Chemotherapy Treatment  Goal: *Chemotherapy regimen followed  Outcome: Progressing Towards Goal  Reviewed Darzalex infusion with patient. Verbalizes good understanding of medication.

## 2019-02-05 NOTE — PROGRESS NOTES
2/5/19:  Patient in for follow-up and pre-chemo daratumamab. Patient has lost at least 7lbs over last couple of weeks but he notes diet changes from thryoid diet and treatment. Patient labs stable. He has upper respiratory symptoms but no fever or chills. Fentanyl script refilled. Patient started ninlaro cycle today. Dr. Edward De La Torre to see the patient in one month.

## 2019-02-06 LAB
ALBUMIN SERPL ELPH-MCNC: 3.94 G/DL (ref 3.2–5.6)
ALBUMIN/GLOB SERPL: 1.6 {RATIO}
ALPHA1 GLOB SERPL ELPH-MCNC: 0.26 G/DL (ref 0.1–0.4)
ALPHA2 GLOB SERPL ELPH-MCNC: 0.77 G/DL (ref 0.4–1.2)
B-GLOBULIN SERPL QL ELPH: 0.97 G/DL (ref 0.6–1.3)
GAMMA GLOB MFR SERPL ELPH: 0.47 G/DL (ref 0.5–1.6)
IGA SERPL-MCNC: 11 MG/DL (ref 85–499)
IGG SERPL-MCNC: 461 MG/DL (ref 610–1616)
IGM SERPL-MCNC: 15 MG/DL (ref 35–242)
M PROTEIN SERPL ELPH-MCNC: ABNORMAL G/DL
PROT PATTERN SERPL ELPH-IMP: ABNORMAL
PROT PATTERN SPEC IFE-IMP: ABNORMAL
PROT SERPL-MCNC: 6.4 G/DL (ref 6.3–8.2)

## 2019-02-13 ENCOUNTER — HOSPITAL ENCOUNTER (OUTPATIENT)
Dept: INFUSION THERAPY | Age: 79
Discharge: HOME | End: 2019-02-13
Payer: MEDICARE

## 2019-02-13 ENCOUNTER — PATIENT OUTREACH (OUTPATIENT)
Dept: CASE MANAGEMENT | Age: 79
End: 2019-02-13

## 2019-02-13 VITALS
BODY MASS INDEX: 25.97 KG/M2 | SYSTOLIC BLOOD PRESSURE: 127 MMHG | RESPIRATION RATE: 18 BRPM | DIASTOLIC BLOOD PRESSURE: 68 MMHG | TEMPERATURE: 98.2 F | OXYGEN SATURATION: 94 % | WEIGHT: 181 LBS | HEART RATE: 93 BPM

## 2019-02-13 DIAGNOSIS — C90.00 MULTIPLE MYELOMA NOT HAVING ACHIEVED REMISSION (HCC): Primary | ICD-10-CM

## 2019-02-13 LAB
ALBUMIN SERPL-MCNC: 3.3 G/DL (ref 3.2–4.6)
ALBUMIN/GLOB SERPL: 0.9 {RATIO} (ref 1.2–3.5)
ALP SERPL-CCNC: 70 U/L (ref 50–136)
ALT SERPL-CCNC: 35 U/L (ref 12–65)
ANION GAP SERPL CALC-SCNC: 6 MMOL/L (ref 7–16)
AST SERPL-CCNC: 39 U/L (ref 15–37)
BASOPHILS # BLD: 0 K/UL (ref 0–0.2)
BASOPHILS NFR BLD: 0 % (ref 0–2)
BILIRUB SERPL-MCNC: 0.4 MG/DL (ref 0.2–1.1)
BUN SERPL-MCNC: 16 MG/DL (ref 8–23)
CALCIUM SERPL-MCNC: 9 MG/DL (ref 8.3–10.4)
CHLORIDE SERPL-SCNC: 108 MMOL/L (ref 98–107)
CO2 SERPL-SCNC: 26 MMOL/L (ref 21–32)
CREAT SERPL-MCNC: 1.31 MG/DL (ref 0.8–1.5)
DIFFERENTIAL METHOD BLD: ABNORMAL
EOSINOPHIL # BLD: 0 K/UL (ref 0–0.8)
EOSINOPHIL NFR BLD: 0 % (ref 0.5–7.8)
ERYTHROCYTE [DISTWIDTH] IN BLOOD BY AUTOMATED COUNT: 13.4 % (ref 11.9–14.6)
GLOBULIN SER CALC-MCNC: 3.5 G/DL (ref 2.3–3.5)
GLUCOSE SERPL-MCNC: 117 MG/DL (ref 65–100)
HCT VFR BLD AUTO: 33.1 % (ref 41.1–50.3)
HGB BLD-MCNC: 11.2 G/DL (ref 13.6–17.2)
IMM GRANULOCYTES # BLD AUTO: 0.1 K/UL (ref 0–0.5)
IMM GRANULOCYTES NFR BLD AUTO: 3 % (ref 0–5)
LYMPHOCYTES # BLD: 0.7 K/UL (ref 0.5–4.6)
LYMPHOCYTES NFR BLD: 23 % (ref 13–44)
MAGNESIUM SERPL-MCNC: 2 MG/DL (ref 1.8–2.4)
MCH RBC QN AUTO: 23.6 PG (ref 26.1–32.9)
MCHC RBC AUTO-ENTMCNC: 33.8 G/DL (ref 31.4–35)
MCV RBC AUTO: 69.8 FL (ref 79.6–97.8)
MONOCYTES # BLD: 0.3 K/UL (ref 0.1–1.3)
MONOCYTES NFR BLD: 11 % (ref 4–12)
NEUTS SEG # BLD: 1.9 K/UL (ref 1.7–8.2)
NEUTS SEG NFR BLD: 64 % (ref 43–78)
NRBC # BLD: 0 K/UL (ref 0–0.2)
PLATELET # BLD AUTO: 113 K/UL (ref 150–450)
PMV BLD AUTO: ABNORMAL FL (ref 9.4–12.3)
POTASSIUM SERPL-SCNC: 3.9 MMOL/L (ref 3.5–5.1)
PROT SERPL-MCNC: 6.8 G/DL (ref 6.3–8.2)
RBC # BLD AUTO: 4.74 M/UL (ref 4.23–5.67)
SODIUM SERPL-SCNC: 140 MMOL/L (ref 136–145)
WBC # BLD AUTO: 3 K/UL (ref 4.3–11.1)

## 2019-02-13 PROCEDURE — 96360 HYDRATION IV INFUSION INIT: CPT

## 2019-02-13 PROCEDURE — 83735 ASSAY OF MAGNESIUM: CPT

## 2019-02-13 PROCEDURE — 74011250636 HC RX REV CODE- 250/636: Performed by: NURSE PRACTITIONER

## 2019-02-13 PROCEDURE — 85025 COMPLETE CBC W/AUTO DIFF WBC: CPT

## 2019-02-13 PROCEDURE — 80053 COMPREHEN METABOLIC PANEL: CPT

## 2019-02-13 RX ORDER — SODIUM CHLORIDE 9 MG/ML
1000 INJECTION, SOLUTION INTRAVENOUS ONCE
Status: COMPLETED | OUTPATIENT
Start: 2019-02-13 | End: 2019-02-13

## 2019-02-13 RX ORDER — SODIUM CHLORIDE 9 MG/ML
999 INJECTION, SOLUTION INTRAVENOUS CONTINUOUS
Status: CANCELLED
Start: 2019-02-13

## 2019-02-13 RX ORDER — HEPARIN 100 UNIT/ML
500 SYRINGE INTRAVENOUS AS NEEDED
Status: DISCONTINUED | OUTPATIENT
Start: 2019-02-13 | End: 2019-02-17 | Stop reason: HOSPADM

## 2019-02-13 RX ORDER — SODIUM CHLORIDE 0.9 % (FLUSH) 0.9 %
10 SYRINGE (ML) INJECTION AS NEEDED
Status: DISCONTINUED | OUTPATIENT
Start: 2019-02-13 | End: 2019-02-17 | Stop reason: HOSPADM

## 2019-02-13 RX ADMIN — HEPARIN 500 UNITS: 100 SYRINGE at 15:21

## 2019-02-13 RX ADMIN — SODIUM CHLORIDE 1000 ML: 900 INJECTION, SOLUTION INTRAVENOUS at 14:20

## 2019-02-13 NOTE — PROGRESS NOTES
Arrived to the [facility]. One liter NS IVFinfusion completed. Patient tolerated well. Any issues or concerns during appointment: none. Patient aware of next infusion appointment on 03.05.2019 (date) at 18 (time). Discharged ambulatory using cane to home.

## 2019-02-13 NOTE — PROGRESS NOTES
2/13/19:  Patient called reporting he is on day 7 of prophylactic tamiflu (wife had flu last week). Patient reported one fever almost a week ago up to 101.9 which was after he started tamiflu. He didn't call at the time of the fever. Patient now with weight loss per his scales at home. Patient also notes no taste or appetite. He is trying to force fluids but not really eating much. Discussed with POLI Duque. Patient to come in to infusion around 130 for labs and 1 liter of IVF.

## 2019-03-05 ENCOUNTER — HOSPITAL ENCOUNTER (OUTPATIENT)
Dept: INFUSION THERAPY | Age: 79
Discharge: HOME | End: 2019-03-05
Payer: MEDICARE

## 2019-03-05 ENCOUNTER — HOSPITAL ENCOUNTER (OUTPATIENT)
Dept: LAB | Age: 79
Discharge: HOME | End: 2019-03-05
Payer: COMMERCIAL

## 2019-03-05 ENCOUNTER — PATIENT OUTREACH (OUTPATIENT)
Dept: CASE MANAGEMENT | Age: 79
End: 2019-03-05

## 2019-03-05 VITALS
TEMPERATURE: 98 F | HEART RATE: 71 BPM | RESPIRATION RATE: 18 BRPM | SYSTOLIC BLOOD PRESSURE: 158 MMHG | DIASTOLIC BLOOD PRESSURE: 62 MMHG

## 2019-03-05 DIAGNOSIS — C90.02 MULTIPLE MYELOMA IN RELAPSE (HCC): Primary | ICD-10-CM

## 2019-03-05 DIAGNOSIS — C90.00 MULTIPLE MYELOMA NOT HAVING ACHIEVED REMISSION (HCC): ICD-10-CM

## 2019-03-05 DIAGNOSIS — C73 HURTHLE CELL CARCINOMA OF THYROID (HCC): ICD-10-CM

## 2019-03-05 LAB
ALBUMIN SERPL-MCNC: 3.5 G/DL (ref 3.2–4.6)
ALBUMIN/GLOB SERPL: 1.3 {RATIO} (ref 1.2–3.5)
ALP SERPL-CCNC: 67 U/L (ref 50–136)
ALT SERPL-CCNC: 16 U/L (ref 12–65)
ANION GAP SERPL CALC-SCNC: 5 MMOL/L (ref 7–16)
AST SERPL-CCNC: 21 U/L (ref 15–37)
BASOPHILS # BLD: 0 K/UL (ref 0–0.2)
BASOPHILS NFR BLD: 1 % (ref 0–2)
BILIRUB SERPL-MCNC: 0.4 MG/DL (ref 0.2–1.1)
BUN SERPL-MCNC: 20 MG/DL (ref 8–23)
CALCIUM SERPL-MCNC: 8.5 MG/DL (ref 8.3–10.4)
CHLORIDE SERPL-SCNC: 107 MMOL/L (ref 98–107)
CO2 SERPL-SCNC: 29 MMOL/L (ref 21–32)
CREAT SERPL-MCNC: 1.14 MG/DL (ref 0.8–1.5)
DIFFERENTIAL METHOD BLD: ABNORMAL
EOSINOPHIL # BLD: 0.1 K/UL (ref 0–0.8)
EOSINOPHIL NFR BLD: 2 % (ref 0.5–7.8)
ERYTHROCYTE [DISTWIDTH] IN BLOOD BY AUTOMATED COUNT: 13.6 % (ref 11.9–14.6)
GLOBULIN SER CALC-MCNC: 2.8 G/DL (ref 2.3–3.5)
GLUCOSE SERPL-MCNC: 99 MG/DL (ref 65–100)
HCT VFR BLD AUTO: 30.9 % (ref 41.1–50.3)
HGB BLD-MCNC: 10.2 G/DL (ref 13.6–17.2)
IMM GRANULOCYTES # BLD AUTO: 0 K/UL (ref 0–0.5)
IMM GRANULOCYTES NFR BLD AUTO: 0 % (ref 0–5)
KAPPA LC FREE SER-MCNC: 9.67 MG/L (ref 3.3–19.4)
KAPPA LC FREE/LAMBDA FREE SER: 0.73 {RATIO} (ref 0.26–1.65)
LAMBDA LC FREE SERPL-MCNC: 13.2 MG/L (ref 5.71–26.3)
LYMPHOCYTES # BLD: 2.3 K/UL (ref 0.5–4.6)
LYMPHOCYTES NFR BLD: 57 % (ref 13–44)
MAGNESIUM SERPL-MCNC: 2.1 MG/DL (ref 1.8–2.4)
MCH RBC QN AUTO: 23.3 PG (ref 26.1–32.9)
MCHC RBC AUTO-ENTMCNC: 33 G/DL (ref 31.4–35)
MCV RBC AUTO: 70.7 FL (ref 79.6–97.8)
MONOCYTES # BLD: 0.6 K/UL (ref 0.1–1.3)
MONOCYTES NFR BLD: 14 % (ref 4–12)
NEUTS SEG # BLD: 1 K/UL (ref 1.7–8.2)
NEUTS SEG NFR BLD: 26 % (ref 43–78)
NRBC # BLD: 0 K/UL (ref 0–0.2)
PLATELET # BLD AUTO: 183 K/UL (ref 150–450)
PMV BLD AUTO: 10 FL (ref 9.4–12.3)
POTASSIUM SERPL-SCNC: 3.7 MMOL/L (ref 3.5–5.1)
PROT SERPL-MCNC: 6.3 G/DL (ref 6.3–8.2)
RBC # BLD AUTO: 4.37 M/UL (ref 4.23–5.67)
SODIUM SERPL-SCNC: 141 MMOL/L (ref 136–145)
WBC # BLD AUTO: 4 K/UL (ref 4.3–11.1)

## 2019-03-05 PROCEDURE — 83735 ASSAY OF MAGNESIUM: CPT

## 2019-03-05 PROCEDURE — 83883 ASSAY NEPHELOMETRY NOT SPEC: CPT

## 2019-03-05 PROCEDURE — 86334 IMMUNOFIX E-PHORESIS SERUM: CPT

## 2019-03-05 PROCEDURE — 86800 THYROGLOBULIN ANTIBODY: CPT

## 2019-03-05 PROCEDURE — 74011000258 HC RX REV CODE- 258: Performed by: INTERNAL MEDICINE

## 2019-03-05 PROCEDURE — 84165 PROTEIN E-PHORESIS SERUM: CPT

## 2019-03-05 PROCEDURE — 74011250636 HC RX REV CODE- 250/636: Performed by: INTERNAL MEDICINE

## 2019-03-05 PROCEDURE — 96415 CHEMO IV INFUSION ADDL HR: CPT

## 2019-03-05 PROCEDURE — 84432 ASSAY OF THYROGLOBULIN: CPT

## 2019-03-05 PROCEDURE — 96375 TX/PRO/DX INJ NEW DRUG ADDON: CPT

## 2019-03-05 PROCEDURE — 85025 COMPLETE CBC W/AUTO DIFF WBC: CPT

## 2019-03-05 PROCEDURE — 96413 CHEMO IV INFUSION 1 HR: CPT

## 2019-03-05 PROCEDURE — 80053 COMPREHEN METABOLIC PANEL: CPT

## 2019-03-05 PROCEDURE — 74011250637 HC RX REV CODE- 250/637: Performed by: INTERNAL MEDICINE

## 2019-03-05 RX ORDER — ACETAMINOPHEN 325 MG/1
650 TABLET ORAL ONCE
Status: COMPLETED | OUTPATIENT
Start: 2019-03-05 | End: 2019-03-05

## 2019-03-05 RX ORDER — HEPARIN 100 UNIT/ML
300-500 SYRINGE INTRAVENOUS AS NEEDED
Status: DISPENSED | OUTPATIENT
Start: 2019-03-05 | End: 2019-03-05

## 2019-03-05 RX ORDER — SODIUM CHLORIDE 9 MG/ML
500 INJECTION, SOLUTION INTRAVENOUS CONTINUOUS
Status: ACTIVE | OUTPATIENT
Start: 2019-03-05 | End: 2019-03-05

## 2019-03-05 RX ORDER — DIPHENHYDRAMINE HYDROCHLORIDE 50 MG/ML
50 INJECTION, SOLUTION INTRAMUSCULAR; INTRAVENOUS ONCE
Status: COMPLETED | OUTPATIENT
Start: 2019-03-05 | End: 2019-03-05

## 2019-03-05 RX ORDER — SODIUM CHLORIDE 9 MG/ML
10 INJECTION INTRAMUSCULAR; INTRAVENOUS; SUBCUTANEOUS AS NEEDED
Status: ACTIVE | OUTPATIENT
Start: 2019-03-05 | End: 2019-03-05

## 2019-03-05 RX ADMIN — DIPHENHYDRAMINE HYDROCHLORIDE 50 MG: 50 INJECTION, SOLUTION INTRAMUSCULAR; INTRAVENOUS at 09:40

## 2019-03-05 RX ADMIN — DARATUMUMAB 1400 MG: 100 INJECTION, SOLUTION, CONCENTRATE INTRAVENOUS at 11:05

## 2019-03-05 RX ADMIN — SODIUM CHLORIDE 10 ML: 9 INJECTION INTRAMUSCULAR; INTRAVENOUS; SUBCUTANEOUS at 09:35

## 2019-03-05 RX ADMIN — DEXAMETHASONE SODIUM PHOSPHATE 20 MG: 10 INJECTION INTRAMUSCULAR; INTRAVENOUS at 09:46

## 2019-03-05 RX ADMIN — ACETAMINOPHEN 650 MG: 325 TABLET, FILM COATED ORAL at 09:32

## 2019-03-05 RX ADMIN — SODIUM CHLORIDE 500 ML: 900 INJECTION, SOLUTION INTRAVENOUS at 10:00

## 2019-03-05 RX ADMIN — Medication 500 UNITS: at 14:31

## 2019-03-05 RX ADMIN — SODIUM CHLORIDE 10 ML: 9 INJECTION INTRAMUSCULAR; INTRAVENOUS; SUBCUTANEOUS at 14:31

## 2019-03-05 NOTE — PROGRESS NOTES
Arrived to infusion after [facility] appt   No concerns   Daratumamab infused,tolerated well  Next appt 4/2

## 2019-03-05 NOTE — PROGRESS NOTES
Pt was seen and labs reviewed by Dr. Rai Garcia. Pt states that his taste is improving and he is finally starting to gain some weight back post radioactive iodine treatment. We will continue current regimen (Connie/Ninlaro). Pt given script for Fentanyl patches and refill for Mag ox sent in, but changing to BID. Ninlaro script given to [facility].   Pt to return to clinic in 4 weeks with NP.

## 2019-03-06 LAB
ALBUMIN SERPL ELPH-MCNC: 3.33 G/DL (ref 3.2–5.6)
ALBUMIN/GLOB SERPL: 1.3 {RATIO}
ALPHA1 GLOB SERPL ELPH-MCNC: 0.28 G/DL (ref 0.1–0.4)
ALPHA2 GLOB SERPL ELPH-MCNC: 0.73 G/DL (ref 0.4–1.2)
B-GLOBULIN SERPL QL ELPH: 0.95 G/DL (ref 0.6–1.3)
GAMMA GLOB MFR SERPL ELPH: 0.61 G/DL (ref 0.5–1.6)
IGA SERPL-MCNC: 18 MG/DL (ref 85–499)
IGG SERPL-MCNC: 521 MG/DL (ref 610–1616)
IGM SERPL-MCNC: 14 MG/DL (ref 35–242)
M PROTEIN SERPL ELPH-MCNC: ABNORMAL G/DL
PROT PATTERN SERPL ELPH-IMP: ABNORMAL
PROT PATTERN SPEC IFE-IMP: ABNORMAL
PROT SERPL-MCNC: 5.9 G/DL (ref 6.3–8.2)
THYROGLOB AB SERPL-ACNC: <1 IU/ML (ref 0–0.9)
THYROGLOBULIN, SERUM, 006694: 7 NG/ML (ref 1.4–29.2)

## 2019-04-02 ENCOUNTER — HOSPITAL ENCOUNTER (OUTPATIENT)
Dept: LAB | Age: 79
Discharge: HOME | End: 2019-04-02
Payer: COMMERCIAL

## 2019-04-02 ENCOUNTER — PATIENT OUTREACH (OUTPATIENT)
Dept: CASE MANAGEMENT | Age: 79
End: 2019-04-02

## 2019-04-02 ENCOUNTER — HOSPITAL ENCOUNTER (OUTPATIENT)
Dept: INFUSION THERAPY | Age: 79
Discharge: HOME | End: 2019-04-02
Payer: MEDICARE

## 2019-04-02 VITALS
DIASTOLIC BLOOD PRESSURE: 67 MMHG | OXYGEN SATURATION: 96 % | SYSTOLIC BLOOD PRESSURE: 126 MMHG | RESPIRATION RATE: 18 BRPM | HEART RATE: 70 BPM

## 2019-04-02 DIAGNOSIS — C90.00 MULTIPLE MYELOMA, REMISSION STATUS UNSPECIFIED (HCC): ICD-10-CM

## 2019-04-02 DIAGNOSIS — C90.00 MULTIPLE MYELOMA NOT HAVING ACHIEVED REMISSION (HCC): ICD-10-CM

## 2019-04-02 DIAGNOSIS — C90.02 MULTIPLE MYELOMA IN RELAPSE (HCC): Primary | ICD-10-CM

## 2019-04-02 LAB
ALBUMIN SERPL-MCNC: 3.8 G/DL (ref 3.2–4.6)
ALBUMIN/GLOB SERPL: 1.4 {RATIO} (ref 1.2–3.5)
ALP SERPL-CCNC: 67 U/L (ref 50–136)
ALT SERPL-CCNC: 16 U/L (ref 12–65)
ANION GAP SERPL CALC-SCNC: 6 MMOL/L (ref 7–16)
AST SERPL-CCNC: 22 U/L (ref 15–37)
BASOPHILS # BLD: 0 K/UL (ref 0–0.2)
BASOPHILS NFR BLD: 0 % (ref 0–2)
BILIRUB SERPL-MCNC: 0.5 MG/DL (ref 0.2–1.1)
BUN SERPL-MCNC: 20 MG/DL (ref 8–23)
CALCIUM SERPL-MCNC: 8.7 MG/DL (ref 8.3–10.4)
CHLORIDE SERPL-SCNC: 109 MMOL/L (ref 98–107)
CO2 SERPL-SCNC: 26 MMOL/L (ref 21–32)
CREAT SERPL-MCNC: 1 MG/DL (ref 0.8–1.5)
DIFFERENTIAL METHOD BLD: ABNORMAL
EOSINOPHIL # BLD: 0 K/UL (ref 0–0.8)
EOSINOPHIL NFR BLD: 1 % (ref 0.5–7.8)
ERYTHROCYTE [DISTWIDTH] IN BLOOD BY AUTOMATED COUNT: 14.7 % (ref 11.9–14.6)
GLOBULIN SER CALC-MCNC: 2.8 G/DL (ref 2.3–3.5)
GLUCOSE SERPL-MCNC: 118 MG/DL (ref 65–100)
HCT VFR BLD AUTO: 32.3 % (ref 41.1–50.3)
HGB BLD-MCNC: 10.8 G/DL (ref 13.6–17.2)
IMM GRANULOCYTES # BLD AUTO: 0 K/UL (ref 0–0.5)
IMM GRANULOCYTES NFR BLD AUTO: 0 % (ref 0–5)
KAPPA LC FREE SER-MCNC: 6.88 MG/L (ref 3.3–19.4)
KAPPA LC FREE/LAMBDA FREE SER: 0.35 {RATIO} (ref 0.26–1.65)
LAMBDA LC FREE SERPL-MCNC: 19.72 MG/L (ref 5.71–26.3)
LYMPHOCYTES # BLD: 2.4 K/UL (ref 0.5–4.6)
LYMPHOCYTES NFR BLD: 57 % (ref 13–44)
MAGNESIUM SERPL-MCNC: 2.3 MG/DL (ref 1.8–2.4)
MCH RBC QN AUTO: 23.8 PG (ref 26.1–32.9)
MCHC RBC AUTO-ENTMCNC: 33.4 G/DL (ref 31.4–35)
MCV RBC AUTO: 71.1 FL (ref 79.6–97.8)
MONOCYTES # BLD: 0.6 K/UL (ref 0.1–1.3)
MONOCYTES NFR BLD: 13 % (ref 4–12)
NEUTS SEG # BLD: 1.2 K/UL (ref 1.7–8.2)
NEUTS SEG NFR BLD: 29 % (ref 43–78)
NRBC # BLD: 0 K/UL (ref 0–0.2)
PLATELET # BLD AUTO: 171 K/UL (ref 150–450)
PMV BLD AUTO: 10.2 FL (ref 9.4–12.3)
POTASSIUM SERPL-SCNC: 4 MMOL/L (ref 3.5–5.1)
PROT SERPL-MCNC: 6.6 G/DL (ref 6.3–8.2)
RBC # BLD AUTO: 4.54 M/UL (ref 4.23–5.67)
SODIUM SERPL-SCNC: 141 MMOL/L (ref 136–145)
WBC # BLD AUTO: 4.2 K/UL (ref 4.3–11.1)

## 2019-04-02 PROCEDURE — 96375 TX/PRO/DX INJ NEW DRUG ADDON: CPT

## 2019-04-02 PROCEDURE — 96367 TX/PROPH/DG ADDL SEQ IV INF: CPT

## 2019-04-02 PROCEDURE — 96361 HYDRATE IV INFUSION ADD-ON: CPT

## 2019-04-02 PROCEDURE — 83735 ASSAY OF MAGNESIUM: CPT

## 2019-04-02 PROCEDURE — 86334 IMMUNOFIX E-PHORESIS SERUM: CPT

## 2019-04-02 PROCEDURE — 83883 ASSAY NEPHELOMETRY NOT SPEC: CPT

## 2019-04-02 PROCEDURE — 74011000258 HC RX REV CODE- 258: Performed by: INTERNAL MEDICINE

## 2019-04-02 PROCEDURE — 85025 COMPLETE CBC W/AUTO DIFF WBC: CPT

## 2019-04-02 PROCEDURE — 80053 COMPREHEN METABOLIC PANEL: CPT

## 2019-04-02 PROCEDURE — 74011000258 HC RX REV CODE- 258: Performed by: NURSE PRACTITIONER

## 2019-04-02 PROCEDURE — 96413 CHEMO IV INFUSION 1 HR: CPT

## 2019-04-02 PROCEDURE — 74011250636 HC RX REV CODE- 250/636: Performed by: INTERNAL MEDICINE

## 2019-04-02 PROCEDURE — 74011250636 HC RX REV CODE- 250/636: Performed by: NURSE PRACTITIONER

## 2019-04-02 PROCEDURE — 96415 CHEMO IV INFUSION ADDL HR: CPT

## 2019-04-02 PROCEDURE — 84165 PROTEIN E-PHORESIS SERUM: CPT

## 2019-04-02 PROCEDURE — 74011250637 HC RX REV CODE- 250/637: Performed by: NURSE PRACTITIONER

## 2019-04-02 RX ORDER — ONDANSETRON 2 MG/ML
8 INJECTION INTRAMUSCULAR; INTRAVENOUS AS NEEDED
Status: DISCONTINUED | OUTPATIENT
Start: 2019-04-02 | End: 2019-04-03 | Stop reason: HOSPADM

## 2019-04-02 RX ORDER — DIPHENHYDRAMINE HYDROCHLORIDE 50 MG/ML
50 INJECTION, SOLUTION INTRAMUSCULAR; INTRAVENOUS ONCE
Status: COMPLETED | OUTPATIENT
Start: 2019-04-02 | End: 2019-04-02

## 2019-04-02 RX ORDER — DIPHENHYDRAMINE HYDROCHLORIDE 50 MG/ML
50 INJECTION, SOLUTION INTRAMUSCULAR; INTRAVENOUS AS NEEDED
Status: DISCONTINUED | OUTPATIENT
Start: 2019-04-02 | End: 2019-04-03 | Stop reason: HOSPADM

## 2019-04-02 RX ORDER — SODIUM CHLORIDE 9 MG/ML
500 INJECTION, SOLUTION INTRAVENOUS CONTINUOUS
Status: ACTIVE | OUTPATIENT
Start: 2019-04-02 | End: 2019-04-02

## 2019-04-02 RX ORDER — SODIUM CHLORIDE 9 MG/ML
10 INJECTION INTRAMUSCULAR; INTRAVENOUS; SUBCUTANEOUS AS NEEDED
Status: ACTIVE | OUTPATIENT
Start: 2019-04-02 | End: 2019-04-02

## 2019-04-02 RX ORDER — ACETAMINOPHEN 325 MG/1
650 TABLET ORAL ONCE
Status: COMPLETED | OUTPATIENT
Start: 2019-04-02 | End: 2019-04-02

## 2019-04-02 RX ORDER — HYDROCORTISONE SODIUM SUCCINATE 100 MG/2ML
100 INJECTION, POWDER, FOR SOLUTION INTRAMUSCULAR; INTRAVENOUS AS NEEDED
Status: DISCONTINUED | OUTPATIENT
Start: 2019-04-02 | End: 2019-04-03 | Stop reason: HOSPADM

## 2019-04-02 RX ADMIN — DIPHENHYDRAMINE HYDROCHLORIDE 50 MG: 50 INJECTION, SOLUTION INTRAMUSCULAR; INTRAVENOUS at 12:06

## 2019-04-02 RX ADMIN — SODIUM CHLORIDE 10 ML: 9 INJECTION INTRAMUSCULAR; INTRAVENOUS; SUBCUTANEOUS at 16:50

## 2019-04-02 RX ADMIN — SODIUM CHLORIDE 500 ML: 900 INJECTION, SOLUTION INTRAVENOUS at 13:26

## 2019-04-02 RX ADMIN — SODIUM CHLORIDE 500 ML: 900 INJECTION, SOLUTION INTRAVENOUS at 11:46

## 2019-04-02 RX ADMIN — SODIUM CHLORIDE 4 MG: 900 INJECTION, SOLUTION INTRAVENOUS at 13:08

## 2019-04-02 RX ADMIN — DEXAMETHASONE SODIUM PHOSPHATE 20 MG: 10 INJECTION INTRAMUSCULAR; INTRAVENOUS at 12:15

## 2019-04-02 RX ADMIN — ACETAMINOPHEN 650 MG: 325 TABLET, FILM COATED ORAL at 12:06

## 2019-04-02 RX ADMIN — DARATUMUMAB 1400 MG: 100 INJECTION, SOLUTION, CONCENTRATE INTRAVENOUS at 13:30

## 2019-04-02 NOTE — PROGRESS NOTES
4/2/19:  Patient in for pre-daratumamab visit with NP. Patient doing well and has only minor complaints of mid-abdominal pain. He feels duragesic patches are controlling pain at this point and only adds tylenol occasionally. Patient would like to have cataract surgery in the near future and NP and Dr. Vera Hodge ok with this plan. Patient has chosen not to pursure hernia repair unless absolutely necessary. Patient started new cycle of ninlaro today and will continue with brad as planned. Dr. Vera Hodge f/u on 4/30.

## 2019-04-04 LAB
ALBUMIN SERPL ELPH-MCNC: 3.62 G/DL (ref 3.2–5.6)
ALBUMIN/GLOB SERPL: 1.5 {RATIO}
ALPHA1 GLOB SERPL ELPH-MCNC: 0.21 G/DL (ref 0.1–0.4)
ALPHA2 GLOB SERPL ELPH-MCNC: 0.69 G/DL (ref 0.4–1.2)
B-GLOBULIN SERPL QL ELPH: 0.86 G/DL (ref 0.6–1.3)
GAMMA GLOB MFR SERPL ELPH: 0.71 G/DL (ref 0.5–1.6)
IGA SERPL-MCNC: 18 MG/DL (ref 85–499)
IGG SERPL-MCNC: 656 MG/DL (ref 610–1616)
IGM SERPL-MCNC: 26 MG/DL (ref 35–242)
M PROTEIN SERPL ELPH-MCNC: 0.12 G/DL
PROT PATTERN SERPL ELPH-IMP: ABNORMAL
PROT PATTERN SPEC IFE-IMP: ABNORMAL
PROT SERPL-MCNC: 6.1 G/DL (ref 6.3–8.2)

## 2019-04-22 ENCOUNTER — HOSPITAL ENCOUNTER (OUTPATIENT)
Dept: LAB | Age: 79
Discharge: HOME | End: 2019-04-22
Payer: MEDICARE

## 2019-04-22 DIAGNOSIS — C73 HURTHLE CELL CARCINOMA OF THYROID (HCC): ICD-10-CM

## 2019-04-22 DIAGNOSIS — E89.0 POSTSURGICAL HYPOTHYROIDISM: ICD-10-CM

## 2019-04-22 LAB — TSH W FREE THYROID I,TSHELE: 0.14 UIU/ML (ref 0.36–3.74)

## 2019-04-22 PROCEDURE — 84432 ASSAY OF THYROGLOBULIN: CPT

## 2019-04-22 PROCEDURE — 84439 ASSAY OF FREE THYROXINE: CPT

## 2019-04-22 PROCEDURE — 84443 ASSAY THYROID STIM HORMONE: CPT

## 2019-04-22 PROCEDURE — 86800 THYROGLOBULIN ANTIBODY: CPT

## 2019-04-22 PROCEDURE — 36415 COLL VENOUS BLD VENIPUNCTURE: CPT

## 2019-04-22 PROCEDURE — 84480 ASSAY TRIIODOTHYRONINE (T3): CPT

## 2019-04-23 LAB
T3 SERPL-MCNC: 1.36 NG/ML (ref 0.6–1.81)
T3 SERPL-MCNC: NORMAL NG/ML
T4 FREE SERPL-MCNC: 1.4 NG/DL (ref 0.9–1.8)
THYROGLOB AB SERPL-ACNC: <1 IU/ML (ref 0–0.9)
THYROGLOBULIN, SERUM, 006694: 0.6 NG/ML (ref 1.4–29.2)

## 2019-04-30 ENCOUNTER — HOSPITAL ENCOUNTER (OUTPATIENT)
Dept: INFUSION THERAPY | Age: 79
Discharge: HOME | End: 2019-04-30
Payer: MEDICARE

## 2019-04-30 ENCOUNTER — PATIENT OUTREACH (OUTPATIENT)
Dept: CASE MANAGEMENT | Age: 79
End: 2019-04-30

## 2019-04-30 ENCOUNTER — HOSPITAL ENCOUNTER (OUTPATIENT)
Dept: LAB | Age: 79
Discharge: HOME | End: 2019-04-30
Payer: MEDICARE

## 2019-04-30 VITALS
RESPIRATION RATE: 18 BRPM | SYSTOLIC BLOOD PRESSURE: 125 MMHG | BODY MASS INDEX: 26.85 KG/M2 | TEMPERATURE: 97.4 F | HEART RATE: 71 BPM | OXYGEN SATURATION: 100 % | DIASTOLIC BLOOD PRESSURE: 66 MMHG | HEIGHT: 70 IN

## 2019-04-30 DIAGNOSIS — C90.00 MULTIPLE MYELOMA, REMISSION STATUS UNSPECIFIED (HCC): ICD-10-CM

## 2019-04-30 DIAGNOSIS — C90.02 MULTIPLE MYELOMA IN RELAPSE (HCC): Primary | ICD-10-CM

## 2019-04-30 DIAGNOSIS — C90.00 MULTIPLE MYELOMA NOT HAVING ACHIEVED REMISSION (HCC): ICD-10-CM

## 2019-04-30 LAB
ALBUMIN SERPL-MCNC: 3.9 G/DL (ref 3.2–4.6)
ALBUMIN/GLOB SERPL: 1.3 {RATIO} (ref 1.2–3.5)
ALP SERPL-CCNC: 59 U/L (ref 50–136)
ALT SERPL-CCNC: 17 U/L (ref 12–65)
ANION GAP SERPL CALC-SCNC: 6 MMOL/L (ref 7–16)
AST SERPL-CCNC: 32 U/L (ref 15–37)
BASOPHILS # BLD: 0 K/UL (ref 0–0.2)
BASOPHILS NFR BLD: 1 % (ref 0–2)
BILIRUB SERPL-MCNC: 0.5 MG/DL (ref 0.2–1.1)
BUN SERPL-MCNC: 22 MG/DL (ref 8–23)
CALCIUM SERPL-MCNC: 9.1 MG/DL (ref 8.3–10.4)
CHLORIDE SERPL-SCNC: 108 MMOL/L (ref 98–107)
CO2 SERPL-SCNC: 25 MMOL/L (ref 21–32)
CREAT SERPL-MCNC: 1.21 MG/DL (ref 0.8–1.5)
DIFFERENTIAL METHOD BLD: ABNORMAL
EOSINOPHIL # BLD: 0 K/UL (ref 0–0.8)
EOSINOPHIL NFR BLD: 1 % (ref 0.5–7.8)
ERYTHROCYTE [DISTWIDTH] IN BLOOD BY AUTOMATED COUNT: 14.1 % (ref 11.9–14.6)
GLOBULIN SER CALC-MCNC: 3.1 G/DL (ref 2.3–3.5)
GLUCOSE SERPL-MCNC: 105 MG/DL (ref 65–100)
HCT VFR BLD AUTO: 32.3 % (ref 41.1–50.3)
HGB BLD-MCNC: 10.9 G/DL (ref 13.6–17.2)
IMM GRANULOCYTES # BLD AUTO: 0 K/UL (ref 0–0.5)
IMM GRANULOCYTES NFR BLD AUTO: 0 % (ref 0–5)
KAPPA LC FREE SER-MCNC: 5.76 MG/L (ref 3.3–19.4)
KAPPA LC FREE/LAMBDA FREE SER: 0.05 {RATIO} (ref 0.26–1.65)
LAMBDA LC FREE SERPL-MCNC: 119.96 MG/L (ref 5.71–26.3)
LYMPHOCYTES # BLD: 1.9 K/UL (ref 0.5–4.6)
LYMPHOCYTES NFR BLD: 50 % (ref 13–44)
MAGNESIUM SERPL-MCNC: 2.2 MG/DL (ref 1.8–2.4)
MCH RBC QN AUTO: 23.9 PG (ref 26.1–32.9)
MCHC RBC AUTO-ENTMCNC: 33.7 G/DL (ref 31.4–35)
MCV RBC AUTO: 70.8 FL (ref 79.6–97.8)
MONOCYTES # BLD: 0.5 K/UL (ref 0.1–1.3)
MONOCYTES NFR BLD: 14 % (ref 4–12)
NEUTS SEG # BLD: 1.3 K/UL (ref 1.7–8.2)
NEUTS SEG NFR BLD: 34 % (ref 43–78)
NRBC # BLD: 0 K/UL (ref 0–0.2)
PLATELET # BLD AUTO: 154 K/UL (ref 150–450)
PMV BLD AUTO: 9.7 FL (ref 9.4–12.3)
POTASSIUM SERPL-SCNC: 4.2 MMOL/L (ref 3.5–5.1)
PROT SERPL-MCNC: 7 G/DL (ref 6.3–8.2)
RBC # BLD AUTO: 4.56 M/UL (ref 4.23–5.67)
SODIUM SERPL-SCNC: 139 MMOL/L (ref 136–145)
WBC # BLD AUTO: 3.8 K/UL (ref 4.3–11.1)

## 2019-04-30 PROCEDURE — 74011250636 HC RX REV CODE- 250/636: Performed by: INTERNAL MEDICINE

## 2019-04-30 PROCEDURE — 74011000258 HC RX REV CODE- 258: Performed by: INTERNAL MEDICINE

## 2019-04-30 PROCEDURE — 83735 ASSAY OF MAGNESIUM: CPT

## 2019-04-30 PROCEDURE — 83883 ASSAY NEPHELOMETRY NOT SPEC: CPT

## 2019-04-30 PROCEDURE — 96413 CHEMO IV INFUSION 1 HR: CPT

## 2019-04-30 PROCEDURE — 96375 TX/PRO/DX INJ NEW DRUG ADDON: CPT

## 2019-04-30 PROCEDURE — 86334 IMMUNOFIX E-PHORESIS SERUM: CPT

## 2019-04-30 PROCEDURE — 96415 CHEMO IV INFUSION ADDL HR: CPT

## 2019-04-30 PROCEDURE — 85025 COMPLETE CBC W/AUTO DIFF WBC: CPT

## 2019-04-30 PROCEDURE — 74011250637 HC RX REV CODE- 250/637: Performed by: INTERNAL MEDICINE

## 2019-04-30 PROCEDURE — 84165 PROTEIN E-PHORESIS SERUM: CPT

## 2019-04-30 PROCEDURE — 80053 COMPREHEN METABOLIC PANEL: CPT

## 2019-04-30 RX ORDER — SODIUM CHLORIDE 0.9 % (FLUSH) 0.9 %
10 SYRINGE (ML) INJECTION AS NEEDED
Status: ACTIVE | OUTPATIENT
Start: 2019-04-30 | End: 2019-04-30

## 2019-04-30 RX ORDER — HEPARIN 100 UNIT/ML
300-500 SYRINGE INTRAVENOUS AS NEEDED
Status: DISPENSED | OUTPATIENT
Start: 2019-04-30 | End: 2019-04-30

## 2019-04-30 RX ORDER — SODIUM CHLORIDE 9 MG/ML
500 INJECTION, SOLUTION INTRAVENOUS CONTINUOUS
Status: ACTIVE | OUTPATIENT
Start: 2019-04-30 | End: 2019-04-30

## 2019-04-30 RX ORDER — DIPHENHYDRAMINE HYDROCHLORIDE 50 MG/ML
50 INJECTION, SOLUTION INTRAMUSCULAR; INTRAVENOUS ONCE
Status: COMPLETED | OUTPATIENT
Start: 2019-04-30 | End: 2019-04-30

## 2019-04-30 RX ORDER — ACETAMINOPHEN 325 MG/1
650 TABLET ORAL ONCE
Status: COMPLETED | OUTPATIENT
Start: 2019-04-30 | End: 2019-04-30

## 2019-04-30 RX ADMIN — ACETAMINOPHEN 650 MG: 325 TABLET, FILM COATED ORAL at 09:57

## 2019-04-30 RX ADMIN — DEXAMETHASONE SODIUM PHOSPHATE 20 MG: 10 INJECTION INTRAMUSCULAR; INTRAVENOUS at 10:00

## 2019-04-30 RX ADMIN — SODIUM CHLORIDE 3.5 MG: 900 INJECTION, SOLUTION INTRAVENOUS at 10:13

## 2019-04-30 RX ADMIN — DIPHENHYDRAMINE HYDROCHLORIDE 50 MG: 50 INJECTION, SOLUTION INTRAMUSCULAR; INTRAVENOUS at 09:59

## 2019-04-30 RX ADMIN — SODIUM CHLORIDE 500 ML: 900 INJECTION, SOLUTION INTRAVENOUS at 09:55

## 2019-04-30 RX ADMIN — DARATUMUMAB 1400 MG: 100 INJECTION, SOLUTION, CONCENTRATE INTRAVENOUS at 10:58

## 2019-04-30 RX ADMIN — Medication 10 ML: at 09:55

## 2019-04-30 NOTE — PROGRESS NOTES
Pt was seen and labs reviewed by Dr. Nava Arriaga. Pt is doing well other than some back and abdominal pain that is his baseline. Pt was told that his last SPEP showed very small M-spike so we will continue to monitor this. If it continues to rise, we could consider going back to Darzalex every 2 week infusions. Pt to continue Ninlaro. He will return to clinic in 4 weeks.

## 2019-04-30 NOTE — PROGRESS NOTES
Arrived to the [facility]. Daratumumab and Zometa completed. Patient tolerated well. Any issues or concerns during appointment: none. Patient aware of next infusion appointment on 5/28 (date) at 10:00 (time). Discharged ambulatory with cane.

## 2019-05-01 ENCOUNTER — PATIENT OUTREACH (OUTPATIENT)
Dept: CASE MANAGEMENT | Age: 79
End: 2019-05-01

## 2019-05-01 DIAGNOSIS — C90.00 MULTIPLE MYELOMA NOT HAVING ACHIEVED REMISSION (HCC): Primary | ICD-10-CM

## 2019-05-01 LAB
ALBUMIN SERPL ELPH-MCNC: 4.02 G/DL (ref 3.2–5.6)
ALBUMIN/GLOB SERPL: 1.6 {RATIO}
ALPHA1 GLOB SERPL ELPH-MCNC: 0.24 G/DL (ref 0.1–0.4)
ALPHA2 GLOB SERPL ELPH-MCNC: 0.69 G/DL (ref 0.4–1.2)
B-GLOBULIN SERPL QL ELPH: 0.82 G/DL (ref 0.6–1.3)
GAMMA GLOB MFR SERPL ELPH: 0.84 G/DL (ref 0.5–1.6)
IGA SERPL-MCNC: 15 MG/DL (ref 85–499)
IGG SERPL-MCNC: 863 MG/DL (ref 610–1616)
IGM SERPL-MCNC: 19 MG/DL (ref 35–242)
M PROTEIN SERPL ELPH-MCNC: 0.34 G/DL
PROT PATTERN SERPL ELPH-IMP: ABNORMAL
PROT PATTERN SPEC IFE-IMP: ABNORMAL
PROT SERPL-MCNC: 6.6 G/DL (ref 6.3–8.2)

## 2019-05-01 NOTE — PROGRESS NOTES
5/1/19:  Scheduled patient for 2 weeks for next brad and NP visit. Patient aware of increase in free light chains and in agreement with the plan to go back to every 2 week dosing of daratumamab. Refill for fentanyl patches given. Tawny Grullon RN for Dr. Alex Stinson reviewed the patients controlled substance prescription history, as maintained in the [facility] [place] prescription monitoring program, so that the prescription(s) for a  controlled substance can be given.

## 2019-05-14 ENCOUNTER — HOSPITAL ENCOUNTER (OUTPATIENT)
Dept: LAB | Age: 79
Discharge: HOME | End: 2019-05-14
Payer: MEDICARE

## 2019-05-14 ENCOUNTER — HOSPITAL ENCOUNTER (OUTPATIENT)
Dept: INFUSION THERAPY | Age: 79
Discharge: HOME | End: 2019-05-14
Payer: MEDICARE

## 2019-05-14 ENCOUNTER — PATIENT OUTREACH (OUTPATIENT)
Dept: CASE MANAGEMENT | Age: 79
End: 2019-05-14

## 2019-05-14 VITALS — HEART RATE: 77 BPM | DIASTOLIC BLOOD PRESSURE: 66 MMHG | SYSTOLIC BLOOD PRESSURE: 120 MMHG | RESPIRATION RATE: 18 BRPM

## 2019-05-14 DIAGNOSIS — C90.00 MULTIPLE MYELOMA NOT HAVING ACHIEVED REMISSION (HCC): ICD-10-CM

## 2019-05-14 DIAGNOSIS — C90.02 MULTIPLE MYELOMA IN RELAPSE (HCC): Primary | ICD-10-CM

## 2019-05-14 LAB
ALBUMIN SERPL-MCNC: 3.7 G/DL (ref 3.2–4.6)
ALBUMIN/GLOB SERPL: 1 {RATIO} (ref 1.2–3.5)
ALP SERPL-CCNC: 58 U/L (ref 50–136)
ALT SERPL-CCNC: 20 U/L (ref 12–65)
ANION GAP SERPL CALC-SCNC: 8 MMOL/L (ref 7–16)
AST SERPL-CCNC: 41 U/L (ref 15–37)
BASOPHILS # BLD: 0 K/UL (ref 0–0.2)
BASOPHILS NFR BLD: 0 % (ref 0–2)
BILIRUB SERPL-MCNC: 0.5 MG/DL (ref 0.2–1.1)
BUN SERPL-MCNC: 24 MG/DL (ref 8–23)
CALCIUM SERPL-MCNC: 9.4 MG/DL (ref 8.3–10.4)
CHLORIDE SERPL-SCNC: 109 MMOL/L (ref 98–107)
CO2 SERPL-SCNC: 24 MMOL/L (ref 21–32)
CREAT SERPL-MCNC: 1.35 MG/DL (ref 0.8–1.5)
DIFFERENTIAL METHOD BLD: ABNORMAL
EOSINOPHIL # BLD: 0 K/UL (ref 0–0.8)
EOSINOPHIL NFR BLD: 0 % (ref 0.5–7.8)
ERYTHROCYTE [DISTWIDTH] IN BLOOD BY AUTOMATED COUNT: 14.4 % (ref 11.9–14.6)
GLOBULIN SER CALC-MCNC: 3.7 G/DL (ref 2.3–3.5)
GLUCOSE SERPL-MCNC: 142 MG/DL (ref 65–100)
HCT VFR BLD AUTO: 32.9 % (ref 41.1–50.3)
HGB BLD-MCNC: 11 G/DL (ref 13.6–17.2)
IMM GRANULOCYTES # BLD AUTO: 0.1 K/UL (ref 0–0.5)
IMM GRANULOCYTES NFR BLD AUTO: 1 % (ref 0–5)
LYMPHOCYTES # BLD: 1.5 K/UL (ref 0.5–4.6)
LYMPHOCYTES NFR BLD: 21 % (ref 13–44)
MAGNESIUM SERPL-MCNC: 2 MG/DL (ref 1.8–2.4)
MCH RBC QN AUTO: 23.6 PG (ref 26.1–32.9)
MCHC RBC AUTO-ENTMCNC: 33.4 G/DL (ref 31.4–35)
MCV RBC AUTO: 70.6 FL (ref 79.6–97.8)
MONOCYTES # BLD: 0.3 K/UL (ref 0.1–1.3)
MONOCYTES NFR BLD: 4 % (ref 4–12)
NEUTS SEG # BLD: 5.2 K/UL (ref 1.7–8.2)
NEUTS SEG NFR BLD: 73 % (ref 43–78)
NRBC # BLD: 0.01 K/UL (ref 0–0.2)
PLATELET # BLD AUTO: 117 K/UL (ref 150–450)
PMV BLD AUTO: 9.8 FL (ref 9.4–12.3)
POTASSIUM SERPL-SCNC: 4.2 MMOL/L (ref 3.5–5.1)
PROT SERPL-MCNC: 7.4 G/DL (ref 6.3–8.2)
RBC # BLD AUTO: 4.66 M/UL (ref 4.23–5.67)
SODIUM SERPL-SCNC: 141 MMOL/L (ref 136–145)
WBC # BLD AUTO: 7 K/UL (ref 4.3–11.1)

## 2019-05-14 PROCEDURE — 85025 COMPLETE CBC W/AUTO DIFF WBC: CPT

## 2019-05-14 PROCEDURE — 96375 TX/PRO/DX INJ NEW DRUG ADDON: CPT

## 2019-05-14 PROCEDURE — 96415 CHEMO IV INFUSION ADDL HR: CPT

## 2019-05-14 PROCEDURE — 96413 CHEMO IV INFUSION 1 HR: CPT

## 2019-05-14 PROCEDURE — 74011000258 HC RX REV CODE- 258: Performed by: NURSE PRACTITIONER

## 2019-05-14 PROCEDURE — 74011250637 HC RX REV CODE- 250/637: Performed by: NURSE PRACTITIONER

## 2019-05-14 PROCEDURE — 83735 ASSAY OF MAGNESIUM: CPT

## 2019-05-14 PROCEDURE — 74011250636 HC RX REV CODE- 250/636: Performed by: NURSE PRACTITIONER

## 2019-05-14 PROCEDURE — 80053 COMPREHEN METABOLIC PANEL: CPT

## 2019-05-14 RX ORDER — SODIUM CHLORIDE 9 MG/ML
10 INJECTION INTRAMUSCULAR; INTRAVENOUS; SUBCUTANEOUS AS NEEDED
Status: ACTIVE | OUTPATIENT
Start: 2019-05-14 | End: 2019-05-14

## 2019-05-14 RX ORDER — ACETAMINOPHEN 325 MG/1
650 TABLET ORAL ONCE
Status: COMPLETED | OUTPATIENT
Start: 2019-05-14 | End: 2019-05-14

## 2019-05-14 RX ORDER — DIPHENHYDRAMINE HYDROCHLORIDE 50 MG/ML
50 INJECTION, SOLUTION INTRAMUSCULAR; INTRAVENOUS ONCE
Status: COMPLETED | OUTPATIENT
Start: 2019-05-14 | End: 2019-05-14

## 2019-05-14 RX ORDER — SODIUM CHLORIDE 9 MG/ML
500 INJECTION, SOLUTION INTRAVENOUS CONTINUOUS
Status: ACTIVE | OUTPATIENT
Start: 2019-05-14 | End: 2019-05-14

## 2019-05-14 RX ADMIN — DIPHENHYDRAMINE HYDROCHLORIDE 50 MG: 50 INJECTION, SOLUTION INTRAMUSCULAR; INTRAVENOUS at 12:26

## 2019-05-14 RX ADMIN — DEXAMETHASONE SODIUM PHOSPHATE 20 MG: 10 INJECTION INTRAMUSCULAR; INTRAVENOUS at 12:30

## 2019-05-14 RX ADMIN — ACETAMINOPHEN 650 MG: 325 TABLET, FILM COATED ORAL at 12:27

## 2019-05-14 RX ADMIN — DARATUMUMAB 1400 MG: 100 INJECTION, SOLUTION, CONCENTRATE INTRAVENOUS at 13:50

## 2019-05-14 RX ADMIN — SODIUM CHLORIDE 500 ML: 900 INJECTION, SOLUTION INTRAVENOUS at 12:40

## 2019-05-14 RX ADMIN — SODIUM CHLORIDE 10 ML: 9 INJECTION INTRAMUSCULAR; INTRAVENOUS; SUBCUTANEOUS at 12:25

## 2019-05-14 NOTE — PROGRESS NOTES
Arrived to infusion after [facility] visit  Reports labs were not good so he is starting back on  q2week chemo. Reports feeling weak,fatigued  daratumumab infused tolerated well. Next appt.  5/28

## 2019-05-14 NOTE — PROGRESS NOTES
Pt was seen and labs reviewed by Carri Garcia NP. Pt states he has been  Having some stomach pain, but that it is unchanged from his baseline. Lambda Light chains and M-spike have been rising, so pt is starting back every 2 weeks Daratumumab infusions. He will remain on Ninlaro, and new Rx sent to [facility]. Pt will return to clinic in 2 weeks to see Dr. Mikie Ratliff with labs.

## 2019-05-28 ENCOUNTER — HOSPITAL ENCOUNTER (OUTPATIENT)
Dept: INFUSION THERAPY | Age: 79
Discharge: HOME | End: 2019-05-28
Payer: MEDICARE

## 2019-05-28 ENCOUNTER — HOSPITAL ENCOUNTER (OUTPATIENT)
Dept: LAB | Age: 79
Discharge: HOME | End: 2019-05-28
Payer: MEDICARE

## 2019-05-28 ENCOUNTER — PATIENT OUTREACH (OUTPATIENT)
Dept: CASE MANAGEMENT | Age: 79
End: 2019-05-28

## 2019-05-28 DIAGNOSIS — C90.02 MULTIPLE MYELOMA IN RELAPSE (HCC): Primary | ICD-10-CM

## 2019-05-28 DIAGNOSIS — C90.00 MULTIPLE MYELOMA, REMISSION STATUS UNSPECIFIED (HCC): ICD-10-CM

## 2019-05-28 DIAGNOSIS — C90.00 MULTIPLE MYELOMA NOT HAVING ACHIEVED REMISSION (HCC): ICD-10-CM

## 2019-05-28 LAB
ALBUMIN SERPL-MCNC: 3.7 G/DL (ref 3.2–4.6)
ALBUMIN/GLOB SERPL: 0.7 {RATIO} (ref 1.2–3.5)
ALP SERPL-CCNC: 59 U/L (ref 50–136)
ALT SERPL-CCNC: 37 U/L (ref 12–65)
ANION GAP SERPL CALC-SCNC: 9 MMOL/L (ref 7–16)
AST SERPL-CCNC: 102 U/L (ref 15–37)
BASOPHILS # BLD: 0.1 K/UL (ref 0–0.2)
BASOPHILS NFR BLD: 1 % (ref 0–2)
BILIRUB SERPL-MCNC: 0.5 MG/DL (ref 0.2–1.1)
BUN SERPL-MCNC: 32 MG/DL (ref 8–23)
CALCIUM SERPL-MCNC: 9.7 MG/DL (ref 8.3–10.4)
CHLORIDE SERPL-SCNC: 105 MMOL/L (ref 98–107)
CO2 SERPL-SCNC: 22 MMOL/L (ref 21–32)
CREAT SERPL-MCNC: 1.89 MG/DL (ref 0.8–1.5)
DIFFERENTIAL METHOD BLD: ABNORMAL
EOSINOPHIL # BLD: 0.1 K/UL (ref 0–0.8)
EOSINOPHIL NFR BLD: 1 % (ref 0.5–7.8)
ERYTHROCYTE [DISTWIDTH] IN BLOOD BY AUTOMATED COUNT: 13.8 % (ref 11.9–14.6)
GLOBULIN SER CALC-MCNC: 5 G/DL (ref 2.3–3.5)
GLUCOSE SERPL-MCNC: 178 MG/DL (ref 65–100)
HCT VFR BLD AUTO: 33 % (ref 41.1–50.3)
HGB BLD-MCNC: 11.1 G/DL (ref 13.6–17.2)
IMM GRANULOCYTES # BLD AUTO: 0.5 K/UL (ref 0–0.5)
IMM GRANULOCYTES NFR BLD AUTO: 6 % (ref 0–5)
KAPPA LC FREE SER-MCNC: 2.26 MG/L (ref 3.3–19.4)
KAPPA LC FREE/LAMBDA FREE SER: 0 {RATIO} (ref 0.26–1.65)
LAMBDA LC FREE SERPL-MCNC: 864.73 MG/L (ref 5.71–26.3)
LYMPHOCYTES # BLD: 2.1 K/UL (ref 0.5–4.6)
LYMPHOCYTES NFR BLD: 28 % (ref 13–44)
MAGNESIUM SERPL-MCNC: 2 MG/DL (ref 1.8–2.4)
MCH RBC QN AUTO: 23.1 PG (ref 26.1–32.9)
MCHC RBC AUTO-ENTMCNC: 33.6 G/DL (ref 31.4–35)
MCV RBC AUTO: 68.6 FL (ref 79.6–97.8)
MONOCYTES # BLD: 0.5 K/UL (ref 0.1–1.3)
MONOCYTES NFR BLD: 7 % (ref 4–12)
NEUTS SEG # BLD: 4.2 K/UL (ref 1.7–8.2)
NEUTS SEG NFR BLD: 57 % (ref 43–78)
NRBC # BLD: 0.05 K/UL (ref 0–0.2)
PLATELET # BLD AUTO: 81 K/UL (ref 150–450)
PLATELET COMMENTS,PCOM: ABNORMAL
PMV BLD AUTO: ABNORMAL FL (ref 9.4–12.3)
POTASSIUM SERPL-SCNC: 4.1 MMOL/L (ref 3.5–5.1)
PROT SERPL-MCNC: 8.7 G/DL (ref 6.3–8.2)
RBC # BLD AUTO: 4.81 M/UL (ref 4.23–5.67)
RBC MORPH BLD: ABNORMAL
SODIUM SERPL-SCNC: 136 MMOL/L (ref 136–145)
WBC # BLD AUTO: 7.5 K/UL (ref 4.3–11.1)
WBC MORPH BLD: ABNORMAL

## 2019-05-28 PROCEDURE — 96415 CHEMO IV INFUSION ADDL HR: CPT

## 2019-05-28 PROCEDURE — 74011000258 HC RX REV CODE- 258: Performed by: INTERNAL MEDICINE

## 2019-05-28 PROCEDURE — 74011250636 HC RX REV CODE- 250/636: Performed by: INTERNAL MEDICINE

## 2019-05-28 PROCEDURE — 84165 PROTEIN E-PHORESIS SERUM: CPT

## 2019-05-28 PROCEDURE — 86334 IMMUNOFIX E-PHORESIS SERUM: CPT

## 2019-05-28 PROCEDURE — 74011250637 HC RX REV CODE- 250/637: Performed by: INTERNAL MEDICINE

## 2019-05-28 PROCEDURE — 96375 TX/PRO/DX INJ NEW DRUG ADDON: CPT

## 2019-05-28 PROCEDURE — 85025 COMPLETE CBC W/AUTO DIFF WBC: CPT

## 2019-05-28 PROCEDURE — 80053 COMPREHEN METABOLIC PANEL: CPT

## 2019-05-28 PROCEDURE — 83735 ASSAY OF MAGNESIUM: CPT

## 2019-05-28 PROCEDURE — 96413 CHEMO IV INFUSION 1 HR: CPT

## 2019-05-28 PROCEDURE — 83883 ASSAY NEPHELOMETRY NOT SPEC: CPT

## 2019-05-28 RX ORDER — SODIUM CHLORIDE 0.9 % (FLUSH) 0.9 %
10 SYRINGE (ML) INJECTION AS NEEDED
Status: ACTIVE | OUTPATIENT
Start: 2019-05-28 | End: 2019-05-28

## 2019-05-28 RX ORDER — SODIUM CHLORIDE 9 MG/ML
500 INJECTION, SOLUTION INTRAVENOUS CONTINUOUS
Status: ACTIVE | OUTPATIENT
Start: 2019-05-28 | End: 2019-05-28

## 2019-05-28 RX ORDER — DIPHENHYDRAMINE HYDROCHLORIDE 50 MG/ML
50 INJECTION, SOLUTION INTRAMUSCULAR; INTRAVENOUS ONCE
Status: COMPLETED | OUTPATIENT
Start: 2019-05-28 | End: 2019-05-28

## 2019-05-28 RX ORDER — ACETAMINOPHEN 325 MG/1
650 TABLET ORAL ONCE
Status: COMPLETED | OUTPATIENT
Start: 2019-05-28 | End: 2019-05-28

## 2019-05-28 RX ADMIN — SODIUM CHLORIDE 500 ML: 900 INJECTION, SOLUTION INTRAVENOUS at 10:25

## 2019-05-28 RX ADMIN — SODIUM CHLORIDE 3 MG: 900 INJECTION, SOLUTION INTRAVENOUS at 15:01

## 2019-05-28 RX ADMIN — Medication 10 ML: at 15:16

## 2019-05-28 RX ADMIN — DIPHENHYDRAMINE HYDROCHLORIDE 50 MG: 50 INJECTION, SOLUTION INTRAMUSCULAR; INTRAVENOUS at 10:30

## 2019-05-28 RX ADMIN — SODIUM CHLORIDE 500 ML: 900 INJECTION, SOLUTION INTRAVENOUS at 11:00

## 2019-05-28 RX ADMIN — DEXAMETHASONE SODIUM PHOSPHATE 20 MG: 10 INJECTION INTRAMUSCULAR; INTRAVENOUS at 10:45

## 2019-05-28 RX ADMIN — ACETAMINOPHEN 650 MG: 325 TABLET, FILM COATED ORAL at 10:29

## 2019-05-28 RX ADMIN — DARATUMUMAB 1400 MG: 100 INJECTION, SOLUTION, CONCENTRATE INTRAVENOUS at 11:45

## 2019-05-28 NOTE — PROGRESS NOTES
5/28/19- Pt seen in the office with Dr Patricia Yen. Labs discussed with pt and the need to change treatments after today's daratumumab. Dr Patricia Yen discussed weekly cytoxan and kyprolis with pt and will follow up with navigator once all labs result. Pt instructed to not take his Ninlaro next week and follow up on 6/11 with NP. Infusion schedule for that day as well and treatment to be determined.

## 2019-05-28 NOTE — PROGRESS NOTES
Arrived to the [facility]. Daratumumb + Zometa completed. Patient tolerated well. Any issues or concerns during appointment: none. Patient aware of next infusion appointment on 6/11/19 at 0930. Discharged ambulatory.     Alberto Terrazas RN

## 2019-05-29 LAB
ALBUMIN SERPL ELPH-MCNC: 3.78 G/DL (ref 3.2–5.6)
ALBUMIN/GLOB SERPL: 0.9 {RATIO}
ALPHA1 GLOB SERPL ELPH-MCNC: 0.24 G/DL (ref 0.1–0.4)
ALPHA2 GLOB SERPL ELPH-MCNC: 0.73 G/DL (ref 0.4–1.2)
B-GLOBULIN SERPL QL ELPH: 1.26 G/DL (ref 0.6–1.3)
GAMMA GLOB MFR SERPL ELPH: 2.2 G/DL (ref 0.5–1.6)
IGA SERPL-MCNC: 6 MG/DL (ref 85–499)
IGG SERPL-MCNC: 2490 MG/DL (ref 610–1616)
IGM SERPL-MCNC: 13 MG/DL (ref 35–242)
M PROTEIN SERPL ELPH-MCNC: 1.28 G/DL
PROT PATTERN SERPL ELPH-IMP: ABNORMAL
PROT PATTERN SPEC IFE-IMP: ABNORMAL
PROT SERPL-MCNC: 8.2 G/DL (ref 6.3–8.2)

## 2019-05-30 ENCOUNTER — PATIENT OUTREACH (OUTPATIENT)
Dept: CASE MANAGEMENT | Age: 79
End: 2019-05-30

## 2019-05-30 NOTE — PROGRESS NOTES
5/30/19:  Received notification from Dr. Bib Fitch that patient's myeloma labs are continuing to rise and he will need to change treatment to cytoxan/kyrpolis/dex if patient agrees. Left patient a message to keep follow-up as planned on 6/11 and treatment plan will change at that visit if he agrees. Marcella Hernandez, pharmacist, working on treatment plan updates.

## 2019-06-11 ENCOUNTER — HOSPITAL ENCOUNTER (OUTPATIENT)
Dept: LAB | Age: 79
Discharge: HOME | End: 2019-06-11
Payer: MEDICARE

## 2019-06-11 ENCOUNTER — APPOINTMENT (OUTPATIENT)
Dept: INFUSION THERAPY | Age: 79
End: 2019-06-11
Payer: MEDICARE

## 2019-06-11 ENCOUNTER — HOSPITAL ENCOUNTER (OUTPATIENT)
Dept: INFUSION THERAPY | Age: 79
Discharge: HOME | End: 2019-06-11
Payer: MEDICARE

## 2019-06-11 ENCOUNTER — PATIENT OUTREACH (OUTPATIENT)
Dept: CASE MANAGEMENT | Age: 79
End: 2019-06-11

## 2019-06-11 VITALS
SYSTOLIC BLOOD PRESSURE: 106 MMHG | DIASTOLIC BLOOD PRESSURE: 52 MMHG | HEART RATE: 73 BPM | RESPIRATION RATE: 18 BRPM | OXYGEN SATURATION: 98 %

## 2019-06-11 DIAGNOSIS — C90.02 MULTIPLE MYELOMA IN RELAPSE (HCC): Primary | ICD-10-CM

## 2019-06-11 DIAGNOSIS — C90.00 MULTIPLE MYELOMA NOT HAVING ACHIEVED REMISSION (HCC): ICD-10-CM

## 2019-06-11 DIAGNOSIS — C90.00 MULTIPLE MYELOMA NOT HAVING ACHIEVED REMISSION (HCC): Primary | ICD-10-CM

## 2019-06-11 LAB
ALBUMIN SERPL-MCNC: 3.4 G/DL (ref 3.2–4.6)
ALBUMIN/GLOB SERPL: 0.5 {RATIO} (ref 1.2–3.5)
ALP SERPL-CCNC: 56 U/L (ref 50–136)
ALT SERPL-CCNC: 22 U/L (ref 12–65)
ANION GAP SERPL CALC-SCNC: 12 MMOL/L (ref 7–16)
AST SERPL-CCNC: 84 U/L (ref 15–37)
BASOPHILS # BLD: 0 K/UL (ref 0–0.2)
BASOPHILS NFR BLD: 0 % (ref 0–2)
BILIRUB SERPL-MCNC: 0.6 MG/DL (ref 0.2–1.1)
BUN SERPL-MCNC: 63 MG/DL (ref 8–23)
CALCIUM SERPL-MCNC: 9.8 MG/DL (ref 8.3–10.4)
CHLORIDE SERPL-SCNC: 103 MMOL/L (ref 98–107)
CO2 SERPL-SCNC: 19 MMOL/L (ref 21–32)
CREAT SERPL-MCNC: 3.43 MG/DL (ref 0.8–1.5)
DIFFERENTIAL METHOD BLD: ABNORMAL
EOSINOPHIL # BLD: 0 K/UL (ref 0–0.8)
EOSINOPHIL NFR BLD: 0 % (ref 0.5–7.8)
ERYTHROCYTE [DISTWIDTH] IN BLOOD BY AUTOMATED COUNT: 13.9 % (ref 11.9–14.6)
GLOBULIN SER CALC-MCNC: 6.2 G/DL (ref 2.3–3.5)
GLUCOSE SERPL-MCNC: 110 MG/DL (ref 65–100)
HCT VFR BLD AUTO: 31.4 % (ref 41.1–50.3)
HGB BLD-MCNC: 10.7 G/DL (ref 13.6–17.2)
IMM GRANULOCYTES # BLD AUTO: 0.2 K/UL (ref 0–0.5)
IMM GRANULOCYTES NFR BLD AUTO: 4 % (ref 0–5)
LYMPHOCYTES # BLD: 2.7 K/UL (ref 0.5–4.6)
LYMPHOCYTES NFR BLD: 56 % (ref 13–44)
MAGNESIUM SERPL-MCNC: 2.2 MG/DL (ref 1.8–2.4)
MCH RBC QN AUTO: 22.7 PG (ref 26.1–32.9)
MCHC RBC AUTO-ENTMCNC: 34.1 G/DL (ref 31.4–35)
MCV RBC AUTO: 66.5 FL (ref 79.6–97.8)
MONOCYTES # BLD: 0.6 K/UL (ref 0.1–1.3)
MONOCYTES NFR BLD: 12 % (ref 4–12)
NEUTS SEG # BLD: 1.3 K/UL (ref 1.7–8.2)
NEUTS SEG NFR BLD: 28 % (ref 43–78)
NRBC # BLD: 0.03 K/UL (ref 0–0.2)
PLATELET # BLD AUTO: 11 K/UL (ref 150–450)
PLATELET COMMENTS,PCOM: ABNORMAL
PMV BLD AUTO: ABNORMAL FL (ref 9.4–12.3)
POTASSIUM SERPL-SCNC: 4.7 MMOL/L (ref 3.5–5.1)
PROT SERPL-MCNC: 9.6 G/DL (ref 6.3–8.2)
RBC # BLD AUTO: 4.72 M/UL (ref 4.23–5.67)
RBC MORPH BLD: ABNORMAL
SODIUM SERPL-SCNC: 134 MMOL/L (ref 136–145)
WBC # BLD AUTO: 4.8 K/UL (ref 4.3–11.1)
WBC MORPH BLD: ABNORMAL

## 2019-06-11 PROCEDURE — 96417 CHEMO IV INFUS EACH ADDL SEQ: CPT

## 2019-06-11 PROCEDURE — 96375 TX/PRO/DX INJ NEW DRUG ADDON: CPT

## 2019-06-11 PROCEDURE — 85025 COMPLETE CBC W/AUTO DIFF WBC: CPT

## 2019-06-11 PROCEDURE — 83735 ASSAY OF MAGNESIUM: CPT

## 2019-06-11 PROCEDURE — 96361 HYDRATE IV INFUSION ADD-ON: CPT

## 2019-06-11 PROCEDURE — 74011250636 HC RX REV CODE- 250/636: Performed by: INTERNAL MEDICINE

## 2019-06-11 PROCEDURE — 74011250636 HC RX REV CODE- 250/636

## 2019-06-11 PROCEDURE — 74011250636 HC RX REV CODE- 250/636: Performed by: NURSE PRACTITIONER

## 2019-06-11 PROCEDURE — 96413 CHEMO IV INFUSION 1 HR: CPT

## 2019-06-11 PROCEDURE — 74011000258 HC RX REV CODE- 258: Performed by: INTERNAL MEDICINE

## 2019-06-11 PROCEDURE — 80053 COMPREHEN METABOLIC PANEL: CPT

## 2019-06-11 RX ORDER — HYDROCORTISONE SODIUM SUCCINATE 100 MG/2ML
100 INJECTION, POWDER, FOR SOLUTION INTRAMUSCULAR; INTRAVENOUS AS NEEDED
Status: ACTIVE | OUTPATIENT
Start: 2019-06-11 | End: 2019-06-11

## 2019-06-11 RX ORDER — ONDANSETRON 2 MG/ML
8 INJECTION INTRAMUSCULAR; INTRAVENOUS ONCE
Status: COMPLETED | OUTPATIENT
Start: 2019-06-11 | End: 2019-06-11

## 2019-06-11 RX ORDER — SODIUM CHLORIDE 0.9 % (FLUSH) 0.9 %
10 SYRINGE (ML) INJECTION AS NEEDED
Status: ACTIVE | OUTPATIENT
Start: 2019-06-11 | End: 2019-06-11

## 2019-06-11 RX ORDER — DIPHENHYDRAMINE HYDROCHLORIDE 50 MG/ML
50 INJECTION, SOLUTION INTRAMUSCULAR; INTRAVENOUS AS NEEDED
Status: ACTIVE | OUTPATIENT
Start: 2019-06-11 | End: 2019-06-11

## 2019-06-11 RX ADMIN — Medication 10 ML: at 10:09

## 2019-06-11 RX ADMIN — CYCLOPHOSPHAMIDE 576 MG: 1 INJECTION, POWDER, FOR SOLUTION INTRAVENOUS; ORAL at 12:10

## 2019-06-11 RX ADMIN — SODIUM CHLORIDE 1500 ML: 900 INJECTION, SOLUTION INTRAVENOUS at 10:09

## 2019-06-11 RX ADMIN — CARFILZOMIB 38.4 MG: 10 INJECTION, POWDER, LYOPHILIZED, FOR SOLUTION INTRAVENOUS at 11:34

## 2019-06-11 RX ADMIN — Medication 10 ML: at 13:11

## 2019-06-11 RX ADMIN — ONDANSETRON 8 MG: 2 INJECTION INTRAMUSCULAR; INTRAVENOUS at 10:30

## 2019-06-11 NOTE — PROGRESS NOTES
6/11/19:  Patient in for pre-chemo for new treatment (kyprolis, cytoxan and dex). Patient aware that myeloma has been progressing. Patient reports some pain which is being controlled with dilaudid and fentanyl patch. Patient reports fatigue and no appetite. Patient also notes his wife had him on a strict low sugar diet. NPDunia, reviewed labs and assessed patient. Patient continuing with cycle #1 today as planned. He will return on Thursday for repeat labs and possible platelets. Patient to see NP before treatment next week. Patient highly encouraged to increase fluid and food intake (high calorie and high protein).

## 2019-06-11 NOTE — PROGRESS NOTES
Arrived to the [facility]. D1C1 Kyprolis and Cytoxan completed. Patient tolerated well. Any issues or concerns during appointment: none. Patient aware of next infusion appointment on 6/13 (date) at 8:30 AM (time). Discharged ambulatory.

## 2019-06-13 ENCOUNTER — HOSPITAL ENCOUNTER (OUTPATIENT)
Dept: INFUSION THERAPY | Age: 79
Discharge: HOME | End: 2019-06-13
Payer: MEDICARE

## 2019-06-13 VITALS
RESPIRATION RATE: 18 BRPM | OXYGEN SATURATION: 98 % | SYSTOLIC BLOOD PRESSURE: 112 MMHG | TEMPERATURE: 98 F | DIASTOLIC BLOOD PRESSURE: 52 MMHG | HEART RATE: 73 BPM

## 2019-06-13 DIAGNOSIS — C90.00 MULTIPLE MYELOMA NOT HAVING ACHIEVED REMISSION (HCC): ICD-10-CM

## 2019-06-13 LAB
ALBUMIN SERPL-MCNC: 3.1 G/DL (ref 3.2–4.6)
ALBUMIN/GLOB SERPL: 0.6 {RATIO} (ref 1.2–3.5)
ALP SERPL-CCNC: 50 U/L (ref 50–136)
ALT SERPL-CCNC: 19 U/L (ref 12–65)
ANION GAP SERPL CALC-SCNC: 12 MMOL/L (ref 7–16)
AST SERPL-CCNC: 67 U/L (ref 15–37)
BASOPHILS # BLD: 0 K/UL (ref 0–0.2)
BASOPHILS NFR BLD: 0 % (ref 0–2)
BILIRUB SERPL-MCNC: 0.2 MG/DL (ref 0.2–1.1)
BUN SERPL-MCNC: 87 MG/DL (ref 8–23)
CALCIUM SERPL-MCNC: 9.1 MG/DL (ref 8.3–10.4)
CHLORIDE SERPL-SCNC: 109 MMOL/L (ref 98–107)
CO2 SERPL-SCNC: 16 MMOL/L (ref 21–32)
CREAT SERPL-MCNC: 3.7 MG/DL (ref 0.8–1.5)
DIFFERENTIAL METHOD BLD: ABNORMAL
EOSINOPHIL # BLD: 0 K/UL (ref 0–0.8)
EOSINOPHIL NFR BLD: 1 % (ref 0.5–7.8)
ERYTHROCYTE [DISTWIDTH] IN BLOOD BY AUTOMATED COUNT: 13.9 % (ref 11.9–14.6)
GLOBULIN SER CALC-MCNC: 5.3 G/DL (ref 2.3–3.5)
GLUCOSE SERPL-MCNC: 118 MG/DL (ref 65–100)
HCT VFR BLD AUTO: 25.8 % (ref 41.1–50.3)
HGB BLD-MCNC: 8.9 G/DL (ref 13.6–17.2)
IMM GRANULOCYTES # BLD AUTO: 0.1 K/UL (ref 0–0.5)
IMM GRANULOCYTES NFR BLD AUTO: 4 % (ref 0–5)
LYMPHOCYTES # BLD: 0.9 K/UL (ref 0.5–4.6)
LYMPHOCYTES NFR BLD: 48 % (ref 13–44)
MAGNESIUM SERPL-MCNC: 2.2 MG/DL (ref 1.8–2.4)
MCH RBC QN AUTO: 22.9 PG (ref 26.1–32.9)
MCHC RBC AUTO-ENTMCNC: 34.5 G/DL (ref 31.4–35)
MCV RBC AUTO: 66.3 FL (ref 79.6–97.8)
MONOCYTES # BLD: 0.2 K/UL (ref 0.1–1.3)
MONOCYTES NFR BLD: 9 % (ref 4–12)
NEUTS SEG # BLD: 0.7 K/UL (ref 1.7–8.2)
NEUTS SEG NFR BLD: 38 % (ref 43–78)
NRBC # BLD: 0 K/UL (ref 0–0.2)
PLATELET # BLD AUTO: 6 K/UL (ref 150–450)
PLATELET COMMENTS,PCOM: ABNORMAL
PMV BLD AUTO: ABNORMAL FL (ref 9.4–12.3)
POTASSIUM SERPL-SCNC: 4.9 MMOL/L (ref 3.5–5.1)
PROT SERPL-MCNC: 8.4 G/DL (ref 6.3–8.2)
RBC # BLD AUTO: 3.89 M/UL (ref 4.23–5.67)
RBC MORPH BLD: ABNORMAL
SODIUM SERPL-SCNC: 137 MMOL/L (ref 136–145)
WBC # BLD AUTO: 1.9 K/UL (ref 4.3–11.1)
WBC MORPH BLD: ABNORMAL

## 2019-06-13 PROCEDURE — 86900 BLOOD TYPING SEROLOGIC ABO: CPT

## 2019-06-13 PROCEDURE — 86644 CMV ANTIBODY: CPT

## 2019-06-13 PROCEDURE — 36430 TRANSFUSION BLD/BLD COMPNT: CPT

## 2019-06-13 PROCEDURE — 83735 ASSAY OF MAGNESIUM: CPT

## 2019-06-13 PROCEDURE — P9037 PLATE PHERES LEUKOREDU IRRAD: HCPCS

## 2019-06-13 PROCEDURE — 85025 COMPLETE CBC W/AUTO DIFF WBC: CPT

## 2019-06-13 PROCEDURE — P9040 RBC LEUKOREDUCED IRRADIATED: HCPCS

## 2019-06-13 PROCEDURE — 74011250637 HC RX REV CODE- 250/637: Performed by: INTERNAL MEDICINE

## 2019-06-13 PROCEDURE — 86923 COMPATIBILITY TEST ELECTRIC: CPT

## 2019-06-13 PROCEDURE — 77030018667 ADMN ST IV BLD FENW -A

## 2019-06-13 PROCEDURE — 74011250636 HC RX REV CODE- 250/636: Performed by: NURSE PRACTITIONER

## 2019-06-13 PROCEDURE — 80053 COMPREHEN METABOLIC PANEL: CPT

## 2019-06-13 RX ORDER — DIPHENHYDRAMINE HYDROCHLORIDE 50 MG/ML
50 INJECTION, SOLUTION INTRAMUSCULAR; INTRAVENOUS AS NEEDED
Status: DISCONTINUED | OUTPATIENT
Start: 2019-06-13 | End: 2019-06-17 | Stop reason: HOSPADM

## 2019-06-13 RX ORDER — ACETAMINOPHEN 325 MG/1
650 TABLET ORAL
Status: DISCONTINUED | OUTPATIENT
Start: 2019-06-13 | End: 2019-06-14 | Stop reason: HOSPADM

## 2019-06-13 RX ORDER — SODIUM CHLORIDE 0.9 % (FLUSH) 0.9 %
10 SYRINGE (ML) INJECTION AS NEEDED
Status: DISCONTINUED | OUTPATIENT
Start: 2019-06-13 | End: 2019-06-17 | Stop reason: HOSPADM

## 2019-06-13 RX ORDER — SODIUM CHLORIDE 9 MG/ML
250 INJECTION, SOLUTION INTRAVENOUS AS NEEDED
Status: DISCONTINUED | OUTPATIENT
Start: 2019-06-13 | End: 2019-06-17 | Stop reason: HOSPADM

## 2019-06-13 RX ORDER — ALBUTEROL SULFATE 2.5 MG/.5ML
2.5 SOLUTION RESPIRATORY (INHALATION) AS NEEDED
Status: DISCONTINUED | OUTPATIENT
Start: 2019-06-13 | End: 2019-06-17 | Stop reason: HOSPADM

## 2019-06-13 RX ORDER — DIPHENHYDRAMINE HCL 25 MG
25 CAPSULE ORAL
Status: COMPLETED | OUTPATIENT
Start: 2019-06-13 | End: 2019-06-13

## 2019-06-13 RX ORDER — HYDROCORTISONE SODIUM SUCCINATE 100 MG/2ML
100 INJECTION, POWDER, FOR SOLUTION INTRAMUSCULAR; INTRAVENOUS AS NEEDED
Status: DISCONTINUED | OUTPATIENT
Start: 2019-06-13 | End: 2019-06-17 | Stop reason: HOSPADM

## 2019-06-13 RX ORDER — EPINEPHRINE 1 MG/ML
0.3 INJECTION, SOLUTION, CONCENTRATE INTRAVENOUS
Status: DISCONTINUED | OUTPATIENT
Start: 2019-06-13 | End: 2019-06-14 | Stop reason: HOSPADM

## 2019-06-13 RX ORDER — ACETAMINOPHEN 325 MG/1
650 TABLET ORAL
Status: COMPLETED | OUTPATIENT
Start: 2019-06-13 | End: 2019-06-13

## 2019-06-13 RX ORDER — ONDANSETRON 2 MG/ML
8 INJECTION INTRAMUSCULAR; INTRAVENOUS AS NEEDED
Status: DISCONTINUED | OUTPATIENT
Start: 2019-06-13 | End: 2019-06-17 | Stop reason: HOSPADM

## 2019-06-13 RX ADMIN — DIPHENHYDRAMINE HYDROCHLORIDE 25 MG: 25 CAPSULE ORAL at 13:11

## 2019-06-13 RX ADMIN — ACETAMINOPHEN 650 MG: 325 TABLET, FILM COATED ORAL at 13:11

## 2019-06-13 RX ADMIN — Medication 10 ML: at 16:35

## 2019-06-13 RX ADMIN — SODIUM CHLORIDE 250 ML: 900 INJECTION, SOLUTION INTRAVENOUS at 13:11

## 2019-06-13 NOTE — PROGRESS NOTES
Arrived to the [facility]. 1 Unit Platelets and I unit PRBC completed. Patient tolerated without problems. Any issues or concerns during appointment: Patient with SOB, spoke with Kirby Chung NP, will give PRBC today. Patient aware of next infusion appointment on 6/18/19 (date) at 0830 (time). Discharged ambulatory.

## 2019-06-14 LAB
ABO + RH BLD: NORMAL
BLD PROD TYP BPU: NORMAL
BLD PROD TYP BPU: NORMAL
BLOOD BANK CMNT PATIENT-IMP: NORMAL
BLOOD GROUP ANTIBODIES SERPL: NORMAL
BPU ID: NORMAL
BPU ID: NORMAL
CROSSMATCH RESULT,%XM: NORMAL
SPECIMEN EXP DATE BLD: NORMAL
STATUS OF UNIT,%ST: NORMAL
STATUS OF UNIT,%ST: NORMAL
UNIT DIVISION, %UDIV: 0
UNIT DIVISION, %UDIV: 0

## 2019-06-18 ENCOUNTER — HOSPITAL ENCOUNTER (OUTPATIENT)
Dept: INFUSION THERAPY | Age: 79
Discharge: HOME | End: 2019-06-18
Payer: MEDICARE

## 2019-06-18 ENCOUNTER — PATIENT OUTREACH (OUTPATIENT)
Dept: CASE MANAGEMENT | Age: 79
End: 2019-06-18

## 2019-06-18 ENCOUNTER — HOSPITAL ENCOUNTER (OUTPATIENT)
Dept: LAB | Age: 79
Discharge: HOME | End: 2019-06-18
Payer: MEDICARE

## 2019-06-18 VITALS — WEIGHT: 167 LBS | HEIGHT: 70 IN | BODY MASS INDEX: 23.91 KG/M2

## 2019-06-18 DIAGNOSIS — C90.00 MULTIPLE MYELOMA NOT HAVING ACHIEVED REMISSION (HCC): Primary | ICD-10-CM

## 2019-06-18 DIAGNOSIS — C90.02 MULTIPLE MYELOMA IN RELAPSE (HCC): ICD-10-CM

## 2019-06-18 LAB
ALBUMIN SERPL-MCNC: 3.1 G/DL (ref 3.2–4.6)
ALBUMIN/GLOB SERPL: 0.7 {RATIO} (ref 1.2–3.5)
ALP SERPL-CCNC: 46 U/L (ref 50–136)
ALT SERPL-CCNC: 23 U/L (ref 12–65)
ANION GAP SERPL CALC-SCNC: 7 MMOL/L (ref 7–16)
AST SERPL-CCNC: 52 U/L (ref 15–37)
BASOPHILS # BLD: 0 K/UL (ref 0–0.2)
BASOPHILS NFR BLD: 0 % (ref 0–2)
BILIRUB SERPL-MCNC: 0.4 MG/DL (ref 0.2–1.1)
BUN SERPL-MCNC: 54 MG/DL (ref 8–23)
CALCIUM SERPL-MCNC: 8.5 MG/DL (ref 8.3–10.4)
CHLORIDE SERPL-SCNC: 108 MMOL/L (ref 98–107)
CO2 SERPL-SCNC: 21 MMOL/L (ref 21–32)
CREAT SERPL-MCNC: 2.06 MG/DL (ref 0.8–1.5)
DIFFERENTIAL METHOD BLD: ABNORMAL
EOSINOPHIL # BLD: 0 K/UL (ref 0–0.8)
EOSINOPHIL NFR BLD: 0 % (ref 0.5–7.8)
ERYTHROCYTE [DISTWIDTH] IN BLOOD BY AUTOMATED COUNT: 14.7 % (ref 11.9–14.6)
GLOBULIN SER CALC-MCNC: 4.5 G/DL (ref 2.3–3.5)
GLUCOSE SERPL-MCNC: 131 MG/DL (ref 65–100)
HCT VFR BLD AUTO: 28.9 % (ref 41.1–50.3)
HGB BLD-MCNC: 9.9 G/DL (ref 13.6–17.2)
IMM GRANULOCYTES # BLD AUTO: 0 K/UL (ref 0–0.5)
IMM GRANULOCYTES NFR BLD AUTO: 1 % (ref 0–5)
LYMPHOCYTES # BLD: 1.7 K/UL (ref 0.5–4.6)
LYMPHOCYTES NFR BLD: 75 % (ref 13–44)
MAGNESIUM SERPL-MCNC: 1.8 MG/DL (ref 1.8–2.4)
MCH RBC QN AUTO: 23.3 PG (ref 26.1–32.9)
MCHC RBC AUTO-ENTMCNC: 34.3 G/DL (ref 31.4–35)
MCV RBC AUTO: 68 FL (ref 79.6–97.8)
MONOCYTES # BLD: 0.1 K/UL (ref 0.1–1.3)
MONOCYTES NFR BLD: 5 % (ref 4–12)
NEUTS SEG # BLD: 0.4 K/UL (ref 1.7–8.2)
NEUTS SEG NFR BLD: 18 % (ref 43–78)
NRBC # BLD: 0.02 K/UL (ref 0–0.2)
PLATELET # BLD AUTO: 25 K/UL (ref 150–450)
PMV BLD AUTO: ABNORMAL FL (ref 9.4–12.3)
POTASSIUM SERPL-SCNC: 4.2 MMOL/L (ref 3.5–5.1)
PROT SERPL-MCNC: 7.6 G/DL (ref 6.3–8.2)
RBC # BLD AUTO: 4.25 M/UL (ref 4.23–5.67)
SODIUM SERPL-SCNC: 136 MMOL/L (ref 136–145)
WBC # BLD AUTO: 2.3 K/UL (ref 4.3–11.1)

## 2019-06-18 PROCEDURE — 74011250636 HC RX REV CODE- 250/636: Performed by: NURSE PRACTITIONER

## 2019-06-18 PROCEDURE — 80053 COMPREHEN METABOLIC PANEL: CPT

## 2019-06-18 PROCEDURE — 96375 TX/PRO/DX INJ NEW DRUG ADDON: CPT

## 2019-06-18 PROCEDURE — 96413 CHEMO IV INFUSION 1 HR: CPT

## 2019-06-18 PROCEDURE — 96417 CHEMO IV INFUS EACH ADDL SEQ: CPT

## 2019-06-18 PROCEDURE — 83735 ASSAY OF MAGNESIUM: CPT

## 2019-06-18 PROCEDURE — 74011250636 HC RX REV CODE- 250/636

## 2019-06-18 PROCEDURE — 74011000258 HC RX REV CODE- 258: Performed by: NURSE PRACTITIONER

## 2019-06-18 PROCEDURE — 85025 COMPLETE CBC W/AUTO DIFF WBC: CPT

## 2019-06-18 RX ORDER — SODIUM CHLORIDE 0.9 % (FLUSH) 0.9 %
10 SYRINGE (ML) INJECTION AS NEEDED
Status: ACTIVE | OUTPATIENT
Start: 2019-06-18 | End: 2019-06-18

## 2019-06-18 RX ORDER — ONDANSETRON 2 MG/ML
8 INJECTION INTRAMUSCULAR; INTRAVENOUS ONCE
Status: COMPLETED | OUTPATIENT
Start: 2019-06-18 | End: 2019-06-18

## 2019-06-18 RX ORDER — SODIUM CHLORIDE 9 MG/ML
25 INJECTION, SOLUTION INTRAVENOUS CONTINUOUS
Status: ACTIVE | OUTPATIENT
Start: 2019-06-18 | End: 2019-06-18

## 2019-06-18 RX ADMIN — ONDANSETRON 8 MG: 2 INJECTION INTRAMUSCULAR; INTRAVENOUS at 09:47

## 2019-06-18 RX ADMIN — Medication 10 ML: at 11:55

## 2019-06-18 RX ADMIN — SODIUM CHLORIDE 500 ML/HR: 900 INJECTION, SOLUTION INTRAVENOUS at 09:05

## 2019-06-18 RX ADMIN — CARFILZOMIB 130 MG: 10 INJECTION, POWDER, LYOPHILIZED, FOR SOLUTION INTRAVENOUS at 10:34

## 2019-06-18 RX ADMIN — CYCLOPHOSPHAMIDE 582 MG: 1 INJECTION, POWDER, FOR SOLUTION INTRAVENOUS; ORAL at 11:10

## 2019-06-18 NOTE — PROGRESS NOTES
Pt arrived ambulatory today at 0900, to receive IV chemotherapy. Pt tolerated without difficulty. Patient discharged via ambulatory accompanied by self. Instructed to notify physician of any problems, questions or concerns. Allowed opportunity for patient/family to ask questions. Verbalized understanding. Next appointment is June 25 at 0930 with Mayra Awan.

## 2019-06-18 NOTE — PROGRESS NOTES
Problem: Chemotherapy Treatment  Goal: *Chemotherapy regimen followed  Outcome: Progressing Towards Goal  Verbalizes/demonstrates understanding of purpose/procedure/potential side effects of cytoxan/kyprolis.

## 2019-06-18 NOTE — PROGRESS NOTES
Pt was seen and labs reviewed by William Brown NP. Pt states he is feeling ok, just mostly fatigued. He states he has constipation that is controlled with stool softeners. He was instructed to call 24/7 with temp and instructed on neutropenic precautions. He will proceed with treatment today. No replacements needed. He will return to clinic in 1 week to see Dr. Kimberly Longoria with labs.

## 2019-06-25 ENCOUNTER — HOSPITAL ENCOUNTER (OUTPATIENT)
Dept: LAB | Age: 79
Discharge: HOME | End: 2019-06-25
Payer: MEDICARE

## 2019-06-25 ENCOUNTER — APPOINTMENT (OUTPATIENT)
Dept: INFUSION THERAPY | Age: 79
End: 2019-06-25
Payer: MEDICARE

## 2019-06-25 ENCOUNTER — HOSPITAL ENCOUNTER (OUTPATIENT)
Dept: INFUSION THERAPY | Age: 79
Discharge: HOME | End: 2019-06-25
Payer: MEDICARE

## 2019-06-25 ENCOUNTER — PATIENT OUTREACH (OUTPATIENT)
Dept: CASE MANAGEMENT | Age: 79
End: 2019-06-25

## 2019-06-25 VITALS
RESPIRATION RATE: 18 BRPM | TEMPERATURE: 97.7 F | DIASTOLIC BLOOD PRESSURE: 57 MMHG | SYSTOLIC BLOOD PRESSURE: 114 MMHG | OXYGEN SATURATION: 100 % | HEART RATE: 70 BPM

## 2019-06-25 DIAGNOSIS — C90.00 MULTIPLE MYELOMA NOT HAVING ACHIEVED REMISSION (HCC): Primary | ICD-10-CM

## 2019-06-25 DIAGNOSIS — C90.02 MULTIPLE MYELOMA IN RELAPSE (HCC): ICD-10-CM

## 2019-06-25 LAB
ALBUMIN SERPL-MCNC: 3.2 G/DL (ref 3.2–4.6)
ALBUMIN/GLOB SERPL: 0.9 {RATIO} (ref 1.2–3.5)
ALP SERPL-CCNC: 69 U/L (ref 50–136)
ALT SERPL-CCNC: 37 U/L (ref 12–65)
ANION GAP SERPL CALC-SCNC: 6 MMOL/L (ref 7–16)
AST SERPL-CCNC: 45 U/L (ref 15–37)
BASOPHILS # BLD: 0 K/UL (ref 0–0.2)
BASOPHILS NFR BLD: 1 % (ref 0–2)
BILIRUB SERPL-MCNC: 0.4 MG/DL (ref 0.2–1.1)
BUN SERPL-MCNC: 34 MG/DL (ref 8–23)
CALCIUM SERPL-MCNC: 8.6 MG/DL (ref 8.3–10.4)
CHLORIDE SERPL-SCNC: 103 MMOL/L (ref 98–107)
CO2 SERPL-SCNC: 25 MMOL/L (ref 21–32)
CREAT SERPL-MCNC: 1.53 MG/DL (ref 0.8–1.5)
DIFFERENTIAL METHOD BLD: ABNORMAL
EOSINOPHIL # BLD: 0 K/UL (ref 0–0.8)
EOSINOPHIL NFR BLD: 0 % (ref 0.5–7.8)
ERYTHROCYTE [DISTWIDTH] IN BLOOD BY AUTOMATED COUNT: 14.6 % (ref 11.9–14.6)
GLOBULIN SER CALC-MCNC: 3.6 G/DL (ref 2.3–3.5)
GLUCOSE SERPL-MCNC: 113 MG/DL (ref 65–100)
HCT VFR BLD AUTO: 27.3 % (ref 41.1–50.3)
HGB BLD-MCNC: 9.3 G/DL (ref 13.6–17.2)
IMM GRANULOCYTES # BLD AUTO: 0 K/UL (ref 0–0.5)
IMM GRANULOCYTES NFR BLD AUTO: 2 % (ref 0–5)
KAPPA LC FREE SER-MCNC: 1.06 MG/L (ref 3.3–19.4)
KAPPA LC FREE/LAMBDA FREE SER: ABNORMAL {RATIO} (ref 0.26–1.65)
LAMBDA LC FREE SERPL-MCNC: 3.61 MG/L (ref 5.71–26.3)
LYMPHOCYTES # BLD: 0.8 K/UL (ref 0.5–4.6)
LYMPHOCYTES NFR BLD: 64 % (ref 13–44)
MAGNESIUM SERPL-MCNC: 2.1 MG/DL (ref 1.8–2.4)
MCH RBC QN AUTO: 23 PG (ref 26.1–32.9)
MCHC RBC AUTO-ENTMCNC: 34.1 G/DL (ref 31.4–35)
MCV RBC AUTO: 67.6 FL (ref 79.6–97.8)
MONOCYTES # BLD: 0.2 K/UL (ref 0.1–1.3)
MONOCYTES NFR BLD: 13 % (ref 4–12)
NEUTS SEG # BLD: 0.3 K/UL (ref 1.7–8.2)
NEUTS SEG NFR BLD: 20 % (ref 43–78)
NRBC # BLD: 0.01 K/UL (ref 0–0.2)
PHOSPHATE SERPL-MCNC: 2.6 MG/DL (ref 2.3–3.7)
PLATELET # BLD AUTO: 6 K/UL (ref 150–450)
PMV BLD AUTO: ABNORMAL FL (ref 9.4–12.3)
POTASSIUM SERPL-SCNC: 4 MMOL/L (ref 3.5–5.1)
PROT SERPL-MCNC: 6.8 G/DL (ref 6.3–8.2)
RBC # BLD AUTO: 4.04 M/UL (ref 4.23–5.67)
SODIUM SERPL-SCNC: 134 MMOL/L (ref 136–145)
WBC # BLD AUTO: 1.3 K/UL (ref 4.3–11.1)

## 2019-06-25 PROCEDURE — 96413 CHEMO IV INFUSION 1 HR: CPT

## 2019-06-25 PROCEDURE — 96417 CHEMO IV INFUS EACH ADDL SEQ: CPT

## 2019-06-25 PROCEDURE — 77030018667 ADMN ST IV BLD FENW -A

## 2019-06-25 PROCEDURE — 74011250636 HC RX REV CODE- 250/636

## 2019-06-25 PROCEDURE — 84100 ASSAY OF PHOSPHORUS: CPT

## 2019-06-25 PROCEDURE — 83735 ASSAY OF MAGNESIUM: CPT

## 2019-06-25 PROCEDURE — 74011250637 HC RX REV CODE- 250/637: Performed by: INTERNAL MEDICINE

## 2019-06-25 PROCEDURE — 80053 COMPREHEN METABOLIC PANEL: CPT

## 2019-06-25 PROCEDURE — 74011250636 HC RX REV CODE- 250/636: Performed by: INTERNAL MEDICINE

## 2019-06-25 PROCEDURE — 74011000258 HC RX REV CODE- 258: Performed by: INTERNAL MEDICINE

## 2019-06-25 PROCEDURE — 96375 TX/PRO/DX INJ NEW DRUG ADDON: CPT

## 2019-06-25 PROCEDURE — 86334 IMMUNOFIX E-PHORESIS SERUM: CPT

## 2019-06-25 PROCEDURE — 86644 CMV ANTIBODY: CPT

## 2019-06-25 PROCEDURE — 84165 PROTEIN E-PHORESIS SERUM: CPT

## 2019-06-25 PROCEDURE — P9037 PLATE PHERES LEUKOREDU IRRAD: HCPCS

## 2019-06-25 PROCEDURE — 83883 ASSAY NEPHELOMETRY NOT SPEC: CPT

## 2019-06-25 PROCEDURE — 36430 TRANSFUSION BLD/BLD COMPNT: CPT

## 2019-06-25 PROCEDURE — 85025 COMPLETE CBC W/AUTO DIFF WBC: CPT

## 2019-06-25 RX ORDER — SODIUM CHLORIDE 9 MG/ML
250 INJECTION, SOLUTION INTRAVENOUS AS NEEDED
Status: DISCONTINUED | OUTPATIENT
Start: 2019-06-25 | End: 2019-06-29 | Stop reason: HOSPADM

## 2019-06-25 RX ORDER — ACETAMINOPHEN 325 MG/1
650 TABLET ORAL ONCE
Status: COMPLETED | OUTPATIENT
Start: 2019-06-25 | End: 2019-06-25

## 2019-06-25 RX ORDER — SODIUM CHLORIDE 0.9 % (FLUSH) 0.9 %
10 SYRINGE (ML) INJECTION AS NEEDED
Status: ACTIVE | OUTPATIENT
Start: 2019-06-25 | End: 2019-06-25

## 2019-06-25 RX ORDER — ONDANSETRON 2 MG/ML
8 INJECTION INTRAMUSCULAR; INTRAVENOUS ONCE
Status: COMPLETED | OUTPATIENT
Start: 2019-06-25 | End: 2019-06-25

## 2019-06-25 RX ORDER — DIPHENHYDRAMINE HCL 25 MG
25 CAPSULE ORAL
Status: DISPENSED | OUTPATIENT
Start: 2019-06-25 | End: 2019-06-25

## 2019-06-25 RX ADMIN — ONDANSETRON 8 MG: 2 INJECTION INTRAMUSCULAR; INTRAVENOUS at 10:10

## 2019-06-25 RX ADMIN — CARFILZOMIB 67.6 MG: 10 INJECTION, POWDER, LYOPHILIZED, FOR SOLUTION INTRAVENOUS at 10:42

## 2019-06-25 RX ADMIN — Medication 10 ML: at 10:05

## 2019-06-25 RX ADMIN — SODIUM CHLORIDE 250 ML: 900 INJECTION, SOLUTION INTRAVENOUS at 10:11

## 2019-06-25 RX ADMIN — ACETAMINOPHEN 650 MG: 325 TABLET, FILM COATED ORAL at 11:20

## 2019-06-25 RX ADMIN — Medication 10 ML: at 12:35

## 2019-06-25 RX ADMIN — CYCLOPHOSPHAMIDE 290 MG: 500 INJECTION, POWDER, FOR SOLUTION INTRAVENOUS; ORAL at 11:17

## 2019-06-25 RX ADMIN — DIPHENHYDRAMINE HYDROCHLORIDE 25 MG: 25 CAPSULE ORAL at 11:20

## 2019-06-25 NOTE — PROGRESS NOTES
Arrived to the [facility]. Kyprolis/cytoxan and 1 unit of platelets completed. Patient tolerated well. Any issues or concerns during appointment: None. Patient aware of next infusion appointment on June 28th at 0830. Discharged ambulatory.

## 2019-06-25 NOTE — PROGRESS NOTES
6/25/19:  Patient in for pre-chemo visit with Dr. Yuliana Shi. Patient reports feeling much better today. His weight is stable. Platelets 6k today. He will receive platelets today after treatment. Patient to return on Friday and Tuesday for labs and possible replacements. He will return for next treatment on 7/9. Patient aware to call for any signs of infection or bleeding. Reminded of neutropenic and bleeding precautions.

## 2019-06-26 LAB
BLD PROD TYP BPU: NORMAL
BPU ID: NORMAL
STATUS OF UNIT,%ST: NORMAL
UNIT DIVISION, %UDIV: 0

## 2019-06-27 LAB
ALBUMIN SERPL ELPH-MCNC: 3.34 G/DL (ref 3.2–5.6)
ALBUMIN/GLOB SERPL: 1.1 {RATIO}
ALPHA1 GLOB SERPL ELPH-MCNC: 0.24 G/DL (ref 0.1–0.4)
ALPHA2 GLOB SERPL ELPH-MCNC: 0.73 G/DL (ref 0.4–1.2)
B-GLOBULIN SERPL QL ELPH: 0.93 G/DL (ref 0.6–1.3)
GAMMA GLOB MFR SERPL ELPH: 1.06 G/DL (ref 0.5–1.6)
IGA SERPL-MCNC: 4 MG/DL (ref 85–499)
IGG SERPL-MCNC: 1144 MG/DL (ref 610–1616)
IGM SERPL-MCNC: <10 MG/DL (ref 35–242)
M PROTEIN SERPL ELPH-MCNC: 0.48 G/DL
PROT PATTERN SERPL ELPH-IMP: ABNORMAL
PROT PATTERN SPEC IFE-IMP: ABNORMAL
PROT SERPL-MCNC: 6.3 G/DL (ref 6.3–8.2)

## 2019-06-28 ENCOUNTER — HOSPITAL ENCOUNTER (OUTPATIENT)
Dept: INFUSION THERAPY | Age: 79
Discharge: HOME | End: 2019-06-28
Payer: MEDICARE

## 2019-06-28 VITALS
DIASTOLIC BLOOD PRESSURE: 57 MMHG | RESPIRATION RATE: 18 BRPM | OXYGEN SATURATION: 98 % | TEMPERATURE: 98.2 F | BODY MASS INDEX: 24.14 KG/M2 | HEART RATE: 77 BPM | SYSTOLIC BLOOD PRESSURE: 104 MMHG | WEIGHT: 168.6 LBS

## 2019-06-28 DIAGNOSIS — C90.00 MULTIPLE MYELOMA NOT HAVING ACHIEVED REMISSION (HCC): Primary | ICD-10-CM

## 2019-06-28 LAB
ALBUMIN SERPL-MCNC: 3.2 G/DL (ref 3.2–4.6)
ALBUMIN/GLOB SERPL: 0.9 {RATIO} (ref 1.2–3.5)
ALP SERPL-CCNC: 131 U/L (ref 50–136)
ALT SERPL-CCNC: 30 U/L (ref 12–65)
ANION GAP SERPL CALC-SCNC: 7 MMOL/L (ref 7–16)
AST SERPL-CCNC: 35 U/L (ref 15–37)
BASOPHILS # BLD: 0 K/UL (ref 0–0.2)
BASOPHILS NFR BLD: 0 % (ref 0–2)
BILIRUB SERPL-MCNC: 0.4 MG/DL (ref 0.2–1.1)
BUN SERPL-MCNC: 37 MG/DL (ref 8–23)
CALCIUM SERPL-MCNC: 8.2 MG/DL (ref 8.3–10.4)
CHLORIDE SERPL-SCNC: 106 MMOL/L (ref 98–107)
CO2 SERPL-SCNC: 23 MMOL/L (ref 21–32)
CREAT SERPL-MCNC: 1.61 MG/DL (ref 0.8–1.5)
DIFFERENTIAL METHOD BLD: ABNORMAL
EOSINOPHIL # BLD: 0 K/UL (ref 0–0.8)
EOSINOPHIL NFR BLD: 0 % (ref 0.5–7.8)
ERYTHROCYTE [DISTWIDTH] IN BLOOD BY AUTOMATED COUNT: 14.7 % (ref 11.9–14.6)
GLOBULIN SER CALC-MCNC: 3.4 G/DL (ref 2.3–3.5)
GLUCOSE SERPL-MCNC: 155 MG/DL (ref 65–100)
HCT VFR BLD AUTO: 25.6 % (ref 41.1–50.3)
HGB BLD-MCNC: 8.7 G/DL (ref 13.6–17.2)
IMM GRANULOCYTES # BLD AUTO: 0 K/UL (ref 0–0.5)
IMM GRANULOCYTES NFR BLD AUTO: 4 % (ref 0–5)
LYMPHOCYTES # BLD: 0.3 K/UL (ref 0.5–4.6)
LYMPHOCYTES NFR BLD: 46 % (ref 13–44)
MAGNESIUM SERPL-MCNC: 2 MG/DL (ref 1.8–2.4)
MCH RBC QN AUTO: 23.1 PG (ref 26.1–32.9)
MCHC RBC AUTO-ENTMCNC: 34 G/DL (ref 31.4–35)
MCV RBC AUTO: 67.9 FL (ref 79.6–97.8)
MONOCYTES # BLD: 0.1 K/UL (ref 0.1–1.3)
MONOCYTES NFR BLD: 23 % (ref 4–12)
NEUTS SEG # BLD: 0.2 K/UL (ref 1.7–8.2)
NEUTS SEG NFR BLD: 27 % (ref 43–78)
NRBC # BLD: 0.02 K/UL (ref 0–0.2)
PLATELET # BLD AUTO: 31 K/UL (ref 150–450)
PMV BLD AUTO: ABNORMAL FL (ref 9.4–12.3)
POTASSIUM SERPL-SCNC: 4.2 MMOL/L (ref 3.5–5.1)
PROT SERPL-MCNC: 6.6 G/DL (ref 6.3–8.2)
RBC # BLD AUTO: 3.77 M/UL (ref 4.23–5.67)
SODIUM SERPL-SCNC: 136 MMOL/L (ref 136–145)
WBC # BLD AUTO: 0.6 K/UL (ref 4.3–11.1)

## 2019-06-28 PROCEDURE — 85025 COMPLETE CBC W/AUTO DIFF WBC: CPT

## 2019-06-28 PROCEDURE — 36591 DRAW BLOOD OFF VENOUS DEVICE: CPT

## 2019-06-28 PROCEDURE — 80053 COMPREHEN METABOLIC PANEL: CPT

## 2019-06-28 PROCEDURE — 83735 ASSAY OF MAGNESIUM: CPT

## 2019-06-28 RX ORDER — SODIUM CHLORIDE 0.9 % (FLUSH) 0.9 %
10 SYRINGE (ML) INJECTION AS NEEDED
Status: DISCONTINUED | OUTPATIENT
Start: 2019-06-28 | End: 2019-07-02 | Stop reason: HOSPADM

## 2019-06-28 RX ADMIN — Medication 10 ML: at 09:35

## 2019-06-28 NOTE — PROGRESS NOTES
Arrived to the [facility]. Labs completed and reviewed. Patient did not require any replacements per parameters. Any issues or concerns during appointment: no 
Patient aware of next infusion appointment on  7/2/19 at 830am. Discharged ambulatory with self.

## 2019-07-02 ENCOUNTER — HOSPITAL ENCOUNTER (OUTPATIENT)
Dept: INFUSION THERAPY | Age: 79
Discharge: HOME | End: 2019-07-02
Payer: MEDICARE

## 2019-07-02 VITALS
RESPIRATION RATE: 18 BRPM | DIASTOLIC BLOOD PRESSURE: 77 MMHG | SYSTOLIC BLOOD PRESSURE: 129 MMHG | HEART RATE: 74 BPM | OXYGEN SATURATION: 95 % | TEMPERATURE: 97.9 F

## 2019-07-02 DIAGNOSIS — C90.00 MULTIPLE MYELOMA NOT HAVING ACHIEVED REMISSION (HCC): ICD-10-CM

## 2019-07-02 LAB
ALBUMIN SERPL-MCNC: 3.2 G/DL (ref 3.2–4.6)
ALBUMIN/GLOB SERPL: 1.1 {RATIO} (ref 1.2–3.5)
ALP SERPL-CCNC: 282 U/L (ref 50–136)
ALT SERPL-CCNC: 22 U/L (ref 12–65)
ANION GAP SERPL CALC-SCNC: 5 MMOL/L (ref 7–16)
AST SERPL-CCNC: 32 U/L (ref 15–37)
BASOPHILS # BLD: 0 K/UL (ref 0–0.2)
BASOPHILS NFR BLD: 1 % (ref 0–2)
BILIRUB SERPL-MCNC: 0.3 MG/DL (ref 0.2–1.1)
BUN SERPL-MCNC: 20 MG/DL (ref 8–23)
CALCIUM SERPL-MCNC: 8 MG/DL (ref 8.3–10.4)
CHLORIDE SERPL-SCNC: 107 MMOL/L (ref 98–107)
CO2 SERPL-SCNC: 24 MMOL/L (ref 21–32)
CREAT SERPL-MCNC: 1.29 MG/DL (ref 0.8–1.5)
DIFFERENTIAL METHOD BLD: ABNORMAL
EOSINOPHIL # BLD: 0 K/UL (ref 0–0.8)
EOSINOPHIL NFR BLD: 0 % (ref 0.5–7.8)
ERYTHROCYTE [DISTWIDTH] IN BLOOD BY AUTOMATED COUNT: 14.7 % (ref 11.9–14.6)
GLOBULIN SER CALC-MCNC: 2.8 G/DL (ref 2.3–3.5)
GLUCOSE SERPL-MCNC: 166 MG/DL (ref 65–100)
HCT VFR BLD AUTO: 23.5 % (ref 41.1–50.3)
HGB BLD-MCNC: 7.9 G/DL (ref 13.6–17.2)
IMM GRANULOCYTES # BLD AUTO: 0.1 K/UL (ref 0–0.5)
IMM GRANULOCYTES NFR BLD AUTO: 4 % (ref 0–5)
LYMPHOCYTES # BLD: 0.5 K/UL (ref 0.5–4.6)
LYMPHOCYTES NFR BLD: 43 % (ref 13–44)
MAGNESIUM SERPL-MCNC: 2 MG/DL (ref 1.8–2.4)
MCH RBC QN AUTO: 22.8 PG (ref 26.1–32.9)
MCHC RBC AUTO-ENTMCNC: 33.6 G/DL (ref 31.4–35)
MCV RBC AUTO: 67.9 FL (ref 79.6–97.8)
MONOCYTES # BLD: 0.2 K/UL (ref 0.1–1.3)
MONOCYTES NFR BLD: 17 % (ref 4–12)
NEUTS SEG # BLD: 0.4 K/UL (ref 1.7–8.2)
NEUTS SEG NFR BLD: 35 % (ref 43–78)
NRBC # BLD: 0.03 K/UL (ref 0–0.2)
PLATELET # BLD AUTO: 30 K/UL (ref 150–450)
PMV BLD AUTO: ABNORMAL FL (ref 9.4–12.3)
POTASSIUM SERPL-SCNC: 4.1 MMOL/L (ref 3.5–5.1)
PROT SERPL-MCNC: 6 G/DL (ref 6.3–8.2)
RBC # BLD AUTO: 3.46 M/UL (ref 4.23–5.67)
SODIUM SERPL-SCNC: 136 MMOL/L (ref 136–145)
WBC # BLD AUTO: 1.3 K/UL (ref 4.3–11.1)

## 2019-07-02 PROCEDURE — 80053 COMPREHEN METABOLIC PANEL: CPT

## 2019-07-02 PROCEDURE — 83735 ASSAY OF MAGNESIUM: CPT

## 2019-07-02 PROCEDURE — 85025 COMPLETE CBC W/AUTO DIFF WBC: CPT

## 2019-07-02 PROCEDURE — 96523 IRRIG DRUG DELIVERY DEVICE: CPT

## 2019-07-02 NOTE — PROGRESS NOTES
Arrived to the [facility] ambulatory. Labs reviewed, no replacements needed completed. Patient tolerated well. Any issues or concerns during appointment: no. 
Patient aware of next infusion appointment on 7/9 at 0900. Discharged to home ambulatory.

## 2019-07-03 RX ORDER — SODIUM CHLORIDE 0.9 % (FLUSH) 0.9 %
10 SYRINGE (ML) INJECTION ONCE
Status: COMPLETED | OUTPATIENT
Start: 2019-07-03 | End: 2019-07-03

## 2019-07-03 RX ADMIN — Medication 10 ML: at 09:30

## 2019-07-09 ENCOUNTER — HOSPITAL ENCOUNTER (OUTPATIENT)
Dept: INFUSION THERAPY | Age: 79
Discharge: HOME | End: 2019-07-09
Payer: MEDICARE

## 2019-07-09 ENCOUNTER — PATIENT OUTREACH (OUTPATIENT)
Dept: CASE MANAGEMENT | Age: 79
End: 2019-07-09

## 2019-07-09 ENCOUNTER — HOSPITAL ENCOUNTER (OUTPATIENT)
Dept: LAB | Age: 79
Discharge: HOME | End: 2019-07-09
Payer: MEDICARE

## 2019-07-09 ENCOUNTER — APPOINTMENT (OUTPATIENT)
Dept: INFUSION THERAPY | Age: 79
End: 2019-07-09
Payer: MEDICARE

## 2019-07-09 DIAGNOSIS — C90.00 MULTIPLE MYELOMA NOT HAVING ACHIEVED REMISSION (HCC): ICD-10-CM

## 2019-07-09 DIAGNOSIS — C90.00 MULTIPLE MYELOMA, REMISSION STATUS UNSPECIFIED (HCC): ICD-10-CM

## 2019-07-09 DIAGNOSIS — C90.00 MULTIPLE MYELOMA NOT HAVING ACHIEVED REMISSION (HCC): Primary | ICD-10-CM

## 2019-07-09 LAB
ALBUMIN SERPL-MCNC: 3.4 G/DL (ref 3.2–4.6)
ALBUMIN/GLOB SERPL: 1.1 {RATIO} (ref 1.2–3.5)
ALP SERPL-CCNC: 610 U/L (ref 50–136)
ALT SERPL-CCNC: 18 U/L (ref 12–65)
ANION GAP SERPL CALC-SCNC: 6 MMOL/L (ref 7–16)
AST SERPL-CCNC: 23 U/L (ref 15–37)
BASOPHILS # BLD: 0 K/UL (ref 0–0.2)
BASOPHILS NFR BLD: 0 % (ref 0–2)
BILIRUB SERPL-MCNC: 0.4 MG/DL (ref 0.2–1.1)
BUN SERPL-MCNC: 17 MG/DL (ref 8–23)
CALCIUM SERPL-MCNC: 8.3 MG/DL (ref 8.3–10.4)
CHLORIDE SERPL-SCNC: 105 MMOL/L (ref 98–107)
CO2 SERPL-SCNC: 24 MMOL/L (ref 21–32)
CREAT SERPL-MCNC: 1.24 MG/DL (ref 0.8–1.5)
DIFFERENTIAL METHOD BLD: ABNORMAL
EOSINOPHIL # BLD: 0 K/UL (ref 0–0.8)
EOSINOPHIL NFR BLD: 0 % (ref 0.5–7.8)
ERYTHROCYTE [DISTWIDTH] IN BLOOD BY AUTOMATED COUNT: 15.6 % (ref 11.9–14.6)
GLOBULIN SER CALC-MCNC: 3 G/DL (ref 2.3–3.5)
GLUCOSE SERPL-MCNC: 216 MG/DL (ref 65–100)
HCT VFR BLD AUTO: 23.2 % (ref 41.1–50.3)
HGB BLD-MCNC: 7.8 G/DL (ref 13.6–17.2)
IMM GRANULOCYTES # BLD AUTO: 0.1 K/UL (ref 0–0.5)
IMM GRANULOCYTES NFR BLD AUTO: 2 % (ref 0–5)
LYMPHOCYTES # BLD: 0.7 K/UL (ref 0.5–4.6)
LYMPHOCYTES NFR BLD: 29 % (ref 13–44)
MAGNESIUM SERPL-MCNC: 2 MG/DL (ref 1.8–2.4)
MCH RBC QN AUTO: 22.9 PG (ref 26.1–32.9)
MCHC RBC AUTO-ENTMCNC: 33.6 G/DL (ref 31.4–35)
MCV RBC AUTO: 68.2 FL (ref 79.6–97.8)
MONOCYTES # BLD: 0.4 K/UL (ref 0.1–1.3)
MONOCYTES NFR BLD: 14 % (ref 4–12)
NEUTS SEG # BLD: 1.3 K/UL (ref 1.7–8.2)
NEUTS SEG NFR BLD: 53 % (ref 43–78)
NRBC # BLD: 0.04 K/UL (ref 0–0.2)
PLATELET # BLD AUTO: 102 K/UL (ref 150–450)
PMV BLD AUTO: 9.1 FL (ref 9.4–12.3)
POTASSIUM SERPL-SCNC: 4 MMOL/L (ref 3.5–5.1)
PROT SERPL-MCNC: 6.4 G/DL (ref 6.3–8.2)
RBC # BLD AUTO: 3.4 M/UL (ref 4.23–5.67)
SODIUM SERPL-SCNC: 135 MMOL/L (ref 136–145)
WBC # BLD AUTO: 2.5 K/UL (ref 4.3–11.1)

## 2019-07-09 PROCEDURE — 83735 ASSAY OF MAGNESIUM: CPT

## 2019-07-09 PROCEDURE — 96413 CHEMO IV INFUSION 1 HR: CPT

## 2019-07-09 PROCEDURE — 74011250636 HC RX REV CODE- 250/636: Performed by: NURSE PRACTITIONER

## 2019-07-09 PROCEDURE — 85025 COMPLETE CBC W/AUTO DIFF WBC: CPT

## 2019-07-09 PROCEDURE — 96417 CHEMO IV INFUS EACH ADDL SEQ: CPT

## 2019-07-09 PROCEDURE — 74011250636 HC RX REV CODE- 250/636

## 2019-07-09 PROCEDURE — 74011000258 HC RX REV CODE- 258: Performed by: NURSE PRACTITIONER

## 2019-07-09 PROCEDURE — 80053 COMPREHEN METABOLIC PANEL: CPT

## 2019-07-09 PROCEDURE — 96375 TX/PRO/DX INJ NEW DRUG ADDON: CPT

## 2019-07-09 RX ORDER — ONDANSETRON 2 MG/ML
8 INJECTION INTRAMUSCULAR; INTRAVENOUS ONCE
Status: COMPLETED | OUTPATIENT
Start: 2019-07-09 | End: 2019-07-09

## 2019-07-09 RX ORDER — SODIUM CHLORIDE 9 MG/ML
25 INJECTION, SOLUTION INTRAVENOUS CONTINUOUS
Status: ACTIVE | OUTPATIENT
Start: 2019-07-09 | End: 2019-07-09

## 2019-07-09 RX ORDER — SODIUM CHLORIDE 9 MG/ML
10 INJECTION INTRAMUSCULAR; INTRAVENOUS; SUBCUTANEOUS AS NEEDED
Status: ACTIVE | OUTPATIENT
Start: 2019-07-09 | End: 2019-07-09

## 2019-07-09 RX ADMIN — ONDANSETRON 8 MG: 2 INJECTION INTRAMUSCULAR; INTRAVENOUS at 09:39

## 2019-07-09 RX ADMIN — SODIUM CHLORIDE 250 ML: 900 INJECTION, SOLUTION INTRAVENOUS at 09:41

## 2019-07-09 RX ADMIN — CYCLOPHOSPHAMIDE 576 MG: 1 INJECTION, POWDER, FOR SOLUTION INTRAVENOUS; ORAL at 11:30

## 2019-07-09 RX ADMIN — SODIUM CHLORIDE 3.5 MG: 900 INJECTION, SOLUTION INTRAVENOUS at 12:02

## 2019-07-09 RX ADMIN — SODIUM CHLORIDE 500 ML: 900 INJECTION, SOLUTION INTRAVENOUS at 12:01

## 2019-07-09 RX ADMIN — CARFILZOMIB 130 MG: 10 INJECTION, POWDER, LYOPHILIZED, FOR SOLUTION INTRAVENOUS at 10:42

## 2019-07-09 NOTE — PROGRESS NOTES
Arrived to the [facility]. Kyprolis, Cytoxan and Zometa completed. Patient tolerated well. Any issues or concerns during appointment: denies  Patient aware of next infusion appointment on 7/16/19 at [address] [place] [address].   Discharged ambulatory with self. Pt uses a cane.

## 2019-07-09 NOTE — PROGRESS NOTES
7/9/19:  Patient in for cycle #2 Cytoxan, kyprolis and dex. Patient reports feeling ok. Fatigue and some weight loss noted. Patient trying to eat frequent meals and encouraged to increase intake. Patient to return in 1 week for day 8. Patient to call with any fevers, chills, or other concerns.

## 2019-07-16 ENCOUNTER — HOSPITAL ENCOUNTER (OUTPATIENT)
Dept: INFUSION THERAPY | Age: 79
Discharge: HOME | End: 2019-07-16
Payer: MEDICARE

## 2019-07-16 ENCOUNTER — HOSPITAL ENCOUNTER (OUTPATIENT)
Dept: LAB | Age: 79
Discharge: HOME | End: 2019-07-16
Payer: MEDICARE

## 2019-07-16 ENCOUNTER — PATIENT OUTREACH (OUTPATIENT)
Dept: CASE MANAGEMENT | Age: 79
End: 2019-07-16

## 2019-07-16 DIAGNOSIS — C90.00 MULTIPLE MYELOMA NOT HAVING ACHIEVED REMISSION (HCC): ICD-10-CM

## 2019-07-16 DIAGNOSIS — C90.00 MULTIPLE MYELOMA NOT HAVING ACHIEVED REMISSION (HCC): Primary | ICD-10-CM

## 2019-07-16 LAB
ALBUMIN SERPL-MCNC: 3.6 G/DL (ref 3.2–4.6)
ALBUMIN/GLOB SERPL: 1.2 {RATIO} (ref 1.2–3.5)
ALP SERPL-CCNC: 550 U/L (ref 50–136)
ALT SERPL-CCNC: 31 U/L (ref 12–65)
ANION GAP SERPL CALC-SCNC: 7 MMOL/L (ref 7–16)
AST SERPL-CCNC: 28 U/L (ref 15–37)
BASOPHILS # BLD: 0 K/UL (ref 0–0.2)
BASOPHILS NFR BLD: 0 % (ref 0–2)
BILIRUB SERPL-MCNC: 0.4 MG/DL (ref 0.2–1.1)
BUN SERPL-MCNC: 26 MG/DL (ref 8–23)
CALCIUM SERPL-MCNC: 7.6 MG/DL (ref 8.3–10.4)
CHLORIDE SERPL-SCNC: 107 MMOL/L (ref 98–107)
CO2 SERPL-SCNC: 22 MMOL/L (ref 21–32)
CREAT SERPL-MCNC: 1.2 MG/DL (ref 0.8–1.5)
DIFFERENTIAL METHOD BLD: ABNORMAL
EOSINOPHIL # BLD: 0 K/UL (ref 0–0.8)
EOSINOPHIL NFR BLD: 0 % (ref 0.5–7.8)
ERYTHROCYTE [DISTWIDTH] IN BLOOD BY AUTOMATED COUNT: 16.5 % (ref 11.9–14.6)
GLOBULIN SER CALC-MCNC: 3 G/DL (ref 2.3–3.5)
GLUCOSE SERPL-MCNC: 110 MG/DL (ref 65–100)
HCT VFR BLD AUTO: 22.7 % (ref 41.1–50.3)
HGB BLD-MCNC: 7.7 G/DL (ref 13.6–17.2)
IMM GRANULOCYTES # BLD AUTO: 0.1 K/UL (ref 0–0.5)
IMM GRANULOCYTES NFR BLD AUTO: 2 % (ref 0–5)
LYMPHOCYTES # BLD: 0.5 K/UL (ref 0.5–4.6)
LYMPHOCYTES NFR BLD: 17 % (ref 13–44)
MAGNESIUM SERPL-MCNC: 2.5 MG/DL (ref 1.8–2.4)
MCH RBC QN AUTO: 23.1 PG (ref 26.1–32.9)
MCHC RBC AUTO-ENTMCNC: 33.9 G/DL (ref 31.4–35)
MCV RBC AUTO: 68.2 FL (ref 79.6–97.8)
MONOCYTES # BLD: 0.2 K/UL (ref 0.1–1.3)
MONOCYTES NFR BLD: 7 % (ref 4–12)
NEUTS SEG # BLD: 1.9 K/UL (ref 1.7–8.2)
NEUTS SEG NFR BLD: 72 % (ref 43–78)
NRBC # BLD: 0.04 K/UL (ref 0–0.2)
PLATELET # BLD AUTO: 85 K/UL (ref 150–450)
PMV BLD AUTO: ABNORMAL FL (ref 9.4–12.3)
POTASSIUM SERPL-SCNC: 4.4 MMOL/L (ref 3.5–5.1)
PROT SERPL-MCNC: 6.6 G/DL (ref 6.3–8.2)
RBC # BLD AUTO: 3.33 M/UL (ref 4.23–5.67)
SODIUM SERPL-SCNC: 136 MMOL/L (ref 136–145)
WBC # BLD AUTO: 2.6 K/UL (ref 4.3–11.1)

## 2019-07-16 PROCEDURE — 80053 COMPREHEN METABOLIC PANEL: CPT

## 2019-07-16 PROCEDURE — 85025 COMPLETE CBC W/AUTO DIFF WBC: CPT

## 2019-07-16 PROCEDURE — 74011250636 HC RX REV CODE- 250/636

## 2019-07-16 PROCEDURE — 96375 TX/PRO/DX INJ NEW DRUG ADDON: CPT

## 2019-07-16 PROCEDURE — 83735 ASSAY OF MAGNESIUM: CPT

## 2019-07-16 PROCEDURE — 74011250636 HC RX REV CODE- 250/636: Performed by: INTERNAL MEDICINE

## 2019-07-16 PROCEDURE — 96417 CHEMO IV INFUS EACH ADDL SEQ: CPT

## 2019-07-16 PROCEDURE — 74011000258 HC RX REV CODE- 258: Performed by: INTERNAL MEDICINE

## 2019-07-16 PROCEDURE — 96413 CHEMO IV INFUSION 1 HR: CPT

## 2019-07-16 RX ORDER — SODIUM CHLORIDE 9 MG/ML
10 INJECTION INTRAMUSCULAR; INTRAVENOUS; SUBCUTANEOUS AS NEEDED
Status: ACTIVE | OUTPATIENT
Start: 2019-07-16 | End: 2019-07-16

## 2019-07-16 RX ORDER — ONDANSETRON 2 MG/ML
8 INJECTION INTRAMUSCULAR; INTRAVENOUS ONCE
Status: COMPLETED | OUTPATIENT
Start: 2019-07-16 | End: 2019-07-16

## 2019-07-16 RX ORDER — SODIUM CHLORIDE 9 MG/ML
25 INJECTION, SOLUTION INTRAVENOUS CONTINUOUS
Status: ACTIVE | OUTPATIENT
Start: 2019-07-16 | End: 2019-07-16

## 2019-07-16 RX ADMIN — SODIUM CHLORIDE 250 ML: 900 INJECTION, SOLUTION INTRAVENOUS at 10:20

## 2019-07-16 RX ADMIN — ONDANSETRON 8 MG: 2 INJECTION INTRAMUSCULAR; INTRAVENOUS at 10:35

## 2019-07-16 RX ADMIN — SODIUM CHLORIDE 10 ML: 9 INJECTION INTRAMUSCULAR; INTRAVENOUS; SUBCUTANEOUS at 12:25

## 2019-07-16 RX ADMIN — CARFILZOMIB 130 MG: 10 INJECTION, POWDER, LYOPHILIZED, FOR SOLUTION INTRAVENOUS at 10:55

## 2019-07-16 RX ADMIN — SODIUM CHLORIDE 10 ML: 9 INJECTION INTRAMUSCULAR; INTRAVENOUS; SUBCUTANEOUS at 10:15

## 2019-07-16 RX ADMIN — CYCLOPHOSPHAMIDE 576 MG: 500 INJECTION, POWDER, FOR SOLUTION INTRAVENOUS; ORAL at 11:35

## 2019-07-16 RX ADMIN — SODIUM CHLORIDE 25 ML/HR: 900 INJECTION, SOLUTION INTRAVENOUS at 10:36

## 2019-07-16 NOTE — PROGRESS NOTES
Arrived to infusion after [facility] appt  No concerns  Kyprolis,cytoxan infused  Tolerated well  7/30 next appt

## 2019-07-16 NOTE — PROGRESS NOTES
7/16/19:  Patient in for day 8 cycle #2 ckd. Patient reports fatigue and occasional pain but otherwise doing well. Patient to continue with treatment today as planned. Myeloma labs next week. Dr. Jadyn Morris to decide next week if patient needs labs only on week off of treatment. Patient to call with any questions or concerns.

## 2019-07-23 ENCOUNTER — HOSPITAL ENCOUNTER (OUTPATIENT)
Dept: LAB | Age: 79
Discharge: HOME | End: 2019-07-23
Payer: MEDICARE

## 2019-07-23 ENCOUNTER — PATIENT OUTREACH (OUTPATIENT)
Dept: CASE MANAGEMENT | Age: 79
End: 2019-07-23

## 2019-07-23 ENCOUNTER — HOSPITAL ENCOUNTER (OUTPATIENT)
Dept: INFUSION THERAPY | Age: 79
Discharge: HOME | End: 2019-07-23
Payer: MEDICARE

## 2019-07-23 DIAGNOSIS — C90.00 MULTIPLE MYELOMA NOT HAVING ACHIEVED REMISSION (HCC): Primary | ICD-10-CM

## 2019-07-23 DIAGNOSIS — C90.00 MULTIPLE MYELOMA NOT HAVING ACHIEVED REMISSION (HCC): ICD-10-CM

## 2019-07-23 LAB
ALBUMIN SERPL-MCNC: 3.5 G/DL (ref 3.2–4.6)
ALBUMIN/GLOB SERPL: 1.2 {RATIO} (ref 1.2–3.5)
ALP SERPL-CCNC: 395 U/L (ref 50–136)
ALT SERPL-CCNC: 33 U/L (ref 12–65)
ANION GAP SERPL CALC-SCNC: 8 MMOL/L (ref 7–16)
AST SERPL-CCNC: 23 U/L (ref 15–37)
BASOPHILS # BLD: 0 K/UL (ref 0–0.2)
BASOPHILS NFR BLD: 0 % (ref 0–2)
BILIRUB SERPL-MCNC: 0.4 MG/DL (ref 0.2–1.1)
BUN SERPL-MCNC: 24 MG/DL (ref 8–23)
CALCIUM SERPL-MCNC: 7.6 MG/DL (ref 8.3–10.4)
CHLORIDE SERPL-SCNC: 108 MMOL/L (ref 98–107)
CO2 SERPL-SCNC: 21 MMOL/L (ref 21–32)
CREAT SERPL-MCNC: 1.31 MG/DL (ref 0.8–1.5)
DIFFERENTIAL METHOD BLD: ABNORMAL
EOSINOPHIL # BLD: 0 K/UL (ref 0–0.8)
EOSINOPHIL NFR BLD: 1 % (ref 0.5–7.8)
ERYTHROCYTE [DISTWIDTH] IN BLOOD BY AUTOMATED COUNT: 17.4 % (ref 11.9–14.6)
GLOBULIN SER CALC-MCNC: 3 G/DL (ref 2.3–3.5)
GLUCOSE SERPL-MCNC: 131 MG/DL (ref 65–100)
HCT VFR BLD AUTO: 21.8 % (ref 41.1–50.3)
HGB BLD-MCNC: 7.4 G/DL (ref 13.6–17.2)
IMM GRANULOCYTES # BLD AUTO: 0 K/UL (ref 0–0.5)
IMM GRANULOCYTES NFR BLD AUTO: 1 % (ref 0–5)
KAPPA LC FREE SER-MCNC: 2.14 MG/L (ref 3.3–19.4)
KAPPA LC FREE/LAMBDA FREE SER: 1.08 {RATIO} (ref 0.26–1.65)
LAMBDA LC FREE SERPL-MCNC: 1.98 MG/L (ref 5.71–26.3)
LYMPHOCYTES # BLD: 0.5 K/UL (ref 0.5–4.6)
LYMPHOCYTES NFR BLD: 21 % (ref 13–44)
MAGNESIUM SERPL-MCNC: 2.2 MG/DL (ref 1.8–2.4)
MCH RBC QN AUTO: 23.3 PG (ref 26.1–32.9)
MCHC RBC AUTO-ENTMCNC: 33.9 G/DL (ref 31.4–35)
MCV RBC AUTO: 68.8 FL (ref 79.6–97.8)
MONOCYTES # BLD: 0.4 K/UL (ref 0.1–1.3)
MONOCYTES NFR BLD: 16 % (ref 4–12)
NEUTS SEG # BLD: 1.6 K/UL (ref 1.7–8.2)
NEUTS SEG NFR BLD: 61 % (ref 43–78)
NRBC # BLD: 0.02 K/UL (ref 0–0.2)
PLATELET # BLD AUTO: 107 K/UL (ref 150–450)
PMV BLD AUTO: ABNORMAL FL (ref 9.4–12.3)
POTASSIUM SERPL-SCNC: 4.1 MMOL/L (ref 3.5–5.1)
PROT SERPL-MCNC: 6.5 G/DL (ref 6.3–8.2)
RBC # BLD AUTO: 3.17 M/UL (ref 4.23–5.67)
SODIUM SERPL-SCNC: 137 MMOL/L (ref 136–145)
WBC # BLD AUTO: 2.5 K/UL (ref 4.3–11.1)

## 2019-07-23 PROCEDURE — 96413 CHEMO IV INFUSION 1 HR: CPT

## 2019-07-23 PROCEDURE — 96375 TX/PRO/DX INJ NEW DRUG ADDON: CPT

## 2019-07-23 PROCEDURE — 84165 PROTEIN E-PHORESIS SERUM: CPT

## 2019-07-23 PROCEDURE — 74011250636 HC RX REV CODE- 250/636: Performed by: INTERNAL MEDICINE

## 2019-07-23 PROCEDURE — 83883 ASSAY NEPHELOMETRY NOT SPEC: CPT

## 2019-07-23 PROCEDURE — 83735 ASSAY OF MAGNESIUM: CPT

## 2019-07-23 PROCEDURE — 74011250636 HC RX REV CODE- 250/636

## 2019-07-23 PROCEDURE — 96417 CHEMO IV INFUS EACH ADDL SEQ: CPT

## 2019-07-23 PROCEDURE — 74011000258 HC RX REV CODE- 258: Performed by: INTERNAL MEDICINE

## 2019-07-23 PROCEDURE — 85025 COMPLETE CBC W/AUTO DIFF WBC: CPT

## 2019-07-23 PROCEDURE — 80053 COMPREHEN METABOLIC PANEL: CPT

## 2019-07-23 PROCEDURE — 86334 IMMUNOFIX E-PHORESIS SERUM: CPT

## 2019-07-23 RX ORDER — SODIUM CHLORIDE 0.9 % (FLUSH) 0.9 %
10 SYRINGE (ML) INJECTION AS NEEDED
Status: ACTIVE | OUTPATIENT
Start: 2019-07-23 | End: 2019-07-23

## 2019-07-23 RX ORDER — ONDANSETRON 2 MG/ML
8 INJECTION INTRAMUSCULAR; INTRAVENOUS ONCE
Status: COMPLETED | OUTPATIENT
Start: 2019-07-23 | End: 2019-07-23

## 2019-07-23 RX ORDER — SODIUM CHLORIDE 9 MG/ML
25 INJECTION, SOLUTION INTRAVENOUS CONTINUOUS
Status: ACTIVE | OUTPATIENT
Start: 2019-07-23 | End: 2019-07-23

## 2019-07-23 RX ADMIN — CYCLOPHOSPHAMIDE 573 MG: 1 INJECTION, POWDER, FOR SOLUTION INTRAVENOUS; ORAL at 10:25

## 2019-07-23 RX ADMIN — CARFILZOMIB 130 MG: 10 INJECTION, POWDER, LYOPHILIZED, FOR SOLUTION INTRAVENOUS at 09:51

## 2019-07-23 RX ADMIN — SODIUM CHLORIDE 250 ML: 900 INJECTION, SOLUTION INTRAVENOUS at 09:14

## 2019-07-23 RX ADMIN — ONDANSETRON 8 MG: 2 INJECTION INTRAMUSCULAR; INTRAVENOUS at 09:46

## 2019-07-23 RX ADMIN — SODIUM CHLORIDE 25 ML/HR: 900 INJECTION, SOLUTION INTRAVENOUS at 09:13

## 2019-07-23 RX ADMIN — Medication 10 ML: at 11:00

## 2019-07-23 NOTE — PROGRESS NOTES
7/23/19 - Patient here for treatment with hgb 7.4 and patient doesn't feel he needs blood at this time. Should he need it patient will call for an infusion appt. Patient given pain medication scripts. All questions answered at this time. Patient will only need to be seen in the office on Day 1 and 15. Day 8 can be infusion appt only.

## 2019-07-23 NOTE — PROGRESS NOTES
Arrived to the [facility]. chemo completed. Patient tolerated well. Any issues or concerns during appointment: no.  Patient aware of next infusion appointment on  8/6 at 0930. Discharged to home ambulatory.

## 2019-07-24 LAB
ALBUMIN SERPL ELPH-MCNC: 2.29 G/DL (ref 3.2–5.6)
ALBUMIN/GLOB SERPL: 0.6 {RATIO}
ALPHA1 GLOB SERPL ELPH-MCNC: 0.44 G/DL (ref 0.1–0.4)
ALPHA2 GLOB SERPL ELPH-MCNC: 1.09 G/DL (ref 0.4–1.2)
B-GLOBULIN SERPL QL ELPH: 1.37 G/DL (ref 0.6–1.3)
GAMMA GLOB MFR SERPL ELPH: 0.72 G/DL (ref 0.5–1.6)
IGA SERPL-MCNC: 3 MG/DL (ref 85–499)
IGG SERPL-MCNC: 537 MG/DL (ref 610–1616)
IGM SERPL-MCNC: <10 MG/DL (ref 35–242)
M PROTEIN SERPL ELPH-MCNC: 0.31 G/DL
PROT PATTERN SERPL ELPH-IMP: ABNORMAL
PROT PATTERN SPEC IFE-IMP: ABNORMAL
PROT SERPL-MCNC: 5.9 G/DL (ref 6.3–8.2)

## 2019-08-06 NOTE — PROGRESS NOTES
Arrived to the [facility]. Kyprolis + Cytoxan completed. Patient tolerated well. Type and crossmatch drawn and 2 units PRBC ordered. Green armband paced on patient and patient instructed not to remove. Any issues or concerns during appointment: none. Patient aware of next infusion appointment on 8/8/19 at 0900. Discharged ambulatory.     Alberto Terrazas RN

## 2019-08-06 NOTE — PROGRESS NOTES
8/6/19:  Patient in for pre-chemo visit with NP. Cycle #3 CKD. Patient notes taking decadron this am prior to coming for ov. Patient doing well but notes weight loss. He will try to increase food intake and not worry about restrictions. NP reviewed labs and assessed the patient. He will return on Thursday for 1 unit of blood. Plan is to return in 1 week for day 8 and then see NP prior to day 15. Dr. Lunsford Cleaves to see the patient prior to cycle #4.

## 2019-08-08 NOTE — PROGRESS NOTES
Arrived to the [facility]. 1 unit PRBC completed. Patient tolerated well. Any issues or concerns during appointment: Clarified with Alfonzo Warner RN that patient is to only receive 1 unit PRBC today. Patient aware of next infusion appointment on 8/13/19 at 0930. Discharged ambulatory from Infusion.

## 2019-08-13 NOTE — PROGRESS NOTES
Arrived to the [facility]. Kyprolis and Cytoxan completed. Patient tolerated well. Any issues or concerns during appointment: None. Patient aware of next infusion appointment on 8/20/19 at 0930 following [address] Discharged ambulatory from Infusion.

## 2019-08-20 NOTE — PROGRESS NOTES
Arrived to the [facility]. Assessment completed and labs reviewed. Kyprolis and Cytoxan completed. Patient tolerated well. Any issues or concerns during appointment: No.  Patient aware of next infusion appointment on 9/09/19 at 3pm.  Discharged ambulatory.

## 2019-08-20 NOTE — PROGRESS NOTES
8/20/19:  Patient in for day 15 cycle #3. Patient reports fatigue but otherwise doing well. Patient offered opportunity to get blood this week but he wants to wait another week. He will call this navigator if symptoms worsen. Patient to return in 2 weeks to start cycle #4.

## 2019-08-23 NOTE — PROGRESS NOTES
Arrived to the [facility]. 2 units prbcs completed. Patient tolerated well. Any issues or concerns during appointment: BP low upon arrival - given 500mL NS prior to transfusion over 3 hours. Patient aware of next infusion appointment on 93/19 at 3pm.   Discharged ambulatory with self.

## 2019-09-03 NOTE — PROGRESS NOTES
Arrived to the [facility]. Kyprolis and Cytoxan with pre hydration completed. Patient tolerated without problems. Any issues or concerns during appointment: no.  Patient aware of next infusion appointment on 9/10/19 (date) at 0900 (time). Discharged ambulatory.

## 2019-09-03 NOTE — PROGRESS NOTES
9/3/19:  Patient in for pre-chemo visit. Patient reports some chronic back and side and abdominal pain and needs refill for duragesic patch. Patient weak but otherwise doing fair. NP reviewed labs and assessed the patient. Oral hydration encouraged. Hgb stabilized at 9.9. Patient to continue with this cycle as planned. Dr. Ricardo Arcos to see the patient in 2 weeks.

## 2019-09-10 NOTE — PROGRESS NOTES
Arrived to the [facility]. Kyprolis and Cytoxancompleted.  Patient tolerated well   Any issues or concerns during appointment: No  Patient aware of next infusion appointment on Tuesday,September 17th @ 1000  Discharged home ambulatory

## 2019-09-17 NOTE — PROGRESS NOTES
[facility] Kyprolis and Cytoxan with pre hydration completed. Patient tolerated without problems. Any issues or concerns during appointment: no.  Patient aware of next infusion appointment on 10/1/19 (date) at 0830   (time). Discharged ambulatory.

## 2019-09-17 NOTE — PROGRESS NOTES
9/17/19:  Patient in for pre-kyprolis and cytoxan appt with Dr. Abdi Beaver. Labs stable and myeloma labs as well. Patient to continue current plan with monthly zometa (due 10/8). NP in 2 weeks and Dr. Abdi Beaver in 4 weeks. Patient will call next week if increase in fatigue and need for labs and possible blood.

## 2019-10-01 NOTE — PROGRESS NOTES
Arrived to the [facility]. Assessment completed and labs reviewed. Pt seen in infusion by Savita Barry NP. Kyprolis/Cytoxan completed. Patient tolerated well. Any issues or concerns during appointment: No.  Patient aware of next infusion appointment on 10/08/19 at 230pm.  Discharged ambulatory.

## 2019-10-01 NOTE — PROGRESS NOTES
10/1/19:  Patient in for cycle #5 CKD. Patient took decadron this am and has enough for next two cycles. Patient reports some pain but only uses norco periodically and never more than one pill per day. No problems eating or drinking. Patient to continue with cycle #5 and then return in one week for day 8. He will see Dr. Luz Cordova in 2 weeks. Zometa due next week.

## 2019-10-08 NOTE — PROGRESS NOTES
Pt arrived ambulatory today at 1431, to receive IV chemotherapy, and zometa. Pt tolerated without difficulty. Patient discharged via ambulatory accompanied by self. Instructed to notify physician of any problems, questions or concerns. Allowed opportunity for patient/family to ask questions. Verbalized understanding. Next appointment is Oct 15 at 1500 with Mayra Awan.

## 2019-10-15 NOTE — PROGRESS NOTES
10/15/19:  Patient in for pre-kyprolis and cytoxan appt. Patient reports noted weight loss and increase in frequency of urination. Dr. Daniele Lechuga reviewed labs and assessed the patient. He is also on torsemide. Dr. Daniele Lechuga to discuss with torsemide with Dr. Naomi Quinteros. Patient continuing with treatment today and will return in 2 weeks for next cycle of treatment.

## 2019-10-15 NOTE — PROGRESS NOTES
Verbalizes/demonstrates understanding of purpose/procedure/potential side effects of cytoxan/kyprolis.

## 2019-10-15 NOTE — PROGRESS NOTES
Pt arrived ambulatory today at 1530, to receive IV chemotherapy, and zometa. Pt tolerated without difficulty. Patient discharged via ambulatory accompanied by self. Instructed to notify physician of any problems, questions or concerns. Allowed opportunity for patient/family to ask questions. Verbalized understanding. Next appointment is Oct 29 at 0830 with Mayra Awan.

## 2019-10-29 NOTE — PROGRESS NOTES
Arrived to the [facility]. Kyprolis and Cytoxan completed. Patient tolerated none. Any issues or concerns during appointment: none. Patient aware of next infusion appointment on 11/5 at 0900. Discharged ambulatory to home.

## 2019-10-29 NOTE — PROGRESS NOTES
Pt was seen and labs reviewed by Shaun Correa NP. Pt is doing well, no complaints today. We will proceed with treatment. He will return to clinic on 11/12.

## 2019-11-05 NOTE — PROGRESS NOTES
Arrived to the [facility]. Zometa, Kyprolis and Cytoxan completed. Patient tolerated without problems. Any issues or concerns during appointment: no.  Patient aware of next infusion appointment on 11/12/19 (date) at 56 (time). Discharged ambulatory.

## 2019-11-12 NOTE — PROGRESS NOTES
11/12/19:  Patient in for pre-chemo day 15 cycle #6 CKD. Patient reports fatigue is slightly worse but otherwise tolerating treatment well. He continues to have back pain. Duragesic patch controls pain at this point. Dr. Ajay Mcgill reviewed labs and assessed the patient. Patient to continue treatment as planned today and return in 2 weeks to see NP. Patient will f/u with Dr. Ajay Mcgill in 4 weeks. Zometa scheduled for day 8 of each cycle along with myeloma labs.

## 2019-11-18 NOTE — PROGRESS NOTES
Arrived to the [facility]. Port accessed, labs drawn & Thyrogen completed. Patient tolerated well. Observed patient x 30 minutes, no reactions. Any issues or concerns during appointment: None. Patient aware of next infusion appointment on 11/19 (date) at 0800 (time). Discharged ambulatory in stable condition.

## 2019-11-19 NOTE — PROGRESS NOTES
Arrived to the [facility]. Thyrogen injection completed. Patient tolerated without problems. Any issues or concerns during appointment: no. 
Patient aware of next infusion appointment on 11/26/19 (date) at [phone] (time). Discharged ambulatory.

## 2019-11-26 NOTE — PROGRESS NOTES
11/26/19:  Patient in for day 1 cycle 7 CKD. Patient reports fatigue but nothing more than his normal.  Patient pleased that he is off of low sodium diet for thyroid studies. NPDunia, reviewed labs and assessed the patient. Patient to continue with treatment and will f/u with Dr. David Bell in 2 weeks as planned.

## 2019-11-26 NOTE — PROGRESS NOTES
Tolerated chemotherapy without difficulty. Patient directed to push oral fluids. Patient discharged via ambulation with cane accompanied by self. Instructed to notify physician of any problems, questions or concerns after discharge. Next appointment is 12/03/19 at 0930 with Infusion.

## 2019-12-03 NOTE — PROGRESS NOTES
Arrived to the [facility]. Labs, Kyprolis, Zometa, Cytoxin infusion completed. Patient tolerated well. Any issues or concerns during appointment: none. Patient aware of next infusion appointment on 12.10.2019 (date) at 0930 (time). Discharged ambulatory to home using cane.

## 2019-12-10 NOTE — PROGRESS NOTES
Arrived to the [facility]. Kyprolis completed. Patient tolerated well. Any issues or concerns during appointment:   Cytoxan held today due to low ANC  U/A collected and sent. Results are clear  Patient aware of next infusion appointment on 12/23/19 at 11am. Discharged ambulatory with self.

## 2019-12-10 NOTE — PROGRESS NOTES
12/10/19:  Patient in for pre-chemo visit with Dr. Kym Lara. Patient reports fatigue is biggest concern. Patient also with frequent urination and some burning. Urinalysis to be done in infusion today. Dr. Kym Lara reviewed labs and assessed the patient. Plan is to hold cytoxan today. Patient to continue with kyrpolis. Patient to have week off next week and then return to see NP to start next cycle.

## 2019-12-23 NOTE — PROGRESS NOTES
Arrived to the [facility]. Kyprolis and Cytoxan completed. Patient tolerated without problems. Any issues or concerns during appointment: no.  Patient aware of next infusion appointment on 12/31/19 (date) at 80 (time). Discharged ambulatory.

## 2019-12-23 NOTE — PROGRESS NOTES
12/23/19:  Patient in for pre-chemo appt with Dunia ASHTON. Patient doing well and has no complaints today. Labs reviewed by NP. No changes. Patient given updated schedule. He will see Dr. Sadiq Mendoza in 2 weeks.

## 2019-12-31 NOTE — PROGRESS NOTES
Arrived to the [facility]. Kyprolis/Cytoxan/Zometa completed. Patient tolerated well. Any issues or concerns during appointment: no.  Patient aware of next infusion appointment on 1/21/20 (date) at 6 am (time). Discharged ambulatory with self.

## 2020-01-01 ENCOUNTER — PATIENT OUTREACH (OUTPATIENT)
Dept: CASE MANAGEMENT | Age: 80
End: 2020-01-01

## 2020-01-01 ENCOUNTER — HOSPITAL ENCOUNTER (OUTPATIENT)
Dept: INFUSION THERAPY | Age: 80
Discharge: HOME | End: 2020-03-03
Payer: MEDICARE

## 2020-01-01 ENCOUNTER — HOSPITAL ENCOUNTER (OUTPATIENT)
Dept: INFUSION THERAPY | Age: 80
Discharge: HOME | End: 2020-01-07
Payer: MEDICARE

## 2020-01-01 ENCOUNTER — HOSPITAL ENCOUNTER (OUTPATIENT)
Dept: INFUSION THERAPY | Age: 80
Discharge: HOME | End: 2020-06-23
Payer: MEDICARE

## 2020-01-01 ENCOUNTER — ANESTHESIA EVENT (OUTPATIENT)
Dept: ENDOSCOPY | Age: 80
End: 2020-01-01

## 2020-01-01 ENCOUNTER — APPOINTMENT (OUTPATIENT)
Dept: INFUSION THERAPY | Age: 80
End: 2020-01-01

## 2020-01-01 ENCOUNTER — HOSPITAL ENCOUNTER (OUTPATIENT)
Dept: LAB | Age: 80
Discharge: HOME | End: 2020-01-21
Payer: MEDICARE

## 2020-01-01 ENCOUNTER — HOSPITAL ENCOUNTER (OUTPATIENT)
Dept: LAB | Age: 80
Discharge: HOME | End: 2020-03-31
Payer: MEDICARE

## 2020-01-01 ENCOUNTER — HOSPITAL ENCOUNTER (OUTPATIENT)
Dept: LAB | Age: 80
Discharge: HOME | End: 2020-06-09
Payer: MEDICARE

## 2020-01-01 ENCOUNTER — HOSPITAL ENCOUNTER (OUTPATIENT)
Dept: INFUSION THERAPY | Age: 80
Discharge: HOME | End: 2020-07-22
Payer: MEDICARE

## 2020-01-01 ENCOUNTER — HOSPICE ADMISSION (OUTPATIENT)
Dept: HOSPICE | Facility: HOSPICE | Age: 80
End: 2020-01-01

## 2020-01-01 ENCOUNTER — HOSPITAL ENCOUNTER (OUTPATIENT)
Dept: LAB | Age: 80
Discharge: HOME | End: 2020-02-04
Payer: MEDICARE

## 2020-01-01 ENCOUNTER — HOSPITAL ENCOUNTER (OUTPATIENT)
Dept: LAB | Age: 80
Discharge: HOME | End: 2020-01-07
Payer: MEDICARE

## 2020-01-01 ENCOUNTER — HOSPITAL ENCOUNTER (OUTPATIENT)
Dept: INFUSION THERAPY | Age: 80
Discharge: HOME | End: 2020-05-26
Payer: MEDICARE

## 2020-01-01 ENCOUNTER — HOSPITAL ENCOUNTER (OUTPATIENT)
Dept: INFUSION THERAPY | Age: 80
Discharge: HOME | End: 2020-07-15
Payer: MEDICARE

## 2020-01-01 ENCOUNTER — HOSPITAL ENCOUNTER (OUTPATIENT)
Dept: INFUSION THERAPY | Age: 80
Discharge: HOME | End: 2020-07-08
Payer: MEDICARE

## 2020-01-01 ENCOUNTER — HOSPITAL ENCOUNTER (OUTPATIENT)
Dept: INFUSION THERAPY | Age: 80
Discharge: HOME | End: 2020-04-28
Payer: MEDICARE

## 2020-01-01 ENCOUNTER — HOSPITAL ENCOUNTER (OUTPATIENT)
Dept: INFUSION THERAPY | Age: 80
Discharge: HOME | End: 2020-04-21
Payer: MEDICARE

## 2020-01-01 ENCOUNTER — HOSPITAL ENCOUNTER (OUTPATIENT)
Dept: INFUSION THERAPY | Age: 80
Discharge: HOME | End: 2020-07-29
Payer: MEDICARE

## 2020-01-01 ENCOUNTER — HOSPITAL ENCOUNTER (OUTPATIENT)
Dept: INFUSION THERAPY | Age: 80
Discharge: HOME | End: 2020-01-21
Payer: MEDICARE

## 2020-01-01 ENCOUNTER — HOSPITAL ENCOUNTER (OUTPATIENT)
Dept: INFUSION THERAPY | Age: 80
Discharge: HOME | End: 2020-04-14
Payer: MEDICARE

## 2020-01-01 ENCOUNTER — APPOINTMENT (OUTPATIENT)
Dept: GENERAL RADIOLOGY | Age: 80
DRG: 871 | End: 2020-01-01
Attending: EMERGENCY MEDICINE
Payer: MEDICARE

## 2020-01-01 ENCOUNTER — HOSPITAL ENCOUNTER (OUTPATIENT)
Dept: LAB | Age: 80
Discharge: HOME | End: 2020-05-12
Payer: MEDICARE

## 2020-01-01 ENCOUNTER — HOSPITAL ENCOUNTER (OUTPATIENT)
Dept: LAB | Age: 80
Discharge: HOME | End: 2020-03-03
Payer: MEDICARE

## 2020-01-01 ENCOUNTER — HOSPITAL ENCOUNTER (INPATIENT)
Age: 80
LOS: 6 days | DRG: 871 | End: 2020-08-06
Attending: EMERGENCY MEDICINE | Admitting: FAMILY MEDICINE
Payer: MEDICARE

## 2020-01-01 ENCOUNTER — HOSPITAL ENCOUNTER (OUTPATIENT)
Dept: INFUSION THERAPY | Age: 80
Discharge: HOME | End: 2020-02-04
Payer: MEDICARE

## 2020-01-01 ENCOUNTER — HOSPITAL ENCOUNTER (OUTPATIENT)
Dept: INFUSION THERAPY | Age: 80
Discharge: HOME | End: 2020-02-18
Payer: MEDICARE

## 2020-01-01 ENCOUNTER — HOSPITAL ENCOUNTER (OUTPATIENT)
Dept: LAB | Age: 80
Discharge: HOME | End: 2020-06-23
Payer: MEDICARE

## 2020-01-01 ENCOUNTER — HOSPITAL ENCOUNTER (OUTPATIENT)
Dept: INFUSION THERAPY | Age: 80
Discharge: HOME | End: 2020-03-31
Payer: MEDICARE

## 2020-01-01 ENCOUNTER — HOSPITAL ENCOUNTER (OUTPATIENT)
Dept: LAB | Age: 80
Discharge: HOME | End: 2020-02-18
Payer: MEDICARE

## 2020-01-01 ENCOUNTER — ANESTHESIA (OUTPATIENT)
Dept: ENDOSCOPY | Age: 80
End: 2020-01-01

## 2020-01-01 ENCOUNTER — HOSPITAL ENCOUNTER (OUTPATIENT)
Dept: INFUSION THERAPY | Age: 80
Discharge: HOME | End: 2020-02-25
Payer: MEDICARE

## 2020-01-01 ENCOUNTER — HOSPITAL ENCOUNTER (OUTPATIENT)
Dept: INFUSION THERAPY | Age: 80
Discharge: HOME | End: 2020-03-17
Payer: MEDICARE

## 2020-01-01 ENCOUNTER — HOSPITAL ENCOUNTER (OUTPATIENT)
Dept: INFUSION THERAPY | Age: 80
End: 2020-01-01

## 2020-01-01 ENCOUNTER — HOSPITAL ENCOUNTER (OUTPATIENT)
Dept: LAB | Age: 80
Discharge: HOME | End: 2020-03-17
Payer: MEDICARE

## 2020-01-01 ENCOUNTER — HOSPITAL ENCOUNTER (OUTPATIENT)
Dept: INFUSION THERAPY | Age: 80
Discharge: HOME | End: 2020-03-24
Payer: MEDICARE

## 2020-01-01 ENCOUNTER — HOSPITAL ENCOUNTER (OUTPATIENT)
Dept: INFUSION THERAPY | Age: 80
Discharge: HOME | End: 2020-01-28
Payer: MEDICARE

## 2020-01-01 ENCOUNTER — APPOINTMENT (OUTPATIENT)
Dept: ULTRASOUND IMAGING | Age: 80
DRG: 871 | End: 2020-01-01
Attending: INTERNAL MEDICINE
Payer: MEDICARE

## 2020-01-01 ENCOUNTER — HOSPITAL ENCOUNTER (OUTPATIENT)
Dept: INFUSION THERAPY | Age: 80
Discharge: HOME | End: 2020-07-01
Payer: MEDICARE

## 2020-01-01 VITALS — BODY MASS INDEX: 23.48 KG/M2 | WEIGHT: 164 LBS | HEIGHT: 70 IN

## 2020-01-01 VITALS
DIASTOLIC BLOOD PRESSURE: 80 MMHG | SYSTOLIC BLOOD PRESSURE: 131 MMHG | HEART RATE: 85 BPM | WEIGHT: 165 LBS | RESPIRATION RATE: 18 BRPM | OXYGEN SATURATION: 100 % | TEMPERATURE: 98.3 F | BODY MASS INDEX: 23.68 KG/M2

## 2020-01-01 VITALS
RESPIRATION RATE: 18 BRPM | SYSTOLIC BLOOD PRESSURE: 112 MMHG | HEART RATE: 67 BPM | BODY MASS INDEX: 23.1 KG/M2 | DIASTOLIC BLOOD PRESSURE: 57 MMHG | OXYGEN SATURATION: 100 % | WEIGHT: 161 LBS | TEMPERATURE: 97.8 F

## 2020-01-01 VITALS
RESPIRATION RATE: 18 BRPM | DIASTOLIC BLOOD PRESSURE: 70 MMHG | WEIGHT: 163.6 LBS | OXYGEN SATURATION: 100 % | SYSTOLIC BLOOD PRESSURE: 128 MMHG | HEART RATE: 73 BPM | TEMPERATURE: 97.6 F | HEIGHT: 70 IN | BODY MASS INDEX: 23.42 KG/M2

## 2020-01-01 VITALS
BODY MASS INDEX: 23.09 KG/M2 | SYSTOLIC BLOOD PRESSURE: 131 MMHG | HEIGHT: 70 IN | HEART RATE: 70 BPM | DIASTOLIC BLOOD PRESSURE: 70 MMHG | TEMPERATURE: 98 F

## 2020-01-01 VITALS
WEIGHT: 156 LBS | TEMPERATURE: 101 F | OXYGEN SATURATION: 90 % | HEIGHT: 69 IN | SYSTOLIC BLOOD PRESSURE: 78 MMHG | HEART RATE: 93 BPM | BODY MASS INDEX: 23.11 KG/M2 | DIASTOLIC BLOOD PRESSURE: 44 MMHG | RESPIRATION RATE: 21 BRPM

## 2020-01-01 VITALS
OXYGEN SATURATION: 99 % | DIASTOLIC BLOOD PRESSURE: 56 MMHG | HEART RATE: 76 BPM | RESPIRATION RATE: 18 BRPM | SYSTOLIC BLOOD PRESSURE: 133 MMHG | WEIGHT: 163.2 LBS | BODY MASS INDEX: 23.42 KG/M2 | TEMPERATURE: 98.2 F

## 2020-01-01 VITALS — BODY MASS INDEX: 22.54 KG/M2 | WEIGHT: 157.06 LBS

## 2020-01-01 VITALS — HEIGHT: 70 IN | BODY MASS INDEX: 23.24 KG/M2

## 2020-01-01 DIAGNOSIS — C90.00 MULTIPLE MYELOMA NOT HAVING ACHIEVED REMISSION (HCC): Primary | ICD-10-CM

## 2020-01-01 DIAGNOSIS — C90.00 MULTIPLE MYELOMA NOT HAVING ACHIEVED REMISSION (HCC): ICD-10-CM

## 2020-01-01 DIAGNOSIS — C90.02 MULTIPLE MYELOMA IN RELAPSE (HCC): ICD-10-CM

## 2020-01-01 DIAGNOSIS — R13.19 OTHER DYSPHAGIA: ICD-10-CM

## 2020-01-01 DIAGNOSIS — C90.02 MULTIPLE MYELOMA IN RELAPSE (HCC): Primary | ICD-10-CM

## 2020-01-01 DIAGNOSIS — C90.00 MULTIPLE MYELOMA, REMISSION STATUS UNSPECIFIED (HCC): Primary | ICD-10-CM

## 2020-01-01 DIAGNOSIS — R68.2 DRY MOUTH: ICD-10-CM

## 2020-01-01 DIAGNOSIS — R06.00 DYSPNEA, UNSPECIFIED TYPE: ICD-10-CM

## 2020-01-01 DIAGNOSIS — C90.00 MULTIPLE MYELOMA, REMISSION STATUS UNSPECIFIED (HCC): ICD-10-CM

## 2020-01-01 DIAGNOSIS — D69.6 THROMBOCYTOPENIA (HCC): ICD-10-CM

## 2020-01-01 DIAGNOSIS — Z20.822 SUSPECTED COVID-19 VIRUS INFECTION: Primary | ICD-10-CM

## 2020-01-01 DIAGNOSIS — R53.81 PHYSICAL DEBILITY: ICD-10-CM

## 2020-01-01 DIAGNOSIS — R13.10 ODYNOPHAGIA: ICD-10-CM

## 2020-01-01 DIAGNOSIS — Z51.5 ENCOUNTER FOR PALLIATIVE CARE: ICD-10-CM

## 2020-01-01 DIAGNOSIS — D64.9 ANEMIA, UNSPECIFIED TYPE: ICD-10-CM

## 2020-01-01 DIAGNOSIS — R45.1 AGITATION: ICD-10-CM

## 2020-01-01 DIAGNOSIS — R07.0 PAIN IN THROAT: ICD-10-CM

## 2020-01-01 LAB
ABO + RH BLD: NORMAL
ABO + RH BLD: NORMAL
ALBUMIN SERPL ELPH-MCNC: 2.89 G/DL (ref 3.2–5.6)
ALBUMIN SERPL ELPH-MCNC: 3.4 G/DL (ref 2.9–4.4)
ALBUMIN SERPL ELPH-MCNC: 3.48 G/DL (ref 3.2–5.6)
ALBUMIN SERPL ELPH-MCNC: 3.5 G/DL (ref 2.9–4.4)
ALBUMIN SERPL ELPH-MCNC: 3.52 G/DL (ref 3.2–5.6)
ALBUMIN SERPL ELPH-MCNC: 3.6 G/DL (ref 2.9–4.4)
ALBUMIN SERPL ELPH-MCNC: 3.69 G/DL (ref 3.2–5.6)
ALBUMIN SERPL ELPH-MCNC: 3.7 G/DL (ref 2.9–4.4)
ALBUMIN SERPL ELPH-MCNC: 3.84 G/DL (ref 3.2–5.6)
ALBUMIN SERPL ELPH-MCNC: 3.91 G/DL (ref 3.2–5.6)
ALBUMIN SERPL-MCNC: 2.8 G/DL (ref 3.2–4.6)
ALBUMIN SERPL-MCNC: 2.8 G/DL (ref 3.2–4.6)
ALBUMIN SERPL-MCNC: 2.9 G/DL (ref 3.2–4.6)
ALBUMIN SERPL-MCNC: 3.2 G/DL (ref 3.2–4.6)
ALBUMIN SERPL-MCNC: 3.2 G/DL (ref 3.2–4.6)
ALBUMIN SERPL-MCNC: 3.3 G/DL (ref 3.2–4.6)
ALBUMIN SERPL-MCNC: 3.3 G/DL (ref 3.2–4.6)
ALBUMIN SERPL-MCNC: 3.4 G/DL (ref 3.2–4.6)
ALBUMIN SERPL-MCNC: 3.5 G/DL (ref 3.2–4.6)
ALBUMIN SERPL-MCNC: 3.6 G/DL (ref 3.2–4.6)
ALBUMIN SERPL-MCNC: 3.7 G/DL (ref 3.2–4.6)
ALBUMIN SERPL-MCNC: 3.7 G/DL (ref 3.2–4.6)
ALBUMIN/GLOB SERPL: 0.5 {RATIO} (ref 1.2–3.5)
ALBUMIN/GLOB SERPL: 0.6 {RATIO} (ref 1.2–3.5)
ALBUMIN/GLOB SERPL: 0.6 {RATIO} (ref 1.2–3.5)
ALBUMIN/GLOB SERPL: 0.7 {RATIO} (ref 1.2–3.5)
ALBUMIN/GLOB SERPL: 0.7 {RATIO} (ref 1.2–3.5)
ALBUMIN/GLOB SERPL: 0.9 {RATIO} (ref 1.2–3.5)
ALBUMIN/GLOB SERPL: 0.9 {RATIO} (ref 1.2–3.5)
ALBUMIN/GLOB SERPL: 1 {RATIO} (ref 1.2–3.5)
ALBUMIN/GLOB SERPL: 1 {RATIO} (ref 1.2–3.5)
ALBUMIN/GLOB SERPL: 1.1 {RATIO} (ref 1.2–3.5)
ALBUMIN/GLOB SERPL: 1.1 {RATIO} (ref 1.2–3.5)
ALBUMIN/GLOB SERPL: 1.2 {RATIO} (ref 1.2–3.5)
ALBUMIN/GLOB SERPL: 1.3 {RATIO} (ref 1.2–3.5)
ALBUMIN/GLOB SERPL: 1.4 {RATIO} (ref 1.2–3.5)
ALBUMIN/GLOB SERPL: 1.7 {RATIO} (ref 0.7–1.7)
ALBUMIN/GLOB SERPL: 1.7 {RATIO} (ref 0.7–1.7)
ALBUMIN/GLOB SERPL: 1.8 {RATIO}
ALBUMIN/GLOB SERPL: 1.9 {RATIO}
ALBUMIN/GLOB SERPL: 1.9 {RATIO}
ALBUMIN/GLOB SERPL: 1.9 {RATIO} (ref 0.7–1.7)
ALBUMIN/GLOB SERPL: 2.1 {RATIO} (ref 0.7–1.7)
ALP SERPL-CCNC: 46 U/L (ref 50–136)
ALP SERPL-CCNC: 48 U/L (ref 50–136)
ALP SERPL-CCNC: 49 U/L (ref 50–136)
ALP SERPL-CCNC: 50 U/L (ref 50–136)
ALP SERPL-CCNC: 51 U/L (ref 50–136)
ALP SERPL-CCNC: 51 U/L (ref 50–136)
ALP SERPL-CCNC: 52 U/L (ref 50–136)
ALP SERPL-CCNC: 53 U/L (ref 50–136)
ALP SERPL-CCNC: 54 U/L (ref 50–136)
ALP SERPL-CCNC: 55 U/L (ref 50–136)
ALPHA1 GLOB SERPL ELPH-MCNC: 0.2 G/DL (ref 0–0.4)
ALPHA1 GLOB SERPL ELPH-MCNC: 0.23 G/DL (ref 0.1–0.4)
ALPHA1 GLOB SERPL ELPH-MCNC: 0.23 G/DL (ref 0.1–0.4)
ALPHA1 GLOB SERPL ELPH-MCNC: 0.24 G/DL (ref 0.1–0.4)
ALPHA1 GLOB SERPL ELPH-MCNC: 0.28 G/DL (ref 0.1–0.4)
ALPHA1 GLOB SERPL ELPH-MCNC: 0.3 G/DL (ref 0–0.4)
ALPHA1 GLOB SERPL ELPH-MCNC: 0.32 G/DL (ref 0.1–0.4)
ALPHA1 GLOB SERPL ELPH-MCNC: 0.35 G/DL (ref 0.1–0.4)
ALPHA2 GLOB SERPL ELPH-MCNC: 0.6 G/DL (ref 0.4–1)
ALPHA2 GLOB SERPL ELPH-MCNC: 0.6 G/DL (ref 0.4–1)
ALPHA2 GLOB SERPL ELPH-MCNC: 0.62 G/DL (ref 0.4–1.2)
ALPHA2 GLOB SERPL ELPH-MCNC: 0.65 G/DL (ref 0.4–1.2)
ALPHA2 GLOB SERPL ELPH-MCNC: 0.67 G/DL (ref 0.4–1.2)
ALPHA2 GLOB SERPL ELPH-MCNC: 0.7 G/DL (ref 0.4–1)
ALPHA2 GLOB SERPL ELPH-MCNC: 0.7 G/DL (ref 0.4–1)
ALPHA2 GLOB SERPL ELPH-MCNC: 0.79 G/DL (ref 0.4–1.2)
ALPHA2 GLOB SERPL ELPH-MCNC: 0.85 G/DL (ref 0.4–1.2)
ALPHA2 GLOB SERPL ELPH-MCNC: 1.01 G/DL (ref 0.4–1.2)
ALT SERPL-CCNC: 17 U/L (ref 12–65)
ALT SERPL-CCNC: 18 U/L (ref 12–65)
ALT SERPL-CCNC: 19 U/L (ref 12–65)
ALT SERPL-CCNC: 20 U/L (ref 12–65)
ALT SERPL-CCNC: 20 U/L (ref 12–65)
ALT SERPL-CCNC: 21 U/L (ref 12–65)
ALT SERPL-CCNC: 22 U/L (ref 12–65)
ALT SERPL-CCNC: 23 U/L (ref 12–65)
ALT SERPL-CCNC: 27 U/L (ref 12–65)
ALT SERPL-CCNC: 29 U/L (ref 12–65)
ALT SERPL-CCNC: 29 U/L (ref 12–65)
ALT SERPL-CCNC: 33 U/L (ref 12–65)
ANION GAP SERPL CALC-SCNC: 10 MMOL/L (ref 7–16)
ANION GAP SERPL CALC-SCNC: 12 MMOL/L (ref 7–16)
ANION GAP SERPL CALC-SCNC: 13 MMOL/L (ref 7–16)
ANION GAP SERPL CALC-SCNC: 3 MMOL/L (ref 7–16)
ANION GAP SERPL CALC-SCNC: 4 MMOL/L (ref 7–16)
ANION GAP SERPL CALC-SCNC: 5 MMOL/L (ref 7–16)
ANION GAP SERPL CALC-SCNC: 6 MMOL/L (ref 7–16)
ANION GAP SERPL CALC-SCNC: 7 MMOL/L (ref 7–16)
ANION GAP SERPL CALC-SCNC: 8 MMOL/L (ref 7–16)
ANION GAP SERPL CALC-SCNC: 9 MMOL/L (ref 7–16)
APPEARANCE UR: ABNORMAL
APPEARANCE UR: ABNORMAL
APTT PPP: 54.3 SEC (ref 24.3–35.4)
AST SERPL-CCNC: 13 U/L (ref 15–37)
AST SERPL-CCNC: 16 U/L (ref 15–37)
AST SERPL-CCNC: 17 U/L (ref 15–37)
AST SERPL-CCNC: 17 U/L (ref 15–37)
AST SERPL-CCNC: 18 U/L (ref 15–37)
AST SERPL-CCNC: 18 U/L (ref 15–37)
AST SERPL-CCNC: 19 U/L (ref 15–37)
AST SERPL-CCNC: 20 U/L (ref 15–37)
AST SERPL-CCNC: 21 U/L (ref 15–37)
AST SERPL-CCNC: 22 U/L (ref 15–37)
AST SERPL-CCNC: 22 U/L (ref 15–37)
AST SERPL-CCNC: 23 U/L (ref 15–37)
AST SERPL-CCNC: 25 U/L (ref 15–37)
AST SERPL-CCNC: 30 U/L (ref 15–37)
AST SERPL-CCNC: 31 U/L (ref 15–37)
AST SERPL-CCNC: 34 U/L (ref 15–37)
AST SERPL-CCNC: 39 U/L (ref 15–37)
AST SERPL-CCNC: 42 U/L (ref 15–37)
AST SERPL-CCNC: 43 U/L (ref 15–37)
AST SERPL-CCNC: 44 U/L (ref 15–37)
AST SERPL-CCNC: 69 U/L (ref 15–37)
ATRIAL RATE: 86 BPM
B-GLOBULIN SERPL ELPH-MCNC: 0.7 G/DL (ref 0.7–1.3)
B-GLOBULIN SERPL ELPH-MCNC: 0.8 G/DL (ref 0.7–1.3)
B-GLOBULIN SERPL QL ELPH: 0.76 G/DL (ref 0.6–1.3)
B-GLOBULIN SERPL QL ELPH: 0.85 G/DL (ref 0.6–1.3)
B-GLOBULIN SERPL QL ELPH: 0.86 G/DL (ref 0.6–1.3)
B-GLOBULIN SERPL QL ELPH: 0.97 G/DL (ref 0.6–1.3)
B-GLOBULIN SERPL QL ELPH: 1.03 G/DL (ref 0.6–1.3)
B-GLOBULIN SERPL QL ELPH: 1.28 G/DL (ref 0.6–1.3)
BACTERIA SPEC CULT: NORMAL
BACTERIA URNS QL MICRO: 0 /HPF
BACTERIA URNS QL MICRO: 0 /HPF
BASOPHILS # BLD: 0 K/UL (ref 0–0.2)
BASOPHILS # BLD: 0.1 K/UL (ref 0–0.2)
BASOPHILS NFR BLD: 0 % (ref 0–2)
BASOPHILS NFR BLD: 1 % (ref 0–2)
BASOPHILS NFR BLD: 2 % (ref 0–2)
BILIRUB DIRECT SERPL-MCNC: 0.3 MG/DL
BILIRUB INDIRECT SERPL-MCNC: 0.3 MG/DL (ref 0–1.1)
BILIRUB SERPL-MCNC: 0.3 MG/DL (ref 0.2–1.1)
BILIRUB SERPL-MCNC: 0.3 MG/DL (ref 0.2–1.1)
BILIRUB SERPL-MCNC: 0.4 MG/DL (ref 0.2–1.1)
BILIRUB SERPL-MCNC: 0.5 MG/DL (ref 0.2–1.1)
BILIRUB SERPL-MCNC: 0.6 MG/DL (ref 0.2–1.1)
BILIRUB SERPL-MCNC: 0.6 MG/DL (ref 0.2–1.1)
BILIRUB UR QL: NEGATIVE
BILIRUB UR QL: NEGATIVE
BLD PROD TYP BPU: NORMAL
BLOOD GROUP ANTIBODIES SERPL: NORMAL
BLOOD GROUP ANTIBODIES SERPL: NORMAL
BPU ID: NORMAL
BUN SERPL-MCNC: 12 MG/DL (ref 8–23)
BUN SERPL-MCNC: 14 MG/DL (ref 8–23)
BUN SERPL-MCNC: 15 MG/DL (ref 8–23)
BUN SERPL-MCNC: 15 MG/DL (ref 8–23)
BUN SERPL-MCNC: 16 MG/DL (ref 8–23)
BUN SERPL-MCNC: 17 MG/DL (ref 8–23)
BUN SERPL-MCNC: 18 MG/DL (ref 8–23)
BUN SERPL-MCNC: 20 MG/DL (ref 8–23)
BUN SERPL-MCNC: 22 MG/DL (ref 8–23)
BUN SERPL-MCNC: 22 MG/DL (ref 8–23)
BUN SERPL-MCNC: 23 MG/DL (ref 8–23)
BUN SERPL-MCNC: 24 MG/DL (ref 8–23)
BUN SERPL-MCNC: 25 MG/DL (ref 8–23)
BUN SERPL-MCNC: 27 MG/DL (ref 8–23)
BUN SERPL-MCNC: 34 MG/DL (ref 8–23)
BUN SERPL-MCNC: 34 MG/DL (ref 8–23)
BUN SERPL-MCNC: 40 MG/DL (ref 8–23)
BUN SERPL-MCNC: 45 MG/DL (ref 8–23)
BUN SERPL-MCNC: 47 MG/DL (ref 8–23)
BUN SERPL-MCNC: 58 MG/DL (ref 8–23)
BUN SERPL-MCNC: 70 MG/DL (ref 8–23)
CALCIUM SERPL-MCNC: 10 MG/DL (ref 8.3–10.4)
CALCIUM SERPL-MCNC: 7.8 MG/DL (ref 8.3–10.4)
CALCIUM SERPL-MCNC: 8.3 MG/DL (ref 8.3–10.4)
CALCIUM SERPL-MCNC: 8.4 MG/DL (ref 8.3–10.4)
CALCIUM SERPL-MCNC: 8.5 MG/DL (ref 8.3–10.4)
CALCIUM SERPL-MCNC: 8.6 MG/DL (ref 8.3–10.4)
CALCIUM SERPL-MCNC: 8.7 MG/DL (ref 8.3–10.4)
CALCIUM SERPL-MCNC: 8.8 MG/DL (ref 8.3–10.4)
CALCIUM SERPL-MCNC: 8.9 MG/DL (ref 8.3–10.4)
CALCIUM SERPL-MCNC: 9 MG/DL (ref 8.3–10.4)
CALCIUM SERPL-MCNC: 9.2 MG/DL (ref 8.3–10.4)
CALCIUM SERPL-MCNC: 9.5 MG/DL (ref 8.3–10.4)
CALCIUM SERPL-MCNC: 9.5 MG/DL (ref 8.3–10.4)
CALCIUM SERPL-MCNC: 9.7 MG/DL (ref 8.3–10.4)
CALCIUM SERPL-MCNC: 9.7 MG/DL (ref 8.3–10.4)
CALCIUM SERPL-MCNC: 9.8 MG/DL (ref 8.3–10.4)
CALCULATED P AXIS, ECG09: 18 DEGREES
CALCULATED R AXIS, ECG10: -17 DEGREES
CALCULATED T AXIS, ECG11: -179 DEGREES
CASTS URNS QL MICRO: ABNORMAL /LPF
CASTS URNS QL MICRO: ABNORMAL /LPF
CHLORIDE SERPL-SCNC: 104 MMOL/L (ref 98–107)
CHLORIDE SERPL-SCNC: 106 MMOL/L (ref 98–107)
CHLORIDE SERPL-SCNC: 107 MMOL/L (ref 98–107)
CHLORIDE SERPL-SCNC: 108 MMOL/L (ref 98–107)
CHLORIDE SERPL-SCNC: 109 MMOL/L (ref 98–107)
CHLORIDE SERPL-SCNC: 110 MMOL/L (ref 98–107)
CHLORIDE SERPL-SCNC: 111 MMOL/L (ref 98–107)
CHLORIDE SERPL-SCNC: 111 MMOL/L (ref 98–107)
CHLORIDE SERPL-SCNC: 112 MMOL/L (ref 98–107)
CHLORIDE SERPL-SCNC: 114 MMOL/L (ref 98–107)
CHLORIDE SERPL-SCNC: 120 MMOL/L (ref 98–107)
CHLORIDE SERPL-SCNC: 126 MMOL/L (ref 98–107)
CO2 SERPL-SCNC: 16 MMOL/L (ref 21–32)
CO2 SERPL-SCNC: 16 MMOL/L (ref 21–32)
CO2 SERPL-SCNC: 18 MMOL/L (ref 21–32)
CO2 SERPL-SCNC: 19 MMOL/L (ref 21–32)
CO2 SERPL-SCNC: 19 MMOL/L (ref 21–32)
CO2 SERPL-SCNC: 21 MMOL/L (ref 21–32)
CO2 SERPL-SCNC: 21 MMOL/L (ref 21–32)
CO2 SERPL-SCNC: 22 MMOL/L (ref 21–32)
CO2 SERPL-SCNC: 22 MMOL/L (ref 21–32)
CO2 SERPL-SCNC: 23 MMOL/L (ref 21–32)
CO2 SERPL-SCNC: 24 MMOL/L (ref 21–32)
CO2 SERPL-SCNC: 25 MMOL/L (ref 21–32)
CO2 SERPL-SCNC: 26 MMOL/L (ref 21–32)
CO2 SERPL-SCNC: 27 MMOL/L (ref 21–32)
COLOR UR: YELLOW
COLOR UR: YELLOW
COVID-19 RAPID TEST, COVR: NOT DETECTED
CREAT SERPL-MCNC: 1.06 MG/DL (ref 0.8–1.5)
CREAT SERPL-MCNC: 1.1 MG/DL (ref 0.8–1.5)
CREAT SERPL-MCNC: 1.16 MG/DL (ref 0.8–1.5)
CREAT SERPL-MCNC: 1.17 MG/DL (ref 0.8–1.5)
CREAT SERPL-MCNC: 1.19 MG/DL (ref 0.8–1.5)
CREAT SERPL-MCNC: 1.2 MG/DL (ref 0.8–1.5)
CREAT SERPL-MCNC: 1.26 MG/DL (ref 0.8–1.5)
CREAT SERPL-MCNC: 1.27 MG/DL (ref 0.8–1.5)
CREAT SERPL-MCNC: 1.28 MG/DL (ref 0.8–1.5)
CREAT SERPL-MCNC: 1.28 MG/DL (ref 0.8–1.5)
CREAT SERPL-MCNC: 1.33 MG/DL (ref 0.8–1.5)
CREAT SERPL-MCNC: 1.4 MG/DL (ref 0.8–1.5)
CREAT SERPL-MCNC: 1.5 MG/DL (ref 0.8–1.5)
CREAT SERPL-MCNC: 1.6 MG/DL (ref 0.8–1.5)
CREAT SERPL-MCNC: 2 MG/DL (ref 0.8–1.5)
CREAT SERPL-MCNC: 2.4 MG/DL (ref 0.8–1.5)
CREAT SERPL-MCNC: 3 MG/DL (ref 0.8–1.5)
CREAT SERPL-MCNC: 3.1 MG/DL (ref 0.8–1.5)
CREAT SERPL-MCNC: 3.1 MG/DL (ref 0.8–1.5)
CREAT SERPL-MCNC: 3.97 MG/DL (ref 0.8–1.5)
CREAT SERPL-MCNC: 4.31 MG/DL (ref 0.8–1.5)
CREAT SERPL-MCNC: 4.4 MG/DL (ref 0.8–1.5)
CREAT SERPL-MCNC: 5.27 MG/DL (ref 0.8–1.5)
CREAT SERPL-MCNC: 5.9 MG/DL (ref 0.8–1.5)
CREAT UR-MCNC: 89.2 MG/DL
CROSSMATCH RESULT,%XM: NORMAL
D DIMER PPP FEU-MCNC: 3.69 UG/ML(FEU)
D DIMER PPP FEU-MCNC: 3.81 UG/ML(FEU)
DIAGNOSIS, 93000: NORMAL
DIFFERENTIAL METHOD BLD: ABNORMAL
EOSINOPHIL # BLD: 0 K/UL (ref 0–0.8)
EOSINOPHIL # BLD: 0.1 K/UL (ref 0–0.8)
EOSINOPHIL # BLD: 0.2 K/UL (ref 0–0.8)
EOSINOPHIL # BLD: 0.3 K/UL (ref 0–0.8)
EOSINOPHIL NFR BLD: 0 % (ref 0.5–7.8)
EOSINOPHIL NFR BLD: 1 % (ref 0.5–7.8)
EOSINOPHIL NFR BLD: 2 % (ref 0.5–7.8)
EOSINOPHIL NFR BLD: 3 % (ref 0.5–7.8)
EOSINOPHIL NFR BLD: 4 % (ref 0.5–7.8)
EOSINOPHIL NFR BLD: 5 % (ref 0.5–7.8)
EOSINOPHIL NFR BLD: 5 % (ref 0.5–7.8)
EPI CELLS #/AREA URNS HPF: ABNORMAL /HPF
EPI CELLS #/AREA URNS HPF: ABNORMAL /HPF
EPO SERPL-ACNC: 101.6 MIU/ML (ref 2.6–18.5)
ERYTHROCYTE [DISTWIDTH] IN BLOOD BY AUTOMATED COUNT: 13.2 % (ref 11.9–14.6)
ERYTHROCYTE [DISTWIDTH] IN BLOOD BY AUTOMATED COUNT: 13.2 % (ref 11.9–14.6)
ERYTHROCYTE [DISTWIDTH] IN BLOOD BY AUTOMATED COUNT: 13.4 % (ref 11.9–14.6)
ERYTHROCYTE [DISTWIDTH] IN BLOOD BY AUTOMATED COUNT: 13.4 % (ref 11.9–14.6)
ERYTHROCYTE [DISTWIDTH] IN BLOOD BY AUTOMATED COUNT: 13.5 % (ref 11.9–14.6)
ERYTHROCYTE [DISTWIDTH] IN BLOOD BY AUTOMATED COUNT: 13.5 % (ref 11.9–14.6)
ERYTHROCYTE [DISTWIDTH] IN BLOOD BY AUTOMATED COUNT: 13.7 % (ref 11.9–14.6)
ERYTHROCYTE [DISTWIDTH] IN BLOOD BY AUTOMATED COUNT: 13.7 % (ref 11.9–14.6)
ERYTHROCYTE [DISTWIDTH] IN BLOOD BY AUTOMATED COUNT: 13.8 % (ref 11.9–14.6)
ERYTHROCYTE [DISTWIDTH] IN BLOOD BY AUTOMATED COUNT: 13.9 % (ref 11.9–14.6)
ERYTHROCYTE [DISTWIDTH] IN BLOOD BY AUTOMATED COUNT: 14 % (ref 11.9–14.6)
ERYTHROCYTE [DISTWIDTH] IN BLOOD BY AUTOMATED COUNT: 14.1 % (ref 11.9–14.6)
ERYTHROCYTE [DISTWIDTH] IN BLOOD BY AUTOMATED COUNT: 14.2 % (ref 11.9–14.6)
ERYTHROCYTE [DISTWIDTH] IN BLOOD BY AUTOMATED COUNT: 14.2 % (ref 11.9–14.6)
ERYTHROCYTE [DISTWIDTH] IN BLOOD BY AUTOMATED COUNT: 14.3 % (ref 11.9–14.6)
ERYTHROCYTE [DISTWIDTH] IN BLOOD BY AUTOMATED COUNT: 14.4 % (ref 11.9–14.6)
ERYTHROCYTE [DISTWIDTH] IN BLOOD BY AUTOMATED COUNT: 14.6 % (ref 11.9–14.6)
ERYTHROCYTE [DISTWIDTH] IN BLOOD BY AUTOMATED COUNT: 14.7 % (ref 11.9–14.6)
ERYTHROCYTE [DISTWIDTH] IN BLOOD BY AUTOMATED COUNT: 15 % (ref 11.9–14.6)
ERYTHROCYTE [DISTWIDTH] IN BLOOD BY AUTOMATED COUNT: 15 % (ref 11.9–14.6)
ERYTHROCYTE [DISTWIDTH] IN BLOOD BY AUTOMATED COUNT: 15.1 % (ref 11.9–14.6)
ERYTHROCYTE [DISTWIDTH] IN BLOOD BY AUTOMATED COUNT: 15.4 % (ref 11.9–14.6)
ERYTHROCYTE [DISTWIDTH] IN BLOOD BY AUTOMATED COUNT: 15.7 % (ref 11.9–14.6)
ERYTHROCYTE [DISTWIDTH] IN BLOOD BY AUTOMATED COUNT: 15.7 % (ref 11.9–14.6)
ERYTHROCYTE [DISTWIDTH] IN BLOOD BY AUTOMATED COUNT: 18.1 % (ref 11.9–14.6)
FERRITIN SERPL-MCNC: 4917 NG/ML (ref 8–388)
FERRITIN SERPL-MCNC: 5505 NG/ML (ref 8–388)
FIBRINOGEN PPP-MCNC: 787 MG/DL (ref 190–501)
FOLATE SERPL-MCNC: 5.8 NG/ML (ref 3.1–17.5)
GAMMA GLOB MFR SERPL ELPH: 0.27 G/DL (ref 0.5–1.6)
GAMMA GLOB MFR SERPL ELPH: 0.28 G/DL (ref 0.5–1.6)
GAMMA GLOB MFR SERPL ELPH: 0.32 G/DL (ref 0.5–1.6)
GAMMA GLOB MFR SERPL ELPH: 0.92 G/DL (ref 0.5–1.6)
GAMMA GLOB MFR SERPL ELPH: 1.23 G/DL (ref 0.5–1.6)
GAMMA GLOB MFR SERPL ELPH: 2.28 G/DL (ref 0.5–1.6)
GAMMA GLOB SERPL ELPH-MCNC: 0.2 G/DL (ref 0.4–1.8)
GAMMA GLOB SERPL ELPH-MCNC: 0.3 G/DL (ref 0.4–1.8)
GAMMA GLOB SERPL ELPH-MCNC: 0.4 G/DL (ref 0.4–1.8)
GAMMA GLOB SERPL ELPH-MCNC: 0.5 G/DL (ref 0.4–1.8)
GLOBULIN SER CALC-MCNC: 2.4 G/DL (ref 2.3–3.5)
GLOBULIN SER CALC-MCNC: 2.5 G/DL (ref 2.3–3.5)
GLOBULIN SER CALC-MCNC: 2.6 G/DL (ref 2.3–3.5)
GLOBULIN SER CALC-MCNC: 2.7 G/DL (ref 2.3–3.5)
GLOBULIN SER CALC-MCNC: 2.7 G/DL (ref 2.3–3.5)
GLOBULIN SER CALC-MCNC: 2.8 G/DL (ref 2.3–3.5)
GLOBULIN SER CALC-MCNC: 3 G/DL (ref 2.3–3.5)
GLOBULIN SER CALC-MCNC: 3.1 G/DL (ref 2.3–3.5)
GLOBULIN SER CALC-MCNC: 3.2 G/DL (ref 2.3–3.5)
GLOBULIN SER CALC-MCNC: 3.3 G/DL (ref 2.3–3.5)
GLOBULIN SER CALC-MCNC: 3.4 G/DL (ref 2.3–3.5)
GLOBULIN SER CALC-MCNC: 4 G/DL (ref 2.3–3.5)
GLOBULIN SER CALC-MCNC: 4.8 G/DL (ref 2.3–3.5)
GLOBULIN SER CALC-MCNC: 5.2 G/DL (ref 2.3–3.5)
GLOBULIN SER CALC-MCNC: 5.3 G/DL (ref 2.3–3.5)
GLOBULIN SER CALC-MCNC: 5.8 G/DL (ref 2.3–3.5)
GLOBULIN SER CALC-MCNC: 5.8 G/DL (ref 2.3–3.5)
GLOBULIN SER CALC-MCNC: 6 G/DL (ref 2.3–3.5)
GLOBULIN SER-MCNC: 1.8 G/DL (ref 2.2–3.9)
GLOBULIN SER-MCNC: 1.9 G/DL (ref 2.2–3.9)
GLOBULIN SER-MCNC: 2.1 G/DL (ref 2.2–3.9)
GLOBULIN SER-MCNC: 2.2 G/DL (ref 2.2–3.9)
GLUCOSE SERPL-MCNC: 103 MG/DL (ref 65–100)
GLUCOSE SERPL-MCNC: 104 MG/DL (ref 65–100)
GLUCOSE SERPL-MCNC: 106 MG/DL (ref 65–100)
GLUCOSE SERPL-MCNC: 109 MG/DL (ref 65–100)
GLUCOSE SERPL-MCNC: 111 MG/DL (ref 65–100)
GLUCOSE SERPL-MCNC: 118 MG/DL (ref 65–100)
GLUCOSE SERPL-MCNC: 120 MG/DL (ref 65–100)
GLUCOSE SERPL-MCNC: 120 MG/DL (ref 65–100)
GLUCOSE SERPL-MCNC: 121 MG/DL (ref 65–100)
GLUCOSE SERPL-MCNC: 121 MG/DL (ref 65–100)
GLUCOSE SERPL-MCNC: 126 MG/DL (ref 65–100)
GLUCOSE SERPL-MCNC: 129 MG/DL (ref 65–100)
GLUCOSE SERPL-MCNC: 132 MG/DL (ref 65–100)
GLUCOSE SERPL-MCNC: 136 MG/DL (ref 65–100)
GLUCOSE SERPL-MCNC: 140 MG/DL (ref 65–100)
GLUCOSE SERPL-MCNC: 141 MG/DL (ref 65–100)
GLUCOSE SERPL-MCNC: 153 MG/DL (ref 65–100)
GLUCOSE SERPL-MCNC: 155 MG/DL (ref 65–100)
GLUCOSE SERPL-MCNC: 156 MG/DL (ref 65–100)
GLUCOSE SERPL-MCNC: 191 MG/DL (ref 65–100)
GLUCOSE SERPL-MCNC: 216 MG/DL (ref 65–100)
GLUCOSE SERPL-MCNC: 79 MG/DL (ref 65–100)
GLUCOSE SERPL-MCNC: 85 MG/DL (ref 65–100)
GLUCOSE SERPL-MCNC: 89 MG/DL (ref 65–100)
GLUCOSE SERPL-MCNC: 89 MG/DL (ref 65–100)
GLUCOSE SERPL-MCNC: 93 MG/DL (ref 65–100)
GLUCOSE UR STRIP.AUTO-MCNC: NEGATIVE MG/DL
GLUCOSE UR STRIP.AUTO-MCNC: NEGATIVE MG/DL
HAPTOGLOB SERPL-MCNC: 275 MG/DL (ref 30–200)
HCT VFR BLD AUTO: 19.8 % (ref 41.1–50.3)
HCT VFR BLD AUTO: 21.7 % (ref 41.1–50.3)
HCT VFR BLD AUTO: 24.1 % (ref 41.1–50.3)
HCT VFR BLD AUTO: 24.3 % (ref 41.1–50.3)
HCT VFR BLD AUTO: 24.3 % (ref 41.1–50.3)
HCT VFR BLD AUTO: 25.8 % (ref 41.1–50.3)
HCT VFR BLD AUTO: 25.9 % (ref 41.1–50.3)
HCT VFR BLD AUTO: 26.8 % (ref 41.1–50.3)
HCT VFR BLD AUTO: 26.9 % (ref 41.1–50.3)
HCT VFR BLD AUTO: 26.9 % (ref 41.1–50.3)
HCT VFR BLD AUTO: 27.2 % (ref 41.1–50.3)
HCT VFR BLD AUTO: 28 % (ref 41.1–50.3)
HCT VFR BLD AUTO: 28.2 % (ref 41.1–50.3)
HCT VFR BLD AUTO: 28.3 % (ref 41.1–50.3)
HCT VFR BLD AUTO: 28.6 % (ref 41.1–50.3)
HCT VFR BLD AUTO: 28.9 % (ref 41.1–50.3)
HCT VFR BLD AUTO: 29 % (ref 41.1–50.3)
HCT VFR BLD AUTO: 29 % (ref 41.1–50.3)
HCT VFR BLD AUTO: 29.2 % (ref 41.1–50.3)
HCT VFR BLD AUTO: 29.8 % (ref 41.1–50.3)
HCT VFR BLD AUTO: 30.1 % (ref 41.1–50.3)
HCT VFR BLD AUTO: 30.5 % (ref 41.1–50.3)
HCT VFR BLD AUTO: 30.9 % (ref 41.1–50.3)
HGB BLD-MCNC: 10 G/DL (ref 13.6–17.2)
HGB BLD-MCNC: 10.2 G/DL (ref 13.6–17.2)
HGB BLD-MCNC: 10.3 G/DL (ref 13.6–17.2)
HGB BLD-MCNC: 10.3 G/DL (ref 13.6–17.2)
HGB BLD-MCNC: 6.7 G/DL (ref 13.6–17.2)
HGB BLD-MCNC: 7.6 G/DL (ref 13.6–17.2)
HGB BLD-MCNC: 8.1 G/DL (ref 13.6–17.2)
HGB BLD-MCNC: 8.2 G/DL (ref 13.6–17.2)
HGB BLD-MCNC: 8.2 G/DL (ref 13.6–17.2)
HGB BLD-MCNC: 8.6 G/DL (ref 13.6–17.2)
HGB BLD-MCNC: 8.8 G/DL (ref 13.6–17.2)
HGB BLD-MCNC: 9 G/DL (ref 13.6–17.2)
HGB BLD-MCNC: 9.1 G/DL (ref 13.6–17.2)
HGB BLD-MCNC: 9.2 G/DL (ref 13.6–17.2)
HGB BLD-MCNC: 9.3 G/DL (ref 13.6–17.2)
HGB BLD-MCNC: 9.3 G/DL (ref 13.6–17.2)
HGB BLD-MCNC: 9.4 G/DL (ref 13.6–17.2)
HGB BLD-MCNC: 9.5 G/DL (ref 13.6–17.2)
HGB BLD-MCNC: 9.6 G/DL (ref 13.6–17.2)
HGB BLD-MCNC: 9.7 G/DL (ref 13.6–17.2)
HGB BLD-MCNC: 9.8 G/DL (ref 13.6–17.2)
HGB BLD-MCNC: 9.8 G/DL (ref 13.6–17.2)
HGB RETIC QN AUTO: 28 PG (ref 29–35)
HGB UR QL STRIP: ABNORMAL
HGB UR QL STRIP: ABNORMAL
IGA SERPL-MCNC: 22 MG/DL (ref 85–499)
IGA SERPL-MCNC: 23 MG/DL (ref 85–499)
IGA SERPL-MCNC: 34 MG/DL (ref 85–499)
IGA SERPL-MCNC: 4 MG/DL (ref 85–499)
IGA SERPL-MCNC: 6 MG/DL (ref 61–437)
IGA SERPL-MCNC: 6 MG/DL (ref 85–499)
IGA SERPL-MCNC: 6 MG/DL (ref 85–499)
IGA SERPL-MCNC: <5 MG/DL (ref 61–437)
IGG SERPL-MCNC: 1516 MG/DL (ref 610–1616)
IGG SERPL-MCNC: 2852 MG/DL (ref 610–1616)
IGG SERPL-MCNC: 295 MG/DL (ref 610–1616)
IGG SERPL-MCNC: 304 MG/DL (ref 700–1600)
IGG SERPL-MCNC: 310 MG/DL (ref 700–1600)
IGG SERPL-MCNC: 334 MG/DL (ref 610–1616)
IGG SERPL-MCNC: 339 MG/DL (ref 610–1616)
IGG SERPL-MCNC: 555 MG/DL (ref 603–1613)
IGG SERPL-MCNC: 600 MG/DL (ref 603–1613)
IGG SERPL-MCNC: 753 MG/DL (ref 610–1616)
IGM SERPL-MCNC: 12 MG/DL (ref 35–242)
IGM SERPL-MCNC: 16 MG/DL (ref 35–242)
IGM SERPL-MCNC: 20 MG/DL (ref 35–242)
IGM SERPL-MCNC: 25 MG/DL (ref 35–242)
IGM SERPL-MCNC: 35 MG/DL (ref 35–242)
IGM SERPL-MCNC: 6 MG/DL (ref 15–143)
IGM SERPL-MCNC: 7 MG/DL (ref 15–143)
IGM SERPL-MCNC: 7 MG/DL (ref 15–143)
IGM SERPL-MCNC: 8 MG/DL (ref 15–143)
IGM SERPL-MCNC: <10 MG/DL (ref 35–242)
IMM GRANULOCYTES # BLD AUTO: 0 K/UL (ref 0–0.5)
IMM GRANULOCYTES # BLD AUTO: 0.1 K/UL (ref 0–0.5)
IMM GRANULOCYTES # BLD AUTO: 0.2 K/UL (ref 0–0.5)
IMM GRANULOCYTES # BLD AUTO: 0.3 K/UL (ref 0–0.5)
IMM GRANULOCYTES # BLD AUTO: 0.4 K/UL (ref 0–0.5)
IMM GRANULOCYTES # BLD AUTO: 0.5 K/UL (ref 0–0.5)
IMM GRANULOCYTES # BLD AUTO: 0.6 K/UL (ref 0–0.5)
IMM GRANULOCYTES # BLD AUTO: 0.8 K/UL (ref 0–0.5)
IMM GRANULOCYTES # BLD AUTO: 1.1 K/UL (ref 0–0.5)
IMM GRANULOCYTES NFR BLD AUTO: 1 % (ref 0–5)
IMM GRANULOCYTES NFR BLD AUTO: 12 % (ref 0–5)
IMM GRANULOCYTES NFR BLD AUTO: 13 % (ref 0–5)
IMM GRANULOCYTES NFR BLD AUTO: 15 % (ref 0–5)
IMM GRANULOCYTES NFR BLD AUTO: 17 % (ref 0–5)
IMM GRANULOCYTES NFR BLD AUTO: 17 % (ref 0–5)
IMM GRANULOCYTES NFR BLD AUTO: 2 % (ref 0–5)
IMM GRANULOCYTES NFR BLD AUTO: 2 % (ref 0–5)
IMM GRANULOCYTES NFR BLD AUTO: 3 % (ref 0–5)
IMM GRANULOCYTES NFR BLD AUTO: 3 % (ref 0–5)
IMM GRANULOCYTES NFR BLD AUTO: 4 % (ref 0–5)
IMM GRANULOCYTES NFR BLD AUTO: 8 % (ref 0–5)
IMM GRANULOCYTES NFR BLD AUTO: 8 % (ref 0–5)
IMM RETICS NFR: 11.3 % (ref 2.3–13.4)
INR PPP: 2.1
INTERPRETATION SERPL IEP-IMP: ABNORMAL
IRON SATN MFR SERPL: 47 %
IRON SERPL-MCNC: 59 UG/DL (ref 35–150)
KAPPA LC FREE SER-MCNC: 1.3 MG/L (ref 3.3–19.4)
KAPPA LC FREE SER-MCNC: 1.3 MG/L (ref 3.3–19.4)
KAPPA LC FREE SER-MCNC: 1.4 MG/L (ref 3.3–19.4)
KAPPA LC FREE SER-MCNC: 1.4 MG/L (ref 3.3–19.4)
KAPPA LC FREE SER-MCNC: 1.6 MG/L (ref 3.3–19.4)
KAPPA LC FREE SER-MCNC: 19.33 MG/L (ref 3.3–19.4)
KAPPA LC FREE SER-MCNC: 3.41 MG/L (ref 3.3–19.4)
KAPPA LC FREE SER-MCNC: 4 MG/L (ref 3.3–19.4)
KAPPA LC FREE SER-MCNC: 6.43 MG/L (ref 3.3–19.4)
KAPPA LC FREE SER-MCNC: 6.52 MG/L (ref 3.3–19.4)
KAPPA LC FREE/LAMBDA FREE SER: 0.01 {RATIO} (ref 0.26–1.65)
KAPPA LC FREE/LAMBDA FREE SER: 0.07 {RATIO} (ref 0.26–1.65)
KAPPA LC FREE/LAMBDA FREE SER: 0.13 {RATIO} (ref 0.26–1.65)
KAPPA LC FREE/LAMBDA FREE SER: 0.14 {RATIO} (ref 0.26–1.65)
KAPPA LC FREE/LAMBDA FREE SER: 0.36 {RATIO} (ref 0.26–1.65)
KAPPA LC FREE/LAMBDA FREE SER: 0.98 {RATIO} (ref 0.26–1.65)
KAPPA LC FREE/LAMBDA FREE SER: 1.02 {RATIO} (ref 0.26–1.65)
KAPPA LC FREE/LAMBDA FREE SER: ABNORMAL {RATIO} (ref 0.26–1.65)
KETONES UR QL STRIP.AUTO: NEGATIVE MG/DL
KETONES UR QL STRIP.AUTO: NEGATIVE MG/DL
LACTATE SERPL-SCNC: 1.4 MMOL/L (ref 0.4–2)
LAMBDA LC FREE SERPL-MCNC: 10.2 MG/L (ref 5.7–26.3)
LAMBDA LC FREE SERPL-MCNC: 11.18 MG/L (ref 5.71–26.3)
LAMBDA LC FREE SERPL-MCNC: 111.2 MG/L (ref 5.7–26.3)
LAMBDA LC FREE SERPL-MCNC: 1111.36 MG/L (ref 5.71–26.3)
LAMBDA LC FREE SERPL-MCNC: 1142.34 MG/L (ref 5.71–26.3)
LAMBDA LC FREE SERPL-MCNC: 133.9 MG/L (ref 5.7–26.3)
LAMBDA LC FREE SERPL-MCNC: 141.23 MG/L (ref 5.71–26.3)
LAMBDA LC FREE SERPL-MCNC: 19 MG/L (ref 5.7–26.3)
LAMBDA LC FREE SERPL-MCNC: 3.49 MG/L (ref 5.71–26.3)
LAMBDA LC FREE SERPL-MCNC: 6.39 MG/L (ref 5.71–26.3)
LDH SERPL L TO P-CCNC: 569 U/L (ref 110–210)
LDH SERPL L TO P-CCNC: 574 U/L (ref 110–210)
LEUKOCYTE ESTERASE UR QL STRIP.AUTO: NEGATIVE
LEUKOCYTE ESTERASE UR QL STRIP.AUTO: NEGATIVE
LYMPHOCYTES # BLD: 0.3 K/UL (ref 0.5–4.6)
LYMPHOCYTES # BLD: 0.3 K/UL (ref 0.5–4.6)
LYMPHOCYTES # BLD: 0.4 K/UL (ref 0.5–4.6)
LYMPHOCYTES # BLD: 0.5 K/UL (ref 0.5–4.6)
LYMPHOCYTES # BLD: 0.6 K/UL (ref 0.5–4.6)
LYMPHOCYTES # BLD: 0.7 K/UL (ref 0.5–4.6)
LYMPHOCYTES # BLD: 0.8 K/UL (ref 0.5–4.6)
LYMPHOCYTES # BLD: 1 K/UL (ref 0.5–4.6)
LYMPHOCYTES # BLD: 1.2 K/UL (ref 0.5–4.6)
LYMPHOCYTES # BLD: 1.3 K/UL (ref 0.5–4.6)
LYMPHOCYTES # BLD: 1.3 K/UL (ref 0.5–4.6)
LYMPHOCYTES # BLD: 1.4 K/UL (ref 0.5–4.6)
LYMPHOCYTES # BLD: 1.5 K/UL (ref 0.5–4.6)
LYMPHOCYTES # BLD: 1.7 K/UL (ref 0.5–4.6)
LYMPHOCYTES # BLD: 1.8 K/UL (ref 0.5–4.6)
LYMPHOCYTES NFR BLD: 12 % (ref 13–44)
LYMPHOCYTES NFR BLD: 17 % (ref 13–44)
LYMPHOCYTES NFR BLD: 18 % (ref 13–44)
LYMPHOCYTES NFR BLD: 19 % (ref 13–44)
LYMPHOCYTES NFR BLD: 19 % (ref 13–44)
LYMPHOCYTES NFR BLD: 21 % (ref 13–44)
LYMPHOCYTES NFR BLD: 21 % (ref 13–44)
LYMPHOCYTES NFR BLD: 23 % (ref 13–44)
LYMPHOCYTES NFR BLD: 26 % (ref 13–44)
LYMPHOCYTES NFR BLD: 27 % (ref 13–44)
LYMPHOCYTES NFR BLD: 28 % (ref 13–44)
LYMPHOCYTES NFR BLD: 28 % (ref 13–44)
LYMPHOCYTES NFR BLD: 29 % (ref 13–44)
LYMPHOCYTES NFR BLD: 30 % (ref 13–44)
LYMPHOCYTES NFR BLD: 31 % (ref 13–44)
LYMPHOCYTES NFR BLD: 32 % (ref 13–44)
LYMPHOCYTES NFR BLD: 34 % (ref 13–44)
LYMPHOCYTES NFR BLD: 36 % (ref 13–44)
LYMPHOCYTES NFR BLD: 36 % (ref 13–44)
LYMPHOCYTES NFR BLD: 39 % (ref 13–44)
LYMPHOCYTES NFR BLD: 43 % (ref 13–44)
LYMPHOCYTES NFR BLD: 52 % (ref 13–44)
LYMPHOCYTES NFR BLD: 62 % (ref 13–44)
LYMPHOCYTES NFR BLD: 69 % (ref 13–44)
LYMPHOCYTES NFR BLD: 9 % (ref 13–44)
M PROTEIN SERPL ELPH-MCNC: 0.1 G/DL
M PROTEIN SERPL ELPH-MCNC: 0.1 G/DL
M PROTEIN SERPL ELPH-MCNC: 0.3 G/DL
M PROTEIN SERPL ELPH-MCNC: 0.3 G/DL
M PROTEIN SERPL ELPH-MCNC: 0.59 G/DL
M PROTEIN SERPL ELPH-MCNC: 0.96 G/DL
M PROTEIN SERPL ELPH-MCNC: 2.07 G/DL
M PROTEIN SERPL ELPH-MCNC: ABNORMAL G/DL
MAGNESIUM SERPL-MCNC: 1.9 MG/DL (ref 1.8–2.4)
MAGNESIUM SERPL-MCNC: 2 MG/DL (ref 1.8–2.4)
MAGNESIUM SERPL-MCNC: 2.1 MG/DL (ref 1.8–2.4)
MAGNESIUM SERPL-MCNC: 2.2 MG/DL (ref 1.8–2.4)
MAGNESIUM SERPL-MCNC: 2.3 MG/DL (ref 1.8–2.4)
MAGNESIUM SERPL-MCNC: 2.3 MG/DL (ref 1.8–2.4)
MAGNESIUM SERPL-MCNC: 2.5 MG/DL (ref 1.8–2.4)
MCH RBC QN AUTO: 23.2 PG (ref 26.1–32.9)
MCH RBC QN AUTO: 23.2 PG (ref 26.1–32.9)
MCH RBC QN AUTO: 23.3 PG (ref 26.1–32.9)
MCH RBC QN AUTO: 23.5 PG (ref 26.1–32.9)
MCH RBC QN AUTO: 23.5 PG (ref 26.1–32.9)
MCH RBC QN AUTO: 23.9 PG (ref 26.1–32.9)
MCH RBC QN AUTO: 24 PG (ref 26.1–32.9)
MCH RBC QN AUTO: 24 PG (ref 26.1–32.9)
MCH RBC QN AUTO: 24.1 PG (ref 26.1–32.9)
MCH RBC QN AUTO: 24.1 PG (ref 26.1–32.9)
MCH RBC QN AUTO: 24.2 PG (ref 26.1–32.9)
MCH RBC QN AUTO: 24.3 PG (ref 26.1–32.9)
MCH RBC QN AUTO: 24.6 PG (ref 26.1–32.9)
MCH RBC QN AUTO: 24.7 PG (ref 26.1–32.9)
MCH RBC QN AUTO: 24.9 PG (ref 26.1–32.9)
MCH RBC QN AUTO: 25 PG (ref 26.1–32.9)
MCH RBC QN AUTO: 25 PG (ref 26.1–32.9)
MCH RBC QN AUTO: 25.1 PG (ref 26.1–32.9)
MCH RBC QN AUTO: 25.1 PG (ref 26.1–32.9)
MCH RBC QN AUTO: 25.4 PG (ref 26.1–32.9)
MCHC RBC AUTO-ENTMCNC: 32.1 G/DL (ref 31.4–35)
MCHC RBC AUTO-ENTMCNC: 32.2 G/DL (ref 31.4–35)
MCHC RBC AUTO-ENTMCNC: 32.5 G/DL (ref 31.4–35)
MCHC RBC AUTO-ENTMCNC: 32.5 G/DL (ref 31.4–35)
MCHC RBC AUTO-ENTMCNC: 32.6 G/DL (ref 31.4–35)
MCHC RBC AUTO-ENTMCNC: 32.7 G/DL (ref 31.4–35)
MCHC RBC AUTO-ENTMCNC: 32.8 G/DL (ref 31.4–35)
MCHC RBC AUTO-ENTMCNC: 32.9 G/DL (ref 31.4–35)
MCHC RBC AUTO-ENTMCNC: 33.1 G/DL (ref 31.4–35)
MCHC RBC AUTO-ENTMCNC: 33.2 G/DL (ref 31.4–35)
MCHC RBC AUTO-ENTMCNC: 33.2 G/DL (ref 31.4–35)
MCHC RBC AUTO-ENTMCNC: 33.3 G/DL (ref 31.4–35)
MCHC RBC AUTO-ENTMCNC: 33.3 G/DL (ref 31.4–35)
MCHC RBC AUTO-ENTMCNC: 33.8 G/DL (ref 31.4–35)
MCHC RBC AUTO-ENTMCNC: 33.8 G/DL (ref 31.4–35)
MCHC RBC AUTO-ENTMCNC: 34 G/DL (ref 31.4–35)
MCHC RBC AUTO-ENTMCNC: 34 G/DL (ref 31.4–35)
MCHC RBC AUTO-ENTMCNC: 34.1 G/DL (ref 31.4–35)
MCHC RBC AUTO-ENTMCNC: 34.2 G/DL (ref 31.4–35)
MCHC RBC AUTO-ENTMCNC: 34.2 G/DL (ref 31.4–35)
MCHC RBC AUTO-ENTMCNC: 35 G/DL (ref 31.4–35)
MCV RBC AUTO: 68.9 FL (ref 79.6–97.8)
MCV RBC AUTO: 69 FL (ref 79.6–97.8)
MCV RBC AUTO: 69 FL (ref 79.6–97.8)
MCV RBC AUTO: 69.5 FL (ref 79.6–97.8)
MCV RBC AUTO: 69.6 FL (ref 79.6–97.8)
MCV RBC AUTO: 69.8 FL (ref 79.6–97.8)
MCV RBC AUTO: 70 FL (ref 79.6–97.8)
MCV RBC AUTO: 70 FL (ref 79.6–97.8)
MCV RBC AUTO: 70.9 FL (ref 79.6–97.8)
MCV RBC AUTO: 71.2 FL (ref 79.6–97.8)
MCV RBC AUTO: 73 FL (ref 79.6–97.8)
MCV RBC AUTO: 73 FL (ref 79.6–97.8)
MCV RBC AUTO: 74.9 FL (ref 79.6–97.8)
MCV RBC AUTO: 75.3 FL (ref 79.6–97.8)
MCV RBC AUTO: 75.9 FL (ref 79.6–97.8)
MCV RBC AUTO: 76 FL (ref 79.6–97.8)
MCV RBC AUTO: 76 FL (ref 79.6–97.8)
MCV RBC AUTO: 76.1 FL (ref 79.6–97.8)
MCV RBC AUTO: 76.1 FL (ref 79.6–97.8)
MCV RBC AUTO: 76.2 FL (ref 79.6–97.8)
MCV RBC AUTO: 76.3 FL (ref 79.6–97.8)
MCV RBC AUTO: 76.4 FL (ref 79.6–97.8)
MCV RBC AUTO: 76.5 FL (ref 79.6–97.8)
MCV RBC AUTO: 76.6 FL (ref 79.6–97.8)
MCV RBC AUTO: 76.6 FL (ref 79.6–97.8)
MCV RBC AUTO: 76.8 FL (ref 79.6–97.8)
MCV RBC AUTO: 77.1 FL (ref 79.6–97.8)
MM INDURATION POC: 0 MM (ref 0–5)
MONOCYTES # BLD: 0.1 K/UL (ref 0.1–1.3)
MONOCYTES # BLD: 0.2 K/UL (ref 0.1–1.3)
MONOCYTES # BLD: 0.3 K/UL (ref 0.1–1.3)
MONOCYTES # BLD: 0.4 K/UL (ref 0.1–1.3)
MONOCYTES # BLD: 0.5 K/UL (ref 0.1–1.3)
MONOCYTES # BLD: 0.6 K/UL (ref 0.1–1.3)
MONOCYTES # BLD: 0.6 K/UL (ref 0.1–1.3)
MONOCYTES # BLD: 0.7 K/UL (ref 0.1–1.3)
MONOCYTES NFR BLD: 11 % (ref 4–12)
MONOCYTES NFR BLD: 16 % (ref 4–12)
MONOCYTES NFR BLD: 17 % (ref 4–12)
MONOCYTES NFR BLD: 18 % (ref 4–12)
MONOCYTES NFR BLD: 19 % (ref 4–12)
MONOCYTES NFR BLD: 19 % (ref 4–12)
MONOCYTES NFR BLD: 21 % (ref 4–12)
MONOCYTES NFR BLD: 3 % (ref 4–12)
MONOCYTES NFR BLD: 4 % (ref 4–12)
MONOCYTES NFR BLD: 5 % (ref 4–12)
MONOCYTES NFR BLD: 6 % (ref 4–12)
MONOCYTES NFR BLD: 7 % (ref 4–12)
MONOCYTES NFR BLD: 8 % (ref 4–12)
MONOCYTES NFR BLD: 9 % (ref 4–12)
NEUTS SEG # BLD: 0.2 K/UL (ref 1.7–8.2)
NEUTS SEG # BLD: 0.4 K/UL (ref 1.7–8.2)
NEUTS SEG # BLD: 0.4 K/UL (ref 1.7–8.2)
NEUTS SEG # BLD: 0.7 K/UL (ref 1.7–8.2)
NEUTS SEG # BLD: 0.9 K/UL (ref 1.7–8.2)
NEUTS SEG # BLD: 1 K/UL (ref 1.7–8.2)
NEUTS SEG # BLD: 1 K/UL (ref 1.7–8.2)
NEUTS SEG # BLD: 1.2 K/UL (ref 1.7–8.2)
NEUTS SEG # BLD: 1.5 K/UL (ref 1.7–8.2)
NEUTS SEG # BLD: 1.6 K/UL (ref 1.7–8.2)
NEUTS SEG # BLD: 1.9 K/UL (ref 1.7–8.2)
NEUTS SEG # BLD: 2 K/UL (ref 1.7–8.2)
NEUTS SEG # BLD: 2.1 K/UL (ref 1.7–8.2)
NEUTS SEG # BLD: 2.3 K/UL (ref 1.7–8.2)
NEUTS SEG # BLD: 2.4 K/UL (ref 1.7–8.2)
NEUTS SEG # BLD: 2.5 K/UL (ref 1.7–8.2)
NEUTS SEG # BLD: 2.5 K/UL (ref 1.7–8.2)
NEUTS SEG # BLD: 2.6 K/UL (ref 1.7–8.2)
NEUTS SEG # BLD: 2.7 K/UL (ref 1.7–8.2)
NEUTS SEG # BLD: 3.7 K/UL (ref 1.7–8.2)
NEUTS SEG # BLD: 4.9 K/UL (ref 1.7–8.2)
NEUTS SEG NFR BLD: 19 % (ref 43–78)
NEUTS SEG NFR BLD: 29 % (ref 43–78)
NEUTS SEG NFR BLD: 34 % (ref 43–78)
NEUTS SEG NFR BLD: 35 % (ref 43–78)
NEUTS SEG NFR BLD: 36 % (ref 43–78)
NEUTS SEG NFR BLD: 41 % (ref 43–78)
NEUTS SEG NFR BLD: 43 % (ref 43–78)
NEUTS SEG NFR BLD: 43 % (ref 43–78)
NEUTS SEG NFR BLD: 44 % (ref 43–78)
NEUTS SEG NFR BLD: 46 % (ref 43–78)
NEUTS SEG NFR BLD: 47 % (ref 43–78)
NEUTS SEG NFR BLD: 49 % (ref 43–78)
NEUTS SEG NFR BLD: 49 % (ref 43–78)
NEUTS SEG NFR BLD: 53 % (ref 43–78)
NEUTS SEG NFR BLD: 58 % (ref 43–78)
NEUTS SEG NFR BLD: 60 % (ref 43–78)
NEUTS SEG NFR BLD: 60 % (ref 43–78)
NEUTS SEG NFR BLD: 61 % (ref 43–78)
NEUTS SEG NFR BLD: 69 % (ref 43–78)
NEUTS SEG NFR BLD: 72 % (ref 43–78)
NEUTS SEG NFR BLD: 73 % (ref 43–78)
NEUTS SEG NFR BLD: 76 % (ref 43–78)
NEUTS SEG NFR BLD: 76 % (ref 43–78)
NEUTS SEG NFR BLD: 82 % (ref 43–78)
NEUTS SEG NFR BLD: 86 % (ref 43–78)
NITRITE UR QL STRIP.AUTO: NEGATIVE
NITRITE UR QL STRIP.AUTO: NEGATIVE
NRBC # BLD: 0 K/UL (ref 0–0.2)
NRBC # BLD: 0.02 K/UL (ref 0–0.2)
NRBC # BLD: 0.03 K/UL (ref 0–0.2)
NRBC # BLD: 0.05 K/UL (ref 0–0.2)
NRBC # BLD: 0.08 K/UL (ref 0–0.2)
NRBC # BLD: 0.09 K/UL (ref 0–0.2)
NRBC # BLD: 0.1 K/UL (ref 0–0.2)
P-R INTERVAL, ECG05: 136 MS
PATH REV BLD -IMP: NORMAL
PH UR STRIP: 5.5 [PH] (ref 5–9)
PH UR STRIP: 6 [PH] (ref 5–9)
PHOSPHATE SERPL-MCNC: 2.2 MG/DL (ref 2.3–3.7)
PHOSPHATE SERPL-MCNC: 2.5 MG/DL (ref 2.3–3.7)
PHOSPHATE SERPL-MCNC: 2.6 MG/DL (ref 2.3–3.7)
PHOSPHATE SERPL-MCNC: 3.1 MG/DL (ref 2.3–3.7)
PHOSPHATE SERPL-MCNC: 3.8 MG/DL (ref 2.3–3.7)
PHOSPHATE SERPL-MCNC: 3.8 MG/DL (ref 2.3–3.7)
PHOSPHATE SERPL-MCNC: 4 MG/DL (ref 2.3–3.7)
PLATELET # BLD AUTO: 106 K/UL (ref 150–450)
PLATELET # BLD AUTO: 108 K/UL (ref 150–450)
PLATELET # BLD AUTO: 114 K/UL (ref 150–450)
PLATELET # BLD AUTO: 120 K/UL (ref 150–450)
PLATELET # BLD AUTO: 126 K/UL (ref 150–450)
PLATELET # BLD AUTO: 128 K/UL (ref 150–450)
PLATELET # BLD AUTO: 134 K/UL (ref 150–450)
PLATELET # BLD AUTO: 135 K/UL (ref 150–450)
PLATELET # BLD AUTO: 139 K/UL (ref 150–450)
PLATELET # BLD AUTO: 149 K/UL (ref 150–450)
PLATELET # BLD AUTO: 150 K/UL (ref 150–450)
PLATELET # BLD AUTO: 150 K/UL (ref 150–450)
PLATELET # BLD AUTO: 153 K/UL (ref 150–450)
PLATELET # BLD AUTO: 157 K/UL (ref 150–450)
PLATELET # BLD AUTO: 160 K/UL (ref 150–450)
PLATELET # BLD AUTO: 167 K/UL (ref 150–450)
PLATELET # BLD AUTO: 207 K/UL (ref 150–450)
PLATELET # BLD AUTO: 41 K/UL (ref 150–450)
PLATELET # BLD AUTO: 43 K/UL (ref 150–450)
PLATELET # BLD AUTO: 50 K/UL (ref 150–450)
PLATELET # BLD AUTO: 60 K/UL (ref 150–450)
PLATELET # BLD AUTO: 85 K/UL (ref 150–450)
PLATELET # BLD AUTO: 89 K/UL (ref 150–450)
PLATELET # BLD AUTO: 90 K/UL (ref 150–450)
PLATELET # BLD AUTO: 93 K/UL (ref 150–450)
PLATELET COMMENTS,PCOM: ABNORMAL
PLATELET COMMENTS,PCOM: ADEQUATE
PLATELET COMMENTS,PCOM: SLIGHT
PMV BLD AUTO: 10 FL (ref 9.4–12.3)
PMV BLD AUTO: 10.2 FL (ref 9.4–12.3)
PMV BLD AUTO: 10.3 FL (ref 9.4–12.3)
PMV BLD AUTO: 10.7 FL (ref 9.4–12.3)
PMV BLD AUTO: 10.7 FL (ref 9.4–12.3)
PMV BLD AUTO: 11.3 FL (ref 9.4–12.3)
PMV BLD AUTO: 11.5 FL (ref 9.4–12.3)
PMV BLD AUTO: 12.6 FL (ref 9.4–12.3)
PMV BLD AUTO: 8.1 FL (ref 9.4–12.3)
PMV BLD AUTO: 8.5 FL (ref 9.4–12.3)
PMV BLD AUTO: 9 FL (ref 9.4–12.3)
PMV BLD AUTO: 9.1 FL (ref 9.4–12.3)
PMV BLD AUTO: 9.3 FL (ref 9.4–12.3)
PMV BLD AUTO: 9.4 FL (ref 9.4–12.3)
PMV BLD AUTO: 9.6 FL (ref 9.4–12.3)
PMV BLD AUTO: 9.6 FL (ref 9.4–12.3)
PMV BLD AUTO: 9.7 FL (ref 9.4–12.3)
PMV BLD AUTO: ABNORMAL FL (ref 9.4–12.3)
POTASSIUM SERPL-SCNC: 3.3 MMOL/L (ref 3.5–5.1)
POTASSIUM SERPL-SCNC: 3.7 MMOL/L (ref 3.5–5.1)
POTASSIUM SERPL-SCNC: 3.7 MMOL/L (ref 3.5–5.1)
POTASSIUM SERPL-SCNC: 3.8 MMOL/L (ref 3.5–5.1)
POTASSIUM SERPL-SCNC: 3.9 MMOL/L (ref 3.5–5.1)
POTASSIUM SERPL-SCNC: 4 MMOL/L (ref 3.5–5.1)
POTASSIUM SERPL-SCNC: 4.1 MMOL/L (ref 3.5–5.1)
POTASSIUM SERPL-SCNC: 4.2 MMOL/L (ref 3.5–5.1)
POTASSIUM SERPL-SCNC: 4.3 MMOL/L (ref 3.5–5.1)
POTASSIUM SERPL-SCNC: 4.5 MMOL/L (ref 3.5–5.1)
POTASSIUM SERPL-SCNC: 4.6 MMOL/L (ref 3.5–5.1)
PPD POC: NEGATIVE NEGATIVE
PROCALCITONIN SERPL-MCNC: 3.51 NG/ML
PROT PATTERN SERPL ELPH-IMP: ABNORMAL
PROT PATTERN SPEC IFE-IMP: ABNORMAL
PROT SERPL-MCNC: 5.4 G/DL (ref 6–8.5)
PROT SERPL-MCNC: 5.5 G/DL (ref 6.3–8.2)
PROT SERPL-MCNC: 5.5 G/DL (ref 6–8.5)
PROT SERPL-MCNC: 5.5 G/DL (ref 6–8.5)
PROT SERPL-MCNC: 5.6 G/DL (ref 6.3–8.2)
PROT SERPL-MCNC: 5.8 G/DL (ref 6.3–8.2)
PROT SERPL-MCNC: 5.8 G/DL (ref 6–8.5)
PROT SERPL-MCNC: 5.9 G/DL (ref 6.3–8.2)
PROT SERPL-MCNC: 6 G/DL (ref 6.3–8.2)
PROT SERPL-MCNC: 6.1 G/DL (ref 6.3–8.2)
PROT SERPL-MCNC: 6.2 G/DL (ref 6.3–8.2)
PROT SERPL-MCNC: 6.2 G/DL (ref 6.3–8.2)
PROT SERPL-MCNC: 6.3 G/DL (ref 6.3–8.2)
PROT SERPL-MCNC: 6.4 G/DL (ref 6.3–8.2)
PROT SERPL-MCNC: 6.7 G/DL (ref 6.3–8.2)
PROT SERPL-MCNC: 6.7 G/DL (ref 6.3–8.2)
PROT SERPL-MCNC: 6.8 G/DL (ref 6.3–8.2)
PROT SERPL-MCNC: 6.9 G/DL (ref 6.3–8.2)
PROT SERPL-MCNC: 7.1 G/DL (ref 6.3–8.2)
PROT SERPL-MCNC: 7.4 G/DL (ref 6.3–8.2)
PROT SERPL-MCNC: 8.1 G/DL (ref 6.3–8.2)
PROT SERPL-MCNC: 8.4 G/DL (ref 6.3–8.2)
PROT SERPL-MCNC: 8.4 G/DL (ref 6.3–8.2)
PROT SERPL-MCNC: 8.5 G/DL (ref 6.3–8.2)
PROT SERPL-MCNC: 8.6 G/DL (ref 6.3–8.2)
PROT SERPL-MCNC: 8.6 G/DL (ref 6.3–8.2)
PROT SERPL-MCNC: 8.9 G/DL (ref 6.3–8.2)
PROT UR STRIP-MCNC: 100 MG/DL
PROT UR STRIP-MCNC: 100 MG/DL
PROT UR-MCNC: 247 MG/DL
PROT/CREAT UR-RTO: 2.8
PROTHROMBIN TIME: 23.8 SEC (ref 12–14.7)
Q-T INTERVAL, ECG07: 352 MS
QRS DURATION, ECG06: 128 MS
QTC CALCULATION (BEZET), ECG08: 421 MS
RBC # BLD AUTO: 2.87 M/UL (ref 4.23–5.6)
RBC # BLD AUTO: 3.15 M/UL (ref 4.23–5.6)
RBC # BLD AUTO: 3.4 M/UL (ref 4.23–5.6)
RBC # BLD AUTO: 3.49 M/UL (ref 4.23–5.6)
RBC # BLD AUTO: 3.53 M/UL (ref 4.23–5.67)
RBC # BLD AUTO: 3.54 M/UL (ref 4.23–5.67)
RBC # BLD AUTO: 3.55 M/UL (ref 4.23–5.67)
RBC # BLD AUTO: 3.68 M/UL (ref 4.23–5.67)
RBC # BLD AUTO: 3.71 M/UL (ref 4.23–5.6)
RBC # BLD AUTO: 3.71 M/UL (ref 4.23–5.67)
RBC # BLD AUTO: 3.74 M/UL (ref 4.23–5.67)
RBC # BLD AUTO: 3.76 M/UL (ref 4.23–5.67)
RBC # BLD AUTO: 3.79 M/UL (ref 4.23–5.67)
RBC # BLD AUTO: 3.8 M/UL (ref 4.23–5.67)
RBC # BLD AUTO: 3.8 M/UL (ref 4.23–5.67)
RBC # BLD AUTO: 3.84 M/UL (ref 4.23–5.67)
RBC # BLD AUTO: 3.87 M/UL (ref 4.23–5.67)
RBC # BLD AUTO: 3.88 M/UL (ref 4.23–5.67)
RBC # BLD AUTO: 3.91 M/UL (ref 4.23–5.67)
RBC # BLD AUTO: 3.93 M/UL (ref 4.23–5.67)
RBC # BLD AUTO: 3.94 M/UL (ref 4.23–5.67)
RBC # BLD AUTO: 3.96 M/UL (ref 4.23–5.6)
RBC # BLD AUTO: 4.07 M/UL (ref 4.23–5.67)
RBC # BLD AUTO: 4.08 M/UL (ref 4.23–5.67)
RBC # BLD AUTO: 4.23 M/UL (ref 4.23–5.67)
RBC # BLD AUTO: 4.26 M/UL (ref 4.23–5.67)
RBC # BLD AUTO: 4.3 M/UL (ref 4.23–5.67)
RBC #/AREA URNS HPF: 0 /HPF
RBC #/AREA URNS HPF: ABNORMAL /HPF
RBC MORPH BLD: ABNORMAL
RETICS # AUTO: 0.01 M/UL (ref 0.03–0.1)
RETICS/RBC NFR AUTO: 0.3 % (ref 0.3–2)
SARS COV-2, XPGCVT: NEGATIVE
SERVICE CMNT-IMP: NORMAL
SODIUM SERPL-SCNC: 132 MMOL/L (ref 136–145)
SODIUM SERPL-SCNC: 135 MMOL/L (ref 136–145)
SODIUM SERPL-SCNC: 135 MMOL/L (ref 136–145)
SODIUM SERPL-SCNC: 136 MMOL/L (ref 136–145)
SODIUM SERPL-SCNC: 137 MMOL/L (ref 136–145)
SODIUM SERPL-SCNC: 138 MMOL/L (ref 136–145)
SODIUM SERPL-SCNC: 139 MMOL/L (ref 136–145)
SODIUM SERPL-SCNC: 140 MMOL/L (ref 136–145)
SODIUM SERPL-SCNC: 140 MMOL/L (ref 136–145)
SODIUM SERPL-SCNC: 141 MMOL/L (ref 136–145)
SODIUM SERPL-SCNC: 142 MMOL/L (ref 136–145)
SODIUM SERPL-SCNC: 146 MMOL/L (ref 136–145)
SODIUM SERPL-SCNC: 148 MMOL/L (ref 136–145)
SODIUM SERPL-SCNC: 152 MMOL/L (ref 136–145)
SODIUM UR-SCNC: 25 MMOL/L
SOURCE, COVRS: NORMAL
SP GR UR REFRACTOMETRY: 1.01 (ref 1–1.02)
SP GR UR REFRACTOMETRY: 1.02 (ref 1–1.02)
SPECIMEN EXP DATE BLD: NORMAL
SPECIMEN EXP DATE BLD: NORMAL
STATUS OF UNIT,%ST: NORMAL
TIBC SERPL-MCNC: 125 UG/DL (ref 250–450)
UNIT DIVISION, %UDIV: 0
URATE SERPL-MCNC: 2.5 MG/DL (ref 2.6–6)
URATE SERPL-MCNC: 7.4 MG/DL (ref 2.6–6)
UROBILINOGEN UR QL STRIP.AUTO: 0.2 EU/DL (ref 0.2–1)
UROBILINOGEN UR QL STRIP.AUTO: 0.2 EU/DL (ref 0.2–1)
VENTRICULAR RATE, ECG03: 86 BPM
VIT B12 SERPL-MCNC: >2000 PG/ML (ref 193–986)
WBC # BLD AUTO: 1.1 K/UL (ref 4.3–11.1)
WBC # BLD AUTO: 1.3 K/UL (ref 4.3–11.1)
WBC # BLD AUTO: 1.3 K/UL (ref 4.3–11.1)
WBC # BLD AUTO: 1.9 K/UL (ref 4.3–11.1)
WBC # BLD AUTO: 1.9 K/UL (ref 4.3–11.1)
WBC # BLD AUTO: 2 K/UL (ref 4.3–11.1)
WBC # BLD AUTO: 2 K/UL (ref 4.3–11.1)
WBC # BLD AUTO: 2.1 K/UL (ref 4.3–11.1)
WBC # BLD AUTO: 2.2 K/UL (ref 4.3–11.1)
WBC # BLD AUTO: 2.3 K/UL (ref 4.3–11.1)
WBC # BLD AUTO: 2.4 K/UL (ref 4.3–11.1)
WBC # BLD AUTO: 2.4 K/UL (ref 4.3–11.1)
WBC # BLD AUTO: 2.5 K/UL (ref 4.3–11.1)
WBC # BLD AUTO: 2.9 K/UL (ref 4.3–11.1)
WBC # BLD AUTO: 2.9 K/UL (ref 4.3–11.1)
WBC # BLD AUTO: 3 K/UL (ref 4.3–11.1)
WBC # BLD AUTO: 3.1 K/UL (ref 4.3–11.1)
WBC # BLD AUTO: 3.3 K/UL (ref 4.3–11.1)
WBC # BLD AUTO: 3.3 K/UL (ref 4.3–11.1)
WBC # BLD AUTO: 3.6 K/UL (ref 4.3–11.1)
WBC # BLD AUTO: 4.1 K/UL (ref 4.3–11.1)
WBC # BLD AUTO: 4.2 K/UL (ref 4.3–11.1)
WBC # BLD AUTO: 4.4 K/UL (ref 4.3–11.1)
WBC # BLD AUTO: 5 K/UL (ref 4.3–11.1)
WBC # BLD AUTO: 6.2 K/UL (ref 4.3–11.1)
WBC # BLD AUTO: 6.2 K/UL (ref 4.3–11.1)
WBC # BLD AUTO: 7.1 K/UL (ref 4.3–11.1)
WBC MORPH BLD: ABNORMAL
WBC URNS QL MICRO: ABNORMAL /HPF
WBC URNS QL MICRO: ABNORMAL /HPF

## 2020-01-01 PROCEDURE — 96375 TX/PRO/DX INJ NEW DRUG ADDON: CPT

## 2020-01-01 PROCEDURE — 82607 VITAMIN B-12: CPT

## 2020-01-01 PROCEDURE — 82248 BILIRUBIN DIRECT: CPT

## 2020-01-01 PROCEDURE — 74011000250 HC RX REV CODE- 250: Performed by: INTERNAL MEDICINE

## 2020-01-01 PROCEDURE — 96413 CHEMO IV INFUSION 1 HR: CPT

## 2020-01-01 PROCEDURE — 83883 ASSAY NEPHELOMETRY NOT SPEC: CPT

## 2020-01-01 PROCEDURE — 74011000250 HC RX REV CODE- 250: Performed by: FAMILY MEDICINE

## 2020-01-01 PROCEDURE — 74011250636 HC RX REV CODE- 250/636: Performed by: INTERNAL MEDICINE

## 2020-01-01 PROCEDURE — 85025 COMPLETE CBC W/AUTO DIFF WBC: CPT

## 2020-01-01 PROCEDURE — 83735 ASSAY OF MAGNESIUM: CPT

## 2020-01-01 PROCEDURE — 84156 ASSAY OF PROTEIN URINE: CPT

## 2020-01-01 PROCEDURE — 74011000258 HC RX REV CODE- 258: Performed by: FAMILY MEDICINE

## 2020-01-01 PROCEDURE — 74011000258 HC RX REV CODE- 258: Performed by: INTERNAL MEDICINE

## 2020-01-01 PROCEDURE — 85384 FIBRINOGEN ACTIVITY: CPT

## 2020-01-01 PROCEDURE — 36415 COLL VENOUS BLD VENIPUNCTURE: CPT

## 2020-01-01 PROCEDURE — 74011250636 HC RX REV CODE- 250/636: Performed by: NURSE PRACTITIONER

## 2020-01-01 PROCEDURE — 84165 PROTEIN E-PHORESIS SERUM: CPT

## 2020-01-01 PROCEDURE — 82784 ASSAY IGA/IGD/IGG/IGM EACH: CPT

## 2020-01-01 PROCEDURE — C9113 INJ PANTOPRAZOLE SODIUM, VIA: HCPCS | Performed by: INTERNAL MEDICINE

## 2020-01-01 PROCEDURE — 97110 THERAPEUTIC EXERCISES: CPT

## 2020-01-01 PROCEDURE — 86334 IMMUNOFIX E-PHORESIS SERUM: CPT

## 2020-01-01 PROCEDURE — 82746 ASSAY OF FOLIC ACID SERUM: CPT

## 2020-01-01 PROCEDURE — 74011000258 HC RX REV CODE- 258: Performed by: EMERGENCY MEDICINE

## 2020-01-01 PROCEDURE — 74011250637 HC RX REV CODE- 250/637: Performed by: FAMILY MEDICINE

## 2020-01-01 PROCEDURE — 74011250637 HC RX REV CODE- 250/637: Performed by: INTERNAL MEDICINE

## 2020-01-01 PROCEDURE — P9040 RBC LEUKOREDUCED IRRADIATED: HCPCS

## 2020-01-01 PROCEDURE — 86923 COMPATIBILITY TEST ELECTRIC: CPT

## 2020-01-01 PROCEDURE — 82728 ASSAY OF FERRITIN: CPT

## 2020-01-01 PROCEDURE — 96417 CHEMO IV INFUS EACH ADDL SEQ: CPT

## 2020-01-01 PROCEDURE — 74011250637 HC RX REV CODE- 250/637: Performed by: NURSE PRACTITIONER

## 2020-01-01 PROCEDURE — 80053 COMPREHEN METABOLIC PANEL: CPT

## 2020-01-01 PROCEDURE — 96367 TX/PROPH/DG ADDL SEQ IV INF: CPT

## 2020-01-01 PROCEDURE — 84100 ASSAY OF PHOSPHORUS: CPT

## 2020-01-01 PROCEDURE — 92610 EVALUATE SWALLOWING FUNCTION: CPT

## 2020-01-01 PROCEDURE — 83010 ASSAY OF HAPTOGLOBIN QUANT: CPT

## 2020-01-01 PROCEDURE — 97530 THERAPEUTIC ACTIVITIES: CPT

## 2020-01-01 PROCEDURE — 83615 LACTATE (LD) (LDH) ENZYME: CPT

## 2020-01-01 PROCEDURE — 74011000258 HC RX REV CODE- 258: Performed by: NURSE PRACTITIONER

## 2020-01-01 PROCEDURE — 80048 BASIC METABOLIC PNL TOTAL CA: CPT

## 2020-01-01 PROCEDURE — 84300 ASSAY OF URINE SODIUM: CPT

## 2020-01-01 PROCEDURE — 77030016057 HC NDL HUBR APOL -B

## 2020-01-01 PROCEDURE — 76775 US EXAM ABDO BACK WALL LIM: CPT

## 2020-01-01 PROCEDURE — 99284 EMERGENCY DEPT VISIT MOD MDM: CPT

## 2020-01-01 PROCEDURE — 86900 BLOOD TYPING SEROLOGIC ABO: CPT

## 2020-01-01 PROCEDURE — 65270000029 HC RM PRIVATE

## 2020-01-01 PROCEDURE — 85379 FIBRIN DEGRADATION QUANT: CPT

## 2020-01-01 PROCEDURE — 76450000000

## 2020-01-01 PROCEDURE — 99223 1ST HOSP IP/OBS HIGH 75: CPT | Performed by: NURSE PRACTITIONER

## 2020-01-01 PROCEDURE — 85730 THROMBOPLASTIN TIME PARTIAL: CPT

## 2020-01-01 PROCEDURE — 97161 PT EVAL LOW COMPLEX 20 MIN: CPT

## 2020-01-01 PROCEDURE — 87040 BLOOD CULTURE FOR BACTERIA: CPT

## 2020-01-01 PROCEDURE — 96415 CHEMO IV INFUSION ADDL HR: CPT

## 2020-01-01 PROCEDURE — 94760 N-INVAS EAR/PLS OXIMETRY 1: CPT

## 2020-01-01 PROCEDURE — 86644 CMV ANTIBODY: CPT

## 2020-01-01 PROCEDURE — 82668 ASSAY OF ERYTHROPOIETIN: CPT

## 2020-01-01 PROCEDURE — 51798 US URINE CAPACITY MEASURE: CPT

## 2020-01-01 PROCEDURE — 81001 URINALYSIS AUTO W/SCOPE: CPT

## 2020-01-01 PROCEDURE — 36430 TRANSFUSION BLD/BLD COMPNT: CPT

## 2020-01-01 PROCEDURE — 74011250636 HC RX REV CODE- 250/636: Performed by: FAMILY MEDICINE

## 2020-01-01 PROCEDURE — 74011250637 HC RX REV CODE- 250/637: Performed by: EMERGENCY MEDICINE

## 2020-01-01 PROCEDURE — 30233N1 TRANSFUSION OF NONAUTOLOGOUS RED BLOOD CELLS INTO PERIPHERAL VEIN, PERCUTANEOUS APPROACH: ICD-10-PCS | Performed by: FAMILY MEDICINE

## 2020-01-01 PROCEDURE — 77030012965 HC NDL HUBR BBMI -A

## 2020-01-01 PROCEDURE — 74011000250 HC RX REV CODE- 250: Performed by: NURSE PRACTITIONER

## 2020-01-01 PROCEDURE — 96361 HYDRATE IV INFUSION ADD-ON: CPT

## 2020-01-01 PROCEDURE — 93005 ELECTROCARDIOGRAM TRACING: CPT | Performed by: EMERGENCY MEDICINE

## 2020-01-01 PROCEDURE — 85610 PROTHROMBIN TIME: CPT

## 2020-01-01 PROCEDURE — 96374 THER/PROPH/DIAG INJ IV PUSH: CPT

## 2020-01-01 PROCEDURE — 87086 URINE CULTURE/COLONY COUNT: CPT

## 2020-01-01 PROCEDURE — 36591 DRAW BLOOD OFF VENOUS DEVICE: CPT

## 2020-01-01 PROCEDURE — 65660000000 HC RM CCU STEPDOWN

## 2020-01-01 PROCEDURE — 87635 SARS-COV-2 COVID-19 AMP PRB: CPT

## 2020-01-01 PROCEDURE — 99232 SBSQ HOSP IP/OBS MODERATE 35: CPT | Performed by: INTERNAL MEDICINE

## 2020-01-01 PROCEDURE — 85046 RETICYTE/HGB CONCENTRATE: CPT

## 2020-01-01 PROCEDURE — 86580 TB INTRADERMAL TEST: CPT | Performed by: FAMILY MEDICINE

## 2020-01-01 PROCEDURE — 99356 PR PROLONGED SVC I/P OR OBS SETTING 1ST HOUR: CPT | Performed by: NURSE PRACTITIONER

## 2020-01-01 PROCEDURE — 83540 ASSAY OF IRON: CPT

## 2020-01-01 PROCEDURE — 97162 PT EVAL MOD COMPLEX 30 MIN: CPT

## 2020-01-01 PROCEDURE — 99232 SBSQ HOSP IP/OBS MODERATE 35: CPT | Performed by: NURSE PRACTITIONER

## 2020-01-01 PROCEDURE — 99233 SBSQ HOSP IP/OBS HIGH 50: CPT | Performed by: NURSE PRACTITIONER

## 2020-01-01 PROCEDURE — 84550 ASSAY OF BLOOD/URIC ACID: CPT

## 2020-01-01 PROCEDURE — 71045 X-RAY EXAM CHEST 1 VIEW: CPT

## 2020-01-01 PROCEDURE — 77010033678 HC OXYGEN DAILY

## 2020-01-01 PROCEDURE — 99223 1ST HOSP IP/OBS HIGH 75: CPT | Performed by: INTERNAL MEDICINE

## 2020-01-01 PROCEDURE — 92526 ORAL FUNCTION THERAPY: CPT

## 2020-01-01 PROCEDURE — 85018 HEMOGLOBIN: CPT

## 2020-01-01 PROCEDURE — 74011000302 HC RX REV CODE- 302: Performed by: FAMILY MEDICINE

## 2020-01-01 PROCEDURE — 74011250636 HC RX REV CODE- 250/636: Performed by: EMERGENCY MEDICINE

## 2020-01-01 PROCEDURE — 84145 PROCALCITONIN (PCT): CPT

## 2020-01-01 PROCEDURE — 83605 ASSAY OF LACTIC ACID: CPT

## 2020-01-01 PROCEDURE — 99497 ADVNCD CARE PLAN 30 MIN: CPT | Performed by: NURSE PRACTITIONER

## 2020-01-01 RX ORDER — HYDROMORPHONE HYDROCHLORIDE 1 MG/ML
1 INJECTION, SOLUTION INTRAMUSCULAR; INTRAVENOUS; SUBCUTANEOUS
Status: DISCONTINUED | OUTPATIENT
Start: 2020-01-01 | End: 2020-08-07 | Stop reason: HOSPADM

## 2020-01-01 RX ORDER — SODIUM CHLORIDE 0.9 % (FLUSH) 0.9 %
10 SYRINGE (ML) INJECTION AS NEEDED
Status: ACTIVE | OUTPATIENT
Start: 2020-01-01 | End: 2020-01-01

## 2020-01-01 RX ORDER — ACETAMINOPHEN 325 MG/1
650 TABLET ORAL AS NEEDED
Status: ACTIVE | OUTPATIENT
Start: 2020-01-01 | End: 2020-01-01

## 2020-01-01 RX ORDER — DIPHENHYDRAMINE HYDROCHLORIDE 50 MG/ML
50 INJECTION, SOLUTION INTRAMUSCULAR; INTRAVENOUS AS NEEDED
Status: CANCELLED
Start: 2020-01-01

## 2020-01-01 RX ORDER — ONDANSETRON 2 MG/ML
8 INJECTION INTRAMUSCULAR; INTRAVENOUS ONCE
Status: COMPLETED | OUTPATIENT
Start: 2020-01-01 | End: 2020-01-01

## 2020-01-01 RX ORDER — SODIUM CHLORIDE 0.9 % (FLUSH) 0.9 %
10 SYRINGE (ML) INJECTION AS NEEDED
Status: DISCONTINUED | OUTPATIENT
Start: 2020-01-01 | End: 2020-01-01 | Stop reason: HOSPADM

## 2020-01-01 RX ORDER — SODIUM CHLORIDE 9 MG/ML
25 INJECTION, SOLUTION INTRAVENOUS CONTINUOUS
Status: ACTIVE | OUTPATIENT
Start: 2020-01-01 | End: 2020-01-01

## 2020-01-01 RX ORDER — ONDANSETRON 2 MG/ML
8 INJECTION INTRAMUSCULAR; INTRAVENOUS AS NEEDED
Status: CANCELLED | OUTPATIENT
Start: 2020-01-01

## 2020-01-01 RX ORDER — SODIUM CHLORIDE, SODIUM LACTATE, POTASSIUM CHLORIDE, CALCIUM CHLORIDE 600; 310; 30; 20 MG/100ML; MG/100ML; MG/100ML; MG/100ML
100 INJECTION, SOLUTION INTRAVENOUS CONTINUOUS
Status: CANCELLED | OUTPATIENT
Start: 2020-01-01

## 2020-01-01 RX ORDER — LORAZEPAM 2 MG/ML
1 INJECTION INTRAMUSCULAR
Status: DISCONTINUED | OUTPATIENT
Start: 2020-01-01 | End: 2020-08-07 | Stop reason: HOSPADM

## 2020-01-01 RX ORDER — ALLOPURINOL 100 MG/1
100 TABLET ORAL EVERY 8 HOURS
Status: DISCONTINUED | OUTPATIENT
Start: 2020-01-01 | End: 2020-01-01

## 2020-01-01 RX ORDER — FLUCONAZOLE 2 MG/ML
200 INJECTION, SOLUTION INTRAVENOUS EVERY 24 HOURS
Status: DISCONTINUED | OUTPATIENT
Start: 2020-01-01 | End: 2020-01-01

## 2020-01-01 RX ORDER — HYDROMORPHONE HCL IN WATER/PF 1 MG/ML
SYRINGE (ML) INJECTION CONTINUOUS
Status: DISCONTINUED | OUTPATIENT
Start: 2020-01-01 | End: 2020-01-01

## 2020-01-01 RX ORDER — DEXAMETHASONE SODIUM PHOSPHATE 4 MG/ML
8 INJECTION, SOLUTION INTRA-ARTICULAR; INTRALESIONAL; INTRAMUSCULAR; INTRAVENOUS; SOFT TISSUE ONCE
Status: COMPLETED | OUTPATIENT
Start: 2020-01-01 | End: 2020-01-01

## 2020-01-01 RX ORDER — HYDROCORTISONE SODIUM SUCCINATE 100 MG/2ML
100 INJECTION, POWDER, FOR SOLUTION INTRAMUSCULAR; INTRAVENOUS AS NEEDED
Status: CANCELLED | OUTPATIENT
Start: 2020-01-01

## 2020-01-01 RX ORDER — SODIUM CHLORIDE 0.9 % (FLUSH) 0.9 %
10 SYRINGE (ML) INJECTION AS NEEDED
Status: CANCELLED | OUTPATIENT
Start: 2020-01-01

## 2020-01-01 RX ORDER — FAMOTIDINE 20 MG/1
20 TABLET, FILM COATED ORAL AS NEEDED
Status: CANCELLED | OUTPATIENT
Start: 2020-01-01

## 2020-01-01 RX ORDER — HYDROMORPHONE HYDROCHLORIDE 2 MG/1
4-8 TABLET ORAL
Status: DISCONTINUED | OUTPATIENT
Start: 2020-01-01 | End: 2020-01-01

## 2020-01-01 RX ORDER — HYDROMORPHONE HYDROCHLORIDE 1 MG/ML
1 INJECTION, SOLUTION INTRAMUSCULAR; INTRAVENOUS; SUBCUTANEOUS
Status: DISCONTINUED | OUTPATIENT
Start: 2020-01-01 | End: 2020-01-01

## 2020-01-01 RX ORDER — MORPHINE SULFATE 2 MG/ML
1 INJECTION, SOLUTION INTRAMUSCULAR; INTRAVENOUS
Status: DISCONTINUED | OUTPATIENT
Start: 2020-01-01 | End: 2020-01-01

## 2020-01-01 RX ORDER — ACETAMINOPHEN 325 MG/1
650 TABLET ORAL AS NEEDED
Status: CANCELLED
Start: 2020-01-01

## 2020-01-01 RX ORDER — EPINEPHRINE 1 MG/ML
0.3 INJECTION, SOLUTION, CONCENTRATE INTRAVENOUS AS NEEDED
Status: CANCELLED | OUTPATIENT
Start: 2020-01-01

## 2020-01-01 RX ORDER — MORPHINE SULFATE IN 0.9 % NACL 150MG/30ML
PATIENT CONTROLLED ANALGESIA SYRINGE INTRAVENOUS CONTINUOUS
Status: DISCONTINUED | OUTPATIENT
Start: 2020-01-01 | End: 2020-01-01

## 2020-01-01 RX ORDER — POTASSIUM CHLORIDE 14.9 MG/ML
20 INJECTION INTRAVENOUS
Status: COMPLETED | OUTPATIENT
Start: 2020-01-01 | End: 2020-01-01

## 2020-01-01 RX ORDER — CYCLOBENZAPRINE HCL 10 MG
10 TABLET ORAL
Status: DISCONTINUED | OUTPATIENT
Start: 2020-01-01 | End: 2020-08-07 | Stop reason: HOSPADM

## 2020-01-01 RX ORDER — SODIUM CHLORIDE 0.9 % (FLUSH) 0.9 %
10-30 SYRINGE (ML) INJECTION AS NEEDED
Status: DISCONTINUED | OUTPATIENT
Start: 2020-01-01 | End: 2020-01-01 | Stop reason: HOSPADM

## 2020-01-01 RX ORDER — MORPHINE SULFATE 2 MG/ML
2 INJECTION, SOLUTION INTRAMUSCULAR; INTRAVENOUS
Status: DISCONTINUED | OUTPATIENT
Start: 2020-01-01 | End: 2020-01-01

## 2020-01-01 RX ORDER — ACETAMINOPHEN 325 MG/1
650 TABLET ORAL ONCE
Status: COMPLETED | OUTPATIENT
Start: 2020-01-01 | End: 2020-01-01

## 2020-01-01 RX ORDER — LANOLIN ALCOHOL/MO/W.PET/CERES
400 CREAM (GRAM) TOPICAL 2 TIMES DAILY
Status: DISCONTINUED | OUTPATIENT
Start: 2020-01-01 | End: 2020-01-01

## 2020-01-01 RX ORDER — SODIUM CHLORIDE 9 MG/ML
25 INJECTION, SOLUTION INTRAVENOUS CONTINUOUS
Status: DISCONTINUED | OUTPATIENT
Start: 2020-01-01 | End: 2020-01-01 | Stop reason: HOSPADM

## 2020-01-01 RX ORDER — DIPHENHYDRAMINE HYDROCHLORIDE 50 MG/ML
25 INJECTION, SOLUTION INTRAMUSCULAR; INTRAVENOUS ONCE
Status: COMPLETED | OUTPATIENT
Start: 2020-01-01 | End: 2020-01-01

## 2020-01-01 RX ORDER — HALOPERIDOL 5 MG/ML
2 INJECTION INTRAMUSCULAR
Status: DISCONTINUED | OUTPATIENT
Start: 2020-01-01 | End: 2020-08-07 | Stop reason: HOSPADM

## 2020-01-01 RX ORDER — SODIUM CHLORIDE 0.9 % (FLUSH) 0.9 %
10 SYRINGE (ML) INJECTION EVERY 8 HOURS
Status: DISCONTINUED | OUTPATIENT
Start: 2020-01-01 | End: 2020-01-01 | Stop reason: HOSPADM

## 2020-01-01 RX ORDER — GLYCOPYRROLATE 0.2 MG/ML
0.2 INJECTION INTRAMUSCULAR; INTRAVENOUS
Status: DISCONTINUED | OUTPATIENT
Start: 2020-01-01 | End: 2020-08-07 | Stop reason: HOSPADM

## 2020-01-01 RX ORDER — FLUCONAZOLE 2 MG/ML
400 INJECTION, SOLUTION INTRAVENOUS EVERY 24 HOURS
Status: DISCONTINUED | OUTPATIENT
Start: 2020-01-01 | End: 2020-01-01 | Stop reason: DRUGHIGH

## 2020-01-01 RX ORDER — ACETAMINOPHEN 325 MG/1
650 TABLET ORAL
Status: DISCONTINUED | OUTPATIENT
Start: 2020-01-01 | End: 2020-08-07 | Stop reason: HOSPADM

## 2020-01-01 RX ORDER — SODIUM CHLORIDE 0.9 % (FLUSH) 0.9 %
5-40 SYRINGE (ML) INJECTION EVERY 8 HOURS
Status: DISCONTINUED | OUTPATIENT
Start: 2020-01-01 | End: 2020-01-01

## 2020-01-01 RX ORDER — SODIUM CHLORIDE 9 MG/ML
10 INJECTION INTRAMUSCULAR; INTRAVENOUS; SUBCUTANEOUS AS NEEDED
Status: ACTIVE | OUTPATIENT
Start: 2020-01-01 | End: 2020-01-01

## 2020-01-01 RX ORDER — ALLOPURINOL 100 MG/1
150 TABLET ORAL DAILY
Status: DISCONTINUED | OUTPATIENT
Start: 2020-01-01 | End: 2020-01-01 | Stop reason: DRUGHIGH

## 2020-01-01 RX ORDER — PROMETHAZINE HYDROCHLORIDE 25 MG/1
12.5 TABLET ORAL
Status: DISCONTINUED | OUTPATIENT
Start: 2020-01-01 | End: 2020-08-07 | Stop reason: HOSPADM

## 2020-01-01 RX ORDER — ONDANSETRON 2 MG/ML
8 INJECTION INTRAMUSCULAR; INTRAVENOUS AS NEEDED
Status: ACTIVE | OUTPATIENT
Start: 2020-01-01 | End: 2020-01-01

## 2020-01-01 RX ORDER — NYSTATIN 100000 [USP'U]/ML
500000 SUSPENSION ORAL 4 TIMES DAILY
Status: DISCONTINUED | OUTPATIENT
Start: 2020-01-01 | End: 2020-01-01

## 2020-01-01 RX ORDER — HYDROMORPHONE HYDROCHLORIDE 1 MG/ML
0.5 INJECTION, SOLUTION INTRAMUSCULAR; INTRAVENOUS; SUBCUTANEOUS
Status: DISCONTINUED | OUTPATIENT
Start: 2020-01-01 | End: 2020-01-01

## 2020-01-01 RX ORDER — HYDROMORPHONE HCL IN WATER/PF 1 MG/ML
SYRINGE (ML) INJECTION CONTINUOUS
Status: DISCONTINUED | OUTPATIENT
Start: 2020-01-01 | End: 2020-08-07 | Stop reason: HOSPADM

## 2020-01-01 RX ORDER — ALBUTEROL SULFATE 0.83 MG/ML
2.5 SOLUTION RESPIRATORY (INHALATION) AS NEEDED
Status: CANCELLED
Start: 2020-01-01

## 2020-01-01 RX ORDER — HEPARIN 100 UNIT/ML
300-500 SYRINGE INTRAVENOUS AS NEEDED
Status: CANCELLED
Start: 2020-01-01

## 2020-01-01 RX ORDER — ACETAMINOPHEN 650 MG/1
650 SUPPOSITORY RECTAL
Status: DISCONTINUED | OUTPATIENT
Start: 2020-01-01 | End: 2020-08-07 | Stop reason: HOSPADM

## 2020-01-01 RX ORDER — MELATONIN
2000 DAILY
Status: DISCONTINUED | OUTPATIENT
Start: 2020-01-01 | End: 2020-01-01

## 2020-01-01 RX ORDER — SODIUM CHLORIDE 0.9 % (FLUSH) 0.9 %
5-40 SYRINGE (ML) INJECTION AS NEEDED
Status: DISCONTINUED | OUTPATIENT
Start: 2020-01-01 | End: 2020-08-07 | Stop reason: HOSPADM

## 2020-01-01 RX ORDER — ONDANSETRON 2 MG/ML
4 INJECTION INTRAMUSCULAR; INTRAVENOUS
Status: DISCONTINUED | OUTPATIENT
Start: 2020-01-01 | End: 2020-08-07 | Stop reason: HOSPADM

## 2020-01-01 RX ORDER — GLYCOPYRROLATE 0.2 MG/ML
0.1 INJECTION INTRAMUSCULAR; INTRAVENOUS
Status: DISCONTINUED | OUTPATIENT
Start: 2020-01-01 | End: 2020-01-01

## 2020-01-01 RX ORDER — DIPHENHYDRAMINE HYDROCHLORIDE 50 MG/ML
50 INJECTION, SOLUTION INTRAMUSCULAR; INTRAVENOUS AS NEEDED
Status: DISCONTINUED | OUTPATIENT
Start: 2020-01-01 | End: 2020-01-01 | Stop reason: HOSPADM

## 2020-01-01 RX ORDER — ROSUVASTATIN CALCIUM 10 MG/1
10 TABLET, COATED ORAL
Status: DISCONTINUED | OUTPATIENT
Start: 2020-01-01 | End: 2020-01-01

## 2020-01-01 RX ORDER — DEXTROSE MONOHYDRATE AND SODIUM CHLORIDE 5; .9 G/100ML; G/100ML
50 INJECTION, SOLUTION INTRAVENOUS CONTINUOUS
Status: DISCONTINUED | OUTPATIENT
Start: 2020-01-01 | End: 2020-01-01

## 2020-01-01 RX ORDER — SODIUM CHLORIDE 9 MG/ML
250 INJECTION, SOLUTION INTRAVENOUS AS NEEDED
Status: DISCONTINUED | OUTPATIENT
Start: 2020-01-01 | End: 2020-08-07 | Stop reason: HOSPADM

## 2020-01-01 RX ORDER — AMIODARONE HYDROCHLORIDE 200 MG/1
200 TABLET ORAL DAILY
Status: DISCONTINUED | OUTPATIENT
Start: 2020-01-01 | End: 2020-01-01

## 2020-01-01 RX ORDER — DIPHENHYDRAMINE HYDROCHLORIDE 50 MG/ML
50 INJECTION, SOLUTION INTRAMUSCULAR; INTRAVENOUS AS NEEDED
Status: ACTIVE | OUTPATIENT
Start: 2020-01-01 | End: 2020-01-01

## 2020-01-01 RX ORDER — ACETAMINOPHEN 325 MG/1
650 TABLET ORAL
Status: COMPLETED | OUTPATIENT
Start: 2020-01-01 | End: 2020-01-01

## 2020-01-01 RX ORDER — VANCOMYCIN/0.9 % SOD CHLORIDE 1.5G/250ML
1500 PLASTIC BAG, INJECTION (ML) INTRAVENOUS ONCE
Status: COMPLETED | OUTPATIENT
Start: 2020-01-01 | End: 2020-01-01

## 2020-01-01 RX ORDER — POLYETHYLENE GLYCOL 3350 17 G/17G
17 POWDER, FOR SOLUTION ORAL DAILY PRN
Status: DISCONTINUED | OUTPATIENT
Start: 2020-01-01 | End: 2020-08-07 | Stop reason: HOSPADM

## 2020-01-01 RX ORDER — CHLORHEXIDINE GLUCONATE 1.2 MG/ML
15 RINSE ORAL 2 TIMES DAILY
Status: DISCONTINUED | OUTPATIENT
Start: 2020-01-01 | End: 2020-01-01

## 2020-01-01 RX ORDER — CARVEDILOL 6.25 MG/1
6.25 TABLET ORAL 2 TIMES DAILY WITH MEALS
Status: DISCONTINUED | OUTPATIENT
Start: 2020-01-01 | End: 2020-01-01

## 2020-01-01 RX ORDER — SODIUM CHLORIDE 9 MG/ML
1000 INJECTION, SOLUTION INTRAVENOUS CONTINUOUS
Status: ACTIVE | OUTPATIENT
Start: 2020-01-01 | End: 2020-01-01

## 2020-01-01 RX ORDER — SODIUM CHLORIDE 9 MG/ML
500 INJECTION, SOLUTION INTRAVENOUS CONTINUOUS
Status: DISCONTINUED | OUTPATIENT
Start: 2020-01-01 | End: 2020-01-01 | Stop reason: HOSPADM

## 2020-01-01 RX ORDER — HYDROMORPHONE HYDROCHLORIDE 1 MG/ML
0.5 INJECTION, SOLUTION INTRAMUSCULAR; INTRAVENOUS; SUBCUTANEOUS
Status: DISCONTINUED | OUTPATIENT
Start: 2020-01-01 | End: 2020-08-07 | Stop reason: HOSPADM

## 2020-01-01 RX ORDER — HYDROCORTISONE SODIUM SUCCINATE 100 MG/2ML
100 INJECTION, POWDER, FOR SOLUTION INTRAMUSCULAR; INTRAVENOUS AS NEEDED
Status: DISCONTINUED | OUTPATIENT
Start: 2020-01-01 | End: 2020-01-01 | Stop reason: HOSPADM

## 2020-01-01 RX ORDER — TORSEMIDE 20 MG/1
20 TABLET ORAL EVERY OTHER DAY
Status: DISCONTINUED | OUTPATIENT
Start: 2020-01-01 | End: 2020-01-01

## 2020-01-01 RX ADMIN — SODIUM CHLORIDE 700 MG: 900 INJECTION, SOLUTION INTRAVENOUS at 11:50

## 2020-01-01 RX ADMIN — Medication 10 ML: at 11:35

## 2020-01-01 RX ADMIN — CARFILZOMIB 130 MG: 10 INJECTION, POWDER, LYOPHILIZED, FOR SOLUTION INTRAVENOUS at 15:26

## 2020-01-01 RX ADMIN — SODIUM CHLORIDE 250 ML: 900 INJECTION, SOLUTION INTRAVENOUS at 12:45

## 2020-01-01 RX ADMIN — SODIUM CHLORIDE 25 ML/HR: 900 INJECTION, SOLUTION INTRAVENOUS at 10:11

## 2020-01-01 RX ADMIN — CARVEDILOL 6.25 MG: 6.25 TABLET, FILM COATED ORAL at 20:53

## 2020-01-01 RX ADMIN — Medication 10 ML: at 10:40

## 2020-01-01 RX ADMIN — Medication 1 EACH: at 11:53

## 2020-01-01 RX ADMIN — TUBERCULIN PURIFIED PROTEIN DERIVATIVE 5 UNITS: 5 INJECTION, SOLUTION INTRADERMAL at 20:53

## 2020-01-01 RX ADMIN — MORPHINE SULFATE 1 MG: 2 INJECTION, SOLUTION INTRAMUSCULAR; INTRAVENOUS at 05:41

## 2020-01-01 RX ADMIN — PANTOPRAZOLE SODIUM 40 MG: 40 INJECTION, POWDER, FOR SOLUTION INTRAVENOUS at 09:04

## 2020-01-01 RX ADMIN — Medication 10 ML: at 22:26

## 2020-01-01 RX ADMIN — MORPHINE SULFATE 1 MG: 2 INJECTION, SOLUTION INTRAMUSCULAR; INTRAVENOUS at 12:22

## 2020-01-01 RX ADMIN — CARFILZOMIB 130 MG: 10 INJECTION, POWDER, LYOPHILIZED, FOR SOLUTION INTRAVENOUS at 13:15

## 2020-01-01 RX ADMIN — Medication 5 ML: at 14:59

## 2020-01-01 RX ADMIN — NYSTATIN 500000 UNITS: 100000 SUSPENSION ORAL at 10:14

## 2020-01-01 RX ADMIN — CYCLOPHOSPHAMIDE 567 MG: 1 INJECTION, POWDER, FOR SOLUTION INTRAVENOUS; ORAL at 13:28

## 2020-01-01 RX ADMIN — Medication 10 ML: at 16:36

## 2020-01-01 RX ADMIN — NYSTATIN 500000 UNITS: 100000 SUSPENSION ORAL at 22:58

## 2020-01-01 RX ADMIN — MORPHINE SULFATE 2 MG: 2 INJECTION, SOLUTION INTRAMUSCULAR; INTRAVENOUS at 05:09

## 2020-01-01 RX ADMIN — Medication 10 ML: at 06:15

## 2020-01-01 RX ADMIN — Medication 10 ML: at 20:55

## 2020-01-01 RX ADMIN — SODIUM CHLORIDE 10 ML: 9 INJECTION, SOLUTION INTRAMUSCULAR; INTRAVENOUS; SUBCUTANEOUS at 11:50

## 2020-01-01 RX ADMIN — HALOPERIDOL LACTATE 2 MG: 5 INJECTION, SOLUTION INTRAMUSCULAR at 15:59

## 2020-01-01 RX ADMIN — DIPHENHYDRAMINE HYDROCHLORIDE 25 MG: 50 INJECTION, SOLUTION INTRAMUSCULAR; INTRAVENOUS at 10:54

## 2020-01-01 RX ADMIN — Medication 10 ML: at 14:50

## 2020-01-01 RX ADMIN — CARFILZOMIB 130 MG: 10 INJECTION, POWDER, LYOPHILIZED, FOR SOLUTION INTRAVENOUS at 12:07

## 2020-01-01 RX ADMIN — SODIUM CHLORIDE 25 ML/HR: 900 INJECTION, SOLUTION INTRAVENOUS at 12:35

## 2020-01-01 RX ADMIN — ROSUVASTATIN 10 MG: 10 TABLET, FILM COATED ORAL at 22:26

## 2020-01-01 RX ADMIN — SODIUM CHLORIDE 10 ML: 9 INJECTION INTRAMUSCULAR; INTRAVENOUS; SUBCUTANEOUS at 11:58

## 2020-01-01 RX ADMIN — MORPHINE SULFATE 2 MG: 2 INJECTION, SOLUTION INTRAMUSCULAR; INTRAVENOUS at 17:58

## 2020-01-01 RX ADMIN — CYCLOPHOSPHAMIDE 564 MG: 1 INJECTION, POWDER, FOR SOLUTION INTRAVENOUS; ORAL at 12:45

## 2020-01-01 RX ADMIN — FLUCONAZOLE 200 MG: 2 INJECTION, SOLUTION INTRAVENOUS at 22:17

## 2020-01-01 RX ADMIN — ONDANSETRON 8 MG: 2 INJECTION INTRAMUSCULAR; INTRAVENOUS at 12:30

## 2020-01-01 RX ADMIN — SODIUM CHLORIDE 250 ML: 900 INJECTION, SOLUTION INTRAVENOUS at 12:05

## 2020-01-01 RX ADMIN — CHLORHEXIDINE GLUCONATE 15 ML: 1.2 RINSE ORAL at 10:14

## 2020-01-01 RX ADMIN — SODIUM CHLORIDE 3.5 MG: 900 INJECTION, SOLUTION INTRAVENOUS at 14:16

## 2020-01-01 RX ADMIN — CARFILZOMIB 130 MG: 10 INJECTION, POWDER, LYOPHILIZED, FOR SOLUTION INTRAVENOUS at 15:41

## 2020-01-01 RX ADMIN — FLUCONAZOLE 200 MG: 2 INJECTION, SOLUTION INTRAVENOUS at 23:37

## 2020-01-01 RX ADMIN — CARFILZOMIB 130 MG: 10 INJECTION, POWDER, LYOPHILIZED, FOR SOLUTION INTRAVENOUS at 13:05

## 2020-01-01 RX ADMIN — ONDANSETRON 8 MG: 2 INJECTION INTRAMUSCULAR; INTRAVENOUS at 15:02

## 2020-01-01 RX ADMIN — SODIUM CHLORIDE 3 MG: 900 INJECTION, SOLUTION INTRAVENOUS at 09:45

## 2020-01-01 RX ADMIN — NYSTATIN 500000 UNITS: 100000 SUSPENSION ORAL at 22:26

## 2020-01-01 RX ADMIN — SODIUM CHLORIDE 250 ML: 900 INJECTION, SOLUTION INTRAVENOUS at 13:00

## 2020-01-01 RX ADMIN — Medication: at 11:20

## 2020-01-01 RX ADMIN — ONDANSETRON 8 MG: 2 INJECTION INTRAMUSCULAR; INTRAVENOUS at 12:28

## 2020-01-01 RX ADMIN — CHLORHEXIDINE GLUCONATE 15 ML: 1.2 RINSE ORAL at 22:08

## 2020-01-01 RX ADMIN — SODIUM CHLORIDE 500 ML: 900 INJECTION, SOLUTION INTRAVENOUS at 14:00

## 2020-01-01 RX ADMIN — MORPHINE SULFATE 1 MG: 2 INJECTION, SOLUTION INTRAMUSCULAR; INTRAVENOUS at 13:22

## 2020-01-01 RX ADMIN — SODIUM CHLORIDE 25 ML/HR: 900 INJECTION, SOLUTION INTRAVENOUS at 10:40

## 2020-01-01 RX ADMIN — ACETAMINOPHEN 650 MG: 325 TABLET, FILM COATED ORAL at 11:04

## 2020-01-01 RX ADMIN — NYSTATIN 500000 UNITS: 100000 SUSPENSION ORAL at 14:12

## 2020-01-01 RX ADMIN — FAMOTIDINE 20 MG: 10 INJECTION INTRAVENOUS at 10:25

## 2020-01-01 RX ADMIN — DEXTROSE MONOHYDRATE AND SODIUM CHLORIDE 100 ML/HR: 5; .9 INJECTION, SOLUTION INTRAVENOUS at 20:55

## 2020-01-01 RX ADMIN — CYCLOPHOSPHAMIDE 576 MG: 1 INJECTION, POWDER, FOR SOLUTION INTRAVENOUS; ORAL at 16:00

## 2020-01-01 RX ADMIN — SODIUM CHLORIDE 250 ML: 900 INJECTION, SOLUTION INTRAVENOUS at 11:45

## 2020-01-01 RX ADMIN — MAGNESIUM GLUCONATE 500 MG ORAL TABLET 400 MG: 500 TABLET ORAL at 20:53

## 2020-01-01 RX ADMIN — MORPHINE SULFATE 1 MG: 2 INJECTION, SOLUTION INTRAMUSCULAR; INTRAVENOUS at 23:01

## 2020-01-01 RX ADMIN — HYDROMORPHONE HYDROCHLORIDE 1 MG: 1 INJECTION, SOLUTION INTRAMUSCULAR; INTRAVENOUS; SUBCUTANEOUS at 01:48

## 2020-01-01 RX ADMIN — Medication 10 ML: at 09:25

## 2020-01-01 RX ADMIN — SODIUM CHLORIDE 25 ML/HR: 900 INJECTION, SOLUTION INTRAVENOUS at 13:01

## 2020-01-01 RX ADMIN — Medication 10 ML: at 15:23

## 2020-01-01 RX ADMIN — DIPHENHYDRAMINE HYDROCHLORIDE 25 MG: 50 INJECTION, SOLUTION INTRAMUSCULAR; INTRAVENOUS at 10:28

## 2020-01-01 RX ADMIN — CYCLOPHOSPHAMIDE 570 MG: 1 INJECTION, POWDER, FOR SOLUTION INTRAVENOUS; ORAL at 16:12

## 2020-01-01 RX ADMIN — POTASSIUM CHLORIDE 20 MEQ: 200 INJECTION, SOLUTION INTRAVENOUS at 08:30

## 2020-01-01 RX ADMIN — Medication 10 ML: at 12:30

## 2020-01-01 RX ADMIN — MORPHINE SULFATE 1 MG: 2 INJECTION, SOLUTION INTRAMUSCULAR; INTRAVENOUS at 16:52

## 2020-01-01 RX ADMIN — ACETAMINOPHEN 650 MG: 325 TABLET, FILM COATED ORAL at 10:11

## 2020-01-01 RX ADMIN — Medication 10 ML: at 13:35

## 2020-01-01 RX ADMIN — NYSTATIN 500000 UNITS: 100000 SUSPENSION ORAL at 20:53

## 2020-01-01 RX ADMIN — DEXAMETHASONE SODIUM PHOSPHATE 8 MG: 4 INJECTION, SOLUTION INTRAMUSCULAR; INTRAVENOUS at 10:56

## 2020-01-01 RX ADMIN — SODIUM CHLORIDE 500 ML: 900 INJECTION, SOLUTION INTRAVENOUS at 10:59

## 2020-01-01 RX ADMIN — CYCLOPHOSPHAMIDE 573 MG: 1 INJECTION, POWDER, FOR SOLUTION INTRAVENOUS; ORAL at 14:00

## 2020-01-01 RX ADMIN — GLYCOPYRROLATE 0.2 MG: 0.2 INJECTION, SOLUTION INTRAMUSCULAR; INTRAVENOUS at 19:02

## 2020-01-01 RX ADMIN — Medication 10 ML: at 18:42

## 2020-01-01 RX ADMIN — MORPHINE SULFATE 2 MG: 2 INJECTION, SOLUTION INTRAMUSCULAR; INTRAVENOUS at 20:38

## 2020-01-01 RX ADMIN — Medication 10 ML: at 11:30

## 2020-01-01 RX ADMIN — SODIUM CHLORIDE 3.5 MG: 900 INJECTION, SOLUTION INTRAVENOUS at 12:50

## 2020-01-01 RX ADMIN — HYDROMORPHONE HYDROCHLORIDE 0.5 MG: 1 INJECTION, SOLUTION INTRAMUSCULAR; INTRAVENOUS; SUBCUTANEOUS at 10:26

## 2020-01-01 RX ADMIN — Medication 10 ML: at 23:00

## 2020-01-01 RX ADMIN — SODIUM CHLORIDE 100 ML/HR: 900 INJECTION, SOLUTION INTRAVENOUS at 11:15

## 2020-01-01 RX ADMIN — AMIODARONE HYDROCHLORIDE 200 MG: 200 TABLET ORAL at 10:14

## 2020-01-01 RX ADMIN — PANTOPRAZOLE SODIUM 40 MG: 40 INJECTION, POWDER, FOR SOLUTION INTRAVENOUS at 10:58

## 2020-01-01 RX ADMIN — CEFEPIME HYDROCHLORIDE 2 G: 2 INJECTION, POWDER, FOR SOLUTION INTRAVENOUS at 16:32

## 2020-01-01 RX ADMIN — TORSEMIDE 20 MG: 20 TABLET ORAL at 20:53

## 2020-01-01 RX ADMIN — CEFEPIME HYDROCHLORIDE 2 G: 2 INJECTION, POWDER, FOR SOLUTION INTRAVENOUS at 16:39

## 2020-01-01 RX ADMIN — CYCLOPHOSPHAMIDE 570 MG: 1 INJECTION, POWDER, FOR SOLUTION INTRAVENOUS; ORAL at 13:55

## 2020-01-01 RX ADMIN — SODIUM CHLORIDE 500 ML/HR: 900 INJECTION, SOLUTION INTRAVENOUS at 11:15

## 2020-01-01 RX ADMIN — Medication 10 ML: at 13:25

## 2020-01-01 RX ADMIN — ONDANSETRON 8 MG: 2 INJECTION INTRAMUSCULAR; INTRAVENOUS at 12:12

## 2020-01-01 RX ADMIN — DEXAMETHASONE SODIUM PHOSPHATE 8 MG: 4 INJECTION, SOLUTION INTRAMUSCULAR; INTRAVENOUS at 11:07

## 2020-01-01 RX ADMIN — SODIUM CHLORIDE 10 ML: 9 INJECTION, SOLUTION INTRAMUSCULAR; INTRAVENOUS; SUBCUTANEOUS at 14:48

## 2020-01-01 RX ADMIN — ROSUVASTATIN 10 MG: 10 TABLET, FILM COATED ORAL at 23:02

## 2020-01-01 RX ADMIN — CHOLECALCIFEROL TAB 25 MCG (1000 UNIT) 2 TABLET: 25 TAB at 10:14

## 2020-01-01 RX ADMIN — ONDANSETRON 8 MG: 2 INJECTION INTRAMUSCULAR; INTRAVENOUS at 11:28

## 2020-01-01 RX ADMIN — CYCLOPHOSPHAMIDE 573 MG: 1 INJECTION, POWDER, FOR SOLUTION INTRAVENOUS; ORAL at 12:46

## 2020-01-01 RX ADMIN — Medication 10 ML: at 12:23

## 2020-01-01 RX ADMIN — SODIUM CHLORIDE 250 ML: 900 INJECTION, SOLUTION INTRAVENOUS at 13:15

## 2020-01-01 RX ADMIN — MORPHINE SULFATE 2 MG: 2 INJECTION, SOLUTION INTRAMUSCULAR; INTRAVENOUS at 09:25

## 2020-01-01 RX ADMIN — Medication 10 ML: at 14:26

## 2020-01-01 RX ADMIN — MAGNESIUM GLUCONATE 500 MG ORAL TABLET 400 MG: 500 TABLET ORAL at 16:36

## 2020-01-01 RX ADMIN — MORPHINE SULFATE 2 MG: 2 INJECTION, SOLUTION INTRAMUSCULAR; INTRAVENOUS at 20:54

## 2020-01-01 RX ADMIN — ONDANSETRON 8 MG: 2 INJECTION INTRAMUSCULAR; INTRAVENOUS at 11:44

## 2020-01-01 RX ADMIN — DEXTROSE MONOHYDRATE AND SODIUM CHLORIDE 100 ML/HR: 5; .9 INJECTION, SOLUTION INTRAVENOUS at 11:12

## 2020-01-01 RX ADMIN — ONDANSETRON 8 MG: 2 INJECTION INTRAMUSCULAR; INTRAVENOUS at 11:27

## 2020-01-01 RX ADMIN — CEFEPIME HYDROCHLORIDE 2 G: 2 INJECTION, POWDER, FOR SOLUTION INTRAVENOUS at 17:28

## 2020-01-01 RX ADMIN — HYDROMORPHONE HYDROCHLORIDE 0.5 MG: 1 INJECTION, SOLUTION INTRAMUSCULAR; INTRAVENOUS; SUBCUTANEOUS at 10:59

## 2020-01-01 RX ADMIN — APIXABAN 2.5 MG: 2.5 TABLET, FILM COATED ORAL at 16:33

## 2020-01-01 RX ADMIN — PANTOPRAZOLE SODIUM 40 MG: 40 INJECTION, POWDER, FOR SOLUTION INTRAVENOUS at 20:20

## 2020-01-01 RX ADMIN — FAMOTIDINE 20 MG: 10 INJECTION, SOLUTION INTRAVENOUS at 10:13

## 2020-01-01 RX ADMIN — Medication 10 ML: at 10:12

## 2020-01-01 RX ADMIN — Medication 10 ML: at 15:20

## 2020-01-01 RX ADMIN — SODIUM CHLORIDE 250 ML: 900 INJECTION, SOLUTION INTRAVENOUS at 12:00

## 2020-01-01 RX ADMIN — Medication 10 ML: at 05:39

## 2020-01-01 RX ADMIN — APIXABAN 2.5 MG: 2.5 TABLET, FILM COATED ORAL at 10:14

## 2020-01-01 RX ADMIN — Medication 10 ML: at 14:40

## 2020-01-01 RX ADMIN — SODIUM CHLORIDE 3.3 MG: 900 INJECTION, SOLUTION INTRAVENOUS at 11:30

## 2020-01-01 RX ADMIN — ACETAMINOPHEN 650 MG: 325 TABLET, FILM COATED ORAL at 10:52

## 2020-01-01 RX ADMIN — CYCLOPHOSPHAMIDE 570 MG: 1 INJECTION, POWDER, FOR SOLUTION INTRAVENOUS; ORAL at 12:30

## 2020-01-01 RX ADMIN — DEXTROSE MONOHYDRATE AND SODIUM CHLORIDE 50 ML/HR: 5; .9 INJECTION, SOLUTION INTRAVENOUS at 20:56

## 2020-01-01 RX ADMIN — FAMOTIDINE 20 MG: 10 INJECTION, SOLUTION INTRAVENOUS at 10:52

## 2020-01-01 RX ADMIN — MORPHINE SULFATE 2 MG: 2 INJECTION, SOLUTION INTRAMUSCULAR; INTRAVENOUS at 19:55

## 2020-01-01 RX ADMIN — SODIUM CHLORIDE 250 ML: 900 INJECTION, SOLUTION INTRAVENOUS at 14:45

## 2020-01-01 RX ADMIN — SODIUM CHLORIDE 500 ML: 900 INJECTION, SOLUTION INTRAVENOUS at 10:01

## 2020-01-01 RX ADMIN — SODIUM CHLORIDE 3 MG: 900 INJECTION, SOLUTION INTRAVENOUS at 14:54

## 2020-01-01 RX ADMIN — GLYCOPYRROLATE 0.2 MG: 0.2 INJECTION, SOLUTION INTRAMUSCULAR; INTRAVENOUS at 11:53

## 2020-01-01 RX ADMIN — LORAZEPAM 1 MG: 2 INJECTION INTRAMUSCULAR; INTRAVENOUS at 17:26

## 2020-01-01 RX ADMIN — DIPHENHYDRAMINE HYDROCHLORIDE 25 MG: 50 INJECTION, SOLUTION INTRAMUSCULAR; INTRAVENOUS at 11:05

## 2020-01-01 RX ADMIN — MAGNESIUM GLUCONATE 500 MG ORAL TABLET 400 MG: 500 TABLET ORAL at 10:14

## 2020-01-01 RX ADMIN — CARVEDILOL 6.25 MG: 6.25 TABLET, FILM COATED ORAL at 16:33

## 2020-01-01 RX ADMIN — SODIUM CHLORIDE 500 ML: 900 INJECTION, SOLUTION INTRAVENOUS at 12:00

## 2020-01-01 RX ADMIN — PANTOPRAZOLE SODIUM 40 MG: 40 INJECTION, POWDER, FOR SOLUTION INTRAVENOUS at 22:58

## 2020-01-01 RX ADMIN — MORPHINE SULFATE 2 MG: 2 INJECTION, SOLUTION INTRAMUSCULAR; INTRAVENOUS at 08:51

## 2020-01-01 RX ADMIN — MORPHINE SULFATE 1 MG: 2 INJECTION, SOLUTION INTRAMUSCULAR; INTRAVENOUS at 11:53

## 2020-01-01 RX ADMIN — CARFILZOMIB 130 MG: 10 INJECTION, POWDER, LYOPHILIZED, FOR SOLUTION INTRAVENOUS at 12:11

## 2020-01-01 RX ADMIN — CEFEPIME HYDROCHLORIDE 2 G: 2 INJECTION, POWDER, FOR SOLUTION INTRAVENOUS at 16:02

## 2020-01-01 RX ADMIN — FAMOTIDINE 20 MG: 10 INJECTION INTRAVENOUS at 11:05

## 2020-01-01 RX ADMIN — Medication 10 ML: at 14:12

## 2020-01-01 RX ADMIN — SODIUM CHLORIDE 25 ML/HR: 9 INJECTION, SOLUTION INTRAVENOUS at 10:36

## 2020-01-01 RX ADMIN — ACETAMINOPHEN 650 MG: 325 TABLET, FILM COATED ORAL at 11:00

## 2020-01-01 RX ADMIN — CARFILZOMIB 130 MG: 10 INJECTION, POWDER, LYOPHILIZED, FOR SOLUTION INTRAVENOUS at 13:35

## 2020-01-01 RX ADMIN — Medication 10 ML: at 13:00

## 2020-01-01 RX ADMIN — DIPHENHYDRAMINE HYDROCHLORIDE 25 MG: 50 INJECTION, SOLUTION INTRAMUSCULAR; INTRAVENOUS at 11:09

## 2020-01-01 RX ADMIN — HYDROMORPHONE HYDROCHLORIDE 1 MG: 1 INJECTION, SOLUTION INTRAMUSCULAR; INTRAVENOUS; SUBCUTANEOUS at 15:01

## 2020-01-01 RX ADMIN — MORPHINE SULFATE 2 MG: 2 INJECTION, SOLUTION INTRAMUSCULAR; INTRAVENOUS at 21:27

## 2020-01-01 RX ADMIN — ONDANSETRON 8 MG: 2 INJECTION INTRAMUSCULAR; INTRAVENOUS at 14:43

## 2020-01-01 RX ADMIN — DEXTROSE MONOHYDRATE AND SODIUM CHLORIDE 100 ML/HR: 5; .9 INJECTION, SOLUTION INTRAVENOUS at 05:45

## 2020-01-01 RX ADMIN — MORPHINE SULFATE 2 MG: 2 INJECTION, SOLUTION INTRAMUSCULAR; INTRAVENOUS at 19:29

## 2020-01-01 RX ADMIN — CYCLOPHOSPHAMIDE 576 MG: 1 INJECTION, POWDER, FOR SOLUTION INTRAVENOUS; ORAL at 14:06

## 2020-01-01 RX ADMIN — HALOPERIDOL LACTATE 2 MG: 5 INJECTION, SOLUTION INTRAMUSCULAR at 20:54

## 2020-01-01 RX ADMIN — SODIUM CHLORIDE 25 ML/HR: 900 INJECTION, SOLUTION INTRAVENOUS at 11:45

## 2020-01-01 RX ADMIN — ACETAMINOPHEN 650 MG: 325 TABLET, FILM COATED ORAL at 17:56

## 2020-01-01 RX ADMIN — SODIUM CHLORIDE 3.5 MG: 900 INJECTION, SOLUTION INTRAVENOUS at 11:49

## 2020-01-01 RX ADMIN — CYCLOPHOSPHAMIDE 573 MG: 1 INJECTION, POWDER, FOR SOLUTION INTRAVENOUS; ORAL at 12:59

## 2020-01-01 RX ADMIN — Medication 10 ML: at 14:05

## 2020-01-01 RX ADMIN — Medication 10 ML: at 13:31

## 2020-01-01 RX ADMIN — CARFILZOMIB 130 MG: 10 INJECTION, POWDER, LYOPHILIZED, FOR SOLUTION INTRAVENOUS at 11:55

## 2020-01-01 RX ADMIN — SODIUM CHLORIDE 250 ML: 900 INJECTION, SOLUTION INTRAVENOUS at 12:20

## 2020-01-01 RX ADMIN — ONDANSETRON 8 MG: 2 INJECTION INTRAMUSCULAR; INTRAVENOUS at 11:46

## 2020-01-01 RX ADMIN — Medication 10 ML: at 10:36

## 2020-01-01 RX ADMIN — Medication 10 ML: at 21:05

## 2020-01-01 RX ADMIN — CARFILZOMIB 130 MG: 10 INJECTION, POWDER, LYOPHILIZED, FOR SOLUTION INTRAVENOUS at 13:55

## 2020-01-01 RX ADMIN — ACETAMINOPHEN 650 MG: 325 TABLET, FILM COATED ORAL at 10:24

## 2020-01-01 RX ADMIN — GLYCOPYRROLATE 0.2 MG: 0.2 INJECTION, SOLUTION INTRAMUSCULAR; INTRAVENOUS at 09:44

## 2020-01-01 RX ADMIN — DEXAMETHASONE SODIUM PHOSPHATE 8 MG: 4 INJECTION, SOLUTION INTRAMUSCULAR; INTRAVENOUS at 10:30

## 2020-01-01 RX ADMIN — SODIUM CHLORIDE 250 ML: 900 INJECTION, SOLUTION INTRAVENOUS at 12:12

## 2020-01-01 RX ADMIN — Medication 10 ML: at 11:55

## 2020-01-01 RX ADMIN — GLYCOPYRROLATE 0.2 MG: 0.2 INJECTION, SOLUTION INTRAMUSCULAR; INTRAVENOUS at 23:49

## 2020-01-01 RX ADMIN — MORPHINE SULFATE 2 MG: 2 INJECTION, SOLUTION INTRAMUSCULAR; INTRAVENOUS at 22:24

## 2020-01-01 RX ADMIN — PANTOPRAZOLE SODIUM 40 MG: 40 INJECTION, POWDER, FOR SOLUTION INTRAVENOUS at 23:38

## 2020-01-01 RX ADMIN — FLUCONAZOLE 200 MG: 2 INJECTION, SOLUTION INTRAVENOUS at 23:06

## 2020-01-01 RX ADMIN — SODIUM CHLORIDE 700 MG: 900 INJECTION, SOLUTION INTRAVENOUS at 11:12

## 2020-01-01 RX ADMIN — MORPHINE SULFATE 2 MG: 2 INJECTION, SOLUTION INTRAMUSCULAR; INTRAVENOUS at 18:26

## 2020-01-01 RX ADMIN — CARVEDILOL 6.25 MG: 6.25 TABLET, FILM COATED ORAL at 10:14

## 2020-01-01 RX ADMIN — DIPHENHYDRAMINE HYDROCHLORIDE 25 MG: 50 INJECTION, SOLUTION INTRAMUSCULAR; INTRAVENOUS at 10:16

## 2020-01-01 RX ADMIN — SODIUM CHLORIDE 699 MG: 900 INJECTION, SOLUTION INTRAVENOUS at 11:59

## 2020-01-01 RX ADMIN — SODIUM CHLORIDE 25 ML/HR: 900 INJECTION, SOLUTION INTRAVENOUS at 11:20

## 2020-01-01 RX ADMIN — SODIUM CHLORIDE 1000 ML: 9 INJECTION, SOLUTION INTRAVENOUS at 10:15

## 2020-01-01 RX ADMIN — SODIUM CHLORIDE 3.3 MG: 900 INJECTION, SOLUTION INTRAVENOUS at 12:10

## 2020-01-01 RX ADMIN — ONDANSETRON 8 MG: 2 INJECTION, SOLUTION INTRAMUSCULAR; INTRAVENOUS at 12:18

## 2020-01-01 RX ADMIN — LEVOTHYROXINE SODIUM 175 MCG: 0.12 TABLET ORAL at 10:14

## 2020-01-01 RX ADMIN — FAMOTIDINE 20 MG: 10 INJECTION, SOLUTION INTRAVENOUS at 11:08

## 2020-01-01 RX ADMIN — VANCOMYCIN HYDROCHLORIDE 1500 MG: 10 INJECTION, POWDER, LYOPHILIZED, FOR SOLUTION INTRAVENOUS at 17:08

## 2020-01-01 RX ADMIN — SODIUM CHLORIDE 700 MG: 900 INJECTION, SOLUTION INTRAVENOUS at 11:15

## 2020-01-01 RX ADMIN — SODIUM CHLORIDE 250 ML: 900 INJECTION, SOLUTION INTRAVENOUS at 12:34

## 2020-01-01 RX ADMIN — FLUCONAZOLE 200 MG: 2 INJECTION, SOLUTION INTRAVENOUS at 22:26

## 2020-01-01 RX ADMIN — MORPHINE SULFATE 1 MG: 2 INJECTION, SOLUTION INTRAMUSCULAR; INTRAVENOUS at 20:57

## 2020-01-01 RX ADMIN — Medication 10 ML: at 09:46

## 2020-01-01 RX ADMIN — MORPHINE SULFATE 2 MG: 2 INJECTION, SOLUTION INTRAMUSCULAR; INTRAVENOUS at 18:42

## 2020-01-01 RX ADMIN — CYCLOPHOSPHAMIDE 576 MG: 1 INJECTION, POWDER, FOR SOLUTION INTRAVENOUS; ORAL at 14:10

## 2020-01-01 RX ADMIN — Medication 10 ML: at 05:59

## 2020-01-01 RX ADMIN — ONDANSETRON 8 MG: 2 INJECTION INTRAMUSCULAR; INTRAVENOUS at 13:00

## 2020-01-01 RX ADMIN — Medication 10 ML: at 12:10

## 2020-01-01 RX ADMIN — HYDROMORPHONE HYDROCHLORIDE 1 MG: 1 INJECTION, SOLUTION INTRAMUSCULAR; INTRAVENOUS; SUBCUTANEOUS at 09:45

## 2020-01-01 RX ADMIN — CYCLOPHOSPHAMIDE 570 MG: 1 INJECTION, POWDER, FOR SOLUTION INTRAVENOUS; ORAL at 14:30

## 2020-01-01 RX ADMIN — MORPHINE SULFATE 2 MG: 2 INJECTION, SOLUTION INTRAMUSCULAR; INTRAVENOUS at 15:20

## 2020-01-01 RX ADMIN — APIXABAN 2.5 MG: 2.5 TABLET, FILM COATED ORAL at 20:53

## 2020-01-01 RX ADMIN — POTASSIUM CHLORIDE 20 MEQ: 200 INJECTION, SOLUTION INTRAVENOUS at 14:12

## 2020-01-01 RX ADMIN — DEXAMETHASONE SODIUM PHOSPHATE 8 MG: 4 INJECTION, SOLUTION INTRAMUSCULAR; INTRAVENOUS at 10:18

## 2020-01-01 RX ADMIN — CARFILZOMIB 130 MG: 10 INJECTION, POWDER, LYOPHILIZED, FOR SOLUTION INTRAVENOUS at 12:25

## 2020-01-01 RX ADMIN — MORPHINE SULFATE 1 MG: 2 INJECTION, SOLUTION INTRAMUSCULAR; INTRAVENOUS at 14:26

## 2020-01-01 RX ADMIN — Medication 10 ML: at 15:00

## 2020-01-01 RX ADMIN — SODIUM CHLORIDE 700 MG: 900 INJECTION, SOLUTION INTRAVENOUS at 12:06

## 2020-01-01 RX ADMIN — Medication 10 ML: at 18:27

## 2020-01-01 RX ADMIN — Medication 10 ML: at 16:45

## 2020-01-01 RX ADMIN — SODIUM CHLORIDE 250 ML: 900 INJECTION, SOLUTION INTRAVENOUS at 14:50

## 2020-01-01 RX ADMIN — DEXAMETHASONE SODIUM PHOSPHATE 8 MG: 4 INJECTION, SOLUTION INTRAMUSCULAR; INTRAVENOUS at 11:11

## 2020-01-01 RX ADMIN — CARFILZOMIB 130 MG: 10 INJECTION, POWDER, LYOPHILIZED, FOR SOLUTION INTRAVENOUS at 12:55

## 2020-01-01 RX ADMIN — Medication 10 ML: at 11:15

## 2020-01-07 NOTE — PROGRESS NOTES
1/7/20:  Patient in for pre-chemo visit with Dr. Susie Sargent. Patient reports mouth sores. Patient to start MMW. Dr. Susie Sargent reviewed labs and assessed the patient. Patient to continue treatment as planned. Week off next week. NP to see patient in 2 weeks.

## 2020-01-07 NOTE — PROGRESS NOTES
Pt arrived ambulatory today at 1110, to receive IV chemotherapy. Pt tolerated without difficulty. Patient discharged via ambulatory accompanied by self. Instructed to notify physician of any problems, questions or concerns. Allowed opportunity for patient/family to ask questions. Verbalized understanding. Next appointment is Jan 21 at 1100 with Mayra Awan. Take all four medications twice daily for 14 days

## 2020-01-21 NOTE — PROGRESS NOTES
1/21/20:  Patient in for pre-chemo visit with NP, [id number] [facility]. Patient reports trying to eat more to avoid further weight loss. He requests refill on fentanyl patches. Patient otherwise doing well. Plan is to continue with kyprolis and cytoxan and dex. Next week zometa is due. Dr. Guerrero Leader in 2 weeks.

## 2020-01-21 NOTE — PROGRESS NOTES
Arrived to the [facility]. Kyprolis + Cytoxan completed. Patient tolerated well. Any issues or concerns during appointment: none. Patient aware of next infusion appointment on 2/28/20 at 1100. Discharged ambulatory.     Caitlyn Hanson RN

## 2020-01-28 NOTE — PROGRESS NOTES
Arrived to the [facility]. Labs completed and patient received Kyprolis, Cytoxan and Zometa. Patient tolerated well. Any issues or concerns during appointment: denies new issues  Patient aware of next infusion appointment on 2/4/20 at 2pm. Discharged ambulatory with self.

## 2020-02-04 NOTE — PROGRESS NOTES
2/4/20 - Patient here for C15D9 cytoxan/kyprolis/dex and has no issues at this time. Patient will proceed with treatment today and have next week off. C16 starts on 2/18.

## 2020-02-04 NOTE — PROGRESS NOTES
Arrived to the [facility]. Cytoxan/kyprolis completed. Patient tolerated well. Any issues or concerns during appointment: None. Patient aware of next infusion appointment on February 18th at 1130. Discharged ambulatory.

## 2020-02-18 NOTE — PROGRESS NOTES
2/18/20:  Patient in for follow-up and pre-chemo for kyprolis, cytoxan and decadron with NP. Patient reports right lower back pain that is new but only occasional.  He is using pain patches that help with pain now. NP reviewed labs and evaluated patient. Patient to continue current plan and will call if pain in low back is worse or uncontrolled. Patient will see Dr. Donna Washington in 2 weeks. Luis next week.

## 2020-02-18 NOTE — PROGRESS NOTES
Arrived to infusion  No concerns , Kyprolis and cytoxan infused  Tolerated well  Next appt.  2/25/20

## 2020-02-25 NOTE — PROGRESS NOTES
Arrived to the [facility]. Labs, Zometa, Kyprolis and Cytoxan completed. Patient tolerated without problems. Any issues or concerns during appointment: no.  Patient aware of next infusion appointment on 3/3/20 (date) at 36 (time). Discharged ambulatory.

## 2020-03-03 NOTE — PROGRESS NOTES
Arrived to the [facility]. Kyprolis and cytoxan completed. Patient tolerated well. Any issues or concerns during appointment: none. Patient aware of next infusion appointment on 3/17/2020 at 11:30am.  Discharged ambulatory with cane.

## 2020-03-03 NOTE — PROGRESS NOTES
3/3/20:  Patient in for pre-chemo visit with Dr. Dee Edward. Patient reports more fatigue and weakness. Dr. Dee Edward reviewed labs and assessed the patient. Light chains increased slightly. Dr. Dee Edward would like to monitor for now and repeat light chains in 2 weeks. Patient to return in 2 weeks for next treatment day.   Patient encouraged to call for questions or concerns

## 2020-03-17 NOTE — PROGRESS NOTES
3/17/2020:  Patient in for follow-up and pre-chemo visit with Dr. Laura Suárez. Patient doing well except for fatigue. Dr. Laura Suárez encouraged the patient to avoid public places for next few weeks except for urgent needs. Patient verbalized understanding. Patient to continue with treatment as planned. Labs reviewed and kidney function improved since last visit. Myeloma labs pending. Refill for fentanyl sent to [facility]. Decadron refilled through [facility].

## 2020-03-24 NOTE — PROGRESS NOTES
Arrived to the [facility]. Labs completed and patient received Kyprolis, Cytoxan and Zometa. Patient tolerated well.   Any issues or concerns during appointment: denies new issues  Patient aware of next infusion appointment on3/31/20 at 1030am. Discharged ambulatory with self.

## 2020-03-31 NOTE — PROGRESS NOTES
3/31/20:  Patient in for follow-up and pre-chemo visit with NP, Katina Davis. Patient frustrated over cost of fentanyl patches. Notified FC to call patient to discuss. Patient otherwise doing well. Stephan discussed results of myeloma labs with Dr. Alex Stinson and no change to treatment plan at this time. Patient continuing with cytoxan and kyprolis as before. Next zometa due 4/21. Patient to return in 2 weeks before next cycle of treatment. Patient reminded of COVID-19 precautions and encouraged to call with any new symptoms or concerns.

## 2020-03-31 NOTE — PROGRESS NOTES
Arrived to the [facility]. Assessment completed and labs reviewed. Pre med and Cytoxan/Kyprolis infusion completed. Patient tolerated ***. Any issues or concerns during appointment: ***. Patient aware of next infusion appointment on 04/14/2020 at 1130. Discharged ambulatory.

## 2020-04-01 NOTE — PROGRESS NOTES
Arrived to the [facility]. Assessment completed and labs reviewed. Kyprolis/Cytoxan completed. Patient tolerated well. Any issues or concerns during appointment: No.  Discharged ambulatory.

## 2020-04-14 NOTE — PROGRESS NOTES
Pt ambulatory to area without complaints. Received treatment per order, tolerated well. Aware of next appt on [email or web address]. Advised to call dr with any issues/concerns. Discharged home without complaints.

## 2020-04-14 NOTE — PROGRESS NOTES
Pt was seen and labs reviewed by Dr. Dean Arredondo. Pt states he has decreased his fentanyl patch to 100 mcg and his pain is well controlled. We will continue current treatment. Pt will return to clinic in 2 weeks with labs prior.

## 2020-04-21 NOTE — PROGRESS NOTES
Arrived to the [facility]. Zometa, Kyprolis, and Cytoxan completed. Patient tolerated well. Any issues or concerns during appointment: no.  Patient aware of next infusion appointment on 4/28 (date) at 80 (time). Discharged ambulating.

## 2020-04-21 NOTE — PROGRESS NOTES
4/21/2020- Patient seen in the office today. Stated he is feeling well. Worried about the light chain levels increasing. Explained that this is an early indicator that his Myeloma is becoming resistant to current therapy. We will start him on Pomalyst. The patient is supposed to return the phone call from [id number] in order to obtain the financial assistance and medications that he needs. Discussed with patient that we will continue current treatment and then stop the Kyprolis and cytoxan once he receives the new medications. The patient has no other needs at this time. Will contact clinic or navigator with any further questions.

## 2020-04-28 NOTE — PROGRESS NOTES
4/28/20:  Patient in for follow-up with Dr. Michelle Payton prior to switching therapy to pomalyst and dex. Patient has pomalyst available and will plan on starting tonight. He will take 4mg 21 out of 28 days along with weekly dex 40mg and zometa monthly. Patient to return in 2 weeks with NP and 4 weeks with Dr. Michelle Payton. Patient encouraged to call for any prn needs.

## 2020-05-26 NOTE — PROGRESS NOTES
5/26/20:  Patient in for follow-up with Dr. Laura Mustafa. Patient reports no side effects from pomalyst and decadron. Dr. Laura Mustafa reviewed labs and assessed the patient. Plan is to continue monthly zometa, pomalyst 4mg 21 days out of 28 with weekly decadron. Patient to have labs only in 2 weeks and then f/u with Dr. Laura Mustafa in a month.

## 2020-05-26 NOTE — PROGRESS NOTES
Pt. Discharged ambulatory. Tolerated infusion well. No distress noted. To call physician with any problems or concerns. Understanding voiced. To return to Infusion on 6/23/20.

## 2020-06-23 NOTE — PROGRESS NOTES
Arrived to the [facility]. Assessment complete, labs reviewed Katiana Guo RN made aware of creatine clearance of 31. Treatment plan adjusted for dosage. Zometa completed. Patient tolerated without problems. Any issues or concerns during appointment: None Instructed to call provider with any side effects or concerns Patient aware of next infusion appointment on 7/21/20(date) at 8 30 AM  (time). Discharged ambulatory with cane

## 2020-06-23 NOTE — PROGRESS NOTES
6/23/20:  Patient in for follow-up. Dr Noelle Zepeda reviewed previous myeloma labs and discussed need to continue pomalyst/dex with monthly zometa for now. He would like to see the results of the July labs before changing treatment. Patient in agreement with the plan. Patient to return in one month for labs, ov and zometa.   Pomalyst 4mg 21 out of 28 days to continue and started today

## 2020-06-25 NOTE — PROGRESS NOTES
MM labs are increasing rapidly - let's try to get elotuzumab approved for him, we can add this to pom/dex

## 2020-07-01 NOTE — PROGRESS NOTES
Arrived ambulatory with cane to [facility] for D1C1 Awaiting labs. Labs resulted. NS infusing. Pre meds given as ordered. Empliciti titrated. Pt  Tolerated well. Port flushed and de accessed. Pt aware of next appt on 7/15/20 at 0930. Discharged ambulatory.

## 2020-07-01 NOTE — PROGRESS NOTES
7/1/20:  Patient in for pre-chemo visit with NP, Candido Norris. Patient reports fatigue and weakness. NP explained that myeloma is relapsing again and kidney function worsening. Plan is to start elotuzumab along with pomalyst and decadron. Patient given [facility] handouts on elotuzumab. Patient previously taking pomalyst and decadron. Consent signed and given to  to be scanned into the chart. Patient to return weekly for first two cycles of elotuzumab.

## 2020-07-08 NOTE — PROGRESS NOTES
7/8/20:  Patient in for pre-chemo day #8 Xiomara/Pom/Dex. Patient reports fatigue and feeling off balance are biggest concerns. Patient losing weight but notes he is eating. Creatinine up to 3.0. Patient encouraged to increase high calorie, high protein foods and increase oral hydration. Patient to receive extra IVF with chemotherapy today. Patient notes he is about a week into his pomalyst.  Patient to continue with weekly visits for now.

## 2020-07-15 NOTE — PROGRESS NOTES
1/29/18:  Received voice message from the patient noting his surgery at [address] went well and he was discharged to home. He has questions as to when he should re-start revlimid and dexamethasone. Spoke with Dr. Breonna Valdivia to discuss. Patient to be worked in sooner to see Dr. Breonna Valdivia. Dr. Breonna Valdivia to assess patient and make decision about re-starting rev/dex. Nan Lockwood MD  PGY 1 Department of Internal Medicine  Pager: [phone]      Patient is a 79y old  Female who presents with a chief complaint of AMS (15 Jul 2020 05:48)      SUBJECTIVE / OVERNIGHT EVENTS: Pt seen and examined. No acute overnight events.   Denies fevers, chills, CP/palpitations, SOB/cough, abdominal pain, N/V, constipation, or diarrhea.        MEDICATIONS  (STANDING):  azithromycin  IVPB      cefTRIAXone   IVPB 1000 milliGRAM(s) IV Intermittent every 24 hours  chlorhexidine 4% Liquid 1 Application(s) Topical <User Schedule>  dexMEDEtomidine Infusion 0.2 MICROgram(s)/kG/Hr (4.5 mL/Hr) IV Continuous <Continuous>  heparin   Injectable 5000 Unit(s) SubCutaneous every 8 hours  pantoprazole  Injectable 40 milliGRAM(s) IV Push daily    MEDICATIONS  (PRN):  haloperidol    Injectable 1 milliGRAM(s) IV Push every 4 hours PRN agitation      I&O's Summary      Vital Signs Last 24 Hrs  T(C): 37.4 (15 Jul 2020 06:30), Max: 37.4 (15 Jul 2020 06:30)  T(F): 99.3 (15 Jul 2020 06:30), Max: 99.3 (15 Jul 2020 06:30)  HR: 55 (15 Jul 2020 08:16) (55 - 89)  BP: 97/71 (15 Jul 2020 06:30) (90/54 - 125/64)  BP(mean): 80 (15 Jul 2020 06:30) (73 - 80)  RR: 18 (15 Jul 2020 06:30) (17 - 25)  SpO2: 99% (15 Jul 2020 08:16) (84% - 100%)    CAPILLARY BLOOD GLUCOSE      POCT Blood Glucose.: 148 mg/dL (2020 23:20)  POCT Blood Glucose.: 154 mg/dL (2020 23:09)      PHYSICAL EXAM:  GENERAL: NAD,   HEAD:  Atraumatic, Normocephalic  EYES: EOMI, PERRL, conjunctiva and sclera clear  NECK: No JVD  CHEST/LUNG: Clear to auscultation bilaterally; No wheeze  HEART: Regular rate and rhythm; No murmurs, rubs, or gallops  ABDOMEN: Soft, Nontender, Nondistended; Bowel sounds present  EXTREMITIES:  2+ Peripheral Pulses, No clubbing, cyanosis, or edema  PSYCH: AAOx3  NEUROLOGY: non-focal  SKIN: No rashes or lesions       LABS:                        10.1   10.12 )-----------( 261      ( 15 Jul 2020 07:04 )             35.3     Auto Eosinophil # 0.14  / Auto Eosinophil % 1.4   / Auto Neutrophil # 8.03  / Auto Neutrophil % 79.3  / BANDS % x                            10.5   10.64 )-----------( 292      ( 2020 23:35 )             37.0     Auto Eosinophil # 0.27  / Auto Eosinophil % 2.5   / Auto Neutrophil # 8.28  / Auto Neutrophil % 77.8  / BANDS % x        07-15    140  |  104  |  34<H>  ----------------------------<  112<H>  4.0   |  22  |  1.27  07-14    142  |  105  |  35<H>  ----------------------------<  168<H>  4.3   |  22  |  1.32<H>    Ca    8.8      15 Jul 2020 07:04  Mg     1.9     07-15  Phos  4.8     07-15  TPro  6.4  /  Alb  3.0<L>  /  TBili  0.2  /  DBili  x   /  AST  12  /  ALT  13  /  AlkPhos  86  07-15  TPro  6.2  /  Alb  2.9<L>  /  TBili  0.2  /  DBili  x   /  AST  13  /  ALT  17  /  AlkPhos  86  07-14    PT/INR - ( 15 Jul 2020 07:04 )   PT: 12.9 sec;   INR: 1.09 ratio         PTT - ( 15 Jul 2020 07:04 )  PTT:34.6 sec  CARDIAC MARKERS ( 15 Jul 2020 07:04 )  x     / x     / 27 U/L / x     / 2.3 ng/mL      Urinalysis Basic - ( 15 Jul 2020 00:34 )    Color: Yellow / Appearance: Clear / S.022 / pH: x  Gluc: x / Ketone: Negative  / Bili: Negative / Urobili: Negative   Blood: x / Protein: 30 mg/dL / Nitrite: Negative   Leuk Esterase: Negative / RBC: 4 /hpf / WBC 2 /HPF   Sq Epi: x / Non Sq Epi: 10 / Bacteria: Negative            RADIOLOGY & ADDITIONAL TESTS:    Imaging Personally Reviewed:    Consultant(s) Notes Reviewed:      Care Discussed with Consultants/Other Providers: Nan Lockwood MD  PGY 1 Department of Internal Medicine  Pager: [phone]      Patient is a 79y old  Female who presents with a chief complaint of AMS (15 Jul 2020 05:48)      SUBJECTIVE / OVERNIGHT EVENTS: Pt seen and examined.  However, has altered mental status. R.O.S. can't be assessed.        MEDICATIONS  (STANDING):  azithromycin  IVPB      cefTRIAXone   IVPB 1000 milliGRAM(s) IV Intermittent every 24 hours  chlorhexidine 4% Liquid 1 Application(s) Topical <User Schedule>  dexMEDEtomidine Infusion 0.2 MICROgram(s)/kG/Hr (4.5 mL/Hr) IV Continuous <Continuous>  heparin   Injectable 5000 Unit(s) SubCutaneous every 8 hours  pantoprazole  Injectable 40 milliGRAM(s) IV Push daily    MEDICATIONS  (PRN):  haloperidol    Injectable 1 milliGRAM(s) IV Push every 4 hours PRN agitation      I&O's Summary      Vital Signs Last 24 Hrs  T(C): 37.4 (15 Jul 2020 06:30), Max: 37.4 (15 Jul 2020 06:30)  T(F): 99.3 (15 Jul 2020 06:30), Max: 99.3 (15 Jul 2020 06:30)  HR: 55 (15 Jul 2020 08:16) (55 - 89)  BP: 97/71 (15 Jul 2020 06:30) (90/54 - 125/64)  BP(mean): 80 (15 Jul 2020 06:30) (73 - 80)  RR: 18 (15 Jul 2020 06:30) (17 - 25)  SpO2: 99% (15 Jul 2020 08:16) (84% - 100%)    CAPILLARY BLOOD GLUCOSE      POCT Blood Glucose.: 148 mg/dL (2020 23:20)  POCT Blood Glucose.: 154 mg/dL (2020 23:09)      PHYSICAL EXAM:  GENERAL: NAD,   HEAD:  Atraumatic, Normocephalic  EYES: EOMI, PERRL, conjunctiva and sclera clear  NECK: No JVD  CHEST/LUNG: Clear to auscultation bilaterally; No wheeze  HEART: Regular rate and rhythm; No murmurs, rubs, or gallops  ABDOMEN: Soft, Nontender, Nondistended; Bowel sounds present  EXTREMITIES:  2+ Peripheral Pulses, No clubbing, cyanosis, or edema  PSYCH: AAOx3  NEUROLOGY: non-focal  SKIN: No rashes or lesions       LABS:                        10.1   10.12 )-----------( 261      ( 15 Jul 2020 07:04 )             35.3     Auto Eosinophil # 0.14  / Auto Eosinophil % 1.4   / Auto Neutrophil # 8.03  / Auto Neutrophil % 79.3  / BANDS % x                            10.5   10.64 )-----------( 292      ( 2020 23:35 )             37.0     Auto Eosinophil # 0.27  / Auto Eosinophil % 2.5   / Auto Neutrophil # 8.28  / Auto Neutrophil % 77.8  / BANDS % x        07-15    140  |  104  |  34<H>  ----------------------------<  112<H>  4.0   |  22  |  1.27  07-14    142  |  105  |  35<H>  ----------------------------<  168<H>  4.3   |  22  |  1.32<H>    Ca    8.8      15 Jul 2020 07:04  Mg     1.9     07-15  Phos  4.8     07-15  TPro  6.4  /  Alb  3.0<L>  /  TBili  0.2  /  DBili  x   /  AST  12  /  ALT  13  /  AlkPhos  86  07-15  TPro  6.2  /  Alb  2.9<L>  /  TBili  0.2  /  DBili  x   /  AST  13  /  ALT  17  /  AlkPhos  86  07-14    PT/INR - ( 15 Jul 2020 07:04 )   PT: 12.9 sec;   INR: 1.09 ratio         PTT - ( 15 Jul 2020 07:04 )  PTT:34.6 sec  CARDIAC MARKERS ( 15 Jul 2020 07:04 )  x     / x     / 27 U/L / x     / 2.3 ng/mL      Urinalysis Basic - ( 15 Jul 2020 00:34 )    Color: Yellow / Appearance: Clear / S.022 / pH: x  Gluc: x / Ketone: Negative  / Bili: Negative / Urobili: Negative   Blood: x / Protein: 30 mg/dL / Nitrite: Negative   Leuk Esterase: Negative / RBC: 4 /hpf / WBC 2 /HPF   Sq Epi: x / Non Sq Epi: 10 / Bacteria: Negative      RADIOLOGY & ADDITIONAL TESTS:    < from: CT Cervical Spine No Cont (07.15.20 @ 03:29) >  Age-indeterminate, displaced left C1 anterior and posterior arch fracture as described, which may be acute given mild prevertebral soft tissue stranding/edema and no significant sclerosis at the fracture margin. Question mildly increased attenuation surrounding the thecal sac in the spinal canal. Extra-axial/epidural hematoma cannot be excluded. MRI is recommended for further evaluation of ligamentous injury and extra-axial hematoma.    Nonspecific bilateral mastoid opacification. Recommend clinical correlation to assess acute mastoiditis.    < end of copied text > Nan Lockwood MD  PGY 1 Department of Internal Medicine  Pager: [phone]      Patient is a 79y old  Female who presents with a chief complaint of AMS (15 Jul 2020 05:48)      SUBJECTIVE / OVERNIGHT EVENTS: Pt seen and examined.  However, has altered mental status. R.O.S. can't be assessed.      MEDICATIONS  (STANDING):  azithromycin  IVPB      cefTRIAXone   IVPB 1000 milliGRAM(s) IV Intermittent every 24 hours  chlorhexidine 4% Liquid 1 Application(s) Topical <User Schedule>  dexMEDEtomidine Infusion 0.2 MICROgram(s)/kG/Hr (4.5 mL/Hr) IV Continuous <Continuous>  heparin   Injectable 5000 Unit(s) SubCutaneous every 8 hours  pantoprazole  Injectable 40 milliGRAM(s) IV Push daily    MEDICATIONS  (PRN):  haloperidol    Injectable 1 milliGRAM(s) IV Push every 4 hours PRN agitation      I&O's Summary      Vital Signs Last 24 Hrs  T(C): 37.4 (15 Jul 2020 06:30), Max: 37.4 (15 Jul 2020 06:30)  T(F): 99.3 (15 Jul 2020 06:30), Max: 99.3 (15 Jul 2020 06:30)  HR: 55 (15 Jul 2020 08:16) (55 - 89)  BP: 97/71 (15 Jul 2020 06:30) (90/54 - 125/64)  BP(mean): 80 (15 Jul 2020 06:30) (73 - 80)  RR: 18 (15 Jul 2020 06:30) (17 - 25)  SpO2: 99% (15 Jul 2020 08:16) (84% - 100%)    CAPILLARY BLOOD GLUCOSE      POCT Blood Glucose.: 148 mg/dL (2020 23:20)  POCT Blood Glucose.: 154 mg/dL (2020 23:09)      PHYSICAL EXAM:  GENERAL: NAD,   HEAD:  Atraumatic, Normocephalic  EYES: EOMI, PERRL, conjunctiva and sclera clear  NECK: No JVD  CHEST/LUNG: Clear to auscultation bilaterally; No wheeze  HEART: Regular rate and rhythm; No murmurs, rubs, or gallops  ABDOMEN: Soft, Nontender, Nondistended; Bowel sounds present  EXTREMITIES:  2+ Peripheral Pulses, No clubbing, cyanosis, or edema  PSYCH: AAOx3  NEUROLOGY: non-focal  SKIN: No rashes or lesions       LABS:                        10.1   10.12 )-----------( 261      ( 15 Jul 2020 07:04 )             35.3     Auto Eosinophil # 0.14  / Auto Eosinophil % 1.4   / Auto Neutrophil # 8.03  / Auto Neutrophil % 79.3  / BANDS % x                            10.5   10.64 )-----------( 292      ( 2020 23:35 )             37.0     Auto Eosinophil # 0.27  / Auto Eosinophil % 2.5   / Auto Neutrophil # 8.28  / Auto Neutrophil % 77.8  / BANDS % x        07-15    140  |  104  |  34<H>  ----------------------------<  112<H>  4.0   |  22  |  1.27  07-14    142  |  105  |  35<H>  ----------------------------<  168<H>  4.3   |  22  |  1.32<H>    Ca    8.8      15 Jul 2020 07:04  Mg     1.9     07-15  Phos  4.8     07-15  TPro  6.4  /  Alb  3.0<L>  /  TBili  0.2  /  DBili  x   /  AST  12  /  ALT  13  /  AlkPhos  86  07-15  TPro  6.2  /  Alb  2.9<L>  /  TBili  0.2  /  DBili  x   /  AST  13  /  ALT  17  /  AlkPhos  86  07-14    PT/INR - ( 15 Jul 2020 07:04 )   PT: 12.9 sec;   INR: 1.09 ratio         PTT - ( 15 Jul 2020 07:04 )  PTT:34.6 sec  CARDIAC MARKERS ( 15 Jul 2020 07:04 )  x     / x     / 27 U/L / x     / 2.3 ng/mL      Urinalysis Basic - ( 15 Jul 2020 00:34 )    Color: Yellow / Appearance: Clear / S.022 / pH: x  Gluc: x / Ketone: Negative  / Bili: Negative / Urobili: Negative   Blood: x / Protein: 30 mg/dL / Nitrite: Negative   Leuk Esterase: Negative / RBC: 4 /hpf / WBC 2 /HPF   Sq Epi: x / Non Sq Epi: 10 / Bacteria: Negative      RADIOLOGY & ADDITIONAL TESTS:    < from: CT Cervical Spine No Cont (07.15.20 @ 03:29) >  Age-indeterminate, displaced left C1 anterior and posterior arch fracture as described, which may be acute given mild prevertebral soft tissue stranding/edema and no significant sclerosis at the fracture margin. Question mildly increased attenuation surrounding the thecal sac in the spinal canal. Extra-axial/epidural hematoma cannot be excluded. MRI is recommended for further evaluation of ligamentous injury and extra-axial hematoma.    Nonspecific bilateral mastoid opacification. Recommend clinical correlation to assess acute mastoiditis.    < end of copied text >

## 2020-07-15 NOTE — PROGRESS NOTES
Arrived to the [facility]. Elotuzumab completed. Patient tolerated well. Any issues or concerns during appointment: patient voiced discouragement about learning that treatment is not working well. Patient aware of next infusion appointment on 7/22/20 at 2pm.   Discharged ambulatory with self.

## 2020-07-22 NOTE — PROGRESS NOTES
Arrived to the [facility]. Elotuzumab completed. Patient tolerated well. Any issues or concerns during appointment: [place] held due to elevated Cr per Teddy Gilford, NP. Patient aware of next infusion appointment on July 29th at 0900. Discharged ambulatory.

## 2020-07-22 NOTE — PROGRESS NOTES
7/22/20:  Patient in for follow-up and pre-chemo visit for elotuzumab day 22. Patient still weak but able to ambulate with walker. NP reviewed labs and assessed the patient. Creatinine stable at 3.1. Patient started new pomalyst and dex cycle yesterday (he was previously on pomalyst prior to addition of lashell). Patient to continue with weekly f/u prior to treatment for now.

## 2020-07-29 NOTE — PROGRESS NOTES
Arrived to the [facility]. Assessment complete, labs reviewed. Elotuzumab completed. Patient tolerated without problems. Any issues or concerns during appointment: None  Instructed to call provider with any side effects or concerns  Patient aware of next infusion appointment on 8/5/20 (date) at 8 AM (time).   Discharged via W/C transport service called to pick patient up

## 2020-07-29 NOTE — PROGRESS NOTES
7/29/2020- Patient seen in the office today. Patient stated that he feels ok other than being real tired and have a cough that comes and goes with no other respiratory symptoms. Creatine is very elevated at 4.4, patient instructed to hold the Pomalyst for now and NP will follow up with Dr. Chris Ram tomorrow to figure out a plan of action.

## 2020-07-31 PROBLEM — R05.9 COUGH: Status: ACTIVE | Noted: 2020-01-01

## 2020-07-31 PROBLEM — D69.6 THROMBOCYTOPENIA (HCC): Status: ACTIVE | Noted: 2020-01-01

## 2020-07-31 PROBLEM — R13.10 ODYNOPHAGIA: Status: ACTIVE | Noted: 2020-01-01

## 2020-07-31 PROBLEM — J18.9 PNA (PNEUMONIA): Status: ACTIVE | Noted: 2020-01-01

## 2020-07-31 PROBLEM — G89.4 CHRONIC PAIN SYNDROME: Status: ACTIVE | Noted: 2020-01-01

## 2020-07-31 PROBLEM — Z20.822 SUSPECTED COVID-19 VIRUS INFECTION: Status: ACTIVE | Noted: 2020-01-01

## 2020-07-31 NOTE — ED NOTES
TRANSFER - OUT REPORT: 
 
Verbal report given to 8th floor RN on Richard Batch  being transferred to 8th floor Med-Surg for routine progression of care Report consisted of patients Situation, Background, Assessment and  
Recommendations(SBAR). Information from the following report(s) SBAR was reviewed with the receiving nurse. Lines:  
Venous Access Device right chest port 07/11/18 Upper chest (subclavicular area, right (Active) Peripheral IV 07/31/20 Left Forearm (Active) Site Assessment Clean, dry, & intact 07/31/20 1612 Phlebitis Assessment 0 07/31/20 1612 Infiltration Assessment 0 07/31/20 1612 Dressing Status Clean, dry, & intact 07/31/20 1612 Peripheral IV 07/31/20 Right Forearm (Active) Site Assessment Clean, dry, & intact 07/31/20 1613 Phlebitis Assessment 0 07/31/20 1613 Infiltration Assessment 0 07/31/20 1613 Dressing Status Clean, dry, & intact 07/31/20 1613 Opportunity for questions and clarification was provided. Patient transported with: 
 Lâ€™Arco[facility]

## 2020-07-31 NOTE — ED PROVIDER NOTES
HPI: 
79-year-old male multiple medical problem including multiple myeloma on treatment is here with generalized weakness and cough for the past 3 days. Fever for the past couple days. Denies any chest pain. Mild short of breath. No leg swelling. No GI symptoms. No fall. Productive yellow-green sputum. No known exposure to coronavirus. No diarrhea. No new rash. Denies any UTI symptoms ROS Constitutional: + fever, no chills Skin: no rash Eye: 
ENMT: 
Respiratory: + shortness of breath, + cough Cardiovascular: No chest pain, no palpitations Gastrointestinal: No vomiting, no nausea, no diarrhea, no abdominal pain : No dysuria MSK: No back pain, no muscle pain, no joint pain Neuro: No headache, no change in mental status, no numbness, no tingling, + weakness Psych:  
Endocrine:  
All other review of systems positive per history of present illness and the above otherwise negative or noncontributory. Visit Vitals /53 Pulse 93 Temp (!) 101 °F (38.3 °C) Resp 18 Ht 5' 9\" (1.753 m) Wt 70.8 kg (156 lb) SpO2 92% BMI 23.04 kg/m² Past Medical History:  
Diagnosis Date  Arrhythmia LBBB  Arthritis  Automatic implantable cardioverter-defibrillator in situ 9/1/2011  BMI 30.0-30.9,adult BMI 30.5  Cardiomyopathy ([address] [facility] by [facility]  Cholelithiases  Chronic systolic heart failure ([facility] [address] 9/13/2011 [place] [employer] class II-III heart failure symptoms  Colonic mass 3/23/2016  Gout  H/O: pituitary tumor X2 benign, removed  High cholesterol  History of thyroid cancer  History of vertigo   
 no recent complications or episodes  Hurthle cell carcinoma of thyroid ([address]  Hx of radiation therapy to prostate 2004  Hyperlipidemia 11/18/2015  Hypertension  Hypothyroid   
 hypo- had portion of thyroid removed due to cancer  ICD (implantable cardiac defibrillator) in place 9/2011 [employer] BIV/ICD, Gen change 3.2.16  
 LBBB (left bundle branch block) 11/18/2015  Malaise and fatigue 11/18/2015  Malignant neoplasm of prostate ([facility] [address]  Mass of colon   
 right colon mass  Multiple myeloma ([facility]  Polyneuropathy 12/14/2016  Postsurgical hypothyroidism 3/15/2018  Postural imbalance 12/14/2016  Sinus bradycardia 1/12/2018  Sinus node dysfunction ([facility])  Vertigo 11/18/2015 Past Surgical History:  
Procedure Laterality Date  HX CHOLECYSTECTOMY  2013  HX COLONOSCOPY    
 dx with Right colon mass  HX GI    
 benign polyps removed  HX HEART CATHETERIZATION    
 HX HEENT  2008 thyroidetomy partial tumor from pituitary x2  HX PACEMAKER  2011/2016  
 [employer] biv-icd  HX THYROIDECTOMY  2008  HX THYROIDECTOMY  01/26/2018  
 completion thyroidectomy  HX TUMOR REMOVAL  1990/2010  
 pituitary tumor removed X2  
 HX VASCULAR ACCESS Prior to Admission Medications Prescriptions Last Dose Informant Patient Reported? Taking? ELIQUIS 5 mg tablet   No No  
Sig: TAKE 1 TABLET BY MOUTH TWO  TIMES DAILY HYDROmorphone (DILAUDID) 4 mg tablet   No No  
Sig: Take 1-2 Tabs by mouth every four (4) hours as needed for Pain. Max Daily Amount: 48 mg. Omeprazole delayed release (PRILOSEC D/R) 20 mg tablet   Yes No  
Sig: Take 20 mg by mouth daily. amino ac/whey prot conc, isol (WHEY PROTEIN PO)   Yes No  
Sig: Take  by mouth. amiodarone (CORDARONE) 200 mg tablet   No No  
Sig: Take 1 Tab by mouth daily. reduce to 1/2 tab a day 7/9  Indications: prevention of recurrent atrial fibrillation  
carvedilol (COREG) 6.25 mg tablet   No No  
Sig: TAKE 1 TABLET BY MOUTH TWO  TIMES DAILY WITH MEALS  
chlorhexidine (PERIDEX) 0.12 % solution   Yes No  
Sig: 15 mL by Swish and Spit route two (2) times a day. cholecalciferol, vitamin D3, (VITAMIN D3) 2,000 unit tab   Yes No  
Sig: Take  by mouth. cyanocobalamin (VITAMIN B-12) 1,000 mcg sublingual tablet   Yes No  
Sig: Take 1,000 mcg by mouth daily. cyclobenzaprine (FLEXERIL) 10 mg tablet   No No  
Sig: Take 1 Tab by mouth three (3) times daily as needed for Muscle Spasm(s). fentaNYL (DURAGESIC) 100 mcg/hr PATCH   No No  
Si Patch by TransDERmal route every seventy-two (72) hours for 30 days. Max Daily Amount: 1 Patch.  
levothyroxine (SYNTHROID) 175 mcg tablet   No No  
Sig: Take 1 Tab by mouth Daily (before breakfast). magnesium oxide (MAG-OX) 400 mg tablet   No No  
Sig: Take 1 Tab by mouth two (2) times a day. polyethylene glycol (MIRALAX) 17 gram packet   Yes No  
Sig: Take 17 g by mouth daily. pomalidomide (Pomalyst) 4 mg cap   No No  
Sig: Take 4 mg by mouth daily for 21 days. Take for 21 days on and 7 days off  
potassium chloride (K-DUR, KLOR-CON) 20 mEq tablet   No No  
Sig: TAKE 1 TABLET BY MOUTH TWO  TIMES DAILY  
rosuvastatin (Crestor) 10 mg tablet   No No  
Sig: Take 1 Tab by mouth nightly. torsemide (DEMADEX) 20 mg tablet   Yes No  
Sig: Take 20 mg by mouth every other day. Facility-Administered Medications: None Adult Exam  
General: alert, ill-appearing Head: normocephalic, atraumatic ENT: moist mucous membranes Neck: supple, non-tender; full range of motion Cardiovascular: regular rate and rhythm, normal peripheral perfusion, no edema Respiratory: Cough. Deep raspy rattling sounding cough. No wheezing noted. Trace crackle noted at the lung bases. No wheezing Gastrointestinal: soft, non-tender; no rebound or guarding, no peritoneal signs, no distension Back: non-tender, full range of motion Musculoskeletal: normal range of motion, normal strength, no gross deformities Neurological: alert and oriented x 4, no gross focal deficits; normal speech Psychiatric: cooperative; appropriate mood and affect MDM: 
He is febrile. Not tachycardic.   Lung exam is suspicious for pneumonia versus COVID. Chest x-ray however unremarkable. Lactic of 1.4. Procalcitonin is very elevated at 3.5. Given the fact that he has multiple myeloma currently on chemotherapy I consulted our ED pharmacist who recommend vancomycin and cefepime for prophylactic coverage for suspected pneumonia. We will also swab the patient for COVID. Patient will need to be admitted to the hospital at this time for further evaluation and treatment. EKG rate of 86. Ventricularly paced. No STEMI equivalent noted. Xr Chest [facility] Result Date: 7/31/2020 EXAMINATION: TEMPORARY 7/31/2020 4:26 PM ACCESSION NUMBER: [id number] INDICATION: Shortness of breath COMPARISON: Chest CT 12/17/2019, chest x-ray 7/18/2018, 7/9/2018 TECHNIQUE: A single AP view of the chest was obtained. FINDINGS: Unchanged positioning of a right-sided central venous access port. Unchanged cardiac pacemaker defibrillator positioning. There is unchanged mild enlargement of the cardiac silhouette. Low lung volumes with bibasilar atelectasis. No pneumothorax or pleural effusion. Bilateral acromioclavicular joint arthropathy. Healed posterior left fifth rib fracture. IMPRESSION: 1. . There is unchanged mild enlargement of the cardiac silhouette. 2. Low lung volumes with bibasilar atelectasis. VOICE DICTATED BY: Dr. Ruperto Michael Recent Results (from the past 24 hour(s)) EKG, 12 LEAD, INITIAL Collection Time: 07/31/20  4:03 PM  
Result Value Ref Range Ventricular Rate 86 BPM  
 Atrial Rate 86 BPM  
 P-R Interval 136 ms QRS Duration 128 ms Q-T Interval 352 ms QTC Calculation (Bezet) 421 ms Calculated P Axis 18 degrees Calculated R Axis -17 degrees Calculated T Axis -179 degrees Diagnosis    
  !! AGE AND GENDER SPECIFIC ECG ANALYSIS !! Electronic ventricular pacemaker When compared with ECG of 26-NOV-2017 09:50, 
Vent. rate has increased BY   8 BPM 
  
CBC WITH AUTOMATED DIFF  Collection Time: 07/31/20  4:08 PM  
 Result Value Ref Range WBC 2.3 (L) 4.3 - 11.1 K/uL  
 RBC 3.71 (L) 4.23 - 5.6 M/uL HGB 8.6 (L) 13.6 - 17.2 g/dL HCT 25.9 (L) 41.1 - 50.3 % MCV 69.8 (L) 79.6 - 97.8 FL  
 MCH 23.2 (L) 26.1 - 32.9 PG  
 MCHC 33.2 31.4 - 35.0 g/dL  
 RDW 15.4 (H) 11.9 - 14.6 % PLATELET 60 (L) 027 - 450 K/uL MPV Unable to calculate. Recommend adding IPF. 9.4 - 12.3 FL ABSOLUTE NRBC 0.08 0.0 - 0.2 K/uL DF PENDING   
METABOLIC PANEL, COMPREHENSIVE Collection Time: 07/31/20  4:08 PM  
Result Value Ref Range Sodium 142 136 - 145 mmol/L Potassium 3.7 3.5 - 5.1 mmol/L Chloride 112 (H) 98 - 107 mmol/L  
 CO2 21 21 - 32 mmol/L Anion gap 9 7 - 16 mmol/L Glucose 79 65 - 100 mg/dL BUN 45 (H) 8 - 23 MG/DL Creatinine 3.97 (H) 0.8 - 1.5 MG/DL  
 GFR est AA 19 (L) >60 ml/min/1.73m2 GFR est non-AA 16 (L) >60 ml/min/1.73m2 Calcium 9.8 8.3 - 10.4 MG/DL Bilirubin, total 0.6 0.2 - 1.1 MG/DL  
 ALT (SGPT) 22 12 - 65 U/L  
 AST (SGOT) 44 (H) 15 - 37 U/L Alk. phosphatase 51 50 - 136 U/L Protein, total 8.6 (H) 6.3 - 8.2 g/dL Albumin 2.8 (L) 3.2 - 4.6 g/dL Globulin 5.8 (H) 2.3 - 3.5 g/dL A-G Ratio 0.5 (L) 1.2 - 3.5 LACTIC ACID Collection Time: 07/31/20  4:08 PM  
Result Value Ref Range Lactic acid 1.4 0.4 - 2.0 MMOL/L  
PROCALCITONIN Collection Time: 07/31/20  4:08 PM  
Result Value Ref Range Procalcitonin 3.51 ng/mL Dragon voice recognition software was used to create this note. Although the note has been reviewed and corrected where necessary, additional errors may have been overlooked and remain in the text.

## 2020-07-31 NOTE — H&P
Hospitalist H&P Note Admit Date:  2020  4:01 PM  
Name:  Yvette Rhoades Age:  [de-identified] y.o. 
:  1940 MRN:  [id number] PCP:  Elizabeth Rodney MD 
Treatment Team: Attending Provider: Benson Euceda MD; Primary Nurse: Lisa Zamora Odynophagia, weakness/fatigue, cough HPI:  
Pt with known h/o afib on amiodarone and eliquis, multiple myeloma in chemotherapy,cardiomyopathy with defibrillator, hypothyroid, h/o colon cancer,htn, ckd 4 and hyperlipidemia. Since past 2-3 weeks pt had been c/o cough, dry, mild ,later on got worse and productive. Increasing cough since past 2 days. Pt had also noticed difficulty swallowing, says hasnt had much to eat for past 2 days, didn't have any of his medications. Also c/o severe fatigue/generalized weakness. Pt otherwise doesn't c/o headache or dizziness or nausea or vomiting or abdominal pain or chest pain. Hb 8.6, platelet 94,HOKGN 2.65,BOPVZFLOM 3.51. Temp 101 
cxr-Low lung volumes with bibasilar atelectasis. ua pending Pt will admitted for prob pna, r/o covid and odynophagia. 10 systems reviewed and negative except as noted in HPI. Past Medical History:  
Diagnosis Date  Arrhythmia LBBB  Arthritis  Automatic implantable cardioverter-defibrillator in situ 2011  BMI 30.0-30.9,adult BMI 30.5  Cardiomyopathy ([address] followed by [address] Cardiology  Cholelithiases  Chronic systolic heart failure ([address] 2011 [place] [employer] class II-III heart failure symptoms  Colonic mass 3/23/2016  Gout  H/O: pituitary tumor X2 benign, removed  High cholesterol  History of thyroid cancer  History of vertigo   
 no recent complications or episodes  Hurthle cell carcinoma of thyroid ([address]  Hx of radiation therapy to prostate   Hyperlipidemia 2015  Hypertension  Hypothyroid   
 hypo- had portion of thyroid removed due to cancer  ICD (implantable cardiac defibrillator) in place 9/2011 [employer] BIV/ICD, Gen change 3.2.16  
 LBBB (left bundle branch block) 11/18/2015  Malaise and fatigue 11/18/2015  Malignant neoplasm of prostate ([facility] [address]  Mass of colon   
 right colon mass  Multiple myeloma ([facility] [address]  Polyneuropathy 12/14/2016  Postsurgical hypothyroidism 3/15/2018  Postural imbalance 12/14/2016  Sinus bradycardia 1/12/2018  Sinus node dysfunction (HCC)  Vertigo 11/18/2015 Past Surgical History:  
Procedure Laterality Date  HX CHOLECYSTECTOMY  2013  HX COLONOSCOPY    
 dx with Right colon mass  HX GI    
 benign polyps removed  HX HEART CATHETERIZATION    
 HX HEENT  2008 thyroidetomy partial tumor from pituitary x2  HX PACEMAKER  2011/2016  
 [employer] biv-icd  HX THYROIDECTOMY  2008  HX THYROIDECTOMY  01/26/2018  
 completion thyroidectomy  HX TUMOR REMOVAL  1990/2010  
 pituitary tumor removed X2  
 HX VASCULAR ACCESS Allergies Allergen Reactions  Ace Inhibitors Other (comments)  Lisinopril Cough  Pcn [Penicillins] Swelling Social History Tobacco Use  Smoking status: Never Smoker  Smokeless tobacco: Never Used Substance Use Topics  Alcohol use: Yes Comment: very rare Family History Problem Relation Age of Onset  Heart Disease Sister  Heart Attack Sister  Stroke Mother  Thyroid Disease Neg Hx  Diabetes Neg Hx Immunization History Administered Date(s) Administered  Influenza Vaccine 01/28/2010, 10/13/2013, 10/02/2015  Pneumococcal Vaccine (Unspecified Type) 10/18/2012  TB Skin Test (PPD) Intradermal 01/13/2017, 05/16/2018 PTA Medications: 
Prior to Admission Medications Prescriptions Last Dose Informant Patient Reported? Taking? ELIQUIS 5 mg tablet   No No  
Sig: TAKE 1 TABLET BY MOUTH TWO  TIMES DAILY HYDROmorphone (DILAUDID) 4 mg tablet   No No  
 Sig: Take 1-2 Tabs by mouth every four (4) hours as needed for Pain. Max Daily Amount: 48 mg. Omeprazole delayed release (PRILOSEC D/R) 20 mg tablet   Yes No  
Sig: Take 20 mg by mouth daily. amino ac/whey prot conc, isol (WHEY PROTEIN PO)   Yes No  
Sig: Take  by mouth. amiodarone (CORDARONE) 200 mg tablet   No No  
Sig: Take 1 Tab by mouth daily. reduce to 1/2 tab a day   Indications: prevention of recurrent atrial fibrillation  
carvedilol (COREG) 6.25 mg tablet   No No  
Sig: TAKE 1 TABLET BY MOUTH TWO  TIMES DAILY WITH MEALS  
chlorhexidine (PERIDEX) 0.12 % solution   Yes No  
Sig: 15 mL by Swish and Spit route two (2) times a day. cholecalciferol, vitamin D3, (VITAMIN D3) 2,000 unit tab   Yes No  
Sig: Take  by mouth.  
cyanocobalamin (VITAMIN B-12) 1,000 mcg sublingual tablet   Yes No  
Sig: Take 1,000 mcg by mouth daily. cyclobenzaprine (FLEXERIL) 10 mg tablet   No No  
Sig: Take 1 Tab by mouth three (3) times daily as needed for Muscle Spasm(s). fentaNYL (DURAGESIC) 100 mcg/hr PATCH   No No  
Si Patch by TransDERmal route every seventy-two (72) hours for 30 days. Max Daily Amount: 1 Patch.  
levothyroxine (SYNTHROID) 175 mcg tablet   No No  
Sig: Take 1 Tab by mouth Daily (before breakfast). magnesium oxide (MAG-OX) 400 mg tablet   No No  
Sig: Take 1 Tab by mouth two (2) times a day. polyethylene glycol (MIRALAX) 17 gram packet   Yes No  
Sig: Take 17 g by mouth daily. pomalidomide (Pomalyst) 4 mg cap   No No  
Sig: Take 4 mg by mouth daily for 21 days. Take for 21 days on and 7 days off  
potassium chloride (K-DUR, KLOR-CON) 20 mEq tablet   No No  
Sig: TAKE 1 TABLET BY MOUTH TWO  TIMES DAILY  
rosuvastatin (Crestor) 10 mg tablet   No No  
Sig: Take 1 Tab by mouth nightly. torsemide (DEMADEX) 20 mg tablet   Yes No  
Sig: Take 20 mg by mouth every other day. Facility-Administered Medications: None Objective:  
 
Patient Vitals for the past 24 hrs: Temp Pulse Resp BP SpO2  
07/31/20 1819  91   94 % 07/31/20 1816  76  143/60 94 % 07/31/20 1608  93  100/53 92 % 07/31/20 1559 (!) 101 °F (38.3 °C) 88 18 132/66 95 % Oxygen Therapy O2 Sat (%): 94 % (07/31/20 1819) Pulse via Oximetry: 91 beats per minute (07/31/20 1819) O2 Device: Room air (07/31/20 1559) No intake or output data in the 24 hours ending 07/31/20 1833 Physical Exam: 
General:    Well nourished. Alert. Frequent cough, c/o generalized weakness and pain on swallowing Eyes:   Normal sclera. Extraocular movements intact. ENT:  Normocephalic, atraumatic.  dry mucous membranes, mild coated tongue CV:   RRR. No murmur, rub, or gallop. Lungs:  Coarse breath sounds Abdomen: Soft, nontender, nondistended. Bowel sounds normal. Large abdominal hernia to the rt abdomen soft, nontender Extremities: Warm and dry. No cyanosis or edema. Neurologic: CN II-XII grossly intact. Sensation intact. Skin:     No rashes or jaundice. Psych:  Normal mood and affect. I reviewed the labs, imaging. Data Review:  
Recent Results (from the past 24 hour(s)) EKG, 12 LEAD, INITIAL Collection Time: 07/31/20  4:03 PM  
Result Value Ref Range Ventricular Rate 86 BPM  
 Atrial Rate 86 BPM  
 P-R Interval 136 ms QRS Duration 128 ms Q-T Interval 352 ms QTC Calculation (Bezet) 421 ms Calculated P Axis 18 degrees Calculated R Axis -17 degrees Calculated T Axis -179 degrees Diagnosis    
  !! AGE AND GENDER SPECIFIC ECG ANALYSIS !! Electronic ventricular pacemaker When compared with ECG of 26-NOV-2017 09:50, 
Vent. rate has increased BY   8 BPM 
  
D DIMER Collection Time: 07/31/20  4:07 PM  
Result Value Ref Range D DIMER 3.81 (HH) <0.56 ug/ml(FEU) CBC WITH AUTOMATED DIFF Collection Time: 07/31/20  4:08 PM  
Result Value Ref Range WBC 2.3 (L) 4.3 - 11.1 K/uL  
 RBC 3.71 (L) 4.23 - 5.6 M/uL HGB 8.6 (L) 13.6 - 17.2 g/dL HCT 25.9 (L) 41.1 - 50.3 % MCV 69.8 (L) 79.6 - 97.8 FL  
 MCH 23.2 (L) 26.1 - 32.9 PG  
 MCHC 33.2 31.4 - 35.0 g/dL  
 RDW 15.4 (H) 11.9 - 14.6 % PLATELET 60 (L) 073 - 450 K/uL MPV Unable to calculate. Recommend adding IPF. 9.4 - 12.3 FL ABSOLUTE NRBC 0.08 0.0 - 0.2 K/uL NEUTROPHILS 29 (L) 43 - 78 % LYMPHOCYTES 62 (H) 13 - 44 % MONOCYTES 4 4.0 - 12.0 % EOSINOPHILS 2 0.5 - 7.8 % BASOPHILS 0 0.0 - 2.0 % IMMATURE GRANULOCYTES 3 0.0 - 5.0 %  
 ABS. NEUTROPHILS 0.7 (L) 1.7 - 8.2 K/UL  
 ABS. LYMPHOCYTES 1.4 0.5 - 4.6 K/UL  
 ABS. MONOCYTES 0.1 0.1 - 1.3 K/UL  
 ABS. EOSINOPHILS 0.0 0.0 - 0.8 K/UL  
 ABS. BASOPHILS 0.0 0.0 - 0.2 K/UL  
 ABS. IMM. GRANS. 0.1 0.0 - 0.5 K/UL  
 RBC COMMENTS SLIGHT 
ANISOCYTOSIS + POIKILOCYTOSIS 
    
 RBC COMMENTS OCCASIONAL 
TARGET CELLS 
    
 WBC COMMENTS MARKED PLATELET COMMENTS MARKED    
 DF AUTOMATED METABOLIC PANEL, COMPREHENSIVE Collection Time: 07/31/20  4:08 PM  
Result Value Ref Range Sodium 142 136 - 145 mmol/L Potassium 3.7 3.5 - 5.1 mmol/L Chloride 112 (H) 98 - 107 mmol/L  
 CO2 21 21 - 32 mmol/L Anion gap 9 7 - 16 mmol/L Glucose 79 65 - 100 mg/dL BUN 45 (H) 8 - 23 MG/DL Creatinine 3.97 (H) 0.8 - 1.5 MG/DL  
 GFR est AA 19 (L) >60 ml/min/1.73m2 GFR est non-AA 16 (L) >60 ml/min/1.73m2 Calcium 9.8 8.3 - 10.4 MG/DL Bilirubin, total 0.6 0.2 - 1.1 MG/DL  
 ALT (SGPT) 22 12 - 65 U/L  
 AST (SGOT) 44 (H) 15 - 37 U/L Alk. phosphatase 51 50 - 136 U/L Protein, total 8.6 (H) 6.3 - 8.2 g/dL Albumin 2.8 (L) 3.2 - 4.6 g/dL Globulin 5.8 (H) 2.3 - 3.5 g/dL A-G Ratio 0.5 (L) 1.2 - 3.5 LACTIC ACID Collection Time: 07/31/20  4:08 PM  
Result Value Ref Range Lactic acid 1.4 0.4 - 2.0 MMOL/L  
PROCALCITONIN Collection Time: 07/31/20  4:08 PM  
Result Value Ref Range Procalcitonin 3.51 ng/mL SARS-COV-2 Collection Time: 07/31/20  5:06 PM  
Result Value Ref Range  Specimen source Nasopharyngeal    
 COVID-19 rapid test Not detected NOTD    
 SARS CoV-2 PENDING   
URINALYSIS W/ RFLX MICROSCOPIC Collection Time: 07/31/20  5:07 PM  
Result Value Ref Range Color YELLOW Appearance CLOUDY Specific gravity 1.014 1.001 - 1.023    
 pH (UA) 6.0 5.0 - 9.0 Protein 100 (A) NEG mg/dL Glucose Negative mg/dL Ketone Negative NEG mg/dL Bilirubin Negative NEG Blood SMALL (A) NEG Urobilinogen 0.2 0.2 - 1.0 EU/dL Nitrites Negative NEG Leukocyte Esterase Negative NEG All Micro Results Procedure Component Value Units Date/Time CULTURE, URINE [[id number]] Collected:  07/31/20 1707 Order Status:  Completed Specimen:  Urine from Clean catch Updated:  07/31/20 1733 CULTURE, BLOOD [[id number]] Collected:  07/31/20 1614 Order Status:  Completed Specimen:  Blood Updated:  07/31/20 1648 CULTURE, BLOOD [[id number]] Collected:  07/31/20 1607 Order Status:  Completed Specimen:  Blood Updated:  07/31/20 1634 Other Studies: Xr Chest [facility] Result Date: 7/31/2020 EXAMINATION: TEMPORARY 7/31/2020 4:26 PM ACCESSION NUMBER: [id number] INDICATION: Shortness of breath COMPARISON: Chest CT 12/17/2019, chest x-ray 7/18/2018, 7/9/2018 TECHNIQUE: A single AP view of the chest was obtained. FINDINGS: Unchanged positioning of a right-sided central venous access port. Unchanged cardiac pacemaker defibrillator positioning. There is unchanged mild enlargement of the cardiac silhouette. Low lung volumes with bibasilar atelectasis. No pneumothorax or pleural effusion. Bilateral acromioclavicular joint arthropathy. Healed posterior left fifth rib fracture. IMPRESSION: 1. . There is unchanged mild enlargement of the cardiac silhouette. 2. Low lung volumes with bibasilar atelectasis. VOICE DICTATED BY: Dr. Kinsey Gee Assessment and Plan:  
 
Hospital Problems as of 7/31/2020 Date Reviewed: 7/29/2020 Codes Class Noted - Resolved POA Chronic pain syndrome ICD-10-CM: G89.4 ICD-9-CM: 338.4  7/31/2020 - Present Unknown Cough ICD-10-CM: R05 ICD-9-CM: 786.2  7/31/2020 - Present Unknown Suspected COVID-19 virus infection ICD-10-CM: Z20.828 ICD-9-CM: V01.79  7/31/2020 - Present Unknown Thrombocytopenia ([facility] [address] ICD-10-CM: D69.6 ICD-9-CM: 287.5  7/31/2020 - Present Unknown Multiple myeloma not having achieved remission ([facility] ICD-10-CM: C90.00 ICD-9-CM: 203.00  2/13/2019 - Present Yes Personal history of colon cancer ICD-10-CM: N74.372 
ICD-9-CM: V10.05  12/27/2018 - Present Yes Overview Signed 12/29/2018 12:50 PM by Tim Del Castillo RN  
   
  
  
   
 PAF (paroxysmal atrial fibrillation) [facility]) ICD-10-CM: I48.0 ICD-9-CM: 427.31  11/26/2017 - Present Yes Chronic systolic heart failure (HCC) ICD-10-CM: I50.22 ICD-9-CM: 428.22  11/26/2017 - Present Yes Multiple myeloma (HCC) ICD-10-CM: C90.00 ICD-9-CM: 203.00  1/2/2017 - Present Unknown PLAN: 
- fever,cough- prob pneumonia, high prolactin,cont cefepime. - odynophagia- was able to tolerate tylenol, cont clear liquids, speech evaluation. d5ns at 50cc/hr 
- r/o covid- cont decadron 
- oral thrush- cont fluconazole and nystatin - chronic chf 
- ckd 4 
- chronic pain - paroxsymal afib - on eliquis 
- generalized weakness- PT and OT consulted. - chronic thrombocytopenia 
 
ua pending [facility] full code DVT ppx:  eliquis Anticipated DC needs:   
Code status:  Full Estimated LOS:  Greater than 2 midnights Risk:  high Signed: 
Amy Patton MD

## 2020-07-31 NOTE — ED TRIAGE NOTES
Pt arrives per EMS from home for complaints of fever and shortness of breath. Pt is [place] pt who gets chemo treatments. Last chemo Wednesday.  per EMS.

## 2020-08-01 NOTE — PROGRESS NOTES
CM made contact with patient spouse Dayron King) to discuss d/c plan. Spouse states that they live in a one level home. States that patient is needing more assistance with his ADL's and she's unable to assist patient with his ADL's because of her own health issues. States that patient do has DME's in the home (cane,rolling walker). Spouse states that patient will need rehab before returning back home. Spouse requested that CM speak with son Jacek Ruelas ARTEM, contact # [phone] and provide him information. CM made an attempt to make contact with son left voicemail for a return call. CM will continue to monitor.

## 2020-08-01 NOTE — PROGRESS NOTES
07/31/20 2130 Dual Skin Pressure Injury Assessment Dual Skin Pressure Injury Assessment WDL Second Care Provider (Based on Facility Policy) Yvette Allen RN Skin Integumentary Skin Integumentary (WDL) X Pressure  Injury Documentation No Pressure Injury Noted-Pressure Ulcer Prevention Initiated Skin Integrity Abrasion 
(Bilat ankles)

## 2020-08-01 NOTE — PROGRESS NOTES
STG: 
(1.)Mr. Malini Colon will move from supine to sit and sit to supine , scoot up and down, and roll side to side with CONTACT GUARD ASSIST within 4 treatment day(s). (2.)Mr. Malini Colon will transfer from bed to chair and chair to bed with CONTACT GUARD ASSIST using the least restrictive device within 4 treatment day(s). (3.)Mr. Malini Colon will ambulate with CONTACT GUARD ASSIST for 20 feet with the least restrictive device within 4 treatment day(s). LTG: 
(1.)Mr. Malini Colon will move from supine to sit and sit to supine , scoot up and down, and roll side to side in bed with STAND BY ASSIST within 7 treatment day(s). (2.)Mr. Malini Colon will transfer from bed to chair and chair to bed with STAND BY ASSIST using the least restrictive device within 7 treatment day(s). (3.)Mr. Bashir will ambulate with STAND BY ASSIST for 50 feet with the least restrictive device within 7 treatment day(s). ________________________________________________________________________________________________ PHYSICAL THERAPY: Initial Assessment and PM 8/1/2020 INPATIENT: PT Visit Days : 1 Payor: [employer] / Plan: [address] / Product Type: [employer] Medicare /   
  
NAME/AGE/GENDER: Arun Posadas is a [de-identified] y.o. male PRIMARY DIAGNOSIS: Multiple myeloma ([facility] [address] [C90.00] <principal problem not specified> <principal problem not specified> 
  
  
ICD-10: Treatment Diagnosis:  
 Generalized Muscle Weakness (M62.81) Other lack of cordination (R27.8) Difficulty in walking, Not elsewhere classified (R26.2) Other abnormalities of gait and mobility (R26.89) Precaution/Allergies: 
Ace inhibitors; Lisinopril; and Pcn [penicillins] ASSESSMENT:  
 
Mr. Malini Colon presents is a pleasant frail elderly male with above diagnosis who demonstrates with decreased transfers, ambulation and mobility below his prior functional baseline.    All transfers are currently limited at minimal assistance x 1 out of bed to sit dangling EOB. His seated balance is fair to poor. He fatigues after a few minutes with posterior lean. He is unable to stand at this time but is performing therapeutic exercise in seated. Skilled PT is indicated for this patient's functional mobility deficits. Patient requires cues to perform therapeutic exercises correctly EOB. Overall good progress towards physical therapy goals is anticipated. Goals listed above are appropriate. Will continue efforts as patient is still below functional baseline. At this time, patient is appropriate for Co-treatment with occupational therapy when available due to patient's decreased overall endurance/tolerance levels, as well as need for high level skilled assistance to complete functional transfers/mobility and functional tasks. Jose Palmer appropriate for a multidisciplinary co-treatment of PT and OT to address goals of both disciplines. This section established at most recent assessment PROBLEM LIST (Impairments causing functional limitations): 
Decreased Strength Decreased ADL/Functional Activities Decreased Transfer Abilities Decreased Ambulation Ability/Technique INTERVENTIONS PLANNED: (Benefits and precautions of physical therapy have been discussed with the patient.) Balance Exercise Bed Mobility Gait Training Neuromuscular Re-education/Strengthening Range of Motion (ROM) Therapeutic Activites Therapeutic Exercise/Strengthening TREATMENT PLAN: Frequency/Duration: 3 times a week for duration of hospital stay Rehabilitation Potential For Stated Goals: Good REHAB RECOMMENDATIONS (at time of discharge pending progress):   
Placement:  
It is my opinion, based on this patient's performance to date, that Mr. Ally Garcia may benefit from intensive therapy at a [address] [place] [address] after discharge due to the functional deficits listed above that are likely to improve with skilled rehabilitation and concerns that he/she may be unsafe to be unsupervised at home due to profound decline in functional mobility  . Equipment:  
None at this time HISTORY:  
History of Present Injury/Illness (Reason for Referral): PER MD H&P Pt with known h/o afib on amiodarone and eliquis, multiple myeloma in chemotherapy,cardiomyopathy with defibrillator, hypothyroid, h/o colon cancer,htn, ckd 4 and hyperlipidemia. Since past 2-3 weeks pt had been c/o cough, dry, mild ,later on got worse and productive. Increasing cough since past 2 days. Pt had also noticed difficulty swallowing, says hasnt had much to eat for past 2 days, didn't have any of his medications. Also c/o severe fatigue/generalized weakness. Pt otherwise doesn't c/o headache or dizziness or nausea or vomiting or abdominal pain or chest pain. Hb 8.6, platelet 43,UYREA 2.23,FUYYENZTI 3.51. Temp 101 
cxr-Low lung volumes with bibasilar atelectasis. ua pending Pt will admitted for prob pna, r/o covid and odynophagia. 10 systems reviewed and negative except as noted in HPI.  
Past Medical History/Comorbidities:  
Mr. Justen Cook  has a past medical history of Arrhythmia, Arthritis, Automatic implantable cardioverter-defibrillator in situ (9/1/2011), BMI 30.0-30.9,adult, Cardiomyopathy ([address] Cholelithiases, Chronic systolic heart failure ([address] (9/13/2011), Colonic mass (3/23/2016), Gout, H/O: pituitary tumor, High cholesterol, History of thyroid cancer, History of vertigo, Hurthle cell carcinoma of thyroid ([address] Hx of radiation therapy to prostate (2004), Hyperlipidemia (11/18/2015), Hypertension, Hypothyroid, ICD (implantable cardiac defibrillator) in place (9/2011), LBBB (left bundle branch block) (11/18/2015), Malaise and fatigue (11/18/2015), Malignant neoplasm of prostate [facility]), Mass of colon, Multiple myeloma ([address] Polyneuropathy (12/14/2016), Postsurgical hypothyroidism (3/15/2018), Postural imbalance (12/14/2016), Sinus bradycardia (1/12/2018), Sinus node dysfunction ([address] and Vertigo (11/18/2015). Mr. Mattie Rosa  has a past surgical history that includes hx heent (2008); hx cholecystectomy (2013); hx gi; hx heart catheterization; hx pacemaker (2011/2016); hx colonoscopy; hx thyroidectomy (2008); hx tumor removal (1990/2010); hx thyroidectomy (01/26/2018); and hx vascular access. Social History/Living Environment:  
Home Environment: Private residence # Steps to Enter: 4 One/Two Story Residence: One story Living Alone: No 
Support Systems: Family member(s) Patient Expects to be Discharged to[de-identified] Private residence Current DME Used/Available at Home: Caroline Segovia Prior Level of Function/Work/Activity: 
Patient had been limited in functional mobility secondary to oncology treatment. Dominant Side:  
      RIGHT Personal Factors:   
      Sex:  male Age:  [de-identified] y.o. Number of Personal Factors/Comorbidities that affect the Plan of Care: 1-2: MODERATE COMPLEXITY EXAMINATION:  
Most Recent Physical Functioning:  
Gross Assessment: 
AROM: Generally decreased, functional 
Strength: Generally decreased, functional 
Coordination: Generally decreased, functional 
Tone: Normal 
Sensation: Intact Posture: 
Posture (WDL): Ex[facility] to [facility] Posture Assessment: Cervical, Forward head Balance: 
Sitting: Impaired Sitting - Static: Fair (occasional) Sitting - Dynamic: Fair (occasional); Poor (constant support) Standing: (unable at this time. ) Bed Mobility: 
Rolling: Minimum assistance Supine to Sit: Minimum assistance Sit to Supine: Minimum assistance Scooting: Minimum assistance Wheelchair Mobility: 
  
Transfers: 
  
Gait: 
  
   
  
Body Structures Involved: 
Nerves Bones Joints Muscles Ligaments Body Functions Affected: 
Sensory/Pain Neuromusculoskeletal 
Movement Related Skin Related Metobolic/Endocrine Activities and Participation Affected: 
Learning and Applying Knowledge General Tasks and Demands Communication Mobility Self Care Interpersonal Interactions and Relationships Community, Social and [place] Number of elements that affect the Plan of Care: 3: MODERATE COMPLEXITY CLINICAL PRESENTATION:  
Presentation: Evolving clinical presentation with changing clinical characteristics: MODERATE COMPLEXITY CLINICAL DECISION MAKIN[address] [place] [id number] AM-PAC 6 Clicks Basic Mobility Inpatient Short Form How much difficulty does the patient currently have. .. Unable A Lot A Little None 1. Turning over in bed (including adjusting bedclothes, sheets and blankets)? [] 1   [x] 2   [] 3   [] 4  
2. Sitting down on and standing up from a chair with arms ( e.g., wheelchair, bedside commode, etc.)   [x] 1   [] 2   [] 3   [] 4  
3. Moving from lying on back to sitting on the side of the bed? [x] 1   [] 2   [] 3   [] 4 How much help from another person does the patient currently need. .. Total A Lot A Little None 4. Moving to and from a bed to a chair (including a wheelchair)? [x] 1   [] 2   [] 3   [] 4  
5. Need to walk in hospital room? [x] 1   [] 2   [] 3   [] 4  
6. Climbing 3-5 steps with a railing? [x] 1   [] 2   [] 3   [] 4  
© , Trustees of [address] [place], [place] [id number], under license to [employer]. All rights reserved Score:  Initial: 7 Most Recent: X (Date: -- ) Interpretation of Tool:  Represents activities that are increasingly more difficult (i.e. Bed mobility, Transfers, Gait). Medical Necessity:    
Patient demonstrates  
good 
 rehab potential due to higher previous functional level. Reason for Services/Other Comments: 
Patient continues to require skilled intervention due to  
medical complications, patient unable to attend/participate in therapy as expected, and decreased transfers, ambulation and mobility. Alesia Lock Use of outcome tool(s) and clinical judgement create a POC that gives a: Questionable prediction of patient's progress: MODERATE COMPLEXITY  
  
 
 
 
TREATMENT:  
(In addition to Assessment/Re-Assessment sessions the following treatments were rendered) Pre-treatment Symptoms/Complaints:  0 Pain: Initial:  
Pain Intensity 1: 0  Post Session:  0/10 Therapeutic Activity: (    15 mins): Therapeutic activities including Bed transfers and bed and seated mobility to improve mobility, strength, balance, and coordination. Required minimal   to promote coordination of bilateral, lower extremity(s) and promote motor control of bilateral, lower extremity(s). Therapeutic Exercise: ( 8 mins):  Exercises per grid below to improve mobility, strength, balance, and coordination. Required minimal visual, verbal, manual, and tactile cues to promote proper body alignment, promote proper body posture, and promote proper body mechanics. Progressed repetitions as indicated. Date: 
8/1 Date: 
 Date: Activity/Exercise Parameters Parameters Parameters AP's  15 x's     
HIP ABD  15 x's     
LAQ's  15 x's Seated Marches  15 x's Braces/Orthotics/Lines/Etc:  
IV 
O2 Device: Nasal cannula Treatment/Session Assessment:   
Response to Treatment: limited mobility and transfers. Profound debility and weakness. Interdisciplinary Collaboration:  
Physical Therapist 
Registered Nurse After treatment position/precautions:  
Supine in bed Bed alarm/tab alert on Bed/Chair-wheels locked Bed in low position Call light within reach Side rails x 3 Compliance with Program/Exercises: Will assess as treatment progresses Recommendations/Intent for next treatment session: \"Next visit will focus on advancements to more challenging activities, reduction in assistance provided, and transfers, ambulation and mobility. \". Total Treatment Duration: PT Patient Time In/Time Out Time In: 4249 Time Out: 1610 Anastasia Faria, PT

## 2020-08-01 NOTE — PROGRESS NOTES
Dr. Dionicio Matthews notified of patient's wife's request for a call. This nurse and Dr. Doinicio Matthews made several attempts with no answer.

## 2020-08-01 NOTE — ACP (ADVANCE CARE PLANNING)
CM made contact with patient via phone and patient requested that CM speak with spouse. CM made contact with spouse ans she states that it's no changes been made to ACP to her knowledge. Spouse states that son Juana Moreno) is patient POA, contact information is [phone].

## 2020-08-01 NOTE — PROGRESS NOTES
Hospitalist Progress Note Admit Date:  2020  4:01 PM  
Name:  Chevy Salgado Age:  [de-identified] y.o. 
:  1940 MRN:  [id number] PCP:  Radha Rodriguez MD 
Treatment Team: Attending Provider: Angela Queen MD; Physical Therapist: Laci Champion, PT; Speech Language Pathologist: Manoj Quispe, SLP Subjective: Pt with known h/o afib on amiodarone and eliquis, multiple myeloma in chemotherapy,cardiomyopathy with defibrillator, hypothyroid, h/o colon cancer,htn, ckd 4 and hyperlipidemia. 
  
Since past 2-3 weeks pt had been c/o cough, dry, mild ,later on got worse and productive. Increasing cough since past 2 days. Pt had also noticed difficulty swallowing, says hasnt had much to eat for past 2 days, didn't have any of his medications. Also c/o severe fatigue/generalized weakness. 
  
Pt otherwise doesn't c/o headache or dizziness or nausea or vomiting or abdominal pain or chest pain. 
  
Hb 8.6, platelet 39,AEMGO 2.66,NNUVFHMNS 3.51. Temp 101 
cxr-Low lung volumes with bibasilar atelectasis. ua pending 
  
Pt will admitted for prob pna, r/o covid and odynophagia. 2020 Says doing ok Pain on swallowing , on clear liquids Objective:  
 
Patient Vitals for the past 24 hrs: 
 Temp Pulse Resp BP SpO2  
20 0906 99.4 °F (37.4 °C) 84 18 109/54 95 % 20 0333 99.1 °F (37.3 °C) 77 19 105/44 94 % 20 2300 99.7 °F (37.6 °C) 90 18 105/45 90 % 20 2056 98.4 °F (36.9 °C) 74 16 145/72 (!) 87 % 20 1819  91   94 % 20 1816  76  143/60 94 % 20 1608  93  100/53 92 % 20 1559 (!) 101 °F (38.3 °C) 88 18 132/66 95 % Oxygen Therapy O2 Sat (%): 95 % (20 0906) Pulse via Oximetry: 91 beats per minute (20) O2 Device: Nasal cannula (20) O2 Flow Rate (L/min): 2 l/min (20) No intake or output data in the 24 hours ending 20 0570 General:    Well nourished. Alert. frequent cough heent- dry mucus memebrane CV:   RRR. No murmur, rub, or gallop. Lungs:   Coarse breath sound Abdomen:   Soft, nontender, nondistended. Cns- no focal neurological deficit Extremities: Warm and dry. No cyanosis or edema. Skin:     No rashes or jaundice. Data Review: 
I have reviewed all labs, meds, telemetry events, and studies from the last 24 hours. Recent Results (from the past 24 hour(s)) EKG, 12 LEAD, INITIAL Collection Time: 07/31/20  4:03 PM  
Result Value Ref Range Ventricular Rate 86 BPM  
 Atrial Rate 86 BPM  
 P-R Interval 136 ms QRS Duration 128 ms Q-T Interval 352 ms QTC Calculation (Bezet) 421 ms Calculated P Axis 18 degrees Calculated R Axis -17 degrees Calculated T Axis -179 degrees Diagnosis    
  !! AGE AND GENDER SPECIFIC ECG ANALYSIS !! Electronic ventricular pacemaker When compared with ECG of 26-NOV-2017 09:50, 
Vent. rate has increased BY   8 BPM 
  
CULTURE, BLOOD Collection Time: 07/31/20  4:07 PM  
 Specimen: Blood Result Value Ref Range Special Requests: RIGHT 
FOREARM Culture result: NO GROWTH AFTER 14 HOURS FERRITIN Collection Time: 07/31/20  4:07 PM  
Result Value Ref Range Ferritin 4,917 (H) 8 - 388 NG/ML  
LD Collection Time: 07/31/20  4:07 PM  
Result Value Ref Range  (H) 110 - 210 U/L  
D DIMER Collection Time: 07/31/20  4:07 PM  
Result Value Ref Range D DIMER 3.81 (HH) <0.56 ug/ml(FEU) CBC WITH AUTOMATED DIFF Collection Time: 07/31/20  4:08 PM  
Result Value Ref Range WBC 2.3 (L) 4.3 - 11.1 K/uL  
 RBC 3.71 (L) 4.23 - 5.6 M/uL HGB 8.6 (L) 13.6 - 17.2 g/dL HCT 25.9 (L) 41.1 - 50.3 % MCV 69.8 (L) 79.6 - 97.8 FL  
 MCH 23.2 (L) 26.1 - 32.9 PG  
 MCHC 33.2 31.4 - 35.0 g/dL  
 RDW 15.4 (H) 11.9 - 14.6 % PLATELET 60 (L) 297 - 450 K/uL MPV Unable to calculate. Recommend adding IPF. 9.4 - 12.3 FL ABSOLUTE NRBC 0.08 0.0 - 0.2 K/uL NEUTROPHILS 29 (L) 43 - 78 % LYMPHOCYTES 62 (H) 13 - 44 % MONOCYTES 4 4.0 - 12.0 % EOSINOPHILS 2 0.5 - 7.8 % BASOPHILS 0 0.0 - 2.0 % IMMATURE GRANULOCYTES 3 0.0 - 5.0 %  
 ABS. NEUTROPHILS 0.7 (L) 1.7 - 8.2 K/UL  
 ABS. LYMPHOCYTES 1.4 0.5 - 4.6 K/UL  
 ABS. MONOCYTES 0.1 0.1 - 1.3 K/UL  
 ABS. EOSINOPHILS 0.0 0.0 - 0.8 K/UL  
 ABS. BASOPHILS 0.0 0.0 - 0.2 K/UL  
 ABS. IMM. GRANS. 0.1 0.0 - 0.5 K/UL  
 RBC COMMENTS SLIGHT 
ANISOCYTOSIS + POIKILOCYTOSIS 
    
 RBC COMMENTS OCCASIONAL 
TARGET CELLS 
    
 WBC COMMENTS MARKED PLATELET COMMENTS MARKED    
 DF AUTOMATED METABOLIC PANEL, COMPREHENSIVE Collection Time: 07/31/20  4:08 PM  
Result Value Ref Range Sodium 142 136 - 145 mmol/L Potassium 3.7 3.5 - 5.1 mmol/L Chloride 112 (H) 98 - 107 mmol/L  
 CO2 21 21 - 32 mmol/L Anion gap 9 7 - 16 mmol/L Glucose 79 65 - 100 mg/dL BUN 45 (H) 8 - 23 MG/DL Creatinine 3.97 (H) 0.8 - 1.5 MG/DL  
 GFR est AA 19 (L) >60 ml/min/1.73m2 GFR est non-AA 16 (L) >60 ml/min/1.73m2 Calcium 9.8 8.3 - 10.4 MG/DL Bilirubin, total 0.6 0.2 - 1.1 MG/DL  
 ALT (SGPT) 22 12 - 65 U/L  
 AST (SGOT) 44 (H) 15 - 37 U/L Alk. phosphatase 51 50 - 136 U/L Protein, total 8.6 (H) 6.3 - 8.2 g/dL Albumin 2.8 (L) 3.2 - 4.6 g/dL Globulin 5.8 (H) 2.3 - 3.5 g/dL A-G Ratio 0.5 (L) 1.2 - 3.5 LACTIC ACID Collection Time: 07/31/20  4:08 PM  
Result Value Ref Range Lactic acid 1.4 0.4 - 2.0 MMOL/L  
PROCALCITONIN Collection Time: 07/31/20  4:08 PM  
Result Value Ref Range Procalcitonin 3.51 ng/mL CULTURE, BLOOD Collection Time: 07/31/20  4:14 PM  
 Specimen: Blood Result Value Ref Range Special Requests: RIGHT 
HAND Culture result: NO GROWTH AFTER 14 HOURS    
SARS-COV-2 Collection Time: 07/31/20  5:06 PM  
Result Value Ref Range Specimen source Nasopharyngeal    
 COVID-19 rapid test Not detected NOTD    
 SARS CoV-2 PENDING   
CULTURE, URINE  Collection Time: 07/31/20  5:07 PM  
 Specimen: Clean catch; Urine URINE Result Value Ref Range Special Requests: NO SPECIAL REQUESTS Culture result:     
  NO GROWTH AFTER SHORT PERIOD OF INCUBATION. FURTHER RESULTS TO FOLLOW AFTER OVERNIGHT INCUBATION. URINALYSIS W/ RFLX MICROSCOPIC Collection Time: 07/31/20  5:07 PM  
Result Value Ref Range Color YELLOW Appearance CLOUDY Specific gravity 1.014 1.001 - 1.023    
 pH (UA) 6.0 5.0 - 9.0 Protein 100 (A) NEG mg/dL Glucose Negative mg/dL Ketone Negative NEG mg/dL Bilirubin Negative NEG Blood SMALL (A) NEG Urobilinogen 0.2 0.2 - 1.0 EU/dL Nitrites Negative NEG Leukocyte Esterase Negative NEG    
 WBC 0-3 0 /hpf  
 RBC 0-3 0 /hpf Epithelial cells 0-3 0 /hpf Bacteria 0 0 /hpf Casts 0-3 0 /lpf  
TYPE & SCREEN Collection Time: 07/31/20  5:58 PM  
Result Value Ref Range Crossmatch Expiration 08/03/2020 ABO/Rh(D) B POSITIVE Antibody screen NEG METABOLIC PANEL, BASIC Collection Time: 08/01/20  5:27 AM  
Result Value Ref Range Sodium 146 (H) 136 - 145 mmol/L Potassium 3.3 (L) 3.5 - 5.1 mmol/L Chloride 114 (H) 98 - 107 mmol/L  
 CO2 19 (L) 21 - 32 mmol/L Anion gap 13 7 - 16 mmol/L Glucose 89 65 - 100 mg/dL BUN 47 (H) 8 - 23 MG/DL Creatinine 4.31 (H) 0.8 - 1.5 MG/DL  
 GFR est AA 17 (L) >60 ml/min/1.73m2 GFR est non-AA 14 (L) >60 ml/min/1.73m2 Calcium 8.9 8.3 - 10.4 MG/DL  
CBC WITH AUTOMATED DIFF Collection Time: 08/01/20  5:27 AM  
Result Value Ref Range WBC 1.1 (LL) 4.3 - 11.1 K/uL  
 RBC 3.49 (L) 4.23 - 5.6 M/uL HGB 8.1 (L) 13.6 - 17.2 g/dL HCT 24.3 (L) 41.1 - 50.3 % MCV 69.6 (L) 79.6 - 97.8 FL  
 MCH 23.2 (L) 26.1 - 32.9 PG  
 MCHC 33.3 31.4 - 35.0 g/dL  
 RDW 15.1 (H) 11.9 - 14.6 % PLATELET 41 (L) 918 - 450 K/uL MPV Unable to calculate. Recommend adding IPF. 9.4 - 12.3 FL ABSOLUTE NRBC 0.09 0.0 - 0.2 K/uL NEUTROPHILS 19 (L) 43 - 78 % LYMPHOCYTES 69 (H) 13 - 44 % MONOCYTES 5 4.0 - 12.0 % EOSINOPHILS 3 0.5 - 7.8 % BASOPHILS 0 0.0 - 2.0 % IMMATURE GRANULOCYTES 4 0.0 - 5.0 %  
 ABS. NEUTROPHILS 0.2 (L) 1.7 - 8.2 K/UL  
 ABS. LYMPHOCYTES 0.8 0.5 - 4.6 K/UL  
 ABS. MONOCYTES 0.1 0.1 - 1.3 K/UL  
 ABS. EOSINOPHILS 0.0 0.0 - 0.8 K/UL  
 ABS. BASOPHILS 0.0 0.0 - 0.2 K/UL  
 ABS. IMM. GRANS. 0.0 0.0 - 0.5 K/UL  
 RBC COMMENTS SLIGHT 
ANISOCYTOSIS + POIKILOCYTOSIS 
    
 RBC COMMENTS OCCASIONAL 
TARGET CELLS 
    
 WBC COMMENTS Result Confirmed By Smear PLATELET COMMENTS MARKED    
 DF AUTOMATED [employer] Results Procedure Component Value Units Date/Time CULTURE, URINE [[id number]] Collected:  07/31/20 1707 Order Status:  Completed Specimen:  Urine from Clean catch Updated:  08/01/20 0548 Special Requests: NO SPECIAL REQUESTS Culture result:    
  NO GROWTH AFTER SHORT PERIOD OF INCUBATION. FURTHER RESULTS TO FOLLOW AFTER OVERNIGHT INCUBATION. CULTURE, BLOOD [[id number]] Collected:  07/31/20 1607 Order Status:  Completed Specimen:  Blood Updated:  08/01/20 6968 Special Requests: --     
  RIGHT 
FOREARM Culture result: NO GROWTH AFTER 14 HOURS     
 CULTURE, BLOOD [[id number]] Collected:  07/31/20 1614 Order Status:  Completed Specimen:  Blood Updated:  08/01/20 5974 Special Requests: --     
  RIGHT 
HAND Culture result: NO GROWTH AFTER 14 HOURS Current Meds: 
Current Facility-Administered Medications Medication Dose Route Frequency  potassium chloride 20 mEq in 100 ml IVPB  20 mEq IntraVENous Q2H  
 tuberculin injection 5 Units  5 Units IntraDERMal ONCE  
 amiodarone (CORDARONE) tablet 200 mg  200 mg Oral DAILY  carvediloL (COREG) tablet 6.25 mg  6.25 mg Oral BID WITH MEALS  chlorhexidine (PERIDEX) 0.12 % mouthwash 15 mL  15 mL Swish and Spit BID  cholecalciferol (VITAMIN D3) (1000 Units /25 mcg) tablet 2 Tab  2,000 Units Oral DAILY  cyclobenzaprine (FLEXERIL) tablet 10 mg  10 mg Oral TID PRN  
 apixaban (ELIQUIS) tablet 2.5 mg  2.5 mg Oral BID  
 HYDROmorphone (DILAUDID) tablet 4-8 mg  4-8 mg Oral Q4H PRN  
 levothyroxine (SYNTHROID) tablet 175 mcg  175 mcg Oral ACB  magnesium oxide (MAG-OX) tablet 400 mg  400 mg Oral BID  [Held by provider] torsemide (DEMADEX) tablet 20 mg  20 mg Oral EVERY OTHER DAY  rosuvastatin (CRESTOR) tablet 10 mg  10 mg Oral QHS  sodium chloride (NS) flush 5-40 mL  5-40 mL IntraVENous Q8H  
 sodium chloride (NS) flush 5-40 mL  5-40 mL IntraVENous PRN  
 acetaminophen (TYLENOL) tablet 650 mg  650 mg Oral Q6H PRN Or  
 acetaminophen (TYLENOL) suppository 650 mg  650 mg Rectal Q6H PRN  polyethylene glycol (MIRALAX) packet 17 g  17 g Oral DAILY PRN  promethazine (PHENERGAN) tablet 12.5 mg  12.5 mg Oral Q6H PRN Or  
 ondansetron (ZOFRAN) injection 4 mg  4 mg IntraVENous Q6H PRN  
 nystatin (MYCOSTATIN) 100,000 unit/mL oral suspension 500,000 Units  500,000 Units Oral QID  cefepime (MAXIPIME) 2 g in 0.9% sodium chloride (MBP/ADV) 100 mL  2 g IntraVENous Q24H  
 dextrose 5% and 0.9% NaCl infusion  100 mL/hr IntraVENous CONTINUOUS  
 fluconazole (DIFLUCAN) 200mg/100 mL IVPB (premix)  200 mg IntraVENous Q24H Other Studies (last 24 hours): Xr Chest [facility] Result Date: 7/31/2020 EXAMINATION: TEMPORARY 7/31/2020 4:26 PM ACCESSION NUMBER: [id number] INDICATION: Shortness of breath COMPARISON: Chest CT 12/17/2019, chest x-ray 7/18/2018, 7/9/2018 TECHNIQUE: A single AP view of the chest was obtained. FINDINGS: Unchanged positioning of a right-sided central venous access port. Unchanged cardiac pacemaker defibrillator positioning. There is unchanged mild enlargement of the cardiac silhouette. Low lung volumes with bibasilar atelectasis. No pneumothorax or pleural effusion. Bilateral acromioclavicular joint arthropathy. Healed posterior left fifth rib fracture. IMPRESSION: 1. . There is unchanged mild enlargement of the cardiac silhouette. 2. Low lung volumes with bibasilar atelectasis. VOICE DICTATED BY: Dr. Meghan Butler Assessment and Plan:  
 
Hospital Problems as of 8/1/2020 Date Reviewed: 7/29/2020 Codes Class Noted - Resolved POA Chronic pain syndrome ICD-10-CM: G89.4 ICD-9-CM: 338.4  7/31/2020 - Present Unknown Cough ICD-10-CM: R05 ICD-9-CM: 786.2  7/31/2020 - Present Unknown Suspected COVID-19 virus infection ICD-10-CM: Z20.828 ICD-9-CM: V01.79  7/31/2020 - Present Unknown [facility]ocyt[facility] ICD-10-CM: D69.6 ICD-9-CM: 287.5  7/31/2020 - Present Unknown PNA (pneumonia) ICD-10-CM: J18.9 ICD-9-CM: 705  7/31/2020 - Present Unknown Odynophagia ICD-10-CM: R13.10 ICD-9-CM: 787.20  7/31/2020 - Present Unknown Multiple myeloma not having achieved remission ([facility] ICD-10-CM: C90.00 ICD-9-CM: 203.00  2/13/2019 - Present Yes Personal history of colon cancer ICD-10-CM: A08.088 
ICD-9-CM: V10.05  12/27/2018 - Present Yes Overview Signed 12/29/2018 12:50 PM by Daljit Carbone RN  
   
  
  
   
 PAF (paroxysmal atrial fibrillation) [facility]) ICD-10-CM: I48.0 ICD-9-CM: 427.31  11/26/2017 - Present Yes Chronic systolic heart failure (HCC) ICD-10-CM: I50.22 ICD-9-CM: 428.22  11/26/2017 - Present Yes Multiple myeloma (HCC) ICD-10-CM: C90.00 ICD-9-CM: 203.00  1/2/2017 - Present Unknown PLAN: 
- fever,cough- prob pneumonia, high prolactin,cont cefepime. - odynophagia- was able to tolerate tylenol, cont clear liquids, speech evaluation.  
- r/o covid- cont decadron 
- oral thrush- cont fluconazole and nystatin - chronic chf 
- acute on ckd- increase ivf to 100 cc/hr 
- chronic pain - paroxsymal afib - on eliquis 
- generalized weakness- PT and OT consulted. - chronic thrombocytopenia Spoke to mother ans son Son [phone] 
  
 
  
[facility] full code 
  
 DVT ppx:  eliquis Anticipated DC needs:   
Code status:  Full Estimated LOS:  Greater than 2 midnights Risk:  high Signed: 
Radha Lee MD

## 2020-08-01 NOTE — PROGRESS NOTES
Problem: Falls - Risk of 
Goal: *Absence of Falls Description: Document Serina Centeno Fall Risk and appropriate interventions in the flowsheet. Outcome: Progressing Towards Goal 
Note: Fall Risk Interventions: 
Mobility Interventions: Bed/chair exit alarm, PT Consult for mobility concerns Mentation Interventions: Adequate sleep, hydration, pain control, Bed/chair exit alarm Medication Interventions: Bed/chair exit alarm, Teach patient to arise slowly Elimination Interventions: Bed/chair exit alarm

## 2020-08-01 NOTE — PROGRESS NOTES
Problem: Dysphagia (Adult) Goal: *Speech Goal: (INSERT TEXT) Description: LTG: Patient will tolerate least restrictive diet without overt signs or symptoms of airway compromise by discharge. STG: Patient will tolerate PO trials with SLP only without overt signs or symptoms of aspiration. STG: Patient will participate in modified barium swallow study as clinically indicated. Outcome: Progressing Towards Goal 
  
SPEECH LANGUAGE PATHOLOGY: DYSPHAGIA- Initial Assessment NAME/AGE/GENDER: El Garcia is a [de-identified] y.o. male DATE: 8/1/2020 PRIMARY DIAGNOSIS: [facility] [C90.00] ICD-10: Treatment Diagnosis: R13.12 Dysphagia, Oropharyngeal Phase RECOMMENDATIONS  
DIET:  
? NPO with alternative means of nutrition MEDICATIONS: Non-oral 
  
ASPIRATION PRECAUTIONS · Slow rate of intake · Small bites/sips · Upright at 90 degrees during meal 
  
EDUCATION: 
· Recommendations discussed with Nursing · Patient CONTINUATION OF SKILLED SERVICES/MEDICAL NECESSITY: 
? Patient is expected to demonstrate progress in  swallow function, diet tolerance and swallow safety in order to  improve swallow safety, work toward diet advancement and decrease aspiration risk. ? Patient continues to require skilled intervention due to dysphagia. RECOMMENDATIONS for CONTINUED SPEECH THERAPY:  
YES: Anticipate need for ongoing speech therapy during this hospitalization and at next level of care. ASSESSMENT Patient presents with severe dysphagia. Swallows of thin and nectar liquids are delayed, but with adequate laryngeal excursion. However, after approximately 10-15 seconds, patient starts to cough and brings up copious amounts of secretions. Patient reports \"nothing is going down\" and \"it just hangs there\". No further PO trials attempted. Recommend NPO with alternate method for nutrition. Patient would benefit from Yankauer suction set-up in room.  Consider also GI consult for further assessment of esophageal phase of swallow. Patient would also benefit from further objective assessment of pharyngeal phase of swallow via Modified Barium Swallow study when medically able to participate. REHABILITATION POTENTIAL FOR STATED GOALS: Fair PLAN   
FREQUENCY/DURATION: Continue to follow patient 2 times a week for duration of hospital stay to address above goals. - Recommendations for next treatment session: Next treatment will address PO trials SUBJECTIVE Patient pleasant and cooperative. He states he is exhausted from coughing. History of Present Injury/Illness: Mr. Caitlyn Griffin  has a past medical history of Arrhythmia, Arthritis, Automatic implantable cardioverter-defibrillator in situ (9/1/2011), BMI 30.0-30.9,adult, Cardiomyopathy ([address] Cholelithiases, Chronic systolic heart failure ([address] (9/13/2011), Colonic mass (3/23/2016), Gout, H/O: pituitary tumor, High cholesterol, History of thyroid cancer, History of vertigo, Hurthle cell carcinoma of thyroid ([address] Hx of radiation therapy to prostate (2004), Hyperlipidemia (11/18/2015), Hypertension, Hypothyroid, ICD (implantable cardiac defibrillator) in place (9/2011), LBBB (left bundle branch block) (11/18/2015), Malaise and fatigue (11/18/2015), Malignant neoplasm of prostate [facility]), Mass of colon, Multiple myeloma ([address] Polyneuropathy (12/14/2016), Postsurgical hypothyroidism (3/15/2018), Postural imbalance (12/14/2016), Sinus bradycardia (1/12/2018), Sinus node dysfunction ([address] and Vertigo (11/18/2015). Sarah Gonzalez He also  has a past surgical history that includes hx heent (2008); hx cholecystectomy (2013); hx gi; hx heart catheterization; hx pacemaker (2011/2016); hx colonoscopy; hx thyroidectomy (2008); hx tumor removal (1990/2010); hx thyroidectomy (01/26/2018); and hx vascular access. Problem List:  (Impairments causing functional limitations): 1. dysphagia Previous Dysphagia: NONE REPORTED Diet Prior to Evaluation: clear liquid Orientation:  
Person Place Situation Pain: Pain Scale 1: Numeric (0 - 10) Pain Intensity 1: 0 Cognitive-Linguistic Screen: 
? Speech Production:  
o Impaired ? Expressive Language: 
o WNL ? Receptive Language: 
o WNL ? Cognition:  
o WNL OBJECTIVE Oral Motor:  
· Labial: No impairment · Dentition: limited · Oral Hygiene: thick secretions · Lingual: No impairment Swallow evaluation: 
 Patient consumed trials of thin and nectar liquids by cup and straw sip and serial straw swallow. Pharyngeal phase of swallow appears intact, but then patient with delayed cough and bringing up copious thick secretions. INTERDISCIPLINARY COLLABORATION: Registered Nurse PRECAUTIONS/ALLERGIES: Ace inhibitors; Lisinopril; and Pcn [penicillins] Tool Used: Dysphagia Outcome and Severity Scale (WESTLEY) Score Comments Normal Diet  [] 7 With no strategies or extra time needed Functional Swallow  [] 6 May have mild oral or pharyngeal delay Mild Dysphagia  [] 5 Which may require one diet consistency restricted Mild-Moderate Dysphagia  [] 4 With 1-2 diet consistencies restricted Moderate Dysphagia  [] 3 With 2 or more diet consistencies restricted Moderate-Severe Dysphagia  [x] 2 With partial PO strategies (trials with ST only) Severe Dysphagia  [] 1 With inability to tolerate any PO safely Score:  Initial: 2 Most Recent: 2 (Date 08/01/20 ) Interpretation of Tool: The Dysphagia Outcome and Severity Scale (WESTLEY) is a simple, easy-to-use, 7-point scale developed to systematically rate the functional severity of dysphagia based on objective assessment and make recommendations for diet level, independence level, and type of nutrition. Current Medications: No current facility-administered medications on file prior to encounter. Current Outpatient Medications on File Prior to Encounter Medication Sig Dispense Refill  pomalidomide (Pomalyst) 4 mg cap Take 4 mg by mouth daily for 21 days. Take for 21 days on and 7 days off 21 Cap 0  
 amiodarone (CORDARONE) 200 mg tablet Take 1 Tab by mouth daily. reduce to 1/2 tab a day 7/9  Indications: prevention of recurrent atrial fibrillation 90 Tab 3  
 fentaNYL (DURAGESIC) 100 mcg/hr PATCH 1 Patch by TransDERmal route every seventy-two (72) hours for 30 days. Max Daily Amount: 1 Patch. 10 Patch 0  
 rosuvastatin (Crestor) 10 mg tablet Take 1 Tab by mouth nightly. 90 Tab 3  
 Omeprazole delayed release (PRILOSEC D/R) 20 mg tablet Take 20 mg by mouth daily.  levothyroxine (SYNTHROID) 175 mcg tablet Take 1 Tab by mouth Daily (before breakfast). 90 Tab 3  
 ELIQUIS 5 mg tablet TAKE 1 TABLET BY MOUTH TWO  TIMES DAILY 180 Tab 3  potassium chloride (K-DUR, KLOR-CON) 20 mEq tablet TAKE 1 TABLET BY MOUTH TWO  TIMES DAILY 180 Tab 3  carvedilol (COREG) 6.25 mg tablet TAKE 1 TABLET BY MOUTH TWO  TIMES DAILY WITH MEALS 180 Tab 3  
 magnesium oxide (MAG-OX) 400 mg tablet Take 1 Tab by mouth two (2) times a day. 180 Tab 2  
 torsemide (DEMADEX) 20 mg tablet Take 20 mg by mouth every other day.  polyethylene glycol (MIRALAX) 17 gram packet Take 17 g by mouth daily.  cyclobenzaprine (FLEXERIL) 10 mg tablet Take 1 Tab by mouth three (3) times daily as needed for Muscle Spasm(s). 270 Tab 3  
 HYDROmorphone (DILAUDID) 4 mg tablet Take 1-2 Tabs by mouth every four (4) hours as needed for Pain. Max Daily Amount: 48 mg. 180 Tab 0  
 amino ac/whey prot conc, isol (WHEY PROTEIN PO) Take  by mouth.  chlorhexidine (PERIDEX) 0.12 % solution 15 mL by Swish and Spit route two (2) times a day.  cyanocobalamin (VITAMIN B-12) 1,000 mcg sublingual tablet Take 1,000 mcg by mouth daily.  cholecalciferol, vitamin D3, (VITAMIN D3) 2,000 unit tab Take  by mouth. After treatment position/precautions: · Upright in bed · RN notified Total Treatment Duration: Time In: 1300 Time Out: 3970 Renee Reece MA, CCC-SLP

## 2020-08-01 NOTE — PROGRESS NOTES
Chaplains have visited with patient in previous admissions Will continue to follow as needed S[place] Matthew, staff

## 2020-08-02 NOTE — PROGRESS NOTES
Hospitalist Progress Note Admit Date:  2020  4:01 PM  
Name:  Mindi Cooper Age:  [de-identified] y.o. 
:  1940 MRN:  [id number] PCP:  Libby Davies MD 
Treatment Team: Attending Provider: Garland Blum MD; Consulting Provider: Modesto Kellogg MD; Utilization Review: Evelio Pinto RN; Consulting Provider: Nery Moses MD 
 
Subjective: Pt with known h/o afib on amiodarone and eliquis, multiple myeloma in chemotherapy,cardiomyopathy with defibrillator, hypothyroid, h/o colon cancer,htn, ckd 4 and hyperlipidemia. 
  
Since past 2-3 weeks pt had been c/o cough, dry, mild ,later on got worse and productive. Increasing cough since past 2 days. Pt had also noticed difficulty swallowing, says hasnt had much to eat for past 2 days, didn't have any of his medications. Also c/o severe fatigue/generalized weakness. 
  
Pt otherwise doesn't c/o headache or dizziness or nausea or vomiting or abdominal pain or chest pain. 
  
Hb 8.6, platelet 46,VNJGK 8.10,YVVAXBDKJ 3.51. Temp 101 
cxr-Low lung volumes with bibasilar atelectasis. ua pending 
  
Pt will admitted for prob pna, r/o covid and odynophagia. 2020 Says doing ok Pain on swallowing , on clear liquids 2020 Wants to know when he can eat Says cough still there mild better 
covid negative Npo as per speech since yesterday Objective:  
 
Patient Vitals for the past 24 hrs: 
 Temp Pulse Resp BP SpO2  
20 0719 97.3 °F (36.3 °C) 77 18 91/48 95 % 20 0340 98.7 °F (37.1 °C) 83 20 103/46 94 % 20 2332 100.3 °F (37.9 °C) 86 20 103/47 94 % 20 99.4 °F (37.4 °C) 89 20 98/51 96 % 20 1534 99.7 °F (37.6 °C) 88 20 98/58 94 % Oxygen Therapy O2 Sat (%): 95 % (20) Pulse via Oximetry: 91 beats per minute (20) O2 Device: Nasal cannula (20) O2 Flow Rate (L/min): 2 l/min (20) Intake/Output Summary (Last 24 hours) at 2020 1152 Last data filed at 8/1/2020 6385 Gross per 24 hour Intake 360 ml Output  Net 360 ml General:    Well nourished. Alert. frequent cough 
heent- dry mucus memebrane CV:   RRR. No murmur, rub, or gallop. Lungs:   Coarse breath sound Abdomen:   Soft, nontender, nondistended. Cns- no focal neurological deficit Extremities: Warm and dry. No cyanosis or edema. Skin:     No rashes or jaundice. Data Review: 
I have reviewed all labs, meds, telemetry events, and studies from the last 24 hours. Recent Results (from the past 24 hour(s)) METABOLIC PANEL, BASIC Collection Time: 08/02/20  4:59 AM  
Result Value Ref Range Sodium 148 (H) 136 - 145 mmol/L Potassium 4.2 3.5 - 5.1 mmol/L Chloride 120 (H) 98 - 107 mmol/L  
 CO2 16 (L) 21 - 32 mmol/L Anion gap 12 7 - 16 mmol/L Glucose 111 (H) 65 - 100 mg/dL BUN 58 (H) 8 - 23 MG/DL Creatinine 5.27 (H) 0.8 - 1.5 MG/DL  
 GFR est AA 14 (L) >60 ml/min/1.73m2 GFR est non-AA 11 (L) >60 ml/min/1.73m2 Calcium 8.3 8.3 - 10.4 MG/DL  
CBC WITH AUTOMATED DIFF Collection Time: 08/02/20 10:28 AM  
Result Value Ref Range WBC 1.3 (LL) 4.3 - 11.1 K/uL  
 RBC 3.15 (L) 4.23 - 5.6 M/uL HGB 7.6 (L) 13.6 - 17.2 g/dL HCT 21.7 (L) 41.1 - 50.3 % MCV 68.9 (L) 79.6 - 97.8 FL  
 MCH 24.1 (L) 26.1 - 32.9 PG  
 MCHC 35.0 31.4 - 35.0 g/dL  
 RDW 15.7 (H) 11.9 - 14.6 % PLATELET 50 (L) 788 - 450 K/uL MPV Unable to calculate. Recommend adding IPF. 9.4 - 12.3 FL ABSOLUTE NRBC 0.10 0.0 - 0.2 K/uL DF PENDING All Micro Results Procedure Component Value Units Date/Time CULTURE, URINE [[id number]] Collected:  07/31/20 1707 Order Status:  Completed Specimen:  Urine from Clean catch Updated:  08/02/20 0720 Special Requests: NO SPECIAL REQUESTS Culture result:    
  <10,000 COLONIES/mL NORMAL SKIN SINDHU ISOLATED 
  
 CULTURE, BLOOD [[id number]] Collected:  07/31/20 7522 Order Status:  Completed Specimen:  Blood Updated:  08/02/20 6024 Special Requests: --     
  RIGHT 
FOREARM Culture result: NO GROWTH 2 DAYS     
 CULTURE, BLOOD [[id number]] Collected:  07/31/20 1614 Order Status:  Completed Specimen:  Blood Updated:  08/02/20 4407 Special Requests: --     
  RIGHT 
HAND Culture result: NO GROWTH 2 DAYS Current Meds: 
Current Facility-Administered Medications Medication Dose Route Frequency  pantoprazole (PROTONIX) 40 mg in 0.9% sodium chloride 10 mL injection  40 mg IntraVENous Q12H  
 amiodarone (CORDARONE) tablet 200 mg  200 mg Oral DAILY  carvediloL (COREG) tablet 6.25 mg  6.25 mg Oral BID WITH MEALS  chlorhexidine (PERIDEX) 0.12 % mouthwash 15 mL  15 mL Swish and Spit BID  cholecalciferol (VITAMIN D3) (1000 Units /25 mcg) tablet 2 Tab  2,000 Units Oral DAILY  cyclobenzaprine (FLEXERIL) tablet 10 mg  10 mg Oral TID PRN  
 HYDROmorphone (DILAUDID) tablet 4-8 mg  4-8 mg Oral Q4H PRN  
 levothyroxine (SYNTHROID) tablet 175 mcg  175 mcg Oral ACB  magnesium oxide (MAG-OX) tablet 400 mg  400 mg Oral BID  [Held by provider] torsemide (DEMADEX) tablet 20 mg  20 mg Oral EVERY OTHER DAY  rosuvastatin (CRESTOR) tablet 10 mg  10 mg Oral QHS  sodium chloride (NS) flush 5-40 mL  5-40 mL IntraVENous Q8H  
 sodium chloride (NS) flush 5-40 mL  5-40 mL IntraVENous PRN  
 acetaminophen (TYLENOL) tablet 650 mg  650 mg Oral Q6H PRN Or  
 acetaminophen (TYLENOL) suppository 650 mg  650 mg Rectal Q6H PRN  polyethylene glycol (MIRALAX) packet 17 g  17 g Oral DAILY PRN  promethazine (PHENERGAN) tablet 12.5 mg  12.5 mg Oral Q6H PRN Or  
 ondansetron (ZOFRAN) injection 4 mg  4 mg IntraVENous Q6H PRN  
 nystatin (MYCOSTATIN) 100,000 unit/mL oral suspension 500,000 Units  500,000 Units Oral QID  cefepime (MAXIPIME) 2 g in 0.9% sodium chloride (MBP/ADV) 100 mL  2 g IntraVENous Q24H  dextrose 5% and 0.9% NaCl infusion  100 mL/hr IntraVENous CONTINUOUS  
 fluconazole (DIFLUCAN) 200mg/100 mL IVPB (premix)  200 mg IntraVENous Q24H Other Studies (last 24 hours): No results found. Assessment and Plan:  
 
Hospital Problems as of 8/2/2020 Date Reviewed: 7/29/2020 Codes Class Noted - Resolved POA Chronic pain syndrome ICD-10-CM: G89.4 ICD-9-CM: 338.4  7/31/2020 - Present Unknown Cough ICD-10-CM: R05 ICD-9-CM: 786.2  7/31/2020 - Present Unknown Suspected COVID-19 virus infection ICD-10-CM: Z20.828 ICD-9-CM: V01.79  7/31/2020 - Present Unknown Thrombocytopenia ([facility] ICD-10-CM: D69.6 ICD-9-CM: 287.5  7/31/2020 - Present Unknown PNA (pneumonia) ICD-10-CM: J18.9 ICD-9-CM: 535  7/31/2020 - Present Unknown Odynophagia ICD-10-CM: R13.10 ICD-9-CM: 787.20  7/31/2020 - Present Unknown Multiple myeloma not having achieved remission ([facility] ICD-10-CM: C90.00 ICD-9-CM: 203.00  2/13/2019 - Present Yes Personal history of colon cancer ICD-10-CM: D49.974 
ICD-9-CM: V10.05  12/27/2018 - Present Yes Overview Signed 12/29/2018 12:50 PM by Daria Cárdenas RN  
   
  
  
   
 PAF (paroxysmal atrial fibrillation) [facility]) ICD-10-CM: I48.0 ICD-9-CM: 427.31  11/26/2017 - Present Yes Chronic systolic heart failure (HCC) ICD-10-CM: I50.22 ICD-9-CM: 428.22  11/26/2017 - Present Yes Multiple myeloma (HCC) ICD-10-CM: C90.00 ICD-9-CM: 203.00  1/2/2017 - Present Unknown PLAN: 
- fever,cough- prob pneumonia, high prolactin,cont cefepime. - odynophagia- npo as per speech, gi following, for probabale egd in am 
- covid ruled out 
- oral thrush- cont fluconazole and nystatin - chronic chf 
- acute on ckd- increase ivf to 100 cc/hr- worsening creatinine- consult nephrology - chronic pain - paroxsymal afib - on eliquis 
- generalized weakness- PT and OT consulted. - chronic thrombocytopenia -pancytopenia- from chemo and multiple myeloma- consult hematology Spoke to son, explained that pt is npo, reason for stopping eliquis and not starting on heparin drip. Son didn't have any questions unanswered. Son [phone] 
  
 
  
[facility] dnr 
  
DVT ppx:  eliquis Anticipated DC needs:   
Code status:  pt changed his code status today to DNR. Estimated LOS:  Greater than 2 midnights Risk:  high Signed: 
Tete Hardy MD

## 2020-08-02 NOTE — CONSULTS
[facility] Consult Subjective: Radha Car is a [de-identified] y.o.  male who is being seen for MARY. This is a truly unfortunate gentleman with a complex past history including colon cancer, thyroid cancer, myeloma, CMP with AICD and now MARY. He comes in with fatigue and cough, febrile with pneumonia. Creatinine has een rising since admission and is now over 5. He does not have a tavera and urine output is not quantified. He does not contribute much to his history which is obtained from the chart. He does tell me that he doesn't feel well. No N/V/D. Little PO intake over the past several days. He is on diuretics chronically. Has dysphagia. Past Medical History:  
Diagnosis Date  Arrhythmia LBBB  Arthritis  Automatic implantable cardioverter-defibrillator in situ 9/1/2011  BMI 30.0-30.9,adult BMI 30.5  Cardiomyopathy ([address] followed by [address] [place] [address] [address] [facility]  Cholelithiases  Chronic systolic heart failure ([address] 9/13/2011 [place] [employer] class II-III heart failure symptoms  Colonic mass 3/23/2016  Gout  H/O: pituitary tumor X2 benign, removed  High cholesterol  History of thyroid cancer  History of vertigo   
 no recent complications or episodes  Hurthle cell carcinoma of thyroid ([address]  Hx of radiation therapy to prostate 2004  Hyperlipidemia 11/18/2015  Hypertension  Hypothyroid   
 hypo- had portion of thyroid removed due to cancer  ICD (implantable cardiac defibrillator) in place 9/2011 [employer] BIV/ICD, Gen change 3.2.16  
 LBBB (left bundle branch block) 11/18/2015  Malaise and fatigue 11/18/2015  Malignant neoplasm of prostate ([address]  Mass of colon   
 right colon mass  Multiple myeloma ([address]  Polyneuropathy 12/14/2016  Postsurgical hypothyroidism 3/15/2018  Postural imbalance 12/14/2016  Sinus bradycardia 1/12/2018  Sinus node dysfunction (HCC)  Vertigo 11/18/2015 Past Surgical History:  
Procedure Laterality Date  HX CHOLECYSTECTOMY  2013  HX COLONOSCOPY    
 dx with Right colon mass  HX GI    
 benign polyps removed  HX HEART CATHETERIZATION    
 HX HEENT  2008 thyroidetomy partial tumor from pituitary x2  HX PACEMAKER  2011/2016  
 [employer] biv-icd  HX THYROIDECTOMY  2008  HX THYROIDECTOMY  01/26/2018  
 completion thyroidectomy  HX TUMOR REMOVAL  1990/2010  
 pituitary tumor removed X2  
 HX VASCULAR ACCESS Family History Problem Relation Age of Onset  Heart Disease Sister  Heart Attack Sister  Stroke Mother  Thyroid Disease Neg Hx  Diabetes Neg Hx Social History Tobacco Use  Smoking status: Never Smoker  Smokeless tobacco: Never Used Substance Use Topics  Alcohol use: Yes Comment: very rare Current Facility-Administered Medications Medication Dose Route Frequency Provider Last Rate Last Dose  pantoprazole (PROTONIX) 40 mg in 0.9% sodium chloride 10 mL injection  40 mg IntraVENous Q12H Aly Redmond MD   40 mg at 08/02/20 1058  amiodarone (CORDARONE) tablet 200 mg  200 mg Oral DAILY Baldomero Christianson MD   Stopped at 08/02/20 1026  carvediloL (COREG) tablet 6.25 mg  6.25 mg Oral BID WITH MEALS Baldomero Christianson MD   Stopped at 08/02/20 0800  chlorhexidine (PERIDEX) 0.12 % mouthwash 15 mL  15 mL Swish and Spit BID Baldomero Christianson MD   Stopped at 08/02/20 0900  cholecalciferol (VITAMIN D3) (1000 Units /25 mcg) tablet 2 Tab  2,000 Units Oral DAILY Baldomero Christianson MD   Stopped at 08/02/20 0900  cyclobenzaprine (FLEXERIL) tablet 10 mg  10 mg Oral TID PRN Baldomero Christianson MD      
 HYDROmorphone (DILAUDID) tablet 4-8 mg  4-8 mg Oral Q4H PRN Baldomero Christianson MD      
 levothyroxine (SYNTHROID) tablet 175 mcg  175 mcg Oral ACB Baldomero Christianson MD   Stopped at 08/02/20 0730  
 magnesium oxide (MAG-OX) tablet 400 mg  400 mg Oral BID Kalyani Freddy Hernández MD   Stopped at 08/02/20 0900  [Held by provider] torsemide (DEMADEX) tablet 20 mg  20 mg Oral EVERY OTHER DAY Ish Marino MD   20 mg at 07/31/20 2053  rosuvastatin (CRESTOR) tablet 10 mg  10 mg Oral QHS Ish Marino MD   10 mg at 08/01/20 2302  sodium chloride (NS) flush 5-40 mL  5-40 mL IntraVENous Q8H Ish Marino MD   10 mL at 08/02/20 0559  sodium chloride (NS) flush 5-40 mL  5-40 mL IntraVENous PRN Ish Marino MD      
 acetaminophen (TYLENOL) tablet 650 mg  650 mg Oral Q6H PRN Ish Marino MD      
 Or  
[address] [place] [address] acetaminophen (TYLENOL) suppository 650 mg  650 mg Rectal Q6H PRN Ish Marino MD      
 polyethylene glycol (MIRALAX) packet 17 g  17 g Oral DAILY PRN Ish Marino MD      
 promethazine (PHENERGAN) tablet 12.5 mg  12.5 mg Oral Q6H PRN Ish Marino MD      
 Or  
 ondansetron (ZOFRAN) injection 4 mg  4 mg IntraVENous Q6H PRN Ish Marino MD      
 nystatin (MYCOSTATIN) 100,000 unit/mL oral suspension 500,000 Units  500,000 Units Oral QID Ish Marino MD   Stopped at 08/02/20 0900  cefepime (MAXIPIME) 2 g in 0.9% sodium chloride (MBP/ADV) 100 mL  2 g IntraVENous Q24H Ish Marino  mL/hr at 08/01/20 1632 2 g at 08/01/20 1632  dextrose 5% and 0.9% NaCl infusion  100 mL/hr IntraVENous CONTINUOUS Ish Marino  mL/hr at 08/02/20 1112 100 mL/hr at 08/02/20 1112  fluconazole (DIFLUCAN) 200mg/100 mL IVPB (premix)  200 mg IntraVENous Q24H Ish Marino  mL/hr at 08/01/20 2306 200 mg at 08/01/20 2306 Allergies Allergen Reactions  Ace Inhibitors Other (comments)  Lisinopril Cough  Pcn [Penicillins] Swelling Review of Systems: 
Pertinent items are noted in the History of Present Illness. Objective: Intake and Output:   
No intake/output data recorded. 07/31 1901 - 08/02 0700 In: 600 [P.O.:600] Out: - Physical Exam: General appearance: awake, appears older than stated age, cachectic Neck: supple, symmetrical, trachea midline, no adenopathy, thyroid: not enlarged, symmetric, no tenderness/mass/nodules, no carotid bruit and no JVD Lungs: clear to auscultation bilaterally Heart: regular rate and rhythm, S1, S2 normal, no murmur, click, rub or gallop Abdomen: soft, non-tender. Bowel sounds normal. No masses,  no organomegaly Extremities: extremities normal, atraumatic, no cyanosis or edema Data Review:  
Recent Results (from the past 24 hour(s)) METABOLIC PANEL, BASIC Collection Time: 08/02/20  4:59 AM  
Result Value Ref Range Sodium 148 (H) 136 - 145 mmol/L Potassium 4.2 3.5 - 5.1 mmol/L Chloride 120 (H) 98 - 107 mmol/L  
 CO2 16 (L) 21 - 32 mmol/L Anion gap 12 7 - 16 mmol/L Glucose 111 (H) 65 - 100 mg/dL BUN 58 (H) 8 - 23 MG/DL Creatinine 5.27 (H) 0.8 - 1.5 MG/DL  
 GFR est AA 14 (L) >60 ml/min/1.73m2 GFR est non-AA 11 (L) >60 ml/min/1.73m2 Calcium 8.3 8.3 - 10.4 MG/DL  
CBC WITH AUTOMATED DIFF Collection Time: 08/02/20 10:28 AM  
Result Value Ref Range WBC 1.3 (LL) 4.3 - 11.1 K/uL  
 RBC 3.15 (L) 4.23 - 5.6 M/uL HGB 7.6 (L) 13.6 - 17.2 g/dL HCT 21.7 (L) 41.1 - 50.3 % MCV 68.9 (L) 79.6 - 97.8 FL  
 MCH 24.1 (L) 26.1 - 32.9 PG  
 MCHC 35.0 31.4 - 35.0 g/dL  
 RDW 15.7 (H) 11.9 - 14.6 % PLATELET 50 (L) 997 - 450 K/uL MPV Unable to calculate. Recommend adding IPF. 9.4 - 12.3 FL ABSOLUTE NRBC 0.10 0.0 - 0.2 K/uL DF PENDING Assessment:  
 
Active Problems: 
  Multiple myeloma ([address] (1/2/2017) PAF (paroxysmal [facility] fibrillation) ([facility] (11/26/2017) Chronic systolic heart failure ([facility] (11/26/2017) Personal history of colon cancer (12/27/2018) Overview:  
 
  Multiple myeloma not having achieved remission ([facility] (2/13/2019) Chronic pain syndrome (7/31/2020) Cough (7/31/2020) Suspected COVID-19 virus infection (7/31/2020) Thrombocytopenia ([address] (7/31/2020) PNA (pneumonia) (7/31/2020) Odynophagia (7/31/2020) Plan: This is an unfortunate gentleman with multiple medical problems, not the least of which is MARY in the setting of advanced myeloma and volume depletion. Would rule out obstruction. I agree with IV fluids and will check urine studies. Ultimately if his renal function continues to deteriorate with support, I would advise against dialysis as it is not likely to meaningfully improve his overall outcome. Will ask Palliative Care to see tomorrow. Thank you for the kind courtesy of this consultation. Will make further adjustments to the medical regimen as the clinical course dictates and follow very closely with you. Signed By: Letitia Rose MD   
 August 2, 2020

## 2020-08-02 NOTE — PROGRESS NOTES
Shift assessment complete. Pt alert and oriented x4. Speech garbled. Respirations even and unlabored. Lung sounds coarse on supplemental 02 at 4L via NC. HR regular. Abdomen soft with hernia noted to the right side. Active bowel sounds heard in all four quadrants. Pt NPO. Skin thin, fragile, dry, flaky. No edema noted. Pt repositioned with pillows between bony prominences. IV patent and infusing. Pt incontinent of bladder, brief c-d-I. Pt denies pain and nausea. No signs of distress. All needs met at this time. Safety measures in place, call light within reach, side rails x3. Will continue to monitor.

## 2020-08-02 NOTE — PROGRESS NOTES
. 
[facility] Progress Note Date: 8/2/2020 GI Problem: dysphagia + odynophagia History of Present Illness:  Patient is a [de-identified] y.o. male with PMH MM on chemo, CHF s/p ICD/pacer, colon cancer, chronic kidney disease, and afib on amio and eliquis (held) who is seen in consultation for dysphagia and odynophagia both. He is COVID negative. He has had fever and a productive cough. He is on cefepime for pneumonia. He can't eat anything. He has oral thrush, and they have started him on fluconazole and nystatin. Speech and swallow have evaluated him and consider this esophageal dysphagia. He is very pancytopenic- WBC 1.1, hgb 8.1, plts 41. He says he is starting to improve some regarding his swallowing. He seems to have some confusion too. Hospital Medications: 
Current Facility-Administered Medications Medication Dose Route Frequency  pantoprazole (PROTONIX) 40 mg in 0.9% sodium chloride 10 mL injection  40 mg IntraVENous Q12H  
 amiodarone (CORDARONE) tablet 200 mg  200 mg Oral DAILY  carvediloL (COREG) tablet 6.25 mg  6.25 mg Oral BID WITH MEALS  chlorhexidine (PERIDEX) 0.12 % mouthwash 15 mL  15 mL Swish and Spit BID  cholecalciferol (VITAMIN D3) (1000 Units /25 mcg) tablet 2 Tab  2,000 Units Oral DAILY  cyclobenzaprine (FLEXERIL) tablet 10 mg  10 mg Oral TID PRN  
 HYDROmorphone (DILAUDID) tablet 4-8 mg  4-8 mg Oral Q4H PRN  
 levothyroxine (SYNTHROID) tablet 175 mcg  175 mcg Oral ACB  magnesium oxide (MAG-OX) tablet 400 mg  400 mg Oral BID  [Held by provider] torsemide (DEMADEX) tablet 20 mg  20 mg Oral EVERY OTHER DAY  rosuvastatin (CRESTOR) tablet 10 mg  10 mg Oral QHS  sodium chloride (NS) flush 5-40 mL  5-40 mL IntraVENous Q8H  
 sodium chloride (NS) flush 5-40 mL  5-40 mL IntraVENous PRN  
 acetaminophen (TYLENOL) tablet 650 mg  650 mg Oral Q6H PRN  Or  
 acetaminophen (TYLENOL) suppository 650 mg  650 mg Rectal Q6H PRN  
  polyethylene glycol (MIRALAX) packet 17 g  17 g Oral DAILY PRN  promethazine (PHENERGAN) tablet 12.5 mg  12.5 mg Oral Q6H PRN Or  
 ondansetron (ZOFRAN) injection 4 mg  4 mg IntraVENous Q6H PRN  
 nystatin (MYCOSTATIN) 100,000 unit/mL oral suspension 500,000 Units  500,000 Units Oral QID  cefepime (MAXIPIME) 2 g in 0.9% sodium chloride (MBP/ADV) 100 mL  2 g IntraVENous Q24H  
 dextrose 5% and 0.9% NaCl infusion  100 mL/hr IntraVENous CONTINUOUS  
 fluconazole (DIFLUCAN) 200mg/100 mL IVPB (premix)  200 mg IntraVENous Q24H Objective:  
 
Physical Exam: 
Vitals: 
Visit Vitals BP 91/48 (BP 1 Location: Left arm, BP Patient Position: At rest) Pulse 77 Temp 97.3 °F (36.3 °C) Resp 18 Ht 5' 9\" (1.753 m) Wt 70.8 kg (156 lb) SpO2 95% BMI 23.04 kg/m² General: No acute distress. Skin:  Extremities and face reveal no rashes. No jennings erythema. No telangiectasias on the chest wall. HEENT: Sclerae anicteric. No oral ulcers. No abnormal pigmentation of the lips. The neck is supple. Cardiovascular: Regular rate and rhythm. No murmurs, gallops, or rubs. Respiratory:  Comfortable breathing  With no accessory muscle use. Clear breath sounds with no wheezes, rales, or rhonchi. GI:  Abdomen nondistended, soft, and nontender. Normal active bowel sounds. No enlargement of the liver or spleen. No masses palpable. Musculoskeletal:  No pitting edema of the lower legs. Extremities have good range of motion. Neurological:  Gross memory appears intact. Patient is alert and oriented. Psychiatric:  Mood appears appropriate with judgement intact. Lymphatic:  No cervical or supraclavicular adenopathy. Laboratory:   
Recent Labs 08/01/20 
7309 WBC 1.1*  
RBC 3.49* HGB 8.1* HCT 24.3*  
PLT 41* Recent Labs 08/02/20 
0459 * * K 4.2 * CO2 16* BUN 58* CREA 5.27* CA 8.3 No results for input(s): PTP, INR, APTT, INREXT in the last 72 hours. Recent Labs  
  07/31/20 
1608 AP 51 ALB 2.8*  
TP 8.6* Assessment: A [de-identified] y.o. male with PMH MM on chemo, CHF s/p ICD/pacer, colon cancer, chronic kidney disease, and afib on amio and eliquis (held) who is seen in consultation for dysphagia and odynophagia both. Plan: 
  
 
EGD tomorrow Continue fluconazole + nystatin Continue Protonix 40 bid Signed By: Deann Garcia MD   
 August 2, 2020

## 2020-08-02 NOTE — PROGRESS NOTES
Problem: Falls - Risk of 
Goal: *Absence of Falls Description: Document Baldev Fink Fall Risk and appropriate interventions in the flowsheet. Outcome: Progressing Towards Goal 
Note: Fall Risk Interventions: 
Mobility Interventions: Bed/chair exit alarm, OT consult for ADLs, Patient to call before getting OOB, PT Consult for mobility concerns Mentation Interventions: Adequate sleep, hydration, pain control, Bed/chair exit alarm, Door open when patient unattended, Increase mobility, More frequent rounding, Reorient patient, Toileting rounds, Update white board Medication Interventions: Bed/chair exit alarm Elimination Interventions: Bed/chair exit alarm, Call light in reach, Toileting schedule/hourly rounds Problem: Patient Education: Go to Patient Education Activity Goal: Patient/Family Education Outcome: Progressing Towards Goal 
  
Problem: Pressure Injury - Risk of 
Goal: *Prevention of pressure injury Description: Document Mervin Scale and appropriate interventions in the flowsheet. Outcome: Progressing Towards Goal 
Note: Pressure Injury Interventions: 
Sensory Interventions: Assess changes in LOC, Discuss PT/OT consult with provider, Float heels, Keep linens dry and wrinkle-free, Minimize linen layers Moisture Interventions: Absorbent underpads, Apply protective barrier, creams and emollients, Limit adult briefs Activity Interventions: Increase time out of bed, Pressure redistribution bed/mattress(bed type), PT/OT evaluation Mobility Interventions: HOB 30 degrees or less, PT/OT evaluation Nutrition Interventions: Offer support with meals,snacks and hydration Friction and Shear Interventions: Apply protective barrier, creams and emollients, Foam dressings/transparent film/skin sealants, HOB 30 degrees or less Problem: Patient Education: Go to Patient Education Activity Goal: Patient/Family Education Outcome: Progressing Towards Goal 
  
 Problem: Dysphagia (Adult) Goal: *Speech Goal: (INSERT TEXT) Description: LTG: Patient will tolerate least restrictive diet without overt signs or symptoms of airway compromise by discharge. STG: Patient will tolerate PO trials with SLP only without overt signs or symptoms of aspiration. STG: Patient will participate in modified barium swallow study as clinically indicated. Outcome: Progressing Towards Goal 
  
Problem: Patient Education: Go to Patient Education Activity Goal: Patient/Family Education Outcome: Progressing Towards Goal

## 2020-08-02 NOTE — CONSULTS
[facility] Consult Note Referring Physician:  
 
Consult Date: 8/1/2020 Reason for Consult: Dysphagia and odynophagia History of Present Illness:  Patient is a [de-identified] y.o. male with PMH MM on chemo, CHF s/p ICD/pacer, colon cancer, chronic kidney disease, and afib on amio and eliquis who is seen in consultation for dysphagia and odynophagia both. He is here as a COVID rule out (it looks to me like his test is back and negative). He has had fever and a productive cough. He is on cefepime for pneumonia. He can't eat anything. He has oral thrush, and they have started him on fluconazole and nystatin. Speech and swallow have evaluated him and consider this esophageal dysphagia. I notice he is on a PPI at home but not here. He is very pancytopenic- WBC 1.1, hgb 8.1, plts 41. Past Medical History:  
Diagnosis Date  Arrhythmia LBBB  Arthritis  Automatic implantable cardioverter-defibrillator in situ 9/1/2011  BMI 30.0-30.9,adult BMI 30.5  Cardiomyopathy ([address] [place] [facility] [place] [facility]  Cholelithiases  Chronic systolic heart failure ([address] 9/13/2011 [place] [employer] class II-III heart failure symptoms  Colonic mass 3/23/2016  Gout  H/O: pituitary tumor X2 benign, removed  High cholesterol  History of thyroid cancer  History of vertigo   
 no recent complications or episodes  Hurthle cell carcinoma of thyroid ([address]  Hx of radiation therapy to prostate 2004  Hyperlipidemia 11/18/2015  Hypertension  Hypothyroid   
 hypo- had portion of thyroid removed due to cancer  ICD (implantable cardiac defibrillator) in place 9/2011 [employer] BIV/ICD, Gen change 3.2.16  
 LBBB (left bundle branch block) 11/18/2015  Malaise and fatigue 11/18/2015  Malignant neoplasm of prostate ([address]  Mass of colon   
 right colon mass  Multiple myeloma ([address]  Polyneuropathy 12/14/2016  Postsurgical hypothyroidism 3/15/2018  Postural imbalance 12/14/2016  Sinus bradycardia 1/12/2018  Sinus node dysfunction (HCC)  Vertigo 11/18/2015 Past Surgical History:  
Procedure Laterality Date  HX CHOLECYSTECTOMY  2013  HX COLONOSCOPY    
 dx with Right colon mass  HX GI    
 benign polyps removed  HX HEART CATHETERIZATION    
 HX HEENT  2008 thyroidetomy partial tumor from pituitary x2  HX PACEMAKER  2011/2016  
 [employer] biv-icd  HX THYROIDECTOMY  2008  HX THYROIDECTOMY  01/26/2018  
 completion thyroidectomy  HX TUMOR REMOVAL  1990/2010  
 pituitary tumor removed X2  
 HX VASCULAR ACCESS Family History Problem Relation Age of Onset  Heart Disease Sister  Heart Attack Sister  Stroke Mother  Thyroid Disease Neg Hx  Diabetes Neg Hx Social History Occupational History  Not on file Tobacco Use  Smoking status: Never Smoker  Smokeless tobacco: Never Used Substance and Sexual Activity  Alcohol use: Yes Comment: very rare  Drug use: No  
 Sexual activity: Yes  
  Partners: Female Hospital Medications: 
Current Facility-Administered Medications Medication Dose Route Frequency  pantoprazole (PROTONIX) 40 mg in 0.9% sodium chloride 10 mL injection  40 mg IntraVENous Q12H  
 tuberculin injection 5 Units  5 Units IntraDERMal ONCE  
 amiodarone (CORDARONE) tablet 200 mg  200 mg Oral DAILY  carvediloL (COREG) tablet 6.25 mg  6.25 mg Oral BID WITH MEALS  chlorhexidine (PERIDEX) 0.12 % mouthwash 15 mL  15 mL Swish and Spit BID  cholecalciferol (VITAMIN D3) (1000 Units /25 mcg) tablet 2 Tab  2,000 Units Oral DAILY  cyclobenzaprine (FLEXERIL) tablet 10 mg  10 mg Oral TID PRN  
 apixaban (ELIQUIS) tablet 2.5 mg  2.5 mg Oral BID  
 HYDROmorphone (DILAUDID) tablet 4-8 mg  4-8 mg Oral Q4H PRN  
 levothyroxine (SYNTHROID) tablet 175 mcg  175 mcg Oral ACB  magnesium oxide (MAG-OX) tablet 400 mg  400 mg Oral BID  [Held by provider] torsemide (DEMADEX) tablet 20 mg  20 mg Oral EVERY OTHER DAY  rosuvastatin (CRESTOR) tablet 10 mg  10 mg Oral QHS  sodium chloride (NS) flush 5-40 mL  5-40 mL IntraVENous Q8H  
 sodium chloride (NS) flush 5-40 mL  5-40 mL IntraVENous PRN  
 acetaminophen (TYLENOL) tablet 650 mg  650 mg Oral Q6H PRN Or  
 acetaminophen (TYLENOL) suppository 650 mg  650 mg Rectal Q6H PRN  polyethylene glycol (MIRALAX) packet 17 g  17 g Oral DAILY PRN  promethazine (PHENERGAN) tablet 12.5 mg  12.5 mg Oral Q6H PRN Or  
 ondansetron (ZOFRAN) injection 4 mg  4 mg IntraVENous Q6H PRN  
 nystatin (MYCOSTATIN) 100,000 unit/mL oral suspension 500,000 Units  500,000 Units Oral QID  cefepime (MAXIPIME) 2 g in 0.9% sodium chloride (MBP/ADV) 100 mL  2 g IntraVENous Q24H  
 dextrose 5% and 0.9% NaCl infusion  100 mL/hr IntraVENous CONTINUOUS  
 fluconazole (DIFLUCAN) 200mg/100 mL IVPB (premix)  200 mg IntraVENous Q24H Allergies: Allergies Allergen Reactions  Ace Inhibitors Other (comments)  Lisinopril Cough  Pcn [Penicillins] Swelling Review of Systems: A comprehensive review of systems was negative except for that written in the History of Present Illness. Objective:  
 
Physical Exam: 
Vitals: 
Visit Vitals BP 98/51 (BP 1 Location: Left arm, BP Patient Position: At rest) Pulse 89 Temp 99.4 °F (37.4 °C) Resp 20 Ht 5' 9\" (1.753 m) Wt 70.8 kg (156 lb) SpO2 96% BMI 23.04 kg/m² General: No acute distress. Skin:  Extremities and face reveal no rashes. No jennings erythema. No telangiectasias on the chest wall. HEENT: Sclerae anicteric. No oral ulcers. No abnormal pigmentation of the lips. The neck is supple. Cardiovascular: Regular rate and rhythm. No murmurs, gallops, or rubs. Respiratory:  Comfortable breathing  With no accessory muscle use.  Clear breath sounds with no wheezes, rales, or rhonchi. GI:  Abdomen nondistended, soft, and nontender. Normal active bowel sounds. No enlargement of the liver or spleen. No masses palpable. Musculoskeletal:  No pitting edema of the lower legs. Extremities have good range of motion. Neurological:  Gross memory appears intact. Patient is alert and oriented. Psychiatric:  Mood appears appropriate with judgement intact. Lymphatic:  No cervical or supraclavicular adenopathy. Laboratory:   
Recent Labs 08/01/20 
3415 WBC 1.1*  
RBC 3.49* HGB 8.1* HCT 24.3*  
PLT 41* Recent Labs 08/01/20 
7936 GLU 89 *  
K 3.3*  
* CO2 19* BUN 47* CREA 4.31* CA 8.9 No results for input(s): PTP, INR, APTT, INREXT in the last 72 hours. Recent Labs  
  07/31/20 
1608 AP 51 ALB 2.8*  
TP 8.6* Assessment: A [de-identified] y.o. male COVID rule out (it looks to me like his test is back and negative) with MM, CHF, ICD/pacer, afib here with pneumonia and pancytopenia we are seeing for dysphagia + odynophagia Plan: 
  
 
I agree with the fluconazole + nystatin- we will see if this helps I will restart him on a PPI- protonix 40 IV bid Agree with leaving NPO for now If he does not improve can consider EGD early next week- he will need his eliquis held if this is to happen (I will stop as got ok from Dr. Arnav Cha) Signed By: Vilma Her MD   
 August 1, 2020

## 2020-08-03 NOTE — PROGRESS NOTES
SPEECH PATHOLOGY NOTE: 
 
Per chart review, patient getting EGD today. Will hold po trials as patient must be NPO for procedure. Will follow up at later time/date when GI assessment is complete and as schedule permits.  
 
 
 
Lendia Dance MS, CCC-SLP

## 2020-08-03 NOTE — PROGRESS NOTES
Patient received on floor at shift change. Dual skin assessment done with Effie Ramirez RN. Patient alert/oriented x4. On 2 L NC. Suction available for patient to self-suction with darryn. Mouth swabs and ice water at bedside for comfort. Bed low/locked. Call light within reach. Patient denies needs at this time.   Will continue to monitor and report to oncoming RN

## 2020-08-03 NOTE — PROGRESS NOTES
Problem: Dysphagia (Adult) Goal: *Speech Goal: (INSERT TEXT) Description: LTG: Patient will tolerate least restrictive diet without overt signs or symptoms of airway compromise by discharge. STG: Patient will tolerate PO trials with SLP only without overt signs or symptoms of aspiration. STG: Patient will participate in modified barium swallow study as clinically indicated. Outcome: Progressing Towards Goal 
  
SPEECH LANGUAGE PATHOLOGY: DYSPHAGIA- Daily Note 1 NAME/AGE/GENDER: Kinjal Petersen is a [de-identified] y.o. male DATE: 8/3/2020 PRIMARY DIAGNOSIS: Multiple myeloma ([facility] [C90.00] Procedure(s) (LRB): ESOPHAGOGASTRODUODENOSCOPY (EGD) /23/ Room 823 (N/A) ICD-10: Treatment Diagnosis: R13.12 Dysphagia, Oropharyngeal Phase RECOMMENDATIONS  
DIET:  
? NPO with alternative means of nutrition MEDICATIONS: Non-oral 
  
ASPIRATION PRECAUTIONS · Slow rate of intake · Small bites/sips · Upright at 90 degrees during meal 
  
EDUCATION: 
· Recommendations discussed with Hospitalist 
· Nursing · Patient CONTINUATION OF SKILLED SERVICES/MEDICAL NECESSITY: 
? Patient is expected to demonstrate progress in  swallow function, diet tolerance and swallow safety in order to  improve swallow safety, work toward diet advancement and decrease aspiration risk. ? Patient continues to require skilled intervention due to dysphagia. RECOMMENDATIONS for CONTINUED SPEECH THERAPY:  
YES: Anticipate need for ongoing speech therapy during this hospitalization and at next level of care. ASSESSMENT Patient continues to present with severe dysphagia. Poor secretion management with wet vocal quality upon arrival. Vocal quality did not improve with cued cough. Copious thick secretions suctioned from posterior oral cavity via yanker. Multiple swallows upon palpation with single ice chip concerning for reduced pharyngeal clearance. Further trials deferred. Recommend NPO with alternate means of nutrition/hydration/medication. Needs ongoing oral suctioning and may benefit from deep suctioning. Recommend ENT consult per MD discretion due to poor secretion management/pharyngeal clearance. REHABILITATION POTENTIAL FOR STATED GOALS: Fair PLAN   
FREQUENCY/DURATION: Continue to follow patient 2 times a week for duration of hospital stay to address above goals. - Recommendations for next treatment session: Next treatment will address PO trials SUBJECTIVE Patient alert in bed for session. Wet vocal quality upon arrival. Patient reports he has had difficulty swallowing for 5-6 days. History of Present Injury/Illness: Mr. Sravani Adame  has a past medical history of Arrhythmia, Arthritis, Automatic implantable cardioverter-defibrillator in situ (9/1/2011), BMI 30.0-30.9,adult, Cardiomyopathy ([address] Cholelithiases, Chronic systolic heart failure ([address] (9/13/2011), Colonic mass (3/23/2016), Gout, H/O: pituitary tumor, High cholesterol, History of thyroid cancer, History of vertigo, Hurthle cell carcinoma of thyroid ([address] Hx of radiation therapy to prostate (2004), Hyperlipidemia (11/18/2015), Hypertension, Hypothyroid, ICD (implantable cardiac defibrillator) in place (9/2011), LBBB (left bundle branch block) (11/18/2015), Malaise and fatigue (11/18/2015), Malignant neoplasm of prostate [facility]), Mass of colon, Multiple myeloma ([address] Polyneuropathy (12/14/2016), Postsurgical hypothyroidism (3/15/2018), Postural imbalance (12/14/2016), Sinus bradycardia (1/12/2018), Sinus node dysfunction ([address] and Vertigo (11/18/2015). Bri Colon He also  has a past surgical history that includes hx heent (2008); hx cholecystectomy (2013); hx gi; hx heart catheterization; hx pacemaker (2011/2016); hx colonoscopy; hx thyroidectomy (2008); hx tumor removal (1990/2010); hx thyroidectomy (01/26/2018); and hx vascular access. Problem List:  (Impairments causing functional limitations): 1. dysphagia Orientation:  
Person Place Situation OBJECTIVE Swallow treatment: 
 Patient with wet vocal quality upon arrival, which did not clear with cued cough or oral suctioning. Thick yellow secretions suctioned via Kale Shahram. Presented patient with single ice chip to assist with thinning oral/pharyngeal secretions. Patient swallowed 5 times upon palpation with single ice chip. Therapist removed further posterior oral secretions via Kale Shahram. Further trials deferred due to concerns for reduced pharyngeal clearance. INTERDISCIPLINARY COLLABORATION: Registered Nurse PRECAUTIONS/ALLERGIES: Ace inhibitors; Lisinopril; and Pcn [penicillins] Tool Used: Dysphagia Outcome and Severity Scale (WESTLEY) Score Comments Normal Diet  [] 7 With no strategies or extra time needed Functional Swallow  [] 6 May have mild oral or pharyngeal delay Mild Dysphagia  [] 5 Which may require one diet consistency restricted Mild-Moderate Dysphagia  [] 4 With 1-2 diet consistencies restricted Moderate Dysphagia  [] 3 With 2 or more diet consistencies restricted Moderate-Severe Dysphagia  [x] 2 With partial PO strategies (trials with ST only) Severe Dysphagia  [] 1 With inability to tolerate any PO safely Score:  Initial: 2 Most Recent: 2 (Date 08/03/20 ) Interpretation of Tool: The Dysphagia Outcome and Severity Scale (WESTLEY) is a simple, easy-to-use, 7-point scale developed to systematically rate the functional severity of dysphagia based on objective assessment and make recommendations for diet level, independence level, and type of nutrition. Current Medications: No current facility-administered medications on file prior to encounter. Current Outpatient Medications on File Prior to Encounter Medication Sig Dispense Refill  pomalidomide (Pomalyst) 4 mg cap Take 4 mg by mouth daily for 21 days.  Take for 21 days on and 7 days off 21 Cap 0  
  amiodarone (CORDARONE) 200 mg tablet Take 1 Tab by mouth daily. reduce to 1/2 tab a day 7/9  Indications: prevention of recurrent atrial fibrillation 90 Tab 3  
 fentaNYL (DURAGESIC) 100 mcg/hr PATCH 1 Patch by TransDERmal route every seventy-two (72) hours for 30 days. Max Daily Amount: 1 Patch. 10 Patch 0  
 rosuvastatin (Crestor) 10 mg tablet Take 1 Tab by mouth nightly. 90 Tab 3  
 Omeprazole delayed release (PRILOSEC D/R) 20 mg tablet Take 20 mg by mouth daily.  levothyroxine (SYNTHROID) 175 mcg tablet Take 1 Tab by mouth Daily (before breakfast). 90 Tab 3  
 ELIQUIS 5 mg tablet TAKE 1 TABLET BY MOUTH TWO  TIMES DAILY 180 Tab 3  potassium chloride (K-DUR, KLOR-CON) 20 mEq tablet TAKE 1 TABLET BY MOUTH TWO  TIMES DAILY 180 Tab 3  carvedilol (COREG) 6.25 mg tablet TAKE 1 TABLET BY MOUTH TWO  TIMES DAILY WITH MEALS 180 Tab 3  
 magnesium oxide (MAG-OX) 400 mg tablet Take 1 Tab by mouth two (2) times a day. 180 Tab 2  
 torsemide (DEMADEX) 20 mg tablet Take 20 mg by mouth every other day.  polyethylene glycol (MIRALAX) 17 gram packet Take 17 g by mouth daily.  cyclobenzaprine (FLEXERIL) 10 mg tablet Take 1 Tab by mouth three (3) times daily as needed for Muscle Spasm(s). 270 Tab 3  
 HYDROmorphone (DILAUDID) 4 mg tablet Take 1-2 Tabs by mouth every four (4) hours as needed for Pain. Max Daily Amount: 48 mg. 180 Tab 0  
 amino ac/whey prot conc, isol (WHEY PROTEIN PO) Take  by mouth.  chlorhexidine (PERIDEX) 0.12 % solution 15 mL by Swish and Spit route two (2) times a day.  cyanocobalamin (VITAMIN B-12) 1,000 mcg sublingual tablet Take 1,000 mcg by mouth daily.  cholecalciferol, vitamin D3, (VITAMIN D3) 2,000 unit tab Take  by mouth. After treatment position/precautions: · Upright in bed · RN notified · Nursing assistant at bedside Total Treatment Duration:  
Time In: 1253 Time Out: 9198 Kofi Byrne MS, CCC-SLP

## 2020-08-03 NOTE — CONSULTS
[facility] Inpatient Hematology / Oncology Consult Note Reason for Consult:  Multiple myeloma ([facility] [address] Everton Mays Referring Physician:  Pieter Lang MD 
 
History of Present Illness: Mr. Malini Colon is a [de-identified] y.o. male admitted on 7/31/2020. The encounter diagnosis was Suspected COVID-19 virus infection. His PMH includes Afib on Eliquis, cardiomyopathy with defibrillator, hypothyroidism, hx colon ca, HTN, and CKD4. He is a known patient of Dr. Luz Cordova with multiple myeloma s/p C2D1 elotuzumab/Pomalyst/dex on 7/29. Pomalyst was held d/t Cr 4.4. During his last visit with Dr. Luz Cordova, Dr. Luz Cordova did start the discussions with him that if we do not see a turnaround soon with the lashell/pom/dex, our options may be limited and will likely be limited further by performance status and end organ damage. SPEP from 7/22 with m-spike increased to 2.07 from 0.96. He presented to ED with c/o worsening cough x 3-4 weeks. Also reports dysphagia, loss of appetite, fatigue, and generalized weakness. In ED, T101, WBC 2.3/, Hgb 8.6, Plt 60k, Cr 3.97. CXR with low lung volumes with bibasilar atelectasis. COVID neg. Nephrology following for acute on CKD. Renal US neg for hydronephrosis; does show prominent bladder distention with debris and R-sided parapelvic renal cyst.  Nephrology advises against dilaysis. Palliative care consult pending. Has oral candidiasis. On nystatin/diflucan. GI with plans for EGD to evaluate dysphagia/odynophagia - unable to perform today d/t counts. Today, WBC 1.3, Hgb 6.7, Plt 43k. We were consulted d/t pancytopenia. Heme History (copied): 
Mr. Malini Colon is a [de-identified] y.o. male who presents today in follow-up regarding resected colon cancer and multiple myeloma. He had a right colon mass found on screening colonoscopy in February 2016.   He was completely asymptomatic from this colon mass, which was described as 6 cm, 70% circumferential in right colon. He was taken for right hemicolectomy and lymphadenectomy on 3/23/16 which showed a T3N0 colon cancer with no lymphovascular or perineural invasion, there were 13 negative lymph nodes as well. MS stable. He opted against adjuvant treatment. In August 2016, he had an asymptomatic bone lesion at T10 noted on CT, bone scan was negative and CEA was normal so we recommended short interval follow-up. When he returned in December, he was having increased chest and back pain, and updated CT showed multiple new bone lesions with labs suggestive of multiple myeloma. We recommended bone marrow biopsy, this confirms extensive involvement with clonal plasma cells with complex cytogenetics by FISH. He would be a candidate for the [id number] trial, but his thrombocytopenia and his previous colon cancer diagnosis make him ineligible. Therefore, we offered RVD off protocol as this appears to be an aggressive plasma cell dyscrasia. He was admitted 1/11/17 for worsening pain, dehydration. He was found to have evidence of pancreatitis with elevation of amylase and lipase, improved with fluids. He only received one dose of Velcade prior to hospitalization but this was thought to possibly be a cause. Therefore, we recommended changing to Rd alone. His response was maintained and this was continued. While on therapy, he continued to have progressive swelling in the left neck, prompting biopsy which showed initially only abscess, but a 2nd biopsy showed Hurthle cells worrisome for a malignant process. He completed definitive surgery with radical neck dissection on 1/26/18, this was a great success and he was discharged on post-op day 2. 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 He was recommended to see Endocrinology at [address] to discuss BOJORQUEZ, but he opted to follow-up locally.   He saw Dr. Lizet Box who ordered repeat CT and ultrasound of the neck, he still had residual thyroid tissue on the right with 2 nodules. He was then referred to ENT to discuss the possibility of completion thyroidectomy, performed in April 2018, with plans for BOJORQUEZ afterward. He was admitted in May 2018 for intractable pain, he was found to have a worsening lumbar compression fracture and underwent kyphoplasty on 5/18. He was discharged to rehab where he remained for about 3 weeks. Unfortunately, they would not allow him to take his Revlimid and dexamethasone while in rehab. We tried to restart, but he was having progressive pain and eventually ended up getting admitted due to worsening pain and acute kidney injury. We decided to proceed with Cytoxan and dex in pulse dosing due to the renal injury and sudden rise in his myeloma labs. After discharge, we then started daratumumab and ixazomib. His counts improved with therapy including MM specific labs, and this was continued. 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 He developed progressive myeloma in April-May 2019, and we switched therapy to weekly Cytoxan/Kyprolis/dex with excellent response. Prior to cycle 12 of CyKd, we noted that his FLC were rising and his M-spike was also increasing. This is highly concerning for progression of myeloma, and given his historical rapid progression with resistance that develops to therapy, I would not wait for additional confirmation and I would go ahead and start the process of changing treatment. Since he has been on multiple other therapies in the past, I would recommend a change to Pomalyst and dexamethasone, this will be 4 mg daily 3 weeks on and 1 week off since his creatinine clearance remains >60 although this may need to be modified for tolerance.   Unfortunately, after only 2 months his myeloma studies were declining rapidly, so we made the decision to add elotuzumab as well. Review of Systems: 
Constitutional +fever, weakness, fatigue, appetite changes. Denies weight loss, night sweats. HEENT Denies trauma, blurry vision, hearing loss, ear pain, nosebleeds, sore throat, neck pain and ear discharge. Skin Denies lesions or rashes. Lungs +cough. Denies dyspnea, sputum production or hemoptysis. Cardiovascular Denies chest pain, palpitations, or lower extremity edema. Gastrointestinal Denies nausea, vomiting, changes in bowel habits, bloody or black stools, abdominal pain.  Denies dysuria, frequency or hesitancy of urination. Neuro Denies headaches, visual changes or ataxia. Denies dizziness, tingling, tremors, sensory change, speech change, focal weakness or headaches. Hematology Denies easy bruising or bleeding, denies gingival bleeding or epistaxis. Endo +hypothyroidism. Denies heat/cold intolerance, denies diabetes. MSK Denies back pain, arthralgias, myalgias or frequent falls. Psychiatric/Behavioral Denies depression and substance abuse. The patient is not nervous/anxious. Allergies Allergen Reactions  Ace Inhibitors Other (comments)  Lisinopril Cough  Pcn [Penicillins] Swelling Past Medical History:  
Diagnosis Date  Arrhythmia LBBB  Arthritis  Automatic implantable cardioverter-defibrillator in situ 9/1/2011  BMI 30.0-30.9,adult BMI 30.5  Cardiomyopathy ([address] followed by [facility]  Cholelithiases  Chronic systolic heart failure ([address] 9/13/2011 [place] [employer] class II-III heart failure symptoms  Colonic mass 3/23/2016  Gout  H/O: pituitary tumor X2 benign, removed  High cholesterol  History of thyroid cancer  History of vertigo   
 no recent complications or episodes  Hurthle cell carcinoma of thyroid ([address]  Hx of radiation therapy to prostate 2004  Hyperlipidemia 11/18/2015  Hypertension  Hypothyroid   
 hypo- had portion of thyroid removed due to cancer  ICD (implantable cardiac defibrillator) in place 9/2011 [employer] BIV/ICD, Gen change 3.2.16  
 LBBB (left bundle branch block) 11/18/2015  Malaise and fatigue 11/18/2015  Malignant neoplasm of prostate ([facility] [address]  Mass of colon   
 right colon mass  Multiple myeloma ([facility] [address]  Polyneuropathy 12/14/2016  Postsurgical hypothyroidism 3/15/2018  Postural imbalance 12/14/2016  Sinus bradycardia 1/12/2018  Sinus node dysfunction (HCC)  Vertigo 11/18/2015 Past Surgical History:  
Procedure Laterality Date  HX CHOLECYSTECTOMY  2013  HX COLONOSCOPY    
 dx with Right colon mass  HX GI    
 benign polyps removed  HX HEART CATHETERIZATION    
 HX HEENT  2008 thyroidetomy partial tumor from pituitary x2  HX PACEMAKER  2011/2016  
 biotronik biv-icd  HX THYROIDECTOMY  2008  HX THYROIDECTOMY  01/26/2018  
 completion thyroidectomy  HX TUMOR REMOVAL  1990/2010  
 pituitary tumor removed X2  
 HX VASCULAR ACCESS Family History Problem Relation Age of Onset  Heart Disease Sister  Heart Attack Sister  Stroke Mother  Thyroid Disease Neg Hx  Diabetes Neg Hx Social History Socioeconomic History  Marital status:  Spouse name: Not on file  Number of children: Not on file  Years of education: Not on file  Highest education level: Not on file Occupational History  Not on file Social Needs  Financial resource strain: Not on file  Food insecurity Worry: Not on file Inability: Not on file  Transportation needs Medical: Not on file Non-medical: Not on file Tobacco Use  Smoking status: Never Smoker  Smokeless tobacco: Never Used Substance and Sexual Activity  Alcohol use: Yes Comment: very rare  Drug use: No  
 Sexual activity: Yes  
  Partners: Female Lifestyle  Physical activity Days per week: Not on file Minutes per session: Not on file  Stress: Not on file Relationships  Social connections Talks on phone: Not on file Gets together: Not on file Attends Yazidi service: Not on file Active member of club or organization: Not on file Attends meetings of clubs or organizations: Not on file Relationship status: Not on file  Intimate partner violence Fear of current or ex partner: Not on file Emotionally abused: Not on file Physically abused: Not on file Forced sexual activity: Not on file Other Topics Concern  Not on file Social History Narrative  Not on file Current Facility-Administered Medications Medication Dose Route Frequency Provider Last Rate Last Dose  
 0.9% sodium chloride infusion 250 mL  250 mL IntraVENous PRN Tyrel Rodriguez MD      
 lip protectant (BLISTEX) ointment 1 Each  1 Each Topical PRN Tyrel Rodriguez MD      
 pantoprazole (PROTONIX) 40 mg in 0.9% sodium chloride 10 mL injection  40 mg IntraVENous Q12H [facility] MD [place]   40 mg at 08/03/20 7583  amiodarone (CORDARONE) tablet 200 mg  200 mg Oral DAILY Tyrel Rodriguez MD   Stopped at 08/02/20 1026  carvediloL (COREG) tablet 6.25 mg  6.25 mg Oral BID WITH MEALS Tyrel Rodriguez MD   Stopped at 08/02/20 0800  chlorhexidine (PERIDEX) 0.12 % mouthwash 15 mL  15 mL Swish and Spit BID Tyrel oRdriguez MD   Stopped at 08/02/20 0900  cholecalciferol (VITAMIN D3) (1000 Units /25 mcg) tablet 2 Tab  2,000 Units Oral DAILY Tyrel Rodriguez MD   Stopped at 08/02/20 0900  cyclobenzaprine (FLEXERIL) tablet 10 mg  10 mg Oral TID PRN Tyrel Rodriguez MD      
 HYDROmorphone (DILAUDID) tablet 4-8 mg  4-8 mg Oral Q4H PRN Tyrel Rodriguez MD      
 levothyroxine (SYNTHROID) tablet 175 mcg  175 mcg Oral ACB Tyrel Rodriguez MD   Stopped at 08/02/20 0730  magnesium oxide (MAG-OX) tablet 400 mg  400 mg Oral BID Aguila Morales MD   Stopped at 20 0900  [Held by provider] torsemide (DEMADEX) tablet 20 mg  20 mg Oral EVERY OTHER DAY Aguila Morales MD   20 mg at 20 2053  rosuvastatin (CRESTOR) tablet 10 mg  10 mg Oral QHS Aguila Morales MD   Stopped at 20 2200  
 sodium chloride (NS) flush 5-40 mL  5-40 mL IntraVENous Q8H Aguila Morales MD   10 mL at 20 0539  
 sodium chloride (NS) flush 5-40 mL  5-40 mL IntraVENous PRN Aguila Morales MD      
 acetaminophen (TYLENOL) tablet 650 mg  650 mg Oral Q6H PRN Aguila Morales MD      
 Or  
[place] Sit acetaminophen (TYLENOL) suppository 650 mg  650 mg Rectal Q6H PRN Aguila Morales MD      
 polyethylene glycol (MIRALAX) packet 17 g  17 g Oral DAILY PRN Aguila Morales MD      
 promethazine (PHENERGAN) tablet 12.5 mg  12.5 mg Oral Q6H PRN Aguila Morales MD      
 Or  
 ondansetron (ZOFRAN) injection 4 mg  4 mg IntraVENous Q6H PRN Aguila Morales MD      
 nystatin (MYCOSTATIN) 100,000 unit/mL oral suspension 500,000 Units  500,000 Units Oral QID Aguila Morales MD   Stopped at 20 0900  cefepime (MAXIPIME) 2 g in 0.9% sodium chloride (MBP/ADV) 100 mL  2 g IntraVENous Q24H Aguila Morales  mL/hr at 20 1728 2 g at 20 1728  dextrose 5% and 0.9% NaCl infusion  100 mL/hr IntraVENous CONTINUOUS Aguila Morales  mL/hr at 20 0545 100 mL/hr at 20 0545  fluconazole (DIFLUCAN) 200mg/100 mL IVPB (premix)  200 mg IntraVENous Q24H Aguila Morales  mL/hr at 20 2217 200 mg at 20 2217 OBJECTIVE: 
Patient Vitals for the past 8 hrs: 
 BP Temp Pulse Resp SpO2  
20 0720 92/59 97.6 °F (36.4 °C) 82 16 97 % 20 0400   74    
20 0314 99/47 98.2 °F (36.8 °C) 86 20 97 % Temp (24hrs), Av.3 °F (36.8 °C), Min:97.6 °F (36.4 °C), Max:99.2 °F (37.3 °C) No intake/output data recorded. Physical Exam: 
Constitutional: Chronically ill-appearing elderly male in no acute distress, lying comfortably in the hospital bed. HEENT: Normocephalic and atraumatic. Oropharynx is clear, mucous membranes are moist.  Extraocular muscles are intact. Sclerae anicteric. Neck supple without JVD. No thyromegaly present. Skin Warm and dry. No bruising and no rash noted. No erythema. No pallor. Respiratory Lungs are coarse bilaterally, normal air exchange without accessory muscle use. On O2.  
CVS Normal rate, regular rhythm and normal S1 and S2. No murmurs, gallops, or rubs. Abdomen Soft, nontender and nondistended, normoactive bowel sounds. No palpable mass. No hepatosplenomegaly. Neuro Grossly nonfocal with no obvious sensory or motor deficits. MSK Normal range of motion in general.  No edema and no tenderness. Psych Appropriate mood and affect. Labs:   
Recent Results (from the past 24 hour(s)) CBC WITH AUTOMATED DIFF Collection Time: 08/02/20 10:28 AM  
Result Value Ref Range WBC 1.3 (LL) 4.3 - 11.1 K/uL  
 RBC 3.15 (L) 4.23 - 5.6 M/uL HGB 7.6 (L) 13.6 - 17.2 g/dL HCT 21.7 (L) 41.1 - 50.3 % MCV 68.9 (L) 79.6 - 97.8 FL  
 MCH 24.1 (L) 26.1 - 32.9 PG  
 MCHC 35.0 31.4 - 35.0 g/dL  
 RDW 15.7 (H) 11.9 - 14.6 % PLATELET 50 (L) 108 - 450 K/uL MPV Unable to calculate. Recommend adding IPF. 9.4 - 12.3 FL ABSOLUTE NRBC 0.10 0.0 - 0.2 K/uL NEUTROPHILS 34 (L) 43 - 78 % LYMPHOCYTES 52 (H) 13 - 44 % MONOCYTES 8 4.0 - 12.0 % EOSINOPHILS 3 0.5 - 7.8 % BASOPHILS 1 0.0 - 2.0 % IMMATURE GRANULOCYTES 2 0.0 - 5.0 %  
 ABS. NEUTROPHILS 0.4 (L) 1.7 - 8.2 K/UL  
 ABS. LYMPHOCYTES 0.8 0.5 - 4.6 K/UL  
 ABS. MONOCYTES 0.1 0.1 - 1.3 K/UL  
 ABS. EOSINOPHILS 0.0 0.0 - 0.8 K/UL  
 ABS. BASOPHILS 0.0 0.0 - 0.2 K/UL  
 ABS. IMM.  GRANS. 0.0 0.0 - 0.5 K/UL  
 RBC COMMENTS ANISOCYTOSIS + POIKILOCYTOSIS    
 RBC COMMENTS OCCASIONAL 
TARGET CELLS 
    
 RBC COMMENTS RARE 
 SCHISTOCYTES 
    
 WBC COMMENTS Result Confirmed By Smear PLATELET COMMENTS MARKED    
 DF AUTOMATED URINALYSIS W/ RFLX MICROSCOPIC Collection Time: 08/02/20  6:33 PM  
Result Value Ref Range Color YELLOW Appearance CLOUDY Specific gravity 1.016 1.001 - 1.023    
 pH (UA) 5.5 5.0 - 9.0 Protein 100 (A) NEG mg/dL Glucose Negative mg/dL Ketone Negative NEG mg/dL Bilirubin Negative NEG Blood LARGE (A) NEG Urobilinogen 0.2 0.2 - 1.0 EU/dL Nitrites Negative NEG Leukocyte Esterase Negative NEG    
 WBC 5-10 0 /hpf  
 RBC 0 0 /hpf Epithelial cells 0-3 0 /hpf Bacteria 0 0 /hpf Casts 0-3 0 /lpf SODIUM, UR, RANDOM Collection Time: 08/02/20  6:33 PM  
Result Value Ref Range Sodium,urine random 25 MMOL/L  
PROTEIN/CREATININE RATIO, URINE Collection Time: 08/02/20  6:33 PM  
Result Value Ref Range Protein, urine random 247 (H) <11.9 mg/dL Creatinine, urine 89.20 mg/dL Protein/Creat. urine Ratio 2.8    
CBC WITH AUTOMATED DIFF Collection Time: 08/03/20  6:20 AM  
Result Value Ref Range WBC 1.3 (LL) 4.3 - 11.1 K/uL  
 RBC 2.87 (L) 4.23 - 5.6 M/uL HGB 6.7 (LL) 13.6 - 17.2 g/dL HCT 19.8 (LL) 41.1 - 50.3 % MCV 69.0 (L) 79.6 - 97.8 FL  
 MCH 23.3 (L) 26.1 - 32.9 PG  
 MCHC 33.8 31.4 - 35.0 g/dL  
 RDW 15.7 (H) 11.9 - 14.6 % PLATELET 43 (L) 557 - 450 K/uL MPV Unable to calculate. Recommend adding IPF. 9.4 - 12.3 FL ABSOLUTE NRBC 0.09 0.0 - 0.2 K/uL DF PENDING   
PROTHROMBIN TIME + INR Collection Time: 08/03/20  6:20 AM  
Result Value Ref Range Prothrombin time 23.8 (H) 12.0 - 14.7 sec INR 2.1 PTT Collection Time: 08/03/20  6:20 AM  
Result Value Ref Range aPTT 54.3 (H) 24.3 - 35.4 SEC METABOLIC PANEL, BASIC Collection Time: 08/03/20  6:20 AM  
Result Value Ref Range Sodium 152 (H) 136 - 145 mmol/L Potassium 3.8 3.5 - 5.1 mmol/L  Chloride 126 (H) 98 - 107 mmol/L  
 CO2 16 (L) 21 - 32 mmol/L  
 Anion gap 10 7 - 16 mmol/L Glucose 109 (H) 65 - 100 mg/dL BUN 70 (H) 8 - 23 MG/DL Creatinine 5.90 (H) 0.8 - 1.5 MG/DL  
 GFR est AA 12 (L) >60 ml/min/1.73m2 GFR est non-AA 10 (L) >60 ml/min/1.73m2 Calcium 7.8 (L) 8.3 - 10.4 MG/DL Imaging: 
Luis Chadwick [[id number]]  Collected: 08/02/20 1521 Order Status: Completed  Updated: 08/02/20 1524 Narrative:     
Exam: Renal ultrasound. INDICATION: Acute kidney injury. Comparison: None. Real-time sonography of the kidneys, retroperitoneum and bladder was performed  
with multiple static images obtained. Findings:  
Right kidney: The right kidney is normal in echogenicity and normal in size  
measuring 11.8 cm. Interpolar parapelvic cyst measuring 2.5 x 3.2 x 2.8 cm. No  
hydronephrosis or perinephric fluid. Left kidney: The left kidney is normal in echogenicity and normal in size  
measuring 11.4 cm. No contour deforming mass. No hydronephrosis or perinephric  
fluid. Bladder: Prominent bladder distention with echogenic debris in the bladder. Aorta: No evidence of aneurysm in the imaged portion. IVC: Patent. Impression:     
IMPRESSION:  
1. No hydronephrosis. 2. Prominent bladder distention with some echogenic debris in the bladder. 3. Right-sided parapelvic renal cyst.  
 
 
  
XR CHEST PORT [[id number]]  Collected: 07/31/20 1626 Order Status: Completed  Updated: 07/31/20 1645 Narrative:     
EXAMINATION: TEMPORARY 7/31/2020 4:26 PM  
 
ACCESSION NUMBER: [id number] INDICATION: Shortness of breath COMPARISON: Chest CT 12/17/2019, chest x-ray 7/18/2018, 7/9/2018 TECHNIQUE: A single AP view of the chest was obtained. FINDINGS:  
 
Unchanged positioning of a right-sided central venous access port. Unchanged  
cardiac pacemaker defibrillator positioning. There is unchanged mild enlargement  
of the cardiac silhouette. Low lung volumes with bibasilar atelectasis.  No  
 pneumothorax or pleural effusion. Bilateral acromioclavicular joint arthropathy. Healed posterior left fifth rib fracture. Impression:     
IMPRESSION:  
 
1. . There is unchanged mild enlargement of the cardiac silhouette. 2. Low lung volumes with bibasilar atelectasis. ASSESSMENT: 
Problem List  Date Reviewed: 7/29/2020 Codes Class Noted Chronic pain syndrome ICD-10-CM: G89.4 ICD-9-CM: 338.4  7/31/2020 Cough ICD-10-CM: R05 ICD-9-CM: 786.2  7/31/2020 Suspected COVID-19 virus infection ICD-10-CM: Z20.828 ICD-9-CM: V01.79  7/31/2020 Thrombocytopenia ([facility] ICD-10-CM: D69.6 ICD-9-CM: 287.5  7/31/2020 PNA (pneumonia) ICD-10-CM: J18.9 ICD-9-CM: 066  7/31/2020 Odynophagia ICD-10-CM: R13.10 ICD-9-CM: 787.20  7/31/2020 Multiple myeloma not having achieved remission ([facility] ICD-10-CM: C90.00 ICD-9-CM: 203.00  2/13/2019 Personal history of colon cancer ICD-10-CM: L73.891 
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 
Multiple myeloma 
- s/p C2D1 elotuzumab/Pomalyst/dex on 7/29. Pomalyst was held d/t Cr 4.4. 
- Noted worsening m-spike on last SPEP from 7/22,increased to 2.07 from 0.96.   
- At his last visit with Dr. Sadiq Mendoza, Dr. Sadiq Mendoza did start the discussions with him that if we do not see a turnaround soon with the xiomara/pom/dex, our options may be limited and will likely be limited further by performance status and end organ damage. May need to consider Hospice vs following up with Dr. Sadiq Mendoza to discuss options Pancytopenia secondary to chemotherapy - Transfuse to keep Hgb >8 and plt >10k or unless active bleeding/procedures, then >50k - Check iron studies, B12, folate, hemolysis labs, DIC labs, uric acid Acute on CKD 
- Nephrology following - Renal US neg for hydronephrosis - Advises against dialysis. Palliative care consult pending 
- check uric acid Dysphagia/odynophagia - GI following - Plan for EGD - unable to perform today d/t counts Oral candidiasis 
- on nystatin/diflucan Pneumonia - covid neg 
- on Cef Lab studies and imaging studies were personally reviewed. Thank you for allowing us to participate in the care of Mr. Kimberly Shafer. We will continue to follow along. Destinee Sheridan, NP [facility] [address] [place] [address] Office : [phone] Fax : [phone] I personally saw, exammed and counselled the patient, and discussed with NP, agree with above history/assessment/plan.  [de-identified] y.o.male MM that was treated through multiple lines if rx most recent showed progression on Pom/Dex and added Xiomara, admitted with cough, FTT, Cr 3.9, dysphagia, ruled out COVID, on cefepime and diflucan, consulted for pancytopenia, which had been worsening in the past two months, meanwhile M spike rapidly increased, discussed with pt need to recovery from the acute illness but myeloma prognosis is poor given he has received all major therapies and hospice can be reasonable to consider, he wants to think and potentially follow Dr. Rodriguez Reason to discuss [address], continue supportive transfusion as needed. Stuart Flores M.D. [address] Office : [phone] Fax : [phone]

## 2020-08-03 NOTE — PROGRESS NOTES
OT treatment attempted. Will hold OT treatment at this time per request by RN. Will follow up and attempt to see as schedule permits and as pt is medically appropriate to be seen. Thank you, Alicia Cash OTR/L

## 2020-08-03 NOTE — PROGRESS NOTES
TRANSFER - IN REPORT: 
 
Verbal report received from Carlitos Mccauley. (name) on Arun Posadas  being received from 823(unit) for routine progression of care Report consisted of patients Situation, Background, Assessment and  
Recommendations(SBAR). Information from the following report(s) SBAR was reviewed with the receiving nurse. Opportunity for questions and clarification was provided. Assessment completed upon patients arrival to unit and care assumed. Questioned RN regarding unit of PRBCs ordered from this morning. RN stated that lab had not yet allocated blood. Call made to Dr. Felecia Garg who confirmed transfusion with patient on comfort care. Also called Blood bank and confirmed that 1 unit of blood was ready since this morning. New allocation not needed, since current type/screen is still good until 8/4/2020 at midnight.

## 2020-08-03 NOTE — PROGRESS NOTES
Hospitalist Progress Note Admit Date:  2020  4:01 PM  
Name:  Kinjal Petersen Age:  [de-identified] y.o. 
:  1940 MRN:  [id number] PCP:  Flint Spatz, MD 
Treatment Team: Attending Provider: Dilshad Viveros MD; Utilization Review: Christo Wolfe RN; Consulting Provider: Bertram Burton MD; Consulting Provider: Lord Kvng MD; Consulting Provider: Karlee Diaz MD; Consulting Provider: Jaleesa Durant NP; Care Manager: Fifi Cuellar Subjective: Pt with known h/o afib on amiodarone and eliquis, multiple myeloma in chemotherapy,cardiomyopathy with defibrillator, hypothyroid, h/o colon cancer,htn, ckd 4 and hyperlipidemia. 
  
Since past 2-3 weeks pt had been c/o cough, dry, mild ,later on got worse and productive. Increasing cough since past 2 days. Pt had also noticed difficulty swallowing, says hasnt had much to eat for past 2 days, didn't have any of his medications. Also c/o severe fatigue/generalized weakness. 
  
Pt otherwise doesn't c/o headache or dizziness or nausea or vomiting or abdominal pain or chest pain. 
  
Hb 8.6, platelet 18,BKCTH 6.71,DQUFOOAUL 3.51. Temp 101 
cxr-Low lung volumes with bibasilar atelectasis. ua pending 
  
Pt will admitted for prob pna, r/o covid and odynophagia. During the stay Patient was continued on antibiotics, for his odynophagia with his history of a chemotherapy patient was also started on fluconazole and nystatin. Speech was consulted and they recommended patient to be n.p.o.. GI was consulted for his odynophagia, was supposed to have an EGD on 8/3/2020 but secondary to his's thrombocytopenia EGD was canceled. Repeat speech evaluation, speech still recommended n.p.o. and and probable ENT evaluation. Patient creatinine got worse, nephrology was consulted, did not recommend dialysis, palliative care was consulted. 8/3/2020 Complains of mild cough, pain on swallowing sputum, presently n.p.o., supposed to go for EGD this morning. (Canceled secondary to thrombocytopenia) Later on during the day family did come and visit the patient, explained patients medical condition treatment and his guarded prognosis, family decided on making patient comfort care measures only. Hospice was consulted. Objective:  
 
Patient Vitals for the past 24 hrs: 
 Temp Pulse Resp BP SpO2  
08/03/20 1558 97.6 °F (36.4 °C) 88 16 90/49 98 % 08/03/20 1134 97.7 °F (36.5 °C) 88 16 100/50 98 % 08/03/20 0720 97.6 °F (36.4 °C) 82 16 92/59 97 % 08/03/20 0400  74     
08/03/20 0314 98.2 °F (36.8 °C) 86 20 99/47 97 % 08/03/20 0000  86     
08/02/20 2308 98.3 °F (36.8 °C) 86 20 96/51 98 % 08/02/20 2000  83     
08/02/20 1924 98 °F (36.7 °C) 80 20 98/53 97 % Oxygen Therapy O2 Sat (%): 98 % (08/03/20 1558) Pulse via Oximetry: 91 beats per minute (07/31/20 1819) O2 Device: Nasal cannula (07/31/20 2101) O2 Flow Rate (L/min): 2 l/min (07/31/20 2101) Intake/Output Summary (Last 24 hours) at 8/3/2020 1727 Last data filed at 8/3/2020 1453 Gross per 24 hour Intake  Output 400 ml Net -400 ml General:    Well nourished. Alert. frequent cough 
heent- dry mucus memebrane CV:   RRR. No murmur, rub, or gallop. Lungs:   Coarse breath sound Abdomen:   Soft, nontender, nondistended. Cns- no focal neurological deficit Extremities: Warm and dry. No cyanosis or edema. Skin:     No rashes or jaundice. Data Review: 
I have reviewed all labs, meds, telemetry events, and studies from the last 24 hours. Recent Results (from the past 24 hour(s)) URINALYSIS W/ RFLX MICROSCOPIC Collection Time: 08/02/20  6:33 PM  
Result Value Ref Range Color YELLOW Appearance CLOUDY Specific gravity 1.016 1.001 - 1.023    
 pH (UA) 5.5 5.0 - 9.0 Protein 100 (A) NEG mg/dL Glucose Negative mg/dL Ketone Negative NEG mg/dL Bilirubin Negative NEG  Blood LARGE (A) NEG    
 Urobilinogen 0.2 0.2 - 1.0 EU/dL Nitrites Negative NEG Leukocyte Esterase Negative NEG    
 WBC 5-10 0 /hpf  
 RBC 0 0 /hpf Epithelial cells 0-3 0 /hpf Bacteria 0 0 /hpf Casts 0-3 0 /lpf SODIUM, UR, RANDOM Collection Time: 08/02/20  6:33 PM  
Result Value Ref Range Sodium,urine random 25 MMOL/L  
PROTEIN/CREATININE RATIO, URINE Collection Time: 08/02/20  6:33 PM  
Result Value Ref Range Protein, urine random 247 (H) <11.9 mg/dL Creatinine, urine 89.20 mg/dL Protein/Creat. urine Ratio 2.8    
CBC WITH AUTOMATED DIFF Collection Time: 08/03/20  6:20 AM  
Result Value Ref Range WBC 1.3 (LL) 4.3 - 11.1 K/uL  
 RBC 2.87 (L) 4.23 - 5.6 M/uL HGB 6.7 (LL) 13.6 - 17.2 g/dL HCT 19.8 (LL) 41.1 - 50.3 % MCV 69.0 (L) 79.6 - 97.8 FL  
 MCH 23.3 (L) 26.1 - 32.9 PG  
 MCHC 33.8 31.4 - 35.0 g/dL  
 RDW 15.7 (H) 11.9 - 14.6 % PLATELET 43 (L) 515 - 450 K/uL MPV Unable to calculate. Recommend adding IPF. 9.4 - 12.3 FL ABSOLUTE NRBC 0.09 0.0 - 0.2 K/uL NEUTROPHILS 35 (L) 43 - 78 % LYMPHOCYTES 36 13 - 44 % MONOCYTES 6 4.0 - 12.0 % EOSINOPHILS 5 0.5 - 7.8 % BASOPHILS 1 0.0 - 2.0 % IMMATURE GRANULOCYTES 17 (H) 0.0 - 5.0 %  
 ABS. NEUTROPHILS 0.4 (L) 1.7 - 8.2 K/UL  
 ABS. LYMPHOCYTES 0.5 0.5 - 4.6 K/UL  
 ABS. MONOCYTES 0.1 0.1 - 1.3 K/UL  
 ABS. EOSINOPHILS 0.1 0.0 - 0.8 K/UL  
 ABS. BASOPHILS 0.0 0.0 - 0.2 K/UL  
 ABS. IMM. GRANS. 0.2 0.0 - 0.5 K/UL  
 RBC COMMENTS MODERATE 
ANISOCYTOSIS + POIKILOCYTOSIS 
    
 RBC COMMENTS SLIGHT 
POLYCHROMASIA 
    
 RBC COMMENTS OCCASIONAL 
CARLITO CELLS 
    
 RBC COMMENTS SCHISTOCYTES    
 WBC COMMENTS Result Confirmed By Smear PLATELET COMMENTS MARKED    
 DF AUTOMATED PROTHROMBIN TIME + INR Collection Time: 08/03/20  6:20 AM  
Result Value Ref Range Prothrombin time 23.8 (H) 12.0 - 14.7 sec INR 2.1 PTT Collection Time: 08/03/20  6:20 AM  
Result Value Ref Range  aPTT 54.3 (H) 24.3 - 35.4 SEC  
 METABOLIC PANEL, BASIC Collection Time: 08/03/20  6:20 AM  
Result Value Ref Range Sodium 152 (H) 136 - 145 mmol/L Potassium 3.8 3.5 - 5.1 mmol/L Chloride 126 (H) 98 - 107 mmol/L  
 CO2 16 (L) 21 - 32 mmol/L Anion gap 10 7 - 16 mmol/L Glucose 109 (H) 65 - 100 mg/dL BUN 70 (H) 8 - 23 MG/DL Creatinine 5.90 (H) 0.8 - 1.5 MG/DL  
 GFR est AA 12 (L) >60 ml/min/1.73m2 GFR est non-AA 10 (L) >60 ml/min/1.73m2 Calcium 7.8 (L) 8.3 - 10.4 MG/DL  
TRANSFERRIN SATURATION Collection Time: 08/03/20 10:33 AM  
Result Value Ref Range Iron 59 35 - 150 ug/dL TIBC 125 (L) 250 - 450 ug/dL Transferrin Saturation 47 >20 % FERRITIN Collection Time: 08/03/20 10:33 AM  
Result Value Ref Range Ferritin 5,505 (H) 8 - 388 NG/ML  
VITAMIN B12 Collection Time: 08/03/20 10:33 AM  
Result Value Ref Range Vitamin B12 >2,000 (H) 193 - 986 pg/mL FOLATE Collection Time: 08/03/20 10:33 AM  
Result Value Ref Range Folate 5.8 3.1 - 17.5 ng/mL LD Collection Time: 08/03/20 10:33 AM  
Result Value Ref Range  (H) 110 - 210 U/L  
RETICULOCYTE COUNT Collection Time: 08/03/20 10:33 AM  
Result Value Ref Range Reticulocyte count 0.3 0.3 - 2.0 % Absolute Retic Cnt. 0.0090 (L) 0.026 - 0.095 M/ul Immature Retic Fraction 11.3 2.3 - 13.4 % Retic Hgb Conc. 28 (L) 29 - 35 pg  
BILIRUBIN, FRACTIONATED Collection Time: 08/03/20 10:33 AM  
Result Value Ref Range Bilirubin, total 0.6 0.2 - 1.1 MG/DL Bilirubin, direct 0.3 <0.4 MG/DL Bilirubin, indirect 0.3 0.0 - 1.1 MG/DL HAPTOGLOBIN Collection Time: 08/03/20 10:33 AM  
Result Value Ref Range Haptoglobin 275 (H) 30 - 200 mg/dL FIBRINOGEN Collection Time: 08/03/20 10:33 AM  
Result Value Ref Range Fibrinogen 787 (H) 190 - 501 mg/dL D DIMER Collection Time: 08/03/20 10:33 AM  
Result Value Ref Range D DIMER 3.69 (HH) <0.56 ug/ml(FEU) URIC ACID  Collection Time: 08/03/20 10:33 AM  
 Result Value Ref Range Uric acid 7.4 (H) 2.6 - 6.0 MG/DL All Micro Results Procedure Component Value Units Date/Time CULTURE, BLOOD [[id number]] Collected:  07/31/20 1607 Order Status:  Completed Specimen:  Blood Updated:  08/03/20 5871 Special Requests: --     
  RIGHT 
FOREARM Culture result: NO GROWTH 3 DAYS     
 CULTURE, BLOOD [[id number]] Collected:  07/31/20 1614 Order Status:  Completed Specimen:  Blood Updated:  08/03/20 2763 Special Requests: --     
  RIGHT 
HAND Culture result: NO GROWTH 3 DAYS     
 CULTURE, URINE [[id number]] Collected:  07/31/20 1707 Order Status:  Completed Specimen:  Urine from Clean catch Updated:  08/03/20 6230 Special Requests: NO SPECIAL REQUESTS Culture result:    
  <10,000 COLONIES/mL NORMAL SKIN SINDHU ISOLATED Current Meds: 
Current Facility-Administered Medications Medication Dose Route Frequency  0.9% sodium chloride infusion 250 mL  250 mL IntraVENous PRN  
 artificial saliva (MOUTH KOTE) 1 Spray  1 Spray Oral PRN  
 HYDROmorphone (PF) (DILAUDID) injection 0.5 mg  0.5 mg IntraVENous Q4H PRN Or  
 HYDROmorphone (PF) (DILAUDID) injection 1 mg  1 mg IntraVENous Q4H PRN  
 lip protectant (BLISTEX) ointment 1 Each  1 Each Topical PRN  pantoprazole (PROTONIX) 40 mg in 0.9% sodium chloride 10 mL injection  40 mg IntraVENous Q12H  cyclobenzaprine (FLEXERIL) tablet 10 mg  10 mg Oral TID PRN  
 HYDROmorphone (DILAUDID) tablet 4-8 mg  4-8 mg Oral Q4H PRN  
 sodium chloride (NS) flush 5-40 mL  5-40 mL IntraVENous PRN  
 acetaminophen (TYLENOL) tablet 650 mg  650 mg Oral Q6H PRN Or  
 acetaminophen (TYLENOL) suppository 650 mg  650 mg Rectal Q6H PRN  polyethylene glycol (MIRALAX) packet 17 g  17 g Oral DAILY PRN  promethazine (PHENERGAN) tablet 12.5 mg  12.5 mg Oral Q6H PRN  Or  
 ondansetron (ZOFRAN) injection 4 mg  4 mg IntraVENous Q6H PRN  
  nystatin (MYCOSTATIN) 100,000 unit/mL oral suspension 500,000 Units  500,000 Units Oral QID  cefepime (MAXIPIME) 2 g in 0.9% sodium chloride (MBP/ADV) 100 mL  2 g IntraVENous Q24H  
 dextrose 5% and 0.9% NaCl infusion  50 mL/hr IntraVENous CONTINUOUS  
 fluconazole (DIFLUCAN) 200mg/100 mL IVPB (premix)  200 mg IntraVENous Q24H Other Studies (last 24 hours): No results found. Assessment and Plan:  
 
Hospital Problems as of 8/3/2020 Date Reviewed: 7/29/2020 Codes Class Noted - Resolved POA Chronic pain syndrome ICD-10-CM: G89.4 ICD-9-CM: 338.4  7/31/2020 - Present Unknown Cough ICD-10-CM: R05 ICD-9-CM: 786.2  7/31/2020 - Present Unknown Suspected COVID-19 virus infection ICD-10-CM: Z20.828 ICD-9-CM: V01.79  7/31/2020 - Present Unknown Thrombocytopenia ([facility] ICD-10-CM: D69.6 ICD-9-CM: 287.5  7/31/2020 - Present Unknown PNA (pneumonia) ICD-10-CM: J18.9 ICD-9-CM: 740  7/31/2020 - Present Unknown Odynophagia ICD-10-CM: R13.10 ICD-9-CM: 787.20  7/31/2020 - Present Unknown Multiple myeloma not having achieved remission ([facility] ICD-10-CM: C90.00 ICD-9-CM: 203.00  2/13/2019 - Present Yes Personal history of colon cancer ICD-10-CM: G62.874 
ICD-9-CM: V10.05  12/27/2018 - Present Yes Overview Signed 12/29/2018 12:50 PM by Duane Ground, RN  
   
  
  
   
 PAF (paroxysmal atrial fibrillation) [facility]) ICD-10-CM: I48.0 ICD-9-CM: 427.31  11/26/2017 - Present Yes Chronic systolic heart failure (HCC) ICD-10-CM: I50.22 ICD-9-CM: 428.22  11/26/2017 - Present Yes Multiple myeloma (HCC) ICD-10-CM: C90.00 ICD-9-CM: 203.00  1/2/2017 - Present Unknown PLAN: 
- fever,cough- prob pneumonia, high prolactin,cont cefepime. - odynophagia- npo as per speech, gi following, EGD canceled secondary to thrombocytopenia. - covid ruled out 
- oral thrush- cont fluconazole and nystatin - chronic chf 
 - acute on ckd- increase ivf to 100 cc/hr- worsening creatinine- consult nephrology-nephrology did not recommend dialysis, consulted palliative care. - chronic pain - paroxsymal afib - on eliquis 
- generalized weakness- PT and OT consulted. - chronic thrombocytopenia 
-pancytopenia- from chemo and multiple myeloma- consult hematology Later on today with spoke to patient son, wife and the patient, discussed his medical management and prognosis, family and patient decided on only comfort care measures. Hospice was consulted. 
  
 
  
[facility] dnr 
  
DVT ppx:  eliquis Anticipated DC needs:   
Code status:   DNR. Estimated LOS:  Greater than 2 midnights Risk:  high Signed: 
Jc Kelly MD

## 2020-08-03 NOTE — PROGRESS NOTES
TRANSFER - IN REPORT: 
 
Verbal report received from Earl RN (name) on Elisa Gustafson  being received from 6th floor room [phone] [phone] (unit) for routine progression of care Report consisted of patients Situation, Background, Assessment and  
Recommendations(SBAR). Information from the following report(s) Kardex, MAR and Recent Results was reviewed with the receiving nurse. Opportunity for questions and clarification was provided. Assessment completed upon patients arrival to unit and care assumed.

## 2020-08-03 NOTE — PROGRESS NOTES
Problem: Falls - Risk of 
Goal: *Absence of Falls Description: Document Alfredo Helton Fall Risk and appropriate interventions in the flowsheet. Outcome: Progressing Towards Goal 
Note: Fall Risk Interventions: 
Mobility Interventions: Bed/chair exit alarm Mentation Interventions: Adequate sleep, hydration, pain control, More frequent rounding Medication Interventions: Bed/chair exit alarm Elimination Interventions: Bed/chair exit alarm, Call light in reach Problem: Dysphagia (Adult) Goal: *Speech Goal: (INSERT TEXT) Description: LTG: Patient will tolerate least restrictive diet without overt signs or symptoms of airway compromise by discharge. STG: Patient will tolerate PO trials with SLP only without overt signs or symptoms of aspiration. STG: Patient will participate in modified barium swallow study as clinically indicated. Outcome: Progressing Towards Goal 
  
Problem: Patient Education: Go to Patient Education Activity Goal: Patient/Family Education Outcome: Progressing Towards Goal

## 2020-08-03 NOTE — PROGRESS NOTES
[facility] PROGRESS NOTE Follow up for: MARY Complains of significant throat pain and has excessive secretion ST doing suction Subjective:  
Patient seen and examined. Chart, notes, labs, imaging, results all reviewed. ROS: 
Gen - no fever, no chills, appetite okay CV - no chest pain, no orthopnea Lung - no shortness of breath, no cough Abd - no tenderness, no nausea, no vomiting Ext - no edema Objective:  
Exam: 
Vitals:  
 08/03/20 0000 08/03/20 0314 08/03/20 0400 08/03/20 0720 BP:  99/47  92/59 Pulse: 86 86 74 82 Resp:  20  16 Temp:  98.2 °F (36.8 °C)  97.6 °F (36.4 °C) SpO2:  97%  97% Weight:      
Height:      
 
 
 
Intake/Output Summary (Last 24 hours) at 8/3/2020 1015 Last data filed at 8/3/2020 7672 Gross per 24 hour Intake  Output 200 ml Net -200 ml Current Facility-Administered Medications Medication Dose Route Frequency  0.9% sodium chloride infusion 250 mL  250 mL IntraVENous PRN  
 artificial saliva (MOUTH KOTE) 1 Spray  1 Spray Oral PRN  
 HYDROmorphone (PF) (DILAUDID) injection 0.5 mg  0.5 mg IntraVENous Q4H PRN Or  
 HYDROmorphone (PF) (DILAUDID) injection 1 mg  1 mg IntraVENous Q4H PRN  
 lip protectant (BLISTEX) ointment 1 Each  1 Each Topical PRN  pantoprazole (PROTONIX) 40 mg in 0.9% sodium chloride 10 mL injection  40 mg IntraVENous Q12H  
 amiodarone (CORDARONE) tablet 200 mg  200 mg Oral DAILY  carvediloL (COREG) tablet 6.25 mg  6.25 mg Oral BID WITH MEALS  chlorhexidine (PERIDEX) 0.12 % mouthwash 15 mL  15 mL Swish and Spit BID  cholecalciferol (VITAMIN D3) (1000 Units /25 mcg) tablet 2 Tab  2,000 Units Oral DAILY  cyclobenzaprine (FLEXERIL) tablet 10 mg  10 mg Oral TID PRN  
 HYDROmorphone (DILAUDID) tablet 4-8 mg  4-8 mg Oral Q4H PRN  
 levothyroxine (SYNTHROID) tablet 175 mcg  175 mcg Oral ACB  magnesium oxide (MAG-OX) tablet 400 mg  400 mg Oral BID  
  [Held by provider] torsemide (DEMADEX) tablet 20 mg  20 mg Oral EVERY OTHER DAY  rosuvastatin (CRESTOR) tablet 10 mg  10 mg Oral QHS  sodium chloride (NS) flush 5-40 mL  5-40 mL IntraVENous Q8H  
 sodium chloride (NS) flush 5-40 mL  5-40 mL IntraVENous PRN  
 acetaminophen (TYLENOL) tablet 650 mg  650 mg Oral Q6H PRN Or  
 acetaminophen (TYLENOL) suppository 650 mg  650 mg Rectal Q6H PRN  polyethylene glycol (MIRALAX) packet 17 g  17 g Oral DAILY PRN  promethazine (PHENERGAN) tablet 12.5 mg  12.5 mg Oral Q6H PRN Or  
 ondansetron (ZOFRAN) injection 4 mg  4 mg IntraVENous Q6H PRN  
 nystatin (MYCOSTATIN) 100,000 unit/mL oral suspension 500,000 Units  500,000 Units Oral QID  cefepime (MAXIPIME) 2 g in 0.9% sodium chloride (MBP/ADV) 100 mL  2 g IntraVENous Q24H  
 dextrose 5% and 0.9% NaCl infusion  100 mL/hr IntraVENous CONTINUOUS  
 fluconazole (DIFLUCAN) 200mg/100 mL IVPB (premix)  200 mg IntraVENous Q24H EXAM 
GEN - Alert, oriented, in no distress CV - S1, S2, RRR, no rub, murmur, or gallop Lung - clear to auscultation bilaterally Abd - soft, nontender, BS present Ext - no edema Recent Labs 08/03/20 
8899 08/02/20 
1028 08/01/20 
7172 WBC 1.3* 1.3* 1.1* HGB 6.7* 7.6* 8.1* HCT 19.8* 21.7* 24.3*  
PLT 43* 50* 41* Recent Labs 08/03/20 
7620 08/02/20 
8384 08/01/20 
8035 * 148* 146*  
K 3.8 4.2 3.3*  
* 120* 114* CO2 16* 16* 19* BUN 70* 58* 47* CREA 5.90* 5.27* 4.31* CA 7.8* 8.3 8.9 * 111* 89 Assessment and Plan: 1. MARY - possible pre renal  
On IVF , worsening renal function seen 2. MM on chemotherapy Per oncology 3. Hypernatremia On D5 /1/2 NS 4. Metabolic  Acidosis 5. Afib on amio 6. Pancytopenia 7. CHF s/p ICD /pacer 8. Colon cancer Not a candidate for dialysis given his extensive commodities including cancer WIll await palliative decision Explained to him that he is not a dialysis candidate and trying to do conservative management . He agreed to the plan Jeremiah Andrea MD

## 2020-08-03 NOTE — PROGRESS NOTES
. 
[facility] Progress Note Date: 8/3/2020 GI Problem: dysphagia + odynophagia History of Present Illness:  Patient is a [de-identified] y.o. male with PMH MM on chemo, CHF s/p ICD/pacer, colon cancer, chronic kidney disease, and afib on amio and eliquis (held) who is seen in consultation for dysphagia and odynophagia both. He is COVID negative. He has had fever and a productive cough. He is on cefepime for pneumonia. He can't eat anything. He has oral thrush, and they have started him on fluconazole and nystatin. Speech and swallow have evaluated him and consider this esophageal dysphagia. He is very pancytopenic- WBC 1.1, hgb 8.1, plts 41. He says he is starting to improve some regarding his swallowing. He seems to have some confusion too. INR also elevated 2.1. PTT hi at 54. 3. Hospital Medications: 
Current Facility-Administered Medications Medication Dose Route Frequency  0.9% sodium chloride infusion 250 mL  250 mL IntraVENous PRN  
 lip protectant (BLISTEX) ointment 1 Each  1 Each Topical PRN  pantoprazole (PROTONIX) 40 mg in 0.9% sodium chloride 10 mL injection  40 mg IntraVENous Q12H  
 amiodarone (CORDARONE) tablet 200 mg  200 mg Oral DAILY  carvediloL (COREG) tablet 6.25 mg  6.25 mg Oral BID WITH MEALS  chlorhexidine (PERIDEX) 0.12 % mouthwash 15 mL  15 mL Swish and Spit BID  cholecalciferol (VITAMIN D3) (1000 Units /25 mcg) tablet 2 Tab  2,000 Units Oral DAILY  cyclobenzaprine (FLEXERIL) tablet 10 mg  10 mg Oral TID PRN  
 HYDROmorphone (DILAUDID) tablet 4-8 mg  4-8 mg Oral Q4H PRN  
 levothyroxine (SYNTHROID) tablet 175 mcg  175 mcg Oral ACB  magnesium oxide (MAG-OX) tablet 400 mg  400 mg Oral BID  [Held by provider] torsemide (DEMADEX) tablet 20 mg  20 mg Oral EVERY OTHER DAY  rosuvastatin (CRESTOR) tablet 10 mg  10 mg Oral QHS  sodium chloride (NS) flush 5-40 mL  5-40 mL IntraVENous Q8H  
  sodium chloride (NS) flush 5-40 mL  5-40 mL IntraVENous PRN  
 acetaminophen (TYLENOL) tablet 650 mg  650 mg Oral Q6H PRN Or  
 acetaminophen (TYLENOL) suppository 650 mg  650 mg Rectal Q6H PRN  polyethylene glycol (MIRALAX) packet 17 g  17 g Oral DAILY PRN  promethazine (PHENERGAN) tablet 12.5 mg  12.5 mg Oral Q6H PRN Or  
 ondansetron (ZOFRAN) injection 4 mg  4 mg IntraVENous Q6H PRN  
 nystatin (MYCOSTATIN) 100,000 unit/mL oral suspension 500,000 Units  500,000 Units Oral QID  cefepime (MAXIPIME) 2 g in 0.9% sodium chloride (MBP/ADV) 100 mL  2 g IntraVENous Q24H  
 dextrose 5% and 0.9% NaCl infusion  100 mL/hr IntraVENous CONTINUOUS  
 fluconazole (DIFLUCAN) 200mg/100 mL IVPB (premix)  200 mg IntraVENous Q24H Objective:  
 
Physical Exam: 
Vitals: 
Visit Vitals BP 92/59 (BP 1 Location: Left arm, BP Patient Position: At rest) Pulse 82 Temp 97.6 °F (36.4 °C) Resp 16 Ht 5' 9\" (1.753 m) Wt 70.8 kg (156 lb) SpO2 97% BMI 23.04 kg/m² General: No acute distress. Cardiovascular: Regular rate and rhythm. No murmurs, gallops, or rubs. Respiratory:  Comfortable breathing  With no accessory muscle use. Clear breath sounds with no wheezes, rales, or rhonchi. GI:  Abdomen nondistended, soft, and nontender. Normal active bowel sounds. No enlargement of the liver or spleen. No masses palpable. Neurological:  Patient is alert and mildly confused. Melvenia Damir Laboratory:   
Recent Labs 08/03/20 
2874 WBC 1.3*  
RBC 2.87* HGB 6.7* HCT 19.8* PLT 43* Recent Labs 08/02/20 
0459 * * K 4.2 * CO2 16* BUN 58* CREA 5.27* CA 8.3 Recent Labs 08/03/20 
8021 PTP 23.8* INR 2.1 APTT 54.3* Recent Labs  
  07/31/20 
1608 AP 51 ALB 2.8*  
TP 8.6* Assessment: A [de-identified] y.o. male with PMH MM on chemo, CHF s/p ICD/pacer, colon cancer, chronic kidney disease, and afib on amio and eliquis (held) who is seen in consultation for dysphagia and odynophagia both. Improving on Diflucan with oral thrush. Plan: EGD for today cancelled as will be unable to do biopsy or dilation with current numbers. Continue fluconazole + nystatin Continue Protonix 40 bid. Await Hematology / Oncology evaluation and recommendations. Discussed with Dr. Timi Norman. May resume diet if okayed by Speech Pathology for oral intake. Signed By: Stacie Bautista MD   
 August 3, 2020

## 2020-08-03 NOTE — CONSULTS
Palliative Care Patient: Lynnette Chambers MRN: [id number]  SSN: [id number] YOB: 1940  Age: [de-identified] y.o. Sex: male Date of Request: 8/2/20 Date of Consult:  8/3/2020 Reason for Consult:  advanced care plan planning/end of life Requesting Physician: Dr Delia Rao Assessment/Plan:  
 
Principal Diagnosis:   
Pain throat Additional Diagnoses: · Debility, Unspecified  R53.81 · Dyspnea  R06.00 · Dysphagia  R13.10 · Odynophagia  R13.10 · Counseling, Encounter for Medical Advice  Z71.9 
· Encounter for Palliative Care  Z51.5 · Dry mouth Palliative Performance Scale (PPS): PPS: 20 Medical Decision Making:  
Reviewed and summarized notes from admission to present. Discussed case with appropriate providers: Dr Valentine Luna Reviewed laboratory and x-ray data from admission to present. Pt resting in bed, no visitors present. Pt endorses dry mouth, for which he has been using mouth sponges. Ordered Mouth-Kote. Pt endorses pain in throat; ordered IV Dilaudid. He denies other discomforts. The pt is now dysphagic. Dr Samantha Jesus has placed EGD on hold and ordered a Speech eval.  Reviewed this with pt. Spoke on phone to the pt's Rc Jain, [phone], who calls the pt Bill. Tavares Barroso is currently in [place]. Tavares Barroso asks that, since the pt is Covid negative, if it is possible to move the pt to a non-Covid floor. This way family can come in to visit, and a discussion of ongoing treatment and rehab vs comfort measures and hospice can be had in-person. A transfer order was placed yesterday, but no beds are available yet. With Gladys's permission, gave Tavares Barroso her phone number. Will continue to follow. Will discuss findings with members of the interdisciplinary team.   
 
Thank you for this referral.    
 
  
. 
 
Subjective:  
 
History obtained from:  Family, Care Provider and Chart Chief Complaint: Throat pain History of Present Illness:  From H&P: \"Pt with known h/o afib on amiodarone and eliquis, multiple myeloma in chemotherapy,cardiomyopathy with defibrillator, hypothyroid, h/o colon cancer,htn, ckd 4 and hyperlipidemia. 
  
Since past 2-3 weeks pt had been c/o cough, dry, mild ,later on got worse and productive. Increasing cough since past 2 days. Pt had also noticed difficulty swallowing, says hasnt had much to eat for past 2 days, didn't have any of his medications. Also c/o severe fatigue/generalized weakness. \" Advance Directive: No      
Code Status:  DNR Health Care Power of : No - Patient does not have a [address] Past Medical History:  
Diagnosis Date  Arrhythmia LBBB  Arthritis  Automatic implantable cardioverter-defibrillator in situ 9/1/2011  BMI 30.0-30.9,adult BMI 30.5  Cardiomyopathy ([address] followed by [facility] Cardiology  Cholelithiases  Chronic systolic heart failure ([address] 9/13/2011 [place] [employer] class II-III heart failure symptoms  Colonic mass 3/23/2016  Gout  H/O: pituitary tumor X2 benign, removed  High cholesterol  History of thyroid cancer  History of vertigo   
 no recent complications or episodes  Hurthle cell carcinoma of thyroid ([address]  Hx of radiation therapy to prostate 2004  Hyperlipidemia 11/18/2015  Hypertension  Hypothyroid   
 hypo- had portion of thyroid removed due to cancer  ICD (implantable cardiac defibrillator) in place 9/2011 [employer] BIV/ICD, Gen change 3.2.16  
 LBBB (left bundle branch block) 11/18/2015  Malaise and fatigue 11/18/2015  Malignant neoplasm of prostate ([address]  Mass of colon   
 right colon mass  Multiple myeloma ([address]  Polyneuropathy 12/14/2016  Postsurgical hypothyroidism 3/15/2018  Postural imbalance 12/14/2016  Sinus bradycardia 1/12/2018  Sinus node dysfunction (HCC)  Vertigo 11/18/2015 Past Surgical History:  
Procedure Laterality Date  HX CHOLECYSTECTOMY  2013  HX COLONOSCOPY    
 dx with Right colon mass  HX GI    
 benign polyps removed  HX HEART CATHETERIZATION    
 HX HEENT  2008 thyroidetomy partial tumor from pituitary x2  HX PACEMAKER  2011/2016  
 [employer] biv-icd  HX THYROIDECTOMY  2008  HX THYROIDECTOMY  01/26/2018  
 completion thyroidectomy  HX TUMOR REMOVAL  1990/2010  
 pituitary tumor removed X2  
 HX VASCULAR ACCESS Family History Problem Relation Age of Onset  Heart Disease Sister  Heart Attack Sister  Stroke Mother  Thyroid Disease Neg Hx  Diabetes Neg Hx Social History Tobacco Use  Smoking status: Never Smoker  Smokeless tobacco: Never Used Substance Use Topics  Alcohol use: Yes Comment: very rare Prior to Admission medications Medication Sig Start Date End Date Taking? Authorizing Provider  
pomalidomide (Pomalyst) 4 mg cap Take 4 mg by mouth daily for 21 days. Take for 21 days on and 7 days off 7/13/20 8/3/20 Yes Alonza Severs, MD  
amiodarone (CORDARONE) 200 mg tablet Take 1 Tab by mouth daily. reduce to 1/2 tab a day 7/9  Indications: prevention of recurrent atrial fibrillation 6/15/20  Yes Mark Daniels Doctor, MD  
fentaNYL (DURAGESIC) 100 mcg/hr PATCH 1 Patch by TransDERmal route every seventy-two (72) hours for 30 days. Max Daily Amount: 1 Patch. 6/15/20 8/1/20 Yes Alonza Severs, MD  
rosuvastatin (Crestor) 10 mg tablet Take 1 Tab by mouth nightly. 4/24/20  Yes Lady Valeri MD  
Omeprazole delayed release (PRILOSEC D/R) 20 mg tablet Take 20 mg by mouth daily. Yes Provider, Historical  
levothyroxine (SYNTHROID) 175 mcg tablet Take 1 Tab by mouth Daily (before breakfast).  2/20/20  Yes Perry Daniel MD  
ELIQUIS 5 mg tablet TAKE 1 TABLET BY MOUTH TWO  TIMES DAILY 11/29/19  Yes Lady Valeri MD  
potassium chloride (K-DUR, KLOR-CON) 20 mEq tablet TAKE 1 TABLET BY MOUTH TWO  TIMES DAILY 9/22/19  Yes Marcie Lee MD  
carvedilol (COREG) 6.25 mg tablet TAKE 1 TABLET BY MOUTH TWO  TIMES DAILY WITH MEALS 9/22/19  Yes Marcie Lee MD  
magnesium oxide (MAG-OX) 400 mg tablet Take 1 Tab by mouth two (2) times a day. 3/5/19  Yes Princess Aguiar MD  
torsemide [facility]) 20 mg tablet Take 20 mg by mouth every other day. Yes Provider, Historical  
polyethylene glycol (MIRALAX) 17 gram packet Take 17 g by mouth daily. Yes Provider, Historical  
cyclobenzaprine (FLEXERIL) 10 mg tablet Take 1 Tab by mouth three (3) times daily as needed for Muscle Spasm(s). 8/10/18  Yes Marcie Lee MD  
HYDROmorphone (DILAUDID) 4 mg tablet Take 1-2 Tabs by mouth every four (4) hours as needed for Pain. Max Daily Amount: 48 mg. 7/31/18  Yes Princess Aguiar MD  
amino ac/whey prot conc, isol (WHEY PROTEIN PO) Take  by mouth. Yes Provider, Historical  
chlorhexidine (PERIDEX) 0.12 % solution 15 mL by Swish and Spit route two (2) times a day. Yes Provider, Historical  
cyanocobalamin (VITAMIN B-12) 1,000 mcg sublingual tablet Take 1,000 mcg by mouth daily. Yes Provider, Historical  
cholecalciferol, vitamin D3, (VITAMIN D3) 2,000 unit tab Take  by mouth. Yes Provider, Historical  
 
 
Allergies Allergen Reactions  Ace Inhibitors Other (comments)  Lisinopril Cough  Pcn [Penicillins] Swelling Review of Systems: A comprehensive review of systems was negative except for: Ears, Nose, Mouth, Throat, and Face: Positive for dry mouth, sore throat. Objective:  
 
Visit Vitals BP 92/59 (BP 1 Location: Left arm, BP Patient Position: At rest) Pulse 82 Temp 97.6 °F (36.4 °C) Resp 16 Ht 5' 9\" (1.753 m) Wt 156 lb (70.8 kg) SpO2 97% BMI 23.04 kg/m² Physical Exam: 
 
General:  Cooperative. No acute distress. Eyes:  Conjunctivae/corneas clear Nose: Nares normal. Septum midline. Neck: Supple, symmetrical, trachea midline, no JVD Lungs:   Clear to auscultation bilaterally, unlabored Heart:  Regular rate and rhythm, no murmur Abdomen:   Soft, non-tender, non-distended Extremities: Normal, atraumatic, no cyanosis or edema Skin: Skin color, texture, turgor normal. No rash or lesions. Neurologic: Nonfocal  
Psych: Alert and oriented Assessment:  
 
Hospital Problems  Date Reviewed: 7/29/2020 Codes Class Noted POA Chronic pain syndrome ICD-10-CM: G89.4 ICD-9-CM: 338.4  7/31/2020 Unknown Cough ICD-10-CM: R05 ICD-9-CM: 786.2  7/31/2020 Unknown Suspected COVID-19 virus infection ICD-10-CM: Z20.828 ICD-9-CM: V01.79  7/31/2020 Unknown Thrombocytopenia ([facility] ICD-10-CM: D69.6 ICD-9-CM: 287.5  7/31/2020 Unknown PNA (pneumonia) ICD-10-CM: J18.9 ICD-9-CM: 832  7/31/2020 Unknown Odynophagia ICD-10-CM: R13.10 ICD-9-CM: 787.20  7/31/2020 Unknown Multiple myeloma not having achieved remission ([facility] ICD-10-CM: C90.00 ICD-9-CM: 203.00  2/13/2019 Yes Personal history of colon cancer ICD-10-CM: O96.130 
ICD-9-CM: V10.05  12/27/2018 Yes Overview Signed 12/29/2018 12:50 PM by January Villalba RN  
   
  
  
   
 PAF (paroxysmal atrial fibrillation) [facility]) ICD-10-CM: I48.0 ICD-9-CM: 427.31  11/26/2017 Yes Chronic systolic heart failure (HCC) ICD-10-CM: I50.22 ICD-9-CM: 428.22  11/26/2017 Yes Multiple myeloma (HCC) ICD-10-CM: C90.00 ICD-9-CM: 203.00  1/2/2017 Unknown Signed By: Harsha Varela NP August 3, 2020

## 2020-08-03 NOTE — PROGRESS NOTES
08/03/20 1908 Dual Skin Pressure Injury Assessment Dual Skin Pressure Injury Assessment WDL Second Care Provider (Based on [address] [place] [address]) Dedrick Lan RN Skin Integumentary Skin Integumentary (WDL) WDL Pressure  Injury Documentation No Pressure Injury Noted-Pressure Ulcer Prevention Initiated Skin Integrity Cracked Skin Color Appropriate for ethnicity Skin Condition/Temp Warm;Dry;Flaky Turgor Epidermis thin w/ loss of subcut tissue Wound Prevention and Protection Methods Orientation of Wound Prevention Posterior Location of Wound Prevention Sacrum/Coccyx Dressing Present  No  
Wound Offloading (Prevention Methods) Bed, pressure reduction mattress;Turning;Repositioning

## 2020-08-04 NOTE — PROGRESS NOTES
Palliative Care Progress Note Patient: Bunny Peacock MRN: [id number]  SSN: [id number] YOB: 1940  Age: [de-identified] y.o. Sex: male Assessment/Plan: Chief Complaint/Interval History: Now Comfort Measures Only Principal Diagnosis: · Debility, Unspecified  R53.81 Additional Diagnoses: · Dyspnea  R06.00 · Dysphagia  R13.10 · Odynophagia  R13.10 · Encounter for Palliative Care  Z51.5 · Dry mouth Palliative Performance Scale (PPS) PPS: 20 Medical Decision Making:  
Reviewed and summarized notes from last 24 hours. Discussed case with appropriate providers: Dr Isis Hsieh Reviewed lab and X-ray data from last 24 hours. Pt resting in bed. The pt is using the [address] frequently; offered Robinul. The pt's breathing is somewhat labored. IV morphine and Dilaudid available. The pt said that he will tallk to Inge Smalls ([phone]) before taking Robinul or medications to alleviate SOB. Mr Jayshree Brown said that he wants to stay at the hospital until he dies. This may not be realistic. The pt wants to talk to Kalli Bassett about this as well. He does not want me to speak with Kalli Bassett until after he speaks with Kalli Bassett. WESLEY Savage will call me when family arrives. Dr Arpan Marquez aware. Was informed by WESLEY Savage that the pt's wife and stepson had arrived. The pt was distressed at the prospect of leaving the hospital for hospice at [facility] or at a SNF. At the same time, the pt's breathing was becoming more labored and wet, requiring morphine and Robinul. Discussed with Dr Arpan Marquez, who confirmed that the pt may continue comfort measures only here at [facility]. Will continue to follow loosely. Will discuss findings with members of the interdisciplinary team.   
 
  
More than 50% of this 37 minute visit was spent counseling and coordination of care as outlined above.    In addition to the E&M described above, a 30--minute ACP meeting was spent discussing the pt's wishes concerning end of life care. In addition to the E&M described above, more than 50% of a 35-minute prolonged visit, from 1125 - 1200, was spent on counseling and coordination of care. Subjective:  
 
Review of Systems: A comprehensive review of systems was negative except for: Respiratory: Positive for dyspnea, non-productive cough. Objective:  
 
Visit Vitals /47 (BP 1 Location: Left arm, BP Patient Position: At rest) Pulse 84 Temp 97.3 °F (36.3 °C) Resp 18 Ht 5' 9\" (1.753 m) Wt 156 lb (70.8 kg) SpO2 93% BMI 23.04 kg/m² Physical Exam: 
 
General:  Cooperative. No acute distress. Eyes:  Conjunctivae/corneas clear Nose: Nares normal. Septum midline. Neck: Supple, symmetrical, trachea midline, no JVD Lungs:   Crackles bilaterally, slightly unlabored on NC Heart:  Regular rate and rhythm, no murmur Abdomen:   Soft, non-tender, non-distended Extremities: Normal, atraumatic, no cyanosis or edema Skin: Skin color, texture, turgor normal. No rash or lesions. Neurologic: Nonfocal  
Psych: Alert and oriented Signed By: Froilan Trammell NP August 4, 2020

## 2020-08-04 NOTE — PROGRESS NOTES
Opportunity for questions provided about medications and EOL care. Education provided. Pt receptive and willing to try morphine and robinol for labored SOB and inability to clear secretions. Pt is self suctioning. Thick mucus noted in lillian.

## 2020-08-04 NOTE — PROGRESS NOTES
Hospitalist Progress Note Admit Date:  2020  4:01 PM  
Name:  Rc Su Age:  [de-identified] y.o. 
:  1940 MRN:  [id number] PCP:  Arlester Fleischer, MD 
Treatment Team: Attending Provider: Bailey Johnson DO; Utilization Review: Rob Buerger, RN; Consulting Provider: Sarwat Chandra MD; Consulting Provider: Nahum Morgan MD; Consulting Provider: Adolfo Bryson NP; Care Manager: Adri Morrison; Primary Nurse: Lewis Bynum RN Subjective: Pt with known h/o afib on amiodarone and eliquis, multiple myeloma in chemotherapy,cardiomyopathy with defibrillator, hypothyroid, h/o colon cancer,htn, ckd 4 and hyperlipidemia. 
  
Since past 2-3 weeks pt had been c/o cough, dry, mild ,later on got worse and productive. Increasing cough since past 2 days. Pt had also noticed difficulty swallowing, says hasnt had much to eat for past 2 days, didn't have any of his medications. Also c/o severe fatigue/generalized weakness. 
  
Pt otherwise doesn't c/o headache or dizziness or nausea or vomiting or abdominal pain or chest pain. 
  
Hb 8.6, platelet 26,RIBAH 6.73,RORBAGATK 3.51. Temp 101 
cxr-Low lung volumes with bibasilar atelectasis. ua pending 
  
Pt will admitted for prob pna, r/o covid and odynophagia. During the stay Patient was continued on antibiotics, for his odynophagia with his history of a chemotherapy patient was also started on fluconazole and nystatin. Speech was consulted and they recommended patient to be n.p.o.. GI was consulted for his odynophagia, was supposed to have an EGD on 8/3/2020 but secondary to his's thrombocytopenia EGD was canceled. Repeat speech evaluation, speech still recommended n.p.o. and and probable ENT evaluation. Patient creatinine got worse, nephrology was consulted, did not recommend dialysis, palliative care was consulted. Interval History (): patient examined at bedside.  No acute overnight events. Family and patient have elected for comfort measures only. Patient denies shortness of breath or any pain. Objective:  
 
Patient Vitals for the past 24 hrs: 
 Temp Pulse Resp BP SpO2  
08/04/20 0823 97.3 °F (36.3 °C) 84 18 114/47 93 % 08/03/20 2312 98.1 °F (36.7 °C) 86  105/59 91 % 08/03/20 2212 98.6 °F (37 °C) 95  118/60 95 % 08/03/20 2058 99 °F (37.2 °C) 89 16 101/43 95 % 08/03/20 2041 98.7 °F (37.1 °C) 88 16 111/54 94 % 08/03/20 2001 97.8 °F (36.6 °C) 80 16 95/48 98 % Oxygen Therapy O2 Sat (%): 93 % (08/04/20 0823) Pulse via Oximetry: 91 beats per minute (07/31/20 1819) O2 Device: Nasal cannula (07/31/20 2101) O2 Flow Rate (L/min): 2 l/min (07/31/20 2101) Intake/Output Summary (Last 24 hours) at 8/4/2020 1823 Last data filed at 8/3/2020 2238 Gross per 24 hour Intake  Output 100 ml Net -100 ml General:    Well nourished. Alert. frequent cough 
heent- dry mucus memebrane CV:   RRR. No murmur, rub, or gallop. Lungs:   Coarse breath sound Abdomen:   Soft, nontender, nondistended. Cns- no focal neurological deficit Extremities: Warm and dry. No cyanosis or edema. Skin:     No rashes or jaundice. Data Review: 
I have reviewed all labs, meds, telemetry events, and studies from the last 24 hours. Recent Results (from the past 24 hour(s)) HGB & HCT Collection Time: 08/04/20 12:42 AM  
Result Value Ref Range HGB 8.2 (L) 13.6 - 17.2 g/dL HCT 24.3 (L) 41.1 - 50.3 % CBC WITH AUTOMATED DIFF Collection Time: 08/04/20  4:58 AM  
Result Value Ref Range WBC 2.0 (LL) 4.3 - 11.1 K/uL  
 RBC 3.40 (L) 4.23 - 5.6 M/uL HGB 8.2 (L) 13.6 - 17.2 g/dL HCT 24.1 (L) 41.1 - 50.3 % MCV 70.9 (L) 79.6 - 97.8 FL  
 MCH 24.1 (L) 26.1 - 32.9 PG  
 MCHC 34.0 31.4 - 35.0 g/dL  
 RDW 18.1 (H) 11.9 - 14.6 % PLATELET 41 (L) 001 - 450 K/uL MPV Unable to calculate. Recommend adding IPF. 9.4 - 12.3 FL ABSOLUTE NRBC 0.09 0.0 - 0.2 K/uL NEUTROPHILS 53 43 - 78 % LYMPHOCYTES 23 13 - 44 % MONOCYTES 6 4.0 - 12.0 % EOSINOPHILS 3 0.5 - 7.8 % BASOPHILS 2 0.0 - 2.0 % IMMATURE GRANULOCYTES 13 (H) 0.0 - 5.0 %  
 ABS. NEUTROPHILS 1.0 (L) 1.7 - 8.2 K/UL  
 ABS. LYMPHOCYTES 0.5 0.5 - 4.6 K/UL  
 ABS. MONOCYTES 0.1 0.1 - 1.3 K/UL  
 ABS. EOSINOPHILS 0.1 0.0 - 0.8 K/UL  
 ABS. BASOPHILS 0.0 0.0 - 0.2 K/UL  
 ABS. IMM. GRANS. 0.3 0.0 - 0.5 K/UL  
 RBC COMMENTS MARKED 
CARLITO CELLS 
    
 RBC COMMENTS MODERATE 
TARGET CELLS 
    
 WBC COMMENTS Result Confirmed By Smear PLATELET COMMENTS MARKED    
 DF AUTOMATED    
URIC ACID Collection Time: 08/04/20  4:58 AM  
Result Value Ref Range Uric acid 2.5 (L) 2.6 - 6.0 MG/DL All Micro Results Procedure Component Value Units Date/Time CULTURE, BLOOD [[id number]] Collected:  07/31/20 1607 Order Status:  Completed Specimen:  Blood Updated:  08/04/20 0750 Special Requests: --     
  RIGHT 
FOREARM Culture result: NO GROWTH 4 DAYS     
 CULTURE, BLOOD [[id number]] Collected:  07/31/20 1614 Order Status:  Completed Specimen:  Blood Updated:  08/04/20 0750 Special Requests: --     
  RIGHT 
HAND Culture result: NO GROWTH 4 DAYS     
 CULTURE, URINE [[id number]] Collected:  07/31/20 1707 Order Status:  Completed Specimen:  Urine from Clean catch Updated:  08/03/20 0961 Special Requests: NO SPECIAL REQUESTS Culture result:    
  <10,000 COLONIES/mL NORMAL SKIN SINDHU ISOLATED Current Meds: 
Current Facility-Administered Medications Medication Dose Route Frequency  LORazepam (ATIVAN) injection 1 mg  1 mg IntraVENous Q2H PRN  
 morphine injection 1 mg  1 mg IntraVENous Q15MIN PRN  
 glycopyrrolate (ROBINUL) injection 0.2 mg  0.2 mg IntraVENous Q4H PRN  
 0.9% sodium chloride infusion 250 mL  250 mL IntraVENous PRN  
 artificial saliva (MOUTH KOTE) 1 Spray  1 Spray Oral PRN  
  HYDROmorphone (PF) (DILAUDID) injection 0.5 mg  0.5 mg IntraVENous Q4H PRN Or  
 HYDROmorphone (PF) (DILAUDID) injection 1 mg  1 mg IntraVENous Q4H PRN  
 lip protectant (BLISTEX) ointment 1 Each  1 Each Topical PRN  
 cyclobenzaprine (FLEXERIL) tablet 10 mg  10 mg Oral TID PRN  
 HYDROmorphone (DILAUDID) tablet 4-8 mg  4-8 mg Oral Q4H PRN  
 sodium chloride (NS) flush 5-40 mL  5-40 mL IntraVENous PRN  
 acetaminophen (TYLENOL) tablet 650 mg  650 mg Oral Q6H PRN Or  
 acetaminophen (TYLENOL) suppository 650 mg  650 mg Rectal Q6H PRN  polyethylene glycol (MIRALAX) packet 17 g  17 g Oral DAILY PRN  promethazine (PHENERGAN) tablet 12.5 mg  12.5 mg Oral Q6H PRN Or  
 ondansetron (ZOFRAN) injection 4 mg  4 mg IntraVENous Q6H PRN  
 dextrose 5% and 0.9% NaCl infusion  50 mL/hr IntraVENous CONTINUOUS Other Studies (last 24 hours): No results found. Assessment and Plan:  
 
Hospital Problems as of 8/4/2020 Date Reviewed: 7/29/2020 Codes Class Noted - Resolved POA Chronic pain syndrome ICD-10-CM: G89.4 ICD-9-CM: 338.4  7/31/2020 - Present Unknown Cough ICD-10-CM: R05 ICD-9-CM: 786.2  7/31/2020 - Present Unknown Suspected COVID-19 virus infection ICD-10-CM: Z20.828 ICD-9-CM: V01.79  7/31/2020 - Present Unknown Thrombocytopenia ([facility] ICD-10-CM: D69.6 ICD-9-CM: 287.5  7/31/2020 - Present Unknown PNA (pneumonia) ICD-10-CM: J18.9 ICD-9-CM: 123  7/31/2020 - Present Unknown Odynophagia ICD-10-CM: R13.10 ICD-9-CM: 787.20  7/31/2020 - Present Unknown Multiple myeloma not having achieved remission ([facility] ICD-10-CM: C90.00 ICD-9-CM: 203.00  2/13/2019 - Present Yes Personal history of colon cancer ICD-10-CM: P13.104 
ICD-9-CM: V10.05  12/27/2018 - Present Yes Overview Signed 12/29/2018 12:50 PM by Duane Ground, RN  
   
  
  
   
 PAF (paroxysmal atrial fibrillation) [facility]) ICD-10-CM: I48.0 ICD-9-CM: 427.31  11/26/2017 - Present Yes Chronic systolic heart failure (HCC) ICD-10-CM: I50.22 ICD-9-CM: 428.22  11/26/2017 - Present Yes Multiple myeloma (HCC) ICD-10-CM: C90.00 ICD-9-CM: 203.00  1/2/2017 - Present Unknown PLAN: 
- comfort measures only - Ativan and morphine ordered 
- Hospice consult placed 
- discontinue all blood draws and scheduled meds - patient/family wish to stay in-house, will monitor clinical status and possibly pursue Hospice House depending on his anticipated clinical course Signed: Keon Spring, DO

## 2020-08-04 NOTE — PROGRESS NOTES
[employer] [facility] Inpatient Hematology / Oncology Daily Progress Note Reason for Consult:  Multiple myeloma ([facility] [address] Jaycee Captain Referring Physician:  June Cardoza,  
 
24 Hour Events: On comfort measures Family at bedside ROS: 
Constitutional: +weakness +fatigue CV: Negative for chest pain, palpitations, edema. Respiratory: +cough. Negative for dyspnea, wheezing. GI: Negative for nausea, abdominal pain, diarrhea. 10 point review of systems is otherwise negative with the exception of the elements mentioned above in the HPI. Allergies Allergen Reactions  Ace Inhibitors Other (comments)  Lisinopril Cough  Pcn [Penicillins] Swelling Past Medical History:  
Diagnosis Date  Arrhythmia LBBB  Arthritis  Automatic implantable cardioverter-defibrillator in situ 9/1/2011  BMI 30.0-30.9,adult BMI 30.5  Cardiomyopathy ([facility] [address] followed by [facility]  Cholelithiases  Chronic systolic heart failure ([facility] [address] 9/13/2011 [place] [employer] class II-III heart failure symptoms  Colonic mass 3/23/2016  Gout  H/O: pituitary tumor X2 benign, removed  High cholesterol  History of thyroid cancer  History of vertigo   
 no recent complications or episodes  Hurthle cell carcinoma of thyroid ([address]  Hx of radiation therapy to prostate 2004  Hyperlipidemia 11/18/2015  Hypertension  Hypothyroid   
 hypo- had portion of thyroid removed due to cancer  ICD (implantable cardiac defibrillator) in place 9/2011 [employer] BIV/ICD, Gen change 3.2.16  
 LBBB (left bundle branch block) 11/18/2015  Malaise and fatigue 11/18/2015  Malignant neoplasm of prostate ([address]  Mass of colon   
 right colon mass  Multiple myeloma ([facility] [address]  Polyneuropathy 12/14/2016  Postsurgical hypothyroidism 3/15/2018  Postural imbalance 12/14/2016  Sinus bradycardia 1/12/2018  Sinus node dysfunction (HCC)  Vertigo 11/18/2015 Past Surgical History:  
Procedure Laterality Date  HX CHOLECYSTECTOMY  2013  HX COLONOSCOPY    
 dx with Right colon mass  HX GI    
 benign polyps removed  HX HEART CATHETERIZATION    
 HX HEENT  2008 thyroidetomy partial tumor from pituitary x2  HX PACEMAKER  2011/2016  
 [employer] biv-icd  HX THYROIDECTOMY  2008  HX THYROIDECTOMY  01/26/2018  
 completion thyroidectomy  HX TUMOR REMOVAL  1990/2010  
 pituitary tumor removed X2  
 HX VASCULAR ACCESS Family History Problem Relation Age of Onset  Heart Disease Sister  Heart Attack Sister  Stroke Mother  Thyroid Disease Neg Hx  Diabetes Neg Hx Social History Socioeconomic History  Marital status:  Spouse name: Not on file  Number of children: Not on file  Years of education: Not on file  Highest education level: Not on file Occupational History  Not on file Social Needs  Financial resource strain: Not on file  Food insecurity Worry: Not on file Inability: Not on file  Transportation needs Medical: Not on file Non-medical: Not on file Tobacco Use  Smoking status: Never Smoker  Smokeless tobacco: Never Used Substance and Sexual Activity  Alcohol use: Yes Comment: very rare  Drug use: No  
 Sexual activity: Yes  
  Partners: Female Lifestyle  Physical activity Days per week: Not on file Minutes per session: Not on file  Stress: Not on file Relationships  Social connections Talks on phone: Not on file Gets together: Not on file Attends Buddhist service: Not on file Active member of club or organization: Not on file Attends meetings of clubs or organizations: Not on file Relationship status: Not on file  Intimate partner violence Fear of current or ex partner: Not on file Emotionally abused: Not on file Physically abused: Not on file Forced sexual activity: Not on file Other Topics Concern  Not on file Social History Narrative  Not on file Current Facility-Administered Medications Medication Dose Route Frequency Provider Last Rate Last Dose  
 0.9% sodium chloride infusion 250 mL  250 mL IntraVENous PRN Aguila Morales MD      
 artificial saliva (MOUTH KOTE) 1 Spray  1 Spray Oral PRN Carmelita Nieto NP      
 HYDROmorphone (PF) (DILAUDID) injection 0.5 mg  0.5 mg IntraVENous Q4H PRN Carmelita Nieto NP   0.5 mg at 08/03/20 1026 Or  
 HYDROmorphone (PF) (DILAUDID) injection 1 mg  1 mg IntraVENous Q4H PRN Lizeth Banda NP      
 lip protectant (BLISTEX) ointment 1 Each  1 Each Topical PRN Aguila Morales MD      
 pantoprazole (PROTONIX) 40 mg in 0.9% sodium chloride 10 mL injection  40 mg IntraVENous Q12H Nabil Redmond MD   40 mg at 08/04/20 3503  cyclobenzaprine (FLEXERIL) tablet 10 mg  10 mg Oral TID PRN Aguila Morales MD      
 HYDROmorphone (DILAUDID) tablet 4-8 mg  4-8 mg Oral Q4H PRN Aguila Morales MD      
 sodium chloride (NS) flush 5-40 mL  5-40 mL IntraVENous PRN Aguila Morales MD      
 acetaminophen (TYLENOL) tablet 650 mg  650 mg Oral Q6H PRN Aguila Morales MD      
 Or  
Raphael acetaminophen (TYLENOL) suppository 650 mg  650 mg Rectal Q6H PRN Aguila Morales MD      
 polyethylene glycol (MIRALAX) packet 17 g  17 g Oral DAILY PRN Aguila Morales MD      
 promethazine (PHENERGAN) tablet 12.5 mg  12.5 mg Oral Q6H PRN Aguila Morales MD      
 Or  
 ondansetron (ZOFRAN) injection 4 mg  4 mg IntraVENous Q6H PRN Aguila Morales MD      
 nystatin (MYCOSTATIN) 100,000 unit/mL oral suspension 500,000 Units  500,000 Units Oral QID Aguila Morales MD   Stopped at 08/02/20 0900  cefepime (MAXIPIME) 2 g in 0.9% sodium chloride (MBP/ADV) 100 mL  2 g IntraVENous Q24H Aguila Morales  mL/hr at 08/03/20 1602 2 g at 08/03/20 1602  dextrose 5% and 0.9% NaCl infusion  50 mL/hr IntraVENous CONTINUOUS Jason Collins MD 50 mL/hr at 20 1641 50 mL/hr at 20 1641  fluconazole (DIFLUCAN) 200mg/100 mL IVPB (premix)  200 mg IntraVENous Q24H Jason Collins  mL/hr at 20 2337 200 mg at 20 2337 OBJECTIVE: 
Patient Vitals for the past 8 hrs: 
 BP Temp Pulse Resp SpO2  
20 0823 114/47 97.3 °F (36.3 °C) 84 18 93 % Temp (24hrs), Av.1 °F (36.7 °C), Min:97.3 °F (36.3 °C), Max:99 °F (37.2 °C) No intake/output data recorded. Physical Exam: 
Constitutional: Chronically ill-appearing elderly male in no acute distress, lying comfortably in the hospital bed. HEENT: Normocephalic and atraumatic. Oropharynx is clear, mucous membranes are moist.  Extraocular muscles are intact. Sclerae anicteric. Neck supple without JVD. No thyromegaly present. Skin Warm and dry. No bruising and no rash noted. No erythema. No pallor. Respiratory Lungs are coarse bilaterally, normal air exchange without accessory muscle use. On O2.  
CVS Normal rate, regular rhythm and normal S1 and S2. No murmurs, gallops, or rubs. Abdomen Soft, nontender and nondistended, normoactive bowel sounds. No palpable mass. No hepatosplenomegaly. Neuro Grossly nonfocal with no obvious sensory or motor deficits. MSK Normal range of motion in general.  No edema and no tenderness. Psych Appropriate mood and affect. Labs:   
Recent Results (from the past 24 hour(s)) TRANSFERRIN SATURATION Collection Time: 20 10:33 AM  
Result Value Ref Range Iron 59 35 - 150 ug/dL TIBC 125 (L) 250 - 450 ug/dL Transferrin Saturation 47 >20 % FERRITIN Collection Time: 20 10:33 AM  
Result Value Ref Range Ferritin 5,505 (H) 8 - 388 NG/ML  
VITAMIN B12 Collection Time: 20 10:33 AM  
Result Value Ref Range Vitamin B12 >2,000 (H) 193 - 986 pg/mL FOLATE  Collection Time: 20 10:33 AM  
 Result Value Ref Range Folate 5.8 3.1 - 17.5 ng/mL LD Collection Time: 08/03/20 10:33 AM  
Result Value Ref Range  (H) 110 - 210 U/L  
RETICULOCYTE COUNT Collection Time: 08/03/20 10:33 AM  
Result Value Ref Range Reticulocyte count 0.3 0.3 - 2.0 % Absolute Retic Cnt. 0.0090 (L) 0.026 - 0.095 M/ul Immature Retic Fraction 11.3 2.3 - 13.4 % Retic Hgb Conc. 28 (L) 29 - 35 pg  
BILIRUBIN, FRACTIONATED Collection Time: 08/03/20 10:33 AM  
Result Value Ref Range Bilirubin, total 0.6 0.2 - 1.1 MG/DL Bilirubin, direct 0.3 <0.4 MG/DL Bilirubin, indirect 0.3 0.0 - 1.1 MG/DL HAPTOGLOBIN Collection Time: 08/03/20 10:33 AM  
Result Value Ref Range Haptoglobin 275 (H) 30 - 200 mg/dL FIBRINOGEN Collection Time: 08/03/20 10:33 AM  
Result Value Ref Range Fibrinogen 787 (H) 190 - 501 mg/dL D DIMER Collection Time: 08/03/20 10:33 AM  
Result Value Ref Range D DIMER 3.69 (HH) <0.56 ug/ml(FEU) PATHOLOGIST REVIEW SMEARS Collection Time: 08/03/20 10:33 AM  
Result Value Ref Range PATHOLOGIST REVIEW PENDING   
URIC ACID Collection Time: 08/03/20 10:33 AM  
Result Value Ref Range Uric acid 7.4 (H) 2.6 - 6.0 MG/DL  
HGB & HCT Collection Time: 08/04/20 12:42 AM  
Result Value Ref Range HGB 8.2 (L) 13.6 - 17.2 g/dL HCT 24.3 (L) 41.1 - 50.3 % CBC WITH AUTOMATED DIFF Collection Time: 08/04/20  4:58 AM  
Result Value Ref Range WBC 2.0 (LL) 4.3 - 11.1 K/uL  
 RBC 3.40 (L) 4.23 - 5.6 M/uL HGB 8.2 (L) 13.6 - 17.2 g/dL HCT 24.1 (L) 41.1 - 50.3 % MCV 70.9 (L) 79.6 - 97.8 FL  
 MCH 24.1 (L) 26.1 - 32.9 PG  
 MCHC 34.0 31.4 - 35.0 g/dL  
 RDW 18.1 (H) 11.9 - 14.6 % PLATELET 41 (L) 943 - 450 K/uL MPV Unable to calculate. Recommend adding IPF. 9.4 - 12.3 FL ABSOLUTE NRBC 0.09 0.0 - 0.2 K/uL NEUTROPHILS 53 43 - 78 % LYMPHOCYTES 23 13 - 44 % MONOCYTES 6 4.0 - 12.0 % EOSINOPHILS 3 0.5 - 7.8 %  BASOPHILS 2 0.0 - 2.0 %  
 IMMATURE GRANULOCYTES 13 (H) 0.0 - 5.0 %  
 ABS. NEUTROPHILS 1.0 (L) 1.7 - 8.2 K/UL  
 ABS. LYMPHOCYTES 0.5 0.5 - 4.6 K/UL  
 ABS. MONOCYTES 0.1 0.1 - 1.3 K/UL  
 ABS. EOSINOPHILS 0.1 0.0 - 0.8 K/UL  
 ABS. BASOPHILS 0.0 0.0 - 0.2 K/UL  
 ABS. IMM. GRANS. 0.3 0.0 - 0.5 K/UL  
 RBC COMMENTS MARKED 
CARLITO CELLS 
    
 RBC COMMENTS MODERATE 
TARGET CELLS 
    
 WBC COMMENTS Result Confirmed By Smear PLATELET COMMENTS MARKED    
 DF AUTOMATED    
URIC ACID Collection Time: 08/04/20  4:58 AM  
Result Value Ref Range Uric acid 2.5 (L) 2.6 - 6.0 MG/DL Imaging: 
Viry Mott [[id number]]  Collected: 08/02/20 1521 Order Status: Completed  Updated: 08/02/20 1524 Narrative:     
Exam: Renal ultrasound. INDICATION: Acute kidney injury. Comparison: None. Real-time sonography of the kidneys, retroperitoneum and bladder was performed  
with multiple static images obtained. Findings:  
Right kidney: The right kidney is normal in echogenicity and normal in size  
measuring 11.8 cm. Interpolar parapelvic cyst measuring 2.5 x 3.2 x 2.8 cm. No  
hydronephrosis or perinephric fluid. Left kidney: The left kidney is normal in echogenicity and normal in size  
measuring 11.4 cm. No contour deforming mass. No hydronephrosis or perinephric  
fluid. Bladder: Prominent bladder distention with echogenic debris in the bladder. Aorta: No evidence of aneurysm in the imaged portion. IVC: Patent. Impression:     
IMPRESSION:  
1. No hydronephrosis. 2. Prominent bladder distention with some echogenic debris in the bladder. 3. Right-sided parapelvic renal cyst.  
 
 
  
XR CHEST PORT [[id number]]  Collected: 07/31/20 1626 Order Status: Completed  Updated: 07/31/20 1645 Narrative:     
EXAMINATION: TEMPORARY 7/31/2020 4:26 PM  
 
ACCESSION NUMBER: [id number] INDICATION: Shortness of breath COMPARISON: Chest CT 12/17/2019, chest x-ray 7/18/2018, 7/9/2018 TECHNIQUE: A single AP view of the chest was obtained. FINDINGS:  
 
Unchanged positioning of a right-sided central venous access port. Unchanged  
cardiac pacemaker defibrillator positioning. There is unchanged mild enlargement  
of the cardiac silhouette. Low lung volumes with bibasilar atelectasis. No  
pneumothorax or pleural effusion. Bilateral acromioclavicular joint arthropathy. Healed posterior left fifth rib fracture. Impression:     
IMPRESSION:  
 
1. . There is unchanged mild enlargement of the cardiac silhouette. 2. Low lung volumes with bibasilar atelectasis. ASSESSMENT: 
Problem List  Date Reviewed: 7/29/2020 Codes Class Noted Chronic pain syndrome ICD-10-CM: G89.4 ICD-9-CM: 338.4  7/31/2020 Cough ICD-10-CM: R05 ICD-9-CM: 786.2  7/31/2020 Suspected COVID-19 virus infection ICD-10-CM: Z20.828 ICD-9-CM: V01.79  7/31/2020 Thrombocytopenia ([facility] ICD-10-CM: D69.6 ICD-9-CM: 287.5  7/31/2020 PNA (pneumonia) ICD-10-CM: J18.9 ICD-9-CM: 834  7/31/2020 Odynophagia ICD-10-CM: R13.10 ICD-9-CM: 787.20  7/31/2020 Multiple myeloma not having achieved remission ([facility] ICD-10-CM: C90.00 ICD-9-CM: 203.00  2/13/2019 Personal history of colon cancer ICD-10-CM: L33.876 
ICD-9-CM: V10.05  12/27/2018 Overview Signed 12/29/2018 12:50 PM by Laury Summers RN Incisional hernia, without obstruction or gangrene ICD-10-CM: K43.2 ICD-9-CM: 553.21  11/17/2018 Postsurgical hypothyroidism ICD-10-CM: E89.0 ICD-9-CM: 244.0  8/9/2018 Compression fracture of lumbar vertebra (HCC) ICD-10-CM: S32.000A ICD-9-CM: 805.4  5/15/2018 Hurthle cell carcinoma of thyroid ([facility] ICD-10-CM: Z29 ICD-9-CM: 193  Unknown Pituitary macroadenoma ([facility] ICD-10-CM: D35.2 ICD-9-CM: 227.3  3/15/2018 PAF (paroxysmal atrial fibrillation) (HCC) ICD-10-CM: I48.0 ICD-9-CM: 427.31  11/26/2017 Chronic systolic heart failure (HCC) ICD-10-CM: I50.22 ICD-9-CM: 428.22  11/26/2017 Pain ICD-10-CM: R52 ICD-9-CM: 780.96  11/20/2017 Multiple myeloma (HCC) ICD-10-CM: C90.00 ICD-9-CM: 203.00  1/2/2017 Malaise and fatigue ICD-10-CM: R53.81, R53.83 ICD-9-CM: 780.79  11/18/2015 LBBB (left bundle branch block) ICD-10-CM: I44.7 ICD-9-CM: 426.3  11/18/2015 Mr. Grey Panchal is a [de-identified] y.o. male admitted on 7/31/2020. The primary encounter diagnosis was Suspected COVID-19 virus infection. Diagnoses of Pain in throat, Physical debility, Dyspnea, unspecified type, Other dysphagia, Odynophagia, Dry mouth, Encounter for palliative care, Multiple myeloma in relapse ([facility] [address] Thrombocytopenia ([facility] [address] and Anemia, unspecified type were also pertinent to this visit. His PMH includes Afib on Eliquis, cardiomyopathy with defibrillator, hypothyroidism, hx colon ca, HTN, and CKD4. He is a known patient of Dr. Bulmaro Ba with multiple myeloma s/p C2D1 elotuzumab/Pomalyst/dex on 7/29. Pomalyst was held d/t Cr 4.4. During his last visit with Dr. Bulmaro Ba, Dr. Bulmaro Ba did start the discussions with him that if we do not see a turnaround soon with the lashell/pom/dex, our options may be limited and will likely be limited further by performance status and end organ damage. SPEP from 7/22 with m-spike increased to 2.07 from 0.96. He presented to ED with c/o worsening cough x 3-4 weeks. Also reports dysphagia, loss of appetite, fatigue, and generalized weakness. In ED, T101, WBC 2.3/, Hgb 8.6, Plt 60k, Cr 3.97. CXR with low lung volumes with bibasilar atelectasis. COVID neg. Nephrology following for acute on CKD. Renal US neg for hydronephrosis; does show prominent bladder distention with debris and R-sided parapelvic renal cyst.  Nephrology advises against dilaysis. Palliative care consult pending. Has oral candidiasis. On nystatin/diflucan.   GI with plans for EGD to evaluate dysphagia/odynophagia - unable to perform today d/t counts. Today, WBC 1.3, Hgb 6.7, Plt 43k. We were consulted d/t pancytopenia. RECOMMENDATIONS: 
Multiple myeloma 
- s/p C2D1 elotuzumab/Pomalyst/dex on 7/29. Pomalyst was held d/t Cr 4.4. 
- Noted worsening m-spike on last SPEP from 7/22,increased to 2.07 from 0.96.   
- At his last visit with Dr. Michael Benjamin, Dr. Michael Benjamin did start the discussions with him that if we do not see a turnaround soon with the xiomara/pom/dex, our options may be limited and will likely be limited further by performance status and end organ damage. May need to consider Hospice vs following up with Dr. Michael Benjamin to discuss options 8/4 Pt/family have elected comfort measures Pancytopenia secondary to chemotherapy - Transfuse to keep Hgb >8 and plt >10k or unless active bleeding/procedures, then >50k - Check iron studies, B12, folate, hemolysis labs, DIC labs, uric acid Acute on CKD 
- Nephrology following - Renal US neg for hydronephrosis - Advises against dialysis. Palliative care consult pending 
- check uric acid Dysphagia/odynophagia - GI following - Plan for EGD - unable to perform today d/t counts Oral candidiasis 
- on nystatin/diflucan Pneumonia - covid neg 
- on Cef Thank you for allowing us to participate in the care of Mr. Marisa Hargrove. Pt/family have elected comfort measures. We will sign off; please do not hesitate to call with any questions. Madhu Ring NP [facility] Hematology & Oncology [address] [address] Office : [phone] Fax : [phone] I personally saw, exammed and counselled the patient, and discussed with NP, agree with above history/assessment/plan.  [de-identified] y.o.male MM that was treated through multiple lines if rx most recent showed progression on Pom/Dex and added Xiomara, admitted with cough, FTT, Cr 3.9, dysphagia, ruled out COVID, on cefepime and diflucan, consulted for pancytopenia, which had been worsening in the past two months, meanwhile M spike rapidly increased, discussed with pt need to recovery from the acute illness but myeloma prognosis is poor given he has received all major therapies and hospice can be reasonable to consider, he discussed with family and decided to pursue comfort care only. All questions answered. 
  
 
Gabbi Pedersen M.D. [address] Office : [phone] Fax : [phone]

## 2020-08-04 NOTE — PROGRESS NOTES
Pt had a HGB of 6.7. Orders were given since this AM to  tranfuse PRBC's. Pt has gotten transferred to 6th floor w/o pt receiving this transfusion. Orders were placed for pt to be placed on comfort measures. According to day shift nurse the Doctor was notified regarding this issue if pt still needed the transfusion and Doctor was ok for pt to get transfusion. Will administer transfusion and continue to monitor pt.

## 2020-08-04 NOTE — PROGRESS NOTES
PT Discharge Note: 
Per chart review, patient/family have decided on comfort care measures only. Please re-order our services if patient would benefit from PT in the future.  
Thank you, 
Lui Lu, PTA

## 2020-08-04 NOTE — PROGRESS NOTES
Chart screened by  for discharge planning. Per chart review, the patient has now transition to comfort measures. CM will continue to follow plan of care, to assist further with discharge planning. MD Castro confirmed, hospice consult will be placed. Please consult or notify  if any new issues arise. CM continues to follow.

## 2020-08-04 NOTE — PROGRESS NOTES
Interdisciplinary Rounds completed. Nursing, Case Management, and Physician  present. Plan of care reviewed and updated. Pt now comfort care.

## 2020-08-04 NOTE — PROGRESS NOTES
Patient on comfort measures. Hourly rounds performed. All needs meet. Pain and agitation managed per MAR. O2 Sat 96% on 3L NC. Bed low/locked. Call light within reach. Patient denies needs at this time.   Will continue to monitor and report to oncoming RN

## 2020-08-04 NOTE — PROGRESS NOTES
SPEECH PATHOLOGY NOTE: 
 
Per chart review, patient/family have decided on comfort measures only. Will sign off for now. Please re-consult if new concerns should arise as in line with goals of care.  
 
 
Niru Domínguez MS, CCC-SLP

## 2020-08-04 NOTE — PROGRESS NOTES
Per chart review, comfort orders have been placed for pt. Will discontinue initial OT evaluation at this time. Please re-consult if pt shows change in medical status and pt is appropriate to receive therapy. Thank you, Alicia Cash OTR/L

## 2020-08-04 NOTE — PROGRESS NOTES
Hourly rounds performed. All needs met. Bed is in low position and call light is within reach. PT had no complaints during shift. Pt self-suction w/ Yankauer several times during shift and using mouth swabs. Bed is in low position and call light is within reach. Will continue to monitor and report to oncoming nurse.

## 2020-08-04 NOTE — PROGRESS NOTES
[facility] Plans noted for comfort care and hospice consult. EGD scheduled for today will be canceled. We will sign off. Please call as needed.

## 2020-08-05 NOTE — PROGRESS NOTES
Hourly rounds completed this shift, will give report to oncoming nurse. Family at bedside. Family informed may stay later than visiting hours due to end of life care.

## 2020-08-05 NOTE — HOSPICE
[facility] RN Liaison received referral for this patient. Patient had earlier stated he wished to remain in hospital on comfort measures. Liaison will present case to [id number] Provider in the event that patient or family decide they would like him brought to the [facility] where he would be allowed to have visitors and one overnight visitor stay with him.   
 
 
Thank you for this referral.

## 2020-08-05 NOTE — PROGRESS NOTES
Palliative Care Progress Note Patient: Lynnette Chambers MRN: [id number]  SSN: [id number] YOB: 1940  Age: [de-identified] y.o. Sex: male Assessment/Plan: Chief Complaint/Interval History: Agitated at times, wife at bedside. Principal Diagnosis: · Debility, Unspecified  R53.81 Additional Diagnoses: · Agitation  R45.1 · Dyspnea  R06.00 · Dysphagia  R13.10 · Odynophagia  R13.10 · Encounter for Palliative Care  Z51.5 · . Palliative Performance Scale (PPS) PPS: 20 Medical Decision Making:  
Reviewed and summarized notes from last 24 hours. Discussed case with appropriate providers: primary RN Reviewed lab and X-ray data from last 24 hours. Patient resting in bed, lethargic. Wife is at the bedside. Wife has good understanding of patient's condition and comfort measures. She expresses distress when patient shows signs of discomfort or suffering. Provided emotional support and reassured her of our ongoing efforts to relieve his suffering. Reviewed medications. Increased morphine to 2mg as needed and provided Haldol for agitation. Will continue to follow. Will discuss findings with members of the interdisciplinary team.   
 
  
More than 50% of this 25 minute visit was spent counseling and coordination of care as outlined above. Subjective:  
 
Review of Systems: A comprehensive review of systems was unobtainable: patient lethargic. Objective:  
 
Visit Vitals BP (!) 84/44 (BP 1 Location: Left arm, BP Patient Position: At rest) Pulse 89 Temp 97.8 °F (36.6 °C) Resp 18 Ht 5' 9\" (1.753 m) Wt 156 lb (70.8 kg) SpO2 94% BMI 23.04 kg/m² Physical Exam: 
 
General:  Lethargic, occasional moans. Eyes:  Deferred. Nose: Nares normal. Septum midline. Neck: Supple, symmetrical, trachea midline. Lungs:   Mildly tachypneic. Heart:    
Abdomen:   Soft, non-distended. Extremities: Normal, atraumatic, no cyanosis. Skin: Skin color, texture, turgor normal. No rash. Neurologic: Lethargic. Psych: Unable to assess. Signed By: Aaliyah Callahan NP August 5, 2020

## 2020-08-05 NOTE — PROGRESS NOTES
Pt restless, Haldol 2mg IV adm. Bladder scan earlier this shift revealed 0ml, pt has voided large amt of urine in brief.

## 2020-08-05 NOTE — PROGRESS NOTES
Progress Note Patient: Leno Stage MRN: [id number]  SSN: [id number] YOB: 1940  Age: [de-identified] y.o. Sex: male Admit Date: 7/31/2020 LOS: 5 days Subjective:  
 
Patient with past medical history of AF on Amiodarone and Apixaban, multiple myeloma in chemotherapy, cardiomyopathy with ICD, hypothyrodism, colon cancer history, hypertension, CKD4 , hyperlipidemia. Admitted due to cough and difficulty swallowing with fever, low platelets. Patient is being treated for pneumonia, odynophagia with fluconazole. EGD could not be done due to low platelets. Family decided to opt for comfort measures. Objective:  
 
Vitals:  
 08/04/20 9113 08/04/20 2004 08/05/20 1400 08/05/20 6584 BP: 114/47 107/72 (!) 88/59 (!) 84/44 Pulse: 84 81 81 89 Resp: 18 18  18 Temp: 97.3 °F (36.3 °C) 98.6 °F (37 °C) 99.3 °F (37.4 °C) 97.8 °F (36.6 °C) SpO2: 93% 98% 98% 94% Weight:      
Height:      
  
 
Intake and Output: 
Current Shift: No intake/output data recorded. Last three shifts: 08/03 1901 - 08/05 0700 In: -  
Out: 100 [Urine:100] Physical Exam:  
 
General:                    The patient is an elderly male in mild acute respiratory distress. Patient appears chronically ill. Head:                                   Normocephalic/atraumatic. Eyes:                                   palpebral pallor, no scleral icterus. ENT:                                    External auricular and nasal exam within normal limits. Mucous membranes are moist. 
Neck:                                   Supple, non-tender, no JVD. Lungs:                       decreased to auscultation bilaterally without wheezes or crackles. No accessory muscle use. Heart:                                  Regular rate and rhythm, without murmurs, rubs, or gallops. Abdomen:                  Soft, non-tender, non-distended with normoactive bowel sounds. Genitourinary:           No tenderness over the bladder or bilateral CVAs. Extremities:               Without clubbing, cyanosis, or edema. Skin:                                    Normal color, texture, and turgor. No rashes, lesions, or jaundice. Pulses:                      Radial and dorsalis pedis pulses present 2+ bilaterally. Capillary refill <2s. Neurologic:                CN II-XII grossly intact and symmetrical.  
                                            Moving all four extremities well with no focal deficits. Lab/Data Review: CBC WITH AUTOMATED DIFF [[id number]] (Order [id number]) Lab Date: 8/3/2020  Department: [facility] Med Surg  Released By/Authorizing: Lubna Vazquez NP (auto-released) Component  Value  Flag  Ref Range  Units  Status WBC  2.0  Low Panic    4.3 - 11.1  K/uL  Final   
Comment: RESULTS VERIFIED, PHONED TO AND READ BACK BY  
WESLEY CONNORS @ 6537 6/2/90 MM   
RBC  3.40  Low    4.23 - 5.6  M/uL  Final   
HGB  8.2  Low    13.6 - 17.2  g/dL  Final   
HCT  24.1  Low    41.1 - 50.3  %  Final   
MCV  70.9  Low    79.6 - 97.8  FL  Final   
MCH  24.1  Low    26.1 - 32.9  PG  Final   
MCHC  34.0   31.4 - 35.0  g/dL  Final   
RDW  18.1  High    11.9 - 14.6  %  Final   
PLATELET  41  Low    150 - 450  K/uL  Final   
MPV    9.4 - 12.3  FL  Final   
Unable to calculate. Recommend adding IPF. ABSOLUTE NRBC  0.09   0.0 - 0.2  K/uL  Final   
Comment: **Note: Absolute NRBC parameter is now reported with Hemogram** NEUTROPHILS  53   43 - 78  %  Final   
LYMPHOCYTES  23   13 - 44  %  Final   
MONOCYTES  6   4.0 - 12.0  %  Final   
EOSINOPHILS  3   0.5 - 7.8  %  Final   
BASOPHILS  2   0.0 - 2.0  %  Final   
IMMATURE GRANULOCYTES  13  High    0.0 - 5.0  %  Final   
ABS.  NEUTROPHILS  1.0  Low    1.7 - 8.2  K/UL  Final   
 ABS. LYMPHOCYTES  0.5   0.5 - 4.6  K/UL  Final   
ABS. MONOCYTES  0.1   0.1 - 1.3  K/UL  Final   
ABS. EOSINOPHILS  0.1   0.0 - 0.8  K/UL  Final   
ABS. BASOPHILS  0.0   0.0 - 0.2  K/UL  Final   
ABS. IMM. GRANS.  0.3   0.0 - 0.5  K/UL  Final   
RBC COMMENTS  MARKED  
CARLITO CELLS       Final   
RBC COMMENTS  MODERATE  
TARGET CELLS       Final   
WBC COMMENTS        Final   
Result Confirmed By Smear PLATELET COMMENTS  MARKED       Final   
Comment:   
DECREASED   
DF  AUTOMATED       Final   
 
METABOLIC PANEL, BASIC [[id number]] (Order [id number]) Lab Date: 8/3/2020  Department: [facility]  Med Surg  Released By/Authorizing: Jason Collins MD (auto-released) Component  Value  Flag  Ref Range  Units  Status Sodium  152  High    136 - 145  mmol/L  Final   
Potassium  3.8   3.5 - 5.1  mmol/L  Final   
Chloride  126  High    98 - 107  mmol/L  Final   
CO2  16  Low    21 - 32  mmol/L  Final   
Anion gap  10   7 - 16  mmol/L  Final   
Glucose  109  High    65 - 100  mg/dL  Final   
Comment:   
47 - 60 mg/dl Consistent with, but not fully diagnostic of hypoglycemia. 101 - 125 mg/dl Impaired fasting glucose/consistent with pre-diabetes mellitus  
> 126 mg/dl Fasting glucose consistent with overt diabetes mellitus BUN  70  High    8 - 23  MG/DL  Final   
Creatinine  5.90  High    0.8 - 1.5  MG/DL  Final   
GFR est AA  12  Low    >60  ml/min/1.73m2  Final   
GFR est non-AA  10  Low    >60  ml/min/1.73m2  Final   
Comment:   
(NOTE) Estimated GFR is calculated using the Modification of Diet in Renal  
Disease (MDRD) Study equation, reported for both  Americans [facility]) and non- Americans (GFRNA), and normalized to 1.73m2  
body surface area. The physician must decide which value applies to  
the patient. The MDRD study equation should only be used in  
individuals age 25 or older.  It has not been validated for the  
following: pregnant women, patients with serious comorbid conditions,  
 or on certain medications, or persons with extremes of body size,  
muscle mass, or nutritional status. Calcium  7.8  Low    8.3 - 10.4  MG/DL  Final   
 
XR chest  
7/31/2020 IMPRESSION: 
  
1. . There is unchanged mild enlargement of the cardiac silhouette. 
  
2. Low lung volumes with bibasilar atelectasis US retroperitoneum 8/2/2020 IMPRESSION: 
1. No hydronephrosis. 2. Prominent bladder distention with some echogenic debris in the bladder. 3. Right-sided parapelvic renal cyst. 
 
I have reviewed chest x-ray myself. Current Facility-Administered Medications:  
  LORazepam (ATIVAN) injection 1 mg, 1 mg, IntraVENous, Q2H PRN, Ciesco, John, DO, 1 mg at 08/04/20 1726 
  morphine injection 1 mg, 1 mg, IntraVENous, Q15MIN PRN, Ciesco, John, DO, 1 mg at 08/05/20 1222 
  glycopyrrolate (ROBINUL) injection 0.2 mg, 0.2 mg, IntraVENous, Q4H PRN, Karuna Banda NP, 0.2 mg at 08/05/20 0944 
  0.9% sodium chloride infusion 250 mL, 250 mL, IntraVENous, PRN, Apolinar Carroll MD 
  artificial saliva (MOUTH KOTE) 1 Spray, 1 Spray, Oral, PRN, Jesus Banda NP 
  HYDROmorphone (PF) (DILAUDID) injection 0.5 mg, 0.5 mg, IntraVENous, Q4H PRN, 0.5 mg at 08/03/20 1026 **OR** HYDROmorphone (PF) (DILAUDID) injection 1 mg, 1 mg, IntraVENous, Q4H PRN, Karuna Banda NP, 1 mg at 08/05/20 0945   lip protectant (BLISTEX) ointment 1 Each, 1 Each, Topical, PRN, Apolinar Carroll MD, 1 Each at 08/04/20 1153   cyclobenzaprine (FLEXERIL) tablet 10 mg, 10 mg, Oral, TID PRN, Apolinar Carroll MD 
  HYDROmorphone (DILAUDID) tablet 4-8 mg, 4-8 mg, Oral, Q4H PRN, Apolinar Carroll MD 
  sodium chloride (NS) flush 5-40 mL, 5-40 mL, IntraVENous, PRN, Apolinar Carroll MD, 10 mL at 08/05/20 5165   acetaminophen (TYLENOL) tablet 650 mg, 650 mg, Oral, Q6H PRN **OR** acetaminophen (TYLENOL) suppository 650 mg, 650 mg, Rectal, Q6H PRN, Apolinar Carroll MD 
   polyethylene glycol (MIRALAX) packet 17 g, 17 g, Oral, DAILY PRN, Harrison Kerns MD 
  promethazine (PHENERGAN) tablet 12.5 mg, 12.5 mg, Oral, Q6H PRN **OR** ondansetron (ZOFRAN) injection 4 mg, 4 mg, IntraVENous, Q6H PRN, Wilfredo Randle MD 
  dextrose 5% and 0.9% NaCl infusion, 50 mL/hr, IntraVENous, CONTINUOUS, Wilfredo Auguste MD, Last Rate: 50 mL/hr at 08/03/20 1641, 50 mL/hr at 08/03/20 1641 Assessment:  
 
Active Problems: 
  Multiple myeloma ([facility] [address] (1/2/2017) PAF (paroxysmal atrial fibrillation) ([address] (11/26/2017) Chronic systolic heart failure ([address] (11/26/2017) Personal history of colon cancer (12/27/2018) Overview:  
 
  Multiple myeloma not having achieved remission ([address] (2/13/2019) Chronic pain syndrome (7/31/2020) Cough (7/31/2020) Suspected COVID-19 virus infection (7/31/2020) Thrombocytopenia ([address] (7/31/2020) PNA (pneumonia) (7/31/2020) Odynophagia (7/31/2020) Plan:  
 
Multiple myeloma Family opted for comfort measure care On Ativan and Morphine Hospice consulted. Patient and family wish to keep patient in house for now. I have discussed the plan of care with patient. Signed By: Wesley Fitzgerald MD   
 August 5, 2020

## 2020-08-05 NOTE — HOSPICE
Open [facility] RN spoke with patient's wife Juliette Padgett and step-son Mu Paredes. Explained the [address] GIP level of care, and Medicare coverage. Mu Cheners grateful for the information. Family has been told it may only be a couple of days. Clinically patient BP has dropped significantly in past 12 hours to 84/44.

## 2020-08-05 NOTE — PROGRESS NOTES
Hourly rounds performed. All needs met. Bed is in low position and call light is within reach. Pt on comfort measures has been medicated per MAR. Pt is hypotensive this AM still on 3L. O2 Sat is 98%. Will continue to monitor and report to oncoming nurse.

## 2020-08-06 NOTE — PROGRESS NOTES
Palliative Care Progress Note Patient: Radha Car MRN: [id number]  SSN: [id number] YOB: 1940  Age: [de-identified] y.o. Sex: male Assessment/Plan: Chief Complaint/Interval History: Now Comfort Measures Only. Discussing transfer to a residential hospice facility. Principal Diagnosis: · Debility, Unspecified  R53.81 Additional Diagnoses: · Dyspnea  R06.00 · Dysphagia  R13.10 · Fatigue, Lethargy  R53.83 
· Odynophagia  R13.10 · Encounter for Palliative Care  Z51.5 · Dry mouth Palliative Performance Scale (PPS) PPS: 20 Medical Decision Making:  
Reviewed and summarized notes from last 24 hours. Discussed case with appropriate providers: WESLEY Zamora Supervisor Mike Garza Reviewed lab and X-ray data from last 24 hours. Pt resting in bed. Iris Adhikari ([phone]) at bedside. Supervisor Shantell and I met with Ritika Walton. It seems that family had thought that PRN morphine could be given every 15 minutes even if pt were not SOB or in pain. Therefore, it is not clear at this point if the pt's lethargy is from disease or from morphine. In addition, the patient may not be imminent; his vital signs are stable, although his BP is trending low, quite possibly from the morphine. Shantell and I discussed with Ritika Walton that, despite the pt's wish to remain in the hospital, hospice care would be much more appropriate. OBEY OconnorKavya is looking into residential hospice facilities near the wife's home in [facility]. Discontinued order for morphine IV every 15 min PRN. Increased frequency of IV Dilaudid from q 4 hrs PRN to q 2 hrs PRN. Placed hospice consult. Will continue to follow loosely. Will discuss findings with members of the interdisciplinary team.   
 
  
More than 50% of this 35 minute visit was spent counseling and coordination of care as outlined above.   In addition to the E&M described above, more than 50% of a 30-minute prolonged visit, from 1000 - 1030, was spent on counseling and coordination of care. Subjective:  
 
Review of Systems: A comprehensive review of systems was negative except for:  
Constituional: lethargy Respiratory: Positive for dyspnea, non-productive cough. Objective:  
 
Visit Vitals BP (!) 84/44 (BP 1 Location: Left arm, BP Patient Position: At rest) Pulse 89 Temp 97.8 °F (36.6 °C) Resp 18 Ht 5' 9\" (1.753 m) Wt 156 lb (70.8 kg) SpO2 94% BMI 23.04 kg/m² Physical Exam: 
 
General:  Lethargic. No acute distress. Eyes:  Conjunctivae/corneas clear Nose: Nares normal. Septum midline. Neck: Supple, symmetrical, trachea midline, no JVD Lungs:   Crackles bilaterally, slightly unlabored on NC Heart:  Regular rate and rhythm, no murmur Abdomen:   Soft, non-tender, non-distended Extremities: Normal, atraumatic, no cyanosis or edema Skin: Skin color, texture, turgor normal. No rash or lesions. Neurologic: Unable to determine Psych: Lethargic Signed By: Ines Palumbo NP August 6, 2020

## 2020-08-06 NOTE — PROGRESS NOTES
OBEY was requested by POLI Russo, to identify residential hospice facility that his closest to the patient's spouse residence in [facility]. CM contacted [address] [place] [address] [address] [place] [address], to identify this information. OBEY was unsuccessful at reaching hospice liaison Vicki Paulson [phone]. However, CM left a voicemail requesting call back. OBEY continues to follow.

## 2020-08-06 NOTE — PROGRESS NOTES
Per Chart review, patient is not medically stable to be transported at this time. CM will continue to follow the patient, and will await medical stability, before discussing [place] [id number] Locations.

## 2020-08-06 NOTE — PROGRESS NOTES
Progress Note Patient: Paty Guevara MRN: [id number]  SSN: [id number] YOB: 1940  Age: [de-identified] y.o. Sex: male Admit Date: 7/31/2020 LOS: 6 days Subjective:  
 
Patient with past medical history of AF on Amiodarone and Apixaban, multiple myeloma in chemotherapy, cardiomyopathy with ICD, hypothyrodism, colon cancer history, hypertension, CKD4 , hyperlipidemia. Admitted due to cough and difficulty swallowing with fever, low platelets. Patient is being treated for pneumonia, odynophagia with fluconazole. EGD could not be done due to low platelets. Family decided to opt for comfort measures. Today, son is at bedside and upset that patient only gets attention for morphine every 15 minutes. They would like patient to have more frequent Morphine than that. Objective:  
 
Vitals:  
 08/04/20 4839 08/04/20 2004 08/05/20 4371 08/05/20 5790 BP: 114/47 107/72 (!) 88/59 (!) 84/44 Pulse: 84 81 81 89 Resp: 18 18  18 Temp: 97.3 °F (36.3 °C) 98.6 °F (37 °C) 99.3 °F (37.4 °C) 97.8 °F (36.6 °C) SpO2: 93% 98% 98% 94% Weight:      
Height:      
  
 
Intake and Output: 
Current Shift: No intake/output data recorded. Last three shifts: No intake/output data recorded. Physical Exam:  
 
General:                    The patient is an elderly male in no acute respiratory distress. Patient appears chronically ill. He is lying and resting in bed. Head:                                   Normocephalic/atraumatic. Eyes:                                   palpebral pallor, no scleral icterus. ENT:                                    External auricular and nasal exam within normal limits. Mucous membranes are moist. 
Neck:                                   Supple, non-tender, no JVD. Lungs:                       decreased to auscultation bilaterally without wheezes or crackles. No accessory muscle use. Heart:                                  Regular rate and rhythm, without murmurs, rubs, or gallops. Abdomen:                  Soft, non-tender, non-distended with normoactive bowel sounds. Genitourinary:           No tenderness over the bladder or bilateral CVAs. Extremities:               Without clubbing, cyanosis, or edema. Skin:                                    Normal color, texture, and turgor. No rashes, lesions, or jaundice. Pulses:                      Radial and dorsalis pedis pulses present 2+ bilaterally. Capillary refill <2s. Neurologic:                no neurodeficit. Lab/Data Review: CBC WITH AUTOMATED DIFF [[id number]] (Order [id number]) Lab Date: 8/3/2020  Department: [place] [address] Med Surg  Released By/Authorizing: Dixie Hanna NP (auto-released) Component  Value  Flag  Ref Range  Units  Status WBC  2.0  Low Panic    4.3 - 11.1  K/uL  Final   
Comment: RESULTS VERIFIED, PHONED TO AND READ BACK BY  
WESLEY CONNORS @ 8182 0/9/45 MM   
RBC  3.40  Low    4.23 - 5.6  M/uL  Final   
HGB  8.2  Low    13.6 - 17.2  g/dL  Final   
HCT  24.1  Low    41.1 - 50.3  %  Final   
MCV  70.9  Low    79.6 - 97.8  FL  Final   
MCH  24.1  Low    26.1 - 32.9  PG  Final   
MCHC  34.0   31.4 - 35.0  g/dL  Final   
RDW  18.1  High    11.9 - 14.6  %  Final   
PLATELET  41  Low    150 - 450  K/uL  Final   
MPV    9.4 - 12.3  FL  Final   
Unable to calculate. Recommend adding IPF. ABSOLUTE NRBC  0.09   0.0 - 0.2  K/uL  Final   
Comment: **Note: Absolute NRBC parameter is now reported with Hemogram** NEUTROPHILS  53   43 - 78  %  Final   
LYMPHOCYTES  23   13 - 44  %  Final   
MONOCYTES  6   4.0 - 12.0  %  Final   
EOSINOPHILS  3   0.5 - 7.8  %  Final   
BASOPHILS  2   0.0 - 2.0  %  Final   
IMMATURE GRANULOCYTES  13  High    0.0 - 5.0  %  Final   
 ABS. NEUTROPHILS  1.0  Low    1.7 - 8.2  K/UL  Final   
ABS. LYMPHOCYTES  0.5   0.5 - 4.6  K/UL  Final   
ABS. MONOCYTES  0.1   0.1 - 1.3  K/UL  Final   
ABS. EOSINOPHILS  0.1   0.0 - 0.8  K/UL  Final   
ABS. BASOPHILS  0.0   0.0 - 0.2  K/UL  Final   
ABS. IMM. GRANS.  0.3   0.0 - 0.5  K/UL  Final   
RBC COMMENTS  MARKED  
CARLITO CELLS       Final   
RBC COMMENTS  MODERATE  
TARGET CELLS       Final   
WBC COMMENTS        Final   
Result Confirmed By Smear PLATELET COMMENTS  MARKED       Final   
Comment:   
DECREASED   
DF  AUTOMATED       Final   
 
METABOLIC PANEL, BASIC [[id number]] (Order [id number]) Lab Date: 8/3/2020  Department: [place] [id number] Med Surg  Released By/Authorizing: Todd Cali MD (auto-released) Component  Value  Flag  Ref Range  Units  Status Sodium  152  High    136 - 145  mmol/L  Final   
Potassium  3.8   3.5 - 5.1  mmol/L  Final   
Chloride  126  High    98 - 107  mmol/L  Final   
CO2  16  Low    21 - 32  mmol/L  Final   
Anion gap  10   7 - 16  mmol/L  Final   
Glucose  109  High    65 - 100  mg/dL  Final   
Comment:   
47 - 60 mg/dl Consistent with, but not fully diagnostic of hypoglycemia. 101 - 125 mg/dl Impaired fasting glucose/consistent with pre-diabetes mellitus  
> 126 mg/dl Fasting glucose consistent with overt diabetes mellitus BUN  70  High    8 - 23  MG/DL  Final   
Creatinine  5.90  High    0.8 - 1.5  MG/DL  Final   
GFR est AA  12  Low    >60  ml/min/1.73m2  Final   
GFR est non-AA  10  Low    >60  ml/min/1.73m2  Final   
Comment:   
(NOTE) Estimated GFR is calculated using the Modification of Diet in Renal  
Disease (MDRD) Study equation, reported for both  Americans [facility]) and non- Americans (GFRNA), and normalized to 1.73m2  
body surface area. The physician must decide which value applies to  
the patient. The MDRD study equation should only be used in  
individuals age 25 or older.  It has not been validated for the  
 following: pregnant women, patients with serious comorbid conditions,  
or on certain medications, or persons with extremes of body size,  
muscle mass, or nutritional status. Calcium  7.8  Low    8.3 - 10.4  MG/DL  Final   
 
XR chest  
7/31/2020 IMPRESSION: 
  
1. . There is unchanged mild enlargement of the cardiac silhouette. 
  
2. Low lung volumes with bibasilar atelectasis US retroperitoneum 8/2/2020 IMPRESSION: 
1. No hydronephrosis. 2. Prominent bladder distention with some echogenic debris in the bladder. 3. Right-sided parapelvic renal cyst. 
 
I have reviewed chest x-ray myself. Current Facility-Administered Medications:  
  morphine  mg / 30 mL, , IntraVENous, CONTINUOUS, Kinjal Buckley MD 
  morphine injection 2 mg, 2 mg, IntraVENous, Q15MIN PRN, Primo SANDOVAL NP, 2 mg at 08/06/20 0925 
  haloperidol lactate (HALDOL) injection 2 mg, 2 mg, IntraVENous, Q4H PRN, John Vazquez NP, 2 mg at 08/05/20 2054   LORazepam (ATIVAN) injection 1 mg, 1 mg, IntraVENous, Q2H PRN, John Castro DO, 1 mg at 08/04/20 1726 
  glycopyrrolate (ROBINUL) injection 0.2 mg, 0.2 mg, IntraVENous, Q4H PRN, Karuna Banda NP, 0.2 mg at 08/05/20 0944 
  0.9% sodium chloride infusion 250 mL, 250 mL, IntraVENous, PRN, Pieter Lang MD 
  artificial saliva (MOUTH KOTE) 1 Spray, 1 Spray, Oral, PRN, Bank, [place], NP 
  HYDROmorphone (PF) (DILAUDID) injection 0.5 mg, 0.5 mg, IntraVENous, Q4H PRN, 0.5 mg at 08/03/20 1026 **OR** HYDROmorphone (PF) (DILAUDID) injection 1 mg, 1 mg, IntraVENous, Q4H PRN, Karuna Banda NP, 1 mg at 08/05/20 0945   lip protectant (BLISTEX) ointment 1 Each, 1 Each, Topical, PRN, Pieter Lang MD, 1 Each at 08/04/20 1153   cyclobenzaprine (FLEXERIL) tablet 10 mg, 10 mg, Oral, TID PRN, Pieter Lang MD 
  sodium chloride (NS) flush 5-40 mL, 5-40 mL, IntraVENous, PRN, Pieter Lang MD, 10 mL at 08/06/20 5182   acetaminophen (TYLENOL) tablet 650 mg, 650 mg, Oral, Q6H PRN **OR** acetaminophen (TYLENOL) suppository 650 mg, 650 mg, Rectal, Q6H PRN, Harrison Kerns MD 
  polyethylene glycol (MIRALAX) packet 17 g, 17 g, Oral, DAILY PRN, Harrison Kerns MD 
  promethazine (PHENERGAN) tablet 12.5 mg, 12.5 mg, Oral, Q6H PRN **OR** ondansetron (ZOFRAN) injection 4 mg, 4 mg, IntraVENous, Q6H PRN, Harrison Kerns MD 
 
 
 
Assessment:  
 
Principal Problem: 
  Multiple myeloma ([facility] [address] (1/2/2017) Active Problems: 
  PAF (paroxysmal atrial fibrillation) ([address] (11/26/2017) Chronic systolic heart failure ([facility] [address] (11/26/2017) Personal history of colon cancer (12/27/2018) Overview:  
 
  Multiple myeloma not having achieved remission ([address] (2/13/2019) Chronic pain syndrome (7/31/2020) Cough (7/31/2020) Suspected COVID-19 virus infection (7/31/2020) Thrombocytopenia ([address] (7/31/2020) PNA (pneumonia) (7/31/2020) Odynophagia (7/31/2020) Plan:  
 
Multiple myeloma with pneumonia Family opted for comfort measure care On Ativan and Morphine Change Morphine IV PRN to Dilaudid drip I have discussed the plan of care with patient, nurse, and patient's son Signed By: Wesley Fitzgerald MD   
 August 6, 2020

## 2020-08-06 NOTE — PROGRESS NOTES
CM received call from [facility] with [address] [facility]. Mert Bruno is located in [facility], which would be a great distance from where the patient resides. CM also identified [facility], which would be 34 minutes from the patient's residence, which is equivalent to distance from [facility]. CM also identified distance from the patient's residence to [facility] with [address] in [place]. Total distance would be 45 minutes from the patient's residence. CM will provide the patient's family with this information and discuss plan moving forward. CM continues to follow.

## 2020-08-06 NOTE — PROGRESS NOTES
Hourly rounds completed this shift. Pt was medicated with Morphine per family request. Pt was able to relax and sleep. Turned and repositioned. Remains on comfort measures. Will give report to oncoming RN.

## 2020-08-06 NOTE — PROGRESS NOTES
END OF SHIFT NOTE: 
 
INTAKE/OUTPUT No intake/output data recorded. Voiding: YES Catheter: NO 
Color: per assistant the brief was dry Drain: DIET 
NPO Flatus: Patient does not have flatus present. Stool:  0 occurrences. Characteristics: 
Stool Assessment Stool Color: Ragini Ingles Stool Appearance: Loose, Soft Stool Amount: Large Stool Source/Status: Rectum, Incontinence Ambulating No 
 
Emesis: 0 occurrences. Characteristics: VITAL SIGNS Patient Vitals for the past 12 hrs: 
 Temp Pulse Resp BP SpO2  
08/06/20 1349 (!) 101 °F (38.3 °C) 93 21 (!) 78/44 90 % Pain Assessment Pain Intensity 1: 5 (08/05/20 1520) Pain Location 1: Abdomen Pain Intervention(s) 1: Medication (see MAR) Patient Stated Pain Goal: 0 Zach Guevara RN

## 2020-08-06 NOTE — PROGRESS NOTES
A representative from [address] [place] [address] requested that a set of VS be taken and entered in computer.

## 2020-08-07 NOTE — PROGRESS NOTES
RN called for suspected pt expiration. Pt pulseless, apneic, no unresponsive, no pulse/heart sounds. Time of death 10:15PM. RN to called

## 2020-08-07 NOTE — PROGRESS NOTES
Upon assessment, Pt. Found unresponsive, pulseless, without respirations. MD, supervisor, made aware, and family updated.

## 2020-08-12 ENCOUNTER — APPOINTMENT (OUTPATIENT)
Dept: INFUSION THERAPY | Age: 80
End: 2020-08-12

## 2020-08-12 NOTE — DISCHARGE SUMMARY
Discharge Summary Patient: Yann Butler MRN: [id number]  SSN: [id number] YOB: 1940  Age: [de-identified] y.o. Sex: male Admit Date: 7/31/2020 Discharge Date: 8/12/2020 Admission Diagnoses: Multiple myeloma ([facility] Sh[facility] Labor Discharge Diagnoses:  
Problem List as of 8/6/2020 Date Reviewed: 7/29/2020 Codes Class Noted - Resolved Chronic pain syndrome ICD-10-CM: G89.4 ICD-9-CM: 338.4  7/31/2020 - Present Cough ICD-10-CM: R05 ICD-9-CM: 786.2  7/31/2020 - Present Suspected COVID-19 virus infection ICD-10-CM: Z20.828 ICD-9-CM: V01.79  7/31/2020 - Present Thrombocytopenia ([facility] ICD-10-CM: D69.6 ICD-9-CM: 287.5  7/31/2020 - Present PNA (pneumonia) ICD-10-CM: J18.9 ICD-9-CM: 050  7/31/2020 - Present Odynophagia ICD-10-CM: R13.10 ICD-9-CM: 787.20  7/31/2020 - Present Multiple myeloma not having achieved remission ([facility] ICD-10-CM: C90.00 ICD-9-CM: 203.00  2/13/2019 - Present Personal history of colon cancer ICD-10-CM: B38.416 
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[facility] ICD-10-CM: U71 ICD-9-CM: 193  Unknown - Present Pituitary macroadenoma ([facility] ICD-10-CM: D35.2 ICD-9-CM: 227.3  3/15/2018 - Present PAF (paroxysmal atrial fibrillation) (HCC) ICD-10-CM: I48.0 ICD-9-CM: 427.31  11/26/2017 - Present Chronic systolic heart failure (HCC) ICD-10-CM: I50.22 ICD-9-CM: 428.22  11/26/2017 - Present Pain ICD-10-CM: R52 ICD-9-CM: 780.96  11/20/2017 - Present Malaise and fatigue ICD-10-CM: R53.81, R53.83 ICD-9-CM: 780.79  2015 - Present LBBB (left bundle branch block) ICD-10-CM: I44.7 ICD-9-CM: 426.3  2015 - Present RESOLVED: Sepsis ([facility] ICD-10-CM: A41.9 ICD-9-CM: 038.9, 995.91  2018 - 2018 RESOLVED: MARY (acute kidney injury) ([facility] ICD-10-CM: N17.9 ICD-9-CM: 584.9  2017 - 2018 RESOLVED: Acute renal failure (ARF) (HCC) ICD-10-CM: N17.9 ICD-9-CM: 584.9  2017 - 2018 RESOLVED: Acute kidney injury ([facility] ICD-10-CM: N17.9 ICD-9-CM: 584.9  2017 - 2018 RESOLVED: Pancytopenia ([facility] ICD-10-CM: T09.719 ICD-9-CM: 284.19  2017 - 2018 RESOLVED: Colonic mass ICD-10-CM: O33.11 ICD-9-CM: 569.89  3/23/2016 - 10/1/2018 * (Principal) Multiple myeloma (HCC) ICD-10-CM: C90.00 ICD-9-CM: 203.00  2017 - Present Sepsis on admission with aspiration pneumonia Discharge Condition:  Hospital Course:  
 
Patient with past medical history of AF on Amiodarone and Apixaban, multiple myeloma in chemotherapy, cardiomyopathy with ICD, hypothyrodism, colon cancer history, hypertension, CKD4 , hyperlipidemia.  
  
Admitted due to cough and difficulty swallowing with fever, low platelets.  
  
Patient is treated for aspiration pneumonia, odynophagia with fluconazole. EGD could not be done due to low platelets.  
  
Later,family decided to opt for comfort measures.    
  
Patient was put on PRN morphine and later IV morphine drip for comfort. Patient was pronounced death by night shift coverage at 10:15 PM on 2020. Physical Exam: my last physical exam before his death.  
  
General:                    The patient is an elderly male in no acute respiratory distress. Patient appears chronically ill. He is lying and resting in bed.   
Head:                                   Normocephalic/atraumatic. Eyes:                                   palpebral pallor, no scleral icterus. ENT:                                    External auricular and nasal exam within normal limits.                                             OIVZIA membranes are moist. 
Neck:                                   Supple, non-tender, no JVD. Lungs:                       decreased to auscultation bilaterally without wheezes or crackles.                                             No accessory muscle use. Heart:                                  Regular rate and rhythm, without murmurs, rubs, or gallops. Abdomen:                  Soft, non-tender, non-distended with normoactive bowel sounds. Genitourinary:           No tenderness over the bladder or bilateral CVAs. Extremities:               Without clubbing, cyanosis, or edema. Skin:                                    Normal color, texture, and turgor. No rashes, lesions, or jaundice. Pulses:                      Radial and dorsalis pedis pulses present 2+ bilaterally.  
                                            Capillary refill > 2s. Neurologic:                no neurodeficit. Consults: Gastroenterology, Hematology/Oncology and Nephrology, palliative care Significant Diagnostic Studies: CBC WITH AUTOMATED DIFF [[id number]] (Order [id number]) Lab Date: 8/3/2020  Department: [place] [id number] Med Surg  Released By/Authorizing: Ziyad Jacome NP (auto-released) Component  Value  Flag  Ref Range  Units  Status WBC  2.0  Low Panic    4.3 - 11.1  K/uL  Final   
Comment: RESULTS VERIFIED, PHONED TO AND READ BACK BY  
WESLEY CONNORS @ 6581 9/4/09 MM   
RBC  3.40  Low    4.23 - 5.6  M/uL  Final   
HGB  8.2  Low    13.6 - 17.2  g/dL  Final   
HCT  24.1  Low    41.1 - 50.3  %  Final   
MCV  70.9  Low    79.6 - 97.8  FL  Final   
MCH  24.1  Low    26.1 - 32.9  PG  Final   
MCHC  34.0    31.4 - 35.0  g/dL  Final   
RDW  18.1  High    11.9 - 14.6  %  Final   
 PLATELET  41  Low    150 - 450  K/uL  Final   
MPV      9.4 - 12.3  FL  Final   
Unable to calculate. Recommend adding IPF. ABSOLUTE NRBC  0.09    0.0 - 0.2  K/uL  Final   
Comment: **Note: Absolute NRBC parameter is now reported with Hemogram** NEUTROPHILS  53    43 - 78  %  Final   
LYMPHOCYTES  23    13 - 44  %  Final   
MONOCYTES  6    4.0 - 12.0  %  Final   
EOSINOPHILS  3    0.5 - 7.8  %  Final   
BASOPHILS  2    0.0 - 2.0  %  Final   
IMMATURE GRANULOCYTES  13  High    0.0 - 5.0  %  Final   
ABS. NEUTROPHILS  1.0  Low    1.7 - 8.2  K/UL  Final   
ABS. LYMPHOCYTES  0.5    0.5 - 4.6  K/UL  Final   
ABS. MONOCYTES  0.1    0.1 - 1.3  K/UL  Final   
ABS. EOSINOPHILS  0.1    0.0 - 0.8  K/UL  Final   
ABS. BASOPHILS  0.0    0.0 - 0.2  K/UL  Final   
ABS. IMM. GRANS. 0.3    0.0 - 0.5  K/UL  Final   
RBC COMMENTS  MARKED  
CARLITO CELLS         Final   
RBC COMMENTS  MODERATE  
TARGET CELLS         Final   
WBC COMMENTS           Final   
Result Confirmed By Smear PLATELET COMMENTS  MARKED         Final   
Comment:   
DECREASED   
DF  AUTOMATED         Final   
  
METABOLIC PANEL, BASIC [[id number]] (Order [id number]) Lab Date: 8/3/2020  Department: [facility] Med Surg  Released By/Authorizing: Berhane Mcrae MD (auto-released) Component  Value  Flag  Ref Range  Units  Status Sodium  152  High    136 - 145  mmol/L  Final   
Potassium  3.8    3.5 - 5.1  mmol/L  Final   
Chloride  126  High    98 - 107  mmol/L  Final   
CO2  16  Low    21 - 32  mmol/L  Final   
Anion gap  10    7 - 16  mmol/L  Final   
Glucose  109  High    65 - 100  mg/dL  Final   
Comment:   
47 - 60 mg/dl Consistent with, but not fully diagnostic of hypoglycemia. 101 - 125 mg/dl Impaired fasting glucose/consistent with pre-diabetes mellitus  
> 126 mg/dl Fasting glucose consistent with overt diabetes mellitus BUN  70  High    8 - 23  MG/DL  Final   
Creatinine  5.90  High    0.8 - 1.5  MG/DL  Final   
 GFR est AA  12  Low    >60  ml/min/1.73m2  Final   
GFR est non-AA  10  Low    >60  ml/min/1.73m2  Final   
Comment:   
(NOTE) Estimated GFR is calculated using the Modification of Diet in Renal  
Disease (MDRD) Study equation, reported for both  [facility]) and non- Americans (GFRNA), and normalized to 1.73m2  
body surface area. The physician must decide which value applies to  
the patient. The MDRD study equation should only be used in  
individuals age 25 or older. It has not been validated for the  
following: pregnant women, patients with serious comorbid conditions,  
or on certain medications, or persons with extremes of body size,  
muscle mass, or nutritional status. Calcium  7.8  Low    8.3 - 10.4  MG/DL  Final   
  
XR chest  
2020 IMPRESSION: 
  
1. . There is unchanged mild enlargement of the cardiac silhouette. 
  
2. Low lung volumes with bibasilar atelectasis 
  
US retroperitoneum 2020 IMPRESSION: 
1. No hydronephrosis. 2. Prominent bladder distention with some echogenic debris in the bladder. 3. Right-sided parapelvic renal cyst. 
  
 
 
Disposition:  Discharge Medications:  
Cannot display discharge medications since this patient is not currently admitted. Activity:  Diet:  Wound Care:  No orders of the defined types were placed in this encounter. Signed By: Paul Gillespie MD   
 2020

## 2020-08-12 NOTE — CDMP QUERY
Pt admitted with pneumonia. Pt noted to have dysphagia and odynophagia. If possible, please document in the progress notes and discharge summary if you are evaluating and/or treating any of the following: 
 
Aspiration pneumonia· Gram negative pneumonia ·         Gram positive pneumonia ·         Other, please specify ·         Clinically unable to determine ·         Unknown The medical record reflects the following: 
 
Risk Factors: multiple myeloma in chemotherapy, oral thrush, pneumonia Clinical Indicators:   
--7/31 H&P stating, \"pt had been c/o cough, dry, mild ,later on got worse and productive. Increasing cough since past 2 days. Pt had also noticed difficulty swallowing, says hasnt had much to eat for past 2 days\" --8/1 Speech progress note stating, \"Patient presents with severe dysphagia. Swallows of thin and nectar liquids are delayed, but with adequate laryngeal excursion. However, after approximately 10-15 seconds, patient starts to cough and brings up copious amounts of secretions. Patient reports \"nothing is going down\" and \"it just hangs there\". No further PO trials attempted. Recommend NPO with alternate method for nutrition. \" 
Treatment: NPO, consideration for EGD, GI consult, fluconazole, nystatin, protonix Note: CAP, HAP, and HCAP indicate where the pneumonia was acquired, not a specific type. Thank you, Vy Levin, [address] [place] [address] RN 
[phone]

## 2020-08-12 NOTE — CDMP QUERY
Pt admitted with pneumonia Pt noted to have low WBC, MARY and tempature. If possible, please document in the progress notes and d/c summary if you are evaluating and / or treating any of the following: 
 
? Sepsis, present on admission ? Sepsis, not present on admission ? No Sepsis ? Other, please specify ? Clinically unable to determine The medical record reflects the following: 
   Risk Factors: multiple myeloma in chemotherapy, oral thrush, pneumonia, MARY on CKD4 Clinical Indicators:  
--7/31 through 8/4 WBC 2.3>1.1>1.3>1.3>2.0 
--7/31 LA 1.4 procal 3.51 Creatinine 3.97 
--7/31 VS @1559 Temp 101 HR 88 RR 18 bp 132/66 
--7/31 Arthur@[employer] temp 99.7 HR 90 RR 15 /45 
--7/31 Chest xray: Low lung volumes with bibasilar atelectasis. No pneumothorax or pleural effusion 
--7/31 H&P stating, \"fever,cough- prob pneumonia, high prolactin,cont cefepime. \" Treatment: IV antibiotics, steroids, nephrology consult Thank you, Jose Quiles, [address] [facility] RN 
[phone]

## 2020-08-19 ENCOUNTER — APPOINTMENT (OUTPATIENT)
Dept: INFUSION THERAPY | Age: 80
End: 2020-08-19

## 2022-10-27 NOTE — ACP (ADVANCE CARE PLANNING)
2/13/17:  Patient in for lab only today. Labs reviewed and shown to Dr. Yimi Renee. Patient doing well and will return on 2/28. Statement Selected

## 2023-07-14 NOTE — PROGRESS NOTES
Tolerated infusion without difficulty. Patient discharged via ambulation with cane accompanied by self. Instructed to notify physician of any problems, questions or concerns after discharge. Next appointment is 04/30/19 at 0900 with Infusion with labs and OV prior. Upon entering room, pt asked me to throw some trash and tissues on his bed side table away. After I did, he pointed to his Incentive Spirometer and said \"throw that away too. The girl last night said we don't use those anymore\" I tried to explain to the pt what the IS was used for and he got very angry and said \"Damn it. I AM THE PATIENT and I as the patient am TELLING you to throw that F*ckng thing away\" so I threw the IS in the trash and he then said \"Now. do my treatments and get your butt in gear\". Tx given while pt glared at me.
